# Patient Record
Sex: MALE | Race: WHITE | NOT HISPANIC OR LATINO | Employment: OTHER | URBAN - NONMETROPOLITAN AREA
[De-identification: names, ages, dates, MRNs, and addresses within clinical notes are randomized per-mention and may not be internally consistent; named-entity substitution may affect disease eponyms.]

---

## 2024-03-07 PROBLEM — R06.02 SOB (SHORTNESS OF BREATH) ON EXERTION: Status: ACTIVE | Noted: 2024-03-07

## 2024-03-07 PROBLEM — I82.409 DVT (DEEP VENOUS THROMBOSIS) (MULTI): Status: ACTIVE | Noted: 2024-03-07

## 2024-03-07 PROBLEM — R73.03 PREDIABETES: Status: ACTIVE | Noted: 2024-03-07

## 2024-03-07 PROBLEM — I42.8 NON-ISCHEMIC CARDIOMYOPATHY (MULTI): Status: ACTIVE | Noted: 2024-03-07

## 2024-03-07 PROBLEM — M17.11 PRIMARY OSTEOARTHRITIS OF RIGHT KNEE: Status: ACTIVE | Noted: 2024-03-07

## 2024-03-07 PROBLEM — I50.9: Status: ACTIVE | Noted: 2024-03-07

## 2024-03-07 PROBLEM — I51.7 LVH (LEFT VENTRICULAR HYPERTROPHY): Status: ACTIVE | Noted: 2024-03-07

## 2024-03-07 PROBLEM — R94.39 ABNORMAL STRESS TEST: Status: ACTIVE | Noted: 2024-03-07

## 2024-03-07 PROBLEM — M17.12 PRIMARY OSTEOARTHRITIS OF LEFT KNEE: Status: ACTIVE | Noted: 2024-03-07

## 2024-03-07 PROBLEM — G47.33 OSA ON CPAP: Status: ACTIVE | Noted: 2024-03-07

## 2024-03-07 RX ORDER — NABUMETONE 750 MG/1
1 TABLET, FILM COATED ORAL EVERY 12 HOURS
COMMUNITY
Start: 2021-02-24 | End: 2024-05-08 | Stop reason: ALTCHOICE

## 2024-03-07 RX ORDER — CARVEDILOL 6.25 MG/1
1 TABLET ORAL
COMMUNITY

## 2024-03-07 RX ORDER — SACUBITRIL AND VALSARTAN 24; 26 MG/1; MG/1
1 TABLET, FILM COATED ORAL 2 TIMES DAILY
COMMUNITY
Start: 2022-07-06

## 2024-03-07 RX ORDER — SPIRONOLACTONE 25 MG/1
1 TABLET ORAL DAILY
COMMUNITY
Start: 2022-07-06

## 2024-03-07 RX ORDER — SEMAGLUTIDE 1.34 MG/ML
1 INJECTION, SOLUTION SUBCUTANEOUS WEEKLY
COMMUNITY

## 2024-05-08 ENCOUNTER — OFFICE VISIT (OUTPATIENT)
Dept: CARDIOLOGY | Facility: CLINIC | Age: 69
End: 2024-05-08
Payer: MEDICARE

## 2024-05-08 VITALS
WEIGHT: 315 LBS | BODY MASS INDEX: 44.1 KG/M2 | HEART RATE: 78 BPM | SYSTOLIC BLOOD PRESSURE: 110 MMHG | DIASTOLIC BLOOD PRESSURE: 70 MMHG | HEIGHT: 71 IN

## 2024-05-08 DIAGNOSIS — I42.8 NON-ISCHEMIC CARDIOMYOPATHY (MULTI): Primary | ICD-10-CM

## 2024-05-08 DIAGNOSIS — G47.33 OSA ON CPAP: ICD-10-CM

## 2024-05-08 PROCEDURE — 3008F BODY MASS INDEX DOCD: CPT | Performed by: NURSE PRACTITIONER

## 2024-05-08 PROCEDURE — 1159F MED LIST DOCD IN RCRD: CPT | Performed by: NURSE PRACTITIONER

## 2024-05-08 PROCEDURE — 1160F RVW MEDS BY RX/DR IN RCRD: CPT | Performed by: NURSE PRACTITIONER

## 2024-05-08 PROCEDURE — 99213 OFFICE O/P EST LOW 20 MIN: CPT | Performed by: NURSE PRACTITIONER

## 2024-05-08 RX ORDER — FUROSEMIDE 40 MG/1
40 TABLET ORAL 2 TIMES DAILY
COMMUNITY

## 2024-05-08 NOTE — PATIENT INSTRUCTIONS
Please bring all medicines, vitamins, and herbal supplements with you when you come to the office.    Prescriptions will not be filled unless you are compliant with your follow up appointments or have a follow up appointment scheduled as per instruction of your physician. Refills should be requested at the time of your visit.     PLAN:   Through informed decision making process incorporating patients unique circumstances, the following treatment plan will be initiated:    1.  Prescription drug management of cardiovascular medication for efficacy, adherence to treatment, side effect assessment and polypharmacy. Current treatment clinically warranted and to continue without modifications.    2.  Please complete patient assistance forms and return to office    3. Return for follow-up; in the interim, contact the office if new symptoms arise.  Dr. Caraballo 9 months

## 2024-05-08 NOTE — LETTER
"May 9, 2024     Danay Harkins DO  [address]  [place] [address]    Patient: Reuben Black   YOB: 1955   Date of Visit: 5/8/2024       Dear Dr. Danay Harkins DO:    Thank you for referring Reuben Black to me for evaluation. Below are my notes for this consultation.  If you have questions, please do not hesitate to call me. I look forward to following your patient along with you.       Sincerely,     Liliana Maynard, APRN-CNP      CC: No Recipients  ______________________________________________________________________________________    Chief Complaint  \"Doing good\"    Reason for Visit  9-month follow-up.  Patient presents to the office today for outpatient follow-up for nonischemic cardiomyopathy.  Last evaluated in clinic by Dr. Caraballo July 2023.    Presents today ambulatory with cane and steady gait.  Accompanied by patient  Patient denies any hospitalizations or significant changes to interval medical history since last office follow-up.   He follows routinely with PCP, is due to get annual lab work later this year.  Also follows routinely with the wound clinic.    History of Present Illness   Patient is a very pleasant 69-year-old gentleman who presents to the office today with no voiced cardiovascular complaints.  He continues to follow at the wound clinic due to right lower extremity ulcers, has noted some slight increasing lower extremity edema.  From an activity standpoint he is able to go to [employer], walks into the casino with no change in his functional class II shortness of breath.  He denies any exertional chest pain.  No orthopnea or PND.  Remains compliant with CPAP treatment.    Only concern today is cost of medications.  Apparently has now in the donut hole.  He is on Xarelto due to a prior right lower extremity DVT.    He has some slight increase in his lower extremity edema.  Due to office visits he has not been taking his Lasix twice daily.  Is " "unable to tolerate compression stockings.    Lower extremity wounds, managed by wound clinic.  Denies any prior history of PAD.    Patient reports that overall has no complaint(s) of chest pain, chest pressure/discomfort, claudication, dyspnea, exertional chest pressure/discomfort, fatigue, and irregular heart beat    Review of Systems   Cardiovascular:  Positive for leg swelling. Negative for chest pain, dyspnea on exertion, irregular heartbeat, near-syncope, orthopnea, palpitations, paroxysmal nocturnal dyspnea and syncope.        Visit Vitals  /70 (BP Location: Left arm, Patient Position: Sitting)   Pulse 78   Ht 1.803 m (5' 11\")   Wt (!) 171 kg (378 lb)   BMI 52.72 kg/m²   Smoking Status Never   BSA 2.93 m²     Physical Exam  Vitals and nursing note reviewed.   Constitutional:       Appearance: Normal appearance.   Cardiovascular:      Rate and Rhythm: Normal rate and regular rhythm.      Heart sounds: Normal heart sounds.   Pulmonary:      Effort: Pulmonary effort is normal.      Breath sounds: Normal breath sounds.   Musculoskeletal:      Cervical back: Full passive range of motion without pain.      Right lower le+ Pitting Edema present.      Left lower le+ Pitting Edema present.   Skin:     General: Skin is cool.   Neurological:      Mental Status: He is alert and oriented to person, place, and time.   Psychiatric:         Attention and Perception: Attention normal.         Mood and Affect: Mood normal.         Behavior: Behavior is cooperative.        Allergies   Allergen Reactions   • Cephalosporins Rash   • Diclofenac Sodium Rash   • Statins-Hmg-Coa Reductase Inhibitors Rash     Current Outpatient Medications   Medication Instructions   • carvedilol (Coreg) 6.25 mg tablet 1 tablet, oral, 2 times daily with meals   • empagliflozin (Jardiance) 10 mg 1 tablet, oral, Daily   • furosemide (LASIX) 40 mg, oral, 2 times daily   • Ozempic 1 mg, subcutaneous, Weekly   • rivaroxaban (Xarelto) 10 mg " tablet 1 tablet, oral, Daily   • sacubitriL-valsartan (Entresto) 24-26 mg tablet 1 tablet, oral, 2 times daily   • spironolactone (Aldactone) 25 mg tablet 1 tablet, oral, Daily      Assessment:    Pleasant 69-year-old gentleman presents for routine follow-up.  Nonischemic cardiomyopathy LVEF 42% baseline functional class II stage C on highest tolerated dose of guideline directed medical treatment.  Will provide with patient assistance forms due to cost constraints: Discussed that if needed could transition back over to Cozaar, price check Farxiga.    Overall patient is pleased with current state of cardiovascular health.  At this time there are no indications for additional cardiovascular testing or need for medication changes.     Non-ischemic cardiomyopathy (Multi)  NICM HF bordeline EF 42% Feb 2023 MUGA (2009 cath minimal CAD EF 40-45%)  FC II Stage C    GDMT  Coreg  Jardiance  Entresto  Aldactone    BMI 50.0-59.9, adult (Multi)  Reviewed the merits of healthy lifestyle choices on overall cardiovascular health.  He had lost weight with Ozempic in the past, I believe there was some difficulty obtaining the prescription.  Just recently resumed.    Plan:     Through informed decision making process incorporating patients unique circumstances, the following treatment plan will be initiated:    1.  Prescription drug management of cardiovascular medication for efficacy, adherence to treatment, side effect assessment and polypharmacy. Current treatment clinically warranted and to continue without modifications.    2.  Please complete patient assistance forms and return to office    3. Return for follow-up; in the interim, contact the office if new symptoms arise.  Dr. Caraballo 9 months    Liliana Maynard  MSN, APRN-CNP, PMHNP-BC  [facility]    Please excuse any errors in grammar or translation related to this dictation. Voice recognition software was utilized to prepare this document.

## 2024-05-09 ASSESSMENT — ENCOUNTER SYMPTOMS
SYNCOPE: 0
PALPITATIONS: 0
IRREGULAR HEARTBEAT: 0
NEAR-SYNCOPE: 0
DYSPNEA ON EXERTION: 0
ORTHOPNEA: 0
PND: 0

## 2024-05-09 NOTE — ASSESSMENT & PLAN NOTE
NICM HF bordeline EF 42% Feb 2023 MUGA (2009 cath minimal CAD EF 40-45%)  FC II Stage C    GDMT  Coreg  Jardiance  Entresto  Aldactone

## 2024-05-09 NOTE — ASSESSMENT & PLAN NOTE
Reviewed the merits of healthy lifestyle choices on overall cardiovascular health.  He had lost weight with Ozempic in the past, I believe there was some difficulty obtaining the prescription.  Just recently resumed.

## 2024-05-09 NOTE — PROGRESS NOTES
"Chief Complaint  \"Doing good\"    Reason for Visit  9-month follow-up.  Patient presents to the office today for outpatient follow-up for nonischemic cardiomyopathy.  Last evaluated in clinic by Dr. Caraballo July 2023.    Presents today ambulatory with cane and steady gait.  Accompanied by patient  Patient denies any hospitalizations or significant changes to interval medical history since last office follow-up.   He follows routinely with PCP, is due to get annual lab work later this year.  Also follows routinely with the wound clinic.    History of Present Illness   Patient is a very pleasant 69-year-old gentleman who presents to the office today with no voiced cardiovascular complaints.  He continues to follow at the wound clinic due to right lower extremity ulcers, has noted some slight increasing lower extremity edema.  From an activity standpoint he is able to go to [employer], walks into the casino with no change in his functional class II shortness of breath.  He denies any exertional chest pain.  No orthopnea or PND.  Remains compliant with CPAP treatment.    Only concern today is cost of medications.  Apparently has now in the donut hole.  He is on Xarelto due to a prior right lower extremity DVT.    He has some slight increase in his lower extremity edema.  Due to office visits he has not been taking his Lasix twice daily.  Is unable to tolerate compression stockings.    Lower extremity wounds, managed by wound clinic.  Denies any prior history of PAD.    Patient reports that overall has no complaint(s) of chest pain, chest pressure/discomfort, claudication, dyspnea, exertional chest pressure/discomfort, fatigue, and irregular heart beat    Review of Systems   Cardiovascular:  Positive for leg swelling. Negative for chest pain, dyspnea on exertion, irregular heartbeat, near-syncope, orthopnea, palpitations, paroxysmal nocturnal dyspnea and syncope.        Visit Vitals  /70 (BP Location: Left arm, " "Patient Position: Sitting)   Pulse 78   Ht 1.803 m (5' 11\")   Wt (!) 171 kg (378 lb)   BMI 52.72 kg/m²   Smoking Status Never   BSA 2.93 m²     Physical Exam  Vitals and nursing note reviewed.   Constitutional:       Appearance: Normal appearance.   Cardiovascular:      Rate and Rhythm: Normal rate and regular rhythm.      Heart sounds: Normal heart sounds.   Pulmonary:      Effort: Pulmonary effort is normal.      Breath sounds: Normal breath sounds.   Musculoskeletal:      Cervical back: Full passive range of motion without pain.      Right lower le+ Pitting Edema present.      Left lower le+ Pitting Edema present.   Skin:     General: Skin is cool.   Neurological:      Mental Status: He is alert and oriented to person, place, and time.   Psychiatric:         Attention and Perception: Attention normal.         Mood and Affect: Mood normal.         Behavior: Behavior is cooperative.        Allergies   Allergen Reactions    Cephalosporins Rash    Diclofenac Sodium Rash    Statins-Hmg-Coa Reductase Inhibitors Rash     Current Outpatient Medications   Medication Instructions    carvedilol (Coreg) 6.25 mg tablet 1 tablet, oral, 2 times daily with meals    empagliflozin (Jardiance) 10 mg 1 tablet, oral, Daily    furosemide (LASIX) 40 mg, oral, 2 times daily    Ozempic 1 mg, subcutaneous, Weekly    rivaroxaban (Xarelto) 10 mg tablet 1 tablet, oral, Daily    sacubitriL-valsartan (Entresto) 24-26 mg tablet 1 tablet, oral, 2 times daily    spironolactone (Aldactone) 25 mg tablet 1 tablet, oral, Daily      Assessment:    Pleasant 69-year-old gentleman presents for routine follow-up.  Nonischemic cardiomyopathy LVEF 42% baseline functional class II stage C on highest tolerated dose of guideline directed medical treatment.  Will provide with patient assistance forms due to cost constraints: Discussed that if needed could transition back over to Cozaar, price check Farxiga.    Overall patient is pleased with current " state of cardiovascular health.  At this time there are no indications for additional cardiovascular testing or need for medication changes.     Non-ischemic cardiomyopathy (Multi)  NICM HF bordeline EF 42% Feb 2023 MUGA (2009 cath minimal CAD EF 40-45%)  FC II Stage C    GDMT  Coreg  Jardiance  Entresto  Aldactone    BMI 50.0-59.9, adult (Multi)  Reviewed the merits of healthy lifestyle choices on overall cardiovascular health.  He had lost weight with Ozempic in the past, I believe there was some difficulty obtaining the prescription.  Just recently resumed.    Plan:     Through informed decision making process incorporating patients unique circumstances, the following treatment plan will be initiated:    1.  Prescription drug management of cardiovascular medication for efficacy, adherence to treatment, side effect assessment and polypharmacy. Current treatment clinically warranted and to continue without modifications.    2.  Please complete patient assistance forms and return to office    3. Return for follow-up; in the interim, contact the office if new symptoms arise.  Dr. Caraballo 9 months    Liliana Maynard  MSN, APRN-CNP, PMHNP-BC  [facility]    Please excuse any errors in grammar or translation related to this dictation. Voice recognition software was utilized to prepare this document.

## 2024-07-16 DIAGNOSIS — R73.03 PREDIABETES: ICD-10-CM

## 2024-07-22 DIAGNOSIS — I50.9 HEART FAILURE, NYHA CLASS 2 (MULTI): ICD-10-CM

## 2024-07-23 RX ORDER — SPIRONOLACTONE 25 MG/1
25 TABLET ORAL DAILY
Qty: 90 TABLET | Refills: 3 | Status: SHIPPED | OUTPATIENT
Start: 2024-07-23 | End: 2025-07-23

## 2024-08-19 NOTE — W.VEIN
Discharge Plan
Discharge
Disposition: Home, Self-Care

Outpatient Diagnostics:
VC Endovenous Ablation 1VeinRT  (Routine)  Timeframe:  1 Month
   Facility: [facility] - Location: [facility]
   Ordered By:  Alonso Nation

Follow Up Appointments: 09/11/2024 


Plan of Treatment:
EVLT of right AASV

Patient Instructions:  Endovenous Ablation (DC)

Print Language: English

Discharge Date/Time: 08/20/24 12:07

## 2024-08-19 NOTE — P.DS_ITS
Discharge Plan    
Discharge    
Disposition: Home, Self-Care    
    
Outpatient Diagnostics:    
VC Endovenous Ablation 1VeinRT  (Routine)  Timeframe:  1 Month    
   Facility: [facility] - Location: [facility]    
   Ordered By:  Alonso Nation    
    
Follow Up Appointments: 09/11/2024     
    
    
Plan of Treatment:    
EVLT of right AASV    
    
Patient Instructions:  Endovenous Ablation (DC)    
    
Print Language: English    
    
Discharge Date/Time: 08/20/24 12:07

## 2024-08-19 NOTE — V.VEINS.HP
Vital Signs
 08/20/24
10:31
Height 5 ft 11 in
Weight 166.922 kg
BMI 51.3
/74
BP Location Left Brachial
BP Position Sitting
BP Cuff Size Adult
BP Source Manual Cuff
Respiration 20
Pulse 75
Pulse Source Monitor
Pulse Oximetry (%) 94 L
Oxygen Delivery Method Room Air
Comment The patient's blood pressure is elevated.

Varicose Veins
Patient is a 69 year old male in this day as a referral from Elaine Espino NP at [facility] secondary to cellulitis to right lower leg along with multiple slow healing wounds to right lower anterior and posterior lower leg which initially 
started in May 2024 after his C-Pap machine fell on his right leg.  Patient has since worn an uniboot to right leg with little to no progress in healing right leg ulcers.  Patient previous to this has had varicose vein disease and treatments 
performed by Dr. Cameron to bilateral leg varicose veins.  Patient also experienced a spontaneous DVT to right lower leg.  Patient has worn bilateral leg knee high compression stockings >5years.  Patient states no family history of varicose vein 
disease.  Patient c/o bilateral leg pain, edema, bulging veins, heaviness.  Patient rates pain at a  10  on a scale of 1-10, with right leg worse than left leg.




IAlonso MD personally performed the services described in this documentation, as scribed by Khang Bahena RN in my presence and it is both accurate and complete.



IKhang RN, am scribing for, and in the presence of, Dr. Alonso Nation and in the presence of the patient.



.




thigh: bilateral (right leg > left leg), knee: bilateral, calf: bilateral, ankle: bilateral and shin: bilateral
aching, burning, cramping, dull and tender
10
3 years
Worsened in recent months: Yes (last 5 months)
standing, sitting and walking
analgesics (Tylenol), elevating extremities, compression stockings and wraps
Reports muscle spasms of leg, fatigue, heaviness, limb pain, edema and leg edema
History of lower extremity trauma: Yes (a piece of cpap machine fell and hit right lower leg resulting in hemtoma)
Superficial thrombophlebitis: Yes (DVT right leg)
Family history of varicose veins: no
Has patient had previous lower extremity venous surgery: Yes
Patient has previously received the following treatment(s) for lower extremity varicose veins: Reports foam therapy and laser therapy
Does patient have a history of pregnancy: no
Has patient had lower extremity venous scan with relux testing: Yes
Support hose used: Yes
Problems walking or doing physical activity: Yes
How does it affect you: pain decreases ability to walk
Do you walk much: No
Do you stand much: Yes

Review of Systems
ROS  
 Narrative [id number] not well approximated wound to right mid-distal anterior lower leg




IAlonso MD personally performed the services described in this documentation, as scribed by Khang Bahena RN in my presence and it is both accurate and complete.



I, Khang Bahena RN, am scribing for, and in the presence of, Dr. Alonso Nation and in the presence of the patient.
 
.   
 Status of ROS 10 or more systems reviewed and unremarkable except as noted in history and below   
 Cardiovascular Reports: edema   
 Integumentary/Breast Reports: itching, redness, skin pain, skin tenderness, skin swelling, non-healing lesion and changes in skin color   
 Neurological Reports: numbness in extremities and weakness in extremities   
 Hematologic/Lymphatic Reports: easy bruising and easy bleeding   

PFSH
PFS
Medical History (Updated 08/20/24 @ 10:58 by Khang Bahena)

Varicose veins of bilateral lower extremities with pain
 ?I83.813 - Varicose veins of bilateral lower extremities with pain (ICD-10)
Lymphedema of both lower extremities
 ?I89.0 - Lymphedema, not elsewhere classified (ICD-10)
Arthritis
 ?M19.90 - Unspecified osteoarthritis, unspecified site (ICD-10)
Hypertension
 ?I10 - Essential (primary) hypertension (ICD-10)
Deep vein thrombosis
 ?I82.409 - Acute embolism and thrombosis of unspecified deep veins of unspecified lower extremity (ICD-10)
Cardiomyopathy
 ?I42.9 - Cardiomyopathy, unspecified (ICD-10)
Carpal tunnel syndrome
 ?G56.00 - Carpal tunnel syndrome, unspecified upper limb (ICD-10)
Peripheral vascular disease
 ?I73.9 - Peripheral vascular disease, unspecified (ICD-10)
Ulcer of right leg
 ?L97.919 - Non-pressure chronic ulcer of unspecified part of right lower leg with unspecified severity (ICD-10)
Coronary artery disease
 ?I25.10 - Atherosclerotic heart disease of native coronary artery without angina pectoris (ICD-10)
CHF (congestive heart failure)
 ?I50.9 - Heart failure, unspecified (ICD-10)
Obstructive sleep apnea
 ?G47.33 - Obstructive sleep apnea (adult) (pediatric) (ICD-10)
Chronic back pain
 ?M54.9 - Dorsalgia, unspecified (ICD-10)
 ?G89.29 - Other chronic pain (ICD-10)
Postphlebetic syndrome with inflammation
 ?I87.029 - Postthrombotic syndrome with inflammation of unspecified lower extremity (ICD-10)
Type 2 diabetes mellitus
 ?E11.9 - Type 2 diabetes mellitus without complications (ICD-10)


Surgical History (Updated 08/20/24 @ 10:54 by Khang Bahena)

History of carpal tunnel surgery of right wrist
 ?Z98.890 - Other specified postprocedural states (ICD-10)
History of cholecystectomy
 ?Z90.49 - Acquired absence of other specified parts of digestive tract (ICD-10)


Family History (Updated 08/19/24 @ 11:42 by Khang Bahena)
Sister
 Family history of diabetes mellitus
Father
 Family history of diabetes mellitus
Other
 Family history of CHF (congestive heart failure)
 Family history of hypertension
 Family history of myocardial infarction



Social History (Updated 08/20/24 @ 10:55 by Khang Bahena)
Within the past year, how often did you have a drink containing alcohol:  never 
Score interpretation:  A score less than 4 is consistent with normal alcohol consumption. 
Smoking status:  Never smoker 
Non-prescribed substance use:  denies use 



Meds
Home Medications and Allergies
Home Medications

?Medication ?Instructions ?Recorded ?Confirmed ?Type
Lactobacillus acidophilus 10 100 mmu cells PO DAILY 08/19/24 08/19/24 History
billion cell capsule    
acetaminophen 500 mg capsule 500 mg PO Q6H PRN pain 08/19/24 08/19/24 History
carvedilol 6.25 mg tablet 6.25 mg PO BID 08/19/24 08/19/24 History
empagliflozin 10 mg-linagliptin 5 1 tab PO DAILY 08/19/24 08/19/24 History
mg tablet    
furosemide 40 mg tablet 40 mg PO BID 08/19/24 08/19/24 History
rivaroxaban 10 mg tablet 10 mg PO DAILY 08/19/24 08/19/24 History
sacubitril 24 mg-valsartan 26 mg 1 tab PO BID 08/19/24 08/19/24 History
tablet    
semaglutide 0.25 mg or 0.5 mg (2 0.25 mg subcut QWEEK 08/19/24 08/19/24 History
mg/3 mL) subcutaneous pen injector    
(Ozempic)    
spironolactone 25 mg tablet 25 mg PO DAILY 08/19/24 08/19/24 History
(Aldactone)    


Allergies

Allergy/AdvReac Type Severity Reaction Status Date / Time
cefixime Allergy  Unknown Verified 08/19/24 11:44
diclofenac Allergy  Unknown Verified 08/19/24 11:44



Exam
Narrative
Exam Narrative: 
[id number] not well approximated wound noted to right mid-distal anterior lower leg


Alonso TOSCANO MD personally performed the services described in this documentation, as scribed by Khang Bahena RN in my presence and it is both accurate and complete.



IKhang RN, am scribing for, and in the presence of, Dr. Alonso Nation and in the presence of the patient.
Constitutional
Documenting provider has reviewed patient's vital signs: yes
Common normals: oriented x3
Nutritional appearance: overweight
Cardio
Peripheral pulses: posterior tibial pulses present and dorsalis pedis pulses present
Extremity
Common normals: normal capillary refill
General: calf tenderness and edema
Right lower extremity: lower leg Right lower leg: inspection and palpation
Left lower extremity: lower leg Left lower leg: inspection and palpation
Neuro
Common normals: oriented x3

Results
Additional Findings
Additional findings: 
Bilateral leg reflux u/s reveals moderate venous insufficiency with saphenofemoral junction reflux and dilatation right AASV, incompetent perforating vein right right leg subjacent to ulcerations, bilateral leg incompetent truncal tributary branch 
saphenous varicosities.



Alonso TOSCANO MD personally performed the services described in this documentation, as scribed by Khang Bahena RN in my presence and it is both accurate and complete.



Khang TOSCANO RN, am scribing for, and in the presence of, Dr. Alonso Nation and in the presence of the patient.

Assessment and Plan
Assessment and Plan
(1) Varicose veins of bilateral lower extremities with pain: 

Plan
Dr. Nation examines patient and reviews results of bilateral leg reflux u/s.  Patient and Dr. Nation create plan of care.
Plan is for patient to continue us of bilateral leg compression stockings, rest, and elevation of bilateral legs/feet.
Patient to return for EVLT of right AASV, right leg perforating veins, and lastly microfoam chemical ablation bilateral leg branch saphenous varicosities. 


Alonso TOSCANO MD personally performed the services described in this documentation, as scribed by Khang Bahena RN in my presence and it is both accurate and complete.



I, Khang Bahena RN, am scribing for, and in the presence of, Dr. Alonso Nation and in the presence of the patient.

## 2024-08-20 ENCOUNTER — HOSPITAL ENCOUNTER
Dept: HOSPITAL 101 - VC | Age: 69
Discharge: HOME | End: 2024-08-20
Payer: MEDICARE

## 2024-08-20 VITALS — SYSTOLIC BLOOD PRESSURE: 120 MMHG | HEART RATE: 75 BPM | DIASTOLIC BLOOD PRESSURE: 74 MMHG | OXYGEN SATURATION: 94 %

## 2024-08-20 VITALS — BODY MASS INDEX: 51.3 KG/M2

## 2024-08-20 DIAGNOSIS — I89.0: ICD-10-CM

## 2024-08-20 DIAGNOSIS — Z87.2: ICD-10-CM

## 2024-08-20 DIAGNOSIS — R60.0: Primary | ICD-10-CM

## 2024-08-20 PROCEDURE — G0463 HOSPITAL OUTPT CLINIC VISIT: HCPCS

## 2024-08-20 PROCEDURE — 93970 EXTREMITY STUDY: CPT

## 2024-08-20 NOTE — VEIN_ITS
Patient Name:      
JOCELIN ARORA 
      
MR#: [id number]      
: 1955 
Accession #: [id number] 
Exam Date: 2024  
Ordering Doctor: JAMEY ESPINO 
  
RADIOLOGY REPORT 
  
PROCEDURE:     [facility] COMPREHENSIVE 
  
VEIN CENTER - OFFICE VISIT INITIAL 
  
COMPARISON: None. 
  
PROGRESS NOTES: 
  
69-year-old male sent from [facility] Espino at the [facility].  The  
patient had trauma to the right lower leg when his CPAP machine fell on him in  
May of 2024. The patient developed multiple leg ulcerations which have not  
healed.  The patient did have cellulitis treated with oral antibiotics.  The  
patient complains of bilateral leg pain and swelling, right much worse than  
the left.  The patient's symptoms are exacerbated by prolonged sitting and  
standing and only partially relieved by rest, leg elevation and compression  
stockings which she has worn for approximately 5 years.  The patient has  
previously been treated by gretchen meyer with intravenous laser ablation of  
his great saphenous veins.  The patient denies any signs and symptoms to  
suggest arterial ischemia. 
  
The patient describes a family history of diabetes congestive heart failure,  
hypertension and myocardial infarction.  Patient's past medical history is  
extensive with significant medical history including bilateral lower extremity  
lymphedema, arthritis, deep vein thrombosis, cardiomyopathy with coronary  
artery disease and congestive heart failure.  Post phlebitic syndrome with  
inflammation.  Past surgical history for carpal tunnel syndrome and  
cholecystectomy.  Type 2 diabetes.  The patient does not drink alcohol and has  
never smoked.  No substance or prescription abuse.  
  
See separate history and physical for medication list.  Nursing notes were  
reviewed 
  
After review of nurse notes, history and physical exam I discussed at length  
the pathophysiology of venous hypertension and possible treatments, therapies  
and strategies available. We discussed at length the importance of elevating  
the lower extremities above the level of the heart, increased physical  
activity and compression stocking use.  I discussed with the patient that his  
symptoms were likely multifactorial related to congestive heart failure  
lymphedema with a tributary sector of venous disease.  Treatment of his vein  
disease but hopefully allow healing of his ulcerations but would not likely  
resolve all of his symptoms. 
  
Ultrasound venous reflux study performed the same day was discussed at length  
with the patient. The report demonstrates moderate dilation of venous  
insufficiency of the right anterior accessory saphenous vein.  Multiple  
incompetent perforating veins in the region the patient's right leg venous  
stasis ulcerations period bilateral incompetent varicose veins. 
  
  
PHYSICAL EXAM: 
  
The right leg demonstrates diffuse subcutaneous edema.  Scattered varicose  
reticular and spider veins.  Hemosiderin staining.  Marked erythema mid calf  
to ankle with multiple skin ulcerations the largest measuring 5 cm in diameter  
along the mid to distal anterolateral lower leg.  Multiple small or  
ulcerations are observed.   
  
The left leg demonstrates scattered varicose, reticular and spider veins.   
Hemosiderin staining.  No skin ulceration. 
  
Both thighs, legs and feet were symmetrically warm to the touch. Good  
posterior tibial and dorsalis pedis pulses were not definitively palpated. 
  
ORDER #: [id number] VEIN/VC Facility EST Comprehensive  
IMPRESSION:  
   
1.  Right anterior accessory saphenous and perforating vein venous   
insufficiency   
2.  Moderate bilateral lower extremity varicose veins  
3.  Moderate lower extremity subcutaneous edema, right greater than left   
4. Multiple right leg ulcerations measuring up to 5 cm  
5. CEAP: C6, Ep, Asp, Pr  
   
   
PLAN:  
1. Endovenous laser ablation of the right anterior accessory saphenous vein   
followed by right leg incompetent perforating veins  
2. Micro foam chemical ablation bilateral incompetent varicose veins  
3. Long-term use of bilateral knee or thigh-high 20-30 mm compression   
stockings  
4. Continue leg elevation with increased physical activity for symptomatic   
relief  
   
Nurse notes, history and physical were reviewed and confirmed, see attached   
forms.  
The nurse was present throughout the physical exam and consultation  
   
   
   
Dictated by: Alonso Nation MD on 2024 at 12:00       
Approved by: Alonso Nation MD on 2024 at 12:11

## 2024-08-20 NOTE — VEIN_ITS
Patient Name:      
JOCELIN ARORA 
      
MR#: [id number]      
: 1955 
Accession #: [id number] 
Exam Date: 2024  
Ordering Doctor: JAMEY RAMÍREZ 
  
RADIOLOGY REPORT 
  
PROCEDURE:     VC EXT VENOUS REFLUX TAMIKO LMTD 
  
COMPARISON:     None. 
  
INDICATIONS:     Z87.2, R60.0, I89.0 
  
TECHNIQUE:     Duplex imaging of the lower extremity to assess the deep and  
superficial venous system for the presence of deep or superficial venous  
incompetence and to document the location and severity of disease.  The study  
includes evaluation of the great saphenous vein (GSV), anterior accessory  
saphenous vein (AASV) and small saphenous vein (SSV).  Patient scanned in  
reverse Trendelenburg and standing. 
FINDINGS:      
RIGHT LOWER EXTREMITY: 
Saphenofemoral Junction Reflux: Yes 11.9mm 2.9 sec 
GSV:     Diam (mm)      Reflux/ Time (sec) 
Proximal Thigh          N/A    
Mid Thigh          N/A 
Distal Thigh          N/A 
Prox Calf          N/A 
Mid Calf          N/A    
Saphenopopliteal Junction Reflux:  3.5mm        No    
SSV:            
Proximal Calf     3.6     Yes   0.5 
Mid Calf     2.6     No    
AASV:            
Proximal Thigh     7.3     Yes   1.7 
Mid Thigh     8.0     Yes   0.8 
Distal Thigh              
Thrombi:     Chronic thrombus visualized in Pop V.       
Compressibility:     Normal       
Flow:     1.0s reflux visualized in Pop V.      
Preforator: 
Mid/med calf 6.6mm with 0.6s reflux. Mid/ant calf lateral to wound 4.5mm with  
0.7s reflux. Dist/lateral/posterior 3.8mm with 0.5s reflux.  
Tech Note: Incompetent AASV. Patent varicose vein prox/med calf 6.6mm with  
0.9s reflux. Patent varicose vein 4.8mm with 0.7s reflux. Patent varicose vein  
mid/med thigh 4.7mm with 0.9s reflux.  
  
  
  
  
LEFT LOWER EXTREMITY:  
Saphenofemoral Junction Reflux: No 6.5 mm sec 
GSV:     Diam (mm)     Reflux/Time (sec) 
Proximal  Thigh            N/A      
Mid   Thigh          N/A      
Distal  Thigh          N/A       
Prox  Calf          N/A      
Mid  Calf          N/A     
Saphenopopliteal Junction Relux:     2.7 mm     No      
SSV:           
Proximal  Calf     2.5     No   
Mid  Calf     2.2     No      
AASV:  Not present           
Proximal  Thigh                 
Mid  Thigh                 
Distal  Thigh                 
Thrombi:     No acute or chronic thrombus visualized      
Compressibility:     Normal       
Flow:     Normal       
: 
Dist/med calf 3.6mm with 0s reflux. Mid/med calf 3.7mm with 0s reflux. Mid/med  
thigh  5.6mm with 0.8s reflux.  
Tech Note: Patent varicose vein mid/med calf 3.7mm with 0.8s reflux. Arising  
off of mid/med thigh , is a patent varicose vein 5.2mm with 1.5s  
reflux.  
  
  
  
CONCLUSION:  
  
1. Moderate venous insufficiency with saphenofemoral junction reflux and  
dilatation right anterior accessory saphenous vein 
2. Incompetent perforating veins in the right leg subjacent to ulcerations 
3. Bilateral incompetent varicose veins with the left varicose veins arising  
off in incompetent perforating vein 
  
  
Dictated by: Alonso Nation MD on 2024 at 11:41      
Approved by: Alonso Nation MD on 2024 at 11:44

## 2024-09-09 NOTE — W.VEIN
Discharge Plan
Discharge
Disposition: Home, Self-Care

Outpatient Diagnostics:
VC Facility EST LMTD  (Routine)  Timeframe:  2 Weeks
   Facility: [facility] - Location: Vein Center
   Ordered By:  Alonso Nation
VC EXT Venous RT LMTD  (Routine)  Timeframe:  2 Weeks
   Facility: [facility] - Location: Vein Center
   Ordered By:  Alonso Nation

Follow Up Appointments: 09/18/2024 


Plan of Treatment:
f/u evaluation with physician along with right leg limited u/s

Patient Instructions:  Endovenous Ablation (DC)

Print Language: English

Discharge Date/Time: 09/11/24 13:50

## 2024-09-09 NOTE — VEINCLINIC_ITS
Vital Signs    
    
    
                                    09/11/24    
                                 13:39  09/11/24    
                                      13:49    
     
Height                             5 ft 11 in    
     
Weight                               166 kg    
     
BP                                   116/64     
     
BP Location                      Left Brachial     
     
BP Position                         Sitting     
     
BP Cuff Size                         Adult     
     
BP Source                         Manual Cuff     
     
Respiration                            20     
     
Pulse                                  82     
     
Pulse Source                        Monitor     
     
Pulse Oximetry (%)                     96     
     
Oxygen Delivery Method              Room Air     
    
    
Varicose Veins    
Patient in this day for EVLT of right GSV.    
    
    
    
    
Alonso TOSCNAO MD personally performed the services described in this   
documentation, as scribed by Khang Bahena RN in my presence and it is both accurate  
and complete.    
    
    
    
IKhang RN, am scribing for, and in the presence of, Dr. Alonso Nation and in   
the presence of the patient.    
    
    
    
.    
    
    
    
    
thigh: bilateral (right leg > left leg), knee: bilateral, calf: bilateral,   
ankle: bilateral and shin: bilateral    
aching, burning, cramping, dull and tender    
10    
3 years    
Worsened in recent months: Yes (last 5 months)    
standing, sitting and walking    
analgesics (Tylenol), elevating extremities, compression stockings and wraps    
Reports muscle spasms of leg, fatigue, heaviness, limb pain, edema and leg edema    
History of lower extremity trauma: Yes (a piece of cpap machine fell and hit   
right lower leg resulting in hemtoma)    
Superficial thrombophlebitis: Yes (DVT right leg)    
Family history of varicose veins: no    
Has patient had previous lower extremity venous surgery: Yes    
Patient has previously received the following treatment(s) for lower extremity   
varicose veins: Reports foam therapy and laser therapy    
Does patient have a history of pregnancy: no    
Has patient had lower extremity venous scan with relux testing: Yes    
Support hose used: Yes    
Problems walking or doing physical activity: Yes    
How does it affect you: pain decreases ability to walk    
Do you walk much: No    
Do you stand much: Yes    
    
Review of Systems    
    
    
ROS      
    
 Narrative No changes noted.    
    
    
    
    
Alonso TOSCANO MD personally performed the services described in this   
documentation, as scribed by Khang Bahena RN in my presence and it is both accurate  
and complete.    
    
    
    
IKhang RN, am scribing for, and in the presence of, Dr. Alonso Nation and in   
the presence of the patient.    
     
                                        .       
    
 Status of ROS                          10 or more systems reviewed and unremark    
able except as noted in     
history and below       
    
 Cardiovascular Reports: edema       
    
 Integumentary/Breast Reports: itching, redness, skin pain, skin tenderness,   
skin swelling, non-healing lesion and changes in skin color       
    
 Neurological Reports: numbness in extremities and weakness in extremities       
    
 Hematologic/Lymphatic Reports: easy bruising and easy bleeding       
    
    
[facility]    
Formerly [facility], [facility]    
Medical History (Updated 08/20/24 @ 10:58 by Khang Bahena)    
    
Varicose veins of bilateral lower extremities with pain    
   ?I83.813 - Varicose veins of bilateral lower extremities with pain (ICD-10)    
Lymphedema of both lower extremities    
   ?I89.0 - Lymphedema, not elsewhere classified (ICD-10)    
Arthritis    
   ?M19.90 - Unspecified osteoarthritis, unspecified site (ICD-10)    
Hypertension    
   ?I10 - Essential (primary) hypertension (ICD-10)    
Deep vein thrombosis    
   ?I82.409 - Acute embolism and thrombosis of unspecified deep veins of   
   unspecified lower extremity (ICD-10)    
Cardiomyopathy    
   ?I42.9 - Cardiomyopathy, unspecified (ICD-10)    
Carpal tunnel syndrome    
   ?G56.00 - Carpal tunnel syndrome, unspecified upper limb (ICD-10)    
Peripheral vascular disease    
   ?I73.9 - Peripheral vascular disease, unspecified (ICD-10)    
Ulcer of right leg    
   ?L97.919 - Non-pressure chronic ulcer of unspecified part of right lower leg   
   with unspecified severity (ICD-10)    
Coronary artery disease    
   ?I25.10 - Atherosclerotic heart disease of native coronary artery without   
   angina pectoris (ICD-10)    
CHF (congestive heart failure)    
   ?I50.9 - Heart failure, unspecified (ICD-10)    
Obstructive sleep apnea    
   ?G47.33 - Obstructive sleep apnea (adult) (pediatric) (ICD-10)    
Chronic back pain    
   ?M54.9 - Dorsalgia, unspecified (ICD-10)    
   ?G89.29 - Other chronic pain (ICD-10)    
Postphlebetic syndrome with inflammation    
   ?I87.029 - Postthrombotic syndrome with inflammation of unspecified lower   
   extremity (ICD-10)    
Type 2 diabetes mellitus    
   ?E11.9 - Type 2 diabetes mellitus without complications (ICD-10)    
    
    
Surgical History (Updated 09/11/24 @ 15:46 by Khang Bahena)    
    
Status post laser ablation of incompetent vein    
   ?Z98.890 - Other specified postprocedural states (ICD-10)    
History of carpal tunnel surgery of right wrist    
   ?Z98.890 - Other specified postprocedural states (ICD-10)    
History of cholecystectomy    
   ?Z90.49 - Acquired absence of other specified parts of digestive tract (ICD-  
   10)    
    
    
Family History (Updated 08/19/24 @ 11:42 by Khang Bahena)    
Sister   Family history of diabetes mellitus    
Father   Family history of diabetes mellitus    
Other   Family history of CHF (congestive heart failure)    
   Family history of hypertension    
   Family history of myocardial infarction    
    
    
    
Social History (Updated 08/20/24 @ 10:55 by Khang Bahena)    
Within the past year, how often did you have a drink containing alcohol:  never     
Score interpretation:  A score less than 4 is consistent with normal alcohol   
consumption.     
Smoking status:  Never smoker     
Non-prescribed substance use:  denies use     
    
    
    
Meds    
Home Medications and Allergies    
                                Home Medications    
    
    
    
?Medication ?Instructions ?Recorded ?Confirmed ?Type    
     
Lactobacillus acidophilus 10 100 mmu cells PO DAILY 08/19/24 08/19/24 History    
    
billion cell capsule        
     
acetaminophen 500 mg capsule 500 mg PO Q6H PRN pain 08/19/24 08/19/24 History    
     
carvedilol 6.25 mg tablet 6.25 mg PO BID 08/19/24 08/19/24 History    
     
empagliflozin 10 mg-linagliptin 5 1 tab PO DAILY 08/19/24 08/19/24 History    
    
mg tablet        
     
furosemide 40 mg tablet 40 mg PO BID 08/19/24 08/19/24 History    
     
rivaroxaban 10 mg tablet 10 mg PO DAILY 08/19/24 08/19/24 History    
     
sacubitril 24 mg-valsartan 26 mg 1 tab PO BID 08/19/24 08/19/24 History    
    
tablet        
     
semaglutide 0.25 mg or 0.5 mg (2 0.25 mg subcut QWEEK 08/19/24 08/19/24 History    
    
mg/3 mL) subcutaneous pen injector        
    
(Ozempic)        
     
spironolactone 25 mg tablet 25 mg PO DAILY 08/19/24 08/19/24 History    
    
(Aldactone)        
    
    
    
                                    Allergies    
    
    
    
Allergy/AdvReac Type Severity Reaction Status Date / Time    
     
cefixime Allergy  Unknown Verified 08/19/24 11:44    
     
diclofenac Allergy  Unknown Verified 08/19/24 11:44    
    
    
    
    
Exam    
Narrative    
Exam Narrative:     
No changes noted.    
    
    
Alonso TOSCANO MD personally performed the services described in this   
documentation, as scribed by Khang Bahena RN in my presence and it is both accurate  
and complete.    
    
    
    
IKhang RN, am scribing for, and in the presence of, Dr. Alonso Nation and in   
the presence of the patient.    
Constitutional    
Documenting provider has reviewed patient's vital signs: yes    
Common normals: oriented x3    
Nutritional appearance: overweight    
Cardio    
Peripheral pulses: posterior tibial pulses present and dorsalis pedis pulses   
present    
Extremity    
Common normals: normal capillary refill    
General: calf tenderness and edema    
Right lower extremity: lower leg Right lower leg: inspection and palpation    
Left lower extremity: lower leg Left lower leg: inspection and palpation    
Neuro    
Common normals: oriented x3    
    
Assessment and Plan    
Assessment and Plan    
(1) Varicose veins of bilateral lower extremities with pain:     
    
Plan    
f/u evaluation with physician along with right leg limited u/s    
    
    
IAlonso MD personally performed the services described in this   
documentation, as scribed by Khang Bahena RN in my presence and it is both accurate  
and complete.    
    
    
    
IKhang RN, am scribing for, and in the presence of, Dr. Alonso Nation and in   
the presence of the patient.    
    
Procedures    
Procedure Instructions    
Procedures    
Plan of care:    
    
Risks and benefits of the procedure were discussed at length and informed   
written consent was obtained.? Time-out completed for verification of correct   
patient, procedure and site.?     
    
Staff present during time-out: Khang Bahena RN,? Alonso Nation MD, Maggie Barrios [facility],    
    
Time Out Time__0400_____    
Patient prepped and procedure performed in usual sterile fashion.    
Risk of injury related to use of Diode laser and/or laser devices__CR___    
    
??????????? Serial number of laser used :? [id number]    
    
Control panel self test performed, electrical cords in good condition, floor is   
dry, basin of water available, fire extinguisher in close proximity_CR__    
    
Polycarbonate goggles available and Laser warning signs outside of doors___CR__    
    
Eye protection provided to patient and staff in room_CR___    
    
Use of laser retardant drapes and dull blackened instruments as directed__CR___    
    
Use of nonflammable prep solutions and use of saline soaked sponges to protect   
tissues as indicated _CR___    
    
Length __29______ cm    
    
Laser operated by _Dr. Nation_________    
    
Physician verbal confirmation laser locked in place__CR__    
    
Laser start time (date and time) _09/11/2024@__1429______    
    
Laser stop time(date and time) __09/11/2024@___1433_____    
    
Adler _8.0___    
    
Average laser use __1567_____Joules    
    
Average laser use___196_____seconds    
    
Pulse continuous ___CR_???????? Pulse intermittent ___    
    
Amount of Tumescent used _250cc_____    
    
Evaluated patient for signs and symptoms of electrical injury __CR___    
    
??????????? Skin clear at insertion site __CR___    
    
    
    
Patient tolerated procedure well.? Left leg Coban dressing applied to access   
site.? Applied Left thigh high leg compression stocking.    
    
Will return on 09/18/2024 for Left leg limited venous ultrasound and exam.

## 2024-09-09 NOTE — P.DS_ITS
Discharge Plan    
Discharge    
Disposition: Home, Self-Care    
    
Outpatient Diagnostics:    
VC Facility EST LMTD  (Routine)  Timeframe:  2 Weeks    
   Facility: [facility] - Location: Vein Center    
   Ordered By:  Alonso Nation    
VC EXT Venous RT LMTD  (Routine)  Timeframe:  2 Weeks    
   Facility: [facility] - Location: Vein Center    
   Ordered By:  Alonso Nation    
    
Follow Up Appointments: 09/18/2024     
    
    
Plan of Treatment:    
f/u evaluation with physician along with right leg limited u/s    
    
Patient Instructions:  Endovenous Ablation (DC)    
    
Print Language: English    
    
Discharge Date/Time: 09/11/24 13:50

## 2024-09-09 NOTE — V.VEINS.HP
Vital Signs
 09/11/24
13:39 09/11/24
13:49
Height  5 ft 11 in
Weight  166 kg
/64 
BP Location Left Brachial 
BP Position Sitting 
BP Cuff Size Adult 
BP Source Manual Cuff 
Respiration 20 
Pulse 82 
Pulse Source Monitor 
Pulse Oximetry (%) 96 
Oxygen Delivery Method Room Air 

Varicose Veins
Patient in this day for EVLT of right GSV.




Alonso TOSCANO MD personally performed the services described in this documentation, as scribed by Khang Bahena RN in my presence and it is both accurate and complete.



IKhang RN, am scribing for, and in the presence of, Dr. Alonso Nation and in the presence of the patient.



.




thigh: bilateral (right leg > left leg), knee: bilateral, calf: bilateral, ankle: bilateral and shin: bilateral
aching, burning, cramping, dull and tender
10
3 years
Worsened in recent months: Yes (last 5 months)
standing, sitting and walking
analgesics (Tylenol), elevating extremities, compression stockings and wraps
Reports muscle spasms of leg, fatigue, heaviness, limb pain, edema and leg edema
History of lower extremity trauma: Yes (a piece of cpap machine fell and hit right lower leg resulting in hemtoma)
Superficial thrombophlebitis: Yes (DVT right leg)
Family history of varicose veins: no
Has patient had previous lower extremity venous surgery: Yes
Patient has previously received the following treatment(s) for lower extremity varicose veins: Reports foam therapy and laser therapy
Does patient have a history of pregnancy: no
Has patient had lower extremity venous scan with relux testing: Yes
Support hose used: Yes
Problems walking or doing physical activity: Yes
How does it affect you: pain decreases ability to walk
Do you walk much: No
Do you stand much: Yes

Review of Systems
ROS  
 Narrative No changes noted.




Alonso TOSCANO MD personally performed the services described in this documentation, as scribed by Khang Bahena RN in my presence and it is both accurate and complete.



IKhang RN, am scribing for, and in the presence of, Dr. Alonso Nation and in the presence of the patient.
 
.   
 Status of ROS 10 or more systems reviewed and unremarkable except as noted in history and below   
 Cardiovascular Reports: edema   
 Integumentary/Breast Reports: itching, redness, skin pain, skin tenderness, skin swelling, non-healing lesion and changes in skin color   
 Neurological Reports: numbness in extremities and weakness in extremities   
 Hematologic/Lymphatic Reports: easy bruising and easy bleeding   

PFSH
[facility]
Medical History (Updated 08/20/24 @ 10:58 by Khang Bahena)

Varicose veins of bilateral lower extremities with pain
 ?I83.813 - Varicose veins of bilateral lower extremities with pain (ICD-10)
Lymphedema of both lower extremities
 ?I89.0 - Lymphedema, not elsewhere classified (ICD-10)
Arthritis
 ?M19.90 - Unspecified osteoarthritis, unspecified site (ICD-10)
Hypertension
 ?I10 - Essential (primary) hypertension (ICD-10)
Deep vein thrombosis
 ?I82.409 - Acute embolism and thrombosis of unspecified deep veins of unspecified lower extremity (ICD-10)
Cardiomyopathy
 ?I42.9 - Cardiomyopathy, unspecified (ICD-10)
Carpal tunnel syndrome
 ?G56.00 - Carpal tunnel syndrome, unspecified upper limb (ICD-10)
Peripheral vascular disease
 ?I73.9 - Peripheral vascular disease, unspecified (ICD-10)
Ulcer of right leg
 ?L97.919 - Non-pressure chronic ulcer of unspecified part of right lower leg with unspecified severity (ICD-10)
Coronary artery disease
 ?I25.10 - Atherosclerotic heart disease of native coronary artery without angina pectoris (ICD-10)
CHF (congestive heart failure)
 ?I50.9 - Heart failure, unspecified (ICD-10)
Obstructive sleep apnea
 ?G47.33 - Obstructive sleep apnea (adult) (pediatric) (ICD-10)
Chronic back pain
 ?M54.9 - Dorsalgia, unspecified (ICD-10)
 ?G89.29 - Other chronic pain (ICD-10)
Postphlebetic syndrome with inflammation
 ?I87.029 - Postthrombotic syndrome with inflammation of unspecified lower extremity (ICD-10)
Type 2 diabetes mellitus
 ?E11.9 - Type 2 diabetes mellitus without complications (ICD-10)


Surgical History (Updated 09/11/24 @ 15:46 by Khang Bahena)

Status post laser ablation of incompetent vein
 ?Z98.890 - Other specified postprocedural states (ICD-10)
History of carpal tunnel surgery of right wrist
 ?Z98.890 - Other specified postprocedural states (ICD-10)
History of cholecystectomy
 ?Z90.49 - Acquired absence of other specified parts of digestive tract (ICD-10)


Family History (Updated 08/19/24 @ 11:42 by Khang Bahena)
Sister
 Family history of diabetes mellitus
Father
 Family history of diabetes mellitus
Other
 Family history of CHF (congestive heart failure)
 Family history of hypertension
 Family history of myocardial infarction



Social History (Updated 08/20/24 @ 10:55 by Khang Bahena)
Within the past year, how often did you have a drink containing alcohol:  never 
Score interpretation:  A score less than 4 is consistent with normal alcohol consumption. 
Smoking status:  Never smoker 
Non-prescribed substance use:  denies use 



Meds
Home Medications and Allergies
Home Medications

?Medication ?Instructions ?Recorded ?Confirmed ?Type
Lactobacillus acidophilus 10 100 mmu cells PO DAILY 08/19/24 08/19/24 History
billion cell capsule    
acetaminophen 500 mg capsule 500 mg PO Q6H PRN pain 08/19/24 08/19/24 History
carvedilol 6.25 mg tablet 6.25 mg PO BID 08/19/24 08/19/24 History
empagliflozin 10 mg-linagliptin 5 1 tab PO DAILY 08/19/24 08/19/24 History
mg tablet    
furosemide 40 mg tablet 40 mg PO BID 08/19/24 08/19/24 History
rivaroxaban 10 mg tablet 10 mg PO DAILY 08/19/24 08/19/24 History
sacubitril 24 mg-valsartan 26 mg 1 tab PO BID 08/19/24 08/19/24 History
tablet    
semaglutide 0.25 mg or 0.5 mg (2 0.25 mg subcut QWEEK 08/19/24 08/19/24 History
mg/3 mL) subcutaneous pen injector    
(Ozempic)    
spironolactone 25 mg tablet 25 mg PO DAILY 08/19/24 08/19/24 History
(Aldactone)    


Allergies

Allergy/AdvReac Type Severity Reaction Status Date / Time
cefixime Allergy  Unknown Verified 08/19/24 11:44
diclofenac Allergy  Unknown Verified 08/19/24 11:44



Exam
Narrative
Exam Narrative: 
No changes noted.


Alonso TOSCANO MD personally performed the services described in this documentation, as scribed by Khang Bahena RN in my presence and it is both accurate and complete.



IKhang RN, am scribing for, and in the presence of, Dr. Alonso Nation and in the presence of the patient.
Constitutional
Documenting provider has reviewed patient's vital signs: yes
Common normals: oriented x3
Nutritional appearance: overweight
Cardio
Peripheral pulses: posterior tibial pulses present and dorsalis pedis pulses present
Extremity
Common normals: normal capillary refill
General: calf tenderness and edema
Right lower extremity: lower leg Right lower leg: inspection and palpation
Left lower extremity: lower leg Left lower leg: inspection and palpation
Neuro
Common normals: oriented x3

Assessment and Plan
Assessment and Plan
(1) Varicose veins of bilateral lower extremities with pain: 

Plan
f/u evaluation with physician along with right leg limited u/s


I, Alonso Nation MD personally performed the services described in this documentation, as scribed by Khang Bahena RN in my presence and it is both accurate and complete.



IKhang RN, am scribing for, and in the presence of, Dr. Alonso Nation and in the presence of the patient.

Procedures
Procedure Instructions
Procedures
Plan of care:

Risks and benefits of the procedure were discussed at length and informed written consent was obtained.? Time-out completed for verification of correct patient, procedure and site.? 

Staff present during time-out: Khang Bahena RN,? Alonso Nation MD, Maggie Barrios [facility],

Time Out Time__9266_____
Patient prepped and procedure performed in usual sterile fashion.
Risk of injury related to use of Diode laser and/or laser devices__CR___

??????????? Serial number of laser used :? [id number]

Control panel self test performed, electrical cords in good condition, floor is dry, basin of water available, fire extinguisher in close proximity_CR__

Polycarbonate goggles available and Laser warning signs outside of doors___CR__

Eye protection provided to patient and staff in room_CR___

Use of laser retardant drapes and dull blackened instruments as directed__CR___

Use of nonflammable prep solutions and use of saline soaked sponges to protect tissues as indicated _CR___

Length __29______ cm

Laser operated by _Dr. Nation_________

Physician verbal confirmation laser locked in place__CR__

Laser start time (date and time) _09/11/2024@__1429______

Laser stop time(date and time) __09/11/2024@___1433_____

Adler _8.0___

Average laser use __1567_____Joules

Average laser use___196_____seconds

Pulse continuous ___CR_???????? Pulse intermittent ___

Amount of Tumescent used _250cc_____

Evaluated patient for signs and symptoms of electrical injury __CR___

??????????? Skin clear at insertion site __CR___



Patient tolerated procedure well.? Left leg Coban dressing applied to access site.? Applied Left thigh high leg compression stocking.

Will return on 09/18/2024 for Left leg limited venous ultrasound and exam.

## 2024-09-11 ENCOUNTER — HOSPITAL ENCOUNTER
Dept: HOSPITAL 101 - VC | Age: 69
Discharge: HOME | End: 2024-09-11
Payer: MEDICARE

## 2024-09-11 VITALS — HEART RATE: 82 BPM | DIASTOLIC BLOOD PRESSURE: 64 MMHG | OXYGEN SATURATION: 96 % | SYSTOLIC BLOOD PRESSURE: 116 MMHG

## 2024-09-11 DIAGNOSIS — I83.813: Primary | ICD-10-CM

## 2024-09-11 PROCEDURE — 36478 ENDOVENOUS LASER 1ST VEIN: CPT

## 2024-09-11 RX ADMIN — SODIUM CHLORIDE 0 ML: 900 INJECTION, SOLUTION INTRAVENOUS at 13:35

## 2024-09-11 RX ADMIN — LIDOCAINE HYDROCHLORIDE 10 ML: 10 INJECTION, SOLUTION INFILTRATION; PERINEURAL at 01:35

## 2024-09-11 NOTE — VEIN_ITS
The [address] [address] 
     [phone] 
  
  
Patient Name: 
JOCELIN ARORA 
  
MRN: [id number]    YOB: 1955    Sex: M 
Assigned Patient Location:  
Current Patient Location:  
Accession/Order Number: [id number] 
Exam Date: 9/11/2024  13:15    Report Date: 9/11/2024  15:33 
  
At the request of: 
NICK FERNANDEZ   
  
Procedure:  VC Endovenous Ablation 1VeinRT 
  
EXAMINATION: VC Endovenous Ablation 1Vein right leg  
  
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 
  
COMPARISON: No relevant comparison available.  
  
TECHNIQUE: 
  
The risks and benefits of the procedure had been previously discussed, and  
were  
rediscussed at length. Informed written consent was obtained. Maggie Barrios and Khang Bahena assisted.  
  
Time out procedure was performed. The right lower extremity was prepared and  
draped in the usual sterile fashion to allow knee flexion in the sterile  
field.  
Duplex ultrasound probe was draped in a sterile cover, sterile transmission  
gel  
was used. Venous mapping was performed with the areas of dilation and large  
tributaries marked.  
  
Access was obtained into the distal anterior accessory saphenous vein however  
the wire could not be advanced more than 5 cm past an area of suspected  
scarring or valvular damage. This was not felt to be adequate treatment with  
subsequent access into the vein above this level was unsuccessful. 
  
Access was then obtained in a large branch saphenous varicosities along the  
distal right leg for treatment of a large incompetent perforating vein with an  
  
associated large venous stasis ulcerations. 
  
The total length was 25 cm from the entry distal leg to mid to distal thigh.  
The diameter of the treated vein ranged from 5-7 mm with the incompetent  
perforating vein measuring 6 mm. A 30 gauge needle and 1% buffered lidocaine  
was used to anesthetize the entry site. A 4 mm incision was made with a  
scalpel  
and the vein was entered percutaneously under direct ultrasound guidance with  
a  
micropuncture set, a single stick was successful in gaining access.  
  
A micro-guide wire was inserted and the needle removed. A micro-set including  
a  
dilator was inserted over the microwire and the needle and dilator were  
removed. A 0.018 guide wire was inserted through the micro-set and threaded  
through the vein to the mid to distal medial thigh. The dilator was removed  
and  
an introducer sheath was inserted over the wire. The dilator and wire were  
removed and the 600 micron fiber was introduced and placed and positioned so  
that it extended beyond the sheath. Final position of the fiber was determined  
  
by ultrasound guidance and duplex imaging. 
  
Tumescent anesthetic was delivered by ultrasound guidance. 250 cc of fluid was  
  
delivered along the entire course of the treated vein. The solution consisted  
of 1000 cc of normal saline with 40 mL of 1% lidocaine and 20 mL of sodium  
bicarbonate. A final positioning check was made. 
  
The energy source was turned on by means of the foot pedal and the fiber and  
sheath were withdrawn. The total number of Joules delivered was 1567. The  
laser  
was active for 196 seconds under continuous pulse, average laser use of 8 J.  
Laser start time 1429 9/11/2024 . Laser stop time 1433 9/11/2024 .  
  
A duplex ultrasound revealed compressibility and flow at the saphenofemoral  
junction immediately after the procedure. Hemostasis at the access site was  
achieved. The skin incision of the saphenous vein was closed with a 4 x 4. A  
compression stocking was applied. Postop instructions were given. A follow up  
appointment was recommended and scheduled. The patient tolerated the procedure  
  
well and was discharged in good condition . 
  
ORDER #: [id number] VEIN/VC Endovenous Ablation 1VeinRT  
IMPRESSION:   
   
Technically successful ablation of a large incompetent right leg perforating   
vein  
   
   
Electronically authenticated by: NICK FERNANDEZ   Date: 9/11/2024  15:33

## 2024-09-18 ENCOUNTER — HOSPITAL ENCOUNTER
Age: 69
Discharge: HOME | End: 2024-09-18
Payer: MEDICARE

## 2024-09-18 VITALS — BODY MASS INDEX: 51 KG/M2

## 2024-09-18 DIAGNOSIS — I80.01: Primary | ICD-10-CM

## 2024-09-18 PROCEDURE — 93971 EXTREMITY STUDY: CPT

## 2024-09-18 PROCEDURE — G0463 HOSPITAL OUTPT CLINIC VISIT: HCPCS

## 2024-09-18 NOTE — W.VEIN
Discharge Plan
Discharge
Disposition: Home, Self-Care

Outpatient Diagnostics:
VC Endovenous Perf Ablation RT  (Routine)  Timeframe:  1 Month
   Facility: [facility] - Location: Vein Center
   Ordered By:  Alonso Nation

Follow Up Appointments: 9/27/24 


Plan of Treatment:
EVLT of right leg perforators


Tumescent Anesthesia: None



Buffered Local Anesthesia: 

20 mL of 1% Lidocaine Buffered

Print Language: English

Discharge Date/Time: 09/18/24 15:38

## 2024-09-18 NOTE — VEIN_ITS
Patient Name:      
JOCELIN ARORA 
      
MR#: [id number]      
: 1955 
Accession #: [id number] 
Exam Date: 2024  
Ordering Doctor: DR ALONSO FERNANDEZ M.D. 
  
RADIOLOGY REPORT 
  
PROCEDURE: VC FACILITY EST LMTD 
  
VEIN CENTER - OFFICE VISIT FOLLOW UP 
  
COMPARISON:  None. 
  
PROGRESS NOTES: 
  
The patient reports no significant problems following intravenous laser  
ablation of the right great saphenous vein.  The patient has worn his  
compression stockings.  The patient did not require oral analgesics.  The  
patient has tried exercise within his capacity. 
  
Physical exam demonstrates a large area of bruising measuring 8 x 8 cm  
proximal right thigh related to tumescence injection.  No erythema or warmth  
to suggest cellulitis or thrombophlebitis.  No active ulceration 
  
Review of the ultrasound performed the same day demonstrates occlusive  
thrombus extending throughout the treated right great saphenous vein as well  
as thrombus within the right anterior accessory saphenous vein.  No deep vein  
thrombus. 
  
The patient expressed a desire to proceed with treatment incompetent right leg  
perforating veins with associated venous stasis ulceration. 
  
ORDER #: [id number] VEIN/ Facility EST LMTD  
IMPRESSION:  
1. Successful ablation of the right great saphenous vein  
2. Persistent incompetent right perforating veins with associated venous   
stasis ulceration.  
   
PLAN:  
   
Intravenous laser ablation right leg incompetent perforating veins  
   
   
Nurse notes, history and physical were reviewed and confirmed, see attached   
forms.  
The nurse was present throughout the physical exam and consultation  
   
   
   
   
   
Dictated by: Alonso Fernandez MD on 2024 at 15:08       
Approved by: Alonso Fernandez MD on 2024 at 15:10

## 2024-09-18 NOTE — V.VEINS.HP
Vital Signs
 09/18/24
15:36
Height 5 ft 11 in
Weight 166 kg
BMI 51.0

Varicose Veins

Patient in today for follow up ultrasound of right lower extremity following EVLT of right leg perforators completed on 9/11/24.


Alonso TOSCANO MD personally performed the services described in this documentation, as scribed by Maggie Barrios RDMS in my presence and it is both accurate and complete.



I, Maggie Barrios RDMS, am scribing for, and in the presence of, Dr. Alonso Nation and in the presence of the patient.
thigh: bilateral (right leg > left leg), knee: bilateral, calf: bilateral, ankle: bilateral and shin: bilateral
aching, burning, cramping, dull and tender
10
3 years
Worsened in recent months: Yes (last 5 months)
standing, sitting and walking
analgesics (Tylenol), elevating extremities, compression stockings and wraps
Reports muscle spasms of leg, fatigue, heaviness, limb pain, edema and leg edema
History of lower extremity trauma: Yes (a piece of cpap machine fell and hit right lower leg resulting in hemtoma)
Superficial thrombophlebitis: Yes (DVT right leg)
Family history of varicose veins: no
Has patient had previous lower extremity venous surgery: Yes
Patient has previously received the following treatment(s) for lower extremity varicose veins: Reports foam therapy and laser therapy
Does patient have a history of pregnancy: no
Has patient had lower extremity venous scan with relux testing: Yes
Support hose used: Yes
Problems walking or doing physical activity: Yes
How does it affect you: pain decreases ability to walk
Do you walk much: No
Do you stand much: Yes

Review of Systems
ROS  
 Narrative 
Alonso TOSCANO MD personally performed the services described in this documentation, as scribed by Maggie Barrios RDMS in my presence and it is both accurate and complete.



EVERTON, Maggie Barrios RDMS, am scribing for, and in the presence of, Dr. Alonso Nation and in the presence of the patient.   
 Status of ROS 10 or more systems reviewed and unremarkable except as noted in history and below   
 Cardiovascular Reports: edema   
 Integumentary/Breast Reports: itching, redness, skin pain, skin tenderness, skin swelling, non-healing lesion and changes in skin color   
 Neurological Reports: numbness in extremities and weakness in extremities   
 Hematologic/Lymphatic Reports: easy bruising and easy bleeding   

PFSH
PFS
Medical History (Updated 09/18/24 @ 07:48 by Maggie Barrios)

Phlebitis and thrombophlebitis of superficial vessels of right lower extremity
 ?I80.01 - Phlebitis and thrombophlebitis of superficial vessels of right lower extremity (ICD-10)
Varicose veins of bilateral lower extremities with pain
 ?I83.813 - Varicose veins of bilateral lower extremities with pain (ICD-10)
Lymphedema of both lower extremities
 ?I89.0 - Lymphedema, not elsewhere classified (ICD-10)
Arthritis
 ?M19.90 - Unspecified osteoarthritis, unspecified site (ICD-10)
Hypertension
 ?I10 - Essential (primary) hypertension (ICD-10)
Deep vein thrombosis
 ?I82.409 - Acute embolism and thrombosis of unspecified deep veins of unspecified lower extremity (ICD-10)
Cardiomyopathy
 ?I42.9 - Cardiomyopathy, unspecified (ICD-10)
Carpal tunnel syndrome
 ?G56.00 - Carpal tunnel syndrome, unspecified upper limb (ICD-10)
Peripheral vascular disease
 ?I73.9 - Peripheral vascular disease, unspecified (ICD-10)
Ulcer of right leg
 ?L97.919 - Non-pressure chronic ulcer of unspecified part of right lower leg with unspecified severity (ICD-10)
Coronary artery disease
 ?I25.10 - Atherosclerotic heart disease of native coronary artery without angina pectoris (ICD-10)
CHF (congestive heart failure)
 ?I50.9 - Heart failure, unspecified (ICD-10)
Obstructive sleep apnea
 ?G47.33 - Obstructive sleep apnea (adult) (pediatric) (ICD-10)
Chronic back pain
 ?M54.9 - Dorsalgia, unspecified (ICD-10)
 ?G89.29 - Other chronic pain (ICD-10)
Postphlebetic syndrome with inflammation
 ?I87.029 - Postthrombotic syndrome with inflammation of unspecified lower extremity (ICD-10)
Type 2 diabetes mellitus
 ?E11.9 - Type 2 diabetes mellitus without complications (ICD-10)


Surgical History (Updated 09/11/24 @ 15:46 by Khang Bahena)

Status post laser ablation of incompetent vein
 ?Z98.890 - Other specified postprocedural states (ICD-10)
History of carpal tunnel surgery of right wrist
 ?Z98.890 - Other specified postprocedural states (ICD-10)
History of cholecystectomy
 ?Z90.49 - Acquired absence of other specified parts of digestive tract (ICD-10)


Family History (Updated 08/19/24 @ 11:42 by Khang Bahena)
Sister
 Family history of diabetes mellitus
Father
 Family history of diabetes mellitus
Other
 Family history of CHF (congestive heart failure)
 Family history of hypertension
 Family history of myocardial infarction



Social History (Updated 08/20/24 @ 10:55 by Khang Bahena)
Within the past year, how often did you have a drink containing alcohol:  never 
Score interpretation:  A score less than 4 is consistent with normal alcohol consumption. 
Smoking status:  Never smoker 
Non-prescribed substance use:  denies use 



Meds
Home Medications and Allergies
Home Medications

?Medication ?Instructions ?Recorded ?Confirmed ?Type
Lactobacillus acidophilus 10 100 mmu cells PO DAILY 08/19/24 08/19/24 History
billion cell capsule    
acetaminophen 500 mg capsule 500 mg PO Q6H PRN pain 08/19/24 08/19/24 History
carvedilol 6.25 mg tablet 6.25 mg PO BID 08/19/24 08/19/24 History
empagliflozin 10 mg-linagliptin 5 1 tab PO DAILY 08/19/24 08/19/24 History
mg tablet    
furosemide 40 mg tablet 40 mg PO BID 08/19/24 08/19/24 History
rivaroxaban 10 mg tablet 10 mg PO DAILY 08/19/24 08/19/24 History
sacubitril 24 mg-valsartan 26 mg 1 tab PO BID 08/19/24 08/19/24 History
tablet    
semaglutide 0.25 mg or 0.5 mg (2 0.25 mg subcut QWEEK 08/19/24 08/19/24 History
mg/3 mL) subcutaneous pen injector    
(Ozempic)    
spironolactone 25 mg tablet 25 mg PO DAILY 08/19/24 08/19/24 History
(Aldactone)    


Allergies

Allergy/AdvReac Type Severity Reaction Status Date / Time
cefixime Allergy  Unknown Verified 08/19/24 11:44
diclofenac Allergy  Unknown Verified 08/19/24 11:44



Exam
Narrative
Exam Narrative: 

IAlonso MD personally performed the services described in this documentation, as scribed by Maggie Barrios RDMS in my presence and it is both accurate and complete.



I, Maggie Barrios RDMS, am scribing for, and in the presence of, Dr. Alonso Nation and in the presence of the patient.
Constitutional
Documenting provider has reviewed patient's vital signs: yes
Common normals: oriented x3
Nutritional appearance: overweight
Cardio
Peripheral pulses: posterior tibial pulses present and dorsalis pedis pulses present
Extremity
Common normals: normal capillary refill
General: calf tenderness and edema
Right lower extremity: lower leg Right lower leg: inspection and palpation
Left lower extremity: lower leg Left lower leg: inspection and palpation
Neuro
Common normals: oriented x3

Results
Imaging
Venous US: 
      Radiologist's impression: 
Heat induced thrombus in right leg proximal to distal lower leg GSV and proximal to mid AASV.


Alonso TOSCANO MD personally performed the services described in this documentation, as scribed by Maggie Barrios RDMS in my presence and it is both accurate and complete.



Maggie TOSCANO RDMS, am scribing for, and in the presence of, Dr. Alonso Nation and in the presence of the patient.

Assessment and Plan
Assessment and Plan
(1) Phlebitis and thrombophlebitis of superficial vessels of right lower extremity: 

Plan
Plan is for patient to return for EVLT of right leg perforators on 9/27/24.


Alonso TOSCANO MD personally performed the services described in this documentation, as scribed by Maggie Barrios RDMS in my presence and it is both accurate and complete.



Maggie TOSCANO RDMS am scribing for, and in the presence of, Dr. Alonso Nation and in the presence of the patient.

## 2024-09-18 NOTE — VEIN_ITS
Patient Name:      
JOCELIN ARORA 
      
MR#: [id number]      
: 1955 
Accession #: [id number] 
Exam Date: 2024  
Ordering Doctor: DR ALONSO FERNANDEZ M.D. 
  
RADIOLOGY REPORT 
  
PROCEDURE:     VC EXT VENOUS RT LMTD 
  
COMPARISON:     None. 
  
INDICATIONS:     I80.01 - Phlebitis and thrombophlebitis of superficial veins  
right leg 
  
TECHNIQUE:     Lower extremity grey scale and Duplex Doppler evaluation of the  
deep venous system from the inguinal ligament through the calf veins.   
  
FINDINGS:       
REGION:     Right lower extremity.   
THROMBI:     Negative for DVT. 
     Heat induced thrombus in remaining segment of GSV from proximal to distal  
lower leg. Thrombus noted in AASV approximately 8.2 cm from SFJ and extends to  
mid thigh. 
COMPRESSIBILITY:     Non-compressible segments corresponding to thrombus 
FLOW:     Areas of no flow corresponding to thrombus 
OTHER:     Patent  mid medial lower leg. 
  
  
CONCLUSION:     Post ablation occlusion of the treated great saphenous vein as  
well as the anterior accessory saphenous vein with no deep vein thrombus 
  
  
Dictated by: Alonso Fernandez MD on 2024 at 15:06      
Approved by: Alonso Fernandez MD on 2024 at 15:07

## 2024-09-18 NOTE — P.DS_ITS
Discharge Plan    
Discharge    
Disposition: Home, Self-Care    
    
Outpatient Diagnostics:    
VC Endovenous Perf Ablation RT  (Routine)  Timeframe:  1 Month    
   Facility: [facility] - Location[facility]    
   Ordered By:  Alonso Nation    
    
Follow Up Appointments: 9/27/24     
    
    
Plan of Treatment:    
EVLT of right leg perforators    
    
    
Tumescent Anesthesia: None    
    
    
    
Buffered Local Anesthesia:     
    
20 mL of 1% Lidocaine Buffered    
    
Print Language: English    
    
Discharge Date/Time: 09/18/24 15:38

## 2024-09-18 NOTE — VEINCLINIC_ITS
Vital Signs    
    
    
                                    09/18/24    
                                      15:36    
     
Height                             5 ft 11 in    
     
Weight                               166 kg    
     
BMI                                   51.0    
    
    
Varicose Veins    
    
Patient in today for follow up ultrasound of right lower extremity following   
EVLT of right leg perforators completed on 9/11/24.    
    
    
Alonso TOSCANO MD personally performed the services described in this   
documentation, as scribed by Maggie Barrios RDMS in my presence and it is   
both accurate and complete.    
    
    
    
I, Maggie Barrios RDMS, am scribing for, and in the presence of, Dr. Alonso Nation and in the presence of the patient.    
thigh: bilateral (right leg > left leg), knee: bilateral, calf: bilateral,   
ankle: bilateral and shin: bilateral    
aching, burning, cramping, dull and tender    
10    
3 years    
Worsened in recent months: Yes (last 5 months)    
standing, sitting and walking    
analgesics (Tylenol), elevating extremities, compression stockings and wraps    
Reports muscle spasms of leg, fatigue, heaviness, limb pain, edema and leg edema    
History of lower extremity trauma: Yes (a piece of cpap machine fell and hit   
right lower leg resulting in hemtoma)    
Superficial thrombophlebitis: Yes (DVT right leg)    
Family history of varicose veins: no    
Has patient had previous lower extremity venous surgery: Yes    
Patient has previously received the following treatment(s) for lower extremity   
varicose veins: Reports foam therapy and laser therapy    
Does patient have a history of pregnancy: no    
Has patient had lower extremity venous scan with relux testing: Yes    
Support hose used: Yes    
Problems walking or doing physical activity: Yes    
How does it affect you: pain decreases ability to walk    
Do you walk much: No    
Do you stand much: Yes    
    
Review of Systems    
    
    
ROS      
    
 Narrative     
Alonso TOSCANO MD personally performed the services described in this   
documentation, as scribed by Maggie Barrios RDMS in my presence and it is   
both accurate and complete.    
    
    
    
EVERTON, Maggie Barrios RDMS, am scribing for, and in the presence of, Dr. Alonso Nation and in the presence of the patient.       
    
 Status of ROS                          10 or more systems reviewed and unremark    
able except as noted in     
history and below       
    
 Cardiovascular Reports: edema       
    
 Integumentary/Breast Reports: itching, redness, skin pain, skin tenderness,   
skin swelling, non-healing lesion and changes in skin color       
    
 Neurological Reports: numbness in extremities and weakness in extremities       
    
 Hematologic/Lymphatic Reports: easy bruising and easy bleeding       
    
    
[facility]    
[facility]    
Medical History (Updated 09/18/24 @ 07:48 by Maggie Barrios)    
    
Phlebitis and thrombophlebitis of superficial vessels of right lower extremity    
   ?I80.01 - Phlebitis and thrombophlebitis of superficial vessels of right   
   lower extremity (ICD-10)    
Varicose veins of bilateral lower extremities with pain    
   ?I83.813 - Varicose veins of bilateral lower extremities with pain (ICD-10)    
Lymphedema of both lower extremities    
   ?I89.0 - Lymphedema, not elsewhere classified (ICD-10)    
Arthritis    
   ?M19.90 - Unspecified osteoarthritis, unspecified site (ICD-10)    
Hypertension    
   ?I10 - Essential (primary) hypertension (ICD-10)    
Deep vein thrombosis    
   ?I82.409 - Acute embolism and thrombosis of unspecified deep veins of uns  
   pecified lower extremity (ICD-10)    
Cardiomyopathy    
   ?I42.9 - Cardiomyopathy, unspecified (ICD-10)    
Carpal tunnel syndrome    
   ?G56.00 - Carpal tunnel syndrome, unspecified upper limb (ICD-10)    
Peripheral vascular disease    
   ?I73.9 - Peripheral vascular disease, unspecified (ICD-10)    
Ulcer of right leg    
   ?L97.919 - Non-pressure chronic ulcer of unspecified part of right lower leg   
   with unspecified severity (ICD-10)    
Coronary artery disease    
   ?I25.10 - Atherosclerotic heart disease of native coronary artery without   
   angina pectoris (ICD-10)    
CHF (congestive heart failure)    
   ?I50.9 - Heart failure, unspecified (ICD-10)    
Obstructive sleep apnea    
   ?G47.33 - Obstructive sleep apnea (adult) (pediatric) (ICD-10)    
Chronic back pain    
   ?M54.9 - Dorsalgia, unspecified (ICD-10)    
   ?G89.29 - Other chronic pain (ICD-10)    
Postphlebetic syndrome with inflammation    
   ?I87.029 - Postthrombotic syndrome with inflammation of unspecified lower   
   extremity (ICD-10)    
Type 2 diabetes mellitus    
   ?E11.9 - Type 2 diabetes mellitus without complications (ICD-10)    
    
    
Surgical History (Updated 09/11/24 @ 15:46 by Khang Bahena)    
    
Status post laser ablation of incompetent vein    
   ?Z98.890 - Other specified postprocedural states (ICD-10)    
History of carpal tunnel surgery of right wrist    
   ?Z98.890 - Other specified postprocedural states (ICD-10)    
History of cholecystectomy    
   ?Z90.49 - Acquired absence of other specified parts of digestive tract (ICD-  
   10)    
    
    
Family History (Updated 08/19/24 @ 11:42 by Khang Bahena)    
Sister   Family history of diabetes mellitus    
Father   Family history of diabetes mellitus    
Other   Family history of CHF (congestive heart failure)    
   Family history of hypertension    
   Family history of myocardial infarction    
    
    
    
Social History (Updated 08/20/24 @ 10:55 by Khang Bahena)    
Within the past year, how often did you have a drink containing alcohol:  never     
Score interpretation:  A score less than 4 is consistent with normal alcohol   
consumption.     
Smoking status:  Never smoker     
Non-prescribed substance use:  denies use     
    
    
    
Meds    
Home Medications and Allergies    
                                Home Medications    
    
    
    
?Medication ?Instructions ?Recorded ?Confirmed ?Type    
     
Lactobacillus acidophilus 10 100 mmu cells PO DAILY 08/19/24 08/19/24 History    
    
billion cell capsule        
     
acetaminophen 500 mg capsule 500 mg PO Q6H PRN pain 08/19/24 08/19/24 History    
     
carvedilol 6.25 mg tablet 6.25 mg PO BID 08/19/24 08/19/24 History    
     
empagliflozin 10 mg-linagliptin 5 1 tab PO DAILY 08/19/24 08/19/24 History    
    
mg tablet        
     
furosemide 40 mg tablet 40 mg PO BID 08/19/24 08/19/24 History    
     
rivaroxaban 10 mg tablet 10 mg PO DAILY 08/19/24 08/19/24 History    
     
sacubitril 24 mg-valsartan 26 mg 1 tab PO BID 08/19/24 08/19/24 History    
    
tablet        
     
semaglutide 0.25 mg or 0.5 mg (2 0.25 mg subcut QWEEK 08/19/24 08/19/24 History    
    
mg/3 mL) subcutaneous pen injector        
    
(Ozempic)        
     
spironolactone 25 mg tablet 25 mg PO DAILY 08/19/24 08/19/24 History    
    
(Aldactone)        
    
    
    
                                    Allergies    
    
    
    
Allergy/AdvReac Type Severity Reaction Status Date / Time    
     
cefixime Allergy  Unknown Verified 08/19/24 11:44    
     
diclofenac Allergy  Unknown Verified 08/19/24 11:44    
    
    
    
    
Exam    
Narrative    
Exam Narrative:     
    
IAlonso MD personally performed the services described in this   
documentation, as scribed by Maggie Barrios RDMS in my presence and it is   
both accurate and complete.    
    
    
    
IMaggie RDMS, am scribing for, and in the presence of, Dr. Alonso Nation and in the presence of the patient.    
Constitutional    
Documenting provider has reviewed patient's vital signs: yes    
Common normals: oriented x3    
Nutritional appearance: overweight    
Cardio    
Peripheral pulses: posterior tibial pulses present and dorsalis pedis pulses   
present    
Extremity    
Common normals: normal capillary refill    
General: calf tenderness and edema    
Right lower extremity: lower leg Right lower leg: inspection and palpation    
Left lower extremity: lower leg Left lower leg: inspection and palpation    
Neuro    
Common normals: oriented x3    
    
Results    
Imaging    
Venous US:     
      Radiologist's impression:     
Heat induced thrombus in right leg proximal to distal lower leg GSV and proximal  
to mid AASV.    
    
    
Alonso TOSCANO MD personally performed the services described in this   
documentation, as scribed by Maggie Barrios RDMS in my presence and it is   
both accurate and complete.    
    
    
    
Maggie TOSCANO RDMS, am scribing for, and in the presence of, Dr. Alonso Nation and in the presence of the patient.    
    
Assessment and Plan    
Assessment and Plan    
(1) Phlebitis and thrombophlebitis of superficial vessels of right lower   
extremity:     
    
Plan    
Plan is for patient to return for EVLT of right leg perforators on 9/27/24.    
    
    
Alonso TOSCANO MD personally performed the services described in this   
documentation, as scribed by Maggie Barrios RDMS in my presence and it is   
both accurate and complete.    
    
    
    
Maggie TOSCANO RDMS, am scribing for, and in the presence of, Dr. Alonso Nation and in the presence of the patient.

## 2024-09-26 NOTE — VEINCLINIC_ITS
Vital Signs    
    
    
                                    09/27/24    
                                      13:03    
     
Height                             5 ft 11 in    
     
Weight                               166 kg    
    
    
Varicose Veins    
    
Patient in this day for EVLT of right leg perforating veins    
    
    
Reinaldo TOSCANO MD personally performed the services described in this docume  
ntation, as scribed by Khang Bahena RN in my presence and it is both accurate and   
complete.    
    
    
Khang TOSCANO RN, am scribing for, and in the presence of, Dr. Reinaldo Anders and   
in the presence of the patient.    
thigh: bilateral (right leg > left leg), knee: bilateral, calf: bilateral,   
ankle: bilateral and shin: bilateral    
aching, burning, cramping, dull and tender    
10    
3 years    
Worsened in recent months: Yes (last 5 months)    
standing, sitting and walking    
analgesics (Tylenol), elevating extremities, compression stockings and wraps    
Reports muscle spasms of leg, fatigue, heaviness, limb pain, edema and leg edema    
History of lower extremity trauma: Yes (a piece of cpap machine fell and hit   
right lower leg resulting in hemtoma)    
Superficial thrombophlebitis: Yes (DVT right leg)    
Family history of varicose veins: no    
Has patient had previous lower extremity venous surgery: Yes    
Patient has previously received the following treatment(s) for lower extremity   
varicose veins: Reports foam therapy and laser therapy    
Does patient have a history of pregnancy: no    
Has patient had lower extremity venous scan with relux testing: Yes    
Support hose used: Yes    
Problems walking or doing physical activity: Yes    
How does it affect you: pain decreases ability to walk    
Do you walk much: No    
Do you stand much: Yes    
    
Review of Systems    
    
    
ROS      
    
 Narrative     
    
Reinaldo TOSCANO MD personally performed the services described in this   
documentation, as scribed by Khang Bahena RN in my presence and it is both accurate  
and complete.    
    
    
Khang TOSCANO RN, am scribing for, and in the presence of, Dr. Reinaldo Anders and   
in the presence of the patient.       
    
 Status of ROS                          10 or more systems reviewed and unremark    
able except as noted in     
history and below       
    
 Cardiovascular Reports: edema       
    
 Integumentary/Breast Reports: itching, redness, skin pain, skin tenderness,   
skin swelling, non-healing lesion and changes in skin color       
    
 Neurological Reports: numbness in extremities and weakness in extremities       
    
 Hematologic/Lymphatic Reports: easy bruising and easy bleeding       
    
    
[facility]    
Medical History (Updated 09/18/24 @ 07:48 by Maggie Barrios)    
    
Phlebitis and thrombophlebitis of superficial vessels of right lower extremity    
   ?I80.01 - Phlebitis and thrombophlebitis of superficial vessels of right   
   lower extremity (ICD-10)    
Varicose veins of bilateral lower extremities with pain    
   ?I83.813 - Varicose veins of bilateral lower extremities with pain (ICD-10)    
Lymphedema of both lower extremities    
   ?I89.0 - Lymphedema, not elsewhere classified (ICD-10)    
Arthritis    
   ?M19.90 - Unspecified osteoarthritis, unspecified site (ICD-10)    
Hypertension    
   ?I10 - Essential (primary) hypertension (ICD-10)    
Deep vein thrombosis    
   ?I82.409 - Acute embolism and thrombosis of unspecified deep veins of   
   unspecified lower extremity (ICD-10)    
Cardiomyopathy    
   ?I42.9 - Cardiomyopathy, unspecified (ICD-10)    
Carpal tunnel syndrome    
   ?G56.00 - Carpal tunnel syndrome, unspecified upper limb (ICD-10)    
Peripheral vascular disease    
   ?I73.9 - Peripheral vascular disease, unspecified (ICD-10)    
Ulcer of right leg    
   ?L97.919 - Non-pressure chronic ulcer of unspecified part of right lower leg   
   with unspecified severity (ICD-10)    
Coronary artery disease    
   ?I25.10 - Atherosclerotic heart disease of native coronary artery without   
   angina pectoris (ICD-10)    
CHF (congestive heart failure)    
   ?I50.9 - Heart failure, unspecified (ICD-10)    
Obstructive sleep apnea    
   ?G47.33 - Obstructive sleep apnea (adult) (pediatric) (ICD-10)    
Chronic back pain    
   ?M54.9 - Dorsalgia, unspecified (ICD-10)    
   ?G89.29 - Other chronic pain (ICD-10)    
Postphlebetic syndrome with inflammation    
   ?I87.029 - Postthrombotic syndrome with inflammation of unspecified lower   
   extremity (ICD-10)    
Type 2 diabetes mellitus    
   ?E11.9 - Type 2 diabetes mellitus without complications (ICD-10)    
    
    
Surgical History (Updated 09/27/24 @ 13:50 by Khang Bahena)    
    
Status post laser ablation of incompetent vein    
   ?Z98.890 - Other specified postprocedural states (ICD-10)    
Status post laser ablation of incompetent vein    
   ?Z98.890 - Other specified postprocedural states (ICD-10)    
History of carpal tunnel surgery of right wrist    
   ?Z98.890 - Other specified postprocedural states (ICD-10)    
History of cholecystectomy    
   ?Z90.49 - Acquired absence of other specified parts of digestive tract (ICD-  
   10)    
    
    
Family History (Updated 08/19/24 @ 11:42 by Khang Bahena)    
Sister   Family history of diabetes mellitus    
Father   Family history of diabetes mellitus    
Other   Family history of CHF (congestive heart failure)    
   Family history of hypertension    
   Family history of myocardial infarction    
    
    
    
Social History (Updated 08/20/24 @ 10:55 by Khang Bahena)    
Within the past year, how often did you have a drink containing alcohol:  never     
Score interpretation:  A score less than 4 is consistent with normal alcohol   
consumption.     
Smoking status:  Never smoker     
Non-prescribed substance use:  denies use     
    
    
    
Meds    
Home Medications and Allergies    
                                Home Medications    
    
    
    
?Medication ?Instructions ?Recorded ?Confirmed ?Type    
     
Lactobacillus acidophilus 10 100 mmu cells PO DAILY 08/19/24 08/19/24 History    
    
billion cell capsule        
     
acetaminophen 500 mg capsule 500 mg PO Q6H PRN pain 08/19/24 08/19/24 History    
     
carvedilol 6.25 mg tablet 6.25 mg PO BID 08/19/24 08/19/24 History    
     
empagliflozin 10 mg-linagliptin 5 1 tab PO DAILY 08/19/24 08/19/24 History    
    
mg tablet        
     
furosemide 40 mg tablet 40 mg PO BID 08/19/24 08/19/24 History    
     
rivaroxaban 10 mg tablet 10 mg PO DAILY 08/19/24 08/19/24 History    
     
sacubitril 24 mg-valsartan 26 mg 1 tab PO BID 08/19/24 08/19/24 History    
    
tablet        
     
semaglutide 0.25 mg or 0.5 mg (2 0.25 mg subcut QWEEK 08/19/24 08/19/24 History    
    
mg/3 mL) subcutaneous pen injector        
    
(Ozempic)        
     
spironolactone 25 mg tablet 25 mg PO DAILY 08/19/24 08/19/24 History    
    
(Aldactone)        
    
    
    
                                    Allergies    
    
    
    
Allergy/AdvReac Type Severity Reaction Status Date / Time    
     
cefixime Allergy  Unknown Verified 08/19/24 11:44    
     
diclofenac Allergy  Unknown Verified 08/19/24 11:44    
    
    
    
    
Exam    
Narrative    
Exam Narrative:     
    
    
IReinaldo MD personally performed the services described in this   
documentation, as scribed by Khang Bahena RN in my presence and it is both accurate  
and complete.    
    
    
I, Khang Josef RN, am scribing for, and in the presence of, Dr. Reinaldo Anders and   
in the presence of the patient.    
Constitutional    
Documenting provider has reviewed patient's vital signs: yes    
Common normals: oriented x3    
Nutritional appearance: overweight    
Cardio    
Peripheral pulses: posterior tibial pulses present and dorsalis pedis pulses   
present    
Extremity    
Common normals: normal capillary refill    
General: calf tenderness and edema    
Right lower extremity: lower leg Right lower leg: inspection and palpation    
Left lower extremity: lower leg Left lower leg: inspection and palpation    
Neuro    
Common normals: oriented x3    
    
Assessment and Plan    
Assessment and Plan    
(1) Varicose veins of bilateral lower extremities with pain:     
    
Plan    
f/u evaluation with physician along with right leg limited u/s    
    
    
IReinaldo MD personally performed the services described in this   
documentation, as scribed by Khang Bahena RN in my presence and it is both accurate  
and complete.    
    
    
Khang TOSCANO RN, am scribing for, and in the presence of, Dr. Reinaldo Anders and   
in the presence of the patient.    
    
Procedures    
Procedure Instructions    
Procedures    
Plan of care:    
    
Risks and benefits of the procedure were discussed at length and informed   
written consent was obtained.? Time-out completed for verification of correct   
patient, procedure and site.?     
    
Staff present during time-out: Khang Bahena RN,? Reinaldo Anders MD, Maggie Barrios [facility]    
    
Time Out Time_9837______    
Patient prepped and procedure performed in usual sterile fashion.    
Risk of injury related to use of Diode laser and/or laser devices?__CR___    
    
??????????? Serial number of laser used :? [id number]    
    
Control panel self test performed, electrical cords in good condition, floor is   
dry, basin of water available, fire extinguisher in close proximity_CR__    
    
Polycarbonate goggles available and Laser warning signs outside of doors___CR__    
    
Eye protection provided to patient and staff in room_CR___    
    
Use of laser retardant drapes and dull blackened instruments as directed__CR___    
    
Use of nonflammable prep solutions and use of saline soaked sponges to protect   
tissues as indicated _CR___    
Laser operated by __Dr. Anders________    
    
Physician verbal confirmation laser locked in place__CR__    
    
Laser start time (date and time) _09/27/2024@_1318_______    
    
Laser stop time(date and time) __09/27/2024@_1332_______    
    
[place]ler _8.0___    
Laser Site #1:  Distal lateral left lower leg    
     Seconds:  197    
     Joules:  25    
Laser Site #2:  Mid lateral left lower leg    
     Seconds:  30    
     Joules:  239    
Laser Site #3:  Distal medial left lower leg    
     Seconds:  40    
     Joules:  321    
    
Pulse continuous ___CR_???????? Pulse intermittent ___    
Evaluated patient for signs and symptoms of electrical injury __CR___    
    
??????????? Skin clear at insertion site __CR___    
    
    
    
Patient tolerated procedure well.? Right leg Coban dressing applied to access   
site.? Applied Right thigh high leg compression stocking.    
    
Will return on 10/11/2024 for Right leg limited venous ultrasound and exam.    
    
    
IReinaldo MD personally performed the services described in this   
documentation, as scribed by Khang Bahena RN in my presence and it is both accurate  
and complete.    
    
    
I, Khang Bahena RN, am scribing for, and in the presence of, Dr. Reinaldo Anders and   
in the presence of the patient.

## 2024-09-26 NOTE — W.VEIN
Discharge Plan
Discharge
Disposition: Home, Self-Care

Outpatient Diagnostics:
VC Facility EST LMTD  (Routine)  Timeframe:  2 Weeks
   Facility: [facility] - Location: [facility]
   Ordered By:  Reinaldo Anders
VC EXT Venous RT LMTD  (Routine)  Timeframe:  2 Weeks
   Facility: [facility] - Location: [facility]
   Ordered By:  Reinaldo Anders

Follow Up Appointments: 10/11/2024 


Plan of Treatment:
f/u evaluation with physician along with right leg limited u/s

Patient Instructions:  Endovenous Ablation (DC)

Print Language: English

## 2024-09-26 NOTE — P.DS_ITS
Discharge Plan    
Discharge    
Disposition: Home, Self-Care    
    
Outpatient Diagnostics:    
VC Facility EST LMTD  (Routine)  Timeframe:  2 Weeks    
   Facility: [facility] - Location: [facility]    
   Ordered By:  Reinaldo Anders    
VC EXT Venous RT LMTD  (Routine)  Timeframe:  2 Weeks    
   Facility: [facility] - Location: [facility]    
   Ordered By:  Reinaldo Anders    
    
Follow Up Appointments: 10/11/2024     
    
    
Plan of Treatment:    
f/u evaluation with physician along with right leg limited u/s    
    
Patient Instructions:  Endovenous Ablation (DC)    
    
Print Language: English

## 2024-09-26 NOTE — V.VEINS.HP
Procedure Instructions  ________________________________________________________________    Preparing for Your Procedure  If you have an implanted cardiac device (pacemaker, defibrillator) please notify our office.   Purchase a new toothbrush.   If you are going home on pain medications, buy laxatives or stool softeners as pain medications can cause constipation.  Stop use of all tobacco products (smoking, chewing, vaping, etc.) as soon as possible.  If you are unable to stop, do your best to decrease the use as much as possible before surgery.  Nicotine patches may stay in place. If you are having a heart procedure they must be removed at or before midnight prior to your procedure.    Exercise and be as active as you can. Being active may help shorten your hospital stay, decrease your chance of pneumonia and help you feel better.  Do breathing exercises at least 2 times a day: Take 10 slow, deep breaths, then cough hard 3 times. After surgery, you may be given other instructions about deep breathing.  If you are going home on the day of your procedure:  Plan for a responsible adult to drive you home (Taxi/Uber not acceptable). If you do not have someone to drive you home, your procedure may be canceled.   It is recommended that someone stay with you for the first 24 hours.  If the patient is under 18 years old, a parent or authorized adult needs to stay at the hospital until the child is discharged from the procedure area or admitted to a hospital bed.  Contact your insurance company to determine coverage. Insurance may require prior authorization. We can help with this, but it is your responsibility. Charges for this procedure may include fees for the surgeon and their assistant, facility, anesthesia, and other services such as lab, radiology, pathology, etc.  You may be called to make payment arrangements.  If you have forms that need to be completed (such as disability, work release or FMLA), drop these off  Vital Signs
 09/27/24
13:03
Height 5 ft 11 in
Weight 166 kg

Varicose Veins

Patient in this day for EVLT of right leg perforating veins


Reinaldo TOSCANO MD personally performed the services described in this documentation, as scribed by Khang Bahena RN in my presence and it is both accurate and complete.


Khang TOSCANO RN, am scribing for, and in the presence of, Dr. Reinaldo Anders and in the presence of the patient.
thigh: bilateral (right leg > left leg), knee: bilateral, calf: bilateral, ankle: bilateral and shin: bilateral
aching, burning, cramping, dull and tender
10
3 years
Worsened in recent months: Yes (last 5 months)
standing, sitting and walking
analgesics (Tylenol), elevating extremities, compression stockings and wraps
Reports muscle spasms of leg, fatigue, heaviness, limb pain, edema and leg edema
History of lower extremity trauma: Yes (a piece of cpap machine fell and hit right lower leg resulting in hemtoma)
Superficial thrombophlebitis: Yes (DVT right leg)
Family history of varicose veins: no
Has patient had previous lower extremity venous surgery: Yes
Patient has previously received the following treatment(s) for lower extremity varicose veins: Reports foam therapy and laser therapy
Does patient have a history of pregnancy: no
Has patient had lower extremity venous scan with relux testing: Yes
Support hose used: Yes
Problems walking or doing physical activity: Yes
How does it affect you: pain decreases ability to walk
Do you walk much: No
Do you stand much: Yes

Review of Systems
ROS  
 Narrative 

Reinaldo TOSCANO MD personally performed the services described in this documentation, as scribed by Khang Bahena RN in my presence and it is both accurate and complete.


Khang TOSCANO RN, am scribing for, and in the presence of, Dr. Reinaldo Anders and in the presence of the patient.   
 Status of ROS 10 or more systems reviewed and unremarkable except as noted in history and below   
 Cardiovascular Reports: edema   
 Integumentary/Breast Reports: itching, redness, skin pain, skin tenderness, skin swelling, non-healing lesion and changes in skin color   
 Neurological Reports: numbness in extremities and weakness in extremities   
 Hematologic/Lymphatic Reports: easy bruising and easy bleeding   

[facility]
Medical History (Updated 09/18/24 @ 07:48 by Maggie Barrios)

Phlebitis and thrombophlebitis of superficial vessels of right lower extremity
 ?I80.01 - Phlebitis and thrombophlebitis of superficial vessels of right lower extremity (ICD-10)
Varicose veins of bilateral lower extremities with pain
 ?I83.813 - Varicose veins of bilateral lower extremities with pain (ICD-10)
Lymphedema of both lower extremities
 ?I89.0 - Lymphedema, not elsewhere classified (ICD-10)
Arthritis
 ?M19.90 - Unspecified osteoarthritis, unspecified site (ICD-10)
Hypertension
 ?I10 - Essential (primary) hypertension (ICD-10)
Deep vein thrombosis
 ?I82.409 - Acute embolism and thrombosis of unspecified deep veins of unspecified lower extremity (ICD-10)
Cardiomyopathy
 ?I42.9 - Cardiomyopathy, unspecified (ICD-10)
Carpal tunnel syndrome
 ?G56.00 - Carpal tunnel syndrome, unspecified upper limb (ICD-10)
Peripheral vascular disease
 ?I73.9 - Peripheral vascular disease, unspecified (ICD-10)
Ulcer of right leg
 ?L97.919 - Non-pressure chronic ulcer of unspecified part of right lower leg with unspecified severity (ICD-10)
Coronary artery disease
 ?I25.10 - Atherosclerotic heart disease of native coronary artery without angina pectoris (ICD-10)
CHF (congestive heart failure)
 ?I50.9 - Heart failure, unspecified (ICD-10)
Obstructive sleep apnea
 ?G47.33 - Obstructive sleep apnea (adult) (pediatric) (ICD-10)
Chronic back pain
 ?M54.9 - Dorsalgia, unspecified (ICD-10)
 ?G89.29 - Other chronic pain (ICD-10)
Postphlebetic syndrome with inflammation
 ?I87.029 - Postthrombotic syndrome with inflammation of unspecified lower extremity (ICD-10)
Type 2 diabetes mellitus
 ?E11.9 - Type 2 diabetes mellitus without complications (ICD-10)


Surgical History (Updated 09/27/24 @ 13:50 by Khang Bahena)

Status post laser ablation of incompetent vein
 ?Z98.890 - Other specified postprocedural states (ICD-10)
Status post laser ablation of incompetent vein
 ?Z98.890 - Other specified postprocedural states (ICD-10)
History of carpal tunnel surgery of right wrist
 ?Z98.890 - Other specified postprocedural states (ICD-10)
History of cholecystectomy
 ?Z90.49 - Acquired absence of other specified parts of digestive tract (ICD-10)


Family History (Updated 08/19/24 @ 11:42 by Khang Bahena)
Sister
 Family history of diabetes mellitus
Father
 Family history of diabetes mellitus
Other
 Family history of CHF (congestive heart failure)
 Family history of hypertension
 Family history of myocardial infarction



Social History (Updated 08/20/24 @ 10:55 by Khang Bahena)
Within the past year, how often did you have a drink containing alcohol:  never 
Score interpretation:  A score less than 4 is consistent with normal alcohol consumption. 
Smoking status:  Never smoker 
Non-prescribed substance use:  denies use 



Meds
Home Medications and Allergies
Home Medications

?Medication ?Instructions ?Recorded ?Confirmed ?Type
Lactobacillus acidophilus 10 100 mmu cells PO DAILY 08/19/24 08/19/24 History
billion cell capsule    
acetaminophen 500 mg capsule 500 mg PO Q6H PRN pain 08/19/24 08/19/24 History
carvedilol 6.25 mg tablet 6.25 mg PO BID 08/19/24 08/19/24 History
empagliflozin 10 mg-linagliptin 5 1 tab PO DAILY 08/19/24 08/19/24 History
mg tablet    
furosemide 40 mg tablet 40 mg PO BID 08/19/24 08/19/24 History
rivaroxaban 10 mg tablet 10 mg PO DAILY 08/19/24 08/19/24 History
sacubitril 24 mg-valsartan 26 mg 1 tab PO BID 08/19/24 08/19/24 History
tablet    
semaglutide 0.25 mg or 0.5 mg (2 0.25 mg subcut QWEEK 08/19/24 08/19/24 History
mg/3 mL) subcutaneous pen injector    
(Ozempic)    
spironolactone 25 mg tablet 25 mg PO DAILY 08/19/24 08/19/24 History
(Aldactone)    


Allergies

Allergy/AdvReac Type Severity Reaction Status Date / Time
cefixime Allergy  Unknown Verified 08/19/24 11:44
diclofenac Allergy  Unknown Verified 08/19/24 11:44



Exam
Narrative
Exam Narrative: 


Reinaldo TOSCANO MD personally performed the services described in this documentation, as scribed by Khang Bahena RN in my presence and it is both accurate and complete.


I, Khang Josef RN, am scribing for, and in the presence of, Dr. Reinaldo Anders and in the presence of the patient.
Constitutional
Documenting provider has reviewed patient's vital signs: yes
Common normals: oriented x3
Nutritional appearance: overweight
Cardio
Peripheral pulses: posterior tibial pulses present and dorsalis pedis pulses present
Extremity
Common normals: normal capillary refill
General: calf tenderness and edema
Right lower extremity: lower leg Right lower leg: inspection and palpation
Left lower extremity: lower leg Left lower leg: inspection and palpation
Neuro
Common normals: oriented x3

Assessment and Plan
Assessment and Plan
(1) Varicose veins of bilateral lower extremities with pain: 

Plan
f/u evaluation with physician along with right leg limited u/s


IReinaldo MD personally performed the services described in this documentation, as scribed by Khang Bahena RN in my presence and it is both accurate and complete.


Khang TOSCANO RN, am scribing for, and in the presence of, Dr. Reinaldo Anders and in the presence of the patient.

Procedures
Procedure Instructions
Procedures
Plan of care:

Risks and benefits of the procedure were discussed at length and informed written consent was obtained.? Time-out completed for verification of correct patient, procedure and site.? 

Staff present during time-out: Khang Bahena RN,? Reinaldo Anders MD, Maggie Barrios [facility]

Time Out Time_0227______
Patient prepped and procedure performed in usual sterile fashion.
Risk of injury related to use of Diode laser and/or laser devices?__CR___

??????????? Serial number of laser used :? [id number]

Control panel self test performed, electrical cords in good condition, floor is dry, basin of water available, fire extinguisher in close proximity_CR__

Polycarbonate goggles available and Laser warning signs outside of doors___CR__

Eye protection provided to patient and staff in room_CR___

Use of laser retardant drapes and dull blackened instruments as directed__CR___

Use of nonflammable prep solutions and use of saline soaked sponges to protect tissues as indicated _CR___
Laser operated by __Dr. Anders________

Physician verbal confirmation laser locked in place__CR__

Laser start time (date and time) _09/27/2024@_1318_______

Laser stop time(date and time) __09/27/2024@_1332_______

[place] _8.0___
Laser Site #1:  Distal lateral left lower leg
     Seconds:  197
     Joules:  25
Laser Site #2:  Mid lateral left lower leg
     Seconds:  30
     Joules:  239
Laser Site #3:  Distal medial left lower leg
     Seconds:  40
     Joules:  321

Pulse continuous ___CR_???????? Pulse intermittent ___
Evaluated patient for signs and symptoms of electrical injury __CR___

??????????? Skin clear at insertion site __CR___



Patient tolerated procedure well.? Right leg Coban dressing applied to access site.? Applied Right thigh high leg compression stocking.

Will return on 10/11/2024 for Right leg limited venous ultrasound and exam.


IReinaldo MD personally performed the services described in this documentation, as scribed by Khang Bahena RN in my presence and it is both accurate and complete.


IKhang RN, am scribing for, and in the presence of, Dr. Reinaldo Anders and in the presence of the patient. at our office before the procedure date. A Release of Information form may need to be completed to allow us to share information with the insurance company or your employer.    NOTE:  Follow these instructions to help avoid delays or cancellation of your procedure. ________________________________________________________________    Prepare Your Home  To avoid a fall, remove any rugs or clutter and clear all walkways.   Place a night light in hallways or bathrooms.  Go grocery shopping and fill your pantry with healthy foods.   You may feel tired after your procedure, prepare several meals ahead of time to be kept in the freezer.       Assistance at Home  After your surgery you may need help with normal everyday activities while your body heals.  You might need transportation to and from appointments.      THE WEEK BEFORE PROCEDURE  Call our office at [phone] if you have:  Signs of illness, such as fever, sore throat or a cold.  Any skin problems, rashes, bug bites or open sores in area of procedure.  An exposure to COVID-19 or develop COVID-19 symptoms.    Expect a phone call from a hospital nurse to review your health history, medications and procedure plans.  For patient and community safety, facilities may have restrictions on persons accompanying patients in the building.  Please confirm current restrictions during your call with the hospital nurse.      2 DAYS BEFORE PROCEDURE  Begin using the new toothbrush. Brush teeth and tongue at least 2 times a day.  Do not shave near the surgical area until after the procedure.  You may be given specific DIET INSTRUCTIONS.  Follow these closely.      DAY BEFORE PROCEDURE  Review and follow your specific DIET INSTRUCTIONS.  DO NOT use tobacco or alcohol.  DO NOT use recreational or illegal drugs.  Remove nail polish from fingers and toes. Nails should be clean. Remove artificial nails.   Remove all jewelry and body piercings.  Review and follow your specific bathing  instructions.   Put on clean clothing or pajamas after bathing.  Sleep with clean bedsheets and don't allow pets to sleep in your bed.    MORNING OF YOUR PROCEDURE  Brush teeth and tongue.  Wear loose, comfortable clothing.  DO NOT wear any makeup.  DO NOT apply lotions, powders or hair products.  DO NOT eat anything, including chewing gum, mints or candy.    Bring these items with you:  Items you rely on such as hearing aids, glasses, cane or walker.  Any inhalers you use.  CPAP/BiPAP machine, if you use one.  Insurance cards and ID.  A copy of your Advance Directive, if you have one.    Do not bring these items with you:  DO NOT bring medications, unless you are told differently  DO NOT bring valuables (jewelry, cash, etc.)  DO NOT wear contact lenses. Wear glasses if needed.     ________________________________________________________________    Things to Know After Surgery    Go to an Emergency Room if you have:  trouble breathing or chest pain    Contact our office if you:  have redness, swelling or unusual pain in one of your legs  are unable to keep fluids down. Nausea can be related to anesthesia or pain medicines.  have a fever of 101 degrees or higher.  have pain not adequately controlled with medication.   have bleeding or drainage from your incision that is increasing, or has a bad odor.  are unable to empty your bladder for more than 12 hours, or if your bladder feels uncomfortable prior to this.    DO NOT make important decisions for at least 24 hours after your procedure as you will likely still have anesthesia in your system.    DO NOT drive, operate heavy machinery, or drink alcohol for at least 24 hours after your procedure.    DO NOT drive while taking narcotics or other sedating medications.  Avoid smoking and other nicotine products.  These have been shown to interfere with the healing process.  A sore throat can result from the breathing tube used to administer general anesthesia. This usually  lasts 1-2 days. Cold liquids, ice chips, and throat lozenges help relieve the soreness.  If you received a nerve block during your procedure, it usually wears off within 12 to 24 hours.  Follow the specific instructions on your discharge paperwork from the hospital on how to manage any dressings or drains.   You will get instructions about activity restrictions. Expect that you may have to limit your activities; our goal is to have you return to normal activities as soon as possible. This will be discussed at your first post-operative visit.  Discuss with your provider how long you may be off work or have work restrictions.    Pain  You will get instructions and/or prescriptions to help manage your pain.    It is important to take your pain medications as prescribed during the first 48 hours to prevent your pain from getting out of control.   Set a realistic expectation about your pain.  You may not have complete relief immediately after your procedure.    Pain medications can cause constipation. Take a stool softener (e.g., Colace or Senokot-S) daily while using prescription pain medication. If you do not have a bowel movement within 3 days after your procedure, take MiraLAX (or generic polyethylene glycol): 1 dose in the morning and 1 dose at bedtime until bowel movements are back to normal. When you stop your pain medication, you should be able to stop your stool softener.   If you have not had a bowel movement within 2 to 3 days, and you have not had surgery in the rectal area, you may try an over the counter Dulcolax suppository or Fleets enema.    To avoid constipation drink 6 to 8 glasses of water a day unless you are on a fluid restriction.      Medications and Refills  Always take prescribed medication as directed on the bottle or by your doctor.    Do not exceed 4000 mg of acetaminophen (Tylenol) per day.    If you are allowed to take ibuprofen (Motrin), do not exceed 3200 mg per day.   Remember your pain  prescription may contain acetaminophen or ibuprofen.   Prescription refill requests are handled during normal business hours, Monday thru Friday 8 am - 4:30 pm. Allow one full business day to respond to refill requests.    **Refill requests made after hours or on weekends, will not be   addressed until the following business day.**  Some narcotic prescriptions cannot be called directly into the pharmacy. Not all pharmacies accept electronic refills. Please take this into consideration when contacting our office. You may need to  a hand-written prescription from one of our offices, or  allow for [employer] Postal Service mail delivery of the written prescription.     Dressing/Incision Care  Keep your dressing(s) clean and dry.  Do not apply any creams, lotions or ointments to your incision until fully healed unless you are specifically instructed to use.   Your incision may be closed with either sutures, steri-strips (butterfly tape), Dermabond (surgical glue) or staples.  Dermabond: Do not peel off, this will fall off on its own.  Steri-strips: Should fall off in 7-10 days.  If after 10 days they have not fallen off, you may gently peel them off.    Staples or visible sutures:  Need to be removed in our office typically 10-14 days after your procedure.   Internal sutures: Do not need to be removed.  They will dissolve on their own.

## 2024-09-27 ENCOUNTER — HOSPITAL ENCOUNTER
Dept: HOSPITAL 101 - VC | Age: 69
Discharge: HOME | End: 2024-09-27
Payer: MEDICARE

## 2024-09-27 DIAGNOSIS — I83.813: Primary | ICD-10-CM

## 2024-09-27 PROCEDURE — 36478 ENDOVENOUS LASER 1ST VEIN: CPT

## 2024-09-27 RX ADMIN — LIDOCAINE HYDROCHLORIDE 0 ML: 10 INJECTION, SOLUTION INFILTRATION; PERINEURAL at 12:50

## 2024-09-27 NOTE — XMS_ITS
Comprehensive CCD (C-CDA v2.1)  
  
                         Created on: 2024  
  
  
Jocelin Pacheco  
External Reference #: [id number]:[id number]  
: [id number]  
Sex: Male  
  
Demographics  
  
  
                                        Address             [address]  [address]  
   
                                        Home Phone          [phone]  
   
                                        Work Phone          [phone]  
   
                                        Preferred Language  en  
   
                                        Marital Status        
   
                                        Nondenominational Affiliation Unknown  
   
                                        Race                White  
   
                                        Ethnic Group        Not  or Lati  
no  
  
  
Author  
  
  
                                        Organization        [facility]  
  
  
Care Team Providers  
  
  
                                Care Team Member Name Role            Phone  
   
                                Annel Baird      Unavailable     [phone]  
   
                                Alonso Birmingham   Unavailable     [phone]  
   
                                Jacoby Braden Unavailable     [phone]  
   
                                Vera Melvin Unavailable     [phone]  
   
                                Unavailable     Unavailable     [phone]  
   
                                DO Vera Melvin Primary Care Provider [phone]  
   
                                ESVIN Steward Attending Provider [phone]  
   
                                SOWMYA Villarreal Attending Provider [phone]  
   
                                DO Vera Melvin Primary Care Provider [phone]  
   
                                ESVIN Steward Attending Provider [phone]  
   
                                DO JASON Maldonado Attending Provider [phone]  
   
                                COMPA MALDONADO Attending       Unavailable  
   
                                COMPA MALDONADO Referring       Unavailable  
   
                                Ngozi, Dr. Vera Vincent Primary Care    U  
DO Vera Brown Primary Care Provider [phone]  
   
                                ESVIN Steward Attending Provider [phone]  
   
                                MD Jacoby Braden Attending Provider [phone]  
   
                                DO Vera Melvin Primary Care Provider [phone]  
   
                                SOWMYA Espino Attending Provider [phone]  
   
                                Dr. Compa Maldonado Referring       Rachna Melvin, Dr. Vera Vincent [facility]    U  
daniel Maldonado, Dr. Compa Sr Attending       Rachna Maldonado, Dr. Compa Sr Referring       Unava  
ilmariaelena Melvin, Dr. Vera Melisa Primary Care    U  
Dr. Compa Thomson Attending       Unava  
ilable  
   
                                Nichelle, ANABELLAC Barney Attending Provider [phone]  
   
                                Matias-Clear [place], DO Vera Attending Provider [phone]  
   
                                [place] DO, Vera D Unavailable     [phone]  
   
                                Juve DPM, Danya SMART Unavailable     [phone]  
   
                                Duke CROTES, Elroy CELESTE Unavailable     [phone]  
   
                                [place] DO, Vera D Primary Care Provider   
[phone]  
   
                                Matias[place], DO Vera Primary Care Provider [phone]  
   
                                SOWMYA Espino Attending Provider [phone]  
   
                                [place] DO, Vera Melisa Primary Care Provi  
sherly [phone]  
   
                                SHARLENE AN  Attending       Unavailable  
   
                                MATIAS-EMERY, VERA MELISA Primary Care    Unava  
ilable  
   
                                Matias-[place], DO Vera Primary Care Provider [phone]  
   
                                SOWMYA Espino Attending Provider [phone]  
   
                                Matias[place], DO Vera Primary Care Provider [phone]  
   
                                SOWMYA Espino Attending Provider [phone]  
   
                                VERA MELVIN Attending       Unavailab  
le  
   
                                MATIAS-VERA ANTONY Referring       Unavailab  
le  
   
                                DANYA AN Attending       Unavailable  
   
                                MATIASVERA GARCIA Referring       Unavailab  
le  
   
                                LOPEZ JESSICA L   Attending       Unavailable  
   
                                MIRIAN, RAY L Referring       Unavailable  
   
                                DEPOY, ARLENE Attending       Unavailable  
   
                                MIRIAN, RAY L Referring       Unavailable  
   
                                LOPEZ, JESSICA L   Attending       Unavailable  
   
                                MIRIAN, RAY L Referring       Unavailable  
   
                                DEPOY, ARLENE Attending       Unavailable  
   
                                MIRIAN, RAY L Referring       Unavailable  
   
                                AN, DANYA H Attending       Unavailable  
   
                                LOPEZ, JESSICA L   Attending       Unavailable  
   
                                MIRIAN, RAY L Referring       Unavailable  
   
                                DANYA AN Attending       Unavailable  
   
                                MATIAS-VERA ANTONY Attending       Unavailab  
le  
   
                                MATIAS-VERA ANTONY Referring       Unavailab  
le  
   
                                DANYA AN Attending       Unavailable  
   
                                VERA MELVIN Attending       Unavailab  
le  
   
                                MATIAS-EMERYVERA Referring       Unavailab  
le  
   
                                Cophector, Elaine   Admitting       Unavailable  
   
                                Elaine Espino   Attending       Unavailable  
   
                                [facility] Vera Primary Care    Unavailable  
   
                                Cophector, Elaine   Attending       Unavailable  
   
                                [facility]    Unavailable  
   
                                Copsey, Elaine   Admitting       Unavailable  
   
                                Copsey, Elaine   Admitting       Unavailable  
   
                                Copsey, Elaine   Attending       Unavailable  
   
                                [facility]    Unavailable  
  
  
  
Allergies  
  
  
                                                    Allergy   
Classification                          Reported   
Allergen(s)               Allergy Type              Date of   
Onset                     Reaction(s)               Facility  
   
                                                      
(20 sources)        Cefixime            Drug Allergy        20  
22                                      Unknown,   
Unknown   
Reaction                                [facility]  
   
                                                      
(20 sources)                            Diclofenac;   
Translations:   
[Voltaren]                Drug Allergy              20  
22                        [facility]  
   
                                                      
(20 sources)        Nystatin            Drug Allergy        20  
22                                      Unknown,   
Unknown   
Reaction                                [facility]  
   
                                                      
(8 sources)                             Cephalosporins   
(Antibiotic);   
Translations:   
[Cephalosporins]                        Allergy to   
drug (finding)                          20                        [place] [address] [facility]  
   
                                                      
(7 sources)                             Hmg-Coa Reductase   
Inhibitors   
(Statins);   
Translations:   
[Statins]                               Allergy to   
drug (finding)                          [facility]   
[facility]  
[address] DO  
Work Phone:   
[phone]  
0  
   
                                                      
(15 sources)                            Benzoate;   
Translations:   
[sodium benzoate]         Drug Allergy              20  
22                                      Unknown   
Reaction                                [facility]  
   
                                                      
(3 sources)               Cefixime                  Allergy to   
substance                               20  
23                        Unknown                   [facility]   
[facility]  
Work Phone:   
[phone]  
1  
   
                                                      
(3 sources)         Nystatin            Drug Allergy        20  
23                        Unknown                   [facility]   
[facility]  
   
                                                      
(1 source)                              Cephalosporins   
(Antibiotic)                            Drug   
Intolerance                             20                        [facility]  
   
                                                      
(2 sources)                             Diclofenac;   
Translations:   
[DICLOFENAC   
SODIUM]                   Drug Allergy              20                        [facility]  
Work Phone:   
[phone]  
   
                                                      
(2 sources)                             HMG-CoA reductase   
inhibitor;   
Translations:   
[STATINS-HMG-COA   
REDUCTASE   
INHIBITORS]                             Drug   
Intolerance                             20                        [facility]  
Work Phone:   
[phone]  
   
                                                      
(1 source)          Cefixime            Drug Allergy        20                                                  [facility]   
Repository  
   
                                                      
(1 source)          Diclofenac          Drug Allergy        20                                                  [facility]   
Repository  
   
                                                      
(1 source)          Nystatin            Drug Allergy        20                                                  [facility]   
Repository  
  
  
  
Medications  
Current Medications  
  
  
  
                      Medication Drug Class(es) Dates      Sig (Normalized) Sig   
(Original)  
   
                                                    3 ML semaglutide   
2.68 MG/ML Pen   
Injector [Ozempic]  
(7 sources)                                         Start:   
2022                              inject 2 mg by   
subcutaneous   
injection every   
week                                    Ozempic (2 MG/DOSE)   
8 MG/3ML 2 mg   
Subcutaneous weekly   
for 30 days 21 Dec,   
2022 Active  
  
  
  
                                                            inject 2 mg by subcu  
taneous injection   
every week                              Ozempic (2 MG/DOSE) 8 MG/3ML 2 mg SQ Onc  
e   
a week. for 28 days E11.8 Active  
   
                                                            inject 2 mg by subcu  
taneous injection   
every week                              Ozempic (2 MG/DOSE) 8 MG/3ML DIAL AND   
INJECT UNDER THE SKIN 2 MG WEEKLY for 28   
Active  
   
                                                            inject 2 mg by subcu  
taneous injection   
every week                              Ozempic (2 MG/DOSE) 8 MG/3ML 2 mg   
Subcutaneous weekly for 30 days Active  
  
  
  
                                                    acetaminophen 500   
mg oral tablet  
(12 sources)                                        Start:   
2020                              take 1   
tablet by   
mouth three   
times daily                             Acetaminophen   
(Tylenol Extra   
Strength) 500 mg   
Tablet Active 500   
MG PO Three times   
daily 2020 1:00am  
   
                                                    carvedilol 6.25 mg   
oral tablet  
(20 sources)                            alpha-Adrenergic   
Blocker,   
beta-Adrenergic   
Blocker                                 Start:   
2017                              take 6.25 mg   
by mouth   
twice daily                             Carvedilol Active   
6.25 MG PO Twice   
daily 2017 1:00am  
   
                                                    empagliflozin 10 mg   
oral tablet  
(20 sources)                            Sodium-Glucose   
Cotransporter 2   
Inhibitor                               Start:   
2022                              take 1   
tablet by   
mouth once   
daily                                   Empagliflozin   
(Jardiance) 10 mg   
tablet Active 10 MG   
PO Daily 2022 12:00am  
   
                                                    furosemide 40 mg   
oral tablet  
(20 sources)              Loop Diuretic             Start:   
2017                              take 1   
tablet by   
mouth twice   
daily                                   Furosemide (Lasix)   
40 mg Tablet Active   
40 MG PO Twice   
daily 2017 1:00am  
  
  
  
                                                            take 1 tablet by lenin  
th every twelve   
hours                                   furosemide (Lasix) 40 MG tablet Take 40   
mg   
by mouth every 12 (twelve) hours. 0 Active  
   
                                                                Lasix TABS TAKE   
1 TABLET TWICE DAILY.   
Quantity: 0 Refills: 0 Ordered: 2022   
DO Active  
  
  
  
                                                    lactobacillus   
acidophilus   
[id number] unt oral   
capsule  
(6 sources)                                         Start:   
2024                              take 10   
capsules by   
mouth once   
daily                                   Lactobacillus   
Acidophilus   
(Probiotic) 10   
billion cell capsule   
Active 100 MMU CELLS   
PO Daily 2024 1:00am  
   
                                                    methylPREDNISolone  
(1 source)                Corticosteroid            Start:   
2024  
End:   
2024                                          methylPREDNISolone   
(Medrol Dospak) 4 MG   
tablets Indications:   
Lumbosacral strain,   
initial encounter ,   
Acute pain of left   
shoulder Take as   
directed on package.   
21 tablet 0   
2024 Active  
   
                                                    Ozempic (2 MG/DOSE) 8   
MG/3ML  
(1 source)                                                  inject 2 mg by   
subcutaneous   
injection   
every week                              Ozempic (2 MG/DOSE)   
8 MG/3ML 2 mg   
Subcutaneous weekly   
for 30 days Active  
   
                                                    Ozempic, 2 MG/DOSE, 8   
MG/3ML solution   
pen-injector  
(3 sources)                                         Start:   
2023                                          Ozempic, 2 MG/DOSE,   
8 MG/3ML solution   
pen-injector  
   
                                                    Probiotic Product   
(PROBIOTIC BLEND PO)  
(3 sources)                                                 take 2 [IU] by   
mouth in the   
morning                                 Probiotic Product   
(PROBIOTIC BLEND PO)   
Take 2 Units by   
mouth in the   
morning. 0 Active  
   
                                                    rivaroxaban 10 mg   
oral tablet  
(20 sources)                            Factor Xa   
Inhibitor                               Start:   
2022                              take 1 tablet   
by mouth once   
daily                                   Rivaroxaban   
(Xarelto) 10 mg   
Tablet Active 10 MG   
PO Daily 2022 12:00am   
for 35 days  
  
  
  
                                                    Start: 2019  
End: 2021           take 10 mg by mouth once daily Rivaroxaban Discontinue  
d 10 MG PO  
   
Daily 2019 12:00am 2021 1:02am  
   
                                                                Xarelto Active  
  
  
  
                                                    rosuvastatin   
calcium 10 mg oral   
tablet  
(3 sources)                             HMG-CoA Reductase   
Inhibitor                               Start:   
2023                              take 1 tablet by   
mouth every   
twenty-four hours                       Rosuvastatin   
Calcium 10 MG 1   
tablet Orally   
Once a day for 30   
day(s)  Active  
   
                                                    sacubitril 24 mg /   
valsartan 26 mg   
oral tablet  
(20 sources)                            Angiotensin 2   
Receptor Blocker                        Start:   
2022                              take 1 tablet by   
mouth twice daily                       Sacubitril-Valsar  
jiménez (Entresto)   
24-26 mg Tablet   
Active 1 TAB PO   
Twice daily 2023   
12:00am  
  
  
  
                                                take 1 tablet by mouth in the mo  
rning Entresto 24-26 MG tablet Take 1 tablet by  
   
mouth in the morning and 1 tablet before   
bedtime. 0 Active  
   
                                                                ENTRESTO 24 mg/2  
6 mg 1 orally twice a day   
Active  
  
  
  
                                                    0.25 mg, 0.5 mg   
dose 1.5 ml   
semaglutide 1.34   
mg/ml pen injector  
(3 sources)                                         Start:   
2022                                          Ozempic (0.25 or   
0.5 MG/DOSE) 2   
MG/1.5ML 0.25 mg   
for one month and   
then increase to   
0.5 mg dose   
Subcutaneous   
weekly for 30 days   
   
Active  
   
                                                    Semaglutide  
(4 sources)                                         Start:   
2024                              inject 0.25 mg by   
subcutaneous   
injection every   
week, then inject   
0.5 mg by   
subcutaneous   
injection every   
week                                    Semaglutide   
(Ozempic) 0.25 mg   
or 0.5 mg (2 mg/3   
mL) pen injector   
Active 0.25 MG   
SUBCUT every week   
3 April 24th, 2024   
12:00am for 4   
weeks; then   
increase to 0.5 mg   
every week  
   
                                                    semaglutide   
(Ozempic) 1 mg/dose   
(4 mg/3 mL) pen   
injector  
(1 source)                                                  inject 1 mg by   
subcutaneous   
injection every   
week                                    semaglutide   
(Ozempic) 1   
mg/dose (4 mg/3   
mL) pen injector   
Inject 1 mg under   
the skin per week.   
Active  
   
                                                    spironolactone 25   
mg oral tablet  
(20 sources)                            Aldosterone   
Antagonist                              Start:   
2022                              take 25 mg by   
mouth once daily                        Spironolactone   
Active 25 MG PO   
Daily 2022 12:00am  
  
  
  
Completed/Discontinued Medications  
  
  
  
                      Medication Drug Class(es) Dates      Sig (Normalized) Sig   
(Original)  
   
                                                    3 ML semaglutide   
1.34 MG/ML Pen   
Injector [Ozempic]  
(7 sources)                                         Start:   
10-                              inject 1 mg by   
subcutaneous   
injection every   
week                                    Ozempic (1 MG/DOSE)   
4 MG/3ML 1 mg   
Subcutaneous Weekly   
for 28 days 28 Oct,   
2022 Not-Taking  
  
  
  
                                        Start: 10-   inject 1 mg by subcu  
taneous   
injection every week                    Ozempic (1 MG/DOSE) 4 MG/3ML 1 mg   
Subcutaneous Weekly for 28 days   
28 Oct, 2022 Active  
   
                                        Start: 2022   inject 1 mg by subcu  
taneous   
injection every week                    Ozempic (1 MG/DOSE) 4 MG/3ML 1 mg   
Subcutaneous Weekly for 28 days   
 Active  
  
  
  
                                                    acetaminophen 325   
mg / HYDROcodone   
bitartrate 5 mg   
oral tablet  
(12 sources)              Opioid Agonist            Start:   
2021  
End:   
2022                              take 1   
tablet by   
mouth   
every   
eight   
hours                                   Hydrocodone-Acetaminophen   
Discontinued 1 TAB PO Q8H 6   
2  8:11am  
   
                                                    acetaminophen 325   
mg / oxyCODONE   
hydrochloride 5   
mg oral tablet  
(12 sources)              Opioid Agonist            Start:   
2019  
End:   
2020                              take 1   
tablet by   
mouth   
every   
four to   
six hours                               Oxycodone-Acetaminophen   
(Percocet) 5-325 mg tablet   
Discontinued 1 TAB PO EVERY   
4-6 HOURS 10 3 November   
29th, 2019 December 2nd,   
2020 1:59pm  
   
                                                    ampicillin 500 mg   
oral capsule  
(12 sources)                            Penicillin-class   
Antibacterial                           Start:   
2019  
End:   
2019                              take 500   
mg by   
mouth   
every six   
hours                                   Ampicillin Discontinued 500   
MG PO Q6H 56 14 2019 12:00am 2019   
3:39pm  
   
                                                    ascorbic acid 500   
mg oral tablet  
(12 sources)              Vitamin C                 Start:   
2020  
End:   
2022                              take 1   
tablet by   
mouth   
twice   
daily                                   Ascorbic Acid (Vitamin C)   
(Vitamin C) 500 mg Tablet   
Discontinued 500 MG PO Twice   
daily 14  1:00am 2022 8:11am  
   
                                                    aspirin 81 mg   
delayed release   
oral tablet  
(12 sources)                            Platelet   
Aggregation   
Inhibitor,   
Nonsteroidal   
Anti-inflammatory   
Drug                                    Start:   
2019  
End:   
2019                              take 1   
tablet by   
mouth   
once   
daily                                   Aspirin (Aspirin Low Dose)   
81 mg Tablet,Delayed Release   
(Dr/Ec) Discontinued 81 MG   
PO Daily 2019   
12:00am 2019   
11:52am  
   
                                                    cephalexin 500 mg   
oral capsule  
(13 sources)                            Cephalosporin   
Antibacterial                           Start:   
2024  
End:   
08-                              take 500   
mg by   
mouth   
twice   
daily                                   Cephalexin Discontinued 500   
MG PO Twice daily 14  12:00am   
2024 3:20pm  
  
  
  
                                                    Start: 2019  
End: 2019                         take 500 mg by mouth every   
five hours                              Cephalexin Discontinued 500 MG PO Q5H   
2019 12:00am May 6th, 2019   
1:54pm  
  
  
  
                                                    ciprofloxacin 500   
mg oral tablet  
(20 sources)                            Quinolone   
Antimicrobial                           Start:   
2021  
End: 2021                         take 1   
tablet by   
mouth twice   
daily                                   Ciprofloxacin Hcl   
(Cipro) 500 mg   
tablet Discontinued   
500 MG PO Twice   
daily  1:00am   
2021   
12:59am  
  
  
  
                                                    Start: 2020  
End: 2021                         take 500 mg by mouth every   
twelve hours                            Ciprofloxacin Hcl Discontinued 500 MG   
PO Q12H 20 10 December 4th, 2020   
1:00am 2021 3:07pm  
   
                                                    Start: 2020  
End: 2020                         take 1 tablet by mouth twice   
daily                                   Ciprofloxacin Hcl (Cipro) 500 mg   
tablet Discontinued 500 MG PO Twice   
daily  12:00am   
2020 1:36pm  
   
                                                    Start: 2020  
End: 2020                         take 1 tablet by mouth twice   
daily                                   Ciprofloxacin Hcl (Cipro) 500 mg   
tablet Discontinued 500 MG PO Twice   
daily  12:00am 2020 10:03am  
  
  
  
                                                    clindamycin 300 mg   
oral capsule  
(20 sources)                            Lincosamide   
Antibacterial                           Start: 2019  
End: 2020                         take 300 mg   
by mouth   
every eight   
hours                                   Clindamycin Hcl   
Discontinued 300   
MG PO Q8H 30 10   
November 29th,   
2019 1:00am   
2020   
3:08pm  
  
  
  
                                                    Start: 2019  
End: 2019                         take 450 mg by mouth every   
eight hours                             Clindamycin Hcl Discontinued 450 MG   
PO Q8H 63  1:00am   
2019 2:00pm  
  
  
  
                                                    clobetasol propionate   
0.0005 mg/mg topical   
ointment  
(20 sources)              Corticosteroid            Start: 2019  
End: 2023                                     Clobetasol Discontinued 1   
APPLIC TOPICAL 3 Times a   
week 2021   
1:32pm 2023   
11:11am apply thin layer   
to right leg 1-2 times   
daily as needed for   
itching.  
  
  
  
                                                    Start: 05-  
End: 2019                                     Clobetasol Discontinued 1 AP  
PLIC TOPICAL .every other day 30 14  
   
May 15th, 2019 12:00am 2019 2:56pm apply thin layer to   
right leg rash with dressing changes  
   
                                                                clobetasol (Jose A  
vate) 0.05 % cream Apply 1 application topically   
every 12 (twelve) hours. 0 Active  
   
                                                                Clobetasol Propi  
maged Not-Taking  
   
                                                                Clobetasol Propi  
maged Active  
  
  
  
                                                    cyclobenzaprine   
hydrochloride 10 mg   
oral tablet  
(19 sources)              Muscle Relaxant           Start:   
2017  
End: 2021                         take 10 mg   
by mouth   
three   
times   
daily                                   Cyclobenzaprine   
Discontinued 10 MG   
PO Three times daily   
2017   
1:00am 2021 12:59am  
   
                                                    dexamethasone 6 mg   
oral tablet  
(12 sources)              Corticosteroid            Start:   
2020  
End: 2021                         take 6 mg   
by mouth   
once daily                              Dexamethasone   
Discontinued 6 MG PO   
Daily 3 3 December   
22nd, 2020 1:00am   
2021   
3:07pm  
   
                                                    doxycycline hyclate   
100 mg oral capsule  
(20 sources)                            Tetracycline-class   
Drug                                    Start:   
2024  
End: 08-                         take 100   
mg by   
mouth   
twice   
daily                                   Doxycycline Hyclate   
Discontinued 100 MG   
PO Twice daily 20 10   
July 8th, 2024   
12:00am 2024 3:20pm  
  
  
  
                                                    Start: 2023  
End: 2024                         take 100 mg by mouth twice   
daily                                   Doxycycline Hyclate Discontinued 100   
MG PO Twice daily  1:00am 2024   
2:30pm  
   
                                                    Start: 2021  
End: 2021                         take 100 mg by mouth twice   
daily                                   Doxycycline Hyclate Discontinued 100   
MG PO Twice daily 20 10 April 8th,   
2021 12:00am 2021 1:31pm  
   
                                                    Start: 2021  
End: 2021                         take 100 mg by mouth twice   
daily                                   Doxycycline Hyclate Discontinued 100   
MG PO Twice daily 20 10 January 29th,   
2021 1:00am 2021 8:01am  
   
                                                    Start: 10-  
End: 2020                         take 100 mg by mouth twice   
daily                                   Doxycycline Hyclate Discontinued 100   
MG PO Twice daily 20 10 October 5th,   
2020 12:00am 2020   
1:59pm  
   
                                                    Start: 2020  
End: 2020                         take 100 mg by mouth twice   
daily                                   Doxycycline Hyclate Discontinued 100   
MG PO Twice daily May 5th, 2020   
12:00am 2020 1:37pm  
   
                                                    Start: 2020  
End: 2020                         take 100 mg by mouth twice   
daily                                   Doxycycline Hyclate Discontinued 100   
MG PO Twice daily  12:00am 2020 1:21pm  
   
                                                    Start: 2019  
End: 2020                         take 100 mg by mouth twice   
daily                                   Doxycycline Hyclate Discontinued 100   
MG PO Twice daily  1:00am 2020   
3:11pm  
   
                                                    Start: 2019  
End: 2019                         take 100 mg by mouth twice   
daily                                   Doxycycline Hyclate Discontinued 100   
MG PO Twice daily  12:00am 2019 3:39pm  
   
                                                    Start: 2019  
End: 2019                         take 100 mg by mouth twice   
daily                                   Doxycycline Hyclate Discontinued 100   
MG PO Twice daily 28 14 May 3rd, 2019   
12:00am May 28th, 2019 3:36pm  
  
  
  
                                                    Ketorolac  
(15 sources)                            Nonsteroidal   
Anti-inflammatory Drug,   
Cyclooxygenase   
Inhibitor                               Start:   
07-                                          Toradol per 15 mg   
15 2013 2 mL  
   
                                                    levoFLOXacin 750   
mg oral tablet  
(12 sources)              Quinolone Antimicrobial   Start:   
2019  
End: 2019                         take 1   
tablet by   
mouth once   
daily                                   Levofloxacin   
(Levaquin) 750 mg   
tablet   
Discontinued 750   
MG PO Daily 10 10   
Lynn 25th, 2019   
12:00am 2019 2:24pm  
   
                                                    meloxicam 15 mg   
oral tablet  
(19 sources)                            Nonsteroidal   
Anti-inflammatory Drug                  Start:   
2017  
End: 2021                         take 15 mg   
by mouth   
once daily                              Meloxicam   
Discontinued 15   
MG PO Daily   
2017 1:00am 2021 1:00am  
   
                                                    metOLazone 2.5 mg   
oral tablet  
(20 sources)              Thiazide-like Diuretic    Start:   
2022  
End: 2023                         take 2.5 mg   
by mouth   
every other   
day                                     Metolazone   
Discontinued 2.5   
MG PO Q2D 2022   
12:00am 2023   
11:10am  
  
  
  
                                                    Start: 2019  
End: 2022                         take 2.5 mg by mouth three   
times weekly in the morning as   
needed                                  Metolazone Discontinued 2.5 MG PO 3   
Times a week 2019 12:00am   
2022 8:10am take three   
times a week 30 minutes prior to am   
lasix as needed  
   
                                                                metOLazone Activ  
e  
  
  
  
                                                    nabumetone 750 mg   
oral tablet  
(20 sources)                            Nonsteroidal   
Anti-inflammatory Drug                  Start:   
2021  
End: 2024                         take 750 mg   
by mouth   
twice daily                             Nabumetone   
Discontinued 750   
MG PO Twice daily   
2021   
1:00am 2024 9:17am  
  
  
  
                                                    Start: 2021  
End: 2024                         take 1 tablet by mouth every   
twelve hours                            nabumetone (Relafen) 750 mg tablet   
Take 1 tablet (750 mg) by mouth every   
12 hours. 2021   
Discontinued (Discontinued by another   
clinician)  
   
                                        Start: 2021   take 1 tablet by lenin  
th once   
daily                                   Nabumetone 750 MG Oral Tablet TAKE 1   
TABLET EVERY 12 HOURS DAILY.   
Quantity: 60 Refills: 2 Ordered:   
2021 Compa Soto MD   
Start : 2021 Active  
  
  
  
                                                    Ozempic (1 MG/DOSE)   
SOPN  
(7 sources)                                                     Ozempic (1 MG/DO  
SE)   
SOPN INJECT 1 UNIT   
Weekly Quantity: 0   
Refills: 0 Ordered:   
2022 DO Active  
   
                                                    permethrin 50 mg/ml   
topical cream  
(12 sources)              Pyrethroid                Start:   
2019  
End:   
2019                                          Permethrin   
Discontinued 1 APPLIC   
TOPICAL Once May 6th,   
2019 12:00am May   
28th, 2019 3:36pm  
   
                                                    predniSONE 20 mg oral   
tablet  
(7 sources)                                         Start:   
2019                              take 2 tablets by   
mouth every   
twenty-four hours                       predniSONE 20 MG 2   
tablet Orally Once a   
day for 5  Not-Taking  
   
                                                    Semaglutide (Ozempic)   
2 mg/dose (8 mg/3 mL)   
pen injector  
(12 sources)                                        Start:   
2022  
End:   
2024                              inject 2 mg by   
subcutaneous injection   
every week                              Semaglutide (Ozempic)   
2 mg/dose (8 mg/3 mL)   
pen injector   
Discontinued 2 MG   
SUBCUT Q7D 2022 12:00am   
2024   
2:31pm  
  
  
  
                                        Start: 2022   inject 2 mg by subcu  
taneous   
injection every week                    Semaglutide (Ozempic) 2 mg/dose   
(8 mg/3 mL) pen injector Active 2   
MG SUBCUT Q7D 2022   
11:00pm  
   
                                        Start: 2022   inject 2 mg by subcu  
taneous   
injection every week                    Semaglutide (Ozempic) 2 mg/dose   
(8 mg/3 mL) pen injector Active 2   
MG SUBCUT Q7D 2022   
12:00am  
  
  
  
                                                    Sulfamethoxazole  
(7 sources)     Sulfonamide Antimicrobial                                 Sulfam  
ethoxazole Not-Taking  
  
  
  
                                                                Sulfamethoxazole  
 Active  
  
  
  
                                                    sulfamethoxazole   
800 mg /   
trimethoprim 160   
mg oral tablet  
(12 sources)                            Dihydrofolate   
Reductase   
Inhibitor   
Antibacterial,   
Sulfonamide   
Antimicrobial                           Start:   
2020  
End:   
2020                              take 1   
tablet by   
mouth   
twice   
daily                                   Sulfamethoxazole-Trimethoprim   
(Bactrim Ds) 800-160 mg tablet   
Discontinued 1 TAB PO Twice   
daily    
1:00am 2020   
3:57pm  
   
                                                    telmisartan 40 mg   
oral tablet  
(20 sources)                            Angiotensin 2   
Receptor Blocker                        Start:   
2017  
End:   
2022                              take 40   
mg by   
mouth   
once   
daily                                   Telmisartan Discontinued 40 MG   
PO Daily 2017   
1:00am 2022   
8:15am  
   
                                                    traMADol   
hydrochloride 50   
mg oral tablet  
(19 sources)              Opioid Agonist            Start:   
2020  
End:   
2020                              take 50   
mg by   
mouth   
twice   
daily                                   Tramadol Discontinued 50 MG PO   
Twice daily 2020   
1:00am 2020   
2:10pm  
   
                                                    triamcinolone   
acetonide 5 mg/ml   
topical cream  
(12 sources)              Corticosteroid            Start:   
2019  
End:   
2019                                          Triamcinolone Acetonide   
Discontinued 1 APPLIC TOPICAL   
Twice daily  12:00am 2019   
12:02am apply to back and arm   
rash twice daily as needed for   
itching.  
   
                                                    Triamcinolone &   
Emollient 0.1 %  
(7 sources)                                                     Triamcinolone &   
Emollient 0.1   
% as directed Externally   
Not-Taking  
   
                                                    vancomycin 250 mg   
oral capsule  
(12 sources)                            Glycopeptide   
Antibacterial                           Start:   
2021  
End:   
2021                              take 250   
mg by   
mouth   
every six   
hours                                   Vancomycin Discontinued 250 MG   
PO Q6H 28    
12:00am 2021 1:01am  
   
                                                    zinc sulfate 220   
mg oral capsule  
(12 sources)                                        Start:   
2020  
End:   
2022                              take 1   
capsule   
by mouth   
once   
daily                                   Zinc Sulfate (Orazinc) 220   
(50) mg Capsule Discontinued   
220 MG PO Daily 7  1:00am 2022 8:11am  
  
  
  
Problems  
Active Problems  
  
  
                      Problem Classification Problem    Date       Documented Da  
te Episodic/Chronic  
   
                                                    Acute and unspecified   
renal failure  
(12 sources)                            Injury of kidney;   
Translations: [Acute   
kidney failure,   
unspecified]                            2020          Episodic  
   
                                                    Allergic reactions  
(12 sources)                            Inflammatory   
dermatosis;   
Translations:   
[Dermatitis,   
unspecified]                            10-          Episodic  
   
                                                    Blindness and vision   
defects  
(7 sources)                             Disorder of vision;   
Translations:   
[Problems with   
sight]                                                      Chronic  
   
                                                    Chronic kidney disease  
(3 sources)                             Chronic kidney   
disease stage 2;   
Translations:   
[Chronic kidney   
disease, stage 2   
(mild)]                                 Onset:   
2023                Chronic  
   
                                                    Chronic ulcer of skin  
(20 sources)                            Ulcer; Translations:   
[Ulcerative lesion]                     Onset:   
2024                Chronic  
   
                                                    Coagulation and   
hemorrhagic disorders  
(4 sources)                             Acquired coagulation   
factor inhibitor   
disorder;   
Translations: [Other   
hemorrhagic disorder   
due to intrinsic   
circulating   
anticoagulants,   
antibodies, or   
inhibitors]                             Onset:   
2023                Chronic  
   
                                                    Congestive heart   
failure;   
nonhypertensive  
(20 sources)                            Congestive heart   
failure;   
Translations: [Heart   
failure,   
unspecified]                            Onset:   
2022  
Resolved:   
2022                                          Chronic  
   
                                                    Coronary   
atherosclerosis and   
other heart disease  
(20 sources)                            Coronary   
arteriosclerosis;   
Translations:   
[Atherosclerotic   
heart disease of   
native coronary   
artery without   
angina pectoris]                        Onset:   
2022  
Resolved:   
2022                                          Chronic  
   
                                                    Diabetes mellitus with   
complications  
(20 sources)                            Disorder due to type   
2 diabetes mellitus;   
Translations: [Type   
2 diabetes mellitus   
with unspecified   
complications]                          Onset:   
2022  
Resolved:   
2022                                          Chronic  
   
                                                    Diabetes mellitus   
without complication  
(8 sources)                             Prediabetes;   
Translations: [Other   
abnormal glucose]                       Onset:   
2024                Episodic  
   
                                                    Disorders of lipid   
metabolism  
(20 sources)                            Mixed   
hyperlipidemia;   
Translations: [Mixed   
hyperlipidemia]                         Onset:   
2022  
Resolved:   
2022                                          Chronic  
   
                                                    Essential hypertension  
(14 sources)                            Hypertensive   
disorder;   
Translations:   
[Essential (primary)   
hypertension]                           2019          Chronic  
   
                                                    Fluid and electrolyte   
disorders  
(12 sources)                            Metabolic acidosis;   
Translations:   
[Metabolic acidosis]                     2020          Episodic  
   
                                                    Genitourinary symptoms   
and ill-defined   
conditions  
(3 sources)                             Urinary   
incontinence;   
Translations:   
[Unspecified urinary   
incontinence]                           Onset:   
2024                Chronic  
   
                                                    Immunizations and   
screening for   
infectious disease  
(16 sources)                            Encounter for   
immunization;   
Translations:   
[Culture positive   
for methicillin   
resistant   
Staphylococcus   
aureus]                                 Onset:   
2021  
Resolved:   
2021                                          Episodic  
   
                                                    Mycoses  
(1 source)                              Onychomycosis;   
Translations: [Tinea   
unguium]                                2024          Episodic  
   
                                                    Nonspecific chest pain  
(6 sources)                             Chest pain;   
Translations: [Chest   
pain, unspecified]                      Onset:   
2023                                          Episodic  
   
                                                    Open wounds of   
extremities  
(20 sources)                            Open wound of right   
lower leg;   
Translations:   
[Unspecified open   
wound, right lower   
leg, initial   
encounter]                              Onset:   
2024                Episodic  
   
                                                    Open wounds of   
extremities  
(3 sources)                             Disorder of lower   
extremity;   
Translations:   
[Unspecified open   
wound, left lower   
leg, initial   
encounter]                              Onset:   
2024                Episodic  
   
                                                    Osteoarthritis  
(20 sources)                            Osteoarthritis of   
knee; Translations:   
[Osteoarthritis of   
knee, unspecified]                      Onset:   
2022  
Resolved:   
2022                                          Chronic  
   
                                                    Other and ill-defined   
heart disease  
(11 sources)                            Left ventricular   
hypertrophy;   
Translations:   
[Cardiomegaly]                          Onset:   
2023                Chronic  
   
                                                    Other circulatory   
disease  
(7 sources)                             H/O: heart disorder;   
Translations:   
[Personal history of   
unspecified   
circulatory disease]                                         Episodic  
   
                                                    Other connective   
tissue disease  
(7 sources)                             H/O: arthritis;   
Translations:   
[Personal history of   
arthritis]                                                  Episodic  
   
                                                    Other connective   
tissue disease  
(1 source)                              Pain in both feet;   
Translations: [Pain   
in right foot]                          2024          Episodic  
   
                                                    Other diseases of   
veins and lymphatics  
(17 sources)                            Postthrombotic   
syndrome;   
Translations:   
[Postthrombotic   
syndrome without   
complications of   
unspecified   
extremity]                                                  Chronic  
   
                                                    Other diseases of   
veins and lymphatics  
(1 source)                              Lymphedema, not   
elsewhere   
classified;   
Translations:   
[Lymphedema, not   
elsewhere   
classified]                             Onset:   
2024                                          Chronic  
   
                                                    Other diseases of   
veins and lymphatics  
(20 sources)                            Peripheral venous   
insufficiency;   
Translations:   
[Venous   
insufficiency   
(chronic)   
(peripheral)]                           Onset:   
2023                Episodic  
   
                                                    Other diseases of   
veins and lymphatics  
(20 sources)                            Stasis dermatitis;   
Translations:   
[Venous   
insufficiency   
(chronic)   
(peripheral)]                           Onset:   
2023                Episodic  
   
                                                    Other diseases of   
veins and lymphatics  
(12 sources)                            Vascular   
insufficiency;   
Translations:   
[Venous   
insufficiency   
(chronic)   
(peripheral)]                           2021          Episodic  
   
                                                    Other diseases of   
veins and lymphatics  
(6 sources)                             Disorder of vein of   
lower extremity;   
Translations:   
[Venous   
insufficiency   
(chronic)   
(peripheral)]                           2021          Episodic  
   
                                                    Other diseases of   
veins and lymphatics  
(1 source)                              Venous insufficiency   
of leg;   
Translations:   
[Venous   
insufficiency   
(chronic)   
(peripheral)]                           2024          Episodic  
   
                                                    Other diseases of   
veins and lymphatics  
(2 sources)                             Venous insufficiency   
(chronic)   
(peripheral);   
Translations:   
[Venous (peripheral)   
insufficiency,   
unspecified]                            Onset:   
2024                Episodic  
   
                                                    Other gastrointestinal   
disorders  
(12 sources)                            Diarrhea;   
Translations:   
[Diarrhea,   
unspecified]                            2020          Episodic  
   
                                                    Other gastrointestinal   
disorders  
(2 sources)                             Diarrhea,   
unspecified                                                 Episodic  
   
                                                    Other hematologic   
conditions  
(7 sources)                             H/O: blood disorder;   
Translations:   
[Personal history of   
diseases of blood   
and blood-forming   
organs]                                                     Episodic  
   
                                                    Other infections;   
including parasitic  
(12 sources)                            Disorder due to   
infection;   
Translations:   
[Unspecified   
infectious disease]                     2021          Episodic  
   
                                                    Other lower   
respiratory disease  
(8 sources)                             Dyspnea on exertion;   
Translations:   
[Shortness of   
breath]                                 Onset:   
2024                Episodic  
   
                                                    Other lower   
respiratory disease  
(12 sources)                            Dyspnea;   
Translations:   
[Dyspnea,   
unspecified]                            2020          Episodic  
   
                                                    Other nervous system   
disorders  
(16 sources)                            Sleep-wake schedule   
disorder, delayed   
phase type;   
Translations:   
[Circadian rhythm   
sleep disorder,   
delayed sleep phase   
type]                                                       Chronic  
   
                                                    Other nervous system   
disorders  
(2 sources)                             Circadian rhythm   
sleep disorder,   
delayed sleep phase   
type                                    Onset:   
2022  
Resolved:   
2022                                          Chronic  
   
                                                    Other nervous system   
disorders  
(7 sources)                             Numbness and   
tingling sensation   
of skin;   
Translations:   
[Disturbance of skin   
sensation]                                                  Episodic  
   
                                                    Other non-traumatic   
joint disorders  
(7 sources)                             Pain in unspecified   
knee; Translations:   
[Knee pain]                                                 Episodic  
   
                                                    Other non-traumatic   
joint disorders  
(3 sources)                             Pain in left   
shoulder;   
Translations: [Pain   
in joint, shoulder   
region]                                 Onset:   
2024                Episodic  
   
                                                    Other nutritional;   
endocrine; and   
metabolic disorders  
(20 sources)                            Body mass index 40+   
- severely obese;   
Translations: [Body   
mass index (BMI)   
50.0-59.9, adult]                       Onset:   
2024                Chronic  
   
                                                    Other nutritional;   
endocrine; and   
metabolic disorders  
(20 sources)                            Body mass index   
(BMI) 50.0-59.9,   
adult; Translations:   
[Body Mass Index   
50.0-59.9, adult]                       Onset:   
2022  
Resolved:   
2022                                          Chronic  
   
                                                    Other nutritional;   
endocrine; and   
metabolic disorders  
(20 sources)                            Obesity;   
Translations:   
[Obesity,   
unspecified]                            2019          Chronic  
   
                                                    Other nutritional;   
endocrine; and   
metabolic disorders  
(14 sources)                            Metabolic syndrome   
X; Translations:   
[Metabolic syndrome]                                         Chronic  
   
                                                    Other nutritional;   
endocrine; and   
metabolic disorders  
(4 sources)               Obesity, unspecified      Onset:   
05-  
Resolved:   
2022                                          Chronic  
   
                                                    Other nutritional;   
endocrine; and   
metabolic disorders  
(6 sources)               Metabolic syndrome        Onset:   
2022  
Resolved:   
2022                                          Chronic  
   
                                                    Other nutritional;   
endocrine; and   
metabolic disorders  
(12 sources)                            Morbid obesity;   
Translations:   
[Morbid (severe)   
obesity due to   
excess calories]                        2022          Chronic  
   
                                                    Other nutritional;   
endocrine; and   
metabolic disorders  
(7 sources)                             Morbid (severe)   
obesity due to   
excess calories;   
Translations:   
[Morbid obesity]                        2022          Chronic  
   
                                                    Other nutritional;   
endocrine; and   
metabolic disorders  
(3 sources)                             Obese class III;   
Translations:   
[Morbid (severe)   
obesity due to   
excess calories]                        10-          Chronic  
   
                                                    Other screening for   
suspected conditions   
(not mental disorders   
or infectious disease)  
(8 sources)                             Cardiovascular   
stress test   
abnormal;   
Translations: [Other   
nonspecific abnormal   
results of function   
study of   
cardiovascular   
system]                                 Onset:   
2024                Episodic  
   
                                                    Other skin disorders  
(12 sources)                            Hemosiderin   
pigmentation of   
lower limb due to   
varicose veins of   
lower limb;   
Translations: [Other   
specified disorders   
of pigmentation]                        2020          Episodic  
   
                                                    Other skin disorders  
(1 source)                              Callosity;   
Translations: [Corns   
and callosities]                        2024          Episodic  
   
                                                    Anastasia-; endo-; and   
myocarditis;   
cardiomyopathy (except   
that caused by   
tuberculosis or   
sexually transmitted   
disease)  
(20 sources)                            Cardiomyopathy;   
Translations:   
[Cardiomyopathy,   
unspecified]                            Onset:   
2022  
Resolved:   
2022                                          Chronic  
   
                                                    Peripheral and   
visceral   
atherosclerosis  
(20 sources)                            Peripheral vascular   
disease;   
Translations:   
[Peripheral vascular   
disease,   
unspecified]                            Onset:   
2023                Chronic  
   
                                                    Phlebitis;   
thrombophlebitis and   
thromboembolism  
(8 sources)                             Deep venous   
thrombosis;   
Translations: [Acute   
venous embolism and   
thrombosis of   
unspecified deep   
vessels of lower   
extremity]                              Onset:   
2024                Episodic  
   
                                                    Residual codes;   
unclassified  
(16 sources)                            Sleep apnea;   
Translations: [Sleep   
apnea, unspecified]                                         Chronic  
   
                                                    Residual codes;   
unclassified  
(20 sources)                            Obstructive sleep   
apnea syndrome;   
Translations:   
[Obstructive sleep   
apnea (adult)   
(pediatric)]                            Onset:   
2023                Chronic  
   
                                                    Residual codes;   
unclassified  
(17 sources)                            Obstructive sleep   
apnea (adult)   
(pediatric);   
Translations:   
[Obstructive sleep   
apnea   
(adult)(pediatric)]                     Onset:   
2022  
Resolved:   
2022                                          Chronic  
   
                                                    Residual codes;   
unclassified  
(4 sources)                             Continuous positive   
airway pressure   
ventilation   
treatment;   
Translations:   
[Dependence on other   
enabling machines   
and devices]                                                Chronic  
   
                                                    Residual codes;   
unclassified  
(3 sources)                             Dependence on   
continuous positive   
airway pressure   
ventilation;   
Translations:   
[Dependence on other   
enabling machines   
and devices]                            Onset:   
2023                Chronic  
   
                                                    Residual codes;   
unclassified  
(8 sources)                             Localized edema;   
Translations:   
[Edema]                                 Onset:   
2022  
Resolved:   
2022                                          Episodic  
   
                                                    Residual codes;   
unclassified  
(6 sources)               Edema, unspecified        Onset:   
2022  
Resolved:   
2022                                          Episodic  
   
                                                    Residual codes;   
unclassified  
(3 sources)                             History of chest   
pain; Translations:   
[Personal history of   
other specified   
diseases]                                                   Episodic  
   
                                                    Residual codes;   
unclassified  
(12 sources)                            Noncompliance with   
treatment;   
Translations:   
[Noncompliance]                         2021          Episodic  
   
                                                    Residual codes;   
unclassified  
(12 sources)                            Edema of right lower   
leg; Translations:   
[Localized edema]                       2022          Episodic  
   
                                                    Residual codes;   
unclassified  
(12 sources)                            Edema of lower   
extremity;   
Translations:   
[Localized edema]                       2019          Episodic  
   
                                                    Residual codes;   
unclassified  
(12 sources)                            Other specified   
health status;   
Translations:   
[Failure of   
outpatient   
treatment]                              2019          Episodic  
   
                                                    Skin and subcutaneous   
tissue infections  
(20 sources)                            Cellulitis of lower   
leg; Translations:   
[Cellulitis of right   
lower limb]                             Onset:   
2024                Episodic  
   
                                                    Spondylosis;   
intervertebral disc   
disorders; other back   
problems  
(6 sources)                             Other specified   
dorsopathies, lumbar   
region                                  Onset:   
2022  
Resolved:   
2022                                          Episodic  
   
                                                    Sprains and strains  
(20 sources)                            Sprain of hip;   
Translations: [Hip   
strain]                                 Onset:   
2024                Episodic  
   
                                                    Superficial injury;   
contusion  
(9 sources)                             Hematoma of right   
lower leg;   
Translations:   
[Contusion of right   
lower leg, initial   
encounter]                              Onset:   
09-                05-                Episodic  
   
                                                    Unclassified  
(15 sources)                            Inflammatory   
disorder;   
Translations:   
[Inflammation]                          2023            
   
                                                    Varicose veins of   
lower extremity  
(20 sources)                            Varicose ulcer of   
lower extremity;   
Translations:   
[Varicose veins of   
right lower   
extremity with ulcer   
other part of lower   
leg]                                    2019          Episodic  
   
                                                    Viral infection  
(12 sources)                            Disease caused by   
2019-nCoV;   
Translations:   
[COVID-19]                              2020          Episodic  
  
  
Past or Other Problems  
  
  
                                                    Problem   
Classification  Problem         Date            Documented Date Episodic/Chronic  
   
                                                    Acquired foot   
deformities  
(3 sources)                             Acquired hammer toe   
of right foot;   
Translations: [Other   
hammer toe(s)   
(acquired), right   
foot]                                   Onset:   
2023  
Resolved:   
2023                Chronic  
   
                                                    Acquired foot   
deformities  
(3 sources)                             Acquired deformity   
of left foot;   
Translations: [Other   
acquired deformities   
of left foot]                           Onset:   
2023                Episodic  
   
                                                    Other congenital   
anomalies  
(3 sources)                             Porokeratosis;   
Translations: [Other   
specified congenital   
malformations of   
skin]                                   Onset:   
2023  
Resolved:   
2023                Chronic  
   
                                                    Other nutritional;   
endocrine; and   
metabolic disorders  
(1 source)                Abnormal weight gain      Onset:   
2022  
Resolved:   
2022                                          Episodic  
   
                                                    Unclassified  
(7 sources)                             Never smoked   
tobacco;   
Translations: [Never   
a smoker]                                                     
   
                                                    Unclassified  
(1 source)                                          Onset:   
2024                  
  
  
  
Results  
  
  
                          Test Name    Value        Interpretation Reference   
Range                                   Facility  
   
                                                    HbA1c HPLC (Bld) [Mass fract  
ion]on 2024   
   
                                                    HbA1c (Bld) [Mass   
fraction]       5.8 %                                           [facility]  
   
                                                    Aerobic Cultureon 2024  
   
   
                                        Aerobic Culture     Result Tab Codes  
Light Normal Skin   
Tiffany 2 Days  
No Anaerobes Isolated   
3 Days  
Gram Stain Result  
Rare Epithelial Cells  
Rare Gram Positive   
Cocci  
No White Blood Cells   
Seen  
PERFORMED BY:  
[place] [id number]  
[phone]  
PATHOLOGIST MEDICAL   
DIRECTOR  
BRIDGETTE SWAIN M.D.    Normal                                  [facility]   
[facility]   
[facility]  
   
                                        Comment on above:   Performed By: #### A  
[facility], GS ####  
[address]   
   
                                                    Gram Stainon 2024   
   
                                                    Microscopic observation   
Gram stain Nom (Unsp   
spec)                                   Gram Stain Result  
Rare Epithelial Cells  
Rare Gram Positive   
Cocci  
No White Blood Cells   
Seen  
PERFORMED BY:  
[place] [id number]  
[phone]  
PATHOLOGIST MEDICAL   
DIRECTOR  
BRIDGETTE SWAIN M.D.    Normal                                  [facility]   
Physician   
Group  
   
                                        Comment on above:   Performed By: #### A  
[facility], GS ####  
[address]   
   
                                                    Gram stain for investigation  
 of transfusion reactionOrdered By: Elaine Espino on   
2024   
   
                                                    Microscopic observation   
Gram stain Nom (Unsp   
spec)                                   No Anaerobes Isolated   
3 Days                                                      [facility]  
   
                                                    Alanine aminotransferase [En  
zymatic activity/volume] in Serum or PlasmaOrdered   
By:   
Vera Melvin on 2023   
   
                                                    ALT [Catalytic   
activity/Vol]   16 U/L                          7-52            [facility]  
   
                                                    Albumin [Mass/volume] in Ser  
um or Plasma by Bromocresol green (BCG) dye binding   
methoOrdered By: Vera Melvin on 2023   
   
                                                    Albumin BCG dye   
[Mass/Vol]      4.1 g/dL                        3.5-5.7         [facility]  
   
                                                    Alkaline phosphatase [Enzyma  
tic activity/volume] in Serum or PlasmaOrdered By:   
Vera eMlvin on 2023   
   
                                                    ALP [Catalytic   
activity/Vol]   24 U/L                                    [facility]  
   
                                                    Aspartate aminotransferase [  
Enzymatic activity/volume] in Serum or PlasmaOrdered  
By:   
Vera Melvin on 2023   
   
                                                    AST [Catalytic   
activity/Vol]   18 U/L                          13-39           [facility]  
   
                                                    Automated erythrocytes count  
 in urine sediment (number/area)Ordered By: Vera Melvin on 2023   
   
                                                    RBC Auto (Urine sed)   
[#/Area]        1-2 [HPF]                       0-4             [facility]  
   
                                                    Automated leukocytes count i  
n urine sediment (number/area)Ordered By: Vera Melvin on 2023   
   
                                                    WBC Auto (Urine sed)   
[#/Area]        None seen [HPF]                 0-4             [facility]  
   
                                                    Bilirubin Test strip Ql (U)O  
rdered By: Vera Melvin on 2023   
   
                      Bilirubin Ql (U) Negative              Negative   [facility]  
   
                                                    Bilirubin.total [Mass/volume  
] in Serum or PlasmaOrdered By: Vera Melvin   
on   
2023   
   
                      Bilirubin [Mass/Vol] 0.7 mg/dL             0.3-1.0    [facility]  
   
                                                    Calcium [Mass/volume] in Ser  
um or PlasmaOrdered By: Vera Melvin on   
2023   
   
                      Calcium [Mass/Vol] 9.3 mg/dL             8.6-10.3   [facility]  
   
                                                    Carbon dioxide, total [Moles  
/volume] in Serum or PlasmaOrdered By: Vera Melvin on 2023   
   
                      CO2 [Moles/Vol] 25.0 mmol/L            21.0-31.0  [facility]  
   
                                                    Chloride [Moles/volume] in S  
santa or PlasmaOrdered By: Vera Melvin on   
2023   
   
                      Chloride [Moles/Vol] 105 mmol/L                 [facility]  
   
                                                    Cholesterol [Mass/volume] in  
 Serum or PlasmaOrdered By: Vera Melvin on   
2023   
   
                      Cholesterol [Mass/Vol] 175 mg/dL             140-200    [facility]  
   
                                        Comment on above:   Chol less than 200 m  
g/dl low riskChol 201-239 mg/dl borderline  
   
riskChol 240 mg/dl and greater high risk   
   
                                                    Cholesterol in LDL Calc [Mas  
s/Vol]Ordered By: Vera Melvin on 2023   
   
                                                    Cholesterol in LDL   
[Mass/Vol]      113 mg/dL                       0-100           [facility]  
   
                                        Comment on above:   LDL ATP III CLASSIFI  
CATIONLDL less than 100 mg/dL OptimalLDL   
100-129 mg/dL Near or above optimalLDL 130-159 mg/dL   
Borderline highLDL 160-189 mg/dL HighLDL greater than 189   
mg/dL Very high   
   
                                                    Cholesterol in VLDL Calc [Ma  
ss/Vol]Ordered By: Vera Melvin on 2023  
   
   
                                                    Cholesterol in VLDL   
[Mass/Vol]      19 mg/dL                                        [facility]  
   
                                                    Color Auto (U)Ordered By: Sa ileana Melvin on 2023   
   
                      Color (U)  Yellow                Yellow     [facility]  
   
                                                    Creatinine [Mass/volume] in   
Serum or PlasmaOrdered By: Vera Melvin on   
2023   
   
                      Creatinine [Mass/Vol] 1.03 mg/dL            0.70-1.30  [facility]  
   
                                                    Creatinine [Mass/volume] in   
UrineOrdered By: Vera Melvin on 2023   
   
                                                    Creatinine (U)   
[Mass/Vol]      107.0 mg/dL                     14.0-26.0       [facility]  
   
                                                    Erythrocyte distribution wid  
th Auto (RBC) [Ratio]Ordered By: Vera Melvin  
 on   
2023   
   
                                                    Erythrocyte   
distribution width   
(RBC) [Ratio]   14.0 %                          12.0-14.8       [facility]  
   
                                                    Globulin Calc (S) [Mass/Vol]  
Ordered By: Vera Melvin on 2023   
   
                      Globulin (S) [Mass/Vol] 3.0 g/dL                         F  
[facility]  
   
                                                    Glucose [Mass/volume] in Ser  
um or PlasmaOrdered By: Vera Melvin on   
2023   
   
                      Glucose [Mass/Vol] 95 mg/dL                   [facility]  
   
                                        Comment on above:   ADA recommended refe  
rence rangeRandom Glucose Reference Range   
is dependent on time and content of last meal. Glucose of more   
than 200 mg/dL in a nonstressed, ambulatory subject supports   
the diagnosis of Diabetes Mellitus.   
   
                                                    Glucose mean value [Mass/vol  
ume] in Blood Estimated from glycated   
hemoglobinOrdered   
By: Vera Melvin on 2023   
   
                                                    Average glucose   
Estimated from glycated   
hemoglobin (Bld)   
[Mass/Vol]      114 mg/dL                                       [facility]  
   
                                                    Hematocrit Auto (Bld) [Volum  
e fraction]Ordered By: Vera Melvin on   
2023   
   
                                                    Hematocrit (Bld)   
[Volume fraction] 44.1 %                          38.8-50.0       [facility]  
   
                                                    Hemoglobin A1c percentageOrd  
ered By: Vera Melvin on 2023   
   
                                                    HbA1c (Bld) [Mass   
fraction]       5.6 %                           4.3-5.6         [facility]  
   
                                        Comment on above:   Increased risk for d  
iabetes: 5.7 - 6.4diabetes: >6.4glycemic   
control for adults with diabetes: <7.0   
   
                                                    Hemoglobin [Mass/volume] in   
BloodOrdered By: Vera Melvin on 2023   
   
                                                    Hemoglobin (Bld)   
[Mass/Vol]      15.0 g/dL                       13.0-17.0       [facility]  
   
                                                    Ketones Auto test strip (U)   
[Mass/Vol]Ordered By: Vera Melvin on   
2023   
   
                      Ketones (U) [Mass/Vol] Negative              Negative   [facility]  
   
                                                    Laboratory - UrinalysisOrder  
ed By: Vera Melvin on 2023   
   
                                                    Hyaline casts LM Ql   
(Urine sed)     None seen [LPF]                 0-8             [facility]  
   
                                                    Leukocytes [#/volume] correc  
radha for nucleated erythrocytes in Blood by Automated  
   
counOrdered By: Vera Melvin on 2023   
   
                                                    WBC corrected for nucl   
RBC Auto (Bld) [#/Vol] 8.9 10*3/uL                     4.1-10.5        [facility]  
   
                                                    MCH Auto (RBC) [Entitic mass  
]Ordered By: Vera Melvin on 2023   
   
                                                    MCH (RBC) [Entitic   
mass]           29.9 pg                         27.5-35.2       [facility]  
   
                                                    MCHC Auto (RBC) [Mass/Vol]Or  
dered By: Vera Melvin on 2023   
   
                      MCHC (RBC) [Mass/Vol] 34.1 g/dL             32.5-35.6  [facility]  
   
                                                    MCV Auto (RBC) [Entitic vol]  
Ordered By: Vera Melvin on 2023   
   
                      MCV (RBC) [Entitic vol] 87.7 fL               83.5-101   F  
[facility]  
   
                                                    Microalbumin [Mass/volume] i  
n UrineOrdered By: Vera Melvin on 2023  
   
   
                                                    Albumin DL <= 20 mg/L   
(U) [Mass/Vol]  mg/dL                           0.0-1.8         [facility]  
   
                                                    Nitrite Test strip Ql (U)Ord  
ered By: Vera Melvin on 2023   
   
                      Nitrite Ql (U) Negative              Negative   [facility]  
   
                                                    No Panel InformationOrdered   
By: Vera Melvin on 2023   
   
                      Estimated GFR (CKD-EPI) > 60.0 mL/Min                       
  [facility]  
   
                                                    Pharmacy Creatinine   
Clearance (Chem N/A                                             [facility]  
   
                                                    Platelet mean volume Auto (B  
ld) [Entitic vol]Ordered By: Vera Melvin on   
2023   
   
                                                    Platelet mean volume   
(Bld) [Entitic vol] 7.5 fL                          6.6-10.1        [facility]  
   
                                                    Platelets Auto (Bld) [#/Vol]  
Ordered By: Vera Melvin on 2023   
   
                      Platelets (Bld) [#/Vol] 303 10*3/uL            150-450      
[facility]  
   
                                                    Potassium [Moles/volume] in   
Serum or PlasmaOrdered By: Vera Melvin on   
2023   
   
                      Potassium [Moles/Vol] 4.4 mmol/L            3.5-5.1    [facility]  
   
                                                    Prostate specific Ag [Mass/v  
olume] in Serum or PlasmaOrdered By: Vrea Antony   
on 2023   
   
                                                    Prostate specific Ag   
[Mass/Vol]      2.660 ng/mL                     0.000-4.000     [facility]  
   
                                                    Protein Auto test strip (U)   
[Mass/Vol]Ordered By: Vera Melvin on   
2023   
   
                      Protein (U) [Mass/Vol] Negative              Negative   [facility]  
   
                                                    Protein [Mass/volume] in Ser  
um or PlasmaOrdered By: Vera Melvin on   
2023   
   
                      Protein [Mass/Vol] 7.1 g/dL              6.4-8.9    [facility]  
   
                                                    RBC Auto (Bld) [#/Vol]Ordere  
d By: Vera Melvin on 2023   
   
                      RBC (Bld) [#/Vol] 5.03 10*6/uL            3.90-5.60  [facility]  
   
                                                    Serum or plasma albumin/glob  
ulin mass ratioOrdered By: Vera Melvin on   
2023   
   
                                                    Albumin/Globulin [Mass   
ratio]          1.4 {ratio}                                     [facility]  
   
                                                    Serum or plasma anion gap de  
terminationOrdered By: Vera Melvin on   
2023   
   
                      Anion gap [Moles/Vol] 10.4 mmol/L            6.0-15.0   [facility]  
   
                                                    Serum or plasma high density  
 lipoprotein (HDL) cholesterol measurementOrdered   
By:   
Vera Melvin on 2023   
   
                                                    Cholesterol in HDL   
[Mass/Vol]      42 mg/dL                        23-92           [facility]  
   
                                        Comment on above:   HDL CHOL ATP-III CLA  
SSIFICATION Cardiovascular RiskHDL > or   
equal to 60 mg/dL LOWHDL < 40 mg/dL HIGH   
   
                                                    Serum or plasma total choles  
terol/high density lipoprotein (HDL) cholesterol   
mass   
ratOrdered By: Vera Melvin on 2023   
   
                                                    Cholesterol.total/Ami  
sterol in HDL [Mass   
ratio]          4.2 {ratio}                     <5.0            [facility]  
   
                                                    Sodium [Moles/volume] in Ser  
um or PlasmaOrdered By: Vera Melvin on   
2023   
   
                      Sodium [Moles/Vol] 136 mmol/L            136-145    [facility]  
   
                                                    Specific gravity Auto test s  
trip (U) [Rel density]Ordered By: Vera Antony on   
2023   
   
                                                    Specific gravity (U)   
[Rel density]   1.022                           1.001-1.030     [facility]  
   
                                                    Squamous epithelial cells de  
tection in urine sediment by light microscopyOrdered  
By:   
Vera Melvin on 2023   
   
                                                    Epithelial   
cells.squamous LM Ql   
(Urine sed)     0-1 [HPF]                       0-2             [facility]  
   
                                                    Triglyceride [Mass/volume] i  
n Serum or PlasmaOrdered By: Vera Melvin on   
2023   
   
                      Triglyceride [Mass/Vol] 99 mg/dL              0-149      F  
[facility]  
   
                                        Comment on above:   TRIG ATP III CLASSIF  
ICATIONTRIG less than 150 mg/dL NormalTRIG  
   
150-199 mg/dL Borderline highTRIG 200-500 mg/dL High TRIG   
greater than 500 mg/dL Very highStandard traceable to the   
Center for Disease Conrtrol and Prevention (CDC) test method.   
   
                                                    Urea nitrogen [Mass/volume]   
in Serum or PlasmaOrdered By: Vera Melvin on  
   
2023   
   
                                                    Urea nitrogen   
[Mass/Vol]      19 mg/dL                        7            [facility]  
   
                                                    Urine bacteria detection by   
automated methodOrdered By: Vera Melvin on   
2023   
   
                      Bacteria Auto Ql (U) None seen             None Seen  [facility]  
   
                                                    Urine clarity by refractomet  
ry automatedOrdered By: Vera Melvin on   
2023   
   
                                                    Clarity Refractometry   
automated (U)   Clear                           Clear           [facility]  
   
                                                    Urine glucose measurement by  
 automated test strip (mass/volume)Ordered By:   
Vera Melvin on 2023   
   
                                                    Glucose Auto test strip   
(U) [Mass/Vol]  >=1000 mg/dL                    Normal          [facility]  
   
                                                    Urine hemoglobin detection b  
y automated test stripOrdered By: Vera Antony on   
2023   
   
                                                    Hemoglobin Auto test   
strip Ql (U)    Negative                        Negative        [facility]  
   
                                                    Urine leukocyte esterase det  
ection by automated test stripOrdered By: Vera Melvin on 2023   
   
                                                    Leukocyte esterase Auto   
test strip Ql (U) Negative                        Negative        [facility]  
   
                                                    Urine microalbumin/creatinin  
e mass ratioOrdered By: Vera Melvin on   
2023   
   
                                                    Albumin/Creatinine DL   
<= 20 mg/L (U) [Mass   
ratio]          TNP                                             [facility]  
   
                                        Comment on above:   Test not performed   
   
                                                    Urobilinogen Auto test strip  
 (U) [Mass/Vol]Ordered By: Vera Melvin on   
2023   
   
                                                    Urobilinogen (U)   
[Mass/Vol]      Normal mg/dL                    Normal          [facility]  
   
                                                    pH Auto test strip (U)Ordere  
d By: Vera Melvin on 2023   
   
                      pH (U)     5.5 [pH]              5.0-9.0    [facility]  
   
                                                    A1C HEMOGLOBINon 2023   
   
                                                    HbA1c (Bld) [Mass   
fraction]       5.6 %                                           [facility]   
[employer]   
[employer]  
Other Phone:   
[phone]  
   
                                                    HbA1c (Bld) [Mass fraction]o  
n 2023   
   
                      A1C HEMOGLOBIN                                  [facility]   
[facility]   
[facility]  
Other Phone:   
[phone]  
   
                                                    Office Visit (Cardiology)on   
2023   
   
                                        Follow-up visit     Diagnoses/Problems  
Assessed  
Chest pain,   
unspecified type   
(786.50) (R07.9)  
CHF (NYHA class II,   
ACC/AHA stage C)   
(428.0) (I50.9)  
LVH (left ventricular   
hypertrophy) (429.3)   
(I51.7)  
Non-ischemic   
cardiomyopathy   
(425.4) (I42.8)  
DVT (deep venous   
thrombosis) (453.40)   
(I82.409)  
Never a smoker  
Prediabetes (790.29)   
(R73.03)  
DIMITRI on CPAP (327.23)   
(G47.33)  
Morbid obesity with   
BMI of 45.0-49.9,   
adult (278.01,V85.42)   
(E66.01,Z68.42)  
Orders  
CHF (NYHA class II,   
ACC/AHA stage C)  
Renew: Spironolactone   
25 MG Oral Tablet;   
TAKE 1 TABLET DAILY  
CHF (NYHA class II,   
ACC/AHA stage C),   
Prediabetes  
Renew: Jardiance 10   
MG Oral Tablet; TAKE   
1 TABLET BY MOUTH   
ONCE DAILY  
Morbid obesity with   
BMI of 45.0-49.9,   
adult  
Healthy Weight Tips;   
Status:Complete -   
Retrospective   
Authorization; Done:   
[id number]  
Some eating tips that   
can help you lose   
weight.;   
Status:Complete -   
Retrospective  
Authorization; Done:   
[id number]  
SocHx: Never a smoker  
Tobacco Use   
Screening;   
Status:Complete;   
Done: [id number]  
Patient Instructions  
Please bring all   
medicines, vitamins,   
and herbal   
supplements with you   
when you come to the   
office.  
Prescriptions will   
not be filled unless   
you are compliant   
with your follow up   
appointments or have   
a follow up   
appointment scheduled   
as per instruction of   
your physician.   
Refills should be   
requested at the time   
of your visit.  
Patient to have labs   
forward to Dr. Compa Maldonado DO   
when completed by pcp   
in 2023  
Follow up in 10   
months with Sharlene An NP  
  
  
Chief Complaint  
JOCELIN PACHECO is being   
seen for a 6 month   
follow-up of.  
Patient is a   
58-year-old gentleman   
returns for   
follow-up. He has had   
an over 30 pound   
weight loss since   
  
Ozempic therapy.  
He has a history of   
mild ASHD, mild to   
moderate nonischemic   
cardiomyopathy with   
persistent ejection   
fraction of 42%. He   
has underlying morbid   
obesity, diabetes,   
obstructive sleep   
apnea, history of   
remote DVT, recurrent   
ulcers on the calf   
due to venous stasis.   
His NYHA   
classification is   
class II-III/C. He is   
still working out   
with cardiac rehab   
and activities that   
are amenable to his   
stature and body   
habitus and   
capability.  
He has no   
hospitalizations for   
heart failure, no   
syncope, no falls, no   
angina  
Recommendations,   
continue GDMT as   
reviewed and   
prescribed, follow-up   
in 8 to 10 months   
with nurse   
practitioner  
  
Surgical History   
Problems  
History of Complete   
colonoscopy  
History of Finger   
surgical procedure  
History of   
Gallbladder surgery  
Past Medical History  
Problems  
History of arthritis   
(V13.4) (Z87.39)  
History of bleeding   
disorder (V12.3)   
(Z86.2)  
History of cardiac   
disorder (V12.50)   
(Z86.79)  
History of Numbness   
and tingling (782.0)   
(R20.0,R20.2)  
History of Vision   
problems (V41.0)   
(H54.7)  
Current Meds  
  
Medication   
NameInstruction  
Carvedilol 6.25 MG   
Oral TabletTAKE 1   
TABLET TWICE DAILY   
WITH MEALS.  
Jardiance 10 MG Oral   
TabletTAKE 1 TABLET   
BY MOUTH ONCE DAILY  
Lasix TABSTAKE 1   
TABLET TWICE DAILY.  
Nabumetone 750 MG   
Oral TabletTAKE 1   
TABLET EVERY 12 HOURS   
DAILY.  
Ozempic (1 MG/DOSE)   
SOPNINJECT 1 UNIT   
Weekly  
Spironolactone 25 MG   
Oral TabletTAKE 1   
TABLET DAILY.  
Xarelto 10 MG Oral   
TabletTake 1 tablet   
daily  
Patient did not bring   
medication list or   
bottles. Updated   
verbally with patient  
Allergies  
Medication  
Cephalosporins  
Adverse Reaction;   
Rash; Recorded By:   
Etelvina Bradshaw;   
2022 8:55:43 AM  
Statins  
Adverse Reaction;   
Rash; Recorded By:   
Etelvina Bradshaw;   
2022 8:55:43 AM  
Voltaren  
Adverse Reaction;   
Rash; Recorded By:   
Etelvina Bradshaw;   
2022 8:55:43 AM  
Social History  
Problems  
Daily caffeine   
consumption  
coffee 1-2 pots a day  
Never a smoker  
No alcohol use  
No illicit drug use  
Review of Systems  
Constitutional: not   
feeling tired.  
Cardiovascular: no   
intermittent leg   
claudication and as   
noted in HPI.  
Respiratory: no cough   
and no shortness of   
breath.  
Gastrointestinal: no   
change in bowel   
habits and no blood   
in stools.  
Integumentary: no   
skin rashes.  
Neurological: no   
seizures and no   
frequent falls.  
All other systems   
have been reviewed   
and are negative for   
complaint.  
  
Vitals  
Vital Signs  
Recorded: [id number]   
10:37AM  
Heart Rate74, L   
Radial  
[id number], LUE,   
Sitting  
[id number], LUE,   
Sitting  
Height5 ft 11 in  
Awulqp624 lb  
BMI Jtiszehkqn83.37   
kg/m2  
BSA Calculated2.69  
Tobacco Useb) No  
Falls Screening (Age   
18+)a) No falls   
within the last year  
Physical Exam  
Constitutional: alert   
and in no acute   
distress.  
Neck: neck is supple,   
symmetric, trachea   
midline, no masses   
and no thyromegaly .  
Pulmonary: no   
increased work of   
breathing or signs of   
respiratory distress   
and lungs clear to   
auscultation.  
Cardiovascular:   
carotid pulses 2+   
bilaterally with no   
bruit , JVP was   
normal, no thrills ,   
regular rhythm,   
normal S1 and S2, no   
murmurs , pedal   
pulses 2+ bilaterally   
and no edema .  
Abdomen: abdomen   
non-tender, no masses   
and no hepatomegaly .  
Skin: skin warm and   
dry, normal skin   
turgor .  
Psychiatric judgment   
and insight is normal   
and orien (more   
content not   
included)...        Normal                                   [facility]  
   
                                                    [facility] MUGA SCAN INJECTIONon    
   
                                        [facility] MUGA SCAN INJECTION MRN: [id number]  
Patient Name: JOCELIN PAHCECO  
  
STUDY:  
MUGA  
  
Performing facility:  
[facility],  
[address] [place] [address], [address]   
[address],  
[place] [address]  
[facility] Provider: KOBI Maldonado DO,   
[facility]  
PCP: Dr. OLIVER Melvin  
Supervising provider:   
Ze Flores MD  
  
INDICATION:  
Chest Pain; CHF NICM  
  
HISTORY:  
Gender: M; Age: 69 y/o ; Height: 0 cm;   
Weight: 0 kg.  
  
Chest Pain; CHF NICM  
Denies smoking.  
  
COMPARISON:  
Previous nuclear   
testing completed   
[id number] MPI [place] [facility] [place]  
  
  
ACCESSION NUMBER(S):  
[id number]; [id number]  
  
ORDERING CLINICIAN:  
COMPA MALDONADO  
  
TECHNIQUE:  
The patient received   
an IV injection of 3   
ml of stannous  
pyrophosphate (PYP)   
using the in-vivo   
method of labeling   
red blood  
cells. After 15   
minutes the patient   
received another IV   
injection of  
27.4 mCi of   
Technetium 99m   
pertechnetate. Planar   
image of the left  
ventricle was   
obtained in the TONG   
45.  
  
FINDINGS:  
The right ventricle   
was normal.  
The left ventricle   
was enlarged in size.  
Regional wall motion   
was abnormal.  
Global resting LVEF   
was abnormal- at 42%.  
  
IMPRESSION:  
Normal resting right   
ventricular function.  
Abnormalresting left   
ventricular function.  
Left ventricular   
ejection fraction is   
42%.  
No previous studies   
are available for   
comparison  
  
Electronically signed   
by: ELROY WALL MD         Normal                                  [facility]  
   
                                                    No Panel Informationon    
   
                                            Normal                [facility]   
[facility]   
250 DO  
Work Phone:   
[phone]  
   
                                                    Creatinine and Glomerular fi  
ltration rate.predicted panel (S/P/Bld)Ordered By: JASON Maldonado on 2023   
   
                      Creatinine [Mass/Vol] 1.12 mg/dL            0.64-1.27  [facility]  
   
                                                    Estimated glomerular filtrat  
ion rate (GFR) non- AmericanOrdered By: JASON Maldonado   
on 2023   
   
                                                    GFR/1.73 sq M.predicted   
among non-blacks MDRD   
(S/P/Bld) [Vol   
rate/Area]      > 60 mL/Min                                     [facility]  
   
                                                    Laboratory - Chemistry and C  
hemistry - challengeOrdered By: JASON Maldonado on   
2023   
   
                                                    Natriuretic peptide B   
(Bld) [Mass/Vol] 13.0 pg/mL                      5-100           [facility]  
   
                                                    No Panel InformationOrdered   
By: JASON Maldonado on 2023   
   
                                                    Estimated GFR (   
American)       > 60 mL/Min                                     [facility]  
   
                                        Comment on above:   GFR estimated refere  
nce range: According to KDOQI guidelines,   
<60 ml/min/1.73m2 is sufficient to diagnose a patient with   
chronic kidney disease.   
   
                                                    Pharmacy Creatinine   
Clearance (Chem N/A                                             [facility]  
   
                                                    No Panel Informationon    
   
                                 10.7\S\10.7 Normal     6.0-15.0   [facility]  
   
[facility]   
250 DO  
Work Phone:   
[phone]  
   
                                 9.0\S\9.0  Normal     8.2-10.2   [facility]   
[facility]   
250 DO  
Work Phone:   
[phone]  
   
                                        Comment on above:   PERFORMED BY:[facility]   
[place], [place] [id number] MEDICAL   
DIRECTORBRIDGETTE SWAIN M.D.   
   
                                 24.1\S\24.1 Normal     22.0-30.0  [facility]  
   
[facility]   
250 DO  
Work Phone:   
[phone]  
   
                                 103\S\103  Normal          [facility]   
[address] DO  
Work Phone:   
[phone]  
   
                                 4.8\S\4.8  Normal     3.5-5.1    [facility]   
[facility]   
250 DO  
Work Phone:   
[phone]  
   
                                                133\S\133       below low   
threshold                 136-146                   [facility]   
250 DO  
Work Phone:   
[phone]  
   
                                 > 60       Normal                [facility]   
250 DO  
Work Phone:   
[phone]  
   
                                        Comment on above:   GFR estimated refere  
nce range: According to KDOQI guidelines,   
<60 ml/min/1.73m2 is sufficient to diagnose a patient with   
chronic kidney disease.   
   
                                 1.12\S\1.12 Normal     0.64-1.27  [facility]   
250 DO  
Work Phone:   
[phone]  
   
                                 17\S\17    Normal     9-23       [facility]   
[address] DO  
Work Phone:   
[phone]  
   
                                 95\S\95    Normal          [facility]   
250 DO  
Work Phone:   
[phone]  
   
                                        Comment on above:   Random Glucose Refer  
ence Range is dependent on time and   
content of last meal. Glucose of more than 200 mg/dL in a   
nonstressed, ambulatory subject supports the diagnosis of   
Diabetes Mellitus. ADA recommended reference range   
   
                                 13.0\S\13.0 Normal     5-100      [facility]  
   
[facility]   
[address] DO  
Work Phone:   
[phone]  
   
                                        Comment on above:   PERFORMED BY:[facility]   
[place], [place] [id number] MEDICAL   
DIRECTORBRIDGETTE SWAIN M.D.   
   
                                                    Serum or plasma anion gap de  
terminationOrdered By: JASON Maldonado on 2023   
   
                      Anion gap [Moles/Vol] 10.7 mmol/L            6.0-15.0   [facility]  
   
                                                    Serum or plasma calcium cong  
urement (mass/volume)Ordered By: JASON Maldonado on   
2023   
   
                      Calcium [Mass/Vol] 9.0 mg/dL             8.2-10.2   [facility]  
   
                                                    Serum or plasma chloride patricia  
surement (moles/volume)Ordered By: JASON Maldonado on   
2023   
   
                      Chloride [Moles/Vol] 103 mmol/L                 [facility]  
   
                                                    Serum or plasma glucose cong  
urement (mass/volume)Ordered By: JASON Maldonado on   
2023   
   
                      Glucose [Mass/Vol] 95 mg/dL                   [facility]  
   
                                        Comment on above:   ADA recommended refe  
rence rangeRandom Glucose Reference Range   
is dependent on time and content of last meal. Glucose of more   
than 200 mg/dL in a nonstressed, ambulatory subject supports   
the diagnosis of Diabetes Mellitus.   
   
                                                    Serum or plasma potassium me  
asurement (moles/volume)Ordered By: JASON Maldonado on   
2023   
   
                      Potassium [Moles/Vol] 4.8 mmol/L            3.5-5.1    [facility]  
   
                                                    Serum or plasma sodium measu  
rement (moles/volume)Ordered By: JASON Maldonado on   
2023   
   
                      Sodium [Moles/Vol] 133 mmol/L            136-146    [facility]  
   
                                                    Serum or plasma total carbon  
 dioxide measurement (moles/volume)Ordered By: JASON Maldonado   
on 2023   
   
                      CO2 [Moles/Vol] 24.1 mmol/L            22.0-30.0  [facility]  
   
                                                    Serum or plasma urea nitroge  
n measurement (mass/volume)Ordered By: JASON Maldonado on   
2023   
   
                                                    Urea nitrogen   
[Mass/Vol]      17 mg/dL                        9-23            [facility]  
   
                                                    Falls Screening (Age 18+)on   
2023   
   
                                                    Adult depression   
screening assessment No                                              [facility] [place]  
[facility]   
[facility]   
250 DO  
Work Phone:   
[phone]  
   
                                        Fall risk assessment a) No falls within   
the last year                                               [facility]   
[facility]   
250 DO  
Work Phone:   
[phone]  
   
                      Tobacco use status CPHS b) No                            M  
[facility]   
[facility]   
250 DO  
Work Phone:   
[phone]  
   
                                                    Falls Screening (Age   
18+)                                    Patient is not at   
high risk for falls.   
Falls risk guidance   
reviewed today                                              [facility]   
[facility]   
250 DO  
Work Phone:   
[phone]  
   
                                                    Office Visit (Cardiology)on   
2023   
   
                                        Follow-up visit     Diagnoses/Problems  
Assessed  
DIMITRI on CPAP   
(327.23,V46.8)   
(G47.33,Z99.89)  
CHF (NYHA class II,   
ACC/AHA stage C)   
(428.0) (I50.9)  
Non-ischemic   
cardiomyopathy   
(425.4) (I42.8)  
Chest pain,   
unspecified type   
(786.50) (R07.9)  
Morbid obesity with   
BMI of 50.0-59.9,   
adult (278.01,V85.43)   
(E66.01,Z68.43)  
Never a smoker  
DVT (deep venous   
thrombosis) (453.40)   
(I82.409)  
Orders  
Chest pain,   
unspecified type, CHF   
(NYHA class II,   
ACC/AHA stage C),   
Non-ischemic  
cardiomyopathy  
Basic Metabolic   
Panel; Status:Active   
- Retrospective   
Authorization;   
Requested  
for:2023;  
NM [facility] ([place]   
Cardiac Blood Pool);   
Status:Hold For -   
Scheduling,Retrospect  
anthony  
Authorization;   
Requested   
for:2023;  
Radiologist to   
Determine Optimal   
Study : Y  
What are the   
patient's signs and   
symptoms? : cp  
Brain Natriuretic   
Peptide BNP;   
Status:Active -   
Retrospective   
Authorization;   
Requested  
for:2023;  
CHF (NYHA class II,   
ACC/AHA stage C),   
Non-ischemic   
cardiomyopathy  
Renew: Carvedilol   
6.25 MG Oral Tablet;   
TAKE 1 TABLET TWICE   
DAILY WITH MEALS  
Morbid obesity with   
BMI of 50.0-59.9,   
adult  
Healthy Weight Tips;   
Status:Complete -   
Retrospective   
Authorization; Done:   
2023  
Some eating tips that   
can help you lose   
weight.;   
Status:Complete -   
Retrospective  
Authorization; Done:   
2023  
SocHx: Never a smoker  
Tobacco Use   
Screening;   
Status:Complete;   
Done: [id number]  
Patient Instructions  
Please bring all   
medicines, vitamins,   
and herbal   
supplements with you   
when you come to the   
office.  
Prescriptions will   
not be filled unless   
you are compliant   
with your follow up   
appointments or have   
a follow up   
appointment scheduled   
as per instruction of   
your physician.   
Refills should be   
requested at the time   
of your visit.  
  
Follow up in 6 months  
  
  
Chief Complaint  
JOCELIN PACHECO is being   
seen for a 6 month   
follow-up of.  
62-year-old gentleman   
who returns for   
annual visit he is   
doing well just   
complains atypical   
chest discomfort   
described as tingling   
as well as sharp and   
somewhat painful   
sensation both at   
rest and with   
activities that are   
sporadic and episodic   
only but not   
reproducible. He has   
a history of moderate   
nonischemic   
cardiomyopathy,   
obstructive sleep   
apnea, morbid   
obesity, history of   
remote DVT (remains   
on low-dose Xarelto).  
Stress perfusion   
imaging from 2022 revealed patchy   
tracer uptake,   
proceed ischemia or   
infarction, normal   
left ventricular   
volume with global   
hypokinesis and   
ejection fraction of   
44% normal transient   
ischemic index.  
Heart catheterization   
from  revealed 30   
to 50% ostial   
circumflex disease,   
20 to 25% mid LAD   
disease normal right   
coronary and at that   
time ejection   
fraction of 40 to   
45%.  
Since  with his   
medical history   
continue therapies.   
We have transitioned   
over to Entresto,   
Jardiance, carvedilol   
and spironolactone  
Unfortunately he has   
had no significant   
weight loss despite   
going to cardiac   
rehab 3 times a week.   
He remains 365   
pounds, similar to   
  
Recommendations,   
obtain a MUGA scan   
current therapies,   
obtain appropriate   
laboratories   
including Chem-6 and   
BNP, we will have him   
come back in 6 months   
continues to have any   
further chest   
discomfort will   
prescribe nitrates   
and/or consider   
invasive assessment.  
  
Active Problems   
Problems  
Abnormal stress test   
(794.39) (R94.39)  
DVT (deep venous   
thrombosis) (453.40)   
(I82.409)  
Knee pain (719.46)   
(M25.569)  
LVH (left ventricular   
hypertrophy) (429.3)   
(I51.7)  
Morbid obesity with   
BMI of 50.0-59.9,   
adult (278.01,V85.43)   
(E66.01,Z68.43)  
Never a smoker  
DIMITRI on CPAP   
(327.23,V46.8)   
(G47.33,Z99.89)  
Prediabetes (790.29)   
(R73.03)  
Primary   
osteoarthritis of   
left knee (715.16)   
(M17.12)  
Primary   
osteoarthritis of   
right knee (715.16)   
(M17.11)  
SOB (shortness of   
breath) on exertion   
(786.05) (R06.02)  
Surgical History  
Problems  
History of Complete   
colonoscopy  
History of Finger   
surgical procedure  
History of   
Gallbladder surgery  
Past Medical History  
Problems  
History of arthritis   
(V13.4) (Z87.39)  
History of bleeding   
disorder (V12.3)   
(Z86.2)  
History of cardiac   
disorder (V12.50)   
(Z86.79)  
History of Numbness   
and tingling (782.0)   
(R20.0,R20.2)  
History of Vision   
problems (V41.0)   
(H54.7)  
Current Meds  
  
Medication   
NameInstruction  
Carvedilol 6.25 MG   
Oral TabletTAKE 1   
TABLET TWICE DAILY   
WITH MEALS.  
Entresto 24-26 MG   
Oral TabletTAKE 1   
TABLET BY MOUTH TWICE   
A DAY  
Jardiance 10 MG Oral   
TabletTAKE 1 TABLET   
BY MOUTH ONCE DAILY  
Lasix TABSTAKE 1   
TABLET TWICE DAILY.  
Nabumetone 750 MG   
Oral TabletTAKE 1   
TABLET EVERY 12 HOURS   
DAILY.  
Ozempic (1 MG/DOSE)   
SOPNINJECT 1 UNIT   
Weekly  
Spironolactone 25 MG   
Oral TabletTAKE 1   
TABLET DAILY.  
Xarelto 10 MG Oral   
TabletTake 1 tablet   
daily  
Allergies  
Medication  
Cephalosporins  
Adverse Reaction;   
Rash; Recorded By:   
Etelvina Bradshaw;   
2022 8:55:43 AM  
Statins  
Adverse Reaction;   
Rash; Recorded By:   
Etelvina Bradshaw;   
2022 8:55:43 AM  
Voltaren  
Adverse Reaction;   
Rash; Recorded By:   
Etelvina Bradshaw;   
2022 8:55:43 AM  
Family History  
Mother  
Family history of   
COPD, moderate (more   
content not   
included)...        Normal                                   [email or web address]  
   
                                                    A1C HEMOGLOBINon 2022   
   
                                                    HbA1c (Bld) [Mass   
fraction]       5.4 %                                           [facility] [place]   
[employer]   
[employer]  
Other Phone:   
[phone]  
   
                                                    HbA1c (Bld) [Mass fraction]o  
n 2022   
   
                      A1C HEMOGLOBIN                                  [place]  
t   
[employer]   
[employer]poration  
Other Phone:   
[phone]  
   
                                                    No Panel Informationon    
   
                                 9.3\S\9.3  Normal     8.2-10.2   [facility]   
[facility]   
250 DO  
Work Phone:   
[phone]  
   
                                        Comment on above:   PERFORMED BY:[facility]   
[place], [place] [id number] MEDICAL   
DIRECTORBRIDGETTE SWAIN M.D.   
   
                                 25.0\S\25.0 Normal     22.0-30.0  [facility]  
   
[facility]   
250 DO  
Work Phone:   
[phone]  
   
                                 101\S\101  Normal          [facility]   
[facility]   
250 DO  
Work Phone:   
[phone]  
   
                                 4.3\S\4.3  Normal     3.5-5.1    [facility]   
[facility] Liao DO  
Work Phone:   
[phone]  
   
                                                135\S\135       below low   
threshold                 136-146                   [facility]   
[facility]   
250 DO  
Work Phone:   
[phone]  
   
                                 > 60       Normal                [facility]  
Work Phone:   
[phone]  
   
                                        Comment on above:   GFR estimated refere  
nce range: According to KDOQI guidelines,   
<60 ml/min/1.73m2 is sufficient to diagnose a patient with   
chronic kidney disease.   
   
                                 54\S\54    Normal                [facility]  
Work Phone:   
[phone]  
   
                                                1.33\S\1.33     above high   
threshold                 0.64-1.27                 [facility]   
250 DO  
Work Phone:   
[phone]  
   
                                 20\S\20    Normal     9-23       [facility]   
250 DO  
Work Phone:   
[phone]  
   
                                 92\S\92    Normal          [facility]   
250 DO  
Work Phone:   
[phone]  
   
                                        Comment on above:   Random Glucose Refer  
ence Range is dependent on time and   
content of last meal. Glucose of more than 200 mg/dL in a   
nonstressed, ambulatory subject supports the diagnosis of   
Diabetes Mellitus. ADA recommended reference range   
   
                                                    Tobacco Screening.on   
022   
   
                                                    Adult depression   
screening assessment No                                              [facility]   
[facility]   
250 DO  
Work Phone:   
[phone]  
   
                                        Fall risk assessment b) One or more fall  
s   
in the last year                                            [facility]   
[employer]   
250 DO  
Work Phone:   
[phone]  
   
                      Tobacco use status CPHS b) No                            M  
[facility]   
[facility]   
250 DO  
Work Phone:   
[phone]  
   
                                                    Complete Blood Count with Au  
to Diffon 2022   
   
                                                    Erythrocyte   
distribution width   
(RBC) [Ratio]   13.0 %          Normal          11.0-15.0       [facility]   
Medical   
Specialist  
   
                                        Comment on above:   Performed By: #### M  
DIFF, CMP, CBCAD ####  
[facility]  
[address] [place], [place] [id number]   
   
                                                    Hematocrit (Bld)   
[Volume fraction] 42.7 %          Normal          38.5-50.0       [facility]   
Medical   
Specialist  
   
                                        Comment on above:   Performed By: #### M  
DIFF, CMP, CBCAD ####  
[facility]  
[address] [place], [place] [id number]   
   
                                                    Hemoglobin (Bld)   
[Mass/Vol]      14.5 g/dL       Normal          13.0-17.1       [facility]   
Medical   
Specialist  
   
                                        Comment on above:   Performed By: #### M  
DIFF, CMP, CBCAD ####  
[facility]  
[address] [place], [place] [id number]   
   
                                                    MCH (RBC) [Entitic   
mass]           29.4 pg         Normal          27.0-33.0       [facility]   
Specialist  
   
                                        Comment on above:   Performed By: #### M  
DIFF, CMP, CBCAD ####  
[facility]  
[address] [place], [place] [id number]   
   
                      MCHC (RBC) [Mass/Vol] 34.0 g/dL  Normal     32.0-36.0  [facility]   
Specialist  
   
                                        Comment on above:   Performed By: #### M  
DIFF, CMP, CBCAD ####  
[facility]  
[address] [place], [place] [id number]   
   
                      MCV (RBC) [Entitic vol] 87 fL      Normal          N  
[facility]   
Specialist  
   
                                        Comment on above:   Performed By: #### M  
DIFF, CMP, CBCAD ####  
[facility]  
[address] [place], [place] [id number]   
   
                                                    Platelet mean volume   
(Bld) [Entitic vol] 9.40 fL         Normal          7.50-12.50      [place]  
[facility]   
Medical   
Specialist  
   
                                        Comment on above:   Performed By: #### M  
DIFF, CMP, CBCAD ####  
[facility]  
[address] [place], [place] [id number]   
   
                      Platelets (Bld) [#/Vol] 316 10*3/uL Normal     140-400      
[facility]   
Specialist  
   
                                        Comment on above:   Performed By: #### M  
DIFF, CMP, CBCAD ####  
[facility]  
[address] [place], [place] [id number]   
   
                      RBC (Bld) [#/Vol] 4.93 10*6/uL Normal     4.20-5.80  [facility]  
   
                                        Comment on above:   Performed By: #### M  
DIFF, CMP, CBCAD ####  
[facility]  
[address] [place], [place] [id number]   
   
                      RDW-SD     40.3 fL    Normal     37.0-50.0  [facility]   
Specialist  
   
                                        Comment on above:   Performed By: #### M  
DIFF, CMP, CBCAD ####  
[facility]  
[address] [place], [place] [id number]   
   
                      REFLEX     Manual Differential Normal                [facility]  
   
                                        Comment on above:   Performed By: #### M  
DIFF, CMP, CBCAD ####  
[facility]  
[address] [place], [place] [id number]   
   
                      WBC (Bld) [#/Vol] 6.9 10*3/uL Normal     3.8-11.0   [facility]   
Medical   
Specialist  
   
                                        Comment on above:   Performed By: #### M  
DIFF, CMP, CBCAD ####  
[facility]  
[address] [place], [place] [id number]   
   
                                                    Comprehensive Metabolic Pane  
feli 2022   
   
                      Albumin [Mass/Vol] 4.4 g/dL   Normal     3.6-5.1    [facility]   
Medical   
Specialist  
   
                                        Comment on above:   Performed By: #### M  
DIFF, CMP, CBCAD ####  
[facility]  
[address] [place], [place] [id number]   
   
                                                    Albumin/Globulin [Mass   
ratio]          1.8 {ratio}     Normal          1.0-2.5         [facility]   
Specialist  
   
                                        Comment on above:   Performed By: #### M  
DIFF, CMP, CBCAD ####  
[facility]  
[address] [place], [place] [id number]   
   
                                                    ALP [Catalytic   
activity/Vol]   34 U/L          Low                       [facility]  
   
                                        Comment on above:   Performed By: #### M  
DIFF, CMP, CBCAD ####  
[facility]  
[address] [place], [place] [id number]   
   
                                                    ALT [Catalytic   
activity/Vol]   19 U/L          Normal          9-46            [facility]   
Specialist  
   
                                        Comment on above:   Result Comment:  Female reference range changed.   
   
                                                            Performed By: #### M  
DIFF, CMP, CBCAD ####  
[facility]  
[address] [place], [place] [id number]   
   
                      Anion gap [Moles/Vol] 21 mmol/L  High       12-20      [facility]  
   
                                        Comment on above:   Result Comment: Effe  
ctive 2020 reference range changed.   
   
                                                            Performed By: #### M  
DIFF, CMP, CBCAD ####  
[facility]  
[address] [place], [place] [id number]   
   
                                                    AST [Catalytic   
activity/Vol]   24 U/L          Normal          10-40           [facility]   
Specialist  
   
                                        Comment on above:   Performed By: #### M  
DIFF, CMP, CBCAD ####  
[facility]  
[address] [place], [place] [id number]   
   
                      Bilirubin [Mass/Vol] 0.56 mg/dL Normal     0.30-1.20  [facility]  
   
                                        Comment on above:   Performed By: #### M  
DIFF, CMP, CBCAD ####  
[facility]  
[address] [place], [place] [id number]   
   
                      BUN/CREA   17 Ratio   Normal     6-22       [facility]  
   
                                        Comment on above:   Performed By: #### M  
DIFF, CMP, CBCAD ####  
[facility]  
[address] [place], [place] [id number]   
   
                      Calcium [Mass/Vol] 9.3 mg/dL  Normal     8.6-10.2   [facility]  
   
                                        Comment on above:   Performed By: #### M  
DIFF, CMP, CBCAD ####  
[facility]  
[address] [place], [place] [id number]   
   
                      Chloride [Moles/Vol] 100 mmol/L Normal          [facility]   
Specialist  
   
                                        Comment on above:   Performed By: #### M  
DIFF, CMP, CBCAD ####  
[facility]  
[address] [facility] [place] [id number]   
   
                      CO2 [Moles/Vol] 20 mmol/L  Normal     20-31      [facility]   
Specialist  
   
                                        Comment on above:   Performed By: #### M  
DIFF, CMP, CBCAD ####  
[facility]  
[address] [place], [place] [id number]   
   
                      Creatinine [Mass/Vol] 1.0 mg/dL  Normal     0.7-1.4    Janell  
[facility]   
Medical   
Specialist  
   
                                        Comment on above:   Performed By: #### M  
DIFF, CMP, CBCAD ####  
[facility]  
[address] [place], [place] [id number]   
   
                      eGFRAA     88 mL/min/1.73m2 Normal     >60        [facility]   
Specialist  
   
                                        Comment on above:   Performed By: #### M  
DIFF, CMP, CBCAD ####  
[facility]  
[address] [place], [place] [id number]   
   
                      eGFRNAA    72 mL/min/1.73m2 Normal     >60        [facility]   
Specialist  
   
                                        Comment on above:   Performed By: #### M  
DIFF, CMP, CBCAD ####  
[facility]  
[address] [place], [place] [id number]   
   
                      Globulin (S) [Mass/Vol] 2.5 g/dL   Normal     1.9-3.7    N  
[facility]   
Specialist  
   
                                        Comment on above:   Performed By: #### M  
DIFF, CMP, CBCAD ####  
[facility]  
[address] [place], [place] [id number]   
   
                      Glucose [Mass/Vol] 111 mg/dL  High       65-99      Juanpablo  
[facility]   
Medical   
Specialist  
   
                                        Comment on above:   Result Comment: For   
FASTING Glucose --- ADA reference ranges:  
Normal 65-99 mg/dl  
Prediabetes 100-125  
Diabetes >/= 126   
   
                                                            Performed By: #### M  
DIFF, CMP, CBCAD ####  
[facility]  
[address] [place], [place] [id number]   
   
                      Potassium [Moles/Vol] 4.5 mmol/L Normal     3.5-5.5    Janell  
[facility]   
Medical   
Specialist  
   
                                        Comment on above:   Performed By: #### M  
DIFF, CMP, CBCAD ####  
[facility]  
[address] [place], [place] [id number]   
   
                      Protein [Mass/Vol] 6.9 g/dL   Normal     6.1-8.1    [place]  
[place]   
Medical   
Specialist  
   
                                        Comment on above:   Performed By: #### M  
DIFF, CMP, CBCAD ####  
[facility]  
[address] [place], [place] [id number]   
   
                      Sodium [Moles/Vol] 137 mmol/L Normal     135-146    [facility]  
   
                                        Comment on above:   Performed By: #### M  
DIFF, CMP, CBCAD ####  
[facility]  
[address] [place], [place] [id number]   
   
                                                    Urea nitrogen   
[Mass/Vol]      17 mg/dL        Normal          7-25            [facility]   
Specialist  
   
                                        Comment on above:   Performed By: #### M  
DIFF, CMP, CBCAD ####  
[facility]  
[address] [place], [place] [id number]   
   
                                                    Manual Differentialon 2022   
   
                      BAND       0.0 %      Normal                [facility]   
Specialist  
   
                                        Comment on above:   Performed By: #### M  
DIFF, CMP, CBCAD ####  
[facility]  
[address] [place], [place] [id number]   
   
                      BANDABS    0.0 K/uL   Normal                [facility]  
   
                                        Comment on above:   Performed By: #### M  
DIFF, CMP, CBCAD ####  
[facility]  
[address] [place], [place] [id number]   
   
                      BASO       0.0 %      Normal                [facility]   
Specialist  
   
                                        Comment on above:   Performed By: #### M  
DIFF, CMP, CBCAD ####  
[facility]  
[address] [place], [place] [id number]   
   
                      BASOABS    0.0 K/uL   Normal     0.0-0.2    [facility]   
Specialist  
   
                                        Comment on above:   Performed By: #### M  
DIFF, CMP, CBCAD ####  
[facility]  
[address] [place], [place] [id number]   
   
                      EOS        6.0 %      Normal                [facility]   
Specialist  
   
                                        Comment on above:   Performed By: #### M  
DIFF, CMP, CBCAD ####  
[facility]  
[address] [place], [place] [id number]   
   
                      EOSABS     0.4 K/uL   Normal     0.0-0.5    [facility]   
Specialist  
   
                                        Comment on above:   Performed By: #### M  
DIFF, CMP, CBCAD ####  
[facility]  
[address] [place], [place] [id number]   
   
                      LYMPH      32.0 %     Normal                [facility]   
Specialist  
   
                                        Comment on above:   Performed By: #### M  
DIFF, CMP, CBCAD ####  
[facility]  
[address] [place], [place] [id number]   
   
                      LYMPHABS   2.2 K/uL   Normal     0.9-3.9    [facility]   
Specialist  
   
                                        Comment on above:   Performed By: #### M  
DIFF, CMP, CBCAD ####  
[facility]  
[address] [place], [place] [id number]   
   
                      LYMPHATYP  0.0 %      Low        0.9-3.9    [facility]   
Specialist  
   
                                        Comment on above:   Performed By: #### M  
DIFF, CMP, CBCAD ####  
[facility]  
[address] [place], [place] [id number]   
   
                      MONO       16.0 %     Normal                [facility]   
Specialist  
   
                                        Comment on above:   Performed By: #### M  
DIFF, CMP, CBCAD ####  
[facility]  
[address] [place], [place] [id number]   
   
                      MONOABS    1.1 K/uL   High       0.2-0.9    [facility]   
Specialist  
   
                                        Comment on above:   Performed By: #### M  
DIFF, CMP, CBCAD ####  
[facility]  
[address] [place], [place] [id number]   
   
                      SEG        46.0 %     Normal                [facility]   
Specialist  
   
                                        Comment on above:   Performed By: #### M  
DIFF, CMP, CBCAD ####  
[facility]  
[address] [place], [place] [id number]   
   
                      SEGABS     3.2 K/uL   Normal     1.5-7.8    [facility]   
Medical   
Specialist  
   
                                        Comment on above:   Performed By: #### M  
DIFF, CMP, CBCAD ####  
[facility]  
[address] [place], [place] [id number]   
   
                      WBC        6.9 K/uL   Normal     3.8-11.0   [facility]   
Medical   
Specialist  
   
                                        Comment on above:   Performed By: #### M  
DIFF, CMP, CBCAD ####  
[facility]  
[address] [place], [place] [id number]   
   
                                                    XR Chest 2 Views*on 20   
   
                                        XR Chest 2 Views*   HISTORY: Calm and   
shortness of breath  
  
COMPARISON: None   
available  
  
TECHNIQUE: Frontal   
and lateral views of   
the chest  
  
  
FINDINGS:  
  
The cardiomediastinal   
silhouette is within   
normal limits. Small   
subtle bilateral  
lower lobe opacities.   
No pneumothorax or   
pleural effusion. No   
acute osseous   
abnormality.  
No changes of the   
spine.  
  
  
IMPRESSION:  
  
Small subtle   
bilateral lower lobe   
opacities may   
represent   
atelectasis, or   
pneumonia  
in the appropriate   
clinical setting.  
  
  
  
  
  
  
Report reported and   
signed by Marcus Mckeon on 2022   
1321                Normal                                  [facility]   
Specialist  
  
  
  
Vital Signs  
  
  
                          Date Time    Vital Sign   Value        Performing   
Clinician                               Facility  
   
                                                    2024   
11:          Body temperature    97.7 [degF]         DO Vera   
[facility]  
Work Phone:   
[phone]                          [facility]  
   
                                                    2024   
11:                              Diastolic blood   
pressure                  70 mm[Hg]                 DO Vera   
[facility]  
Work Phone:   
[phone]                          [facility]  
   
                                                    2024   
11:          Heart rate          76 /min             DO Vera   
[facility]  
Work Phone:   
[phone]                          [facility]  
   
                                                    2024   
11:          Respiratory rate    18 /min             DO Vera   
[facility]  
Work Phone:   
[phone]                          [facility]  
   
                                                    2024   
11:                              Systolic blood   
pressure                  127 mm[Hg]                DO Vera   
[facility]  
Work Phone:   
[phone]                          [facility]  
   
                                                    2024   
15:          Body height         180.34 cm           DO Vera   
[facility]  
Work Phone:   
[phone]                          [facility]  
   
                                                    2024   
15:                              Body mass index   
(BMI) [Ratio]             51.2 kg/m2                DO Vera   
[facility]  
Work Phone:   
[phone]                          [facility]  
   
                                                    2024   
15:          Body weight         166.46 kg           DO Vera   
[facility]  
Work Phone:   
[phone]                          [facility]  
   
                                                    2024   
13:          Body height         180.34 cm           DO Vera   
[facility]  
Work Phone:   
[phone]                          [facility]  
   
                                                    2024   
13:                              Body mass index   
(BMI) [Ratio]             51.5 kg/m2                DO Vera   
[facility]  
Work Phone:   
[phone]                          [facility]  
   
                                                    2024   
13:          Body weight         167.43 kg           DO Vera   
[facility]  
Work Phone:   
[phone]                          [facility]  
   
                                                    2024   
13:                              Diastolic blood   
pressure                  58 mm[Hg]                 DO Vera   
[facility]  
Work Phone:   
[phone]                          [facility]  
   
                                                    2024   
13:          Heart rate          86 /min             DO Vera   
[facility]  
Work Phone:   
[phone]                          [facility]  
   
                                                    2024   
13:          Respiratory rate    20 /min             DO Vera   
[facility]  
Work Phone:   
[phone]                          [facility]  
   
                                                    2024   
13:                              SaO2% (BldA) [Mass   
fraction]                 97 %                      DO Vera   
[facility]  
Work Phone:   
[phone]                          [facility]  
   
                                                    2024   
13:                              Systolic blood   
pressure                  110 mm[Hg]                DO Vera   
[facility]  
Work Phone:   
[phone]                          [facility]  
   
                                                    2024   
16:          Body height         180.34 cm           DO Vera   
[facility]  
Work Phone:   
[phone]                          [facility]  
   
                                                    2024   
16:                              Body mass index   
(BMI) [Ratio]             51.2 kg/m2                DO Vera   
[facility]  
Work Phone:   
[phone]                          [facility]  
   
                                                    2024   
16:          Body weight         166.46 kg           DO Vera   
[facility]  
Work Phone:   
[phone]                          [facility]  
   
                                                    2024   
15:          Body temperature    98.4 [degF]         DO Vera   
[facility]  
Work Phone:   
[phone]                          [facility]  
   
                                                    2024   
15:                              Diastolic blood   
pressure                  70 mm[Hg]                 DO Vera   
[facility]  
Work Phone:   
[phone]                          [facility]  
   
                                                    2024   
15:          Heart rate          99 /min             DO Vera   
[facility]  
Work Phone:   
[phone]                          [facility]  
   
                                                    2024   
15:          Respiratory rate    18 /min             DO Vera   
[facility]  
Work Phone:   
[phone]                          [facility]  
   
                                                    2024   
15:                              Systolic blood   
pressure                  131 mm[Hg]                DO Vera   
[facility]  
Work Phone:   
[phone]                          [facility]  
   
                                                    2024   
15:          Body height         180.34 cm           DO Vera   
[facility]  
Work Phone:   
[phone]                          [facility]  
   
                                                    2024   
15:                              Body mass index   
(BMI) [Ratio]             52.1 kg/m2                DO Vera   
[facility]  
Work Phone:   
[phone]                          [facility]  
   
                                                    2024   
15:          Body weight         169.64 kg           DO Vera   
[facility]  
Work Phone:   
[phone]                          [facility]  
   
                                                    2024   
15:          Body height         180.34 cm           DO Vera   
[facility]  
Work Phone:   
[phone]                          [facility]  
   
                                                    2024   
15:                              Body mass index   
(BMI) [Ratio]             51.2 kg/m2                DO Vera   
[facility]  
Work Phone:   
[phone]                          [facility]  
   
                                                    2024   
15:          Body weight         166.46 kg           DO Vera   
[facility]  
Work Phone:   
[phone]                          [facility]  
   
                                                    2024   
15:          Body temperature    98.1 [degF]         DO Vera   
[facility]  
Work Phone:   
[phone]                          [facility]  
   
                                                    2024   
15:                              Diastolic blood   
pressure                  75 mm[Hg]                 DO Vera   
[facility]  
Work Phone:   
[phone]                          [facility]  
   
                                                    2024   
15:          Heart rate          76 /min             DO Vera   
[facility]  
Work Phone:   
[phone]                          [facility]  
   
                                                    2024   
15:          Respiratory rate    20 /min             DO Vera   
[facility]  
Work Phone:   
[phone]                          [facility]  
   
                                                    2024   
15:                              Systolic blood   
pressure                  133 mm[Hg]                DO Vera   
[facility]  
Work Phone:   
[phone]                          [facility]  
   
                                                    2024   
15:          Body height         180.34 cm           DO Vera   
[facility]  
Work Phone:   
[phone]                          [facility]  
   
                                                    2024   
15:                              Body mass index   
(BMI) [Ratio]             52.9 kg/m2                DO Vera   
[facility]  
Work Phone:   
[phone]                          [facility]  
   
                                                    2024   
15:          Body weight         172.36 kg           DO Vera   
[facility]  
Work Phone:   
[phone]                          [facility]  
   
                                                    2024   
15:                              Diastolic blood   
pressure                  66 mm[Hg]                 DO Vera   
[facility]  
Work Phone:   
[phone]                          [facility]  
   
                                                    2024   
15:          Heart rate          71 /min             DO Vera   
[facility]  
Work Phone:   
[phone]                          [facility]  
   
                                                    2024   
15:          Respiratory rate    18 /min             DO Vera   
[facility]  
Work Phone:   
[phone]                          [facility]  
   
                                                    2024   
15:                              SaO2% (BldA) [Mass   
fraction]                 97 %                      DO Vera   
[facility]  
Work Phone:   
[phone]                          [facility]  
   
                                                    2024   
15:                              Systolic blood   
pressure                  105 mm[Hg]                DO Vera   
[facility]  
Work Phone:   
[phone]                          [facility]  
   
                                                    2024   
15:          Body temperature    98.8 [degF]         DO Vera   
[facility]  
Work Phone:   
[phone]                          [facility]  
   
                                                    2024   
15:                              Diastolic blood   
pressure                  62 mm[Hg]                 DO Vera   
[facility]  
Work Phone:   
[phone]                          [facility]  
   
                                                    2024   
15:          Heart rate          83 /min             DO Vera   
[facility]  
Work Phone:   
[phone]                          [facility]  
   
                                                    2024   
15:          Respiratory rate    18 /min             DO Vera   
[facility]  
Work Phone:   
[phone]                          [facility]  
   
                                                    2024   
15:                              Systolic blood   
pressure                  114 mm[Hg]                DO Vera   
[facility]  
Work Phone:   
[phone]                          [facility]  
   
                                                    2024   
14:          Body height         180.34 cm           DO Vera   
[facility]  
Work Phone:   
[phone]                          [facility]  
   
                                                    2024   
14:                              Body mass index   
(BMI) [Ratio]             51.2 kg/m2                DO Vera   
[facility]  
Work Phone:   
[phone]                          [facility]  
   
                                                    2024   
14:          Body weight         166.46 kg           DO Vera   
[facility]  
Work Phone:   
[phone]                          [facility]  
   
                                                    2024   
11:                              Body mass index   
(BMI) [Ratio]             52.72 kg/m2               Sharlene An APRN-CNP  
Work Phone:   
[phone]                          [facility]  
   
                                                    2024   
11:          Body weight         171.46 kg           Sharlene An   
APRN-CNP  
Work Phone:   
[phone]                          [facility]  
   
                                                    2024   
11:          Body height         180.3 cm            Sharlene An   
APRN-CNP  
Work Phone:   
[phone]                          [facility]  
   
                                                    2024   
11:                              Diastolic blood   
pressure                  70 mm[Hg]                 Sharlene An   
APRN-CNP  
Work Phone:   
[phone]                          [facility]  
   
                                                    2024   
11:          Heart rate          78 /min             Sharlene An   
APRN-CNP  
Work Phone:   
[phone]                          [facility]  
   
                                                    2024   
11:                              Systolic blood   
pressure                  110 mm[Hg]                Sharlene An   
APRN-CNP  
Work Phone:   
[phone]                          [facility]  
   
                                                    2024   
14:          Body height         180.34 cm           DO Vera   
[facility]  
Work Phone:   
[phone]                          [facility]  
   
                                                    2024   
14:                              Body mass index   
(BMI) [Ratio]             53.3 kg/m2                DO Vera   
[facility]  
Work Phone:   
[phone]                          [facility]  
   
                                                    2024   
14:          Body weight         173.38 kg           DO Vera   
[facility]  
Work Phone:   
[phone]                          [facility]  
   
                                                    2024   
14:                              Diastolic blood   
pressure                  72 mm[Hg]                 DO Vera   
[facility]  
Work Phone:   
[phone]                          [facility]  
   
                                                    2024   
14:          Heart rate          80 /min             DO Vera   
[facility]  
Work Phone:   
[phone]                          [facility]  
   
                                                    2024   
14:          Respiratory rate    20 /min             DO Vera   
[facility]  
Work Phone:   
[phone]                          [facility]  
   
                                                    2024   
14:                              SaO2% (BldA) [Mass   
fraction]                 96 %                      DO Vera   
[facility]  
Work Phone:   
[phone]                          [facility]  
   
                                                    2024   
14:                              Systolic blood   
pressure                  133 mm[Hg]                DO Vera   
[facility]  
Work Phone:   
[phone]                          [facility]  
   
                                                    2024   
14:          Body temperature    97.6 [degF]         DO Vera   
[facility]  
Work Phone:   
[phone]                          [facility]  
   
                                                    2024   
14:                              Diastolic blood   
pressure                  78 mm[Hg]                 DO Vera   
[facility]  
Work Phone:   
[phone]                          [facility]  
   
                                                    2024   
14:          Heart rate          89 /min             DO Vera   
[facility]  
Work Phone:   
[phone]                          [facility]  
   
                                                    2024   
14:          Respiratory rate    24 /min             DO Vera   
[facility]  
Work Phone:   
[phone]                          [facility]  
   
                                                    2024   
14:                              Systolic blood   
pressure                  143 mm[Hg]                DO Vera   
[facility]  
Work Phone:   
[phone]                          [facility]  
   
                                                    2024   
14:          Body height         180.34 cm           DO Vera   
[facility]  
Work Phone:   
[phone]                          [facility]  
   
                                                    2024   
14:                              Body mass index   
(BMI) [Ratio]             51.5 kg/m2                DO Vera   
[facility]  
Work Phone:   
[phone]                          [facility]  
   
                                                    2024   
14:          Body weight         167.82 kg           DO Vera   
[facility]  
Work Phone:   
[phone]                          [facility]  
   
                                                    2024   
15:                              Body mass index   
(BMI) [Ratio]             52.44 kg/m2               Ray Terry NP  
Work Phone:   
[phone]                          [facility]  
   
                                                    2024   
15:          Body temperature    97.7 [degF]         Ray Terry NP  
Work Phone:   
[phone]                          [facility]  
   
                                                    2024   
15:          Body weight         170.55 kg           Ray Terry NP  
Work Phone:   
[phone]                          [facility]  
   
                                                    2024   
15:                              Diastolic blood   
pressure                  70 mm[Hg]                 Ray Terry NP  
Work Phone:   
[phone]                          [facility]  
   
                                                    2024   
15:          Heart rate          83 /min             Ray Terry NP  
Work Phone:   
[phone]                          [facility]  
   
                                                    2024   
15:                              SaO2% (BldA) [Mass   
fraction]                 94 %                      Ray Terry NP  
Work Phone:   
[phone]                          [facility]  
   
                                                    2024   
15:                              Systolic blood   
pressure                  112 mm[Hg]                Ray Terry NP  
Work Phone:   
[phone]                          [facility]  
   
                                                    2023   
11:          Body height         177.8 cm            Jacoby Braden  
Other Phone:   
[phone]                           [facility]   
[employer]   
[employer]  
Other Phone:   
[phone]  
   
                                                    2023   
11:                              Body mass index   
(BMI) [Ratio]             51.86 kg/m2               Jacoby Braden  
Other Phone:   
[phone]                           [facility]   
[employer]   
[employer]  
Other Phone:   
[phone]  
   
                                                    2023   
11:          Body weight         163.98 kg           Jacoby Braden  
Other Phone:   
[phone]                           [facility]   
[employer]   
[employer]  
Other Phone:   
[phone]  
   
                                                    2023   
11:                              Diastolic blood   
pressure                  79 mm[Hg]                 Jacoby Braden  
Other Phone:   
[phone]                           [facility]   
[employer]   
[employer]  
Other Phone:   
[phone]  
   
                                                    2023   
11:          Respiratory rate    18 /min             Jacoby Braden  
Other Phone:   
[phone]                           [facility]   
[employer]   
[employer]  
Other Phone:   
[phone]  
   
                                                    2023   
11:                              SaO2% (BldA) [Mass   
fraction]                 99 %                      Jacoby Braden  
Other Phone:   
[phone]                           [facility]   
[employer]   
[employer]  
Other Phone:   
[phone]  
   
                                                    2023   
11:                              Systolic blood   
pressure                  125 mm[Hg]                Jacoby Braden  
Other Phone:   
[phone]                           [facility]   
[employer]   
[employer]  
Other Phone:   
[phone]  
   
                                                    2023   
13:          Body height         180.34 cm           DO Vera Melvin  
Work Phone:   
[phone]                          [facility]  
   
                                                    2023   
13:                              Body mass index   
(BMI) [Ratio]             49.2 kg/m2                DO Vera   
[place]  
Work Phone:   
[phone]                          [facility]  
   
                                                    2023   
13:          Body weight         160.11 kg           DO Vera   
[facility]  
Work Phone:   
[phone]                          [facility]  
   
                                                    2023   
13:          Body temperature    97.7 [degF]         DO Vera   
[facility]  
Work Phone:   
[phone]                          [facility]  
   
                                                    2023   
13:                              Diastolic blood   
pressure                  73 mm[Hg]                 DO Vera   
[facility]  
Work Phone:   
[phone]                          [facility]  
   
                                                    2023   
13:          Heart rate          81 /min             DO Vera   
[place]  
Work Phone:   
[phone]                          [facility]  
   
                                                    2023   
13:          Respiratory rate    20 /min             DO Vera   
[place]  
Work Phone:   
[phone]                          [facility]  
   
                                                    2023   
13:                              Systolic blood   
pressure                  123 mm[Hg]                DO Vera   
[place]  
Work Phone:   
[phone]                          [facility]  
   
                                                    2023   
14:                              42 1                Vera D   
[place]  
Work Phone:   
[phone]                          [facility]   
[address]   
[place]  
Work Phone:   
[phone]  
   
                                                    Comment on   
above:                                  [id number]   
   
                                                    2023   
14:          Body height         177.8 cm            Annel Fitt  
Other Phone:   
[phone]                           [facility]   
[employer]   
[employer]  
Other Phone:   
[phone]  
   
                                                    2023   
14:                              Body mass index   
(BMI) [Ratio]             52.41 kg/m2               Annel Fitt  
Other Phone:   
[phone]                           [facility]   
[employer]   
[employer]  
Other Phone:   
[phone]  
   
                                                    01-   
14:          Body weight         165.7 kg            Annel Baird  
Other Phone:   
[phone]                           [facility]   
[employer]   
[employer]  
Other Phone:   
[phone]  
   
                                                    2023   
09:          Body height         180.34 cm           Vera D   
[place]  
Work Phone:   
[phone]                          [facility]   
[address]   
[place]  
Work Phone:   
[phone]  
   
                                                    2023   
09:                              Body mass index   
(BMI) [Ratio]             50.91 kg/m2               Vera D   
[place]  
Work Phone:   
[phone]                          [facility]   
[address]   
[place]  
Work Phone:   
[phone]  
   
                                                    2023   
09:                              Body surface area   
Derived from   
formula                   2.72 m2                   Vera D   
[place]  
Work Phone:   
[phone]                          [facility]   
[address]   
[place]  
Work Phone:   
[phone]  
   
                                                    2023   
09:          Body weight         165.56 kg           Vera D   
[place]  
Work Phone:   
[phone]                          [facility]   
[address]   
[place]  
Work Phone:   
[phone]  
   
                                                    2023   
09:                              Diastolic blood   
pressure                  78 mm[Hg]                 Vera D   
[place]  
Work Phone:   
[phone]                          [facility]   
[address]   
[place]  
Work Phone:   
[phone]  
   
                                                    2023   
09:          Heart rate          90 /min             Vera D   
[place]  
Work Phone:   
[phone]                          [facility]   
[address]   
[place]  
Work Phone:   
[phone]  
   
                                                    2023   
09:                              Systolic blood   
pressure                  124 mm[Hg]                Vera D   
[place]  
Work Phone:   
[phone]                          [facility]   
[address]   
[place]  
Work Phone:   
[phone]  
   
                                                    2022   
15:          Body height         177.8 cm            Jacoby Braden  
Other Phone:   
[phone]                           [facility]   
[employer]   
[employer]  
Other Phone:   
[phone]  
   
                                                    2022   
15:                              Body mass index   
(BMI) [Ratio]             53.53 kg/m2               Jacoby Zuñigadiff  
Other Phone:   
[phone]                           [facility]   
[employer]   
[employer]  
Other Phone:   
[phone]  
   
                                                    2022   
15:          Body weight         169.24 kg           Jacoby Zuñigadiff  
Other Phone:   
[phone]                           [facility]   
[employer]   
[employer]  
Other Phone:   
[phone]  
   
                                                    2022   
15:                              Diastolic blood   
pressure                  72 mm[Hg]                 Jacoby Zuñigadiff  
Other Phone:   
[phone]                           [facility]   
[employer]   
[employer]  
Other Phone:   
[phone]  
   
                                                    2022   
15:          Respiratory rate    20 /min             Jacoby Zuñigadiff  
Other Phone:   
[phone]                           [facility]   
[employer]   
[employer]  
Other Phone:   
[phone]  
   
                                                    2022   
15:                              SaO2% (BldA) [Mass   
fraction]                 98 %                      Jacoby Zuñigadiff  
Other Phone:   
[phone]                           [facility]   
[employer]   
[employer]  
Other Phone:   
[phone]  
   
                                                    2022   
15:                              Systolic blood   
pressure                  132 mm[Hg]                Jacoby Zuñigadiff  
Other Phone:   
[phone]                           [facility]   
[employer]   
[employer]  
Other Phone:   
[phone]  
   
                                                    2022   
11:          Body height         177.8 cm            Annel Fitt  
Other Phone:   
[phone]                           [facility]   
[employer]   
[employer]  
Other Phone:   
[phone]  
   
                                                    2022   
11:                              Body mass index   
(BMI) [Ratio]             52.17 kg/m2               Annel Fitt  
Other Phone:   
[phone]                           [facility]   
[employer]   
[employer]  
Other Phone:   
[phone]  
   
                                                    2022   
11:          Body weight         164.93 kg           Annel Fitt  
Other Phone:   
[phone]                           [facility]   
[employer]   
[employer]  
Other Phone:   
[phone]  
   
                                                    10-   
15:          Body height         177.8 cm            Jacoby Zuñigadiff  
Other Phone:   
[phone]                           [facility]   
[employer]   
[employer]  
Other Phone:   
[phone]  
   
                                                    10-   
15:                              Body mass index   
(BMI) [Ratio]             52.52 kg/m2               Jacoby Zuñigadiff  
Other Phone:   
[phone]                           [facility]   
[employer]   
[employer]  
Other Phone:   
[phone]  
   
                                                    10-   
15:          Body weight         166.06 kg           Jacoby Zuñigadiff  
Other Phone:   
[phone]                           [facility]   
[employer]   
[employer]  
Other Phone:   
[phone]  
   
                                                    10-   
15:                              Diastolic blood   
pressure                  68 mm[Hg]                 Jacoby Zuñigadiff  
Other Phone:   
[phone]                           [facility]   
[employer]   
[employer]  
Other Phone:   
[phone]  
   
                                                    10-   
15:          Respiratory rate    20 /min             Jacoby Zuñigadiff  
Other Phone:   
[phone]                           [facility]   
[employer]   
[employer]  
Other Phone:   
[phone]  
   
                                                    10-   
15:                              SaO2% (BldA) [Mass   
fraction]                 99 %                      Jacoby Zuñigadiff  
Other Phone:   
[phone]                           [facility]   
[employer]   
[employer]  
Other Phone:   
[phone]  
   
                                                    10-   
15:                              Systolic blood   
pressure                  112 mm[Hg]                Jacoby Zuñigadiff  
Other Phone:   
[phone]                           [facility]   
[employer]   
[employer]  
Other Phone:   
[phone]  
   
                                                    2022   
13:          Body height         180.34 cm           DO Vera   
[place]  
Work Phone:   
[phone]                          [facility]  
   
                                                    2022   
13:                              Body mass index   
(BMI) [Ratio]             52.4 kg/m2                DO Vera   
[place]  
Work Phone:   
[phone]                          [facility]  
   
                                                    2022   
13:          Body weight         170.55 kg           DO Vera   
[place]  
Work Phone:   
[phone]                          [facility]  
   
                                                    2022   
13:          Body temperature    98.6 [degF]         DO Vera   
[place]  
Work Phone:   
[phone]                          [facility]  
   
                                                    2022   
13:                              Diastolic blood   
pressure                  69 mm[Hg]                 DO Vera   
[place]  
Work Phone:   
[phone]                          [facility]  
   
                                                    2022   
13:          Heart rate          92 /min             DO Vera   
[place]  
Work Phone:   
[phone]                          [facility]  
   
                                                    2022   
13:          Respiratory rate    18 /min             DO Vera   
[place]  
Work Phone:   
[phone]                          [facility]  
   
                                                    2022   
13:                              Systolic blood   
pressure                  119 mm[Hg]                DO Vera   
[place]  
Work Phone:   
[phone]                          [facility]  
   
                                                    2022   
12:          Body height         177.8 cm            Jacoby Braden  
Other Phone:   
[phone]                           [facility]   
[employer]   
[employer]  
Other Phone:   
[phone]  
   
                                                    2022   
12:                              Body mass index   
(BMI) [Ratio]             53.1 kg/m2                Jacoby Braden  
Other Phone:   
[phone]                           [facility]   
[employer]   
[employer]  
Other Phone:   
[phone]  
   
                                                    2022   
12:          Body weight         167.88 kg           Jacoby Braden  
Other Phone:   
[phone]                           [facility]   
[employer]   
[employer]  
Other Phone:   
[phone]  
   
                                                    2022   
12:                              Diastolic blood   
pressure                  64 mm[Hg]                 Jacoby Braden  
Other Phone:   
[phone]                           [facility]   
[employer]   
[employer]  
Other Phone:   
[phone]  
   
                                                    2022   
12:          Respiratory rate    18 /min             Jacoby Braden  
Other Phone:   
[phone]                           [facility]   
[employer]   
[employer]  
Other Phone:   
[phone]  
   
                                                    2022   
12:                              SaO2% (BldA) [Mass   
fraction]                 98 %                      Jacoby Braden  
Other Phone:   
[phone]                           [facility]   
[employer]   
[employer]  
Other Phone:   
[phone]  
   
                                                    2022   
12:                              Systolic blood   
pressure                  117 mm[Hg]                Jacoby Zuñigadiff  
Other Phone:   
[phone]                           [facility]   
[employer]   
[employer]  
Other Phone:   
[phone]  
   
                                                    2022   
15:          Body height         177.8 cm            Jacoby Zuñigadiff  
Other Phone:   
[phone]                           [facility]   
[employer]   
[employer]  
Other Phone:   
[phone]  
   
                                                    2022   
15:                              Body mass index   
(BMI) [Ratio]             54.81 kg/m2               Jacoby Zuñigadiff  
Other Phone:   
[phone]                           [facility]   
[employer]   
[employer]  
Other Phone:   
[phone]  
   
                                                    2022   
15:          Body weight         173.28 kg           Jaocby Zuñigadiff  
Other Phone:   
[phone]                           [facility]   
[employer]   
[employer]  
Other Phone:   
[phone]  
   
                                                    2022   
15:                              Diastolic blood   
pressure                  63 mm[Hg]                 Jacoby Zuñgiadiff  
Other Phone:   
[phone]                           [facility]   
[employer]   
[employer]  
Other Phone:   
[phone]  
   
                                                    2022   
15:          Respiratory rate    20 /min             Jacoby Zuñigadiff  
Other Phone:   
[phone]                           [facility]   
[employer]   
[employer]  
Other Phone:   
[phone]  
   
                                                    2022   
15:                              SaO2% (BldA) [Mass   
fraction]                 97 %                      Jacoby Zuñigadiff  
Other Phone:   
[phone]                           [facility]   
[employer]   
[employer]  
Other Phone:   
[phone]  
   
                                                    2022   
15:                              Systolic blood   
pressure                  107 mm[Hg]                Jacoby Braden  
Other Phone:   
[phone]                           [facility]   
[employer]   
[employer]  
Other Phone:   
[phone]  
   
                                                    2022   
08:                              Diastolic blood   
pressure                  62 mm[Hg]                 Vera SIMS   
[place]  
Work Phone:   
[phone]                          [facility]   
[address]   
[place]  
Work Phone:   
[phone]  
   
                                                    2022   
08:                              Systolic blood   
pressure                  128 mm[Hg]                Vera D   
[place]  
Work Phone:   
[phone]                          [facility]   
[address]   
[place]  
Work Phone:   
[phone]  
   
                                                    2022   
08:          Body height         180.34 cm           Vera D   
[place]  
Work Phone:   
[phone]                          [facility]   
[address]   
[place]  
Work Phone:   
[phone]  
   
                                                    2022   
08:                              Body mass index   
(BMI) [Ratio]             54.12 kg/m2               Vera D   
[place]  
Work Phone:   
[phone]                          [facility]   
[address]   
[place]  
Work Phone:   
[phone]  
   
                                                    2022   
08:                              Body surface area   
Derived from   
formula                   2.79 m2                   Vera D   
[place]  
Work Phone:   
[phone]                          [facility]   
[address]   
[place]  
Work Phone:   
[phone]  
   
                                                    2022   
08:          Body weight         176 kg              Vera D   
[place]  
Work Phone:   
[phone]                          [facility]   
[address]   
[place]  
Work Phone:   
[phone]  
   
                                                    2022   
08:                              Diastolic blood   
pressure                  70 mm[Hg]                 Vera D   
[place]  
Work Phone:   
[phone]                          [facility]   
[address]   
[place]  
Work Phone:   
[phone]  
   
                                                    2022   
08:          Heart rate          78 /min             Vera D   
[place]  
Work Phone:   
[phone]                          [facility]   
[address]   
[place]  
Work Phone:   
[phone]  
   
                                                    2022   
08:                              Systolic blood   
pressure                  132 mm[Hg]                Vera D   
[place]  
Work Phone:   
[phone]                          [facility]   
[address]   
[place]  
Work Phone:   
[phone]  
   
                                                    2022   
15:          Body height         177.8 cm            Annel Fitt  
Other Phone:   
[phone]                           [facility]   
[employer]   
[employer]  
Other Phone:   
[phone]  
   
                                                    2022   
15:                              Body mass index   
(BMI) [Ratio]             55.72 kg/m2               Annel Fitt  
Other Phone:   
[phone]                           [facility]   
[employer]   
[employer]  
Other Phone:   
[phone]  
   
                                                    2022   
15:          Body weight         176.18 kg           Annel Fitt  
Other Phone:   
[phone]                           [facility]   
[employer]   
[employer]  
Other Phone:   
[phone]  
   
                                                    2022   
16:          Body height         177.8 cm            Alonso Birmingham  
Other Phone:   
[phone]                           [facility]   
[employer]   
[employer]  
Other Phone:   
[phone]  
   
                                                    2022   
16:                              Body mass index   
(BMI) [Ratio]             55.59 kg/m2               Alonso Birmingham  
Other Phone:   
[phone]                           [facility]  
Other Phone:   
[phone]  
   
                                                    2022   
16:          Body temperature    99.5 [degF]         Alonso Birmingham  
Other Phone:   
[phone]                           [facility]  
Other Phone:   
[phone]  
   
                                                    2022   
16:          Body weight         175.77 kg           Alonso Birmingham  
Other Phone:   
[phone]                           [facility]   
[employer]   
[employer]  
Other Phone:   
[phone]  
   
                                                    2022   
16:                              Diastolic blood   
pressure                  71 mm[Hg]                 Alonso Birmingham  
Other Phone:   
[phone]                           [facility]   
[employer]   
[employer]  
Other Phone:   
[phone]  
   
                                                    2022   
16:                              SaO2% (BldA) [Mass   
fraction]                 96 %                      Alonso Birmingham  
Other Phone:   
[phone]                           [facility]   
[employer]   
[employer]  
Other Phone:   
[phone]  
   
                                                    2022   
16:                              Systolic blood   
pressure                  114 mm[Hg]                Alonso Birmingham  
Other Phone:   
[phone]                           [facility]   
[employer]   
[employer]  
Other Phone:   
[phone]  
   
                                                    2022   
00:                              60 1                Vera D   
[place]  
Work Phone:   
[phone]                          [facility]   
[facility] [address]   
DO  
Work Phone:   
[phone]  
   
                                                    Comment on   
above:                                  [id number]   
   
                                                    2022   
15:          Body height         177.8 cm            Jacoby Braden  
Other Phone:   
[phone]                           [facility]   
[employer]   
[employer]  
Other Phone:   
[phone]  
   
                                                    2022   
15:                              Body mass index   
(BMI) [Ratio]             59.45 kg/m2               Jacoby Braden  
Other Phone:   
[phone]                           [facility]   
[employer]   
[employer]  
Other Phone:   
[phone]  
   
                                                    2022   
15:          Body weight         187.97 kg           Jacoby Braden  
Other Phone:   
[phone]                           [facility]   
[employer]   
[employer]  
Other Phone:   
[phone]  
   
                                                    2022   
15:                              Diastolic blood   
pressure                  57 mm[Hg]                 Jacoby Braden  
Other Phone:   
[phone]                           [facility]   
[employer]   
[employer]  
Other Phone:   
[phone]  
   
                                                    2022   
15:          Respiratory rate    20 /min             Jacoby Braden  
Other Phone:   
[phone]                           [facility]   
[employer]   
[employer]  
Other Phone:   
[phone]  
   
                                                    2022   
15:                              SaO2% (BldA) [Mass   
fraction]                 96 %                      Jacoby Braden  
Other Phone:   
[phone]                           [facility]   
[employer]   
[employer]  
Other Phone:   
[phone]  
   
                                                    2022   
15:                              Systolic blood   
pressure                  125 mm[Hg]                Jacoby Braden  
Other Phone:   
[phone]                           [facility]   
[employer]   
[employer]  
Other Phone:   
[phone]  
   
                                                    2022   
16:          Body height         177.8 cm            Alonso Birmingham  
Other Phone:   
[phone]                           [facility]   
[employer]   
[employer]  
Other Phone:   
[phone]  
   
                                                    2022   
16:                              Body mass index   
(BMI) [Ratio]             59.54 kg/m2               Alonso Birmingham  
Other Phone:   
[phone]                           [facility]   
[employer]   
[employer]  
Other Phone:   
[phone]  
   
                                                    2022   
16:          Body temperature    97.8 [degF]         Alonso Vinnie  
Other Phone:   
[phone]                           [facility]   
[employer]   
[employer]  
Other Phone:   
[phone]  
   
                                                    2022   
16:          Body weight         188.24 kg           Alonso Vinnie  
Other Phone:   
[phone]                           [facility]   
[employer]   
[employer]  
Other Phone:   
[phone]  
   
                                                    2022   
16:                              Diastolic blood   
pressure                  77 mm[Hg]                 Alonso Vinnie  
Other Phone:   
[phone]                           [facility]   
[employer]   
[employer]  
Other Phone:   
[phone]  
   
                                                    2022   
16:                              SaO2% (BldA) [Mass   
fraction]                 93 %                      Alonso Vinnie  
Other Phone:   
[phone]                           [facility]   
[employer]   
[employer]  
Other Phone:   
[phone]  
   
                                                    2022   
16:                              Systolic blood   
pressure                  130 mm[Hg]                Alonso Vinnie  
Other Phone:   
[phone]                           [facility]   
[employer]   
[employer]  
Other Phone:   
[phone]  
  
  
  
Encounters  
  
  
                          Encounter Date Encounter Type Care Provider Facility  
   
                          Start: 2024 ambulatory   Elaine Espino Facility:[facility]  
   
                                                    Start: 2024  
End: 2024           ambulatory                VERA D   
[facility]                            Not Available  
   
                                Start: 2024 Registered Recurring DO Vera  
   
[place]  
Work Phone:   
[phone]                          [facility]   
[place]  
Work Phone:   
[phone]  
   
                                                    Start: 2024  
End: 2024           ambulatory                DO Vera   
[place]  
Work Phone:   
[phone]                          [facility]  
Work Phone:   
[phone]  
   
                                                    Start: 2024  
End: 2024           Discharged Recurring      DO Vera   
[place]  
Work Phone:   
[phone]                          [facility]   
[facility]  
   
                                                    Start: 2024  
End: 2024     ambulatory          DANYA AN   Not Available  
   
                                                    Start: 2024  
End: 2024           ambulatory                DO Vera   
[place]  
Work Phone:   
[phone]                          [facility]  
Work Phone:   
[phone]  
   
                                                    Start: 2024  
End: 2024                         Patient encounter   
procedure                               DO Vera   
[place]  
Work Phone:   
[phone]                          [facility]   
[facility]  
Work Phone:   
[phone]  
   
                                Start: 2024 Registered Recurring DO Vera  
   
[place]  
Work Phone:   
[phone]                          [facility]   
Road [facility]  
   
                                Start: 2024 Registered Recurring DO Vera  
   
[place]  
Work Phone:   
[phone]                          [facility]   
[place]  
Work Phone:   
[phone]  
   
                                                    Start: 2024  
End: 2024           ambulatory                DO Vera   
[place]  
Work Phone:   
[phone]                          [facility]  
Work Phone:   
[phone]  
   
                                                    Start: 2024  
End: 2024                         Patient encounter   
procedure                               DO Vera   
[place]  
Work Phone:   
[phone]                          [facility]   
[facility]  
Work Phone:   
[phone]  
   
                                Start: 2024 Registered Recurring DO Vera  
   
[place]  
Work Phone:   
[phone]                          [facility]   
[place]  
Work Phone:   
[phone]  
   
                                Start: 2024 Registered Recurring DO Vera  
   
[place]  
Work Phone:   
[phone]                          [facility]   
Road [facility]  
   
                                                    Start: 2024  
End: 2024           ambulatory                DO Vera   
[place]  
Work Phone:   
[phone]                          [facility]  
Work Phone:   
[phone]  
   
                                                    Start: 2024  
End: 2024                         Patient encounter   
procedure                               DO Vera   
[place]  
Work Phone:   
[phone]                          [facility]   
[facility]  
Work Phone:   
[phone]  
   
                                Start: 2024 Registered Recurring DO Vera  
   
[place]  
Work Phone:   
[phone]                          [facility]   
[place]  
Work Phone:   
[phone]  
   
                                                    Start: 2024  
End: 2024     ambulatory          SHARLENE HOLM [facility]  
   
Ambulatory  
   
                                                    Start: 2024  
End: 2024                         Office outpatient visit   
15 minutes                              Sharlene HOLM An   
APRN-CNP  
Work Phone:   
[phone]                          [facility]  
   
                                        Comment on above:   Non-ischemic cardiom  
yopathy (Multi) (Primary Dx);  
DIMITRI on CPAP;  
BMI 50.0-59.9, adult (Multi)   
   
                                                    Start: 2024  
End: 2024           ambulatory                VERA MATIAS-ARPAN                            Not Available  
   
                                                    Start: 2024  
End: 2024           ambulatory                DO Vera   
[place]  
Work Phone:   
[phone]                          [facility]  
Work Phone:   
[phone]  
   
                                                    Start: 2024  
End: 2024                         Patient encounter   
procedure                               DO Vera   
[place]  
Work Phone:   
[phone]                          [facility]   
[facility]  
Work Phone:   
[phone]  
   
                                                    Start: 2024  
End: 2024     ambulatory          DANYA AN   Not Available  
   
                                                    Start: 2024  
End: 2024           ambulatory                DO Vera   
[place]  
Work Phone:   
[phone]                          [facility]  
Work Phone:   
[phone]  
   
                                                    Start: 2024  
End: 2024           Discharged Recurring      DO Vera   
[place]  
Work Phone:   
[phone]                          [facility]   
[place]  
Work Phone:   
[phone]  
   
                                                    Start: 2024  
End: 2024     ambulatory          JESSICA L LOPEZ        Not Available  
   
                                                    Start: 2024  
End: 2024     ambulatory          ARLENE DEPOY      Not Available  
   
                                                    Start: 2024  
End: 2024     ambulatory          JESSICA L LOPEZ        Not Available  
   
                                                    Start: 2024  
End: 2024     ambulatory          ARLENE HAIDER      Not Available  
   
                                Start: 02- Bamboo flowsheet Jessica L Lopez   
PT  
Work Phone:   
[phone]                          [facility]  
   
                                Start: 02- Bamboo flowsheet Jessica L Lopez   
PT  
Work Phone:   
[phone]                          [facility]  
   
                                                    Start: 02-  
End: 02-     ambulatory          JESSICA L LOPEZ        Not Available  
   
                                                    Start: 2024  
End: 2024                         Office outpatient visit   
25 minutes                              Ray Terry NP  
Work Phone:   
[phone]                          [facility]  
   
                                        Comment on above:   Lumbosacral strain,   
initial encounter (Primary Dx);  
Acute pain of left shoulder;  
Wound of left lower extremity, initial encounter   
   
                                                    Start: 2024  
End: 2024           ambulatory                VERA MELVIN                            Not Available  
   
                                                    Start: 2024  
End: 2024                         Patient encounter   
procedure                               Danya An   
DPM  
Work Phone:   
[phone]                          [facility]  
   
                                        Comment on above:   Onychomycosis (Prima  
ry Dx);  
Pain in both feet;  
Corns and callosities;  
Other specified peripheral vascular diseases (CMS/HCC);  
Circulating anticoagulant disorder (CMS/HCC)   
   
                                                    Start: 2024  
End: 2024     ambulatory          DANYA AN   Not Available  
   
                                                    Start: 2024  
End: 2024           ambulatory                VERA MATIAS-EMERY                            Not Available  
   
                                                    Start: 2023  
End: 2023     ambulatory          DANYA AN   Not Available  
   
                                                    Start: 2023  
End: 2023           ambulatory                DO Vera [place]  
Work Phone:   
[phone]                          [facility]  
Work Phone:   
[phone]  
   
                                                    Start: 2023  
End: 2023                         Patient encounter   
procedure                               DO Vera Melvin  
Work Phone:   
[phone]                          [facility]  
   
                                                    Start: 2023  
End: 2023           ambulatory                Jacoby Braden  
Other Phone:   
[phone]                           [facility]   
Professional   
Corporation  
Other Phone:   
[phone]  
   
                          Start: 2023 Follow-up encounter Jacoby gan Coordinated   
Care Clinic  
   
                                Start: 2023 Registered Recurring DO Vera  
   
[place]  
Work Phone:   
[phone]                          [facility]   
Management  
Work Phone:   
[phone]  
   
                                                    Start: 2023  
End: 2023           ambulatory                DO Vera   
[place]  
Work Phone:   
[phone]                          [facility]  
Work Phone:   
[phone]  
   
                                                    Start: 2023  
End: 2023           Discharged Recurring      DO Vera   
[place]  
Work Phone:   
[phone]                          [facility]   
[place]  
Work Phone:   
[phone]  
   
                                Start: 2023 ambulatory      Dr. Compa Maldonado                                 Facility:  
   
                                                    Start: 2023  
End: 2023           ambulatory                DO Vera   
[place]  
Work Phone:   
[phone]                          [facility]  
Work Phone:   
[phone]  
   
                                                    Start: 2023  
End: 2023                         Patient encounter   
procedure                               DO Vera   
[place]  
Work Phone:   
[phone]                          [facility]   
Exercise Program  
   
                                                    Start: 2023  
End: 2023           ambulatory                Jacoby Braden  
Other Phone:   
[phone]                           [facility]   
Professional   
Corporation  
Other Phone:   
[phone]  
   
                          Start: 2023 Telephone encounter Jacoby gan Coordinated   
Care Clinic  
   
                                Start: 2023 Registered Recurring DO Vera  
   
[place]  
Work Phone:   
[phone]                          [facility]   
Management  
Work Phone:   
[phone]  
   
                                Start: 2023 Chart Update    Vera SIMS   
[place]  
Work Phone:   
[phone]                          [facility]   
[facility] DO  
Work Phone:   
[phone]  
   
                                        Start: 2023   Patient encounter   
procedure                               Vera SIMS   
[place]  
Work Phone:   
[phone]                          [facility]   
[facility] [place]  
Work Phone:   
[phone]  
   
                          Start: 2023 ambulatory   COMPA MALDONADO Facilit  
[id number]  
   
                                Start: 2023 Chart Update    Vera SIMS   
[place]  
Work Phone:   
[phone]                          [facility]   
[facility] DO  
Work Phone:   
[phone]  
   
                                                    Start: 2023  
End: 2023           ambulatory                DO Vera [place]  
Work Phone:   
[phone]                          [facility]  
Work Phone:   
[phone]  
   
                                                    Start: 2023  
End: 2023                         Patient encounter   
procedure                               DO Vera [place]  
Work Phone:   
[phone]                          [facility]   
[facility]  
   
                                Start: 2023 Registered Recurring DO Vera  
   
[place]  
Work Phone:   
[phone]                          [facility]   
Management  
Work Phone:   
[phone]  
   
                                        Start: 2023   ([facility] RD FU) [facility] F/  
U   
Register[place] Dietician       Annel Baird                 [facility] Coordinated   
Care Clinic  
   
                                                    Start: 2023  
End: 2023           ambulatory                Jacoby Braden  
Other Phone:   
[phone]                           [facility]   
Professional   
Corporation  
Other Phone:   
[phone]  
   
                          Start: 2023 Telephone encounter Jacoby gan [facility]inated   
Care Clinic  
   
                                        Start: 2023   Office outpatient vi  
sit   
25 minutes                              Vera SIMS   
[place]  
Work Phone:   
[phone]                          [facility]   
[address] [address] DO  
Work Phone:   
[phone]  
   
                                Start: 2023 ambulatory      Dr. Compa Maldonado                                 Facility:  
   
                                                    Start: 2022  
End: 2022           ambulatory                Jacoby Braden  
Other Phone:   
[phone]                           [facility]   
Professional   
[facility]poration  
Other Phone:   
[phone]  
   
                          Start: 2022 Follow-up encounter Jacoby gan [facility]inated   
Care Clinic  
   
                                        Start: 2022   ([facility] RD FU) [facility] F/  
U   
Registerd Dietician       Annel Baird                 [facility] Coordinated   
Care Clinic  
   
                                                    Start: 2022  
End: 2022           ambulatory                Annel Mcneilwale  
Other Phone:   
[phone]                           [facility]   
Professional   
[employer]poration  
Other Phone:   
[phone]  
   
                                                    Start: 10-  
End: 10-           ambulatory                Jacoby Braden  
Other Phone:   
[phone]                           [facility]   
Professional   
Corporation  
Other Phone:   
[phone]  
   
                          Start: 10- Telephone encounter Jacoby ANTHONY  
[facility]   
Care [facility]  
   
                                                    Start: 10-  
End: 10-           ambulatory                Jacobygeorgia Braden  
Other Phone:   
[phone]                           [facility]   
Professional   
Corporation  
Other Phone:   
[phone]  
   
                          Start: 10- Follow-up encounter Jacoby Zuñigadimeño ANTHONY  
[facility]   
Care [facility]  
   
                                                    Start: 2022  
End: 2022           ambulatory                DO Vera   
[place]  
Work Phone:   
[phone]                          [facility]  
Work Phone:   
[phone]  
   
                                                    Start: 2022  
End: 2022           Discharged Recurring      DO Vera   
[place]  
Work Phone:   
[phone]                          [facility]   
[place]  
   
                                Start: 2022 Registered Recurring DO Vera  
   
[place]  
Work Phone:   
[phone]                          [facility]   
Management  
   
                                                    Start: 2022  
End: 2022           ambulatory                Jacoby Braden  
Other Phone:   
[phone]                           [facility]   
Professional   
Corporation  
Other Phone:   
[phone]  
   
                          Start: 2022 Follow-up encounter Jacoby gan [facility]inated   
Care Clinic  
   
                                                    Start: 2022  
End: 2022           ambulatory                Jacoby Braden  
Other Phone:   
[phone]                           [facility]   
Professional   
Corporation  
Other Phone:   
[phone]  
   
                          Start: 2022 Follow-up encounter Jacoby gan Coordinated   
Care Clinic  
   
                                Start: 2022 Chart Update    Vera SIMS   
[place]  
Work Phone:   
[phone]                          [facility]   
[facility] [address] DO  
Work Phone:   
[phone]  
   
                                        Start: 2022   Office consultation   
new/estab patient 80 min                Vera LEVI   
[employer]  
Work Phone:   
[phone]                          -[facility]   
[facility] [address] DO  
Work Phone:   
[phone]  
   
                                        Start: 2022   ([facility]) WMN Init  
ial   
Provider                  Annel Baird                 [facility]   
Care [facility]  
   
                                                    Start: 2022  
End: 2022           ambulatory                Annel Baird  
Other Phone:   
[phone]                           [facility]   
[employer]   
[employer]  
Other Phone:   
[phone]  
   
                                                    Start: 2022  
End: 2022           ambulatory                Alonso Birmingham  
Other Phone:   
[phone]                           [facility]   
[employer]   
[employer]  
Other Phone:   
[phone]  
   
                                        Start: 2022   Office outpatient vi  
sit   
25 minutes                Alonso Birmingham              [facility]  
   
                                                    Start: 05-  
End: 05-           ambulatory                Annel Mcneilt  
Other Phone:   
[phone]                           [facility]   
[employer]   
[employer]  
Other Phone:   
[phone]  
   
                                        Start: 05-   IBT FOR OBESITY GROU  
P   
2-10 30M                  Annel Baird                 [facility]   
[facility]  
   
                                                    Start: 2022  
End: 2022           ambulatory                Jacoby Braden  
Other Phone:   
[phone]                           [facility]   
[employer]   
[employer]  
Other Phone:   
[phone]  
   
                          Start: 2022 Nutrition therapy Jacoby Louis  
[facility]   
[facility]  
   
                                                    Start: 2022  
End: 2022           ambulatory                Annel Baird  
Other Phone:   
[phone]                           [facility]   
[employer]   
[employer]  
Other Phone:   
[phone]  
   
                          Start: 2022 [facility]  
   
                                                    Start: 2022  
End: 2022           ambulatory                Alonso Birmingham  
Other Phone:   
[phone]                           [facility]   
[employer]   
[employer]  
Other Phone:   
[phone]  
   
                                        Start: 2022   Office outpatient ne  
w 45   
minutes                   Alonso Birmingham              [facility]  
   
                                        Start: 2021   ([facility] C Vac) [facility]  
vid   
Vaccine                   Annel Baird                 [facility]   
[facility]  
   
                                                    Start: 2021  
End: 2021           ambulatory                Annel Baird  
Other Phone:   
[phone]                           [facility]   
[employer]   
[employer]  
Other Phone:   
[phone]  
  
  
  
Procedures  
  
  
                          Date         Procedure    Procedure Detail Performing   
Clinician  
   
                                        Start: 2024   Investigation of   
transfusion reaction                                DO Vera [facility]  
Work Phone:   
[phone]  
   
                                       Finger operation              Vera SIMS We  
[place]  
Work Phone:   
[phone]  
   
                                       Operation on gallbladder              Garcia  
katrin D [facility]  
Work Phone:   
[phone]  
   
                                       Total colonoscopy              Vera GOMEZ  
[facility]  
Work Phone:   
[phone]  
  
  
  
Plan of Treatment  
  
  
                          Date         Care Activity Detail       Author  
   
                                                    Start:   
2025  
End: 2025                         Patient encounter   
procedure                               2025 3:20 PM EST   
[facility] [address]   
[address] [place]   
[address] [phone]   
Compa Maldnoado DO   
[address] [place] [address]   
[phone] (Work)   
[phone] (Fax)                      [facility]  
   
                                                    Start:   
2025                              Medicare Annual Wellness   
(AWV)                                   Medicare Annual Wellness   
(AWV)                                   [facility]  
   
                                                    Start:   
2024                              Urine screening for   
protein                                 Diabetes: Urine Protein   
Screening                               [facility]  
   
                                                    Start:   
2024                              Screening for malignant   
neoplasm of colon                                   [facility]  
   
                                                    Start:   
2024  
End: 2024                         Patient encounter   
procedure                               2024 2:30 PM EDT   
Office Visit [facility]   
[address]   
[place] [id number] [phone]   
Vera Melvin,   
DO [address]   
[address] [place] [address]   
[phone] (Work)   
[phone] (Fax)                      [facility]  
   
                                                    Start:   
2024  
End: 2024                         Patient encounter   
procedure                               2024 2:30 PM EDT   
Procedure Visit [facility]   
PODIATRY [address]   
[address] [place]   
[id number] [phone]   
Danya An DPM   
[address]   
[place] [address]   
[phone] (Work)   
[phone] (Fax)                      [facility]  
   
                                                    Start:   
2024                              Hemoglobin A1c   
measurement               Diabetes: Hemoglobin A1C  [facility]  
   
                                                    Start:   
02-  
End: 02-           ambulatory                02/15/2024 1:00 PM EST   
Evaluation [facility]   
[address]   
[place] [id number] [phone] Jessica Lopez, PT [address]   
[address] [place]   
[id number] [phone]   
(Work) [phone]   
(Fax)                                   [facility]  
   
                                                    Start:   
2024  
End: 2024                         Patient encounter   
procedure                               2024 3:30 PM EST   
Office Visit [facility]   
[address]   
[place] [address] [phone]                            [facility]  
   
                                                    Start:   
2023                              COVID-19 Vaccine ( season)                         COVID-19 Vaccine ( season)                         [facility]  
   
                                                    Start:   
2023                              FUV, Provider:   
Compa Maldonado, Status:   
Pen, Time: 11:00 AM                     FUV, Provider:   
Compa Maldonado, Status:   
Pen, Time: 11:00 AM                     [facility]   
[facility] [address] DO  
Work Phone:   
[phone]  
   
                                                    Start:   
2023                              МАРИЯ, Provider: [place]   
[facility]   
,[id number], Status:   
Pen, Time: 2:00 PM                      MUGA, Provider: [place]   
[facility]   
[id number], Status:   
Pen, Time: 2:00 PM                      [facility]  
Work Phone:   
[phone]  
   
                                                    Start:   
2023                              FUV, Provider:   
Compa Maldonado, Status:   
Pen, Time: 10:00 AM                     FUV, Provider:   
Compa Maldonado, Status:   
Pen, Time: 10:00 AM                     [facility]  
Work Phone:   
[phone]  
   
                                                    Start:   
2016                              RSV Pregnant patients   
and/or patients aged 60+   
years (1 - 1-dose 60+   
series)                                 RSV Pregnant patients   
and/or patients aged 60+   
years (1 - 1-dose 60+   
series)                                 [facility]  
   
                                                    Start:   
2006          Zoster Vaccines (1 of 2) Zoster Vaccines (1 of 2) [facility]  
   
                                                    Start:   
1978                              DTaP/Tdap/Td Vaccines (1   
- Tdap)                                 DTaP/Tdap/Td Vaccines (1   
- Tdap)                                 [facility]  
   
                                                    Start:   
1974                              Diabetes mellitus   
screening                 Diabetes Screening        [facility]  
   
                                                    Start:   
1974          Hepatitis C screening Hepatitis C Screening [facility]  
   
                                                    Start:   
1962                              Pneumococcal Vaccine:   
65+ Years (1 - PCV)                     Pneumococcal Vaccine:   
65+ Years (1 - PCV)                     [facility]  
   
                                                    Start:   
1962                              Pneumococcal Vaccine:   
65+ Years (1 of 2 - PCV)                Pneumococcal Vaccine:   
65+ Years (1 of 2 - PCV)                [facility]  
   
                                                    Start:   
1956                              Hemoglobin A1c   
measurement               Diabetes: Hemoglobin A1C  [facility]  
   
                                                    Start:   
1956          Lipid panel         Lipid Panel         [facility]  
   
                                                    Start:   
1956                              Medicare Annual Wellness   
Visit                                   Medicare Annual Wellness   
Visit (AWV)                             [facility]  
   
                                                    Start:   
1956                              Screening for malignant   
neoplasm of colon                                   [facility]  
  
  
  
Immunizations  
  
  
                      Immunization Date Immunization Notes      Care Provider Jenny llanes  
   
                                        2023          Influenza, Seasonal,  
   
Quadrivalent,   
Adjuvanted                                          Danya An DPM  
Work Phone:   
[phone]                          [facility]  
   
                                        10-          influenza, high dose  
   
seasonal,   
preservative-free                                   Jacoby Wolfff  
Other Phone:   
[phone]                           [facility]   
Professional   
Corporation  
Other Phone:   
[phone]  
   
                                        10-          Fluzone High-Dose   
Quadrivalent 0.7 ML   
Intramuscular   
Suspension Prefilled   
Syringe                                             Vera D   
[place]  
Work Phone:   
[phone]                          [facility]   
[place] [address] DO  
Work Phone:   
[phone]  
   
                                        10-          influenza virus   
vaccine, unspecified   
formulation                                         DO Vera   
[place]  
Work Phone:   
[phone]                          [facility]  
   
                                        10-          seasonal influenza,   
intradermal,   
preservative free                                   Sharlene An   
APRN-CNP  
Work Phone:   
[phone]                          [facility]  
Work Phone:   
[phone]  
   
                          2021   COVID-19 Pfizer              Annel Fitt  
Other Phone:   
[phone]                           [facility]  
   
                                        2021          influenza, high dose  
   
seasonal,   
preservative-free                                   Vera D   
[place]  
Work Phone:   
[phone]                          [facility]  
   
                                        2021          Pfizer-BioNTech   
COVID-19 Vacc 30   
MCG/0.3ML   
Intramuscular   
Suspension                                          Vera D   
[place]  
Work Phone:   
[phone]                          [facility]  
   
                                        2021          Pfizer-BioNTech   
COVID-19 Vacc 30   
MCG/0.3ML   
Intramuscular   
Suspension                                          Vera D   
[place]  
Work Phone:   
[phone]                          [facility]  
   
                                        2020          Influenza, injectabl  
e,   
Madin Winnett Canine   
Kidney, preservative   
free, quadrivalent                                  Vera D   
[place]  
Work Phone:   
[phone]                          [facility]  
Work Phone:   
[phone]  
   
                                        10-          Influenza, injectabl  
e,   
Madin Winnett Canine   
Kidney, preservative   
free, quadrivalent                                  Vera D   
[place]  
Work Phone:   
[phone]                          [facility]  
Work Phone:   
[phone]  
   
                                        2018          influenza, injectabl  
e,   
madin keara canine   
kidney, preservative   
free                                                Vera LEVI   
[place]  
Work Phone:   
[phone]                          [facility] [address] DO  
Work Phone:   
[phone]  
   
                                        2017          influenza, injectabl  
e,   
quadrivalent,   
preservative free                                   Danya An   
DPM  
Work Phone:   
[phone]                          [facility]  
   
                                        10-          seasonal influenza,   
intradermal,   
preservative free                                   Vera SIMS   
[place]  
Work Phone:   
[phone]                          [facility] [address] DO  
Work Phone:   
[phone]  
   
                                        10-          seasonal influenza,   
intradermal,   
preservative free                                   Danya An   
DPM  
Work Phone:   
[phone]                          [facility]  
  
  
  
Payers  
  
  
                          Date         Payer Category Payer        Policy ID  
   
                          2024   Self-pay                  [id number]  
[id number]  
[id number]  
   
                          2023   Private Health Insurance              [id number]  
[id number]  
[id number]  
   
                          2023   Private Health Insurance              [id number]  
[id number]  
   
                          2021   Medicare                  [id number]  
[id number]  
[id number]  
   
                          2021   Private Health Insurance              CLI  
[id number]   
[id number]  
   
                          2021   Medicare                  [id number]   
[id number]  
   
                          1955   Unknown                   [id number]   
[id number]  
[id number]  
   
                          1956   Unknown                   [id number]   
[id number]  
[id number]  
   
                          1956   Unknown                   [id number]   
[id number]  
[id number]  
   
                          1956   Unknown                   [id number]   
[id number]  
[id number]  
   
                          1956   Unknown                   [id number]   
[id number]  
[id number]  
   
                          1956   Unknown                   [id number]   
[id number]  
[id number]  
   
                          1956   Unknown                   [id number]   
[id number]  
[id number]  
   
                          1956   Unknown                   [id number]   
[id number]  
[id number]  
   
                          1956   Unknown                   [id number]   
[id number]  
[id number]  
   
                          1956   Unknown                   [id number]   
[id number]  
[id number]  
   
                          1956   Unknown                   [id number]   
[id number]  
[id number]  
   
                          1956   Unknown                   [id number]   
[id number]  
[id number]  
   
                          1956   Unknown                   [id number]   
[id number]  
[id number]  
   
                          1956   Unknown                   [id number]   
[id number]  
[id number]  
   
                          1956   Unknown                   [id number]   
[id number]  
[id number]  
   
                          1956   Unknown                   [id number]   
[id number]  
[id number]  
   
                          1956   Unknown                   [id number]   
[id number]  
[id number]  
   
                                                Self-pay        Self Pay [employer]  
e   
Program                                 [id number]   
[id number]  
[id number]  
   
                                       Unknown                   [id number]   
[id number]  
   
                                       Unknown                     
   
                                       Unknown                   [id number]   
[id number]  
[id number]  
   
                                       Unknown                   [id number]   
[id number]  
[id number]  
   
                                       Unknown                   [id number]   
[id number]  
[id number]  
  
  
  
Social History  
  
  
                          Date         Type         Detail       Facility  
   
                                                    Start: 2023  
End: 2024     Sex Assigned At Birth                     [facility]   
Professional   
Corporation  
Other Phone:   
[phone]  
   
                                                    Start: 2023  
End: 2024                         Daily caffeine   
consumption                             Daily caffeine   
consumption                             [facility]   
[facility] [address] DO  
Work Phone:   
[phone]  
   
                                        Comment on above:   coffee 1-2 pots a da  
y;   
   
                                                    Start: 2022  
End: 2024                         Tobacco smoking status   
NHIS                                    Never smoked tobacco   
(finding)                               [facility]  
   
                          Start: 1955 Sex Assigned At Birth Male         F  
[facility]  
   
                                       History of tobacco use [facility] smoker [facility]  
   
                                                    Start: 2023  
End: 2024                         Tobacco use and   
exposure                                Smokeless tobacco   
non-user                                [facility]  
   
                                                    Start: 2024  
End: 2024     Alcohol intake      Ex-drinker (finding) [facility]  
   
                                                            How often to you hav  
e   
a drink containing   
alcohol?                  Never                     [facility]  
   
                                                            How many standard   
drinks containing   
alcohol do you have on   
a typical day?            Patient does not drink    [facility]  
   
                          Start: 1955 Sex Assigned At Birth Not on file  N  
[facility]  
   
                                        Start: 2024   Alcoholic beverage   
intake                                  Lifetime non-drinker   
(finding)                               [facility]  
Work Phone:   
[phone]  
   
                                                    Start: 2024  
End: 2024                         Exposure to SARS-CoV-2   
(event)                   Not sure                  [facility]  
  
  
  
Clinical Notes 2021 to 2024  
  
  
                                Note Date & Type Note            Facility  
  
  
  
                                                    2024 Evaluation note   
   
                                           
   
                                        Authored            2024   
2:24pm  
   
                                                    Start weight: 414.4 lbs., he  
 is down 45.2 lbs. today   
with a weight of 369.2lbs. he is down 10.8 lbs.  
since last visit 2024.  
Starting Date: 04/15/2022.  
Ozempic start date 4/15/2022. Ozempic start weight   
414.4lbs. He is down 34.4 lbs. since starting  
Ozempic.  
1. Abnormal weight gain. He notes he is the heaviest   
in his family. His brother has some but much  
less significant weight issues. He notes he was able   
to get his weight down to 368 pounds but was  
unable to keep it down.  
2. Obesity-markedly improved since starting our   
program and with taking Ozempic 2 mg, but had  
significant weight regain of 20.9 pounds off the   
medication and not following up with the program  
since 2023. He is currently on Ozempic 1 mg and   
we will go back to Ozempic 2 mg through patient  
assistance. This is his second visit back since this   
restart. He had previously stopped Ozempic  
due to cost/donut hole. In the future he could   
reconsider going to the [facility] for  
bariatric surgery evaluation. He needs to get his BMI   
down to 45 before the surgeon will replace  
his knee. He is on Jardiance 10 mg which can also   
help with his diabetes and cardiomyopathy/CHF.  
His diabetes is well controlled with his last A1c of   
5.8 on 2024. He has mild CAD along with  
his diabetes and has not been on a statin so I had   
recommended Crestor, but he notes his cardio said   
it was not necessary so he did not start the   
medication. He is eating healthier overall. He should  
follow-up with our nutritionist. He feels that his   
appetite is controlled with Ozempic 2 mg. I have  
recommend again he consider bariatric surgery so he   
could have his knee replaced or find a surgeon  
who will do the surgery at an elevated BMI. In the   
past, he mentioned they found weakness in the  
bottom of the heart and evidently a problem with an   
artery that they could not get. His cardiologist  
however did not feel he needed the statin/Crestor   
that I previously prescribed. He gets very little  
activity due to bone-on-bone arthritis, severe low   
back pain on tramadol, difficulty getting out of  
a chair and also his cardiac function he does get NELSON   
with small amounts of activity. He sees Dr. Maldonado to treat his cardiomyopathy. He did like   
canned foods and he understands that he must cut  
back the salt/processed food. He denies adding salt   
to his foods. He has had a A1c that was 6.6  
indicative of diabetes. He notes he was never told he   
was diabetic before program. He is seeing Dr. Lomeli for his knees and notes he has to get his BMI   
from 59 down to 45 to be able to have his knees  
replaced. Before starting the program he did eat a   
lot of red meat, potatoes and drank a lot of milk  
2% or 4% often buying 3 or 4 gallons at a time. He   
should not skip meals. He did try Adipex for 1  
month in the past but notes he was not able to lose   
but a few pounds so his PCP stopped it. He notes  
his sister does the shopping as he can. He does some   
simple cooking.  
3. Cardiomyopathy/CHF/early CAD-he must continue to   
follow-up with cardiology for evaluation of  
cardiomyopathy/CHF despite medications/known   
cardiomyopathy/previous mild CAD. We obtained his  
previous cardiac catheterization from 2009. He   
notes his preoperative diagnoses were angina  
pectoris, dyspnea on exertion and abnormal Cardiolite   
perfusion stress test. His left anterior  
descending artery showed a 20 to 25% tubular   
stenosis. His circumflex had an ostial stenosis of 30  
to 50% in several views but in one view showed less   
than 30% stenosis and appeared to be mildly  
kinked, his right coronary artery was large dominant   
and normal. His left ventriculogram showed mild  
to moderately reduced systolic dysfunction with an EF   
in the 40 to 45% range globally. He is now  
being treated by Dr. Maldonado.  
4. Type 2 diabetes with A1c controlled at 5.8 on   
2024-continue Ozempic and Jardiance. We could  
consider Metformin but he already has some loose   
stools. We discussed that first-line treatment with   
lifestyle changes, decrease simple sweets and   
starches, will be some increased activity in the  
future and weight loss. He needs to consider   
bariatric surgery.  
5. Obstructive sleep apnea-compliant with his CPAP.   
Treat also with healthy lifestyle changes and  
weight loss. He needs to consider bariatric surgery.  
6. Bilateral OA of the knees-bone-on-bone making it   
very difficult for him to get around. He notes  
his orthopedic doctor wants him to drop from a BMI of   
59 down to 45 before he will do the surgery.  
We will start weight loss and Ozempic. He needs to   
consider bariatric surgery.  
7. Chronic low back pain-treat with healthy lifestyle   
change. He needs to maximize his pain  
management.  
8. Mixed hyperlipidemia-treat with decreasing the   
simple sweets, added sugars, refined starches, and  
bad/added fats, increasing activity and exercise and   
continued long-term weight loss. Monitor with  
healthy lifestyle change. I recommended that he start   
a statin since he is diabetic and had mild CAD  
on his cath but he notes his cardiologist told him it   
was not necessary to start.  
9. Metabolic syndrome-treat with long-term healthy   
lifestyle changes, behavioral changes, healthy  
nutritional changes, increased activity and exercise   
and achieve long-term weight loss.  
Follow up with me in 8 weeks.  
New labs needed: Up-to-date. Monitor A1c at least   
every 6 months/January with his PCP or in our  
office. He will continue other regular lab follow-up   
with his PCP.   
  
  
  
[facility]  
Work Phone: [phone]- Evaluation + Plan note* Assessment & Plan 
  Note - CARLITOS Severino - 2024 10:55 AM EDTAssociated Problem(s):
   BMI 50.0-59.9, adult (Multi)  
Formatting of this note might be different from the original.  
Reviewed the merits of healthy lifestyle choices on overall cardiovascular 
health.  
He had lost weight with Ozempic in the past, I believe there was some difficulty
 obtaining the prescription. Just recently resumed.  
Electronically signed by CARLITOS Severino at 2024 10:59 AM [facility]  
Work Phone: [phone]- Evaluation + Plan note* Assessment & Plan 
  Note - CARLITOS Severino - 2024 10:55 AM EDTAssociated Problem(s):
   Non-ischemic cardiomyopathy (Multi)  
Formatting of this note might be different from the original.  
NICM HF bordeline EF 42% 2023 MUGA (2009 cath minimal CAD EF 40-45%)  
FC II Stage C  
  
GDMT  
Coreg  
Jardiance  
Entresto  
Aldactone  
Electronically signed by CARLITOS Severino at 2024 10:55 AM [facility]  
Work Phone: [phone]- Miscellaneous Notes* Assessment & Plan Note
   - CARLITOS Severino - 2024 10:55 AM EDTAssociated Problem(s): BMI
   50.0-59.9, adult (Multi)  
Formatting of this note might be different from the original.  
Reviewed the merits of healthy lifestyle choices on overall cardiovascular 
health.  
He had lost weight with Ozempic in the past, I believe there was some difficulty
 obtaining the prescription. Just recently resumed.  
Electronically signed by CARLITOS Severino at 2024 10:59 AM EDT  
  
* Assessment & Plan Note - CARLITOS Severino - 2024 10:55 AM EDT
  Associated Problem(s): Non-ischemic cardiomyopathy (Multi)  
Formatting of this note might be different from the original.  
NICM HF bordeline EF 42% 2023 MUGA (2009 cath minimal CAD EF 40-45%)  
FC II Stage C  
  
GDMT  
Coreg  
Jardiance  
Entresto  
Aldactone  
Electronically signed by CARLITOS Severino at 2024 10:55 AM EDT  
  
documented in this [facility]  
Work Phone: [phone]- History of Present illness Narrative* CARLITOS Severino - 2024 11:00 AM EDTFormatting of this note is 
  different from the original.  
Chief Complaint  
 Doing good   
  
Reason for Visit  
9-month follow-up.  
Patient presents to the office today for outpatient follow-up for nonischemic 
cardiomyopathy.  
Last evaluated in clinic by Dr. Maldonado 2023.  
  
Presents today ambulatory with cane and steady gait.  
Accompanied by patient  
Patient denies any hospitalizations or significant changes to interval medical 
history since last office follow-up.  
He follows routinely with PCP, is due to get annual lab work later this year.  
Also follows routinely with the wound clinic.  
  
History of Present Illness  
Patient is a very pleasant 69-year-old gentleman who presents to the office 
today with no voiced cardiovascular complaints. He continues to follow at the 
wound clinic due to right lower extremity ulcers, has noted some slight 
increasing lower extremity edema. From an activity standpoint he is able to go 
to [employer], [place] with no change in his functional class II 
shortness of breath. He denies any exertional chest pain. No orthopnea or PND. 
Remains compliant with CPAP treatment.  
  
Only concern today is cost of medications. Apparently has now in the donut hole.
 He is on Xarelto due to a prior right lower extremity DVT.  
  
He has some slight increase in his lower extremity edema. Due to office visits 
he has not been taking his Lasix twice daily. Is unable to tolerate compression 
stockings.  
  
Lower extremity wounds, managed by wound clinic. Denies any prior history of 
PAD.  
  
Patient reports that overall has no complaint(s) of chest pain, chest 
pressure/discomfort, claudication, dyspnea, exertional chest 
pressure/discomfort, fatigue, and irregular heart beat  
  
Review of Systems  
Cardiovascular: Positive for leg swelling. Negative for chest pain, dyspnea on 
exertion, irregular heartbeat, near-syncope, orthopnea, palpitations, paroxysmal
 nocturnal dyspnea and syncope.  
  
  
Visit Vitals  
/70 (BP Location: Left arm, Patient Position: Sitting)  
Pulse 78  
Ht 1.803 m (5' 11 )  
Wt (!) 171 kg (378 lb)  
BMI 52.72 kg/m  
Smoking Status Never  
BSA 2.93 m  
  
Physical Exam  
Vitals and nursing note reviewed.  
Constitutional:  
Appearance: Normal appearance.  
Cardiovascular:  
Rate and Rhythm: Normal rate and regular rhythm.  
Heart sounds: Normal heart sounds.  
Pulmonary:  
Effort: Pulmonary effort is normal.  
Breath sounds: Normal breath sounds.  
Musculoskeletal:  
Cervical back: Full passive range of motion without pain.  
Right lower le+ Pitting Edema present.  
Left lower le+ Pitting Edema present.  
Skin:  
General: Skin is cool.  
Neurological:  
Mental Status: He is alert and oriented to person, place, and time.  
Psychiatric:  
Attention and Perception: Attention normal.  
Mood and Affect: Mood normal.  
Behavior: Behavior is cooperative.  
  
  
Allergies  
Allergen Reactions  
Cephalosporins Rash  
Diclofenac Sodium Rash  
Statins-Hmg-Coa Reductase Inhibitors Rash  
  
Current Outpatient Medications  
Medication Instructions  
carvedilol (Coreg) 6.25 mg tablet 1 tablet, oral, 2 times daily with meals  
empagliflozin (Jardiance) 10 mg 1 tablet, oral, Daily  
furosemide (LASIX) 40 mg, oral, 2 times daily  
Ozempic 1 mg, subcutaneous, Weekly  
rivaroxaban (Xarelto) 10 mg tablet 1 tablet, oral, Daily  
sacubitriL-valsartan (Entresto) 24-26 mg tablet 1 tablet, oral, 2 times daily  
spironolactone (Aldactone) 25 mg tablet 1 tablet, oral, Daily  
  
Assessment:  
  
Pleasant 69-year-old gentleman presents for routine follow-up. Nonischemic 
cardiomyopathy LVEF 42% baseline functional class II stage C on highest 
tolerated dose of guideline directed medical treatment. Will provide with 
patient assistance forms due to cost constraints: Discussed that if needed could
 transition back over to Cozaar, price check Farxiga.  
  
Overall patient is pleased with current state of cardiovascular health. At this 
time there are no indications for additional cardiovascular testing or need for 
medication changes.  
  
Non-ischemic cardiomyopathy (Multi)  
NICM HF bordeline EF 42% 2023 MUGA (2009 cath minimal CAD EF 40-45%)  
FC II Stage C  
  
GDMT  
Coreg  
Jardiance  
Entresto  
Aldactone  
  
BMI 50.0-59.9, adult (Multi)  
Reviewed the merits of healthy lifestyle choices on overall cardiovascular 
health.  
He had lost weight with Ozempic in the past, I believe there was some difficulty
 obtaining the prescription. Just recently resumed.  
  
Plan:  
  
Through informed decision making process incorporating patients unique 
circumstances, the followingtreatment plan will be initiated:  
  
1. Prescription drug management of cardiovascular medication for efficacy, 
adherence to treatment, side effect assessment and polypharmacy. Current 
treatment clinically warranted and to continue without modifications.  
  
2. Please complete patient assistance forms and return to office  
  
3. Return for follow-up; in the interim, contact the office if new symptoms 
arise.  
Dr. Maldonado 9 months  
  
Sharlene An MSN, APRDRAKE-CNP, PMP-[facility]  
  
Please excuse any errors in grammar or translation related to this dictation. 
Voice recognition software was utilized to prepare this document.  
  
Electronically signed by CARLITOS Severino at 2024 11:02 AM EDT  
  
documented in this [facility]  
Work Phone: [phone]- Instructions* Patient Instructions*   
  
CARLITOS Severino - 2024 11:00 AM EDT  
  
Formatting of this note might be different from the original.  
Please bring all medicines, vitamins, and herbal supplements with you when you 
come to the office.  
  
Prescriptions will not be filled unless you are compliant with your follow up 
appointments or have a follow up appointment scheduled as per instruction of 
your physician. Refills should be requested at the time of your visit.  
  
PLAN:  
Through informed decision making process incorporating patients unique 
circumstances, the followingtreatment plan will be initiated:  
  
1. Prescription drug management of cardiovascular medication for efficacy, 
adherence to treatment, side effect assessment and polypharmacy. Current 
treatment clinically warranted and to continue without modifications.  
  
2. Please complete patient assistance forms and return to office  
  
3. Return for follow-up; in the interim, contact the office if new symptoms 
arise.  
Dr. Maldonado 9 months  
Electronically signed by CARLITOS Severino at 2024 11:38 AM EDT  
  
  
  
documented in this [facility]  
Work Phone: [phone]- Evaluation note*   
  
                                        Author              Jacoby Braden  
[facility]  
   
                                        Authored            2024 2:5  
6pm  
   
                                                    Start weight: 414.4 lbs., he  
 is down 32.0 lbs. today with a weight of 382.4lbs.   
and   
20.9 lbs. since  
his last visit on 2023.  
Starting Date: 04/15/2022.  
Ozempic start date 4/15/2022. Ozempic start weight 414.4lbs. He is down 32.0 
lbs.   
since starting  
Ozempic.  
1. Abnormal weight gain. He notes he is the heaviest in his family. His brother 
has   
some but much  
less significant weight issues. He notes he was able to get his weight down to 
368   
pounds but was  
unable to keep it down.  
2. Obesity-markedly improved since starting our program and with taking Ozempic 
2 mg,   
but had  
significant weight regain of 20.9 pounds off the medication. Due to cost/donut 
hole   
he stopped  
Ozempic. Hopefully he can restart full dose in the future using patient 
assistance.   
He is also on  
Jardiance 10 mg which can also help with his diabetes and cardiomyopathy/CHF. 
His   
diabetes is well  
controlled with his last A1c of 5.6 despite having issues with obtaining with 
his   
diabetic  
medications. He had mild CAD along with his diabetes and has not been on a 
statin so   
I had  
recommended Crestor, but he notes his cardio said it was not necessary so he did
 not   
start the  
medication. He is eating healthier overall. He should follow-up with our   
nutritionist. He feels that   
his appetite is controlled with Ozempic 2 mg. I have recommend again he consider
   
bariatric surgery  
so he could have his knee replaced or find a surgeon who will do the surgery at 
an   
elevated BMI. In  
the past, he mentioned they found weakness in the bottom of the heart and 
evidently a   
problem with  
an artery that they could not get. His cardiologist however did not feel he 
needed   
the  
statin/Crestor that I previously prescribed. He gets very little activity due to
   
bone-on-bone  
arthritis, severe low back pain on tramadol, difficulty getting out of a chair 
and   
also his cardiac  
function he does get NELSON with small amounts of activity. He sees Dr. Maldonado to 
treat   
his  
cardiomyopathy. He did like canned foods and he understands that he must cut 
back the   
salt/processed  
food. He denies adding salt to his foods. He has had a A1c that was 6.6 
indicative of   
diabetes. He  
notes he was never told he was diabetic before program. He is seeing Dr. Lomeli 
for   
his knees and  
notes he has to get his BMI from 59 down to 45 to be able to have his knees 
replaced.   
Before  
starting the program he did eat a lot of red meat, potatoes and drank a lot of 
milk   
2% or 4% often  
buying 3 or 4 gallons at a time. He should not skip meals. He did try Adipex for
 1   
month in the past  
but notes he was not able to lose but a few pounds so his PCP stopped it. He 
notes   
his sister does  
the shopping as he can. He does some simple cooking.  
3. Cardiomyopathy/CHF/early CAD-he must continue to follow-up with cardiology 
for   
evaluation of  
cardiomyopathy/CHF despite medications/known cardiomyopathy/previous mild CAD. 
We   
obtained his  
previous cardiac catheterization from 2009. He notes his preoperative 
diagnoses   
were angina  
pectoris, dyspnea on exertion and abnormal Cardiolite perfusion stress test. His
 left   
anterior  
descending artery showed a 20 to 25% tubular stenosis. His circumflex had an 
ostial   
stenosis of 30  
to 50% in several views but in one view showed less than 30% stenosis and 
appeared to   
be mildly  
kinked, his right coronary artery was large dominant and normal. His left   
ventriculogram showed mild  
to moderately reduced systolic dysfunction with an EF in the 40 to 45% range   
globally. He is now  
being treated by Dr. Maldonado.  
4. Type 2 diabetes with A1c controlled/improved at 5.6-will restart Ozempic and   
continue Jardiance.  
We could consider Metformin but he already has some loose stools. We discussed 
that   
first-line  
treatment with lifestyle changes, decrease simple sweets and starches, will be 
some   
increased  
activity in the future and weight loss. He needs to consider bariatric surgery.  
5. Obstructive sleep apnea-he is compliant with his CPAP. Treat also with 
healthy   
lifestyle changes  
and weight loss. He needs to consider bariatric surgery.  
6. Bilateral OA of the knees-bone-on-bone making it very difficult for him to 
get   
around. He notes  
his orthopedic doctor wants him to drop from a BMI of 59 down to 45 before he 
will do   
the surgery.  
We will start weight loss and Ozempic. He needs to consider bariatric surgery.  
7. Chronic low back pain-treat with healthy lifestyle change. He needs to 
maximize   
his pain  
management.  
8. Mixed hyperlipidemia-treat with decreasing the simple sweets, added sugars,   
refined starches, and  
bad/added fats, increasing activity and exercise and continued long-term weight 
loss.   
Monitor with  
healthy lifestyle change. I recommended that he start a statin since he is 
diabetic   
and had mild CAD  
on his cath but he notes his cardiologist told him it was not necessary to 
start.  
9. Metabolic syndrome-treat with long-term healthy lifestyle changes, behavioral
   
changes, healthy  
nutritional changes, increased activity and exercise and achieve long-term 
weight   
loss.  
Follow up with me in 6 weeks.  
New labs needed: Up-to-date. Monitor A1c at least every 6 months with his PCP or
 in   
our office. He  
will continue other regular lab follow-up with his PCP.   
  
  
  
  
                                        Author              Yvette Grijalva  
[facility]  
   
                                        Authored            2024 3:39p  
m  
   
                                                    Start weight: 414.4 lbs., he  
 is down 34.4 lbs. today with a weight of 380.0lbs.   
he is   
down 2.4 lbs.  
since last visit 2024.. since his last visit on 2023.  
Starting Date: 04/15/2022.  
Ozempic start date 4/15/2022. Ozempic start weight 414.4lbs. He is down 34.4 
lbs.   
since starting  
Ozempic.  
1. Abnormal weight gain. He notes he is the heaviest in his family. His brother 
has   
some but much  
less significant weight issues. He notes he was able to get his weight down to 
368   
pounds but was  
unable to keep it down.  
2. Obesity-markedly improved since starting our program and with taking Ozempic 
2 mg,   
but had  
significant weight regain of 20.9 pounds off the medication. Due to cost/donut 
hole   
he stopped  
Ozempic. Hopefully he can restart full dose in the future using patient 
assistance.   
He is also on  
Jardiance 10 mg which can also help with his diabetes and cardiomyopathy/CHF. 
His   
diabetes is well  
controlled with his last A1c of 5.6 despite having issues with obtaining with 
his   
diabetic  
medications. He had mild CAD along with his diabetes and has not been on a 
statin so   
I had  
recommended Crestor, but he notes his cardio said it was not necessary so he did
 not   
start the  
medication. He is eating healthier overall. He should follow-up with our   
nutritionist. He feels that  
his appetite is controlled with Ozempic 2 mg. I have recommend again he consider
   
bariatric surgery  
so he could have his knee replaced or find a surgeon who will do the surgery at 
an   
elevated BMI. In  
the past, he mentioned they found weakness in the bottom of the heart and 
evidently a   
problem with  
an artery that they could not get. His cardiologist however did not feel he 
needed   
the  
statin/Crestor that I previously prescribed. He gets very little activity due to
   
bone-on-bone  
arthritis, severe low back pain on tramadol, difficulty getting out of a chair 
and   
also his cardiac  
function he does get NELSON with small amounts of activity. He sees Dr. Maldonado to 
treat   
his  
cardiomyopathy. He did like canned foods and he understands that he must cut 
back the   
salt/processed  
food. He denies adding salt to his foods. He has had a A1c that was 6.6 
indicative of   
diabetes. He  
notes he was never told he was diabetic before program. He is seeing Dr. Lomeli 
for   
his knees and  
notes he has to get his BMI from 59 down to 45 to be able to have his knees 
replaced.   
Before  
starting the program he did eat a lot of red meat, potatoes and drank a lot of 
milk   
2% or 4% often  
buying 3 or 4 gallons at a time. He should not skip meals. He did try Adipex for
 1   
month in the past  
but notes he was not able to lose but a few pounds so his PCP stopped it. He 
notes   
his sister does  
the shopping as he can. He does some simple cooking.  
3. Cardiomyopathy/CHF/early CAD-he must continue to follow-up with cardiology 
for   
evaluation of  
cardiomyopathy/CHF despite medications/known cardiomyopathy/previous mild CAD. 
We   
obtained his  
previous cardiac catheterization from 2009. He notes his preoperative 
diagnoses   
were angina  
pectoris, dyspnea on exertion and abnormal Cardiolite perfusion stress test. His
 left   
anterior  
descending artery showed a 20 to 25% tubular stenosis. His circumflex had an 
ostial   
stenosis of 30  
to 50% in several views but in one view showed less than 30% stenosis and 
appeared to   
be mildly  
kinked, his right coronary artery was large dominant and normal. His left   
ventriculogram showed mild  
to moderately reduced systolic dysfunction with an EF in the 40 to 45% range   
globally. He is now  
being treated by Dr. Maldonado.  
4. Type 2 diabetes with A1c controlled/improved at 5.6-will restart Ozempic and   
continue Jardiance.  
We could consider Metformin but he already has some loose stools. We discussed 
that   
first-line  
treatment with lifestyle changes, decrease simple sweets and starches, will be 
some   
increased  
activity in the future and weight loss. He needs to consider bariatric surgery.  
5. Obstructive sleep apnea-he is compliant with his CPAP. Treat also with 
healthy   
lifestyle changes  
and weight loss. He needs to consider bariatric surgery.  
6. Bilateral OA of the knees-bone-on-bone making it very difficult for him to 
get   
around. He notes  
his orthopedic doctor wants him to drop from a BMI of 59 down to 45 before he 
will do   
the surgery.  
We will start weight loss and Ozempic. He needs to consider bariatric surgery.  
7. Chronic low back pain-treat with healthy lifestyle change. He needs to 
maximize   
his pain  
management.  
8. Mixed hyperlipidemia-treat with decreasing the simple sweets, added sugars,   
refined starches, and  
bad/added fats, increasing activity and exercise and continued long-term weight 
loss.   
Monitor with  
healthy lifestyle change. I recommended that he start a statin since he is 
diabetic   
and had mild CAD  
on his cath but he notes his cardiologist told him it was not necessary to 
start.  
9. Metabolic syndrome-treat with long-term healthy lifestyle changes, behavioral
   
changes, healthy  
nutritional changes, increased activity and exercise and achieve long-term 
weight   
loss.  
Follow up with me in 6 weeks.  
New labs needed: Up-to-date. Monitor A1c at least every 6 months with his PCP or
 in   
our office. He  
will continue other regular lab follow-up with his PCP.   
  
  
  
[facility]  
Work Phone: [phone]- Evaluation note*   
  
                                        Author              Jacoby Braden  
[facility]  
   
                                        Authored            2024 2:5  
6pm  
   
                                                    Start weight: 414.4 lbs., he  
 is down 32.0 lbs. today with a weight of 382.4lbs.   
and   
20.9 lbs. since  
his last visit on 2023.  
Starting Date: 04/15/2022.  
Ozempic start date 4/15/2022. Ozempic start weight 414.4lbs. He is down 32.0 
lbs.   
since starting  
Ozempic.  
1. Abnormal weight gain. He notes he is the heaviest in his family. His brother 
has   
some but much  
less significant weight issues. He notes he was able to get his weight down to 
368   
pounds but was  
unable to keep it down.  
2. Obesity-markedly improved since starting our program and with taking Ozempic 
2 mg,   
but had  
significant weight regain of 20.9 pounds off the medication. Due to cost/donut 
hole   
he stopped  
Ozempic. Hopefully he can restart full dose in the future using patient 
assistance.   
He is also on  
Jardiance 10 mg which can also help with his diabetes and cardiomyopathy/CHF. 
His   
diabetes is well  
controlled with his last A1c of 5.6 despite having issues with obtaining with 
his   
diabetic  
medications. He had mild CAD along with his diabetes and has not been on a 
statin so   
I had  
recommended Crestor, but he notes his cardio said it was not necessary so he did
 not   
start the  
medication. He is eating healthier overall. He should follow-up with our   
nutritionist. He feels that   
his appetite is controlled with Ozempic 2 mg. I have recommend again he consider
   
bariatric surgery  
so he could have his knee replaced or find a surgeon who will do the surgery at 
an   
elevated BMI. In  
the past, he mentioned they found weakness in the bottom of the heart and 
evidently a   
problem with  
an artery that they could not get. His cardiologist however did not feel he 
needed   
the  
statin/Crestor that I previously prescribed. He gets very little activity due to
   
bone-on-bone  
arthritis, severe low back pain on tramadol, difficulty getting out of a chair 
and   
also his cardiac  
function he does get NELSON with small amounts of activity. He sees Dr. Maldonado to 
treat   
his  
cardiomyopathy. He did like canned foods and he understands that he must cut 
back the   
salt/processed  
food. He denies adding salt to his foods. He has had a A1c that was 6.6 
indicative of   
diabetes. He  
notes he was never told he was diabetic before program. He is seeing Dr. Lomeli 
for   
his knees and  
notes he has to get his BMI from 59 down to 45 to be able to have his knees 
replaced.   
Before  
starting the program he did eat a lot of red meat, potatoes and drank a lot of 
milk   
2% or 4% often  
buying 3 or 4 gallons at a time. He should not skip meals. He did try Adipex for
 1   
month in the past  
but notes he was not able to lose but a few pounds so his PCP stopped it. He 
notes   
his sister does  
the shopping as he can. He does some simple cooking.  
3. Cardiomyopathy/CHF/early CAD-he must continue to follow-up with cardiology 
for   
evaluation of  
cardiomyopathy/CHF despite medications/known cardiomyopathy/previous mild CAD. 
We   
obtained his  
previous cardiac catheterization from 2009. He notes his preoperative 
diagnoses   
were angina  
pectoris, dyspnea on exertion and abnormal Cardiolite perfusion stress test. His
 left   
anterior  
descending artery showed a 20 to 25% tubular stenosis. His circumflex had an 
ostial   
stenosis of 30  
to 50% in several views but in one view showed less than 30% stenosis and 
appeared to   
be mildly  
kinked, his right coronary artery was large dominant and normal. His left   
ventriculogram showed mild  
to moderately reduced systolic dysfunction with an EF in the 40 to 45% range   
globally. He is now  
being treated by Dr. Maldonado.  
4. Type 2 diabetes with A1c controlled/improved at 5.6-will restart Ozempic and   
continue Jardiance.  
We could consider Metformin but he already has some loose stools. We discussed 
that   
first-line  
treatment with lifestyle changes, decrease simple sweets and starches, will be 
some   
increased  
activity in the future and weight loss. He needs to consider bariatric surgery.  
5. Obstructive sleep apnea-he is compliant with his CPAP. Treat also with 
healthy   
lifestyle changes  
and weight loss. He needs to consider bariatric surgery.  
6. Bilateral OA of the knees-bone-on-bone making it very difficult for him to 
get   
around. He notes  
his orthopedic doctor wants him to drop from a BMI of 59 down to 45 before he 
will do   
the surgery.  
We will start weight loss and Ozempic. He needs to consider bariatric surgery.  
7. Chronic low back pain-treat with healthy lifestyle change. He needs to 
maximize   
his pain  
management.  
8. Mixed hyperlipidemia-treat with decreasing the simple sweets, added sugars,   
refined starches, and  
bad/added fats, increasing activity and exercise and continued long-term weight 
loss.   
Monitor with  
healthy lifestyle change. I recommended that he start a statin since he is 
diabetic   
and had mild CAD  
on his cath but he notes his cardiologist told him it was not necessary to 
start.  
9. Metabolic syndrome-treat with long-term healthy lifestyle changes, behavioral
   
changes, healthy  
nutritional changes, increased activity and exercise and achieve long-term 
weight   
loss.  
Follow up with me in 6 weeks.  
New labs needed: Up-to-date. Monitor A1c at least every 6 months with his PCP or
 in   
our office. He  
will continue other regular lab follow-up with his PCP.   
  
  
  
  
                                        Author              Jacoby Braden  
[facility]  
   
                                        Authored            2024 4:24p  
m  
   
                                                    Start weight: 414.4 lbs., he  
 is down 34.4 lbs. today with a weight of 380.0lbs.   
he is   
down 2.4 lbs.  
since last visit 2024.. since his last visit on 2023.  
Starting Date: 04/15/2022.  
Ozempic start date 4/15/2022. Ozempic start weight 414.4lbs. He is down 34.4 
lbs.   
since starting  
Ozempic.  
1. Abnormal weight gain. He notes he is the heaviest in his family. His brother 
has   
some but much  
less significant weight issues. He notes he was able to get his weight down to 
368   
pounds but was  
unable to keep it down.  
2. Obesity-markedly improved since starting our program and with taking Ozempic 
2 mg,   
but had  
significant weight regain of 20.9 pounds off the medication and not following up
 with   
the program  
since 2023. This is his first visit back since this restart. He had 
previously   
stopped Ozempic  
due to cost/donut hole. He has filled 1 prescription of Ozempic but he thinks it
 was   
$304.  
Hopefully he can continue Ozempic using patient assistance. We are referring him
 to   
[facility] for bariatric surgery evaluation. He needs to get his BMI down to 45 
before   
the surgeon will  
replace his knee. He is on Jardiance 10 mg which can also help with his diabetes
 and  
cardiomyopathy/CHF. His diabetes is well controlled with his last A1c of 5.5 on   
2024 despite  
having issues with obtaining with his diabetic medications. He had mild CAD 
along   
with his diabetes  
and has not been on a statin so I had recommended Crestor, but he notes his 
cardio   
said it was not  
necessary so he did not start the medication. He is eating healthier overall. He
   
should follow-up  
with our nutritionist. He feels that his appetite is controlled with Ozempic 2 
mg. I   
have recommend  
again he consider bariatric surgery so he could have his knee replaced or find a
   
surgeon who will do  
the surgery at an elevated BMI. In the past, he mentioned they found weakness in
 the   
bottom of the  
heart and evidently a problem with an artery that they could not get. His   
cardiologist however did  
not feel he needed the statin/Crestor that I previously prescribed. He gets very
   
little activity due   
to bone-on-bone arthritis, severe low back pain on tramadol, difficulty getting 
out   
of a chair and  
also his cardiac function he does get NELSON with small amounts of activity. He 
sees Dr. Maldonado to  
treat his cardiomyopathy. He did like canned foods and he understands that he 
must   
cut back the  
salt/processed food. He denies adding salt to his foods. He has had a A1c that 
was   
6.6 indicative of  
diabetes. He notes he was never told he was diabetic before program. He is 
seeing Dr. Lomeli for his  
knees and notes he has to get his BMI from 59 down to 45 to be able to have his 
knees   
replaced.  
Before starting the program he did eat a lot of red meat, potatoes and drank a 
lot of   
milk 2% or 4%  
often buying 3 or 4 gallons at a time. He should not skip meals. He did try 
Adipex   
for 1 month in  
the past but notes he was not able to lose but a few pounds so his PCP stopped 
it. He   
notes his  
sister does the shopping as he can. He does some simple cooking.  
3. Cardiomyopathy/CHF/early CAD-he must continue to follow-up with cardiology 
for   
evaluation of  
cardiomyopathy/CHF despite medications/known cardiomyopathy/previous mild CAD. 
We   
obtained his  
previous cardiac catheterization from 2009. He notes his preoperative 
diagnoses   
were angina  
pectoris, dyspnea on exertion and abnormal Cardiolite perfusion stress test. His
 left   
anterior  
descending artery showed a 20 to 25% tubular stenosis. His circumflex had an 
ostial   
stenosis of 30  
to 50% in several views but in one view showed less than 30% stenosis and 
appeared to   
be mildly  
kinked, his right coronary artery was large dominant and normal. His left   
ventriculogram showed mild  
to moderately reduced systolic dysfunction with an EF in the 40 to 45% range   
globally. He is now  
being treated by Dr. Maldonado.  
4. Type 2 diabetes with A1c controlled/improved at 5.5 on 2024-continue to   
titrate up Ozempic as   
available and continue Jardiance. We could consider Metformin but he already has
 some   
loose stools.  
We discussed that first-line treatment with lifestyle changes, decrease simple 
sweets   
and starches,  
will be some increased activity in the future and weight loss. He needs to 
consider   
bariatric  
surgery.  
5. Obstructive sleep apnea-he is compliant with his CPAP. Treat also with 
healthy   
lifestyle changes  
and weight loss. He needs to consider bariatric surgery.  
6. Bilateral OA of the knees-bone-on-bone making it very difficult for him to 
get   
around. He notes  
his orthopedic doctor wants him to drop from a BMI of 59 down to 45 before he 
will do   
the surgery.  
We will start weight loss and Ozempic. He needs to consider bariatric surgery.  
7. Chronic low back pain-treat with healthy lifestyle change. He needs to 
maximize   
his pain  
management.  
8. Mixed hyperlipidemia-treat with decreasing the simple sweets, added sugars,   
refined starches, and   
bad/added fats, increasing activity and exercise and continued long-term weight 
loss.   
Monitor with  
healthy lifestyle change. I recommended that he start a statin since he is 
diabetic   
and had mild CAD  
on his cath but he notes his cardiologist told him it was not necessary to 
start.  
9. Metabolic syndrome-treat with long-term healthy lifestyle changes, behavioral
   
changes, healthy  
nutritional changes, increased activity and exercise and achieve long-term 
weight   
loss.  
Follow up with me in 6 weeks.  
New labs needed: Up-to-date. Monitor A1c at least every 6 months with his PCP or
 in   
our office. He  
will continue other regular lab follow-up with his PCP.   
  
  
  
[facility]  
Work Phone: [phone]- Evaluation note*   
  
                                        Author              Jacoby Braden  
[facility]  
   
                                        Authored            2024 2:5  
6pm  
   
                                                    Start weight: 414.4 lbs., he  
 is down 32.0 lbs. today with a weight of 382.4lbs.   
and   
20.9 lbs. since  
his last visit on 2023.  
Starting Date: 04/15/2022.  
Ozempic start date 4/15/2022. Ozempic start weight 414.4lbs. He is down 32.0 
lbs.   
since starting  
Ozempic.  
1. Abnormal weight gain. He notes he is the heaviest in his family. His brother 
has   
some but much  
less significant weight issues. He notes he was able to get his weight down to 
368   
pounds but was  
unable to keep it down.  
2. Obesity-markedly improved since starting our program and with taking Ozempic 
2 mg,   
but had  
significant weight regain of 20.9 pounds off the medication. Due to cost/donut 
hole   
he stopped  
Ozempic. Hopefully he can restart full dose in the future using patient 
assistance.   
He is also on  
Jardiance 10 mg which can also help with his diabetes and cardiomyopathy/CHF. 
His   
diabetes is well  
controlled with his last A1c of 5.6 despite having issues with obtaining with 
his   
diabetic  
medications. He had mild CAD along with his diabetes and has not been on a 
statin so   
I had  
recommended Crestor, but he notes his cardio said it was not necessary so he did
 not   
start the  
medication. He is eating healthier overall. He should follow-up with our   
nutritionist. He feels that  
his appetite is controlled with Ozempic 2 mg. I have recommend again he consider
   
bariatric surgery  
so he could have his knee replaced or find a surgeon who will do the surgery at 
an   
elevated BMI. In  
the past, he mentioned they found weakness in the bottom of the heart and 
evidently a   
problem with  
an artery that they could not get. His cardiologist however did not feel he 
needed   
the  
statin/Crestor that I previously prescribed. He gets very little activity due to
   
bone-on-bone  
arthritis, severe low back pain on tramadol, difficulty getting out of a chair 
and   
also his cardiac  
function he does get NELSON with small amounts of activity. He sees Dr. Maldonado to 
treat   
his  
cardiomyopathy. He did like canned foods and he understands that he must cut 
back the   
salt/processed  
food. He denies adding salt to his foods. He has had a A1c that was 6.6 
indicative of   
diabetes. He  
notes he was never told he was diabetic before program. He is seeing Dr. Lomeli 
for   
his knees and  
notes he has to get his BMI from 59 down to 45 to be able to have his knees 
replaced.   
Before  
starting the program he did eat a lot of red meat, potatoes and drank a lot of 
milk   
2% or 4% often  
buying 3 or 4 gallons at a time. He should not skip meals. He did try Adipex for
 1   
month in the past  
but notes he was not able to lose but a few pounds so his PCP stopped it. He 
notes   
his sister does  
the shopping as he can. He does some simple cooking.  
3. Cardiomyopathy/CHF/early CAD-he must continue to follow-up with cardiology 
for   
evaluation of  
cardiomyopathy/CHF despite medications/known cardiomyopathy/previous mild CAD. 
We   
obtained his  
previous cardiac catheterization from 2009. He notes his preoperative 
diagnoses   
were angina  
pectoris, dyspnea on exertion and abnormal Cardiolite perfusion stress test. His
 left   
anterior  
descending artery showed a 20 to 25% tubular stenosis. His circumflex had an 
ostial   
stenosis of 30  
to 50% in several views but in one view showed less than 30% stenosis and 
appeared to   
be mildly  
kinked, his right coronary artery was large dominant and normal. His left   
ventriculogram showed mild  
to moderately reduced systolic dysfunction with an EF in the 40 to 45% range   
globally. He is now  
being treated by Dr. Maldonado.  
4. Type 2 diabetes with A1c controlled/improved at 5.6-will restart Ozempic and   
continue Jardiance.  
We could consider Metformin but he already has some loose stools. We discussed 
that   
first-line  
treatment with lifestyle changes, decrease simple sweets and starches, will be 
some   
increased  
activity in the future and weight loss. He needs to consider bariatric surgery.  
5. Obstructive sleep apnea-he is compliant with his CPAP. Treat also with 
healthy   
lifestyle changes  
and weight loss. He needs to consider bariatric surgery.  
6. Bilateral OA of the knees-bone-on-bone making it very difficult for him to 
get   
around. He notes  
his orthopedic doctor wants him to drop from a BMI of 59 down to 45 before he 
will do   
the surgery.  
We will start weight loss and Ozempic. He needs to consider bariatric surgery.  
7. Chronic low back pain-treat with healthy lifestyle change. He needs to 
maximize   
his pain  
management.  
8. Mixed hyperlipidemia-treat with decreasing the simple sweets, added sugars,   
refined starches, and  
bad/added fats, increasing activity and exercise and continued long-term weight 
loss.   
Monitor with  
healthy lifestyle change. I recommended that he start a statin since he is 
diabetic   
and had mild CAD  
on his cath but he notes his cardiologist told him it was not necessary to 
start.  
9. Metabolic syndrome-treat with long-term healthy lifestyle changes, behavioral
   
changes, healthy  
nutritional changes, increased activity and exercise and achieve long-term 
weight   
loss.  
Follow up with me in 6 weeks.  
New labs needed: Up-to-date. Monitor A1c at least every 6 months with his PCP or
 in   
our office. He  
will continue other regular lab follow-up with his PCP.   
  
  
  
  
                                        Author              Jacoby Braden  
[facility]  
   
                                        Authored            2024 4:24p  
m  
   
                                                    Start weight: 414.4 lbs., he  
 is down 34.4 lbs. today with a weight of 380.0lbs.   
he is   
down 2.4 lbs.  
since last visit 2024.. since his last visit on 2023.  
Starting Date: 04/15/2022.  
Ozempic start date 4/15/2022. Ozempic start weight 414.4lbs. He is down 34.4 
lbs.   
since starting  
Ozempic.  
1. Abnormal weight gain. He notes he is the heaviest in his family. His brother 
has   
some but much  
less significant weight issues. He notes he was able to get his weight down to 
368   
pounds but was  
unable to keep it down.  
2. Obesity-markedly improved since starting our program and with taking Ozempic 
2 mg,   
but had  
significant weight regain of 20.9 pounds off the medication and not following up
 with   
the program  
since 2023. This is his first visit back since this restart. He had 
previously   
stopped Ozempic  
due to cost/donut hole. He has filled 1 prescription of Ozempic but he thinks it
 was   
$304.  
Hopefully he can continue Ozempic using patient assistance. We are referring him
 to   
[facility] for bariatric surgery evaluation. He needs to get his BMI down to 45 
before   
the surgeon will  
replace his knee. He is on Jardiance 10 mg which can also help with his diabetes
 and  
cardiomyopathy/CHF. His diabetes is well controlled with his last A1c of 5.5 on   
2024 despite  
having issues with obtaining with his diabetic medications. He had mild CAD 
along   
with his diabetes  
and has not been on a statin so I had recommended Crestor, but he notes his 
cardio   
said it was not  
necessary so he did not start the medication. He is eating healthier overall. He
   
should follow-up  
with our nutritionist. He feels that his appetite is controlled with Ozempic 2 
mg. I   
have recommend  
again he consider bariatric surgery so he could have his knee replaced or find a
   
surgeon who will do  
the surgery at an elevated BMI. In the past, he mentioned they found weakness in
 the   
bottom of the  
heart and evidently a problem with an artery that they could not get. His   
cardiologist however did  
not feel he needed the statin/Crestor that I previously prescribed. He gets very
   
little activity due  
to bone-on-bone arthritis, severe low back pain on tramadol, difficulty getting 
out   
of a chair and  
also his cardiac function he does get NELSON with small amounts of activity. He 
sees Dr. Mladonado to  
treat his cardiomyopathy. He did like canned foods and he understands that he 
must   
cut back the  
salt/processed food. He denies adding salt to his foods. He has had a A1c that 
was   
6.6 indicative of  
diabetes. He notes he was never told he was diabetic before program. He is 
seeing Dr. Lomeli for his   
knees and notes he has to get his BMI from 59 down to 45 to be able to have his 
knees   
replaced.  
Before starting the program he did eat a lot of red meat, potatoes and drank a 
lot of   
milk 2% or 4%  
often buying 3 or 4 gallons at a time. He should not skip meals. He did try 
Adipex   
for 1 month in  
the past but notes he was not able to lose but a few pounds so his PCP stopped 
it. He   
notes his  
sister does the shopping as he can. He does some simple cooking.  
3. Cardiomyopathy/CHF/early CAD-he must continue to follow-up with cardiology 
for   
evaluation of  
cardiomyopathy/CHF despite medications/known cardiomyopathy/previous mild CAD. 
We   
obtained his  
previous cardiac catheterization from 2009. He notes his preoperative 
diagnoses   
were angina  
pectoris, dyspnea on exertion and abnormal Cardiolite perfusion stress test. His
 left   
anterior  
descending artery showed a 20 to 25% tubular stenosis. His circumflex had an 
ostial   
stenosis of 30  
to 50% in several views but in one view showed less than 30% stenosis and 
appeared to   
be mildly  
kinked, his right coronary artery was large dominant and normal. His left   
ventriculogram showed mild  
to moderately reduced systolic dysfunction with an EF in the 40 to 45% range   
globally. He is now  
being treated by Dr. Maldonado.  
4. Type 2 diabetes with A1c controlled/improved at 5.5 on 2024-continue to   
titrate up Ozempic as  
available and continue Jardiance. We could consider Metformin but he already has
 some   
loose stools.  
We discussed that first-line treatment with lifestyle changes, decrease simple 
sweets   
and starches,  
will be some increased activity in the future and weight loss. He needs to 
consider   
bariatric  
surgery.  
5. Obstructive sleep apnea-he is compliant with his CPAP. Treat also with 
healthy   
lifestyle changes  
and weight loss. He needs to consider bariatric surgery.  
6. Bilateral OA of the knees-bone-on-bone making it very difficult for him to 
get   
around. He notes  
his orthopedic doctor wants him to drop from a BMI of 59 down to 45 before he 
will do   
the surgery.  
We will start weight loss and Ozempic. He needs to consider bariatric surgery.  
7. Chronic low back pain-treat with healthy lifestyle change. He needs to 
maximize   
his pain  
management.  
8. Mixed hyperlipidemia-treat with decreasing the simple sweets, added sugars,   
refined starches, and  
bad/added fats, increasing activity and exercise and continued long-term weight 
loss.   
Monitor with  
healthy lifestyle change. I recommended that he start a statin since he is 
diabetic   
and had mild CAD   
on his cath but he notes his cardiologist told him it was not necessary to 
start.  
9. Metabolic syndrome-treat with long-term healthy lifestyle changes, behavioral
   
changes, healthy  
nutritional changes, increased activity and exercise and achieve long-term 
weight   
loss.  
Follow up with me in 6 weeks.  
New labs needed: Up-to-date. Monitor A1c at least every 6 months with his PCP or
 in   
our office. He  
will continue other regular lab follow-up with his PCP.   
  
  
  
  
                                        Author              [facility]  
   
                                        Authored            2024 2:03  
pm  
   
                                                    Start weight: 414.4 lbs., he  
 is down 45.2 lbs. today with a weight of 369.2lbs.   
he is   
down 10.8 lbs.  
since last visit 2024.  
Starting Date: 04/15/2022.  
Ozempic start date 4/15/2022. Ozempic start weight 414.4lbs. He is down 34.4 
lbs.   
since starting  
Ozempic.  
1. Abnormal weight gain. He notes he is the heaviest in his family. His brother 
has   
some but much  
less significant weight issues. He notes he was able to get his weight down to 
368   
pounds but was  
unable to keep it down.  
2. Obesity-markedly improved since starting our program and with taking Ozempic 
2 mg,   
but had  
significant weight regain of 20.9 pounds off the medication and not following up
 with   
the program  
since 2023. This is his first visit back since this restart. He had 
previously   
stopped Ozempic  
due to cost/donut hole. He has filled 1 prescription of Ozempic but he thinks it
 was   
$304.  
Hopefully he can continue Ozempic using patient assistance. We are referring him
 to   
[facility] for bariatric surgery evaluation. He needs to get his BMI down to 45 
before   
the surgeon will  
replace his knee. He is on Jardiance 10 mg which can also help with his diabetes
 and  
cardiomyopathy/CHF. His diabetes is well controlled with his last A1c of 5.5 on   
2024 despite  
having issues with obtaining with his diabetic medications. He had mild CAD 
along   
with his diabetes  
and has not been on a statin so I had recommended Crestor, but he notes his 
cardio   
said it was not  
necessary so he did not start the medication. He is eating healthier overall. He
   
should follow-up  
with our nutritionist. He feels that his appetite is controlled with Ozempic 2 
mg. I   
have recommend  
again he consider bariatric surgery so he could have his knee replaced or find a
   
surgeon who will do  
the surgery at an elevated BMI. In the past, he mentioned they found weakness in
 the   
bottom of the  
heart and evidently a problem with an artery that they could not get. His   
cardiologist however did  
not feel he needed the statin/Crestor that I previously prescribed. He gets very
   
little activity due  
to bone-on-bone arthritis, severe low back pain on tramadol, difficulty getting 
out   
of a chair and  
also his cardiac function he does get NELSON with small amounts of activity. He 
sees Dr. Maldonado to  
treat his cardiomyopathy. He did like canned foods and he understands that he 
must   
cut back the  
salt/processed food. He denies adding salt to his foods. He has had a A1c that 
was   
6.6 indicative of  
diabetes. He notes he was never told he was diabetic before program. He is 
seeing Dr. Lomeli for his  
knees and notes he has to get his BMI from 59 down to 45 to be able to have his 
knees   
replaced.  
Before starting the program he did eat a lot of red meat, potatoes and drank a 
lot of   
milk 2% or 4%  
often buying 3 or 4 gallons at a time. He should not skip meals. He did try 
Adipex   
for 1 month in  
the past but notes he was not able to lose but a few pounds so his PCP stopped 
it. He   
notes his  
sister does the shopping as he can. He does some simple cooking.  
3. Cardiomyopathy/CHF/early CAD-he must continue to follow-up with cardiology 
for   
evaluation of  
cardiomyopathy/CHF despite medications/known cardiomyopathy/previous mild CAD. 
We   
obtained his  
previous cardiac catheterization from 2009. He notes his preoperative 
diagnoses   
were angina  
pectoris, dyspnea on exertion and abnormal Cardiolite perfusion stress test. His
 left   
anterior  
descending artery showed a 20 to 25% tubular stenosis. His circumflex had an 
ostial   
stenosis of 30  
to 50% in several views but in one view showed less than 30% stenosis and 
appeared to   
be mildly  
kinked, his right coronary artery was large dominant and normal. His left   
ventriculogram showed mild  
to moderately reduced systolic dysfunction with an EF in the 40 to 45% range   
globally. He is now  
being treated by Dr. Maldonado.  
4. Type 2 diabetes with A1c controlled/improved at 5.5 on 2024-continue to   
titrate up Ozempic as  
available and continue Jardiance. We could consider Metformin but he already has
 some   
loose stools.  
We discussed that first-line treatment with lifestyle changes, decrease simple 
sweets   
and starches,  
will be some increased activity in the future and weight loss. He needs to 
consider   
bariatric  
surgery.  
5. Obstructive sleep apnea-he is compliant with his CPAP. Treat also with 
healthy   
lifestyle changes  
and weight loss. He needs to consider bariatric surgery.  
6. Bilateral OA of the knees-bone-on-bone making it very difficult for him to 
get   
around. He notes  
his orthopedic doctor wants him to drop from a BMI of 59 down to 45 before he 
will do   
the surgery.  
We will start weight loss and Ozempic. He needs to consider bariatric surgery.  
7. Chronic low back pain-treat with healthy lifestyle change. He needs to 
maximize   
his pain  
management.  
8. Mixed hyperlipidemia-treat with decreasing the simple sweets, added sugars,   
refined starches, and  
bad/added fats, increasing activity and exercise and continued long-term weight 
loss.   
Monitor with  
healthy lifestyle change. I recommended that he start a statin since he is 
diabetic   
and had mild CAD  
on his cath but he notes his cardiologist told him it was not necessary to 
start.  
9. Metabolic syndrome-treat with long-term healthy lifestyle changes, behavioral
   
changes, healthy  
nutritional changes, increased activity and exercise and achieve long-term 
weight   
loss.  
Follow up with me in 6 weeks.  
New labs needed: Up-to-date. Monitor A1c at least every 6 months with his PCP or
 in   
our office. He  
will continue other regular lab follow-up with his PCP.   
  
  
  
[facility]  
Work Phone: [phone]- Progress note  
  
  
  
  
                                        Author              Elaine Espino  
[facility]  
2024 2:29pm  
   
                                        Note Date/Time      2024 2:2  
9pm  
   
                                                    [facility]  
ENTER  
[address] [place] [address]  
  
Wound Center Provider Note  
Signed  
  
Patient: Jocelin Pacheco MR#:   
[id number]  
: 1955 Acct:[id number]  
  
Age/Sex: 69 / M  
  
Copies to: DO Elaine Arboleda, APRDRAKE~  
  
  
HPI  
Date of Visit  
Date of Visit:  
Date of Service: 2024  
Time of Service: 14:28  
  
Narrative  
HPI:  
24 Jocelin is a 69-year-old male presenting to [facility]   
wound care program for an initial visit to the office for a right lower 
extremity   
venous stasis ulcer/lymphedema ulcer.? The patient has been a patient of the 
office   
for quite some time now and has seen other providers.  
He had seen vascular in [place] was told that there is nothing else that can be 
done   
for him.  
He has an extensive history of infections so I imagine this will continue to be 
an   
issue for him.  
He appears to need better edema control.  
He was told by ortho that he needs to lose 50 pounds in order to have surgery- 
this   
was 3 years ago and has not been accomplished- he says that he wasn't taking the
   
ozempic as ordered and he gained back the weight he had lost and hence no 
surgery.  
A past venous duplex indicates deep vein issues which are not surgically 
correctable-   
vascular did confirm that he does not need to see them because there is nothing 
they   
have to offer him.  
He is still retired so hopefully there is more opportunity for elevation and   
compression of the legs to support healing of the ulcer.  
Nutrition and probiotics were discussed and suggestions were given.  
Doxycycline was escribed today for what appears to be cellulitis of the RLE.  
Topical gentamicin and unna wrap was ordered and he will return here for 
dressings.  
3/4/24 better, topical flagyl today with unna wrap, will use a gauze wrap under 
the   
paste since he feels like the unna paste causes itching, no infection seen 
today,   
cellulitis appears resolved  
3/18/24 much improved, same topical orders, could be healed in a week or so, no   
infection noted  
  
Subjective  
Pain  
Right Lower Leg:  
Pain Description: Intermittent  
Pain Intensity: 0  
Pain Management Techniques Other/Comment:  not too bad today   
Wound/Ulcer History  
When did wound start?: 2024 Right lower leg  
Mode of Arrival/ : Personal vehicle  
Assistive Device Used Today: Cane  
Lives with:: Alone  
Appetite Description: Within Normal Limits  
Who helps w/ dressing change?: Wound Care Dept  
Smoking Status: Never smoker  
  
[facility]  
Medical History (Updated 24 @ 08:12 by Maria A Crowe RN)  
  
Ulcer of right leg  
Wound of right lower extremity  
Itching with irritation  
Itching  
PVD (peripheral vascular disease)  
 poor veins  Dr. Cameron did vein closure 2019, needs 5 more viens worked 
on.  
Carpal tunnel syndrome  
right arm surgery  
DIMITRI (obstructive sleep apnea)  
Cardiomyopathy  
 lower part of heart is weak   
Back pain  
DVT (deep venous thrombosis)  
 blood clot from right foot to right thigh  on  blood thinner   
Hypertension  
Arthritis  
Bilateral knees,  right is bone on bone  per  Dr. Richard   
  
Surgical History (Reviewed 24 @ 08:12 by Maria A Crowe RN)  
  
Hx of cholecystectomy  
  
Family History (Reviewed 24 @ 08:13 by Maria A Crowe RN)  
Father Diabetes mellitus, type 2  
Sister Diabetes mellitus, type 2  
Brother Heart disease  
Legacy FamHx Relation: Brother(s)  
Father Heart disease  
Diabetes  
Heart failure  
  
Family/Other   
Legacy FamHx Problem: 2 BROTHERS  :2 SISTERS ::NO CHILDREN  
Mother   
Heart disease  
Sister Heart disease  
  
Social History  
Smoking Status: Never smoker  
Substance Use Type: None  
  
Grafts  
History of Graft  
History of Graft?: No  
  
Exam  
Physical Exam  
Vital Signs:  
  
  
  
  
Temp Pulse Resp BP O2 Del Method  
  
97.7 F 87 24 132/74 Room Air  
  
24 14:18 24 14:18 24 14:18 24 14:18 24 14:18  
  
  
  
Const  
General: cooperative, comfortable and no acute distress  
Nutritional Appearance: obese  
Orientation: alert, awake and oriented x3  
  
Lower/Upper Extremity Exam  
Vascular Exam-Edema  
Right Lower Extremity:  
Edema Type: Lymphedema  
Vascular Exam-Pulses  
Right Dorsalis Pedis:  
Pulse Assessment Method: Doppler  
Right Posterior Tibial:  
Pulse Assessment Method: Doppler  
Right Brachial:  
Pulse Assessment Method: NIBP  
  
Objective  
Meds/Allergies  
Home Medications  
  
carvedilol 6.25 mg tablet 6.25 mg PO BID 17 [History Confirmed 24]  
furosemide 40 mg tablet (Lasix) 40 mg PO BID 17 [History Confirmed 
24]  
acetaminophen 500 mg tablet (Tylenol Extra Strength) 500 mg PO TID PRN Pain 
20   
[History Confirmed 24]  
empagliflozin 10 mg tablet (Jardiance) 10 mg PO DAILY 22 [History 
Confirmed   
24]  
rivaroxaban 10 mg tablet (Xarelto) 10 mg PO DAILY 22 [History Confirmed   
24]  
semaglutide 2 mg/dose (8 mg/3 mL) subcutaneous pen injector (Ozempic) 2 mg 
subcut Q7D   
22 [History Confirmed 24]  
spironolactone 25 mg tablet 25 mg PO DAILY 22 [History Confirmed 24]  
sacubitril 24 mg-valsartan 26 mg tablet (Entresto) 1 tab PO BID 23 
[History   
Confirmed 24]  
Lactobacillus acidophilus 10 billion cell capsule (Probiotic) 100 mmu cells PO 
DAILY   
24 [History Confirmed 24]  
doxycycline hyclate 100 mg capsule 100 mg PO BID 14 days #28 caps 24 [Rx   
Confirmed 24]  
  
  
Allergies  
  
cefixime Allergy (Unknown, Verified 24 08:10)  
Unknown Reaction  
diclofenac Allergy (Unknown, Verified 24 08:10)  
Unknown Reaction  
nystatin Allergy (Unknown, Verified 24 08:10)  
Unknown Reaction  
sodium benzoate Allergy (Verified 24 08:10)  
Unknown Reaction  
  
  
Wound/Ulcer  
Right Lower Leg:  
Type: Lymphedema  
Thickness: Skin Breakdown  
Bed Appearance: Dried Exudate, Pink and Yellow  
Percent of Wound Bed Granulated/Red: 90  
Percent of Devitalized: 10  
Length (cm): 0.5  
Width (cm): 0.5  
Depth (cm): 0.1  
CM Sq: 0.250  
Surrounding Tissue Appearance: Hyperpigmented  
Surrounding Tissue Temp: Warm  
Drainage Amount: Moderate  
Drainage Description: Serosanguineous  
Drainage Odor: No Odor  
Results  
Height: 5 ft 11 in  
Weight: 167.829 kg  
Body Mass Index: 51.5  
  
Assessment/Plan  
Assessment/Plan  
(1) Venous stasis ulcer:  
Qualifiers:  
Laterality: right Non-pressure ulcer stage: limited to breakdown of skin 
Varicose   
vein presence: with varicose veins Venous stasis ulcer site: other part of lower
leg   
Qualified Code(s): I83.018 - Varicose veins of right lower extremity with ulcer 
other   
part of lower leg; L97.811 - Non-pressure chronic ulcer of other part of right 
lower   
leg limited to breakdown of skin  
Code(s):  
I83.009 - Varicose veins of unspecified lower extremity with ulcer of 
unspecified   
site; L97.909 - Non-pressure chronic ulcer of unspecified part of unspecified 
lower   
leg with unspecified severity  
Plan:  
w/lymphedema  
(2) Edema of right lower leg:  
Code(s):  
R60.0 - Localized edema  
(3) Morbid obesity due to excess calories:  
Code(s):  
E66.01 - Morbid (severe) obesity due to excess calories  
(4) Lymphedema of both lower extremities:  
Code(s):  
I89.0 - Lymphedema, not elsewhere classified  
(5) PVD (peripheral vascular disease):  
Code(s):  
I73.9 - Peripheral vascular disease, unspecified  
(6) Varicose veins of both legs with edema:  
Code(s):  
I83.893 - Varicose veins of bilateral lower extremities with other complications  
(7) Inflammation:  
  
Plan  
see orders and hpi  
Time spent with patient  
Time Spent With Patient (min): 10  
  
  
  
  
Dictated By: SOWMYA Correa DD/DT: 24  
  
Signed By: <Electronically signed by SOWMYA Espino>  
24 1429  
   
  
[facility]  
Work Phone: [phone]- Progress note  
  
  
  
  
                                        Author              Elaine Espino  
[facility]  
2024 3:29pm  
   
                                        Note Date/Time      2024 3:29  
pm  
   
                                                    [facility]  
ENTER  
[address] [place] [address]  
  
Wound Center Provider Note  
Signed  
  
Patient: Jocelin Pacheco MR#:   
[id number]  
: 1955 Acct:[id number]  
  
Age/Sex: 69 / M  
  
Copies to: Vera Melvin,SOWMYA Mayer~  
  
  
HPI  
Date of Visit  
Date of Visit:  
Date of Service: 2024  
Time of Service: 15:26  
  
Narrative  
HPI:  
24 Jocelin is a 69-year-old male presenting to [facility]   
wound care program for an initial visit to the office for a right lower 
extremity   
venous stasis ulcer/lymphedema ulcer.? The patient has been a patient of the 
office   
for quite some time now and has seen other providers.  
He had seen vascular in [place] was told that there is nothing else that can be 
done   
for him.  
He has an extensive history of infections so I imagine this will continue to be 
an   
issue for him.  
He appears to need better edema control.  
He was told by ortho that he needs to lose 50 pounds in order to have surgery- 
this   
was 3 years ago and has not been accomplished- he says that he wasn't taking the
  
ozempic as ordered and he gained back the weight he had lost and hence no 
surgery.  
A past venous duplex indicates deep vein issues which are not surgically 
correctable-   
vascular did confirm that he does not need to see them because there is nothing 
they   
have to offer him.  
He is still retired so hopefully there is more opportunity for elevation and   
compression of the legs to support healing of the ulcer.  
Nutrition and probiotics were discussed and suggestions were given.  
Doxycycline was escribed today for what appears to be cellulitis of the RLE.  
Topical gentamicin and unna wrap was ordered and he will return here for 
dressings.  
3/4/24 better, topical flagyl today with unna wrap, will use a gauze wrap under 
the   
paste since he feels like the unna paste causes itching, no infection seen 
today,   
cellulitis appears resolved  
  
Subjective  
Pain  
Right Lower Leg:  
Pain Description: Intermittent  
Pain Intensity: 8  
Wound/Ulcer History  
When did wound start?: 2024 Right lower leg  
Mode of Arrival/ : Personal vehicle  
Assistive Device Used Today: Cane  
Lives with:: Alone  
Appetite Description: Within Normal Limits  
Who helps w/ dressing change?: Wound Care Dept  
Smoking Status: Never smoker  
  
[facility]  
Medical History (Updated 24 @ 08:12 by Maria A Crowe RN)  
  
Ulcer of right leg  
Wound of right lower extremity  
Itching with irritation  
Itching  
PVD (peripheral vascular disease)  
 poor veins  Dr. Cameron did vein closure 2019, needs 5 more viens worked 
on.  
Carpal tunnel syndrome  
right arm surgery  
DIMITRI (obstructive sleep apnea)  
Cardiomyopathy  
 lower part of heart is weak   
Back pain  
DVT (deep venous thrombosis)  
 blood clot from right foot to right thigh  on  blood thinner   
Hypertension  
Arthritis  
Bilateral knees,  right is bone on bone  per  Dr. Richard   
  
Surgical History (Reviewed 24 @ 08:12 by Maria A Crowe, KIMBERLY)  
  
Hx of cholecystectomy  
  
Family History (Reviewed 24 @ 08:13 by Maria A Crowe RN)  
Father Diabetes mellitus, type 2  
Sister Diabetes mellitus, type 2  
Brother Heart disease  
Legacy FamHx Relation: Brother(s)  
Father Heart disease  
Diabetes  
Heart failure  
  
Family/Other   
Legacy FamHx Problem: 2 BROTHERS  :2 SISTERS ::NO CHILDREN  
Mother   
Heart disease  
Sister Heart disease  
  
Social History  
Smoking Status: Never smoker  
Substance Use Type: None  
  
Grafts  
History of Graft  
History of Graft?: No  
  
Exam  
Physical Exam  
Vital Signs:  
  
  
  
  
Temp Pulse Resp BP O2 Del Method  
  
97.3 F L 85 18 130/60 Room Air  
  
24 15:09 24 15:09 24 15:09 24 15:09 24 15:09  
  
  
  
Const  
General: cooperative, comfortable and no acute distress  
Nutritional Appearance: obese  
Orientation: alert, awake and oriented x3  
  
Lower/Upper Extremity Exam  
Vascular Exam-Edema  
Right Lower Extremity:  
Edema Type: Lymphedema  
Vascular Exam-Pulses  
Right Dorsalis Pedis:  
Pulse Assessment Method: Doppler  
Right Posterior Tibial:  
Pulse Assessment Method: Doppler  
Right Brachial:  
Pulse Assessment Method: NIBP  
  
Objective  
Meds/Allergies  
Home Medications  
  
carvedilol 6.25 mg tablet 6.25 mg PO BID 17 [History Confirmed 24]  
furosemide 40 mg tablet (Lasix) 40 mg PO BID 17 [History Confirmed 
24]  
acetaminophen 500 mg tablet (Tylenol Extra Strength) 500 mg PO TID PRN Pain 
20   
[History Confirmed 24]  
empagliflozin 10 mg tablet (Jardiance) 10 mg PO DAILY 22 [History 
Confirmed   
24]  
rivaroxaban 10 mg tablet (Xarelto) 10 mg PO DAILY 22 [History Confirmed   
24]  
semaglutide 2 mg/dose (8 mg/3 mL) subcutaneous pen injector (Ozempic) 2 mg 
subcut Q7D   
22 [History Confirmed 24]  
spironolactone 25 mg tablet 25 mg PO DAILY 22 [History Confirmed 24]  
sacubitril 24 mg-valsartan 26 mg tablet (Entresto) 1 tab PO BID 23 
[History   
Confirmed 24]  
Lactobacillus acidophilus 10 billion cell capsule (Probiotic) 100 mmu cells PO 
DAILY   
24 [History Confirmed 24]  
doxycycline hyclate 100 mg capsule 100 mg PO BID 14 days #28 caps 24 [Rx   
Confirmed 24]  
  
  
Allergies  
  
cefixime Allergy (Unknown, Verified 24 08:10)  
Unknown Reaction  
diclofenac Allergy (Unknown, Verified 24 08:10)  
Unknown Reaction  
nystatin Allergy (Unknown, Verified 24 08:10)  
Unknown Reaction  
sodium benzoate Allergy (Verified 24 08:10)  
Unknown Reaction  
  
  
Wound/Ulcer  
Right Lower Leg:  
Type: Lymphedema  
Thickness: Skin Breakdown  
Bed Appearance: Dried Exudate, Pink and Yellow  
Percent of Wound Bed Granulated/Red: 80  
Percent of Devitalized: 20  
Length (cm): 1.5  
Width (cm): 1.0  
Depth (cm): 0.1  
CM Sq: 1.500  
Surrounding Tissue Appearance: Hyperpigmented  
Surrounding Tissue Temp: Warm  
Drainage Amount: Moderate  
Drainage Description: Serosanguineous  
Drainage Odor: No Odor  
Procedures  
Time Out: 2 Patient Identifiers, Correct Patient, Correct Side/Site, Correct   
Procedure and Safety Issues Reviewed  
Procedure:  
The right leg ulcer was anesthetized with topical 2% lidocaine gel. A forcep and
  
scissor was used to perform debridement for the removal of 1 cm? of devitalized   
tissue consisting of skin and slough. Debridement was down to healthy bleeding   
tissue. Estimated blood loss was minimal to moderate. Hemostasis was achieved by
  
applying pressure. The ulcer now appears 95% pink and red and the size remains 
the   
same. The patient tolerated well with no pain.  
Results  
Height: 5 ft 11 in  
Weight: 167.829 kg  
Body Mass Index: 51.5  
  
Assessment/Plan  
Assessment/Plan  
(1) Venous stasis ulcer:  
Qualifiers:  
Laterality: right Non-pressure ulcer stage: limited to breakdown of skin 
Varicose   
vein presence: with varicose veins Venous stasis ulcer site: other part of lower
leg   
Qualified Code(s): I83.018 - Varicose veins of right lower extremity with ulcer 
other   
part of lower leg; L97.811 - Non-pressure chronic ulcer of other part of right 
lower   
leg limited to breakdown of skin  
Code(s):  
I83.009 - Varicose veins of unspecified lower extremity with ulcer of 
unspecified   
site; L97.909 - Non-pressure chronic ulcer of unspecified part of unspecified 
lower   
leg with unspecified severity  
Plan:  
w/lymphedema  
(2) Edema of right lower leg:  
Code(s):  
R60.0 - Localized edema  
(3) Morbid obesity due to excess calories:  
Code(s):  
E66.01 - Morbid (severe) obesity due to excess calories  
(4) Lymphedema of both lower extremities:  
Code(s):  
I89.0 - Lymphedema, not elsewhere classified  
(5) PVD (peripheral vascular disease):  
Code(s):  
I73.9 - Peripheral vascular disease, unspecified  
(6) Varicose veins of both legs with edema:  
Code(s):  
I83.893 - Varicose veins of bilateral lower extremities with other complications  
(7) Inflammation:  
  
Plan  
see orders and hpi  
Time spent with patient  
Time Spent With Patient (min): 13  
  
  
  
  
Dictated By: SOWMYA Correa DD/DT: 24 152  
  
Signed By: <Electronically signed by SOWMYA Espino>  
24 1529  
   
  
[facility]  
Work Phone: [phone]- Progress note  
  
  
  
  
                                        Author              Laila Villarreal  
[facility]  
2024 3:08pm  
   
                                        Note Date/Time      2024   
3:04pm  
   
                                                    [facility]  
ENTER  
[address] [place] [address]  
  
Wound Center Provider Note  
Signed  
  
Patient: Jocelin Pacheco MR#:   
[id number]  
: 1955 Acct:[id number]  
  
Age/Sex: 69 / M  
  
Copies to: SOWMYA Gutierrez,~  
  
  
HPI  
Date of Visit  
Date of Visit:  
Date of Service: 2024  
Time of Service: 15:03  
  
Narrative  
HPI:  
24 Jocelin is a 69-year-old male presenting to [facility]   
wound care program for an initial visit to the office for a right lower 
extremity   
venous stasis ulcer/lymphedema ulcer.? The patient has been a patient of the 
office   
for quite some time now and has seen other providers.  
He had seen vascular in [place] was told that there is nothing else that can be 
done   
for him.  
He has an extensive history of infections so I imagine this will continue to be 
an   
issue for him.  
He appears to need better edema control.  
He was told by ortho that he needs to lose 50 pounds in order to have surgery- 
this   
was 3 years ago and has not been accomplished- he says that he wasn't taking the
  
ozempic as ordered and he gained back the weight he had lost and hence no 
surgery.  
A past venous duplex indicates deep vein issues which are not surgically 
correctable-   
vascular did confirm that he does not need to see them because there is nothing 
they   
have to offer him.  
He is still retired so hopefully there is more opportunity for elevation and   
compression of the legs to support healing of the ulcer.  
Nutrition and probiotics were discussed and suggestions were given.  
Doxycycline was escribed today for what appears to be cellulitis of the RLE.  
Topical gentamicin and unna wrap was ordered and he will return here for 
dressings.  
Above documentation per SOWMYA Amos - included for continuity of care  
  
24- Jocelin presents for follow-up. He is still taking his antibiotic and   
understands that he should complete the entire course. The area is improving.   
Dressing order changed to collagen silver; sorbact; ERGO wrap. Follow-up in 2-3   
weeks.  
  
Subjective  
Pain  
Right Lower Leg:  
Pain Description: Intermittent and Burning  
Pain Intensity: 9  
Pain Management Techniques Other/Comment:  plus itching   
Wound/Ulcer History  
When did wound start?: 2024 Right lower leg  
Mode of Arrival/ : Personal vehicle  
Assistive Device Used Today: Cane  
Lives with:: Alone  
Appetite Description: Within Normal Limits  
Who helps w/ dressing change?: Wound Care Dept  
Smoking Status: Never smoker  
  
[facility]  
Medical History (Updated 24 @ 08:12 by Maria A Crowe RN)  
  
Ulcer of right leg  
Wound of right lower extremity  
Itching with irritation  
Itching  
PVD (peripheral vascular disease)  
 poor veins  Dr. Cameron did vein closure 2019, needs 5 more viens worked 
on.  
Carpal tunnel syndrome  
right arm surgery  
DIMITRI (obstructive sleep apnea)  
Cardiomyopathy  
 lower part of heart is weak   
Back pain  
DVT (deep venous thrombosis)  
 blood clot from right foot to right thigh  on  blood thinner   
Hypertension  
Arthritis  
Bilateral knees,  right is bone on bone  per  Dr. Richard   
  
Surgical History (Reviewed 24 @ 08:12 by Maria A Crowe RN)  
  
Hx of cholecystectomy  
  
Family History (Reviewed 24 @ 08:13 by Maria A Crowe RN)  
Father Diabetes mellitus, type 2  
Sister Diabetes mellitus, type 2  
Brother Heart disease  
Legacy FamHx Relation: Brother(s)  
Father Heart disease  
Diabetes  
Heart failure  
  
Family/Other   
Legacy FamHx Problem: 2 BROTHERS  :2 SISTERS ::NO CHILDREN  
Mother   
Heart disease  
Sister Heart disease  
  
Social History  
Smoking Status: Never smoker  
Substance Use Type: None  
  
Grafts  
History of Graft  
History of Graft?: No  
  
Exam  
Physical Exam  
Vital Signs:  
  
  
  
  
Temp Pulse Resp BP O2 Del Method  
  
97.5 F L 87 24 175/78 H Room Air  
  
24 14:51 24 14:51 24 14:51 24 14:51 24 14:51  
  
  
  
Const  
General: cooperative, comfortable and no acute distress  
Nutritional Appearance: obese  
Orientation: alert, awake and oriented x3  
  
Lower/Upper Extremity Exam  
Vascular Exam-Edema  
Right Lower Extremity:  
Edema Type: Lymphedema  
Vascular Exam-Pulses  
Right Dorsalis Pedis:  
Pulse Assessment Method: Doppler  
Right Posterior Tibial:  
Pulse Assessment Method: Doppler  
Right Brachial:  
Pulse Assessment Method: NIBP  
Left Brachial:  
Pulse Assessment Method: NIBP  
  
Objective  
Meds/Allergies  
Home Medications  
  
carvedilol 6.25 mg tablet 6.25 mg PO BID 17 [History Confirmed 24]  
furosemide 40 mg tablet (Lasix) 40 mg PO BID 17 [History Confirmed 
24]  
acetaminophen 500 mg tablet (Tylenol Extra Strength) 500 mg PO TID PRN Pain 
20   
[History Confirmed 24]  
empagliflozin 10 mg tablet (Jardiance) 10 mg PO DAILY 22 [History 
Confirmed   
24]  
rivaroxaban 10 mg tablet (Xarelto) 10 mg PO DAILY 22 [History Confirmed   
24]  
semaglutide 2 mg/dose (8 mg/3 mL) subcutaneous pen injector (Ozempic) 2 mg 
subcut Q7D   
22 [History Confirmed 24]  
spironolactone 25 mg tablet 25 mg PO DAILY 22 [History Confirmed 24]  
sacubitril 24 mg-valsartan 26 mg tablet (Entresto) 1 tab PO BID 23 
[History   
Confirmed 24]  
Lactobacillus acidophilus 10 billion cell capsule (Probiotic) 100 mmu cells PO 
DAILY   
24 [History Confirmed 24]  
doxycycline hyclate 100 mg capsule 100 mg PO BID 14 days #28 caps 24 [Rx   
Confirmed 24]  
  
  
Allergies  
  
cefixime Allergy (Unknown, Verified 24 08:10)  
Unknown Reaction  
diclofenac Allergy (Unknown, Verified 24 08:10)  
Unknown Reaction  
nystatin Allergy (Unknown, Verified 24 08:10)  
Unknown Reaction  
sodium benzoate Allergy (Verified 24 08:10)  
Unknown Reaction  
  
  
Wound/Ulcer  
Right Lower Leg:  
Type: Lymphedema  
Thickness: Skin Breakdown  
Bed Appearance: Dried Exudate, Pink and Yellow  
Percent of Wound Bed Granulated/Red: 80  
Percent of Devitalized: 20  
Length (cm): 2.5  
Width (cm): 2  
Depth (cm): 0.1  
CM Sq: 5.000  
Surrounding Tissue Appearance: Hyperpigmented  
Surrounding Tissue Temp: Warm  
Drainage Amount: Moderate  
Drainage Description: Serosanguineous  
Drainage Odor: No Odor  
Results  
Height: 5 ft 11 in  
Weight: 167.829 kg  
Body Mass Index: 51.5  
  
Assessment/Plan  
Assessment/Plan  
(1) Venous stasis ulcer:  
Qualifiers:  
Laterality: right Non-pressure ulcer stage: limited to breakdown of skin 
Varicose   
vein presence: with varicose veins Venous stasis ulcer site: other part of lower
leg   
Qualified Code(s): I83.018 - Varicose veins of right lower extremity with ulcer 
other   
part of lower leg; L97.811 - Non-pressure chronic ulcer of other part of right 
lower   
leg limited to breakdown of skin  
Code(s):  
I83.009 - Varicose veins of unspecified lower extremity with ulcer of 
unspecified   
site; L97.909 - Non-pressure chronic ulcer of unspecified part of unspecified 
lower   
leg with unspecified severity  
Plan:  
w/lymphedema  
(2) Edema of right lower leg:  
Code(s):  
R60.0 - Localized edema  
(3) Morbid obesity due to excess calories:  
Code(s):  
E66.01 - Morbid (severe) obesity due to excess calories  
(4) Lymphedema of both lower extremities:  
Code(s):  
I89.0 - Lymphedema, not elsewhere classified  
(5) PVD (peripheral vascular disease):  
Code(s):  
I73.9 - Peripheral vascular disease, unspecified  
(6) Varicose veins of both legs with edema:  
Code(s):  
I83.893 - Varicose veins of bilateral lower extremities with other complications  
(7) Inflammation:  
  
Plan  
see orders and hpi  
Time spent with patient  
Time Spent With Patient (min): 15  
  
  
  
  
Dictated By: SOWMYA Gutierrez DD/DT: 24 1503  
  
Signed By: <Electronically signed by SOWMYA Villarreal>  
24 1673  
   
  
[facility]  
Work Phone: [phone]- Progress note  
  
  
  
  
                                        Author              Elaine Espino  
[facility]  
2024 8:20am  
   
                                        Note Date/Time      2024   
8:20am  
   
                                                    [facility]  
ENTER  
[address] [place] [address]  
  
Wound Center Provider Note  
Signed  
  
Patient: Jocelin Pacheco MR#:   
[id number]  
: 1955 Acct:[id number]  
  
Age/Sex: 69 / M  
  
Copies to: DO Elaine Arboleda, SOWMYA~  
  
  
HPI  
Date of Visit  
Date of Visit:  
Date of Service: 2024  
Time of Service: 08:17  
  
Narrative  
HPI:  
24 Jocelin is a 69-year-old male presenting to [facility]   
wound care program for an initial visit to the office for a right lower 
extremity   
venous stasis ulcer/lymphedema ulcer.? The patient has been a patient of the 
office   
for quite some time now and has seen other providers.  
He had seen vascular in [place] was told that there is nothing else that can be 
done   
for him.  
He has an extensive history of infections so I imagine this will continue to be 
an   
issue for him.  
He appears to need better edema control.  
He was told by ortho that he needs to lose 50 pounds in order to have surgery- 
this   
was 3 years ago and has not been accomplished- he says that he wasn't taking the
  
ozempic as ordered and he gained back the weight he had lost and hence no 
surgery.  
A past venous duplex indicates deep vein issues which are not surgically 
correctable-   
vascular did confirm that he does not need to see them because there is nothing 
they   
have to offer him.  
He is still retired so hopefully there is more opportunity for elevation and   
compression of the legs to support healing of the ulcer.  
Nutrition and probiotics were discussed and suggestions were given.  
Doxycycline was escribed today for what appears to be cellulitis of the RLE.  
Topical gentamicin and unna wrap was ordered and he will return here for 
dressings.  
  
Subjective  
Pain  
Right Lower Leg:  
Pain Description: Burning  
Pain Intensity: 10  
Wound/Ulcer History  
When did wound start?: 2024 Right lower leg  
Mode of Arrival/ : Personal vehicle  
Assistive Device Used Today: Cane  
Lives with:: Alone  
Appetite Description: Within Normal Limits  
Who helps w/ dressing change?: Wound Care Dept  
Smoking Status: Never smoker  
  
[facility]  
Medical History (Updated 24 @ 08:12 by Maria A Crowe, KIMBERLY)  
  
Ulcer of right leg  
Wound of right lower extremity  
Itching with irritation  
Itching  
PVD (peripheral vascular disease)  
 poor veins  Dr. Cameron did vein closure 2019, needs 5 more viens worked 
on.  
Carpal tunnel syndrome  
right arm surgery  
DIMITRI (obstructive sleep apnea)  
Cardiomyopathy  
 lower part of heart is weak   
Back pain  
DVT (deep venous thrombosis)  
 blood clot from right foot to right thigh  on  blood thinner   
Hypertension  
Arthritis  
Bilateral knees,  right is bone on bone  per  Dr. Richard   
  
Surgical History (Reviewed 24 @ 08:12 by Maria A Crowe, RN)  
  
Hx of cholecystectomy  
  
Family History (Reviewed 24 @ 08:13 by Maria A Crowe, KIMBERLY)  
Father Diabetes mellitus, type 2  
Sister Diabetes mellitus, type 2  
Brother Heart disease  
Legacy FamHx Relation: Brother(s)  
Father Heart disease  
Diabetes  
Heart failure  
  
Family/Other   
Legacy FamHx Problem: 2 BROTHERS  :2 SISTERS ::NO CHILDREN  
Mother   
Heart disease  
Sister Heart disease  
  
Social History  
Smoking Status: Never smoker  
Substance Use Type: None  
  
Grafts  
History of Graft  
History of Graft?: No  
  
Exam  
Physical Exam  
Vital Signs:  
  
  
  
  
Temp Pulse Resp BP O2 Del Method  
  
97.2 F L 81 24 121/74 Room Air  
  
24 08:11 24 08:11 24 08:11 24 08:11 24 08:11  
  
  
  
Const  
General: cooperative, comfortable and no acute distress  
Nutritional Appearance: obese  
Orientation: alert, awake and oriented x3  
  
Lower/Upper Extremity Exam  
Vascular Exam-Edema  
Right Lower Extremity:  
Edema Type: Lymphedema  
Vascular Exam-Pulses  
Right Dorsalis Pedis:  
Pulse Assessment Method: Doppler  
Right Posterior Tibial:  
Pulse Assessment Method: Doppler  
Right Brachial:  
Pulse Assessment Method: NIBP  
  
Objective  
Meds/Allergies  
Home Medications  
  
carvedilol 6.25 mg tablet 6.25 mg PO BID 17 [History Confirmed 24]  
furosemide 40 mg tablet (Lasix) 40 mg PO BID 17 [History Confirmed 
24]  
acetaminophen 500 mg tablet (Tylenol Extra Strength) 500 mg PO TID PRN Pain 
20   
[History Confirmed 24]  
empagliflozin 10 mg tablet (Jardiance) 10 mg PO DAILY 22 [History 
Confirmed   
24]  
rivaroxaban 10 mg tablet (Xarelto) 10 mg PO DAILY 22 [History Confirmed   
24]  
semaglutide 2 mg/dose (8 mg/3 mL) subcutaneous pen injector (Ozempic) 2 mg 
subcut Q7D   
22 [History Confirmed 24]  
spironolactone 25 mg tablet 25 mg PO DAILY 22 [History Confirmed 24]  
sacubitril 24 mg-valsartan 26 mg tablet (Entresto) 1 tab PO BID 23 
[History   
Confirmed 24]  
Lactobacillus acidophilus 10 billion cell capsule (Probiotic) 100 mmu cells PO 
DAILY   
24 [History Confirmed 24]  
doxycycline hyclate 100 mg capsule 100 mg PO BID 14 days #28 caps 24 [Rx   
Confirmed 24]  
  
  
Allergies  
  
cefixime Allergy (Unknown, Verified 24 08:10)  
Unknown Reaction  
diclofenac Allergy (Unknown, Verified 24 08:10)  
Unknown Reaction  
nystatin Allergy (Unknown, Verified 24 08:10)  
Unknown Reaction  
sodium benzoate Allergy (Verified 24 08:10)  
Unknown Reaction  
  
  
Wound/Ulcer  
Right Lower Leg:  
Type: Lymphedema  
Thickness: Skin Breakdown  
Bed Appearance: Dried Exudate, Epithelial Tissue or Bridge, Pink and Yellow  
Percent of Wound Bed Granulated/Red: 80  
Percent of Devitalized: 20  
Length (cm): 4  
Width (cm): 1.8  
Depth (cm): 0.1  
CM Sq: 7.200  
Surrounding Tissue Appearance: Dark Red  
Surrounding Tissue Temp: Warm  
Drainage Amount: Moderate  
Drainage Description: Serosanguineous  
Drainage Odor: No Odor  
Results  
Height: 5 ft 11 in  
Weight: 167.829 kg  
Body Mass Index: 51.5  
  
Assessment/Plan  
Assessment/Plan  
(1) Venous stasis ulcer:  
Qualifiers:  
Laterality: right Non-pressure ulcer stage: limited to breakdown of skin 
Varicose   
vein presence: with varicose veins Venous stasis ulcer site: other part of lower
leg   
Qualified Code(s): I83.018 - Varicose veins of right lower extremity with ulcer 
other   
part of lower leg; L97.811 - Non-pressure chronic ulcer of other part of right 
lower   
leg limited to breakdown of skin  
Code(s):  
I83.009 - Varicose veins of unspecified lower extremity with ulcer of 
unspecified   
site; L97.909 - Non-pressure chronic ulcer of unspecified part of unspecified 
lower   
leg with unspecified severity  
Plan:  
w/lymphedema  
(2) Edema of right lower leg:  
Code(s):  
R60.0 - Localized edema  
(3) Morbid obesity due to excess calories:  
Code(s):  
E66.01 - Morbid (severe) obesity due to excess calories  
(4) Lymphedema of both lower extremities:  
Code(s):  
I89.0 - Lymphedema, not elsewhere classified  
(5) PVD (peripheral vascular disease):  
Code(s):  
I73.9 - Peripheral vascular disease, unspecified  
(6) Varicose veins of both legs with edema:  
Code(s):  
I83.893 - Varicose veins of bilateral lower extremities with other complications  
(7) Inflammation:  
  
Plan  
see orders and hpi  
Time spent with patient  
Time Spent With Patient (min): 14  
  
  
  
  
Dictated By: SOWMYA Correa DD/DT: 24  
  
Signed By: <Electronically signed by SOWMYA Espino>  
24 0820  
   
  
[facility]  
Work Phone: [phone]- History of Present illness Narrative* 
  Ray Terry, NP - 2024 3:30 PM ESTFormatting of this note is 
  different from the original.  
Images from the original note were not included.  
  
Jocelin Pacheco is a 69 y.o. male presents with chief complaint of Back Pain (Pt 
presents with lower back pain with onset of 2 weeks with pain increasing. Took 
tylenol with no relief. Has used ice on it a few times. Left shoulder is 
hurting. Denies any trouble urinating. )  
  
HPI:  
  
Back Pain  
  
Here for low back pain, left side hurts more than the right side. Denies any 
injury or reason for increased back pain. Tried ice and tylenol.  
  
HISTORIES:  
  
PAST MEDICAL HISTORY:  
Past Medical History:  
Diagnosis Date  
Acquired hammer toe of right foot  
Cardiomyopathy (CMS/HCC)  
d/t morbid obesity  
Chronic anticoagulation  
CKD (chronic kidney disease), stage II  
Class 3 obesity (CMS/HCC)  
CPAP (continuous positive airway pressure) dependence  
Diabetes mellitus type 2 in obese (CMS/HCC)  
History of 2019 novel coronavirus disease (COVID-19) 2020  
Received Remdesivir and dexamethasone as an in-patient  
Hx of being hospitalized 2019  
Hospitalized for Lt. lower extremity cellulitis and chronic open venous stasis 
ulcer  
Hx of carpal tunnel syndrome  
Hx of deep venous thrombosis  
DIMITRI on CPAP  
Peripheral venous insufficiency  
Porokeratosis  
Primary osteoarthritis of both knees  
Stasis dermatitis of both legs  
  
  
SURGICAL HISTORY:  
Past Surgical History:  
Procedure Laterality Date  
CARDIAC CATHETERIZATION 2009  
Due to Chest pain  
CARPAL TUNNEL RELEASE Right 2010  
Dr. Estrada  
CHOLECYSTECTOMY 2002  
FOOT NEUROMA SURGERY Bilateral   
Hammertoe repair as well  
HERNIA REPAIR  
IR INJECTION JOINT 2020  
(Knee) Synvisc injection with Dr Lomeli (Ortho)  
IR INJECTION JOINT   
(Knee) Synvisc done by Dr Richard  
SKIN GRAFT 2019  
From right thigh to right foot due to non-healing wound/ulcer. Done by Dr Blakely  
VEIN LIGATION Bilateral 2019  
Procedures in April and Oct done by Dr Lyle Mascorro  
  
  
SOCIAL HISTORY:  
Social History  
  
Tobacco Use  
Smoking status: Never  
Passive exposure: Past  
Smokeless tobacco: Never  
Substance Use Topics  
Alcohol use: Not Currently  
Drug use: Never  
  
Depression: Not at risk (2024)  
PHQ-2  
PHQ-2 Score: 0  
  
  
FAMILY HISTORY:  
Family History  
Problem Relation Name Age of Onset  
Hypertension Mother  
Heart disease Mother  
Diabetes Father  
Heart attack Sister  
Diabetes Sister  
Stroke Brother  
Heart attack Brother  
ALS Brother  
  
  
MEDICATIONS:  
Current Outpatient Medications  
Medication Instructions  
carvedilol (COREG) 6.25 mg, Oral, 2 times daily  
clobetasol (Temovate) 0.05 % cream 1 application , Topical, Every 12 hours  
Entresto 24-26 MG tablet 1 tablet, Oral, 2 times daily  
furosemide (LASIX) 40 mg, Oral, Every 12 hours  
Jardiance 10 mg, Oral, Every 24 hours  
Ozempic, 2 MG/DOSE, 8 MG/3ML solution pen-injector  
Probiotic Product (PROBIOTIC BLEND PO) 2 Units, Oral, Daily  
spironolactone (ALDACTONE) 25 mg, Oral, Daily  
Xarelto 10 MG tablet TAKE 1 TABLET BY MOUTH EVERY DAY WITH FOOD  
  
  
ALLERGIES:  
Allergies  
Allergen Reactions  
Cefixime Unknown  
Nystatin Unknown  
Sodium Benzoate  
Other Reaction(s): Unknown Reaction  
Diclofenac Rash  
  
  
REVIEW OF SYMPTOMS:  
  
Review of Systems  
Musculoskeletal: Positive for back pain.  
Shoulder pain, left. Rates shoulder pain as a 5 and the back as a 9 or 10 on a 
0-10 scale.  
Psychiatric/Behavioral: Negative for sleep disturbance.  
  
  
PHYSICAL EXAM:  
  
Visit Vitals  
/70  
Pulse 83  
Temp 97.7 F  
Wt 376 lb  
SpO2 94%  
BMI 52.44 kg/m  
Smoking Status Never  
BSA 2.93 m  
  
  
Physical Exam  
Constitutional:  
Appearance: He is obese. He is not ill-appearing.  
Eyes:  
Extraocular Movements: Extraocular movements intact.  
Musculoskeletal:  
General: Tenderness present.  
Comments: Low back pain  
Skin:  
General: Skin is warm and dry.  
Neurological:  
Mental Status: He is alert and oriented to person, place, and time.  
Gait: Gait abnormal.  
Psychiatric:  
Mood and Affect: Mood normal.  
Thought Content: Thought content normal.  
Judgment: Judgment normal.  
  
ASSESSMENT AND PLAN:  
  
Assessment/Plan  
Diagnoses and all orders for this visit:  
Lumbosacral strain, initial encounter  
- methylPREDNISolone (Medrol Dospak) 4 MG tablets; Take as directed on package.  
- Ambulatory referral to Physical Therapy; Future  
Acute pain of left shoulder  
- methylPREDNISolone (Medrol Dospak) 4 MG tablets; Take as directed on package.  
- Ambulatory referral to Physical Therapy; Future  
Wound of left lower extremity, initial encounter  
--pt states he will call wound center  
Dictated, but not read  
  
Patient presents today for an acute visit. He s been having low back pain for 
approximately two weeks. He also mentioned that his left shoulder is painful. It
s worse with abduction and he can only raise the left shoulder approximately 90 
. he denies any actual injury. He states he s been taking Tylenol and using ice 
to his back.  
  
At this point in time we re not gonna order x-rays. He does have tenderness of 
the lumbosacral spine and point tenderness in the deltoid area. He s advised ice
the left shoulder. He can use Tylenol. For the low back pain is tender across 
the whole lumbosacral spine. He is not having any radicular type symptoms. I 
will give him a low-dose Medrol dose pack since his A1c is good at 5.5. And he 
can use Tylenol as needed. And suds are not indicated since he is on Xarelto. 
Will refer him to physical therapy and evaluate and treat for the back pain in 
the left shoulder. If not improving will consider x-rays. He also mentioned he 
does have the start of a wound on his left posterior lower extremity.Picture was
taken. He [facility]. I advised him to use Aquaphor or 
Lantisepticointment to the area. He agrees. He ll return here in May for his 
regular scheduled appointment to see Dr. Matias.  
  
  
Electronically signed by Ray Terry NP at 2024 4:20 PM EST  
  
documented in this encounterOzarks Community [id number]- History of Present illness
Narrative* Danya An DPM - 2024 3:15 PM ESTFormatting of this 
  note is different from the original.  
HPI:  
Patient presents in office today for routine nailcare and callous care.  
Location: Right great toe.  
Duration: ongoing x1 month.  
Severity of the symptoms: mild , considered 5 out of 10.  
Onset of pain: gradual.  
Status: stable.  
Context: hard to trim , hard to reach.  
Current symptoms include: toe redness , toe swelling ,  
Relieved by: debridement.  
Characteristics of the nails: red, swollen, painful.  
Aggravated by: shoe gear, pressure.  
Risk factors: curvature of nails.  
  
Examination:  
General Examination:  
GENERAL EXAMINATIONalert, well hydrated, in no distress , awake, aware of 
surroundings.  
FOOT EXAM:  
Date of Last Foot Exam 24  
  
Vascular:  
DORSALIS PEDIS PULSE:2/4, bilaterally.  
POSTERIOR TIBIAL PULSE:1/4, bilaterally.  
TEMPERATURE GRADIENT:within normal limits, warm to cool.  
EDEMA:mild, bilateral lower extremity. erythema to the right 2nd digit is 
resolved. Continued erythema noted.  
CAPILLARY FILLING TIME(sec):bilateral, digits 1-5, less than 3 seconds.  
  
Neurologic:  
VIBRATORY:abnormal.  
SEMMES-LUKE 5.07 MONOFILAMENTnormal.  
  
Dermatologic:  
HYPERKERATOSIS:Location:, Submetatarsal Head 4 right, medial IPJ of the hallux 
bilateral, distal right 3rd digit.  
NAIL PATHOLOGY:digits 1-5 bilateral are thickened, discolored, crumbly, 
dystrophic, elongated and with subungual debris. Incurvation to the lateral 
border right hallux nail. Upon debridement there isno underlying opening noted 
in the skin. The surrounding granuloma, erythema has resolved along thenail 
fold.  
SKIN PATHOLOGY:atrophic, dry, decreased hair growth bilateral.  
  
Orthopedic:  
FOOT MORPHOLOGY:Metatarsus adductus bilateral .  
DEFORMITIES:Rigidly contracted second digit right with elevation at the second 
MPJ. There is overriding and rubbing between the distal right second digit and 
lateral right hallux. Semirigid contracted digits 2 through 4 left, 3 and 4 
right .  
PAIN ELICITED WITH PALPATION OFArea of porokeratosis sub-fourth MPJ right. Pain 
with palpation to the MPJ of the right 2nd digit, but pain is improving .  
MUSCLE STRENGTH5/5 for all pedal groups tested  
  
Assessment:  
1. Right foot pain - M79.671  
2. Dermatophytosis of nail - B35.1 (Primary)  
3. Left foot pain - M79.672  
4. 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  
5. Corns and callosities - L84  
6. Porokeratosis - Q82.8  
7. Acquired pes planovalgus of left foot - M21.6X2  
8. Acquired pes planovalgus of right foot - M21.41  
9. Acquired hammer toe of right foot - M20.41  
10. Circulating anticoagulant disorder - D68.318  
11. Venous insufficiency - I87.2  
12. Ingrowing nail - L60.0  
  
Plan:  
Dermatophytosis of nail  
1. Nails 1-5 bilateral were debrided in length and thickness by manual and 
mechanical means.  
2. Advised patient on the importance of continued foot care including daily 
monitoring of their feet for any new complaints or concerns that may arise.  
3. Continue use of good supportive shoe gear to help prevent complications.  
4. RTC: 9-12 weeks or as needed if problems arise.  
  
Corns and callosities  
1. Hyperkeratosis/porokeratosis as above noted was debrided.  
2. Instructed patient on use of aperture pads or Silipos padding/toe spacers to 
prevent rubbing andcontinued development of the hyperkeratosis.  
3. Also discussed use of moisturizing creams for overall increased hydration to 
the skin.  
4. Discussed continued use of proper foot gear to avoid excess pressure over the
callous site.  
Electronically signed by Danya An DPM at 2024 1:53 PM EST  
  
documented in this encounterOzarks Community HospitalUamkquwbhl80- Evaluation note*   
  
                      Encounter Date Diagnosis  Assessment Notes Treatment Notes  
 Treatment   
Clinical Notes  
   
                                        04 Aug, 2023        Type 2 diabetes   
mellitus with   
unspecified   
complications (ICD-10   
- E11.8)                                                      
   
                                        04 Aug, 2023        BMI 50.0-59.9, adult  
   
(ICD-10 - Z68.43)                                             
   
                                        04 Aug, 2023        Knee osteoarthritis   
(ICD-10 - M17.9)                                              
   
                                        04 Aug, 2023        Mixed hyperlipidemia  
   
(ICD-10 - E78.2)                                              
   
                                        04 Aug, 2023        CAD (coronary artery  
   
disease) (ICD-10 -   
I25.10)                                                       
   
                                        04 Aug, 2023        Obstructive sleep   
apnea (adult)   
(pediatric) (ICD-10 -   
G47.33)                                                       
   
                                        04 Aug, 2023        Cardiomyopathy   
(ICD-10 - I42.9)                                              
   
                                        04 Aug, 2023        CHF (congestive hear  
t   
failure) (ICD-10 -   
I50.9)                                                        
   
                                        04 Aug, 2023        Edema (ICD-10 -   
R60.9)                                                        
   
                                        04 Aug, 2023        Low back derangement  
   
syndrome (ICD-10 -   
M53.86)                                                       
   
                                        04 Aug, 2023        Metabolic syndrome X  
   
(ICD-10 - E88.81)                                             
   
                                        04 Aug, 2023        Encounter for   
immunization (ICD-10   
- Z23)                                                        
  
  
[employer]  
Other Phone: [phone]- Evaluation note*   
  
                                        Author              Jacoby Braden  
[facility]  
   
                                        Authored            2024 2:5  
6pm  
   
                                                    Start weight: 414.4 lbs., he  
 is down 32.0 lbs. today with a weight of 382.4lbs.   
and   
20.9 lbs. since  
his last visit on 2023.  
Starting Date: 04/15/2022.  
Ozempic start date 4/15/2022. Ozempic start weight 414.4lbs. He is down 32.0 
lbs.   
since starting  
Ozempic.  
1. Abnormal weight gain. He notes he is the heaviest in his family. His brother 
has   
some but much  
less significant weight issues. He notes he was able to get his weight down to 
368   
pounds but was  
unable to keep it down.  
2. Obesity-markedly improved since starting our program and with taking Ozempic 
2 mg,   
but had  
significant weight regain of 20.9 pounds off the medication. Due to cost/donut 
hole   
he stopped  
Ozempic. Hopefully he can restart full dose in the future using patient 
assistance.   
He is also on  
Jardiance 10 mg which can also help with his diabetes and cardiomyopathy/CHF. 
His   
diabetes is well  
controlled with his last A1c of 5.6 despite having issues with obtaining with 
his   
diabetic  
medications. He had mild CAD along with his diabetes and has not been on a 
statin so   
I had  
recommended Crestor, but he notes his cardio said it was not necessary so he did
not   
start the  
medication. He is eating healthier overall. He should follow-up with our   
nutritionist. He feels that   
his appetite is controlled with Ozempic 2 mg. I have recommend again he consider
  
bariatric surgery  
so he could have his knee replaced or find a surgeon who will do the surgery at 
an   
elevated BMI. In  
the past, he mentioned they found weakness in the bottom of the heart and 
evidently a   
problem with  
an artery that they could not get. His cardiologist however did not feel he 
needed   
the  
statin/Crestor that I previously prescribed. He gets very little activity due to
  
bone-on-bone  
arthritis, severe low back pain on tramadol, difficulty getting out of a chair 
and   
also his cardiac  
function he does get NELSON with small amounts of activity. He sees Dr. Maldonado to 
treat   
his  
cardiomyopathy. He did like canned foods and he understands that he must cut 
back the   
salt/processed  
food. He denies adding salt to his foods. He has had a A1c that was 6.6 
indicative of   
diabetes. He  
notes he was never told he was diabetic before program. He is seeing Dr. Lomeli 
for   
his knees and  
notes he has to get his BMI from 59 down to 45 to be able to have his knees 
replaced.   
Before  
starting the program he did eat a lot of red meat, potatoes and drank a lot of 
milk   
2% or 4% often  
buying 3 or 4 gallons at a time. He should not skip meals. He did try Adipex for
1   
month in the past  
but notes he was not able to lose but a few pounds so his PCP stopped it. He 
notes   
his sister does  
the shopping as he can. He does some simple cooking.  
3. Cardiomyopathy/CHF/early CAD-he must continue to follow-up with cardiology 
for   
evaluation of  
cardiomyopathy/CHF despite medications/known cardiomyopathy/previous mild CAD. 
We   
obtained his  
previous cardiac catheterization from 2009. He notes his preoperative 
diagnoses   
were angina  
pectoris, dyspnea on exertion and abnormal Cardiolite perfusion stress test. His
left   
anterior  
descending artery showed a 20 to 25% tubular stenosis. His circumflex had an 
ostial   
stenosis of 30  
to 50% in several views but in one view showed less than 30% stenosis and 
appeared to   
be mildly  
kinked, his right coronary artery was large dominant and normal. His left   
ventriculogram showed mild  
to moderately reduced systolic dysfunction with an EF in the 40 to 45% range   
globally. He is now  
being treated by Dr. Maldonado.  
4. Type 2 diabetes with A1c controlled/improved at 5.6-will restart Ozempic and   
continue Jardiance.  
We could consider Metformin but he already has some loose stools. We discussed 
that   
first-line  
treatment with lifestyle changes, decrease simple sweets and starches, will be 
some   
increased  
activity in the future and weight loss. He needs to consider bariatric surgery.  
5. Obstructive sleep apnea-he is compliant with his CPAP. Treat also with 
healthy   
lifestyle changes  
and weight loss. He needs to consider bariatric surgery.  
6. Bilateral OA of the knees-bone-on-bone making it very difficult for him to 
get   
around. He notes  
his orthopedic doctor wants him to drop from a BMI of 59 down to 45 before he 
will do   
the surgery.  
We will start weight loss and Ozempic. He needs to consider bariatric surgery.  
7. Chronic low back pain-treat with healthy lifestyle change. He needs to 
maximize   
his pain  
management.  
8. Mixed hyperlipidemia-treat with decreasing the simple sweets, added sugars,   
refined starches, and   
bad/added fats, increasing activity and exercise and continued long-term weight 
loss.   
Monitor with  
healthy lifestyle change. I recommended that he start a statin since he is 
diabetic   
and had mild CAD  
on his cath but he notes his cardiologist told him it was not necessary to 
start.  
9. Metabolic syndrome-treat with long-term healthy lifestyle changes, behavioral
  
changes, healthy  
nutritional changes, increased activity and exercise and achieve long-term 
weight   
loss.  
Follow up with me in 6 weeks.  
New labs needed: Up-to-date. Monitor A1c at least every 6 months with his PCP or
in   
our office. He  
will continue other regular lab follow-up with his PCP.   
  
  
  
[facility]  
Work Phone: [phone]- Progress note  
  
  
  
  
                                        Author              Elaine Espino  
[facility]  
2023 11:24am  
   
                                        Note Date/Time      2023 11:24  
am  
   
                                                    [facility]  
ENTER  
[address] [place] [address]  
  
Wound Center Provider Note  
Signed  
  
Patient: Jocelin Pacheco MR#:   
[id number]  
: 1955 Acct:[id number]  
  
Age/Sex: 68 / M  
  
Copies to: DO Elaine Arboleda APRN~  
  
  
HPI  
Date of Visit  
Date of Visit:  
Date of Service: 2023  
Time of Service: 11:20  
  
Narrative  
HPI:  
23 Jocelin is a 68-year-old male presenting to [facility]   
wound care program for an initial visit to the office for a right lower 
extremity   
venous stasis ulcer/lymphedema ulcer.? The patient has been a patient of the 
office   
for quite some time now and has seen other providers.  
He had seen vascular in [place] was told that there is nothing else that can be 
done   
for him.  
He has an extensive history of infections so I imagine this will continue to be 
an   
issue for him.  
He appears to need better edema control.  
He was told by ortho that he needs to lose 50 pounds in order to have surgery- 
this   
was 2 years ago and has not been accomplished.  
A past venous duplex indicates deep vein issues which are not surgically 
correctable-   
vascular did confirm that he does not need to see them because there is nothing 
they   
have to offer him.  
He is still retired so hopefully there is more opportunity for elevation and   
compression of the legs to support healing of the ulcer.  
Nutrition and probiotics were discussed and suggestions were given.  
Doxycycline was escribed today for what appears to be cellulitis of the RLE.  
23 looks better, is still taking the antibiotics, change to collagen silver   
topically and clobetasol to periwound skin that he says is itchy, will return 
for   
dressing changes, will bring his compression socks from home to the next visit 
since   
most of our wraps tend to roll down on his legs, says that he will use the   
compression pumps  
  
Subjective  
Pain  
Right Lower Leg:  
Pain Description: Burning  
Pain Intensity: 0  
Wound/Ulcer History  
When did wound start?: 2023  
Mode of Arrival/ : Personal vehicle  
Assistive Device Used Today: Cane  
Lives with:: Alone  
Appetite Description: Within Normal Limits  
Who helps w/ dressing change?: Wound Care Dept  
Why Do You Need Help?: Can't Reach Ulcer  
Smoking Status: Never smoker  
  
[facility]  
Medical History (Updated 23 @ 11:23 by SOWMYA Correa)  
  
Arthritis  
Bilateral knees,  right is bone on bone  per  Dr. Shine   
Back pain  
Cardiomyopathy  
 lower part of heart is weak   
Carpal tunnel syndrome  
right arm surgery  
DVT (deep venous thrombosis)  
 blood clot from right foot to right thigh  on  blood thinner   
Hypertension  
Itching  
Itching with irritation  
DIMITRI (obstructive sleep apnea)  
PVD (peripheral vascular disease)  
 poor veins  Dr. Cameron did vein closure 2019, needs 5 more viens worked 
on.  
Wound of right lower extremity  
  
Surgical History (Reviewed 23 @ 11:09 by Maren Martinez, RN)  
  
Hx of cholecystectomy  
  
Family History (Reviewed 23 @ 11:09 by Maren Martinez, KIMBERLY)  
Father Diabetes mellitus, type 2  
Sister Diabetes mellitus, type 2  
  
Social History  
Smoking Status: Never smoker  
Substance Use Type: None  
  
Grafts  
History of Graft  
History of Graft?: Yes  
  
Exam  
Physical Exam  
Vital Signs:  
  
  
  
  
Temp Pulse Resp BP O2 Del Method  
  
97.7 F 80 18 117/73 Room Air  
  
23 11:05 23 11:05 23 11:05 23 11:05 23 11:05  
  
  
  
Const  
General: cooperative, comfortable and no acute distress  
Nutritional Appearance: obese  
Orientation: alert, awake and oriented x3  
  
Lower/Upper Extremity Exam  
Vascular Exam-Edema  
Right Lower Extremity:  
Edema Type: Lymphedema  
Vascular Exam-Pulses  
Right Brachial:  
Pulse Assessment Method: NIBP  
Right Dorsalis Pedis:  
Pulse Assessment Method: Palpation  
Right Posterior Tibial:  
Pulse Assessment Method: Palpation  
  
Objective  
Meds/Allergies  
Home Medications  
  
carvedilol 6.25 mg tablet 6.25 mg PO BID 17 [History Confirmed 23]  
furosemide 40 mg tablet (Lasix) 40 mg PO BID 17 [History Confirmed 
23]  
acetaminophen 500 mg tablet (Tylenol Extra Strength) 500 mg PO TID PRN Pain 
20   
[History Confirmed 23]  
nabumetone 750 mg tablet 750 mg PO BID 21 [History Confirmed 23]  
empagliflozin 10 mg tablet (Jardiance) 10 mg PO DAILY 22 [History 
Confirmed   
23]  
rivaroxaban 10 mg tablet (Xarelto) 10 mg PO DAILY 22 [History Confirmed   
23]  
semaglutide 2 mg/dose (8 mg/3 mL) subcutaneous pen injector (Ozempic) 2 mg 
subcut Q7D   
22 [History Confirmed 23]  
spironolactone 25 mg tablet 25 mg PO DAILY 22 [History Confirmed 23]  
doxycycline hyclate 100 mg capsule 100 mg PO BID 14 days #28 caps 23 [Rx   
Confirmed 23]  
sacubitril 24 mg-valsartan 26 mg tablet (Entresto) 1 tab PO BID 23 
[History   
Confirmed 23]  
  
  
Allergies  
  
cefixime Allergy (Verified 23 11:09)  
Unknown Reaction  
diclofenac Allergy (Verified 23 11:09)  
Unknown Reaction  
nystatin Allergy (Verified 23 11:09)  
Unknown Reaction  
sodium benzoate Allergy (Verified 23 11:09)  
Unknown Reaction  
  
  
Wound/Ulcer  
Right Lower Leg:  
Type: Venous Stasis Ulcer  
Thickness: Skin Breakdown  
Bed Appearance: Beefy Red, Epithelial Tissue or Bridge, Pink and Yellow  
Percent of Wound Bed Granulated/Red: 75  
Percent of Devitalized: 25  
Length (cm): 4.5  
Width (cm): 17.5  
Depth (cm): 0.2  
CM Sq: 78.750  
Surrounding Tissue Appearance: Hyperpigmented  
Surrounding Tissue Temp: Warm  
Drainage Amount: Moderate  
Drainage Description: Serosanguineous  
Drainage Odor: No Odor  
Procedures  
Time Out: 2 Patient Identifiers, Correct Patient, Correct Side/Site, Correct   
Procedure and Safety Issues Reviewed  
Procedure:  
The right leg ulcer was anesthetized with topical 2% lidocaine gel. A curette 
was   
used to perform debridement for the removal of 10 cm? of devitalized tissue   
consisting of skin and slough. Debridement was down to healthy bleeding tissue.   
Estimated blood loss was minimal. Hemostasis was achieved by applying pressure. 
The   
right leg ulcer now appears 95% pink and red and the size remainsthe same. The   
patient tolerated well with no pain.  
Results  
Height: 5 ft 11 in  
Weight: 160.118 kg  
Body Mass Index: 49.2  
  
Assessment/Plan  
Assessment/Plan  
(1) Venous stasis ulcer:  
Qualifiers:  
Laterality: right Non-pressure ulcer stage: limited to breakdown of skin 
Varicose   
vein presence: with varicose veins Venous stasis ulcer site: other part of lower
leg   
Qualified Code(s): I83.018 - Varicose veins of right lower extremity with ulcer 
other   
part of lower leg; L97.811 - Non-pressure chronic ulcer of other part of right 
lower   
leg limited to breakdown of skin  
Code(s):  
I83.009 - Varicose veins of unspecified lower extremity with ulcer of 
unspecified   
site; L97.909 - Non-pressure chronic ulcer of unspecified part of unspecified 
lower   
leg with unspecified severity  
Status: Chronic  
(2) Edema of right lower leg:  
Code(s):  
R60.0 - Localized edema  
Status: Chronic  
(3) Morbid obesity due to excess calories:  
Code(s):  
E66.01 - Morbid (severe) obesity due to excess calories  
Status: Chronic  
(4) Lymphedema of both lower extremities:  
Code(s):  
I89.0 - Lymphedema, not elsewhere classified  
Status: Suspected  
(5) PVD (peripheral vascular disease):  
Code(s):  
I73.9 - Peripheral vascular disease, unspecified  
Status: Chronic  
(6) Varicose veins of both legs with edema:  
Code(s):  
I83.893 - Varicose veins of bilateral lower extremities with other complications  
Status: Chronic  
(7) Inflammation:  
Status: Chronic  
(8) Cellulitis:  
Assessment/Problem Details:  
rle  
Qualifiers:  
Laterality: right Site of cellulitis: extremity Site of cellulitis of extremity:
  
lower extremity Qualified Code(s): L03.115 - Cellulitis of right lower limb  
Code(s):  
L03.90 - Cellulitis, unspecified  
Status: Resolved  
Time spent with patient  
Time Spent With Patient (min): 15  
  
  
  
  
Dictated By: SOWMYA Correa DD/DT: 23  
  
Signed By: <Electronically signed by SOWMYA Espino>  
23 [address] [place] [address] Ctr  
Work Phone: [phone]- Progress note  
  
  
  
  
                                        Author              Elaine Espino  
[facility]  
2023 11:30am  
   
                                        Note Date/Time      2023 11:2  
7am  
   
                                                    [facility]  
ENTER  
[address] [place] [address]  
  
Wound Center Provider Note  
Signed  
  
Patient: Jocelin Pacheco MR#:   
[id number]  
: 1955 Acct:[id number]  
  
Age/Sex: 68 / M  
  
Copies to: DO Elaine Arboleda APRN~  
  
  
HPI  
Date of Visit  
Date of Visit:  
Date of Service: 2023  
Time of Service: 11:21  
  
Narrative  
HPI:  
23 Jocelin is a 68-year-old male presenting to [facility]   
wound care program for an initial visit to the office for a right lower 
extremity   
venous stasis ulcer/lymphedema ulcer.? The patient has been a patient of the 
office   
for quite some time now and has seen other providers.  
He had seen vascular in [place] was told that there is nothing else that can be 
done   
for him.  
He has an extensive history of infections so I imagine this will continue to be 
an   
issue for him.  
He appears to need better edema control.  
He was told by ortho that he needs to lose 50 pounds in order to have surgery- 
this   
was 2 years ago and has not been accomplished.  
A past venous duplex indicates deep vein issues which are not surgically 
correctable-   
vascular did confirm that he does not need to see them because there is nothing 
they   
have to offer him.  
He is still retired so hopefully there is more opportunity for elevation and   
compression of the legs to support healing of the ulcer.  
Nutrition and probiotics were discussed and suggestions were given.  
Doxycycline was escribed today for what appears to be cellulitis of the RLE.  
  
Subjective  
Pain  
Right Lower Leg:  
Pain Intensity: 10  
Wound/Ulcer History  
When did wound start?: 2023  
Mode of Arrival/ : Personal vehicle  
Assistive Device Used Today: Cane  
Lives with:: Alone  
Appetite Description: Within Normal Limits  
Who helps w/ dressing change?: Wound Care Dept  
Why Do You Need Help?: Can't Reach Ulcer  
Smoking Status: Never smoker  
  
[facility]  
Medical History (Updated 23 @ 11:25 by SOWMYA Correa)  
  
Arthritis  
Bilateral knees,  right is bone on bone  per  Dr. Richard   
Back pain  
Cardiomyopathy  
 lower part of heart is weak   
Carpal tunnel syndrome  
right arm surgery  
DVT (deep venous thrombosis)  
 blood clot from right foot to right thigh  on  blood thinner   
Hypertension  
Itching  
Itching with irritation  
DIMITRI (obstructive sleep apnea)  
PVD (peripheral vascular disease)  
 poor veins  Dr. Cameron did vein closure 2019, needs 5 more viens worked 
on.  
Wound of right lower extremity  
  
Surgical History (Reviewed 23 @ 11:09 by Maren Martinez RN)  
  
Hx of cholecystectomy  
  
Family History (Reviewed 23 @ 11:09 by Maren Martinez RN)  
Father Diabetes mellitus, type 2  
Sister Diabetes mellitus, type 2  
  
Social History  
Smoking Status: Never smoker  
Substance Use Type: None  
  
Grafts  
History of Graft  
History of Graft?: Yes  
  
Exam  
Physical Exam  
Vital Signs:  
  
  
  
  
Temp Pulse Resp BP O2 Del Method  
  
97.5 F L 88 18 138/80 Room Air  
  
23 11:04 23 11:04 23 11:04 23 11:04 23 11:04  
  
  
  
Const  
General: cooperative, comfortable and no acute distress  
Nutritional Appearance: obese  
Orientation: alert, awake and oriented x3  
  
Lower/Upper Extremity Exam  
Vascular Exam-Edema  
Right Lower Extremity:  
Edema Type: Lymphedema  
Vascular Exam-Pulses  
Right Brachial:  
Pulse Assessment Method: NIBP  
Right Dorsalis Pedis:  
Pulse Assessment Method: Palpation  
Right Posterior Tibial:  
Pulse Assessment Method: Palpation  
  
Objective  
Meds/Allergies  
Home Medications  
  
carvedilol 6.25 mg tablet 6.25 mg PO BID 17 [History Confirmed 23]  
furosemide 40 mg tablet (Lasix) 40 mg PO BID 17 [History Confirmed 
23]  
acetaminophen 500 mg tablet (Tylenol Extra Strength) 500 mg PO TID PRN Pain 
20   
[History Confirmed 23]  
nabumetone 750 mg tablet 750 mg PO BID 21 [History Confirmed 23]  
empagliflozin 10 mg tablet (Jardiance) 10 mg PO DAILY 22 [History 
Confirmed   
23]  
rivaroxaban 10 mg tablet (Xarelto) 10 mg PO DAILY 22 [History Confirmed   
23]  
semaglutide 2 mg/dose (8 mg/3 mL) subcutaneous pen injector (Ozempic) 2 mg 
subcut Q7D   
22 [History Confirmed 23]  
spironolactone 25 mg tablet 25 mg PO DAILY 22 [History Confirmed 23]  
doxycycline hyclate 100 mg capsule 100 mg PO BID 14 days #28 caps 23 [Rx]  
sacubitril 24 mg-valsartan 26 mg tablet (Entresto) 1 tab PO BID 23 
[History   
Confirmed 23]  
  
  
Allergies  
  
cefixime Allergy (Verified 23 11:09)  
Unknown Reaction  
diclofenac Allergy (Verified 23 11:09)  
Unknown Reaction  
nystatin Allergy (Verified 23 11:09)  
Unknown Reaction  
sodium benzoate Allergy (Verified 23 11:09)  
Unknown Reaction  
  
  
Wound/Ulcer  
Right Lower Leg:  
Type: Venous Stasis Ulcer  
Thickness: Skin Breakdown  
Bed Appearance: Beefy Red, Black Dry, Epithelial Tissue or Bridge and Yellow  
Percent of Wound Bed Granulated/Red: 50  
Percent of Devitalized: 50  
Length (cm): 3.5  
Width (cm): 18.5  
Depth (cm): 0.2  
CM Sq: 64.750  
Surrounding Tissue Appearance: Bright Red  
Surrounding Tissue Temp: Increased Warmth  
Drainage Amount: Moderate  
Drainage Description: Serosanguineous  
Drainage Odor: No Odor  
Results  
Height: 5 ft 11 in  
Weight: 160.118 kg  
Body Mass Index: 49.2  
  
Assessment/Plan  
Assessment/Plan  
(1) Venous stasis ulcer:  
Qualifiers:  
Venous stasis ulcer site: other part of lower leg Varicose vein presence: with   
varicose veins Laterality: right Non-pressure ulcer stage: limited to breakdown 
of   
skin Qualified Code(s): I83.018 - Varicose veins of right lower extremity with 
ulcer   
other part of lower leg; L97.811 - Non-pressurechronic ulcer of other part of 
right   
lower leg limited to breakdown of skin  
Code(s):  
I83.009 - Varicose veins of unspecified lower extremity with ulcer of 
unspecified   
site; L97.909 - Non-pressure chronic ulcer of unspecified part of unspecified 
lower   
leg with unspecified severity  
Status: Chronic  
(2) Cellulitis:  
Assessment/Problem Details:  
rle  
Qualifiers:  
Site of cellulitis: extremity Site of cellulitis of extremity: lower extremity   
Laterality: right Qualified Code(s): L03.115 - Cellulitis of right lower limb  
Code(s):  
L03.90 - Cellulitis, unspecified  
Status: Acute  
(3) Edema of right lower leg:  
Code(s):  
R60.0 - Localized edema  
Status: Chronic  
(4) PVD (peripheral vascular disease):  
Code(s):  
I73.9 - Peripheral vascular disease, unspecified  
Status: Chronic  
(5) Varicose veins of both legs with edema:  
Code(s):  
I83.893 - Varicose veins of bilateral lower extremities with other complications  
Status: Chronic  
(6) Morbid obesity due to excess calories:  
Code(s):  
E66.01 - Morbid (severe) obesity due to excess calories  
Status: Chronic  
(7) Lymphedema of both lower extremities:  
Code(s):  
I89.0 - Lymphedema, not elsewhere classified  
Status: Suspected  
(8) Inflammation:  
Status: Chronic  
Time spent with patient  
Time Spent With Patient (min): 20  
  
  
  
  
Dictated By: SOWMYA Correa DD/DT: 23 1121  
  
Signed By: <Electronically signed by SOWMYA Espino>  
23 1130  
   
  
[facility]  
Work Phone: [phone]- Evaluation note*   
  
                      Encounter Date Diagnosis  Assessment Notes Treatment Notes  
 Treatment   
Clinical Notes  
   
                                                Type 2 diabetes   
mellitus with   
unspecified   
complications (ICD-10   
- E11.8)                                                      
  
  
[facility]  
Other Phone: [phone]- Evaluation note*   
  
                      Encounter Date Diagnosis  Assessment Notes Treatment Notes  
 Treatment   
Clinical Notes  
   
                                                Obesity,   
unspecified   
classification,   
unspecified obesity   
type, unspecified   
whether serious   
comorbidity present   
(ICD-10 - E66.9)                                              
   
                                                BMI 50.0-59.9,   
adult (ICD-10 -   
Z68.43)                                                       
   
                           Other                     Summary of Visi  
t:  
(A) emphasized   
increasing fruits   
and vegetables  
(B) reviewed the   
plate method  
(C) discussed   
foods high in   
sodium  
                                          
  
  
[facility]  
Other Phone: [phone]- Evaluation note*   
  
                      Encounter Date Diagnosis  Assessment Notes Treatment Notes  
 Treatment   
Clinical Notes  
   
                                        21 Dec, 2022        Type 2 diabetes   
mellitus with   
unspecified   
complications (ICD-10   
- E11.8)                                                      
   
                                        21 Dec, 2022        BMI 50.0-59.9, adult  
   
(ICD-10 - Z68.43)                                             
   
                                        21 Dec, 2022        Diarrhea (ICD-10 -   
R19.7)                                                        
   
                                        21 Dec, 2022        Obstructive sleep   
apnea (adult)   
(pediatric) (ICD-10 -   
G47.33)                                                       
   
                                        21 Dec, 2022        Mixed hyperlipidemia  
   
(ICD-10 - E78.2)                                              
   
                                        21 Dec, 2022        CAD (coronary artery  
   
disease) (ICD-10 -   
I25.10)                                                       
   
                                        21 Dec, 2022        Knee osteoarthritis   
(ICD-10 - M17.9)                                              
   
                                        21 Dec, 2022        Cardiomyopathy   
(ICD-10 - I42.9)                                              
   
                                        21 Dec, 2022        CHF (congestive hear  
t   
failure) (ICD-10 -   
I50.9)                                                        
   
                                        21 Dec, 2022        Edema (ICD-10 -   
R60.9)                                                        
   
                                        21 Dec, 2022        Low back derangement  
   
syndrome (ICD-10 -   
M53.86)                                                       
   
                                        21 Dec, 2022        Metabolic syndrome X  
   
(ICD-10 - E88.81)                                             
   
                                        21 Dec, 2022        Encounter for   
immunization (ICD-10   
- Z23)                                                        
  
  
[facility]  
Other Phone: [phone]- Evaluation note*   
  
                      Encounter Date Diagnosis  Assessment Notes Treatment Notes  
 Treatment   
Clinical Notes  
   
                                                Obesity,   
unspecified   
classification,   
unspecified obesity   
type, unspecified   
whether serious   
comorbidity present   
(ICD-10 - E66.9)                                              
   
                                                BMI 50.0-59.9,   
adult (ICD-10 -   
Z68.43)                                                       
   
                           Other                     Summary of Visi  
t:  
(A) encourage   
patient to trial   
different fruits,   
as well as   
temporarily   
reducing/eliminati  
ng milk to improve   
diarrhea  
(B) discussed food   
options for   
traveling on the   
road  
(C) discussed   
options for   
oatmeal  
                                          
  
  
[facility]  
Other Phone: [phone]- Evaluation note*   
  
                      Encounter Date Diagnosis  Assessment Notes Treatment Notes  
 Treatment   
Clinical Notes  
   
                                        20 Oct, 2022        Type 2 diabetes   
mellitus with   
unspecified   
complications (ICD-10   
- E11.8)                                                      
   
                                        20 Oct, 2022        BMI 50.0-59.9, adult  
   
(ICD-10 - Z68.43)                                             
   
                                        20 Oct, 2022        Diarrhea (ICD-10 -   
R19.7)                                                        
   
                                        20 Oct, 2022        Obstructive sleep   
apnea (adult)   
(pediatric) (ICD-10 -   
G47.33)                                                       
   
                                        20 Oct, 2022        Mixed hyperlipidemia  
   
(ICD-10 - E78.2)                                              
   
                                        20 Oct, 2022        CAD (coronary artery  
   
disease) (ICD-10 -   
I25.10)                                                       
   
                                        20 Oct, 2022        Knee osteoarthritis   
(ICD-10 - M17.9)                                              
   
                                        20 Oct, 2022        Cardiomyopathy   
(ICD-10 - I42.9)                                              
   
                                        20 Oct, 2022        CHF (congestive hear  
t   
failure) (ICD-10 -   
I50.9)                                                        
   
                                        20 Oct, 2022        Edema (ICD-10 -   
R60.9)                                                        
   
                                        20 Oct, 2022        Low back derangement  
   
syndrome (ICD-10 -   
M53.86)                                                       
   
                                        20 Oct, 2022        Metabolic syndrome X  
   
(ICD-10 - E88.81)                                             
   
                                        20 Oct, 2022        Encounter for   
immunization (ICD-10   
- Z23)                                              Patient denies   
current illness,   
previous allergic   
reaction to   
influenza   
vaccine, eggs, or   
other vaccines,   
and   
Guillain-[place]   
Syndrome. Patient   
given current   
editions of   
influenza Vaccine   
Information   
Statement (VIS).  
                                          
  
  
[facility]  
Other Phone: [phone]- Progress note  
  
  
  
  
                                        Author              Laila Villarreal  
[facility]  
2022 1:59pm  
   
                                        Note Date/Time      2022  
 1:59pm  
   
                                                    [facility]  
ENTER  
[address] [place] [address]  
  
Wound Center Provider Note  
Signed  
  
Patient: Jocelin Pacheco MR#:   
[id number]  
: 1955 Acct:[id number]  
  
Age/Sex: 67 / M  
  
Copies to: SOWMYA Gutierrez,~  
  
  
HPI  
Date of Visit  
Date of Visit:  
Date of Service: 2022  
Time of Service: 13:55  
  
Narrative  
HPI:  
Jocelin is a 67-year-old male presenting to the [facility] for a 
follow-up   
visit for evaluation and treatment of a Right Lower Leg Wound. Jocelin reports 
that he   
bumped his leg on an end table approximately one month ago. He has been a 
patient in   
our office before due to venous stasis ulcers. He has 2+ pitting edema to his 
RLE. He   
does have compression pumps at home and admits to using them approximately once 
a   
week. He has been seen by vascular in the past in [place] where he was told no   
surgical intervention could be performed. The right leg is entirely healed. The 
area   
will be padded and protected today. His right leg will be wrapped in ACE wrap 
for   
compression. He understands that he should use his compression pumps daily, and   
compression stockings are recommended as well. He will reach out to our office 
for   
further wound care needs.  
  
Subjective  
Pain  
Right Lower Leg:  
Pain Description: Throbbing  
Pain Intensity: 0  
Wound/Ulcer History  
When did wound start?: 2022 right medial lower leg  
Mode of Arrival/ : Personal vehicle  
Assistive Device Used Today: Cane  
Lives with:: Alone  
Appetite Description: Within Normal Limits  
Who helps w/ dressing change?: Wound Care Dept  
Smoking Status: Never smoker  
  
[facility]  
Medical History (Updated 22 @ 13:56 by SOWMYA Gutierrez)  
  
Arthritis  
Bilateral knees,  right is bone on bone  per  Dr. Richard   
Back pain  
Cardiomyopathy  
 lower part of heart is weak   
Carpal tunnel syndrome  
right arm surgery  
DVT (deep venous thrombosis)  
 blood clot from right foot to right thigh  on  blood thinner   
Hypertension  
Itching  
Itching with irritation  
DIMITRI (obstructive sleep apnea)  
PVD (peripheral vascular disease)  
 poor veins  Dr. Cameron did vein closure 2019, needs 5 more viens worked 
on.  
Wound of right lower extremity  
  
Surgical History (Reviewed 22 @ 08:05 by Maria A Crowe RN)  
  
Hx of cholecystectomy  
  
Family History (Reviewed 22 @ 08:05 by Maria A Crowe RN)  
Father Diabetes mellitus, type 2  
Sister Diabetes mellitus, type 2  
  
Social History  
Smoking Status: Never smoker  
Substance Use Type: None  
  
Grafts  
History of Graft  
History of Graft?: No  
  
Exam  
Physical Exam  
Vital Signs:  
  
  
  
  
Temp Pulse Resp BP O2 Del Method  
  
98.6 F 92 H 18 119/69 Room Air  
  
22 13:46 22 13:46 22 13:46 22 13:46 22 13:46  
  
  
  
Const  
General: cooperative, comfortable and no acute distress  
Nutritional Appearance: obese  
Orientation: alert, awake and oriented x3  
  
Lower/Upper Extremity Exam  
Vascular Exam-Edema  
Right Lower Extremity:  
Edema Degree: 1+  
Edema Type: Pitting  
Vascular Exam-Pulses  
Right Dorsalis Pedis:  
Pulse Assessment Method: Palpation  
Right Posterior Tibial:  
Pulse Assessment Method: Palpation  
Right Brachial:  
Pulse Assessment Method: NIBP  
  
Objective  
Meds/Allergies  
Home Medications  
  
carvedilol 6.25 mg tablet 6.25 mg PO BID 17 [History Confirmed 22]  
furosemide 40 mg tablet (Lasix) 40 mg PO BID 17 [History Confirmed 
22]  
acetaminophen 500 mg tablet (Tylenol Extra Strength) 500 mg PO TID PRN Pain 
20   
[History Confirmed 22]  
nabumetone 750 mg tablet 750 mg PO BID 21 [History Confirmed 22]  
clobetasol 0.05 % topical ointment 1 applic topical 3XW PRN Rash 21 
[History   
Confirmed 22]  
empagliflozin 10 mg tablet (Jardiance) 10 mg PO DAILY 22 [History 
Confirmed   
22]  
metolazone 2.5 mg tablet 2.5 mg PO Q2D 22 [History Confirmed 22]  
rivaroxaban 10 mg tablet (Xarelto) 10 mg PO DAILY 22 [History Confirmed   
22]  
semaglutide 2 mg/dose (8 mg/3 mL) subcutaneous pen injector (Ozempic) 2 mg 
subcut Q7D   
22 [History Confirmed 22]  
spironolactone 25 mg tablet 25 mg PO DAILY 22 [History Confirmed 22]  
telmisartan 40 mg tablet (Micardis) 40 mg PO DAILY 22 [History Confirmed   
22]  
  
  
Allergies  
  
cefixime Allergy (Verified 22 10:26)  
Unknown Reaction  
diclofenac Allergy (Verified 22 10:26)  
Unknown Reaction  
nystatin Allergy (Verified 22 10:26)  
Unknown Reaction  
sodium benzoate Allergy (Verified 22 10:26)  
Unknown Reaction  
  
  
Wound/Ulcer  
Right Lower Posterior Leg:  
Bed Appearance: Beefy Red and Pink  
Length (cm): 0  
Width (cm): 0  
Depth (cm): 0  
CM Sq: 0.000  
Surrounding Tissue Appearance: Hyperpigmented  
Surrounding Tissue Temp: Warm  
Drainage Odor: No Odor  
Results  
Height: 5 ft 11 in  
Weight: 170.551 kg  
Body Mass Index: 52.4  
  
Assessment/Plan  
Assessment/Plan  
(1) Traumatic open wound of right lower leg:  
Assessment/Problem Details:  
22- Right lower medial leg- healed  
Code(s):  
S81.801A - Unspecified open wound, right lower leg, initial encounter  
Status: Resolved  
(2) Venous stasis ulcer:  
Assessment/Problem Details:  
new 22- healed 22  
Code(s):  
I83.009 - Varicose veins of unspecified lower extremity with ulcer of 
unspecified   
site; L97.909 - Non-pressure chronic ulcer of unspecified part of unspecified 
lower   
leg with unspecified severity  
Status: Resolved  
(3) Edema of right lower leg:  
Code(s):  
R60.0 - Localized edema  
Status: Chronic  
(4) PVD (peripheral vascular disease):  
Code(s):  
I73.9 - Peripheral vascular disease, unspecified  
Status: Chronic  
(5) Varicose veins of both legs with edema:  
Code(s):  
I83.893 - Varicose veins of bilateral lower extremities with other complications  
Status: Chronic  
(6) Morbid obesity due to excess calories:  
Code(s):  
E66.01 - Morbid (severe) obesity due to excess calories  
Status: Chronic  
Time spent with patient  
Time Spent With Patient (min): 20  
  
  
  
  
Dictated By: SOWMYA Gutierrez DD/DT: 22 1355  
  
Signed By: <Electronically signed by SOWMYA Villarreal>  
22 1359  
   
  
[facility]  
Work Phone: [phone]- Progress note  
  
  
  
  
                                        Author              Laila Villarreal  
[facility]  
2022 1:58pm  
   
                                        Note Date/Time      2022  
 1:58pm  
   
                                                    [facility]  
ENTER  
[address] [place] [address]  
  
Wound Center Provider Note  
Signed  
  
Patient: Jocelin Pacheco MR#:   
[id number]  
: 1955 Acct:[id number]  
  
Age/Sex: 67 / M  
  
Copies to: SOWMYA Gutierrez DO~  
  
  
HPI  
Date of Visit  
Date of Visit:  
Date of Service: 2022  
Time of Service: 13:54  
  
Narrative  
HPI:  
Jocelin is a 67-year-old male presenting to the [facility] for a 
follow-up   
visit for evaluation and treatment of a Right Lower Leg Wound. Jocelin reports 
that he   
bumped his leg on an end table approximately one month ago. He has been a 
patient in   
our office before due to venous stasis ulcers. He has 2+ pitting edema to his 
RLE. He   
does have compression pumps at home and admits to using them approximately once 
a   
week. He has been seen by vascular in the past in [place] where he was told no   
surgical intervention could be performed. The initial traumatic wound is healed.
He   
does present with a new ulcer to right lower posterior leg. His wound will be 
topped   
with collagen powder and topped with versatel. His leg will be wrapped in an 
Ergo K2   
for compression to reduce edema. Jocelin does seem to be tolerating the Ergo wrap   
better. Healing of the wound will be dependent on dressing changes being 
performed as   
ordered, a nutritious diet somewhat higher in protein, loss of excess weight, 
control   
of edema with compression and use of compression pumps, and preventing/treating   
infection if it were to arise. There are no signs of acute infection on exam 
today.   
Dressing changes will be done in our office twice a week. Jocelin also reports 
that he   
has been exercising regularly over the past month to help with weight loss.  
  
Subjective  
Pain  
Right Lower Leg:  
Pain Description: Throbbing  
Pain Intensity: 2  
Wound/Ulcer History  
When did wound start?: 2022 right medial lower leg  
Mode of Arrival/ : Personal vehicle  
Assistive Device Used Today: Cane  
Lives with:: Alone  
Appetite Description: Within Normal Limits  
Who helps w/ dressing change?: Wound Care Dept  
Smoking Status: Never smoker  
  
[facility]  
Medical History (Updated 22 @ 13:55 by Laila Villarreal, SOWMYA)  
  
Arthritis  
Bilateral knees,  right is bone on bone  per  Dr. Richard   
Back pain  
Cardiomyopathy  
 lower part of heart is weak   
Carpal tunnel syndrome  
right arm surgery  
DVT (deep venous thrombosis)  
 blood clot from right foot to right thigh  on  blood thinner   
Hypertension  
Itching  
Itching with irritation  
DIMITRI (obstructive sleep apnea)  
PVD (peripheral vascular disease)  
 poor veins  Dr. Cameron did vein closure 2019, needs 5 more viens worked 
on.  
Wound of right lower extremity  
  
Surgical History (Reviewed 22 @ 08:05 by Maria A Crowe RN)  
  
Hx of cholecystectomy  
  
Family History (Reviewed 22 @ 08:05 by Maria A Crowe RN)  
Father Diabetes mellitus, type 2  
Sister Diabetes mellitus, type 2  
  
Social History  
Smoking Status: Never smoker  
Substance Use Type: None  
  
Grafts  
History of Graft  
History of Graft?: No  
  
Exam  
Physical Exam  
Vital Signs:  
  
  
  
  
Temp Pulse Resp BP O2 Del Method  
  
98.1 F 80 20 116/72 Room Air  
  
22 13:48 22 13:48 22 13:48 22 13:48 22 13:48  
  
  
  
Const  
General: cooperative, comfortable and no acute distress  
Nutritional Appearance: obese  
Orientation: alert, awake and oriented x3  
  
Lower/Upper Extremity Exam  
Vascular Exam-Edema  
Right Lower Extremity:  
Edema Degree: 2+  
Edema Type: Pitting  
Vascular Exam-Pulses  
Right Dorsalis Pedis:  
Pulse Assessment Method: Doppler  
Right Posterior Tibial:  
Pulse Assessment Method: Doppler  
Right Brachial:  
Pulse Assessment Method: NIBP  
  
Objective  
Meds/Allergies  
Home Medications  
  
carvedilol 6.25 mg tablet 6.25 mg PO BID 17 [History Confirmed 22]  
furosemide 40 mg tablet (Lasix) 40 mg PO BID 17 [History Confirmed 
22]  
acetaminophen 500 mg tablet (Tylenol Extra Strength) 500 mg PO TID PRN Pain 
20   
[History Confirmed 22]  
nabumetone 750 mg tablet 750 mg PO BID 21 [History Confirmed 22]  
clobetasol 0.05 % topical ointment 1 applic topical 3XW PRN Rash 21 
[History   
Confirmed 22]  
empagliflozin 10 mg tablet (Jardiance) 10 mg PO DAILY 22 [History 
Confirmed   
22]  
metolazone 2.5 mg tablet 2.5 mg PO Q2D 22 [History Confirmed 22]  
rivaroxaban 10 mg tablet (Xarelto) 10 mg PO DAILY 22 [History Confirmed   
22]  
semaglutide 2 mg/dose (8 mg/3 mL) subcutaneous pen injector (Ozempic) 2 mg 
subcut Q7D   
22 [History Confirmed 22]  
spironolactone 25 mg tablet 25 mg PO DAILY 22 [History Confirmed 22]  
telmisartan 40 mg tablet (Micardis) 40 mg PO DAILY 22 [History Confirmed   
22]  
  
  
Allergies  
  
cefixime Allergy (Verified 22 10:26)  
Unknown Reaction  
diclofenac Allergy (Verified 22 10:26)  
Unknown Reaction  
nystatin Allergy (Verified 22 10:26)  
Unknown Reaction  
sodium benzoate Allergy (Verified 22 10:26)  
Unknown Reaction  
  
  
Wound/Ulcer  
Right Lower Medial Leg:  
Type: Traumatic (laceration (without foreign body))  
Thickness: Full  
Bed Appearance: Beefy Red and Pink  
Percent of Wound Bed Granulated/Red: 100  
Percent of Devitalized: 0  
Length (cm): 0  
Width (cm): 0  
Depth (cm): 0  
CM Sq: 0.000  
Surrounding Tissue Appearance: Hyperpigmented  
Surrounding Tissue Temp: Warm  
Drainage Amount: None  
Drainage Description: Serosanguineous  
Drainage Odor: No Odor  
Right Lower Posterior Leg:  
Bed Appearance: Beefy Red and Pink  
Percent of Wound Bed Granulated/Red: 100  
Percent of Devitalized: 0  
Length (cm): 2.4  
Width (cm): 1.9  
Depth (cm): 0.1  
CM Sq: 4.560  
Surrounding Tissue Appearance: Hyperpigmented  
Surrounding Tissue Temp: Warm  
Drainage Amount: Moderate  
Drainage Description: Serosanguineous  
Drainage Odor: No Odor  
Results  
Height: 5 ft 11 in  
Weight: 170.551 kg  
Body Mass Index: 52.4  
  
Assessment/Plan  
Assessment/Plan  
(1) Traumatic open wound of right lower leg:  
Assessment/Problem Details:  
22- Right lower medial leg- healed  
Code(s):  
S81.801A - Unspecified open wound, right lower leg, initial encounter  
Status: Resolved  
(2) Venous stasis ulcer:  
Assessment/Problem Details:  
new 22  
Code(s):  
I83.009 - Varicose veins of unspecified lower extremity with ulcer of 
unspecified   
site; L97.909 - Non-pressure chronic ulcer of unspecified part of unspecified 
lower   
leg with unspecified severity  
Status: Acute  
(3) Edema of right lower leg:  
Code(s):  
R60.0 - Localized edema  
Status: Chronic  
(4) PVD (peripheral vascular disease):  
Code(s):  
I73.9 - Peripheral vascular disease, unspecified  
Status: Chronic  
(5) Varicose veins of both legs with edema:  
Code(s):  
I83.893 - Varicose veins of bilateral lower extremities with other complications  
Status: Chronic  
(6) Morbid obesity due to excess calories:  
Code(s):  
E66.01 - Morbid (severe) obesity due to excess calories  
Status: Chronic  
Time spent with patient  
Time Spent With Patient (min): 20  
  
  
  
  
Dictated By: SOWMYA Gutierrez DD/DT: 22 1354  
  
Signed By: <Electronically signed by SOWMYA Villarreal>  
22 1358  
   
  
[facility]  
Work Phone: [phone]- Evaluation note*   
  
                      Encounter Date Diagnosis  Assessment Notes Treatment Notes  
 Treatment   
Clinical Notes  
   
                                        08 Sep, 2022        Type 2 diabetes   
mellitus with   
unspecified   
complications (ICD-10   
- E11.8)                                                      
   
                                        08 Sep, 2022        BMI 50.0-59.9, adult  
   
(ICD-10 - Z68.43)                                             
   
                                        08 Sep, 2022        Obstructive sleep   
apnea (adult)   
(pediatric) (ICD-10 -   
G47.33)                                                       
   
                                        08 Sep, 2022        Mixed hyperlipidemia  
   
(ICD-10 - E78.2)                                              
   
                                        08 Sep, 2022        CAD (coronary artery  
   
disease) (ICD-10 -   
I25.10)                                                       
   
                                        08 Sep, 2022        Knee osteoarthritis   
(ICD-10 - M17.9)                                              
   
                                        08 Sep, 2022        Cardiomyopathy   
(ICD-10 - I42.9)                                              
   
                                        08 Sep, 2022        CHF (congestive hear  
t   
failure) (ICD-10 -   
I50.9)                                                        
   
                                        08 Sep, 2022        Edema (ICD-10 -   
R60.9)                                                        
   
                                        08 Sep, 2022        Low back derangement  
   
syndrome (ICD-10 -   
M53.86)                                                       
   
                                        08 Sep, 2022        Metabolic syndrome X  
   
(ICD-10 - E88.81)                                             
  
  
[facility]  
Other Phone: [phone]- Progress note  
  
  
  
  
                                        Author              Laila Villarreal  
[facility]  
2022 10:41am  
   
                                        Note Date/Time      2022 10  
:40am  
   
                                                    [facility]  
ENTER  
[address] [place] [address]  
  
Wound Center Provider Note  
Signed  
  
Patient: Jocelin Pacheco MR#:   
[id number]  
: 1955 Acct:[id number]  
  
Age/Sex: 67 / M  
  
Copies to: SOWMYA Gutierrez DO~  
  
  
HPI  
Date of Visit  
Date of Visit:  
Date of Service: 2022  
Time of Service: 10:37  
  
Narrative  
HPI:  
Jocelin is a 67-year-old male presenting to the [facility] for a 
follow-up   
visit for evaluation and treatment of a Right Lower Leg Wound. Jocelin reports 
that he   
bumped his leg on an end table approximately one month ago. He has been a 
patient in   
our office before due to venous stasis ulcers. He has 2+ pitting edema to his 
RLE. He   
does have compression pumps at home and admits to using them approximately once 
a   
week. He has been seen by vascular in the past in [place] where he was told no   
surgical intervention could be performed. The wound did show improvement. The 
wound   
bed is more pink/red. His wound will be topped with collagen powder. His leg 
will be   
wrapped in an Ergo K2 for compression to reduce edema. He did experience 
sensitivity   
(itching) to the standard Unna boot. Clobetasol was applied anastasia wound and we 
will   
monitor for improvement with the Ergo wrap. Healing of the wound will be 
dependent on   
dressing changes being performed as ordered, a nutritious diet somewhat higher 
in   
protein, loss of excess weight, control of edema with compression and use of   
compression pumps, and preventing/treating infection if it were to arise. 
Thereare no   
signs of acute infection on exam today. Dressing changes will be done inour 
office   
twice a week. Jocelin also reports that he has been exercising regularly over the 
past   
month to help with weight loss.  
  
Subjective  
Pain  
Right Lower Leg:  
Pain Description: Soreness  
Pain Intensity: 7  
Wound/Ulcer History  
When did wound start?: 2022 right medial lower leg  
Mode of Arrival/ : Personal vehicle  
Assistive Device Used Today: Cane  
Lives with:: Alone  
Appetite Description: Within Normal Limits  
Who helps w/ dressing change?: Wound Care Dept  
Smoking Status: Never smoker  
  
[facility]  
Medical History (Updated 22 @ 08:17 by Maria A Crowe RN)  
  
Arthritis  
Bilateral knees,  right is bone on bone  per  Dr. Richard   
Back pain  
Cardiomyopathy  
 lower part of heart is weak   
Carpal tunnel syndrome  
right arm surgery  
DVT (deep venous thrombosis)  
 blood clot from right foot to right thigh  on  blood thinner   
Hypertension  
Itching  
Itching with irritation  
DIMITRI (obstructive sleep apnea)  
PVD (peripheral vascular disease)  
 poor veins  Dr. Cameron did vein closure 2019, needs 5 more viens worked 
on.  
Wound of right lower extremity  
  
Surgical History (Reviewed 22 @ 08:05 by Maria A Crowe, KIMBERLY)  
  
Hx of cholecystectomy  
  
Family History (Reviewed 22 @ 08:05 by Maira A Crowe RN)  
Father Diabetes mellitus, type 2  
Sister Diabetes mellitus, type 2  
  
Social History  
Smoking Status: Never smoker  
Substance Use Type: None  
  
Grafts  
History of Graft  
History of Graft?: No  
  
Exam  
Physical Exam  
Vital Signs:  
  
  
  
  
Temp Pulse Resp BP O2 Del Method  
  
97.3 F L 103 H 18 139/72 Room Air  
  
22 10:24 22 10:24 22 10:24 22 10:24 22 10:24  
  
  
  
Const  
General: cooperative, comfortable and no acute distress  
Nutritional Appearance: obese  
Orientation: alert, awake and oriented x3  
  
Lower/Upper Extremity Exam  
Vascular Exam-Edema  
Right Lower Extremity:  
Edema Degree: 2+  
Edema Type: Pitting  
Vascular Exam-Pulses  
Right Dorsalis Pedis:  
Pulse Assessment Method: Doppler  
Right Posterior Tibial:  
Pulse Assessment Method: Doppler  
Right Brachial:  
Pulse Assessment Method: NIBP  
  
Objective  
Meds/Allergies  
Home Medications  
  
carvedilol 6.25 mg tablet 6.25 mg PO BID 17 [History Confirmed 22]  
furosemide 40 mg tablet (Lasix) 40 mg PO BID 17 [History Confirmed 
22]  
acetaminophen 500 mg tablet (Tylenol Extra Strength) 500 mg PO TID PRN Pain 
20   
[History Confirmed 22]  
nabumetone 750 mg tablet 750 mg PO BID 21 [History Confirmed 22]  
clobetasol 0.05 % topical ointment 1 applic topical 3XW PRN Rash 21 
[History   
Confirmed 22]  
empagliflozin 10 mg tablet (Jardiance) 10 mg PO DAILY 22 [History 
Confirmed   
22]  
metolazone 2.5 mg tablet 2.5 mg PO Q2D 22 [History Confirmed 22]  
rivaroxaban 10 mg tablet (Xarelto) 10 mg PO DAILY 22 [History Confirmed   
22]  
semaglutide 2 mg/dose (8 mg/3 mL) subcutaneous pen injector (Ozempic) 2 mg 
subcut Q7D   
22 [History Confirmed 22]  
spironolactone 25 mg tablet 25 mg PO DAILY 22 [History Confirmed 22]  
telmisartan 40 mg tablet (Micardis) 40 mg PO DAILY 22 [History Confirmed   
22]  
  
  
Allergies  
  
cefixime Allergy (Verified 22 10:26)  
Unknown Reaction  
diclofenac Allergy (Verified 22 10:26)  
Unknown Reaction  
nystatin Allergy (Verified 22 10:26)  
Unknown Reaction  
sodium benzoate Allergy (Verified 22 10:26)  
Unknown Reaction  
  
  
Wound/Ulcer  
Right Lower Medial Leg:  
Type: Traumatic (laceration (without foreign body))  
Thickness: Full  
Bed Appearance: Beefy Red, Pink and Yellow  
Percent of Wound Bed Granulated/Red: 95  
Percent of Devitalized: 5  
Length (cm): 2.1  
Width (cm): 1.4  
Depth (cm): 0.1  
CM Sq: 2.940  
Surrounding Tissue Appearance: Hyperpigmented  
Surrounding Tissue Temp: Warm  
Drainage Amount: Moderate  
Drainage Description: Serosanguineous  
Drainage Odor: No Odor  
Lidocaine Applied Topically: 2% Jelly  
Results  
Height: 5 ft 11 in  
Weight: 170.551 kg  
Body Mass Index: 52.4  
  
Assessment/Plan  
Assessment/Plan  
(1) Traumatic open wound of right lower leg:  
Assessment/Problem Details:  
22- Right lower medial leg  
Code(s):  
S81.801A - Unspecified open wound, right lower leg, initial encounter  
Status: Chronic  
(2) Edema of right lower leg:  
Code(s):  
R60.0 - Localized edema  
Status: Chronic  
(3) PVD (peripheral vascular disease):  
Code(s):  
I73.9 - Peripheral vascular disease, unspecified  
Status: Chronic  
(4) Varicose veins of both legs with edema:  
Code(s):  
I83.893 - Varicose veins of bilateral lower extremities with other complications  
Status: Chronic  
(5) Morbid obesity due to excess calories:  
Code(s):  
E66.01 - Morbid (severe) obesity due to excess calories  
Status: Chronic  
Time spent with patient  
Time Spent With Patient (min): 15  
  
  
  
  
Dictated By: SOWMYA Gutierrez DD/DT: 22 1037  
  
Signed By: <Electronically signed by SOWMYA Villarreal>  
22 1041  
   
  
[facility]  
Work Phone: [phone]- Progress note  
  
  
  
  
                                        Author              Laila Villarreal  
[facility]  
2022 8:03am  
   
                                        Note Date/Time      2022 8:  
03am  
   
                                                    [facility]  
ENTER  
[address] [place] [address]  
  
Wound Center Provider Note  
Signed  
  
Patient: Jocelin Pacheco MR#:   
[id number]  
: 1955 Acct:[id number]  
  
Age/Sex: 67 / M  
  
Copies to: SOWMYA Gutierrez DO~  
  
  
HPI  
Date of Visit  
Date of Visit:  
Date of Service: 2022  
Time of Service: 07:50  
  
Narrative  
HPI:  
Jocelin is a 67-year-old male presenting to the [facility] for an 
initial   
visit for evaluation and treatment of a Right Lower Leg Wound. Jocelin reports 
that he   
bumped his leg on an end table approximately one month ago. He has been a 
patient in   
our office before due to venous stasis ulcers. He presents today with the wound 
open   
to air. He has 2+ pitting edema to his RLE. He does have compression pumps at 
home   
and admits to using them approximately once a week. He has been seen by vascular
in   
the past in [place] where he was told no surgical intervention could be 
performed.   
The wound will be topped witha honey/collagen mixture and an alginate silver. 
His leg   
will be wrapped in an Unna boot for compression to reduce edema. Healing of the 
wound   
will be dependent on dressing changes being performed as ordered, a nutritious 
diet   
somewhathigher in protein, loss of excess weight, control of edema with 
compression   
and use of compression pumps, and preventing/treating infection if it were to 
arise.   
There are no signs of acute infection on exam today. Dressing changes will be 
done in   
our office twice a week. Jocelin also reports that he has been exercising 
regularly   
over the past month to help with weight loss.  
  
Subjective  
Pain  
Right Lower Leg:  
Pain Description: Soreness  
Pain Intensity: 8  
Wound/Ulcer History  
When did wound start?: 2022 right medial lower leg  
Mode of Arrival/ : Personal vehicle  
Assistive Device Used Today: Cane  
Lives with:: Alone  
Appetite Description: Within Normal Limits  
Who helps w/ dressing change?: Wound Care Dept  
Smoking Status: Never smoker  
  
[facility]  
Medical History (Reviewed 21 @ 01:50 by Gumaro Malin DO)  
  
Arthritis  
Bilateral knees,  right is bone on bone  per  Dr. Richard   
Back pain  
Cardiomyopathy  
 lower part of heart is weak   
Carpal tunnel syndrome  
right arm surgery  
DVT (deep venous thrombosis)  
 blood clot from right foot to right thigh  on  blood thinner   
Hypertension  
Itching  
Itching with irritation  
DIMITRI (obstructive sleep apnea)  
PVD (peripheral vascular disease)  
 poor veins  Dr. Cameron did vein closure 2019, needs 5 more viens worked 
on.  
  
Surgical History (Reviewed 21 @ 01:50 by Gumaro Malin DO)  
  
Hx of cholecystectomy  
  
Family History (Reviewed 21 @ 01:50 by Gumaro Malin DO)  
Father Diabetes mellitus, type 2  
Sister Diabetes mellitus, type 2  
  
Social History  
Smoking Status: Never smoker  
Substance Use Type: None  
  
Grafts  
History of Graft  
History of Graft?: No  
  
Exam  
Physical Exam  
Vital Signs:  
  
  
  
  
Temp Pulse Resp BP O2 Del Method  
  
97.7 F 97 H 24 130/70 Room Air  
  
22 07:37 22 07:37 22 07:37 22 07:37 22 07:37  
  
  
  
Const  
General: cooperative, comfortable and no acute distress  
Nutritional Appearance: obese  
Orientation: alert, awake and oriented x3  
  
Lower/Upper Extremity Exam  
Vascular Exam-Edema  
Right Lower Extremity:  
Edema Degree: 2+  
Edema Type: Pitting  
Vascular Exam-Pulses  
Right Dorsalis Pedis:  
Pulse Assessment Method: Palpation  
Right Posterior Tibial:  
Pulse Assessment Method: Doppler  
Right Brachial:  
Pulse Assessment Method: NIBP  
  
Objective  
Meds/Allergies  
Home Medications  
  
carvedilol 6.25 mg tablet 6.25 mg PO BID 17 [History Confirmed 21]  
furosemide 40 mg tablet (Lasix) 40 mg PO BID 17 [History Confirmed 
21]  
telmisartan 40 mg tablet 40 mg PO DAILY 17 [History Confirmed 21]  
metolazone 2.5 mg tablet 2.5 mg PO 3XW PRN Edema 19 [History Confirmed   
21]  
acetaminophen 500 mg tablet (Tylenol Extra Strength) 500 mg PO TID PRN Pain 
20   
[History Confirmed 21]  
ascorbic acid (vitamin C) 500 mg tablet (Vitamin C) 500 mg PO BID 7 days #14 
tabs   
20 [Rx Confirmed 21]  
zinc sulfate 50 mg zinc (220 mg) capsule (Orazinc) 220 mg PO DAILY 7 days #7 
caps   
20 [Rx Confirmed 21]  
nabumetone 750 mg tablet 750 mg PO BID 21 [History Confirmed 21]  
hydrocodone 5 mg-acetaminophen 325 mg tablet 1 tab PO Q8H PRN severe pain 
(scalescore   
7-10) 2 days #6 tabs 21 [Rx Confirmed 21]  
clobetasol 0.05 % topical ointment 1 applic topical 3XW PRN Rash 21 
[History   
Confirmed 21]  
  
  
Allergies  
  
cefixime Allergy (Verified 21 13:49)  
Unknown Reaction  
diclofenac Allergy (Verified 21 13:49)  
Unknown Reaction  
nystatin Allergy (Verified 21 13:49)  
Unknown Reaction  
sodium benzoate Allergy (Verified 21 13:49)  
Unknown Reaction  
  
  
Wound/Ulcer  
Right Lower Medial Leg:  
Type: Traumatic (laceration (without foreign body))  
Thickness: Full  
Bed Appearance: Brown, Devitalized and Dried Exudate  
Percent of Wound Bed Granulated/Red: 0  
Percent of Devitalized: 100  
Length (cm): 2.5  
Width (cm): 1.5  
Depth (cm): 0.1  
CM Sq: 3.750  
Surrounding Tissue Appearance: Hyperpigmented  
Surrounding Tissue Temp: Warm  
Drainage Amount: Scant  
Drainage Description: Serosanguineous  
Drainage Odor: No Odor  
Lidocaine Applied Topically: 2% Jelly  
Procedures  
Time Out: 2 Patient Identifiers, Correct Patient, Correct Side/Site, Correct   
Procedure and Safety Issues Reviewed  
Procedure:  
The right lower medial leg wound was anesthetized with 2% topical lidocaine. A   
curette was used to perform debridement for the removal of 3.7 cm? of 
devitalized   
tissue consisting of skin, slough, and exudate. Debridement was down to more 
healthy,   
bleeding tissue. Estimated blood loss was minimal. Hemostasis was achieved by   
applying direct pressure. The wound now appears 50% more pink and red and 
remains the   
same in size. The patient tolerated the procedure well with minimal pain.  
Results  
Height: 5 ft 11 in  
Weight: 170.551 kg  
Body Mass Index: 52.4  
  
Assessment/Plan  
Assessment/Plan  
(1) Traumatic open wound of right lower leg:  
Assessment/Problem Details:  
22- Right lower medial leg  
Code(s):  
S81.801A - Unspecified open wound, right lower leg, initial encounter  
Status: Chronic  
(2) Edema of right lower leg:  
Code(s):  
R60.0 - Localized edema  
Status: Chronic  
(3) PVD (peripheral vascular disease):  
Code(s):  
I73.9 - Peripheral vascular disease, unspecified  
Status: Chronic  
(4) Varicose veins of both legs with edema:  
Code(s):  
I83.893 - Varicose veins of bilateral lower extremities with other complications  
Status: Chronic  
(5) Morbid obesity due to excess calories:  
Code(s):  
E66.01 - Morbid (severe) obesity due to excess calories  
Status: Chronic  
Time spent with patient  
Time Spent With Patient (min): 20  
  
  
  
  
Dictated By: SOWMYA Gutierrez DD/DT: 22 0750  
  
Signed By: <Electronically signed by SOWMYA Villarreal>  
22 0803  
   
  
[facility]  
Work Phone: [phone]- Evaluation note*   
  
                      Encounter Date Diagnosis  Assessment Notes Treatment Notes  
 Treatment   
Clinical Notes  
   
                                                Type 2 diabetes   
mellitus with   
unspecified   
complications (ICD-10   
- E11.8)                                                      
   
                                                BMI 50.0-59.9, adult  
   
(ICD-10 - Z68.43)                                             
   
                                                Obstructive sleep   
apnea (adult)   
(pediatric) (ICD-10 -   
G47.33)                                                       
   
                                                Mixed hyperlipidemia  
   
(ICD-10 - E78.2)                                              
   
                                                CAD (coronary artery  
   
disease) (ICD-10 -   
I25.10)                                                       
   
                                                Knee osteoarthritis   
(ICD-10 - M17.9)                                              
   
                                                Cardiomyopathy   
(ICD-10 - I42.9)                                              
   
                                                CHF (congestive hear  
t   
failure) (ICD-10 -   
I50.9)                                                        
   
                                                Edema (ICD-10 -   
R60.9)                                                        
   
                                                Low back derangement  
   
syndrome (ICD-10 -   
M53.86)                                                       
   
                                                Metabolic syndrome X  
   
(ICD-10 - E88.81)                                             
  
  
[facility]  
Other Phone: [phone]- Evaluation note*   
  
                      Encounter Date Diagnosis  Assessment Notes Treatment Notes  
 Treatment   
Clinical Notes  
   
                                                Obesity,   
unspecified   
classification,   
unspecified obesity   
type, unspecified   
whether serious   
comorbidity present   
(ICD-10 - E66.9)                                              
   
                                                BMI 50.0-59.9,   
adult (ICD-10 -   
Z68.43)                                                       
   
                           Other                     Summary of Visi  
t:  
(A) reviewed plate   
method  
(B) emphasized   
reducing calorie   
consumption   
through beverages  
(C) continue   
logging all food   
and drink  
                                          
  
  
[facility]  
Other Phone: [phone]- Evaluation note*   
  
                      Encounter Date Diagnosis  Assessment Notes Treatment Notes  
 Treatment   
Clinical Notes  
   
                                                Obstructive sleep   
apnea (adult)   
(pediatric) (ICD-10   
- G47.33)                                           His machine should   
be set for CPAP   
auto minimum 12   
And maximum 18. We   
will try to get   
download 1 way or   
another to confirm   
that he has good   
control. He does   
seem to tolerate   
the pressure   
without difficulty   
and does wear the   
mask when he is in   
bed. I encouraged   
him to ensure   
regular sleep-wake   
cycles with   
adequate sleep   
time  
                                          
   
                                                Sleep phase   
syndrome, delayed   
(ICD-10 - G47.21)                                   His original   
history reported   
substantial sleep   
phase delay, but   
he is now   
reporting almost   
chaotic sleep-wake   
cycles. I   
encouraged him to   
have regular   
sleep-wake times,   
and to avoid being   
awake in the bed   
even if unable to   
sleep.  
                                          
   
                                                BMI 50.0-59.9,   
adult (ICD-10 -   
Z68.43)                                             He asked about   
inspire therapy,   
but his current   
body weight is   
above the cutoff   
to qualify for   
that treatment. He   
is encouraged to   
continue efforts   
at weight   
reduction  
                                          
  
  
[facility]  
Other Phone: [phone]- Evaluation note*   
  
                      Encounter Date Diagnosis  Assessment Notes Treatment Notes  
 Treatment   
Clinical Notes  
   
                                        10 May, 2022        Obesity,   
unspecified   
classification,   
unspecified   
obesity type,   
unspecified   
whether serious   
comorbidity   
present (ICD-10 -   
E66.9)                                                        
   
                                        10 May, 2022        BMI 50.0-59.9,   
adult (ICD-10 -   
Z68.43)                                                       
   
                          10 May, 2022 Other                     Summary of Visi  
t:  
(A) Presentation of   
Plate Method   
discussed  
(B) Sample meal ideas   
reviewed  
(C) exercise   
recommendations   
reviewed  
Patient set the   
following goals:  
- patient set   
personal goal using   
given handout.  
                                          
  
  
[facility]  
Other Phone: [phone]- Evaluation note*   
  
                      Encounter Date Diagnosis  Assessment Notes Treatment Notes  
 Treatment   
Clinical Notes  
   
                                                Abnormal weight gain  
   
(ICD-10 - R63.5)                                              
   
                                                Type 2 diabetes   
mellitus with   
unspecified   
complications (ICD-10   
- E11.8)                                                      
   
                                                Obstructive sleep   
apnea (adult)   
(pediatric) (ICD-10 -   
G47.33)                                                       
   
                                                Mixed hyperlipidemia  
   
(ICD-10 - E78.2)                                              
   
                                                Knee osteoarthritis   
(ICD-10 - M17.9)                                              
   
                                                Cardiomyopathy   
(ICD-10 - I42.9)                                              
   
                                                CHF (congestive hear  
t   
failure) (ICD-10 -   
I50.9)                                                        
   
                                                BMI 50.0-59.9, adult  
   
(ICD-10 - Z68.43)                                             
   
                                                Edema (ICD-10 -   
R60.9)                                                        
   
                                                Low back derangement  
   
syndrome (ICD-10 -   
M53.86)                                                       
   
                                                Metabolic syndrome X  
   
(ICD-10 - E88.81)                                             
   
                                                CAD (coronary artery  
   
disease) (ICD-10 -   
I25.10)                                                       
  
  
[facility]  
Other Phone: [phone]- Evaluation note*   
  
                      Encounter Date Diagnosis  Assessment Notes Treatment Notes  
 Treatment   
Clinical Notes  
   
                                                Obstructive sleep   
apnea (adult)   
(pediatric)   
(ICD-10 - G47.33)                                     
  
  
He had original sleep   
study in  with an   
AHI of 63 and with   
21% of the time spent   
hypoxic. He was   
titrated and treated   
with CPAP, but   
struggled some with   
usage at that time.   
He was lost to   
follow-up not long   
after. However he now   
reports he has been   
using the machine   
regularly. The most   
current download   
available to us is   
from , and at   
that time he did have   
good compliance with   
9 hours of usage and   
with adequate control   
of sleep apnea.   
However I am   
concerned because of   
his very high [place]   
Sleepiness Scale, his   
difficult to sort out   
sleep history, and   
his severe persistent   
hypoxia when   
initially diagnosed   
(which raises the   
stakes and emphasizes   
the importance of   
effective treatment).   
His exam does show   
bilateral lower   
extremity edema which   
can be an indication   
of suboptimally   
treated sleep apnea   
with pulmonary   
hypertension. Given   
the uncertainty of   
the overall situation   
I feel full   
reassessment with   
polysomnography and   
possible split-night   
is advisable. In the   
meantime we will try   
to get him a new auto   
CPAP machine as his   
current device is   
both antiquated and   
subject to recall                         
  
  
   
                                                BMI 50.0-59.9,   
adult (ICD-10 -   
Z68.43)                                               
  
  
His substantial   
morbid obesity does   
pose considerable   
risks for   
cardiopulmonary   
complications and he   
is encouraged to   
bring body weight   
down. Not only will   
this improve quality   
of life but can have   
substantial impact on   
sleep apnea and   
pulmonary   
hypertension with   
hypoventilation,   
should these problems   
be present                                
  
  
   
                                                Lower extremity   
edema (ICD-10 -   
R60.0)                                                
  
  
He mayHis original   
sleep test showed   
severe sleep apnea   
with substantial   
nocturnal hypoxia. I   
am concerned for   
breakthrough hypoxia   
with hypoventilation;   
some patients may   
require control of   
sleep apnea but   
additionally may   
require oxygen   
therapy bled into the   
device                                    
  
  
   
                                                Sleep phase   
syndrome, delayed   
(ICD-10 - G47.21)                                     
  
  
His self-reported   
sleep pattern has not   
no nighttime sleep   
with substantial   
sleep only beginning   
at 6 AM. However it   
is quite difficult to   
sort out what is   
going on and he is   
not certain that he   
goes sleepless   
through the night. We   
will try to schedule   
him for a sleep phase   
delayed sleep study                       
  
  
   
                           Other                     The diagnosis o  
f   
Sleep Apnea was   
reviewed in detail,   
and handout materials   
were provided. The   
patient expresses   
good understanding,   
We also reviewed the   
medical and accident   
risks associated with   
sleep apnea and   
excessive fatigue.   
The patient is   
advised to adhere to   
a proper sleep   
hygiene schedule and   
to assure adequate   
total sleep time; The   
patient was advised   
to continue efforts   
at progressive weight   
loss, including   
decreased overall   
caloric intake   
quantity and better   
food choices.The   
patient was advised   
to call or return any   
difficulties arise,   
including changes in   
symptoms and/or   
problems with   
treatment                                 
  
  
[facility]  
Other Phone: [phone] Evaluation note*   
  
                      Encounter Date Diagnosis  Assessment Notes Treatment Notes  
 Treatment   
Clinical Notes  
   
                                        22 Dec, 2021        Encounter for   
immunization   
(ICD-10 - Z23)                                        
  
  
Patient presents   
for COVID-19   
vaccination   
BOOSTER.   
Pre-screening form   
answers evaluated   
with patient.   
Patient denies   
current illness or   
allergic reaction   
to component of   
COVID-19 vaccine.   
Patient provided   
with current copy   
of EUA.                                   
  
  
  
  
[facility]  
Other Phone: [phone] complaint Narrative - Reported* JOCELIN PACHECO is 
  being seen for a consultation for an abnormal ECG and abnormal test(s) 
  results.  
* 67-year-old gentleman seen in cardiology consultation at the request of 
  primary care for worsening shortness of breath and exertional dyspnea. Patient
  had abnormal Lexiscan imaging revealing global hypokinesis with ejection 
  fraction of 44% and a normal transient ischemic index but no evidence of isc
  hemia. Prior heart catheterization performed by myself in  revealed 
  minimal coronary disease with reduced left ventricular function  
* Today's ECG is normal. Echocardiogram reveals LVH with normal left ventricular
  function and a septal thickness of 1.8 cm with no outflow tract obstructive 
  physiology.  
* Patient has a history of morbid obesity, obstructive sleep apnea, history of 
  right lower extremity DVT, essential hypertension.  
* The driving symptom currently is shortness of breath and dyspnea with exertion
  now with evidence ofLV dysfunction and morbid obesity  
* We have counseled him on dietary discretion, weight loss and exercise and 
  aerobic training as a means towards improving his overall demeanor/physiology 
  and anatomy and body habitus.  
* I would simply recommend appropriate guideline directed medical therapies with
  Entresto, spironolactone, SGLT2 inhibitor (Jardiance), discontinue 
  telmisartan, with appropriate laboratory and clinicalfollow-up 
  thereafterwards. Extensive education and counseling performed with the patient
  for over 30 minutes this morning, he is agreeing to the above will follow up 
  as described  
-[facility] [address] DO  
Work Phone: [phone] noteNo InformationNort Coast Professional 
[facility]por[facility]  
Other Phone: [phone] note*   
  
                          Diagnosis    Onset Date   Resolution   Status  
   
                          Edema of right lower leg                           chr  
onic  
   
                          Morbid obesity due to excess calories                   
          chronic  
   
                          PVD (peripheral vascular disease)                       
      chronic  
   
                          Varicose veins of both legs with edema                  
           chronic  
   
                          Traumatic open wound of right lower leg                 
            resolved  
   
                          Venous stasis ulcer                           resolved  
  
  
[facility]  
Work Phone: [phone] noteNo assessment information available
[facility]  
Work Phone: [phone] note*   
  
                          Diagnosis    Onset Date   Resolution   Status  
   
                          Edema of right lower leg                           chr  
onic  
   
                          Inflammation                           chronic  
   
                          Morbid obesity due to excess calories                   
          chronic  
   
                          PVD (peripheral vascular disease)                       
      chronic  
   
                          Varicose veins of both legs with edema                  
           chronic  
   
                          Cellulitis                             resolved  
   
                          Venous stasis ulcer                           resolved  
  
  
[facility]  
Work Phone: [phone] note*   
  
                                                    Diagnosis  
   
                                                      
  
  
Onychomycosis- Primary  
  
  
Dermatophytosis of nail  
   
                                                      
  
  
Pain in both feet  
   
                                                      
  
  
Corns and callosities  
   
                                                      
  
  
Other specified peripheral vascular diseases (CMS/HCC)  
   
                                                      
  
  
Circulating anticoagulant disorder (CMS/HCC)  
  
  
Other and unspecified coagulation defects  
  
documented in this encounter  
[facility]*   
  
                                                    Diagnosis  
   
                                                      
  
  
Lumbosacral strain, initial encounter- Primary  
   
                                                      
  
  
Acute pain of left shoulder  
   
                                                      
  
  
Wound of left lower extremity, initial encounter  
  
documented in this encounter  
[facility]*   
  
                          Diagnosis    Onset Date   Resolution   Status  
   
                          Edema of right lower leg                           chr  
onic  
   
                          Inflammation                           chronic  
   
                          Morbid obesity due to excess calories                   
          chronic  
   
                          PVD (peripheral vascular disease)                       
      chronic  
   
                          Varicose veins of both legs with edema                  
           chronic  
   
                          Venous stasis ulcer                           resolved  
  
  
[facility]  
Work Phone: [phone] note*   
  
                                                    Diagnosis  
   
                                                      
  
  
Non-ischemic cardiomyopathy (Multi)- Primary  
  
  
Other primary cardiomyopathies  
   
                                                      
  
  
DIMITRI on CPAP  
   
                                                      
  
  
BMI 50.0-59.9, adult (Multi)  
  
documented in this encounter  
[facility]  
Work Phone: [phone] general Narrative - Reported*   
  
                                Type            Description     Date  
   
                                Medical History Osteoarthritis Bilateral Knees   
   
                                Medical History Hypertension      
   
                                Medical History Chronic Back Pain   
   
                                Medical History Cardiomyopathy    
   
                                Medical History DIMITRI               
   
                                Medical History vein closure      
   
                                Medical History Venous Ulcer with Pain   
   
                                Surgical History gall bladder      
   
                                Surgical History hand surgery      
   
                                Surgical History heart catheterization   
   
                                Hospitalization History infection         
  
  
[facility]  
Other Phone: [phone] note  
  
  
  
  
                                        Author              Elaine Espino  
[facility]  
2023 1:19pm  
   
                                        Note Date/Time      2023 1:19  
pm  
   
                                                    [facility]  
ENTER  
[address] [place] [address]  
  
Wound Center Provider Note  
Signed  
  
Patient: Jocelin Pacheco MR#:   
[id number]  
: 1955 Acct:[id number]  
  
Age/Sex: 68 / M  
  
Copies to: DO Elaine Arboleda, APRN~  
  
  
HPI  
Date of Visit  
Date of Visit:  
Date of Service: 2023  
Time of Service: 13:17  
  
Narrative  
HPI:  
23 Jocelin is a 68-year-old male presenting to [facility]   
wound care program for an initial visit to the office for a right lower 
extremity   
venous stasis ulcer/lymphedema ulcer.? The patient has been a patient of the 
office   
for quite some time now and has seen other providers.  
He had seen vascular in [place] was told that there is nothing else that can be 
done   
for him.  
He has an extensive history of infections so I imagine this will continue to be 
an   
issue for him.  
He appears to need better edema control.  
He was told by ortho that he needs to lose 50 pounds in order to have surgery- 
this   
was 2 years ago and has not been accomplished.  
A past venous duplex indicates deep vein issues which are not surgically 
correctable-   
vascular did confirm that he does not need to see them because there is nothing 
they   
have to offer him.  
He is still retired so hopefully there is more opportunity for elevation and   
compression of the legs to support healing of the ulcer.  
Nutrition and probiotics were discussed and suggestions were given.  
Doxycycline was escribed today for what appears to be cellulitis of the RLE.  
23 looks better, is still taking the antibiotics, change to collagen silver   
topically and clobetasol to periwound skin that he says is itchy, will return 
for   
dressing changes, will bring his compression socks from home to the next visit 
since   
most of our wraps tend to roll down on his legs, says that he will use the   
compression pumps  
23 healed, steroid and compression sock applied, aware to call sooner than 
later   
if something reopens  
  
Subjective  
Pain  
Right Lower Leg:  
Pain Intensity: 0  
Wound/Ulcer History  
When did wound start?: 2023  
Mode of Arrival/ : Personal vehicle  
Assistive Device Used Today: Cane  
Lives with:: Alone  
Appetite Description: Within Normal Limits  
Who helps w/ dressing change?: Wound Care Dept  
Why Do You Need Help?: Can't Reach Ulcer  
Smoking Status: Never smoker  
  
[facility]  
Medical History (Updated 23 @ 13:19 by SOWMYA Correa)  
  
Arthritis  
Bilateral knees,  right is bone on bone  per  Dr. Richard   
Back pain  
Cardiomyopathy  
 lower part of heart is weak   
Carpal tunnel syndrome  
right arm surgery  
DVT (deep venous thrombosis)  
 blood clot from right foot to right thigh  on  blood thinner   
Hypertension  
Itching  
Itching with irritation  
DIMITRI (obstructive sleep apnea)  
PVD (peripheral vascular disease)  
 poor veins  Dr. Cameron did vein closure 2019, needs 5 more viens worked 
on.  
Wound of right lower extremity  
  
Surgical History (Reviewed 23 @ 11:09 by Maren Martinez RN)  
  
Hx of cholecystectomy  
  
Family History (Reviewed 23 @ 11:09 by Maren Martinez RN)  
Father Diabetes mellitus, type 2  
Sister Diabetes mellitus, type 2  
  
Social History  
Smoking Status: Never smoker  
Substance Use Type: None  
  
Grafts  
History of Graft  
History of Graft?: Yes  
  
Exam  
Physical Exam  
Vital Signs:  
  
  
  
  
Temp Pulse Resp BP O2 Del Method  
  
97.7 F 81 20 123/73 Room Air  
  
23 13:12 23 13:12 23 13:12 23 13:12 23 13:12  
  
  
  
Const  
General: cooperative, comfortable and no acute distress  
Nutritional Appearance: obese  
Orientation: alert, awake and oriented x3  
  
Lower/Upper Extremity Exam  
Vascular Exam-Edema  
Right Lower Extremity:  
Edema Type: Lymphedema  
Vascular Exam-Pulses  
Right Brachial:  
Pulse Assessment Method: NIBP  
  
Objective  
Meds/Allergies  
Home Medications  
  
carvedilol 6.25 mg tablet 6.25 mg PO BID 17 [History Confirmed 23]  
furosemide 40 mg tablet (Lasix) 40 mg PO BID 17 [History Confirmed 
23]  
acetaminophen 500 mg tablet (Tylenol Extra Strength) 500 mg PO TID PRN Pain 
20   
[History Confirmed 23]  
nabumetone 750 mg tablet 750 mg PO BID 21 [History Confirmed 23]  
empagliflozin 10 mg tablet (Jardiance) 10 mg PO DAILY 22 [History 
Confirmed   
23]  
rivaroxaban 10 mg tablet (Xarelto) 10 mg PO DAILY 22 [History Confirmed   
23]  
semaglutide 2 mg/dose (8 mg/3 mL) subcutaneous pen injector (Ozempic) 2 mg 
subcut Q7D   
22 [History Confirmed 23]  
spironolactone 25 mg tablet 25 mg PO DAILY 22 [History Confirmed 23]  
doxycycline hyclate 100 mg capsule 100 mg PO BID 14 days #28 caps 23 [Rx   
Confirmed 23]  
sacubitril 24 mg-valsartan 26 mg tablet (Entresto) 1 tab PO BID 23 
[History   
Confirmed 23]  
  
  
Allergies  
  
cefixime Allergy (Verified 23 11:09)  
Unknown Reaction  
diclofenac Allergy (Verified 23 11:09)  
Unknown Reaction  
nystatin Allergy (Verified 23 11:09)  
Unknown Reaction  
sodium benzoate Allergy (Verified 23 11:09)  
Unknown Reaction  
  
  
Wound/Ulcer  
Right Lower Leg:  
Type: Venous Stasis Ulcer  
Thickness: Skin Breakdown  
Length (cm): 0  
Width (cm): 0  
Depth (cm): 0  
CM Sq: 0.000  
Surrounding Tissue Appearance: Hyperpigmented  
Surrounding Tissue Temp: Warm  
Drainage Amount: None  
Drainage Odor: No Odor  
Results  
Height: 5 ft 11 in  
Weight: 160.118 kg  
Body Mass Index: 49.2  
  
Assessment/Plan  
Assessment/Plan  
(1) Venous stasis ulcer:  
Qualifiers:  
Laterality: right Non-pressure ulcer stage: limited to breakdown of skin 
Varicose   
vein presence: with varicose veins Venous stasis ulcer site: other part of lower
leg   
Qualified Code(s): I83.018 - Varicose veins of right lower extremity with ulcer 
other   
part of lower leg; L97.811 - Non-pressure chronic ulcer of other part of right 
lower   
leg limited to breakdown of skin  
Code(s):  
I83.009 - Varicose veins of unspecified lower extremity with ulcer of 
unspecified   
site; L97.909 - Non-pressure chronic ulcer of unspecified part of unspecified 
lower   
leg with unspecified severity  
Status: Resolved  
(2) Edema of right lower leg:  
Code(s):  
R60.0 - Localized edema  
Status: Chronic  
(3) Morbid obesity due to excess calories:  
Code(s):  
E66.01 - Morbid (severe) obesity due to excess calories  
Status: Chronic  
(4) Lymphedema of both lower extremities:  
Code(s):  
I89.0 - Lymphedema, not elsewhere classified  
Status: Suspected  
(5) PVD (peripheral vascular disease):  
Code(s):  
I73.9 - Peripheral vascular disease, unspecified  
Status: Chronic  
(6) Varicose veins of both legs with edema:  
Code(s):  
I83.893 - Varicose veins of bilateral lower extremities with other complications  
Status: Chronic  
(7) Inflammation:  
Status: Chronic  
Time spent with patient  
Time Spent With Patient (min): 10  
  
  
  
  
Dictated By: SOWMYA Correa DD/DT: 23  
  
Signed By: <Electronically signed by SOWMYA Espino>  
23 1319  
   
  
[facility]  
Work Phone: [phone] for referral (narrative)* Consultation 
  (Routine) - Authorized  
  
                          Specialty    Diagnoses / Procedures Referred By Contac  
t Referred To Contact  
   
                                        Cardiology            
  
  
Diagnoses  
  
  
Non-ischemic cardiomyopathy   
(Multi)  
  
  
  
Procedures  
  
  
Follow Up In Cardiology                   
  
  
Sharlene An APRN-CNP  
  
  
[address]  
  
  
[facility], [address] [address]  
  
  
Phone: [phone]  
  
  
Fax: [phone]                         
  
  
Compa Maldonado DO  
  
  
[address] [facility]  
  
  
[place] [address]  
  
  
Phone: [phone]  
  
  
Fax: [phone]  
  
  
  
                    Referral ID Status    Reason    Start Date Expiration Date V  
isits   
Requested                               Visits   
Authorized  
   
                [id number] Authorized         2024 1       1  
  
  
  
  
Electronically signed by Sharlene URBINA at 2024 11:39 AM EDT  
  
  
[facility]  
Work Phone: [phone]  
  
Summary Purpose  
  
  
                                                      
  
  
  
Family History  
No Family History Records FoundUnknown Family Member  
  
                                Name            Dates           Details  
   
                                                    Family history of diabetes m  
ellitus: Sister, Mother,   
Father(V18.0, Z83.3)  
                                                    Status:Active  
   
                                                    COPD, moderate: Mother, Sist  
er  
                                                    Status:Active  
  
                              Unknown Family Member  
  
                                Name            Dates           Details  
   
                                                    Family history of diabetes m  
ellitus: Sister, Mother,   
Father(V18.0, Z83.3)  
                                                    Status:Active  
   
                                                    COPD, moderate: Mother, Sist  
er  
                                                    Status:Active  
  
  
  
                          Relationship Condition    Age at Onset Recorded Date/T  
ceasar  
   
                          father       Type 2 diabetes mellitus Unknown        
   
                          sister       Type 2 diabetes mellitus Unknown        
  
                              Unknown Family Member  
  
                                Name            Dates           Details  
   
                                                    Family history of diabetes m  
ellitus: Sister, Mother,   
Father(V18.0, Z83.3)  
                                                    Status:Active  
   
                                                    COPD, moderate: Mother, Sist  
er  
                                                    Status:Active  
  
                              Unknown Family Member  
  
                                Name            Dates           Details  
   
                                                    Family history of diabetes m  
ellitus: Sister, Mother,   
Father(V18.0, Z83.3)  
                                                    Status:Active  
   
                                                    COPD, moderate: Mother, Sist  
er  
                                                    Status:Active  
  
                              Unknown Family Member  
  
                                Name            Dates           Details  
   
                                                    Family history of diabetes m  
ellitus: Sister, Mother,   
Father(V18.0, Z83.3)  
                                                    Status:Active  
   
                                                    COPD, moderate: Mother, Sist  
er  
                                                    Status:Active  
  
                              Unknown Family Member  
  
                                Name            Dates           Details  
   
                                                    Family history of diabetes m  
ellitus: Sister, Mother,   
Father(V18.0, Z83.3)  
                                                    Status:Active  
   
                                                    COPD, moderate: Mother, Sist  
er  
                                                    Status:Active  
  
  
  
                          Relationship Condition    Age at Onset Recorded Date/T  
ceasar  
   
                          father       Type 2 diabetes mellitus Unknown        
   
                          sister       Type 2 diabetes mellitus Unknown        
   
                          brother      Heart disease Unknown        
   
                          father       Heart disease Unknown        
   
                                       Diabetes mellitus Unknown        
   
                                       Heart failure Unknown        
   
                                            Unknown        
   
                          family member      Unknown        
   
                          Not Specified      Unknown        
   
                                       Heart disease Unknown        
   
                          sister       Heart disease Unknown        
  
  
  
                          Relationship Condition    Age at Onset Recorded Date/T  
ceasar  
   
                          father       Type 2 diabetes mellitus Unknown        
   
                          sister       Type 2 diabetes mellitus Unknown        
   
                          brother      Heart disease Unknown        
   
                          father       Heart disease Unknown        
   
                                       Diabetes mellitus Unknown        
   
                                       Heart failure Unknown        
   
                                            Unknown        
   
                          family member      Unknown        
   
                          mother            Unknown        
   
                                       Heart disease Unknown        
   
                          sister       Heart disease Unknown        
  
  
  
Advance Directives  
No Advanced Directives Records Found  
  
                                Advance Directive Response        Recorded Date/  
Time  
   
                                Advance Directives No               9:56pm  
  
  
  
                                Advance Directive Response        Recorded Date/  
Time  
   
                                Advance Directives No               8:56pm  
  
  
  
Chief Complaint and Reason for Visit  
  
  
                                        Chief Complaint     Obesity  
Open Wound  
   
                                        Reason for Visit    Edema of right lower  
 leg  
Morbid obesity due to excess calories  
PVD (peripheral vascular disease)  
Varicose veins of both legs with edema  
Traumatic open wound of right lower leg  
Venous stasis ulcer  
  
  
  
                                        Chief Complaint     Obesity  
R07.9 I50.9 I42.8  
  
  
  
                                        Chief Complaint     Obesity  
exercise  
  
  
  
                                        Chief Complaint     Open Wound  
   
                                        Reason for Visit    Edema of right lower  
 leg  
Inflammation  
Morbid obesity due to excess calories  
PVD (peripheral vascular disease)  
Varicose veins of both legs with edema  
Cellulitis  
Venous stasis ulcer  
  
  
  
                                        Chief Complaint     Open Wound  
Obesity  
D68.318 N18.2 R73.03 E66.01  
   
                                        Reason for Visit    Edema of right lower  
 leg  
Inflammation  
Morbid obesity due to excess calories  
PVD (peripheral vascular disease)  
Varicose veins of both legs with edema  
Cellulitis  
Venous stasis ulcer  
  
  
  
                                        Chief Complaint     Open Wound  
   
                                        Reason for Visit    Edema of right lower  
 leg  
Inflammation  
Morbid obesity due to excess calories  
PVD (peripheral vascular disease)  
Varicose veins of both legs with edema  
Venous stasis ulcer  
  
  
  
                                        Chief Complaint     Open Wound  
WMN f/u restart  
   
                                        Reason for Visit    Edema of right lower  
 leg  
Inflammation  
Morbid obesity due to excess calories  
PVD (peripheral vascular disease)  
Varicose veins of both legs with edema  
Venous stasis ulcer  
Adult BMI 50.0-59.9 kg/sq m  
Mixed hyperlipidemia  
DIMITRI on CPAP  
Osteoarthritis of knee  
Type 2 diabetes mellitus with unspecified complications  
  
  
  
                                        Chief Complaint     Open Wound  
WMN f/u restart  
Open Wound  
   
                                        Reason for Visit    Edema of right lower  
 leg  
Inflammation  
Morbid obesity due to excess calories  
PVD (peripheral vascular disease)  
Varicose veins of both legs with edema  
Venous stasis ulcer  
Adult BMI 50.0-59.9 kg/sq m  
Mixed hyperlipidemia  
DIMITRI on CPAP  
Osteoarthritis of knee  
Type 2 diabetes mellitus with unspecified complications  
Adult BMI 50.0-59.9 kg/sq m  
Hematoma of right lower leg  
Type 2 diabetes mellitus with unspecified complications  
  
  
  
                                        Chief Complaint     Open Wound  
WMN f/u restart  
Bilateral lower extremity edema  
Open Wound  
   
                                        Reason for Visit    Edema of right lower  
 leg  
Inflammation  
Morbid obesity due to excess calories  
PVD (peripheral vascular disease)  
Varicose veins of both legs with edema  
Venous stasis ulcer  
Adult BMI 50.0-59.9 kg/sq m  
Mixed hyperlipidemia  
DIMITRI on CPAP  
Osteoarthritis of knee  
Type 2 diabetes mellitus with unspecified complications  
Adult BMI 50.0-59.9 kg/sq m  
CAD (coronary artery disease)  
Mixed hyperlipidemia  
DIMITRI on CPAP  
Osteoarthritis of knee  
Type 2 diabetes mellitus with unspecified complications  
HTN (hypertension)  
Adult BMI 50.0-59.9 kg/sq m  
Hematoma of right lower leg  
Leg wound, right  
Type 2 diabetes mellitus with unspecified complications  
Cellulitis  
  
  
  
                                        Chief Complaint     WMN f/u restart  
Open Wound  
Bilateral lower extremity edema  
   
                                        Reason for Visit    Adult BMI 50.0-59.9   
kg/sq m  
Mixed hyperlipidemia  
DIMITRI on CPAP  
Osteoarthritis of knee  
Type 2 diabetes mellitus with unspecified complications  
Adult BMI 50.0-59.9 kg/sq m  
CAD (coronary artery disease)  
Mixed hyperlipidemia  
DIMITRI on CPAP  
Osteoarthritis of knee  
Type 2 diabetes mellitus with unspecified complications  
HTN (hypertension)  
Adult BMI 50.0-59.9 kg/sq m  
Hematoma of right lower leg  
Leg wound, right  
Type 2 diabetes mellitus with unspecified complications  
Cellulitis  
  
  
  
                                        Chief Complaint     Bilateral lower extr  
emity edema  
Open Wound  
   
                                        Reason for Visit    Adult BMI 50.0-59.9   
kg/sq m  
Mixed hyperlipidemia  
DIMITRI on CPAP  
Osteoarthritis of knee  
Type 2 diabetes mellitus with unspecified complications  
Adult BMI 50.0-59.9 kg/sq m  
Hematoma of right lower leg  
Leg wound, right  
Type 2 diabetes mellitus with unspecified complications  
Urinary incontinence  
Venous insufficiency of right lower extremity  
Cellulitis  
  
  
  
Chief Complaint  
* JOCELIN PACHECO is being seen for a 6 month follow-up of.  
* 62-year-old gentleman who returns for annual visit he is doing well just 
  complains atypical chest discomfort described as tingling as well as sharp and
  somewhat painful sensation both at rest and with activities that are sporadic 
  and episodic only but not reproducible. He has a history of moderate n
  onischemic cardiomyopathy, obstructive sleep apnea, morbid obesity, history of
  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  
* Stress perfusion imaging from 2022 revealed patchy tracer uptake, proceed
  ischemia or infarction, normal left ventricular volume with global hypokinesis
  and ejection fraction of 44% normal transient ischemic index.  
* Heart catheterization from  revealed 30 to 50% ostial circumflex disease, 
  20 to 25% mid LAD disease normal right coronary and at that time ejection 
  fraction of 40 to 45%.  
* Since  with his medical history continue therapies. We have transitioned 
  over to Entresto, Jardiance, carvedilol and spironolactone  
* Unfortunately he has had no significant weight loss despite going to cardiac 
  rehab 3 times a week. He remains 365 pounds, similar to   
* Recommendations, obtain a MUGA scan current therapies, obtain appropriate 
  laboratories including Chem-6 and BNP, we will have him come back in 6 months 
  continues to have any further chest discomfort will prescribe nitrates and/or 
  consider invasive assessment.  
  
  
Reason for Referral  
  
  
                          Specialty    Diagnoses / Procedures Referred By Contac  
t Referred To Contact  
   
                                        Physical Therapy      
  
  
Diagnoses  
  
  
Lumbosacral strain,   
initial encounter  
  
  
Acute pain of left   
shoulder  
  
  
  
Procedures  
  
  
MS OFFICE/OUTPATIENT NEW   
HIGH MDM 60 MINUTES                       
  
  
Ray Terry, NP  
  
  
[address]  
  
  
[place] [address]  
  
  
Phone: [phone]  
  
  
Fax: [phone]                         
  
  
Jessica Lopez, PT  
  
  
[address]  
  
  
[address]  
  
  
[place]   
[address]  
  
  
Phone: [phone]  
  
  
Fax: [phone]  
  
  
  
                      Referral ID Status     Reason     Start Date Expiration   
Date                                    Visits   
Requested                               Visits   
Authorized  
   
                                [id number]          Authorized        
  
  
Consult and   
Treat           2024        10              10  
  
  
  
Additional Source Comments  
  
  
  
                                                    PREGNANCY (unrecognized sect  
ion and content)  
   
                                                    No Pregnancy Status Records   
FoundNo Pregnancy Status Records FoundNo Pregnancy   
Status   
Records FoundNo Pregnancy Status Records FoundNo Pregnancy Status Records 
FoundNo   
Pregnancy Status Records FoundNo Pregnancy Status Records Found  
  
  
  
  
                                                    INFORMATION SOURCE (unrecogn  
ized section and content)  
   
                                          
  
  
  
                                        DATE CREATED        AUTHOR  
   
                                2022                      [facility]  
dical Specialist  
  
  
  
                                DATE CREATED    AUTHOR          AUTHOR'S ORGANIZ  
ATION  
   
                                2023                       [facility]  
[facility]  
  
  
  
                                DATE CREATED    AUTHOR          AUTHOR'S ORGANIZ  
ATION  
   
                                2023                       [facility]  
  
  
  
                                DATE CREATED    AUTHOR          AUTHOR'S ORGANIZ  
ATION  
   
                                08/10/2023                      [facility]  
ical Center  
  
  
  
                                DATE CREATED    AUTHOR          AUTHOR'S ORGANIZ  
ATION  
   
                                05/10/2024                      [facility]  
[facility]  
  
  
  
                                DATE CREATED    AUTHOR          AUTHOR'S ORGANIZ  
ATION  
   
                                2024                      [facility]  
dical Specialists [facility]  
  
  
  
                                DATE CREATED    AUTHOR          AUTHOR'S ORGANIZ  
ATION  
   
                                2024                      [facility]  
ysician Group  
  
  
  
  
  
                                                    REASON FOR VISIT (unrecogniz  
ed section and content)  
   
                                          
  
  
  
                                        Reason              Comments  
   
                                        Back Pain           Pt presents with low  
er back pain with onset of 2 weeks with pain   
increasing. Took tylenol with no relief. Has used ice on it a few times.   
Left shoulder is hurting. Denies any trouble urinating.  
  
  
  
                                        Reason              Comments  
   
                                        Follow-up           10m per wss  
  
  
  
  
  
                                                    Care Teams (unrecognized sec  
tion and content)  
   
                                          
  
  
  
                                                    Team Status: Active   
   
                          Member       Role         Status       Dates  
   
                          Vera Melvin , DO Primary Care Provider Active   
        
  
  
  
                                                    Team Status: Inactive   
   
                          Member       Role         Status       Dates  
   
                          Vera Melvin , DO Primary Care Provider Active   
      Start: 2024  
End: 2024  
   
                          SOWMYA Correa Attending Provider Active       St  
art: 2024  
End: 2024  
  
  
  
                                                    Team Status: Active   
   
                          Member       Role         Status       Dates  
   
                          Vera Melvin , DO Primary Care Provider Active   
        
   
                          ESVIN Haro Attending Provider Active         
  
  
  
                                                    Team Status: Inactive   
   
                          Member       Role         Status       Dates  
   
                          Vera Melvin , DO Primary Care Provider Active   
        
   
                          SOWMYA Correa Attending Provider Active         
  
  
  
                                                    Team Status: Inactive   
   
                          Member       Role         Status       Dates  
   
                          Vera Melvin , DO Primary Care Provider, Attend  
ing Provider Active         
  
  
  
                                                    Team Status: Inactive   
   
                          Member       Role         Status       Dates  
   
                          Vera Melvin , DO Primary Care Provider Active   
        
   
                          Jacoby Braden MD Attending Provider Active         
  
  
  
                                                    Team Status: Inactive   
   
                          Member       Role         Status       Dates  
   
                          Vera Melvin , DO Primary Care Provider Active   
        
   
                          SOWMYA Gutierrez Attending Provider Active       
    
  
  
  
                                                    Team Status: Inactive   
   
                          Member       Role         Status       Dates  
   
                          Vera Melvin , DO Primary Care Provider Active   
        
   
                          JASON Maldonado DO Attending Provider Active         
  
  
  
                      Team Member Relationship Specialty  Start Date End Date  
   
                                                      
  
  
Vera Melvin DO  
  
  
NPI: [id number]  
  
  
[address]  
  
  
[place] [address]  
  
  
[phone] (Work)  
  
  
[phone] (Fax) PCP - ACO Reach                 23           
   
                                                      
  
  
Vera Melvin DO  
  
  
NPI: [id number]  
  
  
[address]  
  
  
[place] [address]  
  
  
[phone] (Work)  
  
  
[phone] (Fax) PCP - General   Internal Medicine 24            
   
                                                      
  
  
Danya An DPM  
  
  
NPI: [id number]  
  
  
[address]  
  
  
[place] [address]  
  
  
[phone] (Work)  
  
  
[phone] (Fax) Referring Physician Podiatry        23          
   
                                                      
  
  
Elroy Wall MD  
  
  
NPI: [id number]  
  
  
[address] [facility] [address]  
  
  
[place] [address]  
  
  
[phone] (Work)  
  
  
[phone] (Fax) Referring Physician Cardiology      23          
   
                                                      
  
  
Steff Espino    Nurse Practitioner Wound Care      23          
  
  
  
                      Team Member Relationship Specialty  Start Date End Date  
   
                                                      
  
  
Vera Melvin DO  
  
  
NPI: [id number]  
  
  
[address]  
  
  
[place] [address]  
  
  
[phone] (Work)  
  
  
[phone] (Fax) PCP - ACO Reach                 23           
   
                                                      
  
  
Vera Melvin DO  
  
  
NPI: [id number]  
  
  
[address]  
  
  
[place] [address]  
  
  
[phone] (Work)  
  
  
[phone] (Fax) PCP - General   Internal Medicine 24            
   
                                                      
  
  
Danya An DPM  
  
  
NPI: [id number]  
  
  
[address]  
  
  
[place] [address]  
  
  
[phone] (Work)  
  
  
[phone] (Fax) Referring Physician Podiatry        23          
   
                                                      
  
  
Elroy Wall MD  
  
  
NPI: [id number]  
  
  
[address] [facility] [address]  
  
  
[place] [address]  
  
  
[phone] (Work)  
  
  
[phone] (Fax) Referring Physician Cardiology      23          
   
                                                      
  
  
Steff Espino    Nurse Practitioner Wound Care      23          
  
  
  
                      Team Member Relationship Specialty  Start Date End Date  
   
                                                      
  
  
Vera Melvin DO  
  
  
NPI: [id number]  
  
  
[address]  
  
  
[place] [address]  
  
  
[phone] (Work)  
  
  
[phone] (Fax) PCP - ACO Reach                 23           
   
                                                      
  
  
Vera Melvin DO  
  
  
NPI: [id number]  
  
  
[address]  
  
  
[place] [address]  
  
  
[phone] (Work)  
  
  
[phone] (Fax) PCP - General   Internal Medicine 24            
   
                                                      
  
  
Danya An DPM  
  
  
NPI: [id number]  
  
  
[address]  
  
  
[place] [address]  
  
  
[phone] (Work)  
  
  
[phone] (Fax) Referring Physician Podiatry        23          
   
                                                      
  
  
Elroy Wall MD  
  
  
NPI: [id number]  
  
  
[address]  
  
  
[place] [address]  
  
  
[phone] (Work)  
  
  
[phone] (Fax) Referring Physician Cardiology      23          
   
                                                      
  
  
Steff Espino    Nurse Practitioner Wound Care      23          
  
  
  
                                                    Team Status: Inactive   
   
                          Member       Role         Status       Dates  
   
                          Vera Melvin DO Primary Care Provider Active   
      Start: 2024  
End: 2024  
   
                          Jacoby Braden MD Attending Provider Active         
Start: 2024  
End: 2024  
  
  
  
                      Team Member Relationship Specialty  Start Date End Date  
   
                                                      
  
  
Vera Melvin DO  
  
  
NPI: [id number]  
  
  
[address]  
  
  
[place] [address]  
  
  
[phone] (Work)  
  
  
[phone] (Fax) PCP - General                   00            
  
  
  
                                                    Team Status: Active   
   
                          Member       Role         Status       Dates  
   
                          Vera Melvin DO Primary Care Provider Active   
      Start: 2024  
  
   
                          SOWMYA Correa Attending Provider Active       St  
art: 2024  
  
  
  
  
                                                    Team Status: Inactive   
   
                          Member       Role         Status       Dates  
   
                          Vera Melvin DO Primary Care Provider Active   
      Start: 2024  
End: 2024  
   
                          Jacoby Braden MD Attending Provider Active         
Start: 2024  
End: 2024  
  
  
  
                                                    Team Status: Active   
   
                          Member       Role         Status       Dates  
   
                          Vera Melvin DO Primary Care Provider Active   
      Start: 2024  
  
   
                          SOWMYA Correa Attending Provider Active       St  
art: 2024  
  
  
  
  
                                                    Team Status: Active   
   
                          Member       Role         Status       Dates  
   
                          Vera Melvin DO Primary Care Provider Active   
      Start: 2024  
  
   
                          SOWMYA Correa Attending Provider Active       St  
art: 2024  
  
  
  
  
                                                    Team Status: Inactive   
   
                          Member       Role         Status       Dates  
   
                          Vera Melvin DO Primary Care Provider Active   
      Start: 2024  
End: 2024  
   
                          ROM Peng Attending Provider Active        
 Start: 2024  
End: 2024  
  
  
  
                                                    Team Status: Active   
   
                          Member       Role         Status       Keyanna  
   
                          Vera Melvin ,  Primary Care Provider Active   
      Start: 2024  
  
   
                          SOWMYA Correa Attending Provider Active       St  
art: 2024  
  
  
  
  
                                                    Team Status: Active   
   
                          Member       Role         Status       Keyanna Melvin ,  Primary Care Provider Active   
      Start: 2024  
  
   
                          SOWMYA Correa Attending Provider Active       St  
art: 2024  
  
  
  
  
                                                    Team Status: Inactive   
   
                          Member       Role         Status       Keyanna  
   
                          Vera Melvin DO Primary Care Provider Active   
      Start: 2024  
End: 2024  
   
                          Jacoby Braden MD Attending Provider Active         
Start: 2024  
End: 2024  
  
  
  
                                                    Team Status: Inactive   
   
                          Member       Role         Status       Keyanna  
   
                          Vera Melvin DO Primary Care Provider Active   
      Start: 2024  
End: 2024  
   
                          SOWMYA Correa Attending Provider Active       St  
art: 2024  
End: 2024  
  
  
  
                                                    Team Status: Active   
   
                          Member       Role         Status       Keyanna Melvin DO Primary Care Provider Active   
      Start: 2024  
  
   
                          SOWMYA Correa Attending Provider Active       St  
art: 2024  
  
  
  
  
  
  
                                                    Goals (unrecognized section   
and content)  
   
                                                    Goals may be documented in a  
n alternate section  
  
FOR RECORDS PERTAINING TO PATIENTS WHO ARE OR HAVE BEEN ENROLLED IN A CHEMICAL 
DEPENDENCY/SUBSTANCEABUSE PROGRAM, SOME INFORMATION MAY BE OMITTED. This 
clinical summary was aggregated from multiple sources. Caution should be 
exercised in using it in the provision of clinical care. This summary normalizes
information from multiple sources, and as a consequence, information in this 
document may materially change the coding, format and clinical context of 
patient data. In addition, data may be omitted in some cases. CLINICAL DECISIONS
SHOULD BE BASED ON THE PRIMARY CLINICAL RECORDS. [facility] [employer] provides 
no warranty or guarantee of the accuracy or completeness of information in this 
document.

## 2024-09-27 NOTE — VEIN_ITS
[address] [address] 
     [phone] 
  
  
Patient Name: 
JOCELIN ARORA 
  
MRN: [id number]    YOB: 1955    Sex: M 
Assigned Patient Location:  
Current Patient Location:  
Accession/Order Number: [id number] 
Exam Date: 9/27/2024  12:35    Report Date: 9/27/2024  13:41 
  
At the request of: 
NICK FERNANDEZ   
  
Procedure:  VC Endovenous Perf Ablation RT 
  
EXAMINATION: VC Endovenous Perf Ablation RT 
  
COMPARISON: 
  
INDICATIONS: I83.813 - Varicose veins of bilateral lower extremities w... 
  
OPERATIVE REPORT: 
  
Diagnosis: Superficial venous reflux, incompetent perforating veins  
  
Procedure: Endovenous laser ablation of the right (s) 
  
Procedure: The patient was positioned supine on the table and the leg was  
prepped and draped to allow for visualization during venous access. A sterile  
cover was draped over a 16 mhz ultrasound probe. Venous mapping was performed  
prior to the procedure noting location and size of vessel(s).  
  
 vein 1: 
  
Distal lateral lower right leg. The diameter of the vein ranged from 4.2 mm's  
below the muscular fascia to 4.2 mm's at the entry point.  
  
Using a 30 gauge needle the entry site was anesthetized with 1 cc of 1%  
buffered lidocaine. Access was gained percutaneously, with a 21-gauge needle,  
into the  vein under ultrasound guidance. The needle was advanced  
into the desired position and the pre-measured 400-micron fiber was then  
inserted into the needle and locked in place. The position of the fiber was  
imaged with ultrasound guidance. The fiber tip was visualized to be 10 mm from  
  
the deep vessel. An anesthetic solution of 6 cc 1% buffered lidocaine was  
delivered along the course of the vein under ultrasound guidance using a  
syringe. A final positioning check of the laser fiber tip was performed. The  
laser was activated by means of a foot-pedal and the fiber and needle were  
withdrawn together in accordance to the desired joules per treatment area/spot  
  
weld. 3 areas/spot welds were performed, and the total number of joules  
delivered was 197. The total time of energy delivery was 25 seconds. A duplex  
ultrasound revealed compressibility and flow of the deep system immediately  
after the procedure.  
  
Hemostasis of the access site was achieved and dressed. A 20-30 mm compression  
  
stocking over coban was placed on the treated leg. Post-Op instructions were  
given, and a follow-up appointment was made. 
  
 vein 2: 
  
Mid lateral lower right leg. The diameter of the vein ranged from 4.9 mm's  
below the muscular fascia to 4.9 mm's at the entry point.  
  
Using a 30 gauge needle the entry site was anesthetized with 1 cc of 1%  
buffered lidocaine. Access was gained percutaneously, with a 21-gauge needle,  
into the  vein under ultrasound guidance. The needle was advanced  
into the desired position and the pre-measured 400-micron fiber was then  
inserted into the needle and locked in place. The position of the fiber was  
imaged with ultrasound guidance. The fiber tip was visualized to be 10 mm from  
  
the deep vessel. An anesthetic solution of 6 cc 1% buffered lidocaine was  
delivered along the course of the vein under ultrasound guidance using a  
syringe. A final positioning check of the laser fiber tip was performed. The  
laser was activated by means of a foot-pedal and the fiber and needle were  
withdrawn together in accordance to the desired joules per treatment area/spot  
  
weld. 3 areas/spot welds were performed, and the total number of joules  
delivered was 239. The total time of energy delivery was 30 seconds. A duplex  
ultrasound revealed compressibility and flow of the deep system immediately  
after the procedure.  
  
Hemostasis of the access site was achieved and dressed. A 20-30 mm compression  
  
stocking over coban was placed on the treated leg. Post-Op instructions were  
given, and a follow-up appointment was made. 
  
 vein 3: 
  
Distal medial lower right leg. The diameter of the vein ranged from 6.0 mm's  
below the muscular fascia to 6.0 mm's at the entry point.  
  
Using a 30 gauge needle the entry site was anesthetized with 1 cc of 1%  
buffered lidocaine. Access was gained percutaneously, with a 21-gauge needle,  
into the  vein under ultrasound guidance. The needle was advanced  
into the desired position and the pre-measured 400-micron fiber was then  
inserted into the needle and locked in place. The position of the fiber was  
imaged with ultrasound guidance. The fiber tip was visualized to be 10 mm from  
  
the deep vessel. An anesthetic solution of 6 cc 1% buffered lidocaine was  
delivered along the course of the vein under ultrasound guidance using a  
syringe. A final positioning check of the laser fiber tip was performed. The  
laser was activated by means of a foot-pedal and the fiber and needle were  
withdrawn together in accordance to the desired joules per treatment area/spot  
  
weld. 4 areas/spot welds were performed, and the total number of joules  
delivered was 321. The total time of energy delivery was 40 seconds. A duplex  
ultrasound revealed compressibility and flow of the deep system immediately  
after the procedure.  
  
Hemostasis of the access site was achieved and dressed. A 20-30 mm compression  
  
stocking over coban was placed on the treated leg. Post-Op instructions were  
given, and a follow-up appointment was made. 
  
CONCLUSION:  
1. Technically successful endovenous laser ablation of 3  veins  
  
  
Electronically authenticated by: KAYY JUNIOR   Date: 9/27/2024  13:41

## 2024-10-06 DIAGNOSIS — I50.9 HEART FAILURE, NYHA CLASS 2: ICD-10-CM

## 2024-10-07 RX ORDER — SACUBITRIL AND VALSARTAN 24; 26 MG/1; MG/1
1 TABLET, FILM COATED ORAL 2 TIMES DAILY
Qty: 180 TABLET | Refills: 3 | Status: SHIPPED | OUTPATIENT
Start: 2024-10-07 | End: 2025-10-07

## 2024-10-10 NOTE — P.DS_ITS
Discharge Plan    
Discharge    
Disposition: Home, Self-Care    
    
Outpatient Diagnostics:    
VC INJ Foam Sclerosant WUS MLT  (Routine)  Timeframe:  2 Weeks    
   Facility: [facility] - [facility]    
   Ordered By:  Alonso Nation    
    
Plan of Treatment:    
Varithena/microfoam chemical ablation     
    
Print Language: English    
    
Discharge Date/Time: 10/11/24 11:21

## 2024-10-10 NOTE — V.VEINS.HP
Vital Signs
 10/10/24
15:37
Height 5 ft 11 in
Weight 166 kg
BMI 51.0

Varicose Veins

Patient in this day for follow  up ultrasound post EVLT of right leg perforating veins



Alonso TOSCANO MD personally performed the services described in this documentation, as scribed by Gail Jimenez RVT, RDMS in my presence and it is both accurate and complete.



I, Gail Jimenez RVT, RDMS, am scribing for, and in the presence of, Dr. Alonso Nation and in the presence of the patient.
thigh: bilateral (right leg > left leg), knee: bilateral, calf: bilateral, ankle: bilateral and shin: bilateral
aching, burning, cramping, dull and tender
10
3 years
Worsened in recent months: Yes (last 5 months)
standing, sitting and walking
analgesics (Tylenol), elevating extremities, compression stockings and wraps
Reports muscle spasms of leg, fatigue, heaviness, limb pain, edema and leg edema
History of lower extremity trauma: Yes (a piece of cpap machine fell and hit right lower leg resulting in hemtoma)
Superficial thrombophlebitis: Yes (DVT right leg)
Family history of varicose veins: no
Has patient had previous lower extremity venous surgery: Yes
Patient has previously received the following treatment(s) for lower extremity varicose veins: Reports foam therapy and laser therapy
Does patient have a history of pregnancy: no
Has patient had lower extremity venous scan with relux testing: Yes
Support hose used: Yes
Problems walking or doing physical activity: Yes
How does it affect you: pain decreases ability to walk
Do you walk much: No
Do you stand much: Yes

Review of Systems
ROS  
 Narrative 


Alonso TOSCANO MD personally performed the services described in this documentation, as scribed by Gail Jimenez RVT, RDMS in my presence and it is both accurate and complete.



I, Gail Jimenez RVT, RDMS, am scribing for, and in the presence of, Dr. Alonso Nation and in the presence of the patient.   
 Status of ROS 10 or more systems reviewed and unremarkable except as noted in history and below   
 Cardiovascular Reports: edema   
 Integumentary/Breast Reports: itching, redness, skin pain, skin tenderness, skin swelling, non-healing lesion and changes in skin color   
 Neurological Reports: numbness in extremities and weakness in extremities   
 Hematologic/Lymphatic Reports: easy bruising and easy bleeding   

PFSH
[facility]
Medical History (Updated 09/18/24 @ 07:48 by Maggie Barrios)

Phlebitis and thrombophlebitis of superficial vessels of right lower extremity
 ?I80.01 - Phlebitis and thrombophlebitis of superficial vessels of right lower extremity (ICD-10)
Varicose veins of bilateral lower extremities with pain
 ?I83.813 - Varicose veins of bilateral lower extremities with pain (ICD-10)
Lymphedema of both lower extremities
 ?I89.0 - Lymphedema, not elsewhere classified (ICD-10)
Arthritis
 ?M19.90 - Unspecified osteoarthritis, unspecified site (ICD-10)
Hypertension
 ?I10 - Essential (primary) hypertension (ICD-10)
Deep vein thrombosis
 ?I82.409 - Acute embolism and thrombosis of unspecified deep veins of unspecified lower extremity (ICD-10)
Cardiomyopathy
 ?I42.9 - Cardiomyopathy, unspecified (ICD-10)
Carpal tunnel syndrome
 ?G56.00 - Carpal tunnel syndrome, unspecified upper limb (ICD-10)
Peripheral vascular disease
 ?I73.9 - Peripheral vascular disease, unspecified (ICD-10)
Ulcer of right leg
 ?L97.919 - Non-pressure chronic ulcer of unspecified part of right lower leg with unspecified severity (ICD-10)
Coronary artery disease
 ?I25.10 - Atherosclerotic heart disease of native coronary artery without angina pectoris (ICD-10)
CHF (congestive heart failure)
 ?I50.9 - Heart failure, unspecified (ICD-10)
Obstructive sleep apnea
 ?G47.33 - Obstructive sleep apnea (adult) (pediatric) (ICD-10)
Chronic back pain
 ?M54.9 - Dorsalgia, unspecified (ICD-10)
 ?G89.29 - Other chronic pain (ICD-10)
Postphlebetic syndrome with inflammation
 ?I87.029 - Postthrombotic syndrome with inflammation of unspecified lower extremity (ICD-10)
Type 2 diabetes mellitus
 ?E11.9 - Type 2 diabetes mellitus without complications (ICD-10)


Surgical History (Updated 09/27/24 @ 13:50 by Khang Bahena)

Status post laser ablation of incompetent vein
 ?Z98.890 - Other specified postprocedural states (ICD-10)
Status post laser ablation of incompetent vein
 ?Z98.890 - Other specified postprocedural states (ICD-10)
History of carpal tunnel surgery of right wrist
 ?Z98.890 - Other specified postprocedural states (ICD-10)
History of cholecystectomy
 ?Z90.49 - Acquired absence of other specified parts of digestive tract (ICD-10)


Family History (Updated 08/19/24 @ 11:42 by Khang Bahena)
Sister
 Family history of diabetes mellitus
Father
 Family history of diabetes mellitus
Other
 Family history of CHF (congestive heart failure)
 Family history of hypertension
 Family history of myocardial infarction



Social History (Updated 08/20/24 @ 10:55 by Khang Bahena)
Within the past year, how often did you have a drink containing alcohol:  never 
Score interpretation:  A score less than 4 is consistent with normal alcohol consumption. 
Smoking status:  Never smoker 
Non-prescribed substance use:  denies use 



Meds
Home Medications and Allergies
Home Medications

?Medication ?Instructions ?Recorded ?Confirmed ?Type
Lactobacillus acidophilus 10 100 mmu cells PO DAILY 08/19/24 08/19/24 History
billion cell capsule    
acetaminophen 500 mg capsule 500 mg PO Q6H PRN pain 08/19/24 08/19/24 History
carvedilol 6.25 mg tablet 6.25 mg PO BID 08/19/24 08/19/24 History
empagliflozin 10 mg-linagliptin 5 1 tab PO DAILY 08/19/24 08/19/24 History
mg tablet    
furosemide 40 mg tablet 40 mg PO BID 08/19/24 08/19/24 History
rivaroxaban 10 mg tablet 10 mg PO DAILY 08/19/24 08/19/24 History
sacubitril 24 mg-valsartan 26 mg 1 tab PO BID 08/19/24 08/19/24 History
tablet    
semaglutide 0.25 mg or 0.5 mg (2 0.25 mg subcut QWEEK 08/19/24 08/19/24 History
mg/3 mL) subcutaneous pen injector    
(Ozempic)    
spironolactone 25 mg tablet 25 mg PO DAILY 08/19/24 08/19/24 History
(Aldactone)    


Allergies

Allergy/AdvReac Type Severity Reaction Status Date / Time
cefixime Allergy  Unknown Verified 08/19/24 11:44
diclofenac Allergy  Unknown Verified 08/19/24 11:44



Exam
Narrative
Exam Narrative: 



I, Alonso Nation MD personally performed the services described in this documentation, as scribed by Gail Jimenez RVT, RUSS in my presence and it is both accurate and complete.



I, Gail Jimenez RVT, RDMS, am scribing for, and in the presence of, Dr. Alonso Nation and in the presence of the patient.
Constitutional
Documenting provider has reviewed patient's vital signs: yes
Common normals: oriented x3
Nutritional appearance: overweight
Cardio
Peripheral pulses: posterior tibial pulses present and dorsalis pedis pulses present
Extremity
Common normals: normal capillary refill
General: calf tenderness and edema
Right lower extremity: lower leg Right lower leg: inspection and palpation
Left lower extremity: lower leg Left lower leg: inspection and palpation
Neuro
Common normals: oriented x3

Results
Imaging
Venous US: 
      Radiologist's impression: 
The ultrasound demonstrates Heat induced thrombus visualized in perforators at dist/lat calf and mid/lat calf. 

Assessment and Plan
Assessment and Plan
(1) Phlebitis and thrombophlebitis of superficial vessels of right lower extremity: 

Plan

Patient in today for follow up ultrasound of lower extremity following treatment of EVLT of right leg perforators completed on 9/27/24.

## 2024-10-10 NOTE — VEINCLINIC_ITS
Vital Signs    
    
    
                                    10/10/24    
                                      15:37    
     
Height                             5 ft 11 in    
     
Weight                               166 kg    
     
BMI                                   51.0    
    
    
Varicose Veins    
    
Patient in this day for follow  up ultrasound post EVLT of right leg perforating  
veins    
    
    
    
Alonso TOSCANO MD personally performed the services described in this   
documentation, as scribed by Gail Jimenez RVT, RDMS in my presence and it   
is both accurate and complete.    
    
    
    
I, Gail iJmenez RVT, RDMS, am scribing for, and in the presence of, Dr. Alonso Nation and in the presence of the patient.    
thigh: bilateral (right leg > left leg), knee: bilateral, calf: bilateral,   
ankle: bilateral and shin: bilateral    
aching, burning, cramping, dull and tender    
10    
3 years    
Worsened in recent months: Yes (last 5 months)    
standing, sitting and walking    
analgesics (Tylenol), elevating extremities, compression stockings and wraps    
Reports muscle spasms of leg, fatigue, heaviness, limb pain, edema and leg edema    
History of lower extremity trauma: Yes (a piece of cpap machine fell and hit   
right lower leg resulting in hemtoma)    
Superficial thrombophlebitis: Yes (DVT right leg)    
Family history of varicose veins: no    
Has patient had previous lower extremity venous surgery: Yes    
Patient has previously received the following treatment(s) for lower extremity   
varicose veins: Reports foam therapy and laser therapy    
Does patient have a history of pregnancy: no    
Has patient had lower extremity venous scan with relux testing: Yes    
Support hose used: Yes    
Problems walking or doing physical activity: Yes    
How does it affect you: pain decreases ability to walk    
Do you walk much: No    
Do you stand much: Yes    
    
Review of Systems    
    
    
ROS      
    
 Narrative     
    
    
Alonso TOSCANO MD personally performed the services described in this   
documentation, as scribed by Gail Jimenez RVT, RDMS in my presence and it   
is both accurate and complete.    
    
    
    
I, Gail Jimenez RVT, RDMS, am scribing for, and in the presence of, Dr. Alonso Nation and in the presence of the patient.       
    
 Status of ROS                          10 or more systems reviewed and unremark    
able except as noted in     
history and below       
    
 Cardiovascular Reports: edema       
    
 Integumentary/Breast Reports: itching, redness, skin pain, skin tenderness,   
skin swelling, non-healing lesion and changes in skin color       
    
 Neurological Reports: numbness in extremities and weakness in extremities       
    
 Hematologic/Lymphatic Reports: easy bruising and easy bleeding       
    
    
PFSH    
[facility]    
Medical History (Updated 09/18/24 @ 07:48 by Maggie Barrios)    
    
Phlebitis and thrombophlebitis of superficial vessels of right lower extremity    
   ?I80.01 - Phlebitis and thrombophlebitis of superficial vessels of right   
   lower extremity (ICD-10)    
Varicose veins of bilateral lower extremities with pain    
   ?I83.813 - Varicose veins of bilateral lower extremities with pain (ICD-10)    
Lymphedema of both lower extremities    
   ?I89.0 - Lymphedema, not elsewhere classified (ICD-10)    
Arthritis    
   ?M19.90 - Unspecified osteoarthritis, unspecified site (ICD-10)    
Hypertension    
   ?I10 - Essential (primary) hypertension (ICD-10)    
Deep vein thrombosis    
   ?I82.409 - Acute embolism and thrombosis of unspecified deep veins of   
   unspecified lower extremity (ICD-10)    
Cardiomyopathy    
   ?I42.9 - Cardiomyopathy, unspecified (ICD-10)    
Carpal tunnel syndrome    
   ?G56.00 - Carpal tunnel syndrome, unspecified upper limb (ICD-10)    
Peripheral vascular disease    
   ?I73.9 - Peripheral vascular disease, unspecified (ICD-10)    
Ulcer of right leg    
   ?L97.919 - Non-pressure chronic ulcer of unspecified part of right lower leg   
   with unspecified severity (ICD-10)    
Coronary artery disease    
   ?I25.10 - Atherosclerotic heart disease of native coronary artery without   
   angina pectoris (ICD-10)    
CHF (congestive heart failure)    
   ?I50.9 - Heart failure, unspecified (ICD-10)    
Obstructive sleep apnea    
   ?G47.33 - Obstructive sleep apnea (adult) (pediatric) (ICD-10)    
Chronic back pain    
   ?M54.9 - Dorsalgia, unspecified (ICD-10)    
   ?G89.29 - Other chronic pain (ICD-10)    
Postphlebetic syndrome with inflammation    
   ?I87.029 - Postthrombotic syndrome with inflammation of unspecified lower   
   extremity (ICD-10)    
Type 2 diabetes mellitus    
   ?E11.9 - Type 2 diabetes mellitus without complications (ICD-10)    
    
    
Surgical History (Updated 09/27/24 @ 13:50 by Khang Bahena)    
    
Status post laser ablation of incompetent vein    
   ?Z98.890 - Other specified postprocedural states (ICD-10)    
Status post laser ablation of incompetent vein    
   ?Z98.890 - Other specified postprocedural states (ICD-10)    
History of carpal tunnel surgery of right wrist    
   ?Z98.890 - Other specified postprocedural states (ICD-10)    
History of cholecystectomy    
   ?Z90.49 - Acquired absence of other specified parts of digestive tract (ICD-  
   10)    
    
    
Family History (Updated 08/19/24 @ 11:42 by Khang Bahena)    
Sister   Family history of diabetes mellitus    
Father   Family history of diabetes mellitus    
Other   Family history of CHF (congestive heart failure)    
   Family history of hypertension    
   Family history of myocardial infarction    
    
    
    
Social History (Updated 08/20/24 @ 10:55 by Khang Bahena)    
Within the past year, how often did you have a drink containing alcohol:  never     
Score interpretation:  A score less than 4 is consistent with normal alcohol   
consumption.     
Smoking status:  Never smoker     
Non-prescribed substance use:  denies use     
    
    
    
Meds    
Home Medications and Allergies    
                                Home Medications    
    
    
    
?Medication ?Instructions ?Recorded ?Confirmed ?Type    
     
Lactobacillus acidophilus 10 100 mmu cells PO DAILY 08/19/24 08/19/24 History    
    
billion cell capsule        
     
acetaminophen 500 mg capsule 500 mg PO Q6H PRN pain 08/19/24 08/19/24 History    
     
carvedilol 6.25 mg tablet 6.25 mg PO BID 08/19/24 08/19/24 History    
     
empagliflozin 10 mg-linagliptin 5 1 tab PO DAILY 08/19/24 08/19/24 History    
    
mg tablet        
     
furosemide 40 mg tablet 40 mg PO BID 08/19/24 08/19/24 History    
     
rivaroxaban 10 mg tablet 10 mg PO DAILY 08/19/24 08/19/24 History    
     
sacubitril 24 mg-valsartan 26 mg 1 tab PO BID 08/19/24 08/19/24 History    
    
tablet        
     
semaglutide 0.25 mg or 0.5 mg (2 0.25 mg subcut QWEEK 08/19/24 08/19/24 History    
    
mg/3 mL) subcutaneous pen injector        
    
(Ozempic)        
     
spironolactone 25 mg tablet 25 mg PO DAILY 08/19/24 08/19/24 History    
    
(Aldactone)        
    
    
    
                                    Allergies    
    
    
    
Allergy/AdvReac Type Severity Reaction Status Date / Time    
     
cefixime Allergy  Unknown Verified 08/19/24 11:44    
     
diclofenac Allergy  Unknown Verified 08/19/24 11:44    
    
    
    
    
Exam    
Narrative    
Exam Narrative:     
    
    
    
IAlonso MD personally performed the services described in this   
documentation, as scribed by Gail Jimenez RVT, RUSS in my presence and it   
is both accurate and complete.    
    
    
    
I, Gail Jimenez RVT, RDMS, am scribing for, and in the presence of, Dr. Alonso Nation and in the presence of the patient.    
Constitutional    
Documenting provider has reviewed patient's vital signs: yes    
Common normals: oriented x3    
Nutritional appearance: overweight    
Cardio    
Peripheral pulses: posterior tibial pulses present and dorsalis pedis pulses pr  
esent    
Extremity    
Common normals: normal capillary refill    
General: calf tenderness and edema    
Right lower extremity: lower leg Right lower leg: inspection and palpation    
Left lower extremity: lower leg Left lower leg: inspection and palpation    
Neuro    
Common normals: oriented x3    
    
Results    
Imaging    
Venous US:     
      Radiologist's impression:     
The ultrasound demonstrates Heat induced thrombus visualized in perforators at   
dist/lat calf and mid/lat calf.     
    
Assessment and Plan    
Assessment and Plan    
(1) Phlebitis and thrombophlebitis of superficial vessels of right lower   
extremity:     
    
Plan    
    
Patient in today for follow up ultrasound of lower extremity following treatment  
of EVLT of right leg perforators completed on 9/27/24.

## 2024-10-10 NOTE — W.VEIN
Discharge Plan
Discharge
Disposition: Home, Self-Care

Outpatient Diagnostics:
VC INJ Foam Sclerosant WUS MLT  (Routine)  Timeframe:  2 Weeks
   Facility: [facility] - Location: [facility]
   Ordered By:  Alonso Nation

Plan of Treatment:
Varithena/microfoam chemical ablation 

Print Language: English

Discharge Date/Time: 10/11/24 11:21

## 2024-10-11 ENCOUNTER — HOSPITAL ENCOUNTER
Dept: HOSPITAL 101 - VC | Age: 69
Discharge: HOME | End: 2024-10-11
Payer: MEDICARE

## 2024-10-11 VITALS — BODY MASS INDEX: 51 KG/M2

## 2024-10-11 DIAGNOSIS — I80.01: Primary | ICD-10-CM

## 2024-10-11 PROCEDURE — 93971 EXTREMITY STUDY: CPT

## 2024-10-11 PROCEDURE — G0463 HOSPITAL OUTPT CLINIC VISIT: HCPCS

## 2024-10-11 NOTE — VEIN_ITS
Patient Name:      
JOCELIN ARORA 
      
MR#: [id number]      
: 1955 
Accession #: [id number] 
Exam Date: 10/11/2024  
Ordering Doctor: DR KAYY JUNIOR M.D. 
  
RADIOLOGY REPORT 
  
PROCEDURE: [facility] EST LMTD 
  
VEIN CENTER - OFFICE VISIT FOLLOW UP 
  
[facility] 
  
PROGRESS NOTES: 
  
The patient reports no problems following intravenous laser ablation of  
incompetent right leg perforating veins.  The patient has worn his compression  
stockings and wraps. 
  
Physical exam demonstrates continued improvement in overall appearance of the  
right lower leg with continued healing of multiple ulcerations.  There is  
interval decrease in subcutaneous swelling skin thickening and erythema. 
  
Review of the ultrasound performed the same day demonstrates occlusive  
thrombus extending throughout the treated perforating veins with no deep vein  
thrombus. 
  
The patient expressed a desire to proceed with treatment of varicose veins  
with micro foam chemical ablation. 
  
ORDER #: [id number] VEIN/[facility] EST LMTD  
IMPRESSION:  
1. Successful ablation of treated incompetent right leg perforating vein  
2. Persistent bilateral varicose veins.  
   
PLAN:  
   
Micro foam chemical ablation varicose veins  
   
   
Nurse notes, history and physical were reviewed and confirmed, see attached   
forms.  
The nurse was present throughout the physical exam and consultation  
   
   
   
   
   
Dictated by: Alonso Nation MD on 10/11/2024 at 12:01       
Approved by: Alonso Nation MD on 10/11/2024 at 12:03

## 2024-10-11 NOTE — XMS_ITS
Comprehensive CCD (C-CDA v2.1)  
  
                          Created on: 2024  
  
  
Jocelin Pacheco  
External Reference #: CDR,PersonID:[id number]  
: [id number]  
Sex: Male  
  
Demographics  
  
  
                                        Address             [address]  [address]  
   
                                        Home Phone          [phone]  
   
                                        Work Phone          [phone]  
   
                                        Preferred Language  en  
   
                                        Marital Status        
   
                                        Yazidism Affiliation Unknown  
   
                                        Race                White  
   
                                        Ethnic Group        Not  or Lati  
no  
  
  
Author  
  
  
                                        Organization        [facility]  
  
  
Care Team Providers  
  
  
                                Care Team Member Name Role            Phone  
   
                                Annel Baird      Unavailable     [phone]  
   
                                Alonso Birmingham   Unavailable     [phone]  
   
                                Jacoby Braden Unavailable     [phone]  
   
                                Vera Melvin Unavailable     [phone]  
   
                                Unavailable     Unavailable     [phone]  
   
                                DO Vera Melvin Primary Care Provider [phone]  
   
                                ESVIN Steward Attending Provider [phone]  
   
                                SOWMYA Villarreal Attending Provider [phone]  
   
                                DO Vera Melvin Primary Care Provider [phone]  
   
                                ESVIN Steward Attending Provider [phone]  
   
                                DO JASON Maldonado Attending Provider [phone]  
   
                                COMPA MALDONADO Attending       Unavailable  
   
                                COMPA MALDONADO Referring       Unavailable  
   
                                Ngozi, Dr. Vera Vincent Primary [facility]    U  
DO Vera Brown Primary Care Provider [phone]  
   
                                ESVIN Steward Attending Provider [phone]  
   
                                MD Jacoby Braden Attending Provider [phone]  
   
                                DO Vera Melvin Primary Care Provider [phone]  
   
                                SOWMYA Espino Attending Provider [phone]  
   
                                Dr. Compa Maldonado Referring       Rachna Melvin, Dr. Vera [facility]    U  
daniel Maldonado, Dr. Compa Sr Attending       Rachna Maldonado, Dr. Compa Sr Referring       Unava  
ilmariaelena Melvin, Dr. Vera Melisa Primary Care    U  
Dr. Compa Thomson Attending       Unava  
ilable  
   
                                Nichelle, ANABELLAC Barney Attending Provider [phone]  
   
                                Matias-Mayaguez, DO Vera Attending Provider [phone]  
   
                                Matias-Mayaguez DO, Vera D Unavailable     [phone]  
   
                                Juve DPM, Danya SMART Unavailable     [phone]  
   
                                [facility] CORTES, Elroy CELESTE Unavailable     [phone]  
   
                                Matias-Mayaguez DO, Vera D Primary Care Provider   
[phone]  
   
                                Matias-Mayaguez, DO Vera Primary Care Provider [phone]  
   
                                SOWMYA Espino Attending Provider [phone]  
   
                                Matias-Mayaguez DO, Vera Melisa Primary Care Provi  
sherly [phone]  
   
                                SHARLENE AN  Attending       Unavailable  
   
                                MATIAS-EMERY, VERA MELISA Primary Care    Unava  
ilable  
   
                                Matias-Mayaguez, DO Vera Primary Care Provider [phone]  
   
                                SOWMYA Espino Attending Provider [phone]  
   
                                Matias-Mayaguez, DO Vera Primary Care Provider [phone]  
   
                                SOWMYA Espino Attending Provider [phone]  
   
                                VERA MELVIN Attending       Unavailab  
le  
   
                                MATIAS-VERA ANTONY Referring       Unavailab  
le  
   
                                DANYA AN Attending       Unavailable  
   
                                MATIASVERA GARCIA Referring       Unavailab  
le  
   
                                LOPEZ JESSICA L   Attending       Unavailable  
   
                                MIRIAN, RAY L Referring       Unavailable  
   
                                DEPOY, ARLENE Attending       Unavailable  
   
                                MIRIAN, RAY L Referring       Unavailable  
   
                                LOPEZ, JESSICA L   Attending       Unavailable  
   
                                MIRIAN, RAY L Referring       Unavailable  
   
                                DEPOY, ARLENE Attending       Unavailable  
   
                                MIRIAN, RAY L Referring       Unavailable  
   
                                NA, DANYA H Attending       Unavailable  
   
                                LOPEZ, JESSICA L   Attending       Unavailable  
   
                                MIRIAN, RAY L Referring       Unavailable  
   
                                DANYA AN Attending       Unavailable  
   
                                MATIAS-VERA ANTONY Attending       Unavailab  
le  
   
                                MATIAS-VERA ANTONY Referring       Unavailab  
le  
   
                                DANYA AN Attending       Unavailable  
   
                                VERA MELVIN Attending       Unavailab  
le  
   
                                MATIAS-EMERYVERA Referring       Unavailab  
le  
   
                                Cophector, Elaine   Admitting       Unavailable  
   
                                Elaine Espino   Attending       Unavailable  
   
                                [facility]    Unavailable  
   
                                Cophector, Elaine   Attending       Unavailable  
   
                                [facility]    Unavailable  
   
                                Copsey, Elaine   Admitting       Unavailable  
   
                                Copsey, Elanie   Admitting       Unavailable  
   
                                Copsey, Elaine   Attending       Unavailable  
   
                                [facility]    Unavailable  
  
  
  
Allergies  
  
  
                                                    Allergy   
Classification                          Reported   
Allergen(s)               Allergy Type              Date of   
Onset                     Reaction(s)               Facility  
   
                                                      
(20 sources)        Cefixime            Drug Allergy        20  
22                                      Unknown,   
Unknown   
Reaction                                [facility]  
   
                                                      
(20 sources)                            Diclofenac;   
Translations:   
[Voltaren]                Drug Allergy              20  
22                        [facility]  
   
                                                      
(20 sources)        Nystatin            Drug Allergy        20  
22                                      Unknown,   
Unknown   
Reaction                                [facility]  
   
                                                      
(8 sources)                             Cephalosporins   
(Antibiotic);   
Translations:   
[Cephalosporins]                        Allergy to   
drug (finding)                          20                        [place] [address] Repository  
   
                                                      
(7 sources)                             Hmg-Coa Reductase   
Inhibitors   
(Statins);   
Translations:   
[Statins]                               Allergy to   
drug (finding)                          [facility]   
[facility]  
[address] DO  
Work Phone:   
[phone]  
0  
   
                                                      
(15 sources)                            Benzoate;   
Translations:   
[sodium benzoate]         Drug Allergy              20  
22                                      Unknown   
Reaction                                [facility]  
   
                                                      
(3 sources)               Cefixime                  Allergy to   
substance                               20  
23                        Unknown                   [facility]   
[facility]  
Work Phone:   
[phone]  
1  
   
                                                      
(3 sources)         Nystatin            Drug Allergy        20  
23                        Unknown                   [facility]   
[facility]  
   
                                                      
(1 source)                              Cephalosporins   
(Antibiotic)                            Drug   
Intolerance                             20                        [facility]  
   
                                                      
(2 sources)                             Diclofenac;   
Translations:   
[DICLOFENAC   
SODIUM]                   Drug Allergy              20                        [facility]  
Work Phone:   
[phone]  
   
                                                      
(2 sources)                             HMG-CoA reductase   
inhibitor;   
Translations:   
[STATINS-HMG-COA   
REDUCTASE   
INHIBITORS]                             Drug   
Intolerance                             20                        [facility]  
Work Phone:   
[phone]  
   
                                                      
(1 source)          Cefixime            Drug Allergy        20                                                  [facility]   
Repository  
   
                                                      
(1 source)          Diclofenac          Drug Allergy        20                                                  [facility]   
Repository  
   
                                                      
(1 source)          Nystatin            Drug Allergy        20                                                  [facility]   
Repository  
  
  
  
Medications  
Current Medications  
  
  
  
                      Medication Drug Class(es) Dates      Sig (Normalized) Sig   
(Original)  
   
                                                    3 ML semaglutide   
2.68 MG/ML Pen   
Injector [Ozempic]  
(7 sources)                                         Start:   
2022                              inject 2 mg by   
subcutaneous   
injection every   
week                                    Ozempic (2 MG/DOSE)   
8 MG/3ML 2 mg   
Subcutaneous weekly   
for 30 days 21 Dec,   
2022 Active  
  
  
  
                                                            inject 2 mg by subcu  
taneous injection   
every week                              Ozempic (2 MG/DOSE) 8 MG/3ML 2 mg SQ Onc  
e   
a week. for 28 days E11.8 Active  
   
                                                            inject 2 mg by subcu  
taneous injection   
every week                              Ozempic (2 MG/DOSE) 8 MG/3ML DIAL AND   
INJECT UNDER THE SKIN 2 MG WEEKLY for 28   
Active  
   
                                                            inject 2 mg by subcu  
taneous injection   
every week                              Ozempic (2 MG/DOSE) 8 MG/3ML 2 mg   
Subcutaneous weekly for 30 days Active  
  
  
  
                                                    acetaminophen 500   
mg oral tablet  
(12 sources)                                        Start:   
2020                              take 1   
tablet by   
mouth three   
times daily                             Acetaminophen   
(Tylenol Extra   
Strength) 500 mg   
Tablet Active 500   
MG PO Three times   
daily 2020 1:00am  
   
                                                    carvedilol 6.25 mg   
oral tablet  
(20 sources)                            alpha-Adrenergic   
Blocker,   
beta-Adrenergic   
Blocker                                 Start:   
2017                              take 6.25 mg   
by mouth   
twice daily                             Carvedilol Active   
6.25 MG PO Twice   
daily 2017 1:00am  
   
                                                    empagliflozin 10 mg   
oral tablet  
(20 sources)                            Sodium-Glucose   
Cotransporter 2   
Inhibitor                               Start:   
2022                              take 1   
tablet by   
mouth once   
daily                                   Empagliflozin   
(Jardiance) 10 mg   
tablet Active 10 MG   
PO Daily 2022 12:00am  
   
                                                    furosemide 40 mg   
oral tablet  
(20 sources)              Loop Diuretic             Start:   
2017                              take 1   
tablet by   
mouth twice   
daily                                   Furosemide (Lasix)   
40 mg Tablet Active   
40 MG PO Twice   
daily 2017 1:00am  
  
  
  
                                                            take 1 tablet by lenin  
th every twelve   
hours                                   furosemide (Lasix) 40 MG tablet Take 40   
mg   
by mouth every 12 (twelve) hours. 0 Active  
   
                                                                Lasix TABS TAKE   
1 TABLET TWICE DAILY.   
Quantity: 0 Refills: 0 Ordered: 2022   
DO Active  
  
  
  
                                                    lactobacillus   
acidophilus   
[id number] unt oral   
capsule  
(6 sources)                                         Start:   
2024                              take 10   
capsules by   
mouth once   
daily                                   Lactobacillus   
Acidophilus   
(Probiotic) 10   
billion cell capsule   
Active 100 MMU CELLS   
PO Daily 2024 1:00am  
   
                                                    methylPREDNISolone  
(1 source)                Corticosteroid            Start:   
2024  
End:   
2024                                          methylPREDNISolone   
(Medrol Dospak) 4 MG   
tablets Indications:   
Lumbosacral strain,   
initial encounter ,   
Acute pain of left   
shoulder Take as   
directed on package.   
21 tablet 0   
2024 Active  
   
                                                    Ozempic (2 MG/DOSE) 8   
MG/3ML  
(1 source)                                                  inject 2 mg by   
subcutaneous   
injection   
every week                              Ozempic (2 MG/DOSE)   
8 MG/3ML 2 mg   
Subcutaneous weekly   
for 30 days Active  
   
                                                    Ozempic, 2 MG/DOSE, 8   
MG/3ML solution   
pen-injector  
(3 sources)                                         Start:   
2023                                          Ozempic, 2 MG/DOSE,   
8 MG/3ML solution   
pen-injector  
   
                                                    Probiotic Product   
(PROBIOTIC BLEND PO)  
(3 sources)                                                 take 2 [IU] by   
mouth in the   
morning                                 Probiotic Product   
(PROBIOTIC BLEND PO)   
Take 2 Units by   
mouth in the   
morning. 0 Active  
   
                                                    rivaroxaban 10 mg   
oral tablet  
(20 sources)                            Factor Xa   
Inhibitor                               Start:   
2022                              take 1 tablet   
by mouth once   
daily                                   Rivaroxaban   
(Xarelto) 10 mg   
Tablet Active 10 MG   
PO Daily 2022 12:00am   
for 35 days  
  
  
  
                                                    Start: 2019  
End: 2021           take 10 mg by mouth once daily Rivaroxaban Discontinue  
d 10 MG PO  
   
Daily 2019 12:00am 2021 1:02am  
   
                                                                Xarelto Active  
  
  
  
                                                    rosuvastatin   
calcium 10 mg oral   
tablet  
(3 sources)                             HMG-CoA Reductase   
Inhibitor                               Start:   
2023                              take 1 tablet by   
mouth every   
twenty-four hours                       Rosuvastatin   
Calcium 10 MG 1   
tablet Orally   
Once a day for 30   
day(s)  Active  
   
                                                    sacubitril 24 mg /   
valsartan 26 mg   
oral tablet  
(20 sources)                            Angiotensin 2   
Receptor Blocker                        Start:   
2022                              take 1 tablet by   
mouth twice daily                       Sacubitril-Valsar  
jiménez (Entresto)   
24-26 mg Tablet   
Active 1 TAB PO   
Twice daily 2023   
12:00am  
  
  
  
                                                take 1 tablet by mouth in the mo  
rning Entresto 24-26 MG tablet Take 1 tablet by  
   
mouth in the morning and 1 tablet before   
bedtime. 0 Active  
   
                                                                ENTRESTO 24 mg/2  
6 mg 1 orally twice a day   
Active  
  
  
  
                                                    0.25 mg, 0.5 mg   
dose 1.5 ml   
semaglutide 1.34   
mg/ml pen injector  
(3 sources)                                         Start:   
2022                                          Ozempic (0.25 or   
0.5 MG/DOSE) 2   
MG/1.5ML 0.25 mg   
for one month and   
then increase to   
0.5 mg dose   
Subcutaneous   
weekly for 30 days   
   
Active  
   
                                                    Semaglutide  
(4 sources)                                         Start:   
2024                              inject 0.25 mg by   
subcutaneous   
injection every   
week, then inject   
0.5 mg by   
subcutaneous   
injection every   
week                                    Semaglutide   
(Ozempic) 0.25 mg   
or 0.5 mg (2 mg/3   
mL) pen injector   
Active 0.25 MG   
SUBCUT every week   
3 April 24th, 2024   
12:00am for 4   
weeks; then   
increase to 0.5 mg   
every week  
   
                                                    semaglutide   
(Ozempic) 1 mg/dose   
(4 mg/3 mL) pen   
injector  
(1 source)                                                  inject 1 mg by   
subcutaneous   
injection every   
week                                    semaglutide   
(Ozempic) 1   
mg/dose (4 mg/3   
mL) pen injector   
Inject 1 mg under   
the skin per week.   
Active  
   
                                                    spironolactone 25   
mg oral tablet  
(20 sources)                            Aldosterone   
Antagonist                              Start:   
2022                              take 25 mg by   
mouth once daily                        Spironolactone   
Active 25 MG PO   
Daily 2022 12:00am  
  
  
  
Completed/Discontinued Medications  
  
  
  
                      Medication Drug Class(es) Dates      Sig (Normalized) Sig   
(Original)  
   
                                                    3 ML semaglutide   
1.34 MG/ML Pen   
Injector [Ozempic]  
(7 sources)                                         Start:   
10-                              inject 1 mg by   
subcutaneous   
injection every   
week                                    Ozempic (1 MG/DOSE)   
4 MG/3ML 1 mg   
Subcutaneous Weekly   
for 28 days 28 Oct,   
2022 Not-Taking  
  
  
  
                                        Start: 10-   inject 1 mg by subcu  
taneous   
injection every week                    Ozempic (1 MG/DOSE) 4 MG/3ML 1 mg   
Subcutaneous Weekly for 28 days   
28 Oct, 2022 Active  
   
                                        Start: 2022   inject 1 mg by subcu  
taneous   
injection every week                    Ozempic (1 MG/DOSE) 4 MG/3ML 1 mg   
Subcutaneous Weekly for 28 days   
 Active  
  
  
  
                                                    acetaminophen 325   
mg / HYDROcodone   
bitartrate 5 mg   
oral tablet  
(12 sources)              Opioid Agonist            Start:   
2021  
End:   
2022                              take 1   
tablet by   
mouth   
every   
eight   
hours                                   Hydrocodone-Acetaminophen   
Discontinued 1 TAB PO Q8H 6   
2  8:11am  
   
                                                    acetaminophen 325   
mg / oxyCODONE   
hydrochloride 5   
mg oral tablet  
(12 sources)              Opioid Agonist            Start:   
2019  
End:   
2020                              take 1   
tablet by   
mouth   
every   
four to   
six hours                               Oxycodone-Acetaminophen   
(Percocet) 5-325 mg tablet   
Discontinued 1 TAB PO EVERY   
4-6 HOURS 10 3 November   
29th, 2019 December 2nd,   
2020 1:59pm  
   
                                                    ampicillin 500 mg   
oral capsule  
(12 sources)                            Penicillin-class   
Antibacterial                           Start:   
2019  
End:   
2019                              take 500   
mg by   
mouth   
every six   
hours                                   Ampicillin Discontinued 500   
MG PO Q6H 56 14 2019 12:00am 2019   
3:39pm  
   
                                                    ascorbic acid 500   
mg oral tablet  
(12 sources)              Vitamin C                 Start:   
2020  
End:   
2022                              take 1   
tablet by   
mouth   
twice   
daily                                   Ascorbic Acid (Vitamin C)   
(Vitamin C) 500 mg Tablet   
Discontinued 500 MG PO Twice   
daily 14  1:00am 2022 8:11am  
   
                                                    aspirin 81 mg   
delayed release   
oral tablet  
(12 sources)                            Platelet   
Aggregation   
Inhibitor,   
Nonsteroidal   
Anti-inflammatory   
Drug                                    Start:   
2019  
End:   
2019                              take 1   
tablet by   
mouth   
once   
daily                                   Aspirin (Aspirin Low Dose)   
81 mg Tablet,Delayed Release   
(Dr/Ec) Discontinued 81 MG   
PO Daily 2019   
12:00am 2019   
11:52am  
   
                                                    cephalexin 500 mg   
oral capsule  
(13 sources)                            Cephalosporin   
Antibacterial                           Start:   
2024  
End:   
08-                              take 500   
mg by   
mouth   
twice   
daily                                   Cephalexin Discontinued 500   
MG PO Twice daily 14  12:00am   
2024 3:20pm  
  
  
  
                                                    Start: 2019  
End: 2019                         take 500 mg by mouth every   
five hours                              Cephalexin Discontinued 500 MG PO Q5H   
2019 12:00am May 6th, 2019   
1:54pm  
  
  
  
                                                    ciprofloxacin 500   
mg oral tablet  
(20 sources)                            Quinolone   
Antimicrobial                           Start:   
2021  
End: 2021                         take 1   
tablet by   
mouth twice   
daily                                   Ciprofloxacin Hcl   
(Cipro) 500 mg   
tablet Discontinued   
500 MG PO Twice   
daily  1:00am   
2021   
12:59am  
  
  
  
                                                    Start: 2020  
End: 2021                         take 500 mg by mouth every   
twelve hours                            Ciprofloxacin Hcl Discontinued 500 MG   
PO Q12H 20 10 December 4th, 2020   
1:00am 2021 3:07pm  
   
                                                    Start: 2020  
End: 2020                         take 1 tablet by mouth twice   
daily                                   Ciprofloxacin Hcl (Cipro) 500 mg   
tablet Discontinued 500 MG PO Twice   
daily  12:00am   
2020 1:36pm  
   
                                                    Start: 2020  
End: 2020                         take 1 tablet by mouth twice   
daily                                   Ciprofloxacin Hcl (Cipro) 500 mg   
tablet Discontinued 500 MG PO Twice   
daily  12:00am 2020 10:03am  
  
  
  
                                                    clindamycin 300 mg   
oral capsule  
(20 sources)                            Lincosamide   
Antibacterial                           Start: 2019  
End: 2020                         take 300 mg   
by mouth   
every eight   
hours                                   Clindamycin Hcl   
Discontinued 300   
MG PO Q8H 30 10   
November 29th,   
2019 1:00am   
2020   
3:08pm  
  
  
  
                                                    Start: 2019  
End: 2019                         take 450 mg by mouth every   
eight hours                             Clindamycin Hcl Discontinued 450 MG   
PO Q8H 63  1:00am   
2019 2:00pm  
  
  
  
                                                    clobetasol propionate   
0.0005 mg/mg topical   
ointment  
(20 sources)              Corticosteroid            Start: 2019  
End: 2023                                     Clobetasol Discontinued 1   
APPLIC TOPICAL 3 Times a   
week 2021   
1:32pm 2023   
11:11am apply thin layer   
to right leg 1-2 times   
daily as needed for   
itching.  
  
  
  
                                                    Start: 05-  
End: 2019                                     Clobetasol Discontinued 1 AP  
PLIC TOPICAL .every other day 30 14  
   
May 15th, 2019 12:00am 2019 2:56pm apply thin layer to   
right leg rash with dressing changes  
   
                                                                clobetasol (Jose A  
vate) 0.05 % cream Apply 1 application topically   
every 12 (twelve) hours. 0 Active  
   
                                                                Clobetasol Propi  
maged Not-Taking  
   
                                                                Clobetasol Propi  
maged Active  
  
  
  
                                                    cyclobenzaprine   
hydrochloride 10 mg   
oral tablet  
(19 sources)              Muscle Relaxant           Start:   
2017  
End: 2021                         take 10 mg   
by mouth   
three   
times   
daily                                   Cyclobenzaprine   
Discontinued 10 MG   
PO Three times daily   
2017   
1:00am 2021 12:59am  
   
                                                    dexamethasone 6 mg   
oral tablet  
(12 sources)              Corticosteroid            Start:   
2020  
End: 2021                         take 6 mg   
by mouth   
once daily                              Dexamethasone   
Discontinued 6 MG PO   
Daily 3 3 December   
22nd, 2020 1:00am   
2021   
3:07pm  
   
                                                    doxycycline hyclate   
100 mg oral capsule  
(20 sources)                            Tetracycline-class   
Drug                                    Start:   
2024  
End: 08-                         take 100   
mg by   
mouth   
twice   
daily                                   Doxycycline Hyclate   
Discontinued 100 MG   
PO Twice daily 20 10   
July 8th, 2024   
12:00am 2024 3:20pm  
  
  
  
                                                    Start: 2023  
End: 2024                         take 100 mg by mouth twice   
daily                                   Doxycycline Hyclate Discontinued 100   
MG PO Twice daily  1:00am 2024   
2:30pm  
   
                                                    Start: 2021  
End: 2021                         take 100 mg by mouth twice   
daily                                   Doxycycline Hyclate Discontinued 100   
MG PO Twice daily 20 10 April 8th,   
2021 12:00am 2021 1:31pm  
   
                                                    Start: 2021  
End: 2021                         take 100 mg by mouth twice   
daily                                   Doxycycline Hyclate Discontinued 100   
MG PO Twice daily 20 10 January 29th,   
2021 1:00am 2021 8:01am  
   
                                                    Start: 10-  
End: 2020                         take 100 mg by mouth twice   
daily                                   Doxycycline Hyclate Discontinued 100   
MG PO Twice daily 20 10 October 5th,   
2020 12:00am 2020   
1:59pm  
   
                                                    Start: 2020  
End: 2020                         take 100 mg by mouth twice   
daily                                   Doxycycline Hyclate Discontinued 100   
MG PO Twice daily May 5th, 2020   
12:00am 2020 1:37pm  
   
                                                    Start: 2020  
End: 2020                         take 100 mg by mouth twice   
daily                                   Doxycycline Hyclate Discontinued 100   
MG PO Twice daily  12:00am 2020 1:21pm  
   
                                                    Start: 2019  
End: 2020                         take 100 mg by mouth twice   
daily                                   Doxycycline Hyclate Discontinued 100   
MG PO Twice daily  1:00am 2020   
3:11pm  
   
                                                    Start: 2019  
End: 2019                         take 100 mg by mouth twice   
daily                                   Doxycycline Hyclate Discontinued 100   
MG PO Twice daily  12:00am 2019 3:39pm  
   
                                                    Start: 2019  
End: 2019                         take 100 mg by mouth twice   
daily                                   Doxycycline Hyclate Discontinued 100   
MG PO Twice daily 28 14 May 3rd, 2019   
12:00am May 28th, 2019 3:36pm  
  
  
  
                                                    Ketorolac  
(15 sources)                            Nonsteroidal   
Anti-inflammatory Drug,   
Cyclooxygenase   
Inhibitor                               Start:   
07-                                          Toradol per 15 mg   
15 2013 2 mL  
   
                                                    levoFLOXacin 750   
mg oral tablet  
(12 sources)              Quinolone Antimicrobial   Start:   
2019  
End: 2019                         take 1   
tablet by   
mouth once   
daily                                   Levofloxacin   
(Levaquin) 750 mg   
tablet   
Discontinued 750   
MG PO Daily 10 10   
Lynn 25th, 2019   
12:00am 2019 2:24pm  
   
                                                    meloxicam 15 mg   
oral tablet  
(19 sources)                            Nonsteroidal   
Anti-inflammatory Drug                  Start:   
2017  
End: 2021                         take 15 mg   
by mouth   
once daily                              Meloxicam   
Discontinued 15   
MG PO Daily   
2017 1:00am 2021 1:00am  
   
                                                    metOLazone 2.5 mg   
oral tablet  
(20 sources)              Thiazide-like Diuretic    Start:   
2022  
End: 2023                         take 2.5 mg   
by mouth   
every other   
day                                     Metolazone   
Discontinued 2.5   
MG PO Q2D 2022   
12:00am 2023   
11:10am  
  
  
  
                                                    Start: 2019  
End: 2022                         take 2.5 mg by mouth three   
times weekly in the morning as   
needed                                  Metolazone Discontinued 2.5 MG PO 3   
Times a week 2019 12:00am   
2022 8:10am take three   
times a week 30 minutes prior to am   
lasix as needed  
   
                                                                metOLazone Activ  
e  
  
  
  
                                                    nabumetone 750 mg   
oral tablet  
(20 sources)                            Nonsteroidal   
Anti-inflammatory Drug                  Start:   
2021  
End: 2024                         take 750 mg   
by mouth   
twice daily                             Nabumetone   
Discontinued 750   
MG PO Twice daily   
2021   
1:00am 2024 9:17am  
  
  
  
                                                    Start: 2021  
End: 2024                         take 1 tablet by mouth every   
twelve hours                            nabumetone (Relafen) 750 mg tablet   
Take 1 tablet (750 mg) by mouth every   
12 hours. 2021   
Discontinued (Discontinued by another   
clinician)  
   
                                        Start: 2021   take 1 tablet by lenin  
th once   
daily                                   Nabumetone 750 MG Oral Tablet TAKE 1   
TABLET EVERY 12 HOURS DAILY.   
Quantity: 60 Refills: 2 Ordered:   
2021 Compa Soto MD   
Start : 2021 Active  
  
  
  
                                                    Ozempic (1 MG/DOSE)   
SOPN  
(7 sources)                                                     Ozempic (1 MG/DO  
SE)   
SOPN INJECT 1 UNIT   
Weekly Quantity: 0   
Refills: 0 Ordered:   
2022 DO Active  
   
                                                    permethrin 50 mg/ml   
topical cream  
(12 sources)              Pyrethroid                Start:   
2019  
End:   
2019                                          Permethrin   
Discontinued 1 APPLIC   
TOPICAL Once May 6th,   
2019 12:00am May   
28th, 2019 3:36pm  
   
                                                    predniSONE 20 mg oral   
tablet  
(7 sources)                                         Start:   
2019                              take 2 tablets by   
mouth every   
twenty-four hours                       predniSONE 20 MG 2   
tablet Orally Once a   
day for 5  Not-Taking  
   
                                                    Semaglutide (Ozempic)   
2 mg/dose (8 mg/3 mL)   
pen injector  
(12 sources)                                        Start:   
2022  
End:   
2024                              inject 2 mg by   
subcutaneous injection   
every week                              Semaglutide (Ozempic)   
2 mg/dose (8 mg/3 mL)   
pen injector   
Discontinued 2 MG   
SUBCUT Q7D 2022 12:00am   
2024   
2:31pm  
  
  
  
                                        Start: 2022   inject 2 mg by subcu  
taneous   
injection every week                    Semaglutide (Ozempic) 2 mg/dose   
(8 mg/3 mL) pen injector Active 2   
MG SUBCUT Q7D 2022   
11:00pm  
   
                                        Start: 2022   inject 2 mg by subcu  
taneous   
injection every week                    Semaglutide (Ozempic) 2 mg/dose   
(8 mg/3 mL) pen injector Active 2   
MG SUBCUT Q7D 2022   
12:00am  
  
  
  
                                                    Sulfamethoxazole  
(7 sources)     Sulfonamide Antimicrobial                                 Sulfam  
ethoxazole Not-Taking  
  
  
  
                                                                Sulfamethoxazole  
 Active  
  
  
  
                                                    sulfamethoxazole   
800 mg /   
trimethoprim 160   
mg oral tablet  
(12 sources)                            Dihydrofolate   
Reductase   
Inhibitor   
Antibacterial,   
Sulfonamide   
Antimicrobial                           Start:   
2020  
End:   
2020                              take 1   
tablet by   
mouth   
twice   
daily                                   Sulfamethoxazole-Trimethoprim   
(Bactrim Ds) 800-160 mg tablet   
Discontinued 1 TAB PO Twice   
daily    
1:00am 2020   
3:57pm  
   
                                                    telmisartan 40 mg   
oral tablet  
(20 sources)                            Angiotensin 2   
Receptor Blocker                        Start:   
2017  
End:   
2022                              take 40   
mg by   
mouth   
once   
daily                                   Telmisartan Discontinued 40 MG   
PO Daily 2017   
1:00am 2022   
8:15am  
   
                                                    traMADol   
hydrochloride 50   
mg oral tablet  
(19 sources)              Opioid Agonist            Start:   
2020  
End:   
2020                              take 50   
mg by   
mouth   
twice   
daily                                   Tramadol Discontinued 50 MG PO   
Twice daily 2020   
1:00am 2020   
2:10pm  
   
                                                    triamcinolone   
acetonide 5 mg/ml   
topical cream  
(12 sources)              Corticosteroid            Start:   
2019  
End:   
2019                                          Triamcinolone Acetonide   
Discontinued 1 APPLIC TOPICAL   
Twice daily  12:00am 2019   
12:02am apply to back and arm   
rash twice daily as needed for   
itching.  
   
                                                    Triamcinolone &   
Emollient 0.1 %  
(7 sources)                                                     Triamcinolone &   
Emollient 0.1   
% as directed Externally   
Not-Taking  
   
                                                    vancomycin 250 mg   
oral capsule  
(12 sources)                            Glycopeptide   
Antibacterial                           Start:   
2021  
End:   
2021                              take 250   
mg by   
mouth   
every six   
hours                                   Vancomycin Discontinued 250 MG   
PO Q6H 28    
12:00am 2021 1:01am  
   
                                                    zinc sulfate 220   
mg oral capsule  
(12 sources)                                        Start:   
2020  
End:   
2022                              take 1   
capsule   
by mouth   
once   
daily                                   Zinc Sulfate (Orazinc) 220   
(50) mg Capsule Discontinued   
220 MG PO Daily 7  1:00am 2022 8:11am  
  
  
  
Problems  
Active Problems  
  
  
                      Problem Classification Problem    Date       Documented Da  
te Episodic/Chronic  
   
                                                    Acute and unspecified   
renal failure  
(12 sources)                            Injury of kidney;   
Translations: [Acute   
kidney failure,   
unspecified]                            2020          Episodic  
   
                                                    Allergic reactions  
(12 sources)                            Inflammatory   
dermatosis;   
Translations:   
[Dermatitis,   
unspecified]                            10-          Episodic  
   
                                                    Blindness and vision   
defects  
(7 sources)                             Disorder of vision;   
Translations:   
[Problems with   
sight]                                                      Chronic  
   
                                                    Chronic kidney disease  
(3 sources)                             Chronic kidney   
disease stage 2;   
Translations:   
[Chronic kidney   
disease, stage 2   
(mild)]                                 Onset:   
2023                Chronic  
   
                                                    Chronic ulcer of skin  
(20 sources)                            Ulcer; Translations:   
[Ulcerative lesion]                     Onset:   
2024                Chronic  
   
                                                    Coagulation and   
hemorrhagic disorders  
(4 sources)                             Acquired coagulation   
factor inhibitor   
disorder;   
Translations: [Other   
hemorrhagic disorder   
due to intrinsic   
circulating   
anticoagulants,   
antibodies, or   
inhibitors]                             Onset:   
2023                Chronic  
   
                                                    Congestive heart   
failure;   
nonhypertensive  
(20 sources)                            Congestive heart   
failure;   
Translations: [Heart   
failure,   
unspecified]                            Onset:   
2022  
Resolved:   
2022                                          Chronic  
   
                                                    Coronary   
atherosclerosis and   
other heart disease  
(20 sources)                            Coronary   
arteriosclerosis;   
Translations:   
[Atherosclerotic   
heart disease of   
native coronary   
artery without   
angina pectoris]                        Onset:   
2022  
Resolved:   
2022                                          Chronic  
   
                                                    Diabetes mellitus with   
complications  
(20 sources)                            Disorder due to type   
2 diabetes mellitus;   
Translations: [Type   
2 diabetes mellitus   
with unspecified   
complications]                          Onset:   
2022  
Resolved:   
2022                                          Chronic  
   
                                                    Diabetes mellitus   
without complication  
(8 sources)                             Prediabetes;   
Translations: [Other   
abnormal glucose]                       Onset:   
2024                Episodic  
   
                                                    Disorders of lipid   
metabolism  
(20 sources)                            Mixed   
hyperlipidemia;   
Translations: [Mixed   
hyperlipidemia]                         Onset:   
2022  
Resolved:   
2022                                          Chronic  
   
                                                    Essential hypertension  
(14 sources)                            Hypertensive   
disorder;   
Translations:   
[Essential (primary)   
hypertension]                           2019          Chronic  
   
                                                    Fluid and electrolyte   
disorders  
(12 sources)                            Metabolic acidosis;   
Translations:   
[Metabolic acidosis]                     2020          Episodic  
   
                                                    Genitourinary symptoms   
and ill-defined   
conditions  
(3 sources)                             Urinary   
incontinence;   
Translations:   
[Unspecified urinary   
incontinence]                           Onset:   
10-                08-                Chronic  
   
                                                    Immunizations and   
screening for   
infectious disease  
(16 sources)                            Encounter for   
immunization;   
Translations:   
[Culture positive   
for methicillin   
resistant   
Staphylococcus   
aureus]                                 Onset:   
2021  
Resolved:   
2021                                          Episodic  
   
                                                    Mycoses  
(1 source)                              Onychomycosis;   
Translations: [Tinea   
unguium]                                2024          Episodic  
   
                                                    Nonspecific chest pain  
(6 sources)                             Chest pain;   
Translations: [Chest   
pain, unspecified]                      Onset:   
2023                                          Episodic  
   
                                                    Open wounds of   
extremities  
(20 sources)                            Open wound of right   
lower leg;   
Translations:   
[Unspecified open   
wound, right lower   
leg, initial   
encounter]                              Onset:   
10-                09-                Episodic  
   
                                                    Open wounds of   
extremities  
(3 sources)                             Disorder of lower   
extremity;   
Translations:   
[Unspecified open   
wound, left lower   
leg, initial   
encounter]                              Onset:   
2024                Episodic  
   
                                                    Osteoarthritis  
(20 sources)                            Osteoarthritis of   
knee; Translations:   
[Osteoarthritis of   
knee, unspecified]                      Onset:   
2022  
Resolved:   
2022                                          Chronic  
   
                                                    Other and ill-defined   
heart disease  
(11 sources)                            Left ventricular   
hypertrophy;   
Translations:   
[Cardiomegaly]                          Onset:   
2023                Chronic  
   
                                                    Other circulatory   
disease  
(7 sources)                             H/O: heart disorder;   
Translations:   
[Personal history of   
unspecified   
circulatory disease]                                         Episodic  
   
                                                    Other connective   
tissue disease  
(7 sources)                             H/O: arthritis;   
Translations:   
[Personal history of   
arthritis]                                                  Episodic  
   
                                                    Other connective   
tissue disease  
(1 source)                              Pain in both feet;   
Translations: [Pain   
in right foot]                          2024          Episodic  
   
                                                    Other diseases of   
veins and lymphatics  
(17 sources)                            Postthrombotic   
syndrome;   
Translations:   
[Postthrombotic   
syndrome without   
complications of   
unspecified   
extremity]                                                  Chronic  
   
                                                    Other diseases of   
veins and lymphatics  
(1 source)                              Lymphedema, not   
elsewhere   
classified;   
Translations:   
[Lymphedema, not   
elsewhere   
classified]                             Onset:   
2024                                          Chronic  
   
                                                    Other diseases of   
veins and lymphatics  
(20 sources)                            Peripheral venous   
insufficiency;   
Translations:   
[Venous   
insufficiency   
(chronic)   
(peripheral)]                           Onset:   
2023                Episodic  
   
                                                    Other diseases of   
veins and lymphatics  
(20 sources)                            Stasis dermatitis;   
Translations:   
[Venous   
insufficiency   
(chronic)   
(peripheral)]                           Onset:   
2023                Episodic  
   
                                                    Other diseases of   
veins and lymphatics  
(12 sources)                            Vascular   
insufficiency;   
Translations:   
[Venous   
insufficiency   
(chronic)   
(peripheral)]                           2021          Episodic  
   
                                                    Other diseases of   
veins and lymphatics  
(6 sources)                             Disorder of vein of   
lower extremity;   
Translations:   
[Venous   
insufficiency   
(chronic)   
(peripheral)]                           2021          Episodic  
   
                                                    Other diseases of   
veins and lymphatics  
(1 source)                              Venous insufficiency   
of leg;   
Translations:   
[Venous   
insufficiency   
(chronic)   
(peripheral)]                           2024          Episodic  
   
                                                    Other diseases of   
veins and lymphatics  
(2 sources)                             Venous insufficiency   
(chronic)   
(peripheral);   
Translations:   
[Venous (peripheral)   
insufficiency,   
unspecified]                            Onset:   
10-                09-                Episodic  
   
                                                    Other gastrointestinal   
disorders  
(12 sources)                            Diarrhea;   
Translations:   
[Diarrhea,   
unspecified]                            2020          Episodic  
   
                                                    Other gastrointestinal   
disorders  
(2 sources)                             Diarrhea,   
unspecified                                                 Episodic  
   
                                                    Other hematologic   
conditions  
(7 sources)                             H/O: blood disorder;   
Translations:   
[Personal history of   
diseases of blood   
and blood-forming   
organs]                                                     Episodic  
   
                                                    Other infections;   
including parasitic  
(12 sources)                            Disorder due to   
infection;   
Translations:   
[Unspecified   
infectious disease]                     2021          Episodic  
   
                                                    Other lower   
respiratory disease  
(8 sources)                             Dyspnea on exertion;   
Translations:   
[Shortness of   
breath]                                 Onset:   
2024                Episodic  
   
                                                    Other lower   
respiratory disease  
(12 sources)                            Dyspnea;   
Translations:   
[Dyspnea,   
unspecified]                            2020          Episodic  
   
                                                    Other nervous system   
disorders  
(16 sources)                            Sleep-wake schedule   
disorder, delayed   
phase type;   
Translations:   
[Circadian rhythm   
sleep disorder,   
delayed sleep phase   
type]                                                       Chronic  
   
                                                    Other nervous system   
disorders  
(2 sources)                             Circadian rhythm   
sleep disorder,   
delayed sleep phase   
type                                    Onset:   
2022  
Resolved:   
2022                                          Chronic  
   
                                                    Other nervous system   
disorders  
(7 sources)                             Numbness and   
tingling sensation   
of skin;   
Translations:   
[Disturbance of skin   
sensation]                                                  Episodic  
   
                                                    Other non-traumatic   
joint disorders  
(7 sources)                             Pain in unspecified   
knee; Translations:   
[Knee pain]                                                 Episodic  
   
                                                    Other non-traumatic   
joint disorders  
(3 sources)                             Pain in left   
shoulder;   
Translations: [Pain   
in joint, shoulder   
region]                                 Onset:   
2024                Episodic  
   
                                                    Other nutritional;   
endocrine; and   
metabolic disorders  
(20 sources)                            Body mass index 40+   
- severely obese;   
Translations: [Body   
mass index (BMI)   
50.0-59.9, adult]                       Onset:   
2024                Chronic  
   
                                                    Other nutritional;   
endocrine; and   
metabolic disorders  
(20 sources)                            Body mass index   
(BMI) 50.0-59.9,   
adult; Translations:   
[Body Mass Index   
50.0-59.9, adult]                       Onset:   
2022  
Resolved:   
2022                                          Chronic  
   
                                                    Other nutritional;   
endocrine; and   
metabolic disorders  
(20 sources)                            Obesity;   
Translations:   
[Obesity,   
unspecified]                            2019          Chronic  
   
                                                    Other nutritional;   
endocrine; and   
metabolic disorders  
(14 sources)                            Metabolic syndrome   
X; Translations:   
[Metabolic syndrome]                                         Chronic  
   
                                                    Other nutritional;   
endocrine; and   
metabolic disorders  
(4 sources)               Obesity, unspecified      Onset:   
05-  
Resolved:   
2022                                          Chronic  
   
                                                    Other nutritional;   
endocrine; and   
metabolic disorders  
(6 sources)               Metabolic syndrome        Onset:   
2022  
Resolved:   
2022                                          Chronic  
   
                                                    Other nutritional;   
endocrine; and   
metabolic disorders  
(12 sources)                            Morbid obesity;   
Translations:   
[Morbid (severe)   
obesity due to   
excess calories]                        2022          Chronic  
   
                                                    Other nutritional;   
endocrine; and   
metabolic disorders  
(7 sources)                             Morbid (severe)   
obesity due to   
excess calories;   
Translations:   
[Morbid obesity]                        2022          Chronic  
   
                                                    Other nutritional;   
endocrine; and   
metabolic disorders  
(3 sources)                             Obese class III;   
Translations:   
[Morbid (severe)   
obesity due to   
excess calories]                        10-          Chronic  
   
                                                    Other screening for   
suspected conditions   
(not mental disorders   
or infectious disease)  
(8 sources)                             Cardiovascular   
stress test   
abnormal;   
Translations: [Other   
nonspecific abnormal   
results of function   
study of   
cardiovascular   
system]                                 Onset:   
2024                Episodic  
   
                                                    Other skin disorders  
(12 sources)                            Hemosiderin   
pigmentation of   
lower limb due to   
varicose veins of   
lower limb;   
Translations: [Other   
specified disorders   
of pigmentation]                        2020          Episodic  
   
                                                    Other skin disorders  
(1 source)                              Callosity;   
Translations: [Corns   
and callosities]                        2024          Episodic  
   
                                                    Anastasia-; endo-; and   
myocarditis;   
cardiomyopathy (except   
that caused by   
tuberculosis or   
sexually transmitted   
disease)  
(20 sources)                            Cardiomyopathy;   
Translations:   
[Cardiomyopathy,   
unspecified]                            Onset:   
2022  
Resolved:   
2022                                          Chronic  
   
                                                    Peripheral and   
visceral   
atherosclerosis  
(20 sources)                            Peripheral vascular   
disease;   
Translations:   
[Peripheral vascular   
disease,   
unspecified]                            Onset:   
2023                Chronic  
   
                                                    Phlebitis;   
thrombophlebitis and   
thromboembolism  
(8 sources)                             Deep venous   
thrombosis;   
Translations: [Acute   
venous embolism and   
thrombosis of   
unspecified deep   
vessels of lower   
extremity]                              Onset:   
2024                Episodic  
   
                                                    Residual codes;   
unclassified  
(16 sources)                            Sleep apnea;   
Translations: [Sleep   
apnea, unspecified]                                         Chronic  
   
                                                    Residual codes;   
unclassified  
(20 sources)                            Obstructive sleep   
apnea syndrome;   
Translations:   
[Obstructive sleep   
apnea (adult)   
(pediatric)]                            Onset:   
2023                Chronic  
   
                                                    Residual codes;   
unclassified  
(17 sources)                            Obstructive sleep   
apnea (adult)   
(pediatric);   
Translations:   
[Obstructive sleep   
apnea   
(adult)(pediatric)]                     Onset:   
2022  
Resolved:   
2022                                          Chronic  
   
                                                    Residual codes;   
unclassified  
(4 sources)                             Continuous positive   
airway pressure   
ventilation   
treatment;   
Translations:   
[Dependence on other   
enabling machines   
and devices]                                                Chronic  
   
                                                    Residual codes;   
unclassified  
(3 sources)                             Dependence on   
continuous positive   
airway pressure   
ventilation;   
Translations:   
[Dependence on other   
enabling machines   
and devices]                            Onset:   
2023                Chronic  
   
                                                    Residual codes;   
unclassified  
(8 sources)                             Localized edema;   
Translations:   
[Edema]                                 Onset:   
2022  
Resolved:   
2022                                          Episodic  
   
                                                    Residual codes;   
unclassified  
(6 sources)               Edema, unspecified        Onset:   
2022  
Resolved:   
2022                                          Episodic  
   
                                                    Residual codes;   
unclassified  
(3 sources)                             History of chest   
pain; Translations:   
[Personal history of   
other specified   
diseases]                                                   Episodic  
   
                                                    Residual codes;   
unclassified  
(12 sources)                            Noncompliance with   
treatment;   
Translations:   
[Noncompliance]                         2021          Episodic  
   
                                                    Residual codes;   
unclassified  
(12 sources)                            Edema of right lower   
leg; Translations:   
[Localized edema]                       2022          Episodic  
   
                                                    Residual codes;   
unclassified  
(12 sources)                            Edema of lower   
extremity;   
Translations:   
[Localized edema]                       2019          Episodic  
   
                                                    Residual codes;   
unclassified  
(12 sources)                            Other specified   
health status;   
Translations:   
[Failure of   
outpatient   
treatment]                              2019          Episodic  
   
                                                    Skin and subcutaneous   
tissue infections  
(20 sources)                            Cellulitis of lower   
leg; Translations:   
[Cellulitis of right   
lower limb]                             Onset:   
10-                05-                Episodic  
   
                                                    Spondylosis;   
intervertebral disc   
disorders; other back   
problems  
(6 sources)                             Other specified   
dorsopathies, lumbar   
region                                  Onset:   
2022  
Resolved:   
2022                                          Episodic  
   
                                                    Sprains and strains  
(20 sources)                            Sprain of hip;   
Translations: [Hip   
strain]                                 Onset:   
2024                Episodic  
   
                                                    Superficial injury;   
contusion  
(9 sources)                             Hematoma of right   
lower leg;   
Translations:   
[Contusion of right   
lower leg, initial   
encounter]                              Onset:   
10-                05-                Episodic  
   
                                                    Unclassified  
(15 sources)                            Inflammatory   
disorder;   
Translations:   
[Inflammation]                          2023            
   
                                                    Varicose veins of   
lower extremity  
(20 sources)                            Varicose ulcer of   
lower extremity;   
Translations:   
[Varicose veins of   
right lower   
extremity with ulcer   
other part of lower   
leg]                                    2019          Episodic  
   
                                                    Viral infection  
(12 sources)                            Disease caused by   
2019-nCoV;   
Translations:   
[COVID-19]                              2020          Episodic  
  
  
Past or Other Problems  
  
  
                                                    Problem   
Classification  Problem         Date            Documented Date Episodic/Chronic  
   
                                                    Acquired foot   
deformities  
(3 sources)                             Acquired hammer toe   
of right foot;   
Translations: [Other   
hammer toe(s)   
(acquired), right   
foot]                                   Onset:   
2023  
Resolved:   
2023                Chronic  
   
                                                    Acquired foot   
deformities  
(3 sources)                             Acquired deformity   
of left foot;   
Translations: [Other   
acquired deformities   
of left foot]                           Onset:   
2023                Episodic  
   
                                                    Other congenital   
anomalies  
(3 sources)                             Porokeratosis;   
Translations: [Other   
specified congenital   
malformations of   
skin]                                   Onset:   
2023  
Resolved:   
2023                Chronic  
   
                                                    Other nutritional;   
endocrine; and   
metabolic disorders  
(1 source)                Abnormal weight gain      Onset:   
2022  
Resolved:   
2022                                          Episodic  
   
                                                    Unclassified  
(7 sources)                             Never smoked   
tobacco;   
Translations: [Never   
a smoker]                                                     
   
                                                    Unclassified  
(1 source)                                          Onset:   
2024                  
  
  
  
Results  
  
  
                          Test Name    Value        Interpretation Reference   
Range                                   Facility  
   
                                                    HbA1c HPLC (Bld) [Mass fract  
ion]on 2024   
   
                                                    HbA1c (Bld) [Mass   
fraction]       5.8 %                                           [facility]  
   
                                                    Aerobic Cultureon 2024  
   
   
                                        Aerobic Culture     Result Tab Codes  
Light Normal Skin   
Tiffany 2 Days  
No Anaerobes Isolated   
3 Days  
Gram Stain Result  
Rare Epithelial Cells  
Rare Gram Positive   
Cocci  
No White Blood Cells   
Seen  
PERFORMED BY:  
[place] [id number]  
[phone]  
PATHOLOGIST MEDICAL   
DIRECTOR  
BRIDGETTE SWAIN M.D.    Normal                                  [facility]   
Physician   
Group  
   
                                        Comment on above:   Performed By: #### A  
[facility], GS ####  
[address]   
   
                                                    Gram Stainon 2024   
   
                                                    Microscopic observation   
Gram stain Nom (Unsp   
spec)                                   Gram Stain Result  
Rare Epithelial Cells  
Rare Gram Positive   
Cocci  
No White Blood Cells   
Seen  
PERFORMED BY:  
[place] [address]  
[phone]  
PATHOLOGIST MEDICAL   
DIRECTOR  
BRIDGETTE SWAIN M.D.    Normal                                  [facility]   
Physician   
Group  
   
                                        Comment on above:   Performed By: #### A  
[facility], GS ####  
[address]   
   
                                                    Gram stain for investigation  
 of transfusion reactionOrdered By: Elaine Espino on   
2024   
   
                                                    Microscopic observation   
Gram stain Nom (Unsp   
spec)                                   No Anaerobes Isolated   
3 Days                                                      [facility]  
   
                                                    Alanine aminotransferase [En  
zymatic activity/volume] in Serum or PlasmaOrdered   
By:   
Vera Melvin on 2023   
   
                                                    ALT [Catalytic   
activity/Vol]   16 U/L                          7-52            [facility]  
   
                                                    Albumin [Mass/volume] in Ser  
um or Plasma by Bromocresol green (BCG) dye binding   
methoOrdered By: Vera Melvin on 2023   
   
                                                    Albumin BCG dye   
[Mass/Vol]      4.1 g/dL                        3.5-5.7         [facility]  
   
                                                    Alkaline phosphatase [Enzyma  
tic activity/volume] in Serum or PlasmaOrdered By:   
Vera Melvin on 2023   
   
                                                    ALP [Catalytic   
activity/Vol]   24 U/L                                    [facility]  
   
                                                    Aspartate aminotransferase [  
Enzymatic activity/volume] in Serum or PlasmaOrdered  
By:   
Vera Melvin on 2023   
   
                                                    AST [Catalytic   
activity/Vol]   18 U/L                          13-39           [facility]  
   
                                                    Automated erythrocytes count  
 in urine sediment (number/area)Ordered By: Vera Melvin on 2023   
   
                                                    RBC Auto (Urine sed)   
[#/Area]        1-2 [HPF]                       0-4             [facility]  
   
                                                    Automated leukocytes count i  
n urine sediment (number/area)Ordered By: Vera Melvin on 2023   
   
                                                    WBC Auto (Urine sed)   
[#/Area]        None seen [HPF]                 0-4             [facility]  
   
                                                    Bilirubin Test strip Ql (U)O  
rdered By: Vera Melvin on 2023   
   
                      Bilirubin Ql (U) Negative              Negative   [facility]  
   
                                                    Bilirubin.total [Mass/volume  
] in Serum or PlasmaOrdered By: Vera Melvin   
on   
2023   
   
                      Bilirubin [Mass/Vol] 0.7 mg/dL             0.3-1.0    [facility]  
   
                                                    Calcium [Mass/volume] in Ser  
um or PlasmaOrdered By: Vera Melvin on   
2023   
   
                      Calcium [Mass/Vol] 9.3 mg/dL             8.6-10.3   [facility]  
   
                                                    Carbon dioxide, total [Moles  
/volume] in Serum or PlasmaOrdered By: Vera Melvin on 2023   
   
                      CO2 [Moles/Vol] 25.0 mmol/L            21.0-31.0  [facility]  
   
                                                    Chloride [Moles/volume] in S  
santa or PlasmaOrdered By: Vera Melvin on   
2023   
   
                      Chloride [Moles/Vol] 105 mmol/L                 [facility]  
   
                                                    Cholesterol [Mass/volume] in  
 Serum or PlasmaOrdered By: Vera Melvin on   
2023   
   
                      Cholesterol [Mass/Vol] 175 mg/dL             140-200    [facility]  
   
                                        Comment on above:   Chol less than 200 m  
g/dl low riskChol 201-239 mg/dl borderline  
   
riskChol 240 mg/dl and greater high risk   
   
                                                    Cholesterol in LDL Calc [Mas  
s/Vol]Ordered By: Vera Melvin on 2023   
   
                                                    Cholesterol in LDL   
[Mass/Vol]      113 mg/dL                       0-100           [facility]  
   
                                        Comment on above:   LDL ATP III CLASSIFI  
CATIONLDL less than 100 mg/dL OptimalLDL   
100-129 mg/dL Near or above optimalLDL 130-159 mg/dL   
Borderline highLDL 160-189 mg/dL HighLDL greater than 189   
mg/dL Very high   
   
                                                    Cholesterol in VLDL Calc [Ma  
ss/Vol]Ordered By: Vera Melvin on 2023  
   
   
                                                    Cholesterol in VLDL   
[Mass/Vol]      19 mg/dL                                        [facility]  
   
                                                    Color Auto (U)Ordered By: Sa ileana Melvin on 2023   
   
                      Color (U)  Yellow                Yellow     [facility]  
   
                                                    Creatinine [Mass/volume] in   
Serum or PlasmaOrdered By: Vera Melvin on   
2023   
   
                      Creatinine [Mass/Vol] 1.03 mg/dL            0.70-1.30  [facility]  
   
                                                    Creatinine [Mass/volume] in   
UrineOrdered By: Vera Melvin on 2023   
   
                                                    Creatinine (U)   
[Mass/Vol]      107.0 mg/dL                     14.0-26.0       [facility]  
   
                                                    Erythrocyte distribution wid  
th Auto (RBC) [Ratio]Ordered By: Vera Melvin  
 on   
2023   
   
                                                    Erythrocyte   
distribution width   
(RBC) [Ratio]   14.0 %                          12.0-14.8       [facility]  
   
                                                    Globulin Calc (S) [Mass/Vol]  
Ordered By: Vera Melvin on 2023   
   
                      Globulin (S) [Mass/Vol] 3.0 g/dL                         F  
[facility]  
   
                                                    Glucose [Mass/volume] in Ser  
um or PlasmaOrdered By: Vera Melvin on   
2023   
   
                      Glucose [Mass/Vol] 95 mg/dL                   [facility]  
   
                                        Comment on above:   ADA recommended refe  
rence rangeRandom Glucose Reference Range   
is dependent on time and content of last meal. Glucose of more   
than 200 mg/dL in a nonstressed, ambulatory subject supports   
the diagnosis of Diabetes Mellitus.   
   
                                                    Glucose mean value [Mass/vol  
ume] in Blood Estimated from glycated   
hemoglobinOrdered   
By: Vera Melvin on 2023   
   
                                                    Average glucose   
Estimated from glycated   
hemoglobin (Bld)   
[Mass/Vol]      114 mg/dL                                       [facility]  
   
                                                    Hematocrit Auto (Bld) [Volum  
e fraction]Ordered By: Vera Melvin on   
2023   
   
                                                    Hematocrit (Bld)   
[Volume fraction] 44.1 %                          38.8-50.0       [facility]  
   
                                                    Hemoglobin A1c percentageOrd  
ered By: Vera Melvin on 2023   
   
                                                    HbA1c (Bld) [Mass   
fraction]       5.6 %                           4.3-5.6         [facility]  
   
                                        Comment on above:   Increased risk for d  
iabetes: 5.7 - 6.4diabetes: >6.4glycemic   
control for adults with diabetes: <7.0   
   
                                                    Hemoglobin [Mass/volume] in   
BloodOrdered By: Vera Melvin on 2023   
   
                                                    Hemoglobin (Bld)   
[Mass/Vol]      15.0 g/dL                       13.0-17.0       [facility]  
   
                                                    Ketones Auto test strip (U)   
[Mass/Vol]Ordered By: Vera Melvin on   
2023   
   
                      Ketones (U) [Mass/Vol] Negative              Negative   [facility]  
   
                                                    Laboratory - UrinalysisOrder  
ed By: Vera Melvin on 2023   
   
                                                    Hyaline casts LM Ql   
(Urine sed)     None seen [LPF]                 0-8             [facility]  
   
                                                    Leukocytes [#/volume] correc  
radha for nucleated erythrocytes in Blood by Automated  
   
counOrdered By: Vera Melvin on 2023   
   
                                                    WBC corrected for nucl   
RBC Auto (Bld) [#/Vol] 8.9 10*3/uL                     4.1-10.5        [facility]  
   
                                                    MCH Auto (RBC) [Entitic mass  
]Ordered By: Vera Melvin on 2023   
   
                                                    MCH (RBC) [Entitic   
mass]           29.9 pg                         27.5-35.2       [facility]  
   
                                                    MCHC Auto (RBC) [Mass/Vol]Or  
dered By: Vera Melvin on 2023   
   
                      MCHC (RBC) [Mass/Vol] 34.1 g/dL             32.5-35.6  [facility]  
   
                                                    MCV Auto (RBC) [Entitic vol]  
Ordered By: Vera Melvin on 2023   
   
                      MCV (RBC) [Entitic vol] 87.7 fL               83.5-101   F  
[facility]  
   
                                                    Microalbumin [Mass/volume] i  
n UrineOrdered By: Vera Melvin on 2023  
   
   
                                                    Albumin DL <= 20 mg/L   
(U) [Mass/Vol]  mg/dL                           0.0-1.8         [facility]  
   
                                                    Nitrite Test strip Ql (U)Ord  
ered By: Vera Melvin on 2023   
   
                      Nitrite Ql (U) Negative              Negative   [facility]  
   
                                                    No Panel InformationOrdered   
By: Vera Melvin on 2023   
   
                      Estimated GFR (CKD-EPI) > 60.0 mL/Min                       
  [facility]  
   
                                                    Pharmacy Creatinine   
Clearance (Chem N/A                                             [facility]  
   
                                                    Platelet mean volume Auto (B  
ld) [Entitic vol]Ordered By: Vera Melvin on   
2023   
   
                                                    Platelet mean volume   
(Bld) [Entitic vol] 7.5 fL                          6.6-10.1        [facility]  
   
                                                    Platelets Auto (Bld) [#/Vol]  
Ordered By: Vera Melvin on 2023   
   
                      Platelets (Bld) [#/Vol] 303 10*3/uL            150-450      
[facility]  
   
                                                    Potassium [Moles/volume] in   
Serum or PlasmaOrdered By: Vera Melvin on   
2023   
   
                      Potassium [Moles/Vol] 4.4 mmol/L            3.5-5.1    [facility]  
   
                                                    Prostate specific Ag [Mass/v  
olume] in Serum or PlasmaOrdered By: Vera Antony   
on 2023   
   
                                                    Prostate specific Ag   
[Mass/Vol]      2.660 ng/mL                     0.000-4.000     [facility]  
   
                                                    Protein Auto test strip (U)   
[Mass/Vol]Ordered By: Vera Melvin on   
2023   
   
                      Protein (U) [Mass/Vol] Negative              Negative   [facility]  
   
                                                    Protein [Mass/volume] in Ser  
um or PlasmaOrdered By: Vera Melvin on   
2023   
   
                      Protein [Mass/Vol] 7.1 g/dL              6.4-8.9    [facility]  
   
                                                    RBC Auto (Bld) [#/Vol]Ordere  
d By: Vera Melvin on 2023   
   
                      RBC (Bld) [#/Vol] 5.03 10*6/uL            3.90-5.60  [facility]  
   
                                                    Serum or plasma albumin/glob  
ulin mass ratioOrdered By: Vera Melvin on   
2023   
   
                                                    Albumin/Globulin [Mass   
ratio]          1.4 {ratio}                                     [facility]  
   
                                                    Serum or plasma anion gap de  
terminationOrdered By: Vera Melvin on   
2023   
   
                      Anion gap [Moles/Vol] 10.4 mmol/L            6.0-15.0   [facility]  
   
                                                    Serum or plasma high density  
 lipoprotein (HDL) cholesterol measurementOrdered   
By:   
Vera Melvin on 2023   
   
                                                    Cholesterol in HDL   
[Mass/Vol]      42 mg/dL                        23-92           [facility]  
   
                                        Comment on above:   HDL CHOL ATP-III CLA  
SSIFICATION Cardiovascular RiskHDL > or   
equal to 60 mg/dL LOWHDL < 40 mg/dL HIGH   
   
                                                    Serum or plasma total choles  
terol/high density lipoprotein (HDL) cholesterol   
mass   
ratOrdered By: Vera Melvin on 2023   
   
                                                    Cholesterol.total/Ami  
sterol in HDL [Mass   
ratio]          4.2 {ratio}                     <5.0            [facility]  
   
                                                    Sodium [Moles/volume] in Ser  
um or PlasmaOrdered By: Vera Melvin on   
2023   
   
                      Sodium [Moles/Vol] 136 mmol/L            136-145    [facility]  
   
                                                    Specific gravity Auto test s  
trip (U) [Rel density]Ordered By: Vera Antony on   
2023   
   
                                                    Specific gravity (U)   
[Rel density]   1.022                           1.001-1.030     [facility]  
   
                                                    Squamous epithelial cells de  
tection in urine sediment by light microscopyOrdered  
By:   
Vera Melvin on 2023   
   
                                                    Epithelial   
cells.squamous LM Ql   
(Urine sed)     0-1 [HPF]                       0-2             [facility]  
   
                                                    Triglyceride [Mass/volume] i  
n Serum or PlasmaOrdered By: Vera Melvin on   
2023   
   
                      Triglyceride [Mass/Vol] 99 mg/dL              0-149      F  
[facility]  
   
                                        Comment on above:   TRIG ATP III CLASSIF  
ICATIONTRIG less than 150 mg/dL NormalTRIG  
   
150-199 mg/dL Borderline highTRIG 200-500 mg/dL High TRIG   
greater than 500 mg/dL Very highStandard traceable to the   
Center for Disease Conrtrol and Prevention (CDC) test method.   
   
                                                    Urea nitrogen [Mass/volume]   
in Serum or PlasmaOrdered By: Vera Melvin on  
   
2023   
   
                                                    Urea nitrogen   
[Mass/Vol]      19 mg/dL                        7            [facility]  
   
                                                    Urine bacteria detection by   
automated methodOrdered By: Vera Melvin on   
2023   
   
                      Bacteria Auto Ql (U) None seen             None Seen  [facility]  
   
                                                    Urine clarity by refractomet  
ry automatedOrdered By: Vera Melvin on   
2023   
   
                                                    Clarity Refractometry   
automated (U)   Clear                           Clear           [facility]  
   
                                                    Urine glucose measurement by  
 automated test strip (mass/volume)Ordered By:   
Vera Melvin on 2023   
   
                                                    Glucose Auto test strip   
(U) [Mass/Vol]  >=1000 mg/dL                    Normal          [facility]  
   
                                                    Urine hemoglobin detection b  
y automated test stripOrdered By: Vera Antony on   
2023   
   
                                                    Hemoglobin Auto test   
strip Ql (U)    Negative                        Negative        [facility]  
   
                                                    Urine leukocyte esterase det  
ection by automated test stripOrdered By: Vera Melvin on 2023   
   
                                                    Leukocyte esterase Auto   
test strip Ql (U) Negative                        Negative        [facility]  
   
                                                    Urine microalbumin/creatinin  
e mass ratioOrdered By: Vera Melvin on   
2023   
   
                                                    Albumin/Creatinine DL   
<= 20 mg/L (U) [Mass   
ratio]          TNP                                             [facility]  
   
                                        Comment on above:   Test not performed   
   
                                                    Urobilinogen Auto test strip  
 (U) [Mass/Vol]Ordered By: Vera Melvin on   
2023   
   
                                                    Urobilinogen (U)   
[Mass/Vol]      Normal mg/dL                    Normal          [facility]  
   
                                                    pH Auto test strip (U)Ordere  
d By: Vera Melvin on 2023   
   
                      pH (U)     5.5 [pH]              5.0-9.0    [facility]  
   
                                                    A1C HEMOGLOBINon 2023   
   
                                                    HbA1c (Bld) [Mass   
fraction]       5.6 %                                           [facility]   
[employer]   
[employer]  
Other Phone:   
[phone]  
   
                                                    HbA1c (Bld) [Mass fraction]o  
n 2023   
   
                      A1C HEMOGLOBIN                                  [facility]   
[employer]   
[employer]ation  
Other Phone:   
[phone]  
   
                                                    Office Visit (Cardiology)on   
2023   
   
                                        Follow-up visit     Diagnoses/Problems  
Assessed  
Chest pain,   
unspecified type   
(786.50) (R07.9)  
CHF (NYHA class II,   
ACC/AHA stage C)   
(428.0) (I50.9)  
LVH (left ventricular   
hypertrophy) (429.3)   
(I51.7)  
Non-ischemic   
cardiomyopathy   
(425.4) (I42.8)  
DVT (deep venous   
thrombosis) (453.40)   
(I82.409)  
Never a smoker  
Prediabetes (790.29)   
(R73.03)  
DIMITRI on CPAP (327.23)   
(G47.33)  
Morbid obesity with   
BMI of 45.0-49.9,   
adult (278.01,V85.42)   
(E66.01,Z68.42)  
Orders  
CHF (NYHA class II,   
ACC/AHA stage C)  
Renew: Spironolactone   
25 MG Oral Tablet;   
TAKE 1 TABLET DAILY  
CHF (NYHA class II,   
ACC/AHA stage C),   
Prediabetes  
Renew: Jardiance 10   
MG Oral Tablet; TAKE   
1 TABLET BY MOUTH   
ONCE DAILY  
Morbid obesity with   
BMI of 45.0-49.9,   
adult  
Healthy Weight Tips;   
Status:Complete -   
Retrospective   
Authorization; Done:   
[id number]  
Some eating tips that   
can help you lose   
weight.;   
Status:Complete -   
Retrospective  
Authorization; Done:   
[id number]  
SocHx: Never a smoker  
Tobacco Use   
Screening;   
Status:Complete;   
Done: [id number]  
Patient Instructions  
Please bring all   
medicines, vitamins,   
and herbal   
supplements with you   
when you come to the   
office.  
Prescriptions will   
not be filled unless   
you are compliant   
with your follow up   
appointments or have   
a follow up   
appointment scheduled   
as per instruction of   
your physician.   
Refills should be   
requested at the time   
of your visit.  
Patient to have labs   
forward to Dr. Compa Maldonado DO   
when completed by pcp   
in 2023  
Follow up in 10   
months with Sharlene An NP  
  
  
Chief Complaint  
JOCELIN PACHECO is being   
seen for a 6 month   
follow-up of.  
Patient is a   
58-year-old gentleman   
returns for   
follow-up. He has had   
an over 30 pound   
weight loss since   
  
Ozempic therapy.  
He has a history of   
mild ASHD, mild to   
moderate nonischemic   
cardiomyopathy with   
persistent ejection   
fraction of 42%. He   
has underlying morbid   
obesity, diabetes,   
obstructive sleep   
apnea, history of   
remote DVT, recurrent   
ulcers on the calf   
due to venous stasis.   
His NYHA   
classification is   
class II-III/C. He is   
still working out   
with cardiac rehab   
and activities that   
are amenable to his   
stature and body   
habitus and   
capability.  
He has no   
hospitalizations for   
heart failure, no   
syncope, no falls, no   
angina  
Recommendations,   
continue GDMT as   
reviewed and   
prescribed, follow-up   
in 8 to 10 months   
with nurse   
practitioner  
  
Surgical History   
Problems  
History of Complete   
colonoscopy  
History of Finger   
surgical procedure  
History of   
Gallbladder surgery  
Past Medical History  
Problems  
History of arthritis   
(V13.4) (Z87.39)  
History of bleeding   
disorder (V12.3)   
(Z86.2)  
History of cardiac   
disorder (V12.50)   
(Z86.79)  
History of Numbness   
and tingling (782.0)   
(R20.0,R20.2)  
History of Vision   
problems (V41.0)   
(H54.7)  
Current Meds  
  
Medication   
NameInstruction  
Carvedilol 6.25 MG   
Oral TabletTAKE 1   
TABLET TWICE DAILY   
WITH MEALS.  
Jardiance 10 MG Oral   
TabletTAKE 1 TABLET   
BY MOUTH ONCE DAILY  
Lasix TABSTAKE 1   
TABLET TWICE DAILY.  
Nabumetone 750 MG   
Oral TabletTAKE 1   
TABLET EVERY 12 HOURS   
DAILY.  
Ozempic (1 MG/DOSE)   
SOPNINJECT 1 UNIT   
Weekly  
Spironolactone 25 MG   
Oral TabletTAKE 1   
TABLET DAILY.  
Xarelto 10 MG Oral   
TabletTake 1 tablet   
daily  
Patient did not bring   
medication list or   
bottles. Updated   
verbally with patient  
Allergies  
Medication  
Cephalosporins  
Adverse Reaction;   
Rash; Recorded By:   
Etelvina Bradshaw;   
2022 8:55:43 AM  
Statins  
Adverse Reaction;   
Rash; Recorded By:   
Etelvina Bradshaw;   
2022 8:55:43 AM  
Voltaren  
Adverse Reaction;   
Rash; Recorded By:   
Etelvina Bradshaw;   
2022 8:55:43 AM  
Social History  
Problems  
Daily caffeine   
consumption  
coffee 1-2 pots a day  
Never a smoker  
No alcohol use  
No illicit drug use  
Review of Systems  
Constitutional: not   
feeling tired.  
Cardiovascular: no   
intermittent leg   
claudication and as   
noted in HPI.  
Respiratory: no cough   
and no shortness of   
breath.  
Gastrointestinal: no   
change in bowel   
habits and no blood   
in stools.  
Integumentary: no   
skin rashes.  
Neurological: no   
seizures and no   
frequent falls.  
All other systems   
have been reviewed   
and are negative for   
complaint.  
  
Vitals  
Vital Signs  
Recorded: [id number]   
10:37AM  
Heart Rate74, L   
Radial  
[id number], LUE,   
Sitting  
[id number], LUE,   
Sitting  
Height5 ft 11 in  
Wejxzh100 lb  
BMI Juvvudcyfw66.37   
kg/m2  
BSA Calculated2.69  
Tobacco Useb) No  
Falls Screening (Age   
18+)a) No falls   
within the last year  
Physical Exam  
Constitutional: alert   
and in no acute   
distress.  
Neck: neck is supple,   
symmetric, trachea   
midline, no masses   
and no thyromegaly .  
Pulmonary: no   
increased work of   
breathing or signs of   
respiratory distress   
and lungs clear to   
auscultation.  
Cardiovascular:   
carotid pulses 2+   
bilaterally with no   
bruit , JVP was   
normal, no thrills ,   
regular rhythm,   
normal S1 and S2, no   
murmurs , pedal   
pulses 2+ bilaterally   
and no edema .  
Abdomen: abdomen   
non-tender, no masses   
and no hepatomegaly .  
Skin: skin warm and   
dry, normal skin   
turgor .  
Psychiatric judgment   
and insight is normal   
and orien (more   
content not   
included)...        Normal                                   [facility]  
   
                                                    [facility] MUGA SCAN INJECTIONon    
   
                                        [facility] MUGA SCAN INJECTION MRN: [id number]  
Patient Name: JOCELIN PACHECO  
  
STUDY:  
MUGA  
  
Performing facility:  
[facility],  
[address] [place] [address], [address]   
[address],  
[place] [address]  
[facility] Provider: KOBI Maldonado DO,   
[facility]  
PCP: Dr. OLIVER Melvin  
Supervising provider:   
Ze Flores MD  
  
INDICATION:  
Chest Pain; CHF NICM  
  
HISTORY:  
Gender: M; Age: 67 y/o ; Height: 0 cm;   
Weight: 0 kg.  
  
Chest Pain; CHF NICM  
Denies smoking.  
  
COMPARISON:  
Previous nuclear   
[place] completed   
[id number] [facility] [place]  
  
  
ACCESSION NUMBER(S):  
[id number]; [id number]  
  
ORDERING CLINICIAN:  
COMPA MALDONADO  
  
TECHNIQUE:  
The patient received   
an IV injection of 3   
ml of stannous  
pyrophosphate (PYP)   
using the in-vivo   
method of labeling   
red blood  
cells. After 15   
minutes the patient   
received another IV   
injection of  
27.4 mCi of   
Technetium 99m   
pertechnetate. Planar   
image of the left  
ventricle was   
obtained in the [place]   
45.  
  
FINDINGS:  
The right ventricle   
was normal.  
The left ventricle   
was enlarged in size.  
Regional wall motion   
was abnormal.  
Global resting LVEF   
was abnormal- at 42%.  
  
IMPRESSION:  
Normal resting right   
ventricular function.  
Abnormalresting left   
ventricular function.  
Left ventricular   
ejection fraction is   
42%.  
No previous studies   
are available for   
comparison  
  
Electronically signed   
by: ELROY WALL MD         Normal                                  [facility]  
   
                                                    No Panel Informationon    
   
                                            Normal                [facility]   
[facility]   
250 DO  
Work Phone:   
[phone]  
   
                                                    Creatinine and Glomerular fi  
ltration rate.predicted panel (S/P/Bld)Ordered By: JASON Maldonado on 2023   
   
                      Creatinine [Mass/Vol] 1.12 mg/dL            0.64-1.27  [facility]  
   
                                                    Estimated glomerular filtrat  
ion rate (GFR) non- AmericanOrdered By: JASON Maldonado   
on 2023   
   
                                                    GFR/1.73 sq M.predicted   
among non-blacks MDRD   
(S/P/Bld) [Vol   
rate/Area]      > 60 mL/Min                                     [facility]  
   
                                                    Laboratory - Chemistry and C  
hemistry - challengeOrdered By: JASON Maldonado on   
2023   
   
                                                    Natriuretic peptide B   
(Bld) [Mass/Vol] 13.0 pg/mL                      5-100           [facility]  
   
                                                    No Panel InformationOrdered   
By: JASON Maldonado on 2023   
   
                                                    Estimated GFR (   
American)       > 60 mL/Min                                     [facility]  
   
                                        Comment on above:   GFR estimated refere  
nce range: According to KDOQI guidelines,   
<60 ml/min/1.73m2 is sufficient to diagnose a patient with   
chronic kidney disease.   
   
                                                    Pharmacy Creatinine   
Clearance (Chem N/A                                             [facility]  
   
                                                    No Panel Informationon    
   
                                 10.7\S\10.7 Normal     6.0-15.0   [facility]  
   
[facility]   
250 DO  
Work Phone:   
[phone]  
   
                                 9.0\S\9.0  Normal     8.2-10.2   [facility]   
[facility]   
250 DO  
Work Phone:   
[phone]  
   
                                        Comment on above:   PERFORMED BY:[facility]   
[place], [place] [id number] MEDICAL   
DIRECTORBRIDGETTE SWAIN M.D.   
   
                                 24.1\S\24.1 Normal     22.0-30.0  [facility]  
   
[facility]   
250 DO  
Work Phone:   
[phone]  
   
                                 103\S\103  Normal          [facility]   
250 DO  
Work Phone:   
[phone]  
   
                                 4.8\S\4.8  Normal     3.5-5.1    [facility]   
[facility]   
250 DO  
Work Phone:   
[phone]  
   
                                                133\S\133       below low   
threshold                 136-146                   [facility]   
250 DO  
Work Phone:   
[phone]  
   
                                 > 60       Normal                [facility]   
250 DO  
Work Phone:   
[phone]  
   
                                        Comment on above:   GFR estimated refere  
nce range: According to KDOQI guidelines,   
<60 ml/min/1.73m2 is sufficient to diagnose a patient with   
chronic kidney disease.   
   
                                 1.12\S\1.12 Normal     0.64-1.27  [facility]   
250 DO  
Work Phone:   
[phone]  
   
                                 17\S\17    Normal     9-23       [facility]   
[address] DO  
Work Phone:   
[phone]  
   
                                 95\S\95    Normal          [facility]   
250 DO  
Work Phone:   
[phone]  
   
                                        Comment on above:   Random Glucose Refer  
ence Range is dependent on time and   
content of last meal. Glucose of more than 200 mg/dL in a   
nonstressed, ambulatory subject supports the diagnosis of   
Diabetes Mellitus. ADA recommended reference range   
   
                                 13.0\S\13.0 Normal     5-100      [facility]  
   
[facility]   
[address] DO  
Work Phone:   
[phone]  
   
                                        Comment on above:   PERFORMED BY:[facility]   
[place] [id number]ICAL   
DIRECTORBRIDGETTE SWAIN M.D.   
   
                                                    Serum or plasma anion gap de  
terminationOrdered By: JASON Maldonado on 2023   
   
                      Anion gap [Moles/Vol] 10.7 mmol/L            6.0-15.0   [facility]  
   
                                                    Serum or plasma calcium cong  
urement (mass/volume)Ordered By: JASON Maldonado on   
2023   
   
                      Calcium [Mass/Vol] 9.0 mg/dL             8.2-10.2   [facility]  
   
                                                    Serum or plasma chloride patricia  
surement (moles/volume)Ordered By: JASON Maldonado on   
2023   
   
                      Chloride [Moles/Vol] 103 mmol/L                 [facility]  
   
                                                    Serum or plasma glucose cong  
urement (mass/volume)Ordered By: JASON Maldonado on   
2023   
   
                      Glucose [Mass/Vol] 95 mg/dL                   [facility]  
   
                                        Comment on above:   ADA recommended refe  
rence rangeRandom Glucose Reference Range   
is dependent on time and content of last meal. Glucose of more   
than 200 mg/dL in a nonstressed, ambulatory subject supports   
the diagnosis of Diabetes Mellitus.   
   
                                                    Serum or plasma potassium me  
asurement (moles/volume)Ordered By: JASON Maldonado on   
2023   
   
                      Potassium [Moles/Vol] 4.8 mmol/L            3.5-5.1    [facility]  
   
                                                    Serum or plasma sodium measu  
rement (moles/volume)Ordered By: JASON Maldonado on   
2023   
   
                      Sodium [Moles/Vol] 133 mmol/L            136-146    [facility]  
   
                                                    Serum or plasma total carbon  
 dioxide measurement (moles/volume)Ordered By: JASON Maldonado   
on 2023   
   
                      CO2 [Moles/Vol] 24.1 mmol/L            22.0-30.0  [facility]  
   
                                                    Serum or plasma urea nitroge  
n measurement (mass/volume)Ordered By: JASON Maldonado on   
2023   
   
                                                    Urea nitrogen   
[Mass/Vol]      17 mg/dL                        9-23            [facility]  
   
                                                    Falls Screening (Age 18+)on   
2023   
   
                                                    Adult depression   
screening assessment No                                              [facility]  
[facility]   
[facility]   
250 DO  
Work Phone:   
[phone]  
   
                                        Fall risk assessment a) No falls within   
the last year                                               [facility]   
[facility]   
250 DO  
Work Phone:   
[phone]  
   
                      Tobacco use status CPHS b) No                            M  
[facility]   
[facility]   
250 DO  
Work Phone:   
[phone]  
   
                                                    Falls Screening (Age   
18+)                                    Patient is not at   
high risk for falls.   
Falls risk guidance   
reviewed today                                              [facility]   
[facility]   
250 DO  
Work Phone:   
[phone]  
   
                                                    Office Visit (Cardiology)on   
2023   
   
                                        Follow-up visit     Diagnoses/Problems  
Assessed  
DIMITRI on CPAP   
(327.23,V46.8)   
(G47.33,Z99.89)  
CHF (NYHA class II,   
ACC/AHA stage C)   
(428.0) (I50.9)  
Non-ischemic   
cardiomyopathy   
(425.4) (I42.8)  
Chest pain,   
unspecified type   
(786.50) (R07.9)  
Morbid obesity with   
BMI of 50.0-59.9,   
adult (278.01,V85.43)   
(E66.01,Z68.43)  
Never a smoker  
DVT (deep venous   
thrombosis) (453.40)   
(I82.409)  
Orders  
Chest pain,   
unspecified type, CHF   
(NYHA class II,   
ACC/AHA stage C),   
Non-ischemic  
cardiomyopathy  
Basic Metabolic   
Panel; Status:Active   
- Retrospective   
Authorization;   
Requested  
for:2023;  
NM [facility] ([place]   
Cardiac Blood Pool);   
Status:Hold For -   
Scheduling,Retrospect  
anthony  
Authorization;   
Requested   
for:2023;  
Radiologist to   
Determine Optimal   
Study : Y  
What are the   
patient's signs and   
symptoms? : cp  
Brain Natriuretic   
Peptide BNP;   
Status:Active -   
Retrospective   
Authorization;   
Requested  
for:2023;  
CHF (NYHA class II,   
ACC/AHA stage C),   
Non-ischemic   
cardiomyopathy  
Renew: Carvedilol   
6.25 MG Oral Tablet;   
TAKE 1 TABLET TWICE   
DAILY WITH MEALS  
Morbid obesity with   
BMI of 50.0-59.9,   
adult  
Healthy Weight Tips;   
Status:Complete -   
Retrospective   
Authorization; Done:   
2023  
Some eating tips that   
can help you lose   
weight.;   
Status:Complete -   
Retrospective  
Authorization; Done:   
2023  
SocHx: Never a smoker  
Tobacco Use   
Screening;   
Status:Complete;   
Done: [id number]  
Patient Instructions  
Please bring all   
medicines, vitamins,   
and herbal   
supplements with you   
when you come to the   
office.  
Prescriptions will   
not be filled unless   
you are compliant   
with your follow up   
appointments or have   
a follow up   
appointment scheduled   
as per instruction of   
your physician.   
Refills should be   
requested at the time   
of your visit.  
  
Follow up in 6 months  
  
  
Chief Complaint  
JOCELIN PACHECO is being   
seen for a 6 month   
follow-up of.  
62-year-old gentleman   
who returns for   
annual visit he is   
doing well just   
complains atypical   
chest discomfort   
described as tingling   
as well as sharp and   
somewhat painful   
sensation both at   
rest and with   
activities that are   
sporadic and episodic   
only but not   
reproducible. He has   
a history of moderate   
nonischemic   
cardiomyopathy,   
obstructive sleep   
apnea, morbid   
obesity, history of   
remote DVT (remains   
on low-dose Xarelto).  
Stress perfusion   
imaging from 2022 revealed patchy   
tracer uptake,   
proceed ischemia or   
infarction, normal   
left ventricular   
volume with global   
hypokinesis and   
ejection fraction of   
44% normal transient   
ischemic index.  
Heart catheterization   
from  revealed 30   
to 50% ostial   
circumflex disease,   
20 to 25% mid LAD   
disease normal right   
coronary and at that   
time ejection   
fraction of 40 to   
45%.  
Since  with his   
medical history   
continue therapies.   
We have transitioned   
over to Entresto,   
Jardiance, carvedilol   
and spironolactone  
Unfortunately he has   
had no significant   
weight loss despite   
going to cardiac   
rehab 3 times a week.   
He remains 365   
pounds, similar to   
  
Recommendations,   
obtain a MUGA scan   
current therapies,   
obtain appropriate   
laboratories   
including Chem-6 and   
BNP, we will have him   
come back in 6 months   
continues to have any   
further chest   
discomfort will   
prescribe nitrates   
and/or consider   
invasive assessment.  
  
Active Problems   
Problems  
Abnormal stress test   
(794.39) (R94.39)  
DVT (deep venous   
thrombosis) (453.40)   
(I82.409)  
Knee pain (719.46)   
(M25.569)  
LVH (left ventricular   
hypertrophy) (429.3)   
(I51.7)  
Morbid obesity with   
BMI of 50.0-59.9,   
adult (278.01,V85.43)   
(E66.01,Z68.43)  
Never a smoker  
DIMITRI on CPAP   
(327.23,V46.8)   
(G47.33,Z99.89)  
Prediabetes (790.29)   
(R73.03)  
Primary   
osteoarthritis of   
left knee (715.16)   
(M17.12)  
Primary   
osteoarthritis of   
right knee (715.16)   
(M17.11)  
SOB (shortness of   
breath) on exertion   
(786.05) (R06.02)  
Surgical History  
Problems  
History of Complete   
colonoscopy  
History of Finger   
surgical procedure  
History of   
Gallbladder surgery  
Past Medical History  
Problems  
History of arthritis   
(V13.4) (Z87.39)  
History of bleeding   
disorder (V12.3)   
(Z86.2)  
History of cardiac   
disorder (V12.50)   
(Z86.79)  
History of Numbness   
and tingling (782.0)   
(R20.0,R20.2)  
History of Vision   
problems (V41.0)   
(H54.7)  
Current Meds  
  
Medication   
NameInstruction  
Carvedilol 6.25 MG   
Oral TabletTAKE 1   
TABLET TWICE DAILY   
WITH MEALS.  
Entresto 24-26 MG   
Oral TabletTAKE 1   
TABLET BY MOUTH TWICE   
A DAY  
Jardiance 10 MG Oral   
TabletTAKE 1 TABLET   
BY MOUTH ONCE DAILY  
Lasix TABSTAKE 1   
TABLET TWICE DAILY.  
Nabumetone 750 MG   
Oral TabletTAKE 1   
TABLET EVERY 12 HOURS   
DAILY.  
Ozempic (1 MG/DOSE)   
SOPNINJECT 1 UNIT   
Weekly  
Spironolactone 25 MG   
Oral TabletTAKE 1   
TABLET DAILY.  
Xarelto 10 MG Oral   
TabletTake 1 tablet   
daily  
Allergies  
Medication  
Cephalosporins  
Adverse Reaction;   
Rash; Recorded By:   
Etelvina Bradshaw;   
2022 8:55:43 AM  
Statins  
Adverse Reaction;   
Rash; Recorded By:   
Etelvina Bradshaw;   
2022 8:55:43 AM  
Voltaren  
Adverse Reaction;   
Rash; Recorded By:   
Etelvina Bradhsaw;   
2022 8:55:43 AM  
Family History  
Mother  
Family history of   
COPD, moderate (more   
content not   
included)...        Normal                                   [facility]  
   
                                                    A1C HEMOGLOBINon 2022   
   
                                                    HbA1c (Bld) [Mass   
fraction]       5.4 %                                           [place]   
[employer]   
[employer]  
Other Phone:   
[phone]  
   
                                                    HbA1c (Bld) [Mass fraction]o  
n 2022   
   
                      A1C HEMOGLOBIN                                  [place]  
t   
[employer]   
[employer]poration  
Other Phone:   
[phone]  
   
                                                    No Panel Informationon    
   
                                 9.3\S\9.3  Normal     8.2-10.2   [facility]   
[facility]   
250 DO  
Work Phone:   
[phone]  
   
                                        Comment on above:   PERFORMED BY:[facility]   
[place], [place] [id number] MEDICAL   
DIRECTORBRIDGETTE SWAIN M.D.   
   
                                 25.0\S\25.0 Normal     22.0-30.0  [facility]  
   
[facility]   
250 DO  
Work Phone:   
[phone]  
   
                                 101\S\101  Normal          [facility]   
[facility]   
250 DO  
Work Phone:   
[phone]  
   
                                 4.3\S\4.3  Normal     3.5-5.1    [facility]   
[facility] Liao DO  
Work Phone:   
[phone]  
   
                                                135\S\135       below low   
threshold                 136-146                   [facility]   
[facility]   
250 DO  
Work Phone:   
[phone]  
   
                                 > 60       Normal                [facility]  
Work Phone:   
[phone]  
   
                                        Comment on above:   GFR estimated refere  
nce range: According to KDOQI guidelines,   
<60 ml/min/1.73m2 is sufficient to diagnose a patient with   
chronic kidney disease.   
   
                                 54\S\54    Normal                [facility]  
Work Phone:   
[phone]  
   
                                                1.33\S\1.33     above high   
threshold                 0.64-1.27                 [facility]   
250 DO  
Work Phone:   
[phone]  
   
                                 20\S\20    Normal     9-23       [facility]   
250 DO  
Work Phone:   
[phone]  
   
                                 92\S\92    Normal          [facility]   
250 DO  
Work Phone:   
[phone]  
   
                                        Comment on above:   Random Glucose Refer  
ence Range is dependent on time and   
content of last meal. Glucose of more than 200 mg/dL in a   
nonstressed, ambulatory subject supports the diagnosis of   
Diabetes Mellitus. ADA recommended reference range   
   
                                                    Tobacco Screening.on   
022   
   
                                                    Adult depression   
screening assessment No                                              [facility]   
[facility]   
250 DO  
Work Phone:   
[phone]  
   
                                        Fall risk assessment b) One or more fall  
s   
in the last year                                            [facility]   
[employer]   
250 DO  
Work Phone:   
[phone]  
   
                      Tobacco use status CPHS b) No                            M  
[facility]   
[facility]   
250 DO  
Work Phone:   
[phone]  
   
                                                    Complete Blood Count with Au  
to Diffon 2022   
   
                                                    Erythrocyte   
distribution width   
(RBC) [Ratio]   13.0 %          Normal          11.0-15.0       [facility]   
Medical   
Specialist  
   
                                        Comment on above:   Performed By: #### M  
DIFF, CMP, CBCAD ####  
[facility]  
[address], [place] [id number]   
   
                                                    Hematocrit (Bld)   
[Volume fraction] 42.7 %          Normal          38.5-50.0       [facility]   
Medical   
Specialist  
   
                                        Comment on above:   Performed By: #### M  
DIFF, CMP, CBCAD ####  
[facility]  
[address], [place] [id number]   
   
                                                    Hemoglobin (Bld)   
[Mass/Vol]      14.5 g/dL       Normal          13.0-17.1       [facility]   
Medical   
Specialist  
   
                                        Comment on above:   Performed By: #### M  
DIFF, CMP, CBCAD ####  
[facility]  
[address], [place] [id number]   
   
                                                    MCH (RBC) [Entitic   
mass]           29.4 pg         Normal          27.0-33.0       [facility]   
Specialist  
   
                                        Comment on above:   Performed By: #### M  
DIFF, CMP, CBCAD ####  
[facility]  
[address], [place] [id number]   
   
                      MCHC (RBC) [Mass/Vol] 34.0 g/dL  Normal     32.0-36.0  [facility]   
Specialist  
   
                                        Comment on above:   Performed By: #### M  
DIFF, CMP, CBCAD ####  
[facility]  
[address], [place] [id number]   
   
                      MCV (RBC) [Entitic vol] 87 fL      Normal          N  
[facility]   
Specialist  
   
                                        Comment on above:   Performed By: #### M  
DIFF, CMP, CBCAD ####  
[facility]  
[address], [place] [id number]   
   
                                                    Platelet mean volume   
(Bld) [Entitic vol] 9.40 fL         Normal          7.50-12.50      [place]  
[facility]   
Medical   
Specialist  
   
                                        Comment on above:   Performed By: #### M  
DIFF, CMP, CBCAD ####  
[facility]  
[address], [place] [id number]   
   
                      Platelets (Bld) [#/Vol] 316 10*3/uL Normal     140-400      
[facility]   
Specialist  
   
                                        Comment on above:   Performed By: #### M  
DIFF, CMP, CBCAD ####  
[facility]  
[address], [place] [id number]   
   
                      RBC (Bld) [#/Vol] 4.93 10*6/uL Normal     4.20-5.80  [facility]  
   
                                        Comment on above:   Performed By: #### M  
DIFF, CMP, CBCAD ####  
[facility]  
[address], [place] [id number]   
   
                      RDW-SD     40.3 fL    Normal     37.0-50.0  [facility]   
Specialist  
   
                                        Comment on above:   Performed By: #### M  
DIFF, CMP, CBCAD ####  
[facility]  
[address], [place] [id number]   
   
                      REFLEX     Manual Differential Normal                [facility]  
   
                                        Comment on above:   Performed By: #### M  
DIFF, CMP, CBCAD ####  
[facility]  
[address], [place] [id number]   
   
                      WBC (Bld) [#/Vol] 6.9 10*3/uL Normal     3.8-11.0   [facility]   
Medical   
Specialist  
   
                                        Comment on above:   Performed By: #### M  
DIFF, CMP, CBCAD ####  
[facility]  
[address], [place] [id number]   
   
                                                    Comprehensive Metabolic Pane  
feli 2022   
   
                      Albumin [Mass/Vol] 4.4 g/dL   Normal     3.6-5.1    [facility]   
Medical   
Specialist  
   
                                        Comment on above:   Performed By: #### M  
DIFF, CMP, CBCAD ####  
[facility]  
[address], [place] [id number]   
   
                                                    Albumin/Globulin [Mass   
ratio]          1.8 {ratio}     Normal          1.0-2.5         [facility]   
Specialist  
   
                                        Comment on above:   Performed By: #### M  
DIFF, CMP, CBCAD ####  
[facility]  
[address], [place] [id number]   
   
                                                    ALP [Catalytic   
activity/Vol]   34 U/L          Low                       [facility]  
   
                                        Comment on above:   Performed By: #### M  
DIFF, CMP, CBCAD ####  
[facility]  
[address], [place] [id number]   
   
                                                    ALT [Catalytic   
activity/Vol]   19 U/L          Normal          9-46            [facility]   
Specialist  
   
                                        Comment on above:   Result Comment:  Female reference range changed.   
   
                                                            Performed By: #### M  
DIFF, CMP, CBCAD ####  
[facility]  
[address], [place] [id number]   
   
                      Anion gap [Moles/Vol] 21 mmol/L  High       12-20      [facility]  
   
                                        Comment on above:   Result Comment: Effe  
ctive 2020 reference range changed.   
   
                                                            Performed By: #### M  
DIFF, CMP, CBCAD ####  
[facility]  
[address], [place] [id number]   
   
                                                    AST [Catalytic   
activity/Vol]   24 U/L          Normal          10-40           [facility]   
Specialist  
   
                                        Comment on above:   Performed By: #### M  
DIFF, CMP, CBCAD ####  
[facility]  
[address], [place] [id number]   
   
                      Bilirubin [Mass/Vol] 0.56 mg/dL Normal     0.30-1.20  [facility]  
   
                                        Comment on above:   Performed By: #### M  
DIFF, CMP, CBCAD ####  
[facility]  
[address], [place] [id number]   
   
                      BUN/CREA   17 Ratio   Normal     6-22       [facility]  
   
                                        Comment on above:   Performed By: #### M  
DIFF, CMP, CBCAD ####  
[facility]  
[address], [place] [id number]   
   
                      Calcium [Mass/Vol] 9.3 mg/dL  Normal     8.6-10.2   [facility]  
   
                                        Comment on above:   Performed By: #### M  
DIFF, CMP, CBCAD ####  
[facility]  
[address], [place] [id number]   
   
                      Chloride [Moles/Vol] 100 mmol/L Normal          [facility]   
Specialist  
   
                                        Comment on above:   Performed By: #### M  
DIFF, CMP, CBCAD ####  
[facility]  
[address] [facility] [place] [id number]   
   
                      CO2 [Moles/Vol] 20 mmol/L  Normal     20-31      [facility]   
Specialist  
   
                                        Comment on above:   Performed By: #### M  
DIFF, CMP, CBCAD ####  
[facility]  
[address], [place] [id number]   
   
                      Creatinine [Mass/Vol] 1.0 mg/dL  Normal     0.7-1.4    Janell  
[facility]   
Medical   
Specialist  
   
                                        Comment on above:   Performed By: #### M  
DIFF, CMP, CBCAD ####  
[facility]  
[address], [place] [id number]   
   
                      eGFRAA     88 mL/min/1.73m2 Normal     >60        [facility]   
Specialist  
   
                                        Comment on above:   Performed By: #### M  
DIFF, CMP, CBCAD ####  
[facility]  
[address], [place] [id number]   
   
                      eGFRNAA    72 mL/min/1.73m2 Normal     >60        [facility]   
Specialist  
   
                                        Comment on above:   Performed By: #### M  
DIFF, CMP, CBCAD ####  
[facility]  
[address], [place] [id number]   
   
                      Globulin (S) [Mass/Vol] 2.5 g/dL   Normal     1.9-3.7    N  
[facility]   
Specialist  
   
                                        Comment on above:   Performed By: #### M  
DIFF, CMP, CBCAD ####  
[facility]  
[address], [place] [id number]   
   
                      Glucose [Mass/Vol] 111 mg/dL  High       65-99      Juanpablo  
[facility]   
Medical   
Specialist  
   
                                        Comment on above:   Result Comment: For   
FASTING Glucose --- ADA reference ranges:  
Normal 65-99 mg/dl  
Prediabetes 100-125  
Diabetes >/= 126   
   
                                                            Performed By: #### M  
DIFF, CMP, CBCAD ####  
[facility]  
[address], [place] [id number]   
   
                      Potassium [Moles/Vol] 4.5 mmol/L Normal     3.5-5.5    Janell  
[facility]   
Medical   
Specialist  
   
                                        Comment on above:   Performed By: #### M  
DIFF, CMP, CBCAD ####  
[facility]  
[address], [place] [id number]   
   
                      Protein [Mass/Vol] 6.9 g/dL   Normal     6.1-8.1    [place]  
[facility] [place]   
Medical   
Specialist  
   
                                        Comment on above:   Performed By: #### M  
DIFF, CMP, CBCAD ####  
[facility]  
[address], [place] [id number]   
   
                      Sodium [Moles/Vol] 137 mmol/L Normal     135-146    [facility]  
   
                                        Comment on above:   Performed By: #### M  
DIFF, CMP, CBCAD ####  
[facility]  
[address], [place] [id number]   
   
                                                    Urea nitrogen   
[Mass/Vol]      17 mg/dL        Normal          7-25            [facility]   
Specialist  
   
                                        Comment on above:   Performed By: #### M  
DIFF, CMP, CBCAD ####  
[facility]  
[address], [place] [id number]   
   
                                                    Manual Differentialon 2022   
   
                      BAND       0.0 %      Normal                [facility]   
Specialist  
   
                                        Comment on above:   Performed By: #### M  
DIFF, CMP, CBCAD ####  
[facility]  
[address], [place] [id number]   
   
                      BANDABS    0.0 K/uL   Normal                [facility]  
   
                                        Comment on above:   Performed By: #### M  
DIFF, CMP, CBCAD ####  
[facility]  
[address], [place] [id number]   
   
                      BASO       0.0 %      Normal                [facility]   
Specialist  
   
                                        Comment on above:   Performed By: #### M  
DIFF, CMP, CBCAD ####  
[facility]  
[address], [place] [id number]   
   
                      BASOABS    0.0 K/uL   Normal     0.0-0.2    [facility]   
Specialist  
   
                                        Comment on above:   Performed By: #### M  
DIFF, CMP, CBCAD ####  
[facility]  
[address], [place] [id number]   
   
                      EOS        6.0 %      Normal                [facility]   
Specialist  
   
                                        Comment on above:   Performed By: #### M  
DIFF, CMP, CBCAD ####  
[facility]  
[address], [place] [id number]   
   
                      EOSABS     0.4 K/uL   Normal     0.0-0.5    [facility]   
Specialist  
   
                                        Comment on above:   Performed By: #### M  
DIFF, CMP, CBCAD ####  
[facility]  
[address], [place] [id number]   
   
                      LYMPH      32.0 %     Normal                [facility]   
Specialist  
   
                                        Comment on above:   Performed By: #### M  
DIFF, CMP, CBCAD ####  
[facility]  
[address], [place] [id number]   
   
                      LYMPHABS   2.2 K/uL   Normal     0.9-3.9    [facility]   
Specialist  
   
                                        Comment on above:   Performed By: #### M  
DIFF, CMP, CBCAD ####  
[facility]  
[address], [place] [id number]   
   
                      LYMPHATYP  0.0 %      Low        0.9-3.9    [facility]   
Specialist  
   
                                        Comment on above:   Performed By: #### M  
DIFF, CMP, CBCAD ####  
[facility]  
[address], [place] [id number]   
   
                      MONO       16.0 %     Normal                [facility]   
Specialist  
   
                                        Comment on above:   Performed By: #### M  
DIFF, CMP, CBCAD ####  
[facility]  
[address], [place] [id number]   
   
                      MONOABS    1.1 K/uL   High       0.2-0.9    [facility]   
Specialist  
   
                                        Comment on above:   Performed By: #### M  
DIFF, CMP, CBCAD ####  
[facility]  
[address], [place] [id number]   
   
                      SEG        46.0 %     Normal                [facility]   
Specialist  
   
                                        Comment on above:   Performed By: #### M  
DIFF, CMP, CBCAD ####  
[facility]  
[address], [place] [id number]   
   
                      SEGABS     3.2 K/uL   Normal     1.5-7.8    [facility]   
Medical   
Specialist  
   
                                        Comment on above:   Performed By: #### M  
DIFF, CMP, CBCAD ####  
[facility]  
[address], [place] [id number]   
   
                      WBC        6.9 K/uL   Normal     3.8-11.0   [facility]   
Medical   
Specialist  
   
                                        Comment on above:   Performed By: #### M  
DIFF, CMP, CBCAD ####  
[facility]  
[address], [place] [id number]   
   
                                                    XR Chest 2 Views*on 20   
   
                                        XR Chest 2 Views*   HISTORY: Calm and   
shortness of breath  
  
COMPARISON: None   
available  
  
TECHNIQUE: Frontal   
and lateral views of   
the chest  
  
  
FINDINGS:  
  
The cardiomediastinal   
silhouette is within   
normal limits. Small   
subtle bilateral  
lower lobe opacities.   
No pneumothorax or   
pleural effusion. No   
acute osseous   
abnormality.  
No changes of the   
spine.  
  
  
IMPRESSION:  
  
Small subtle   
bilateral lower lobe   
opacities may   
represent   
atelectasis, or   
pneumonia  
in the appropriate   
clinical setting.  
  
  
  
  
  
  
Report reported and   
signed by Marcus Mckeon on 2022   
1321                Normal                                  [facility]   
Specialist  
  
  
  
Vital Signs  
  
  
                          Date Time    Vital Sign   Value        Performing   
Clinician                               Facility  
   
                                                    2024   
11:          Body temperature    97.7 [degF]         DO Vera   
[facility]  
Work Phone:   
[phone]                          [facility]  
   
                                                    2024   
11:                              Diastolic blood   
pressure                  70 mm[Hg]                 DO Vera   
[facility]  
Work Phone:   
[phone]                          [facility]  
   
                                                    2024   
11:          Heart rate          76 /min             DO Vera   
[facility]  
Work Phone:   
[phone]                          [facility]  
   
                                                    2024   
11:          Respiratory rate    18 /min             DO Vera   
[facility]  
Work Phone:   
[phone]                          [facility]  
   
                                                    2024   
11:                              Systolic blood   
pressure                  127 mm[Hg]                DO Vera   
[facility]  
Work Phone:   
[phone]                          [facility]  
   
                                                    2024   
15:          Body height         180.34 cm           DO Vera   
[facility]  
Work Phone:   
[phone]                          [facility]  
   
                                                    2024   
15:                              Body mass index   
(BMI) [Ratio]             51.2 kg/m2                DO Vera   
[facility]  
Work Phone:   
[phone]                          [facility]  
   
                                                    2024   
15:          Body weight         166.46 kg           DO Vera   
[facility]  
Work Phone:   
[phone]                          [facility]  
   
                                                    2024   
13:          Body height         180.34 cm           DO Vera   
[facility]  
Work Phone:   
[phone]                          [facility]  
   
                                                    2024   
13:                              Body mass index   
(BMI) [Ratio]             51.5 kg/m2                DO Vera   
[facility]  
Work Phone:   
[phone]                          [facility]  
   
                                                    2024   
13:          Body weight         167.43 kg           DO Vera   
[facility]  
Work Phone:   
[phone]                          [facility]  
   
                                                    2024   
13:                              Diastolic blood   
pressure                  58 mm[Hg]                 DO Vera   
[facility]  
Work Phone:   
[phone]                          [facility]  
   
                                                    2024   
13:          Heart rate          86 /min             DO Vera   
[facility]  
Work Phone:   
[phone]                          [facility]  
   
                                                    2024   
13:          Respiratory rate    20 /min             DO Vera   
[facility]  
Work Phone:   
[phone]                          [facility]  
   
                                                    2024   
13:                              SaO2% (BldA) [Mass   
fraction]                 97 %                      DO Vera   
[facility]  
Work Phone:   
[phone]                          [facility]  
   
                                                    2024   
13:                              Systolic blood   
pressure                  110 mm[Hg]                DO Vera   
[facility]  
Work Phone:   
[phone]                          [facility]  
   
                                                    2024   
16:          Body height         180.34 cm           DO Vera   
[facility]  
Work Phone:   
[phone]                          [facility]  
   
                                                    2024   
16:                              Body mass index   
(BMI) [Ratio]             51.2 kg/m2                DO Vera   
[facility]  
Work Phone:   
[phone]                          [facility]  
   
                                                    2024   
16:          Body weight         166.46 kg           DO Vera   
[facility]  
Work Phone:   
[phone]                          [facility]  
   
                                                    2024   
15:          Body temperature    98.4 [degF]         DO Vera   
[facility]  
Work Phone:   
[phone]                          [facility]  
   
                                                    2024   
15:                              Diastolic blood   
pressure                  70 mm[Hg]                 DO Vera   
[facility]  
Work Phone:   
[phone]                          [facility]  
   
                                                    2024   
15:          Heart rate          99 /min             DO Vera   
[facility]  
Work Phone:   
[phone]                          [facility]  
   
                                                    2024   
15:          Respiratory rate    18 /min             DO Vera   
[facility]  
Work Phone:   
[phone]                          [facility]  
   
                                                    2024   
15:                              Systolic blood   
pressure                  131 mm[Hg]                DO Vera   
[facility]  
Work Phone:   
[phone]                          [facility]  
   
                                                    2024   
15:          Body height         180.34 cm           DO Vera   
[facility]  
Work Phone:   
[phone]                          [facility]  
   
                                                    2024   
15:                              Body mass index   
(BMI) [Ratio]             52.1 kg/m2                DO Vera   
[facility]  
Work Phone:   
[phone]                          [facility]  
   
                                                    2024   
15:          Body weight         169.64 kg           DO Vera   
[facility]  
Work Phone:   
[phone]                          [facility]  
   
                                                    2024   
15:          Body height         180.34 cm           DO Vera   
[facility]  
Work Phone:   
[phone]                          [facility]  
   
                                                    2024   
15:                              Body mass index   
(BMI) [Ratio]             51.2 kg/m2                DO Vera   
[facility]  
Work Phone:   
[phone]                          [facility]  
   
                                                    2024   
15:          Body weight         166.46 kg           DO Vera   
[place]  
Work Phone:   
[phone]                          [facility]  
   
                                                    2024   
15:          Body temperature    98.1 [degF]         DO Vera   
[place]  
Work Phone:   
[phone]                          [facility]  
   
                                                    2024   
15:                              Diastolic blood   
pressure                  75 mm[Hg]                 DO Vera   
[place]  
Work Phone:   
[phone]                          [facility]  
   
                                                    2024   
15:          Heart rate          76 /min             DO Vera   
[place]  
Work Phone:   
[phone]                          [facility]  
   
                                                    2024   
15:          Respiratory rate    20 /min             DO Vera   
[place]  
Work Phone:   
[phone]                          [facility]  
   
                                                    2024   
15:                              Systolic blood   
pressure                  133 mm[Hg]                DO Vera   
[facility]  
Work Phone:   
[phone]                          [facility]  
   
                                                    2024   
15:          Body height         180.34 cm           DO Vera   
[place]  
Work Phone:   
[phone]                          [facility]  
   
                                                    2024   
15:                              Body mass index   
(BMI) [Ratio]             52.9 kg/m2                DO Vera   
[place]  
Work Phone:   
[phone]                          [facility]  
   
                                                    2024   
15:          Body weight         172.36 kg           DO Vera   
[facility]  
Work Phone:   
[phone]                          [facility]  
   
                                                    2024   
15:                              Diastolic blood   
pressure                  66 mm[Hg]                 DO Vera   
[facility]  
Work Phone:   
[phone]                          [facility]  
   
                                                    2024   
15:          Heart rate          71 /min             DO Vera   
[facility]  
Work Phone:   
[phone]                          [facility]  
   
                                                    2024   
15:          Respiratory rate    18 /min             DO Vera   
[facility]  
Work Phone:   
[phone]                          [facility]  
   
                                                    2024   
15:                              SaO2% (BldA) [Mass   
fraction]                 97 %                      DO Vera   
[place]  
Work Phone:   
[phone]                          [facility]  
   
                                                    2024   
15:                              Systolic blood   
pressure                  105 mm[Hg]                DO Vera   
[place]  
Work Phone:   
[phone]                          [facility]  
   
                                                    2024   
15:          Body temperature    98.8 [degF]         DO Vera   
[facility]  
Work Phone:   
[phone]                          [facility]  
   
                                                    2024   
15:                              Diastolic blood   
pressure                  62 mm[Hg]                 DO Vera   
[facility]  
Work Phone:   
[phone]                          [facility]  
   
                                                    2024   
15:          Heart rate          83 /min             DO Vera   
[place]  
Work Phone:   
[phone]                          [facility]  
   
                                                    2024   
15:          Respiratory rate    18 /min             DO Vera   
[place]  
Work Phone:   
[phone]                          [facility]  
   
                                                    2024   
15:                              Systolic blood   
pressure                  114 mm[Hg]                DO Vera   
[place]  
Work Phone:   
[phone]                          [facility]  
   
                                                    2024   
14:          Body height         180.34 cm           DO Vera   
[place]  
Work Phone:   
[phone]                          [facility]  
   
                                                    2024   
14:                              Body mass index   
(BMI) [Ratio]             51.2 kg/m2                DO Vera   
[place]  
Work Phone:   
[phone]                          [facility]  
   
                                                    2024   
14:          Body weight         166.46 kg           DO Vera   
[place]  
Work Phone:   
[phone]                          [facility]  
   
                                                    2024   
11:                              Body mass index   
(BMI) [Ratio]             52.72 kg/m2               Sharlene An APRN-CNP  
Work Phone:   
[phone]                          [facility]  
   
                                                    2024   
11:          Body weight         171.46 kg           Sharlene An   
APRN-CNP  
Work Phone:   
[phone]                          [facility]  
   
                                                    2024   
11:          Body height         180.3 cm            Sharlene An   
APRN-CNP  
Work Phone:   
[phone]                          [facility]  
   
                                                    2024   
11:                              Diastolic blood   
pressure                  70 mm[Hg]                 Sharlene An   
APRN-CNP  
Work Phone:   
[phone]                          [facility]  
   
                                                    2024   
11:          Heart rate          78 /min             Sharlene An   
APRN-CNP  
Work Phone:   
[phone]                          [facility]  
   
                                                    2024   
11:                              Systolic blood   
pressure                  110 mm[Hg]                Sharlene An   
APRN-CNP  
Work Phone:   
[phone]                          [facility]  
   
                                                    2024   
14:          Body height         180.34 cm           DO Vera   
[place]  
Work Phone:   
[phone]                          [facility]  
   
                                                    2024   
14:                              Body mass index   
(BMI) [Ratio]             53.3 kg/m2                DO Vera   
[place]  
Work Phone:   
[phone]                          [facility]  
   
                                                    2024   
14:          Body weight         173.38 kg           DO Vera   
[place]  
Work Phone:   
[phone]                          [facility]  
   
                                                    2024   
14:                              Diastolic blood   
pressure                  72 mm[Hg]                 DO Vera   
[place]  
Work Phone:   
[phone]                          [facility]  
   
                                                    2024   
14:          Heart rate          80 /min             DO Vera   
[place]  
Work Phone:   
[phone]                          [facility]  
   
                                                    2024   
14:          Respiratory rate    20 /min             DO Vera   
[place]  
Work Phone:   
[phone]                          [facility]  
   
                                                    2024   
14:                              SaO2% (BldA) [Mass   
fraction]                 96 %                      DO Vera   
[place]  
Work Phone:   
[phone]                          [facility]  
   
                                                    2024   
14:                              Systolic blood   
pressure                  133 mm[Hg]                DO Vera   
[place]  
Work Phone:   
[phone]                          [facility]  
   
                                                    2024   
14:          Body temperature    97.6 [degF]         DO Vera   
[place]  
Work Phone:   
[phone]                          [facility]  
   
                                                    2024   
14:                              Diastolic blood   
pressure                  78 mm[Hg]                 DO Vera   
[place]  
Work Phone:   
[phone]                          [facility]  
   
                                                    2024   
14:          Heart rate          89 /min             DO Vera   
[place]  
Work Phone:   
[phone]                          [facility]  
   
                                                    2024   
14:          Respiratory rate    24 /min             DO Vera   
[place]  
Work Phone:   
[phone]                          [facility]  
   
                                                    2024   
14:                              Systolic blood   
pressure                  143 mm[Hg]                DO Vera   
[place]  
Work Phone:   
[phone]                          [facility]  
   
                                                    2024   
14:          Body height         180.34 cm           DO Vera   
[place]  
Work Phone:   
[phone]                          [facility]  
   
                                                    2024   
14:                              Body mass index   
(BMI) [Ratio]             51.5 kg/m2                DO Vera   
[place]  
Work Phone:   
[phone]                          [facility]  
   
                                                    2024   
14:          Body weight         167.82 kg           DO Vera   
[place]  
Work Phone:   
[phone]                          [facility]  
   
                                                    2024   
15:                              Body mass index   
(BMI) [Ratio]             52.44 kg/m2               Ray Terry NP  
Work Phone:   
[phone]                          [facility]  
   
                                                    2024   
15:          Body temperature    97.7 [degF]         Ray Terry NP  
Work Phone:   
[phone]                          [facility]  
   
                                                    2024   
15:          Body weight         170.55 kg           Ray Terry NP  
Work Phone:   
[phone]                          [facility]  
   
                                                    2024   
15:                              Diastolic blood   
pressure                  70 mm[Hg]                 Ray Terry NP  
Work Phone:   
[phone]                          [facility]  
   
                                                    2024   
15:          Heart rate          83 /min             Ray Terry NP  
Work Phone:   
[phone]                          [facility]  
   
                                                    2024   
15:                              SaO2% (BldA) [Mass   
fraction]                 94 %                      Ray Terry NP  
Work Phone:   
[phone]                          [facility]  
   
                                                    2024   
15:                              Systolic blood   
pressure                  112 mm[Hg]                Ray Terry NP  
Work Phone:   
[phone]                          [facility]  
   
                                                    2023   
11:          Body height         177.8 cm            Jacoby Braden  
Other Phone:   
[phone]                           [facility]   
[employer]   
[employer]  
Other Phone:   
[phone]  
   
                                                    2023   
11:                              Body mass index   
(BMI) [Ratio]             51.86 kg/m2               Jacoby Braden  
Other Phone:   
[phone]                           [facility]   
[employer]   
[employer]  
Other Phone:   
[phone]  
   
                                                    2023   
11:          Body weight         163.98 kg           Jacoby Braden  
Other Phone:   
[phone]                           [facility]   
[employer]   
[employer]  
Other Phone:   
[phone]  
   
                                                    2023   
11:                              Diastolic blood   
pressure                  79 mm[Hg]                 Jacoby Braden  
Other Phone:   
[phone]                           [facility]   
[employer]   
[employer]  
Other Phone:   
[phone]  
   
                                                    2023   
11:          Respiratory rate    18 /min             Jacoby Braden  
Other Phone:   
[phone]                           [facility]   
[employer]   
[employer]  
Other Phone:   
[phone]  
   
                                                    2023   
11:                              SaO2% (BldA) [Mass   
fraction]                 99 %                      Jacoby Braden  
Other Phone:   
[phone]                           [facility]   
[employer]   
[employer]  
Other Phone:   
[phone]  
   
                                                    2023   
11:                              Systolic blood   
pressure                  125 mm[Hg]                Jacoby Braden  
Other Phone:   
[phone]                           [facility]   
[employer]   
[employer]  
Other Phone:   
[phone]  
   
                                                    2023   
13:          Body height         180.34 cm           DO Vera Melvin  
Work Phone:   
[phone]                          [facility]  
   
                                                    2023   
13:                              Body mass index   
(BMI) [Ratio]             49.2 kg/m2                DO Vera   
[place]  
Work Phone:   
[phone]                          [facility]  
   
                                                    2023   
13:          Body weight         160.11 kg           DO Vera   
[place]  
Work Phone:   
[phone]                          [facility]  
   
                                                    2023   
13:          Body temperature    97.7 [degF]         DO Vera   
[place]  
Work Phone:   
[phone]                          [facility]  
   
                                                    2023   
13:                              Diastolic blood   
pressure                  73 mm[Hg]                 DO Vera   
[place]  
Work Phone:   
[phone]                          [facility]  
   
                                                    2023   
13:          Heart rate          81 /min             DO Vera   
[place]  
Work Phone:   
[phone]                          [facility]  
   
                                                    2023   
13:          Respiratory rate    20 /min             DO Vera   
[place]  
Work Phone:   
[phone]                          [facility]  
   
                                                    2023   
13:                              Systolic blood   
pressure                  123 mm[Hg]                DO Vera   
[place]  
Work Phone:   
[phone]                          [facility]  
   
                                                    2023   
14:                              42 1                Vera D   
[place]  
Work Phone:   
[phone]                          [facility]   
[facility]   
[place]  
Work Phone:   
[phone]  
   
                                                    Comment on   
above:                                  [id number]   
   
                                                    2023   
14:          Body height         177.8 cm            Annel Fitt  
Other Phone:   
[phone]                           [facility]   
[employer]   
[employer]  
Other Phone:   
[phone]  
   
                                                    2023   
14:                              Body mass index   
(BMI) [Ratio]             52.41 kg/m2               Annel Fitt  
Other Phone:   
[phone]                           [facility]   
[employer]   
[employer]  
Other Phone:   
[phone]  
   
                                                    01-   
14:          Body weight         165.7 kg            Annel Baird  
Other Phone:   
[phone]                           [facility]   
[employer]   
[employer]  
Other Phone:   
[phone]  
   
                                                    2023   
09:          Body height         180.34 cm           Vera D   
[place]  
Work Phone:   
[phone]                          [facility]   
[facility]   
[place]  
Work Phone:   
[phone]  
   
                                                    2023   
09:                              Body mass index   
(BMI) [Ratio]             50.91 kg/m2               Vera D   
[place]  
Work Phone:   
[phone]                          [facility]   
[facility]   
[place]  
Work Phone:   
[phone]  
   
                                                    2023   
09:                              Body surface area   
Derived from   
formula                   2.72 m2                   Vera D   
[place]  
Work Phone:   
[phone]                          [facility]   
[facility]   
[place]  
Work Phone:   
[phone]  
   
                                                    2023   
09:          Body weight         165.56 kg           Vera D   
[place]  
Work Phone:   
[phone]                          [facility]   
[facility]   
[place]  
Work Phone:   
[phone]  
   
                                                    2023   
09:                              Diastolic blood   
pressure                  78 mm[Hg]                 Vera D   
[place]  
Work Phone:   
[phone]                          [facility]   
[address]   
[place]  
Work Phone:   
[phone]  
   
                                                    2023   
09:          Heart rate          90 /min             Vera D   
[place]  
Work Phone:   
[phone]                          [facility]   
[facility]   
[place]  
Work Phone:   
[phone]  
   
                                                    2023   
09:                              Systolic blood   
pressure                  124 mm[Hg]                Vera D   
[place]  
Work Phone:   
[phone]                          [facility]   
[facility]   
[place]  
Work Phone:   
[phone]  
   
                                                    2022   
15:          Body height         177.8 cm            Jacoby Braden  
Other Phone:   
[phone]                           [facility]   
[employer]   
[employer]  
Other Phone:   
[phone]  
   
                                                    2022   
15:                              Body mass index   
(BMI) [Ratio]             53.53 kg/m2               Jacoby Zuñigadiff  
Other Phone:   
[phone]                           [facility]   
[employer]   
[employer]  
Other Phone:   
[phone]  
   
                                                    2022   
15:          Body weight         169.24 kg           Jacoby Zuñigadiff  
Other Phone:   
[phone]                           [facility]   
[employer]   
[employer]  
Other Phone:   
[phone]  
   
                                                    2022   
15:                              Diastolic blood   
pressure                  72 mm[Hg]                 Jacoby Zuñigadiff  
Other Phone:   
[phone]                           [facility]   
[employer]   
[employer]  
Other Phone:   
[phone]  
   
                                                    2022   
15:          Respiratory rate    20 /min             Jacoby Zuñigadiff  
Other Phone:   
[phone]                           [facility]   
[employer]   
[employer]  
Other Phone:   
[phone]  
   
                                                    2022   
15:                              SaO2% (BldA) [Mass   
fraction]                 98 %                      Jacoby Zuñigadiff  
Other Phone:   
[phone]                           [facility]   
[employer]   
[employer]  
Other Phone:   
[phone]  
   
                                                    2022   
15:                              Systolic blood   
pressure                  132 mm[Hg]                Jacoby Zuñigadiff  
Other Phone:   
[phone]                           [facility]   
[employer]   
[employer]  
Other Phone:   
[phone]  
   
                                                    2022   
11:          Body height         177.8 cm            Annel Fitt  
Other Phone:   
[phone]                           [facility]   
[employer]   
[employer]  
Other Phone:   
[phone]  
   
                                                    2022   
11:                              Body mass index   
(BMI) [Ratio]             52.17 kg/m2               Annel Fitt  
Other Phone:   
[phone]                           [facility]   
[employer]   
[employer]  
Other Phone:   
[phone]  
   
                                                    2022   
11:          Body weight         164.93 kg           Annel Fitt  
Other Phone:   
[phone]                           [facility]   
[employer]   
[employer]  
Other Phone:   
[phone]  
   
                                                    10-   
15:          Body height         177.8 cm            Jacoby Zuñigadiff  
Other Phone:   
[phone]                           [facility]   
[employer]   
[employer]  
Other Phone:   
[phone]  
   
                                                    10-   
15:                              Body mass index   
(BMI) [Ratio]             52.52 kg/m2               Jacoby Zuñigadiff  
Other Phone:   
[phone]                           [facility]   
[employer]   
[employer]  
Other Phone:   
[phone]  
   
                                                    10-   
15:          Body weight         166.06 kg           Jacoby Zuñigadiff  
Other Phone:   
[phone]                           [facility]   
[employer]   
[employer]  
Other Phone:   
[phone]  
   
                                                    10-   
15:                              Diastolic blood   
pressure                  68 mm[Hg]                 Jacoby Zuñigadiff  
Other Phone:   
[phone]                           [facility]   
[employer]   
[employer]  
Other Phone:   
[phone]  
   
                                                    10-   
15:          Respiratory rate    20 /min             Jacoby Zuñigadiff  
Other Phone:   
[phone]                           [facility]   
[employer]   
[employer]  
Other Phone:   
[phone]  
   
                                                    10-   
15:                              SaO2% (BldA) [Mass   
fraction]                 99 %                      Jacoby Zuñigadiff  
Other Phone:   
[phone]                           [facility]   
[employer]   
[employer]  
Other Phone:   
[phone]  
   
                                                    10-   
15:                              Systolic blood   
pressure                  112 mm[Hg]                Jacoby Zuñigadiff  
Other Phone:   
[phone]                           [facility]   
[employer]   
[employer]  
Other Phone:   
[phone]  
   
                                                    2022   
13:          Body height         180.34 cm           DO Vera   
[place]  
Work Phone:   
[phone]                          [facility]  
   
                                                    2022   
13:                              Body mass index   
(BMI) [Ratio]             52.4 kg/m2                DO Vera   
[place]  
Work Phone:   
[phone]                          [facility]  
   
                                                    2022   
13:          Body weight         170.55 kg           DO Vera   
[place]  
Work Phone:   
[phone]                          [facility]  
   
                                                    2022   
13:          Body temperature    98.6 [degF]         DO Vera   
[place]  
Work Phone:   
[phone]                          [facility]  
   
                                                    2022   
13:                              Diastolic blood   
pressure                  69 mm[Hg]                 DO Vera   
[place]  
Work Phone:   
[phone]                          [facility]  
   
                                                    2022   
13:          Heart rate          92 /min             DO Vera   
[place]  
Work Phone:   
[phone]                          [facility]  
   
                                                    2022   
13:          Respiratory rate    18 /min             DO Vera   
[place]  
Work Phone:   
[phone]                          [facility]  
   
                                                    2022   
13:                              Systolic blood   
pressure                  119 mm[Hg]                DO Vera   
[place]  
Work Phone:   
[phone]                          [facility]  
   
                                                    2022   
12:          Body height         177.8 cm            Jacoby Braden  
Other Phone:   
[phone]                           [facility]   
[employer]   
[employer]  
Other Phone:   
[phone]  
   
                                                    2022   
12:                              Body mass index   
(BMI) [Ratio]             53.1 kg/m2                Jacoby Braden  
Other Phone:   
[phone]                           [facility]   
[employer]   
[employer]  
Other Phone:   
[phone]  
   
                                                    2022   
12:          Body weight         167.88 kg           Jacoby Braden  
Other Phone:   
[phone]                           [facility]   
[employer]   
[employer]  
Other Phone:   
[phone]  
   
                                                    2022   
12:                              Diastolic blood   
pressure                  64 mm[Hg]                 Jacoby Braden  
Other Phone:   
[phone]                           [facility]   
[employer]   
[employer]  
Other Phone:   
[phone]  
   
                                                    2022   
12:          Respiratory rate    18 /min             Jacoby Braden  
Other Phone:   
[phone]                           [facility]   
[employer]   
[employer]  
Other Phone:   
[phone]  
   
                                                    2022   
12:                              SaO2% (BldA) [Mass   
fraction]                 98 %                      Jacoby Braden  
Other Phone:   
[phone]                           [facility]   
[employer]   
[employer]  
Other Phone:   
[phone]  
   
                                                    2022   
12:                              Systolic blood   
pressure                  117 mm[Hg]                Jacoby Zuñigadiff  
Other Phone:   
[phone]                           [facility]   
[employer]   
[employer]  
Other Phone:   
[phone]  
   
                                                    2022   
15:          Body height         177.8 cm            Jacoby Zuñigadiff  
Other Phone:   
[phone]                           [facility]   
[employer]   
[employer]  
Other Phone:   
[phone]  
   
                                                    2022   
15:                              Body mass index   
(BMI) [Ratio]             54.81 kg/m2               Jacoby Zuñigadiff  
Other Phone:   
[phone]                           [facility]   
[employer]   
[employer]  
Other Phone:   
[phone]  
   
                                                    2022   
15:          Body weight         173.28 kg           Jacoby Zuñigadiff  
Other Phone:   
[phone]                           [facility]   
[employer]   
[employer]  
Other Phone:   
[phone]  
   
                                                    2022   
15:                              Diastolic blood   
pressure                  63 mm[Hg]                 Jacoby Zuñigadiff  
Other Phone:   
[phone]                           [facility]   
[employer]   
[employer]  
Other Phone:   
[phone]  
   
                                                    2022   
15:          Respiratory rate    20 /min             Jacoby Zuñigadiff  
Other Phone:   
[phone]                           [facility]   
[employer]  
Other Phone:   
[phone]  
   
                                                    2022   
15:                              SaO2% (BldA) [Mass   
fraction]                 97 %                      Jacoby Zuñigadiff  
Other Phone:   
[phone]                           [facility]   
[employer]   
[employer]  
Other Phone:   
[phone]  
   
                                                    2022   
15:                              Systolic blood   
pressure                  107 mm[Hg]                Jacoby Braden  
Other Phone:   
[phone]                           [facility]   
[employer]  
Other Phone:   
[phone]  
   
                                                    2022   
08:                              Diastolic blood   
pressure                  62 mm[Hg]                 Vera SIMS   
[place]  
Work Phone:   
[phone]                          [facility]   
[facility]   
[place]  
Work Phone:   
[phone]  
   
                                                    2022   
08:                              Systolic blood   
pressure                  128 mm[Hg]                Vera D   
[place]  
Work Phone:   
[phone]                          [facility]   
[facility]   
[place]  
Work Phone:   
[phone]  
   
                                                    2022   
08:          Body height         180.34 cm           Vera D   
[place]  
Work Phone:   
[phone]                          [facility]   
[facility]   
[place]  
Work Phone:   
[phone]  
   
                                                    2022   
08:                              Body mass index   
(BMI) [Ratio]             54.12 kg/m2               Vera D   
[place]  
Work Phone:   
[phone]                          [facility]   
[facility]   
[place]  
Work Phone:   
[phone]  
   
                                                    2022   
08:                              Body surface area   
Derived from   
formula                   2.79 m2                   Vera D   
[place]  
Work Phone:   
[phone]                          [facility]   
[facility]   
[place]  
Work Phone:   
[phone]  
   
                                                    2022   
08:          Body weight         176 kg              Vera D   
[place]  
Work Phone:   
[phone]                          [facility]   
[facility]   
[place]  
Work Phone:   
[phone]  
   
                                                    2022   
08:                              Diastolic blood   
pressure                  70 mm[Hg]                 Vera D   
[place]  
Work Phone:   
[phone]                          [facility]   
[facility]   
[place]  
Work Phone:   
[phone]  
   
                                                    2022   
08:          Heart rate          78 /min             Vera D   
[place]  
Work Phone:   
[phone]                          [facility]   
[facility]   
[place]  
Work Phone:   
[phone]  
   
                                                    2022   
08:                              Systolic blood   
pressure                  132 mm[Hg]                Vera D   
[place]  
Work Phone:   
[phone]                          [facility]   
[facility]   
[place]  
Work Phone:   
[phone]  
   
                                                    2022   
15:          Body height         177.8 cm            Annel Fitt  
Other Phone:   
[phone]                           [facility]   
[employer]   
[employer]  
Other Phone:   
[phone]  
   
                                                    2022   
15:                              Body mass index   
(BMI) [Ratio]             55.72 kg/m2               Annel Fitt  
Other Phone:   
[phone]                           [facility]   
[employer]  
Other Phone:   
[phone]  
   
                                                    2022   
15:          Body weight         176.18 kg           Annel Fitt  
Other Phone:   
[phone]                           [facility]   
[employer]  
Other Phone:   
[phone]  
   
                                                    2022   
16:          Body height         177.8 cm            Alonso Birmingham  
Other Phone:   
[phone]                           [facility]   
[employer]  
Other Phone:   
[phone]  
   
                                                    2022   
16:                              Body mass index   
(BMI) [Ratio]             55.59 kg/m2               Alonso Birmingham  
Other Phone:   
[phone]                           [facility]   
[employer]  
Other Phone:   
[phone]  
   
                                                    2022   
16:          Body temperature    99.5 [degF]         Alonso Birmingham  
Other Phone:   
[phone]                           [facility]  
Other Phone:   
[phone]  
   
                                                    2022   
16:          Body weight         175.77 kg           Alonso Birmingham  
Other Phone:   
[phone]                           [facility]   
[employer]   
[employer]  
Other Phone:   
[phone]  
   
                                                    2022   
16:                              Diastolic blood   
pressure                  71 mm[Hg]                 Alonso Birmingham  
Other Phone:   
[phone]                           [facility]   
[employer]   
[employer]  
Other Phone:   
[phone]  
   
                                                    2022   
16:                              SaO2% (BldA) [Mass   
fraction]                 96 %                      Alonso Birmingham  
Other Phone:   
[phone]                           [facility]   
[employer]   
[employer]  
Other Phone:   
[phone]  
   
                                                    2022   
16:                              Systolic blood   
pressure                  114 mm[Hg]                Alonso Birmingham  
Other Phone:   
[phone]                           [facility]   
[employer]   
[employer]  
Other Phone:   
[phone]  
   
                                                    2022   
00:                              60 1                Vera D   
[place]  
Work Phone:   
[phone]                          [facility]   
[facility]   
DO  
Work Phone:   
[phone]  
   
                                                    Comment on   
above:                                  [id number]   
   
                                                    2022   
15:          Body height         177.8 cm            Jacoby Braden  
Other Phone:   
[phone]                           [facility]   
[employer]   
[employer]  
Other Phone:   
[phone]  
   
                                                    2022   
15:                              Body mass index   
(BMI) [Ratio]             59.45 kg/m2               Jacoby Braden  
Other Phone:   
[phone]                           [facility]   
[employer]   
[employer]  
Other Phone:   
[phone]  
   
                                                    2022   
15:          Body weight         187.97 kg           Jacoby Braden  
Other Phone:   
[phone]                           [facility]   
[employer]   
[employer]  
Other Phone:   
[phone]  
   
                                                    2022   
15:                              Diastolic blood   
pressure                  57 mm[Hg]                 Jacoby Braden  
Other Phone:   
[phone]                           [facility]   
[employer]   
[employer]  
Other Phone:   
[phone]  
   
                                                    2022   
15:          Respiratory rate    20 /min             Jacoby Braden  
Other Phone:   
[phone]                           [facility]   
[employer]   
[employer]  
Other Phone:   
[phone]  
   
                                                    2022   
15:                              SaO2% (BldA) [Mass   
fraction]                 96 %                      Jacoby Braden  
Other Phone:   
[phone]                           [facility]   
[employer]   
[employer]  
Other Phone:   
[phone]  
   
                                                    2022   
15:                              Systolic blood   
pressure                  125 mm[Hg]                Jacoby Braden  
Other Phone:   
[phone]                           [facility]   
[employer]   
[employer]  
Other Phone:   
[phone]  
   
                                                    2022   
16:          Body height         177.8 cm            Alonso Birmingham  
Other Phone:   
[phone]                           [facility]   
[employer]   
[employer]  
Other Phone:   
[phone]  
   
                                                    2022   
16:                              Body mass index   
(BMI) [Ratio]             59.54 kg/m2               Alonso Birmingham  
Other Phone:   
[phone]                           [facility]   
[employer]   
[employer]  
Other Phone:   
[phone]  
   
                                                    2022   
16:          Body temperature    97.8 [degF]         Alonso Vinnie  
Other Phone:   
[phone]                           [facility]   
[employer]   
[employer]  
Other Phone:   
[phone]  
   
                                                    2022   
16:          Body weight         188.24 kg           Alonso Vinnie  
Other Phone:   
[phone]                           [facility]   
[employer]   
[employer]  
Other Phone:   
[phone]  
   
                                                    2022   
16:                              Diastolic blood   
pressure                  77 mm[Hg]                 Alonso Vinnei  
Other Phone:   
[phone]                           [facility]   
[employer]   
[employer]  
Other Phone:   
[phone]  
   
                                                    2022   
16:                              SaO2% (BldA) [Mass   
fraction]                 93 %                      Alonso Vinnie  
Other Phone:   
[phone]                           [facility]   
[employer]   
[employer]  
Other Phone:   
[phone]  
   
                                                    2022   
16:                              Systolic blood   
pressure                  130 mm[Hg]                Alonso Vinnie  
Other Phone:   
[phone]                           [facility]   
[employer]   
[employer]  
Other Phone:   
[phone]  
  
  
  
Encounters  
  
  
                          Encounter Date Encounter Type Care Provider Facility  
   
                          Start: 10- ambulatory   Elaine Espino Facility:[facility]  
   
                                                    Start: 2024  
End: 2024           ambulatory                VERA D   
MATIAS-EMERY                            Not Available  
   
                                Start: 2024 Registered Recurring DO Vera  
   
[place]  
Work Phone:   
[phone]                          [facility]   
[place]  
Work Phone:   
[phone]  
   
                                                    Start: 2024  
End: 2024           ambulatory                DO Vera   
[place]  
Work Phone:   
[phone]                          [facility]  
Work Phone:   
[phone]  
   
                                                    Start: 2024  
End: 2024           Discharged Recurring      DO Vera   
[place]  
Work Phone:   
[phone]                          [facility]   
[facility] [employer]  
   
                                                    Start: 2024  
End: 2024     ambulatory          DANYA AN   Not Available  
   
                                                    Start: 2024  
End: 2024           ambulatory                DO Vera   
[place]  
Work Phone:   
[phone]                          [facility]  
Work Phone:   
[phone]  
   
                                                    Start: 2024  
End: 2024                         Patient encounter   
procedure                               DO Vera   
[place]  
Work Phone:   
[phone]                          [facility]   
[facility]  
Work Phone:   
[phone]  
   
                                Start: 2024 Registered Recurring DO Vrea  
   
[place]  
Work Phone:   
[phone]                          [facility]   
[address] [facility]  
   
                                Start: 2024 Registered Recurring DO Vera  
   
[place]  
Work Phone:   
[phone]                          [facility]   
[place]  
Work Phone:   
[phone]  
   
                                                    Start: 2024  
End: 2024           ambulatory                DO Vera   
[place]  
Work Phone:   
[phone]                          [facility]  
Work Phone:   
[phone]  
   
                                                    Start: 2024  
End: 2024                         Patient encounter   
procedure                               DO Vera   
[place]  
Work Phone:   
[phone]                          [facility]   
[facility]  
Work Phone:   
[phone]  
   
                                Start: 2024 Registered Recurring DO Vera  
   
[place]  
Work Phone:   
[phone]                          [facility]   
[place]  
Work Phone:   
[phone]  
   
                                Start: 2024 Registered Recurring DO Vera  
   
[place]  
Work Phone:   
[phone]                          [facility]   
[address] [facility]  
   
                                                    Start: 2024  
End: 2024           ambulatory                DO Vera   
[place]  
Work Phone:   
[phone]                          [facility]  
Work Phone:   
[phone]  
   
                                                    Start: 2024  
End: 2024                         Patient encounter   
procedure                               DO Vera   
[place]  
Work Phone:   
[phone]                          [facility]   
[facility]  
Work Phone:   
[phone]  
   
                                Start: 2024 Registered Recurring DO Vera  
   
[place]  
Work Phone:   
[phone]                          [facility]   
[place]  
Work Phone:   
[phone]  
   
                                                    Start: 2024  
End: 2024     ambulatory          SHARLENE HOLM [facility]  
   
Ambulatory  
   
                                                    Start: 2024  
End: 2024                         Office outpatient visit   
15 minutes                              Sharlene HOLM An   
APRN-CNP  
Work Phone:   
[phone]                          [facility]  
   
                                        Comment on above:   Non-ischemic cardiom  
yopathy (Multi) (Primary Dx);  
DIMITRI on CPAP;  
BMI 50.0-59.9, adult (Multi)   
   
                                                    Start: 2024  
End: 2024           ambulatory                VERA MATIAS-ARPAN                            Not Available  
   
                                                    Start: 2024  
End: 2024           ambulatory                DO Vera   
[place]  
Work Phone:   
[phone]                          [facility]  
Work Phone:   
[phone]  
   
                                                    Start: 2024  
End: 2024                         Patient encounter   
procedure                               DO Vera   
[place]  
Work Phone:   
[phone]                          [facility]   
[facility]  
Work Phone:   
[phone]  
   
                                                    Start: 2024  
End: 2024     ambulatory          DANYA AN   Not Available  
   
                                                    Start: 2024  
End: 2024           ambulatory                DO Vera   
[place]  
Work Phone:   
[phone]                          [facility]  
Work Phone:   
[phone]  
   
                                                    Start: 2024  
End: 2024           Discharged Recurring      DO Vera   
[place]  
Work Phone:   
[phone]                          [facility]   
[place]  
Work Phone:   
[phone]  
   
                                                    Start: 2024  
End: 2024     ambulatory          JESSICA L LOPEZ        Not Available  
   
                                                    Start: 2024  
End: 2024     ambulatory          ARLENE DEPOY      Not Available  
   
                                                    Start: 2024  
End: 2024     ambulatory          JESSICA L LOPEZ        Not Available  
   
                                                    Start: 2024  
End: 2024     ambulatory          ARLENE HAIDER      Not Available  
   
                                Start: 02- Bamboo flowsheet Jessica L Lopez   
PT  
Work Phone:   
[phone]                          [facility]  
   
                                Start: 02- Bamboo flowsheet Jessica L Lopez   
PT  
Work Phone:   
[phone]                          [facility]  
   
                                                    Start: 02-  
End: 02-     ambulatory          JESSICA L LOPEZ        Not Available  
   
                                                    Start: 2024  
End: 2024                         Office outpatient visit   
25 minutes                              Ray Terry NP  
Work Phone:   
[phone]                          [facility]  
   
                                        Comment on above:   Lumbosacral strain,   
initial encounter (Primary Dx);  
Acute pain of left shoulder;  
Wound of left lower extremity, initial encounter   
   
                                                    Start: 2024  
End: 2024           ambulatory                VERA MELVIN                            Not Available  
   
                                                    Start: 2024  
End: 2024                         Patient encounter   
procedure                               Danya An   
DPM  
Work Phone:   
[phone]                          [facility]OD[facility]  
   
                                        Comment on above:   Onychomycosis (Prima  
ry Dx);  
Pain in both feet;  
Corns and callosities;  
Other specified peripheral vascular diseases (CMS/HCC);  
Circulating anticoagulant disorder (CMS/HCC)   
   
                                                    Start: 2024  
End: 2024     ambulatory          DANYA AN   Not Available  
   
                                                    Start: 2024  
End: 2024           ambulatory                VERA MATIAS-EMERY                            Not Available  
   
                                                    Start: 2023  
End: 2023     ambulatory          DANYA AN   Not Available  
   
                                                    Start: 2023  
End: 2023           ambulatory                DO Vera [place]  
Work Phone:   
[phone]                          [facility]  
Work Phone:   
[phone]  
   
                                                    Start: 2023  
End: 2023                         Patient encounter   
procedure                               DO Vera Melvin  
Work Phone:   
[phone]                          [facility]  
   
                                                    Start: 2023  
End: 2023           ambulatory                Jacoby Braden  
Other Phone:   
[phone]                           [facility]   
Professional   
Corporation  
Other Phone:   
[phone]  
   
                          Start: 2023 Follow-up encounter Jacoby gan Coordinated   
Care Clinic  
   
                                Start: 2023 Registered Recurring DO Vera  
   
[place]  
Work Phone:   
[phone]                          [facility]   
Management  
Work Phone:   
[phone]  
   
                                                    Start: 2023  
End: 2023           ambulatory                DO Vera   
[place]  
Work Phone:   
[phone]                          [facility]  
Work Phone:   
[phone]  
   
                                                    Start: 2023  
End: 2023           Discharged Recurring      DO Vera   
[place]  
Work Phone:   
[phone]                          [facility]   
[place]  
Work Phone:   
[phone]  
   
                                Start: 2023 ambulatory      Dr. Compa Maldonado                                 Facility:  
   
                                                    Start: 2023  
End: 2023           ambulatory                DO Vera   
[place]  
Work Phone:   
[phone]                          [facility]  
Work Phone:   
[phone]  
   
                                                    Start: 2023  
End: 2023                         Patient encounter   
procedure                               DO Vera   
[place]  
Work Phone:   
[phone]                          [facility]   
Exercise Program  
   
                                                    Start: 2023  
End: 2023           ambulatory                Jacoby Braden  
Other Phone:   
[phone]                           [facility]   
Professional   
Corporation  
Other Phone:   
[phone]  
   
                          Start: 2023 Telephone encounter Jacoby gan Coordinated   
Care Clinic  
   
                                Start: 2023 Registered Recurring DO Vera  
   
[place]  
Work Phone:   
[phone]                          [facility]   
Management  
Work Phone:   
[phone]  
   
                                Start: 2023 Chart Update    Vera SIMS   
[place]  
Work Phone:   
[phone]                          [facility]   
[facility] DO  
Work Phone:   
[phone]  
   
                                        Start: 2023   Patient encounter   
procedure                               Vera SIMS   
[place]  
Work Phone:   
[phone]                          [facility]   
[facility] [place]  
Work Phone:   
[phone]  
   
                          Start: 2023 ambulatory   COMPA MALDONADO Facilit  
[id number]  
   
                                Start: 2023 Chart Update    Vera SIMS   
[place]  
Work Phone:   
[phone]                          [facility]   
[facility] DO  
Work Phone:   
[phone]  
   
                                                    Start: 2023  
End: 2023           ambulatory                DO Vera Alexisaver-[place]z  
Work Phone:   
[phone]                          [facility]  
Work Phone:   
[phone]  
   
                                                    Start: 2023  
End: 2023                         Patient encounter   
procedure                               DO Vera [place]  
Work Phone:   
[phone]                          [facility]   
[facility]  
   
                                Start: 2023 Registered Recurring DO Vera  
   
[place]  
Work Phone:   
[phone]                          [facility]   
Management  
Work Phone:   
[phone]  
   
                                        Start: 2023   ([facility] RD FU) [facility] F/  
U   
Registerd Dietician       Annel Baird                 [facility] Coordinated   
Care Clinic  
   
                                                    Start: 2023  
End: 2023           ambulatory                Jacoby Braden  
Other Phone:   
[phone]                           [facility]   
Professional   
Corporation  
Other Phone:   
[phone]  
   
                          Start: 2023 Telephone encounter Jacoby gan Coordinated   
Care Clinic  
   
                                        Start: 2023   Office outpatient vi  
sit   
25 minutes                              Vera SIMS   
[place]  
Work Phone:   
[phone]                          [facility]   
[facility] DO  
Work Phone:   
[phone]  
   
                                Start: 2023 ambulatory      Dr. Compa Maldonado                                 Facility:  
   
                                                    Start: 2022  
End: 2022           ambulatory                Jacoby Braden  
Other Phone:   
[phone]                           [facility]   
Professional   
Corporation  
Other Phone:   
[phone]  
   
                          Start: 2022 Follow-up encounter Jacoby gan Coordinated   
Care Clinic  
   
                                        Start: 2022   ([facility] RD FU) [facility] F/  
U   
Registerd Dietician       Annel Baird                 [facility] Coordinated   
Care Clinic  
   
                                                    Start: 2022  
End: 2022           ambulatory                Annel Mcneilwale  
Other Phone:   
[phone]                           [facility]   
Professional   
Corporation  
Other Phone:   
[phone]  
   
                                                    Start: 10-  
End: 10-           ambulatory                Jacoby Braden  
Other Phone:   
[phone]                           [facility]   
Professional   
Corporation  
Other Phone:   
[phone]  
   
                          Start: 10- Telephone encounter Jacoby ANTHONY  
peters [facility]inated   
Care Clinic  
   
                                                    Start: 10-  
End: 10-           ambulatory                Jacobygeorgia Braden  
Other Phone:   
[phone]                           [facility]   
Professional   
Corporation  
Other Phone:   
[phone]  
   
                          Start: 10- Follow-up encounter Jacoby Zuñigadimeño ANTHONY  
peters [facility]inated   
Care Clinic  
   
                                                    Start: 2022  
End: 2022           ambulatory                DO Vera   
[place]  
Work Phone:   
[phone]                          [facility]  
Work Phone:   
[phone]  
   
                                                    Start: 2022  
End: 2022           Discharged Recurring      DO Vera   
[place]  
Work Phone:   
[phone]                          [facility]   
[place]  
   
                                Start: 2022 Registered Recurring DO Vera  
   
[place]  
Work Phone:   
[phone]                          [facility]   
Management  
   
                                                    Start: 2022  
End: 2022           ambulatory                Jacoby Braden  
Other Phone:   
[phone]                           [facility]   
Professional   
Corporation  
Other Phone:   
[phone]  
   
                          Start: 2022 Follow-up encounter Jacoby gan Coordinated   
Care Clinic  
   
                                                    Start: 2022  
End: 2022           ambulatory                Jacoby Braden  
Other Phone:   
[phone]                           [facility]   
Professional   
Corporation  
Other Phone:   
[phone]  
   
                          Start: 2022 Follow-up encounter Jacoby gan Coordinated   
Care Clinic  
   
                                Start: 2022 Chart Update    Vera SIMS   
[employer]  
Work Phone:   
[phone]                          [facility]   
[facility] DO  
Work Phone:   
[phone]  
   
                                        Start: 2022   Office consultation   
new/estab patient 80 min                Vera LEVI   
Fylet-[place]  
Work Phone:   
[phone]                          -[facility]   
[facility] [address] DO  
Work Phone:   
[phone]  
   
                                        Start: 2022   ([facility]) WMN Init  
ial   
Provider                  nAnel Baird                 [facility]   
Care Clinic  
   
                                                    Start: 2022  
End: 2022           ambulatory                Annel Baird  
Other Phone:   
[phone]                           [facility]   
[employer]   
[employer]  
Other Phone:   
[phone]  
   
                                                    Start: 2022  
End: 2022           ambulatory                Alonso Birmingham  
Other Phone:   
[phone]                           [facility]   
[employer]   
[employer]  
Other Phone:   
[phone]  
   
                                        Start: 2022   Office outpatient vi  
sit   
25 minutes                Alonso Birmingham              [facility]  
   
                                                    Start: 05-  
End: 05-           ambulatory                Annel Mcneilt  
Other Phone:   
[phone]                           [facility]   
[employer]   
[employer]  
Other Phone:   
[phone]  
   
                                        Start: 05-   IBT FOR OBESITY GROU  
P   
2-10 30M                  Annel Baird                 [facility]   
Care Clinic  
   
                                                    Start: 2022  
End: 2022           ambulatory                Jacoby Braden  
Other Phone:   
[phone]                           [facility]   
[employer]   
[employer]  
Other Phone:   
[phone]  
   
                          Start: 2022 Nutrition therapy Jacoby Louis  
[facility]   
Care Clinic  
   
                                                    Start: 2022  
End: 2022           ambulatory                Annel Baird  
Other Phone:   
[phone]                           [facility]   
[employer]   
[employer]  
Other Phone:   
[phone]  
   
                          Start: 2022 [facility]  
   
                                                    Start: 2022  
End: 2022           ambulatory                Alonso Birmingham  
Other Phone:   
[phone]                           [facility]   
[employer]   
[employer]  
Other Phone:   
[phone]  
   
                                        Start: 2022   Office outpatient ne  
w 45   
minutes                   Alonso Birmingham              [facility]  
   
                                        Start: 2021   ([facility]) [facility]  
vid   
Vaccine                   Annel Baird                 [facility]   
Care Clinic  
   
                                                    Start: 2021  
End: 2021           ambulatory                Annel Baird  
Other Phone:   
[phone]                           [facility]   
[employer]   
[employer]  
Other Phone:   
[phone]  
  
  
  
Procedures  
  
  
                          Date         Procedure    Procedure Detail Performing   
Clinician  
   
                                        Start: 2024   Investigation of   
transfusion reaction                                DO Vera Alexisaver-Mayaguez  
Work Phone:   
[phone]  
   
                                       Finger operation              Vera SIMS We  
aver-[place]aguez  
Work Phone:   
[phone]  
   
                                       Operation on gallbladder              Garcia  
katrin D Matias-Mayaguez  
Work Phone:   
[phone]  
   
                                       Total colonoscopy              Vera GOMEZ  
eaver-Mayaguez  
Work Phone:   
[phone]  
  
  
  
Plan of Treatment  
  
  
                          Date         Care Activity Detail       Author  
   
                                                    Start:   
2025  
End: 2025                         Patient encounter   
procedure                               2025 3:20 PM EST   
Office Visit [facility] [address]   
[address] [place]   
[address] [phone]   
Compa Maldonado DO   
[address]   
[address] [place] [address]   
[phone] (Work)   
[phone] (Fax)                      [facility]  
   
                                                    Start:   
2025                              Medicare Annual Wellness   
(AWV)                                   Medicare Annual Wellness   
(AWV)                                   [facility]  
   
                                                    Start:   
2024                              Urine screening for   
protein                                 Diabetes: Urine Protein   
Screening                               [facility]  
   
                                                    Start:   
2024                              Screening for malignant   
neoplasm of colon                                   [facility]  
   
                                                    Start:   
2024  
End: 2024                         Patient encounter   
procedure                               2024 2:30 PM EDT   
Office Visit [facility]   
[address]   
[place] [address] [phone]   
Vera Melvin,   
DO [address]   
[address] [place] [address]   
[phone] (Work)   
[phone] (Fax)                      [facility]  
   
                                                    Start:   
2024  
End: 2024                         Patient encounter   
procedure                               2024 2:30 PM EDT   
Procedure Visit [facility]   
PODIATRY [address]   
[address] [place]   
[address] [phone]   
Danya An DPM   
[address]   
[place] [address]   
[phone] (Work)   
[phone] (Fax)                      [facility] P[facility]AT[facility]  
   
                                                    Start:   
2024                              Hemoglobin A1c   
measurement               Diabetes: Hemoglobin A1C  [facility]  
   
                                                    Start:   
02-  
End: 02-           ambulatory                02/15/2024 1:00 PM EST   
Evaluation [facility]   
[address]   
[place] [address] [phone] Jessica Lopez, PT [address]   
[address] [place]   
[address] [phone]   
(Work) [phone]   
(Fax)                                   [facility]  
   
                                                    Start:   
2024  
End: 2024                         Patient encounter   
procedure                               2024 3:30 PM EST   
Office Visit [facility]   
[address]   
[place] [address] [phone]                            [facility]  
   
                                                    Start:   
2023                              COVID-19 Vaccine ( season)                         COVID-19 Vaccine ( season)                         [facility]  
   
                                                    Start:   
2023                              FUV, Provider:   
Compa Maldonado, Status:   
Pen, Time: 11:00 AM                     FUV, Provider:   
Compa Maldonado, Status:   
Pen, Time: 11:00 AM                     [facility]   
[facility] [address] DO  
Work Phone:   
[phone]  
   
                                                    Start:   
2023                              МАРИЯ, Provider: [place]   
[facility]   
,[id number], Status:   
Pen, Time: 2:00 PM                      MUGA, Provider: [place]   
[facility]   
[id number], Status:   
Pen, Time: 2:00 PM                      [facility]  
Work Phone:   
[phone]  
   
                                                    Start:   
2023                              FUV, Provider:   
Compa Maldonado, Status:   
Pen, Time: 10:00 AM                     FUV, Provider:   
Compa Maldonado, Status:   
Pen, Time: 10:00 AM                     [facility]  
Work Phone:   
[phone]  
   
                                                    Start:   
2016                              RSV Pregnant patients   
and/or patients aged 60+   
years (1 - 1-dose 60+   
series)                                 RSV Pregnant patients   
and/or patients aged 60+   
years (1 - 1-dose 60+   
series)                                 [facility]  
   
                                                    Start:   
2006          Zoster Vaccines (1 of 2) Zoster Vaccines (1 of 2) [facility]  
   
                                                    Start:   
1978                              DTaP/Tdap/Td Vaccines (1   
- Tdap)                                 DTaP/Tdap/Td Vaccines (1   
- Tdap)                                 [facility]  
   
                                                    Start:   
1974                              Diabetes mellitus   
screening                 Diabetes Screening        [facility]  
   
                                                    Start:   
1974          Hepatitis C screening Hepatitis C Screening [facility]  
   
                                                    Start:   
1962                              Pneumococcal Vaccine:   
65+ Years (1 - PCV)                     Pneumococcal Vaccine:   
65+ Years (1 - PCV)                     [facility]  
   
                                                    Start:   
1962                              Pneumococcal Vaccine:   
65+ Years (1 of 2 - PCV)                Pneumococcal Vaccine:   
65+ Years (1 of 2 - PCV)                [facility]  
   
                                                    Start:   
1956                              Hemoglobin A1c   
measurement               Diabetes: Hemoglobin A1C  [facility]  
   
                                                    Start:   
1956          Lipid panel         Lipid Panel         [facility]  
   
                                                    Start:   
1956                              Medicare Annual Wellness   
Visit                                   Medicare Annual Wellness   
Visit (AWV)                             [facility]  
   
                                                    Start:   
1956                              Screening for malignant   
neoplasm of colon                                   [facility]  
  
  
  
Immunizations  
  
  
                      Immunization Date Immunization Notes      Care Provider Jenny llanes  
   
                                        2023          Influenza, Seasonal,  
   
Quadrivalent,   
Adjuvanted                                          Danya An DPM  
Work Phone:   
[phone]                          [facility]  
   
                                        10-          influenza, high dose  
   
seasonal,   
preservative-free                                   Jacoby Wolfff  
Other Phone:   
[phone]                           [facility]   
Professional   
Corporation  
Other Phone:   
[phone]  
   
                                        10-          Fluzone High-Dose   
Quadrivalent 0.7 ML   
Intramuscular   
Suspension Prefilled   
Syringe                                             Vera D   
Matias-Mayaguez  
Work Phone:   
[phone]                          [facility]   
[address] DO  
Work Phone:   
[phone]  
   
                                        10-          influenza virus   
vaccine, unspecified   
formulation                                         DO Vera   
Matias-Mayaguez  
Work Phone:   
[phone]                          [facility]  
   
                                        10-          seasonal influenza,   
intradermal,   
preservative free                                   Sharlene An   
APRN-CNP  
Work Phone:   
[phone]                          [facility]  
Work Phone:   
[phone]  
   
                          2021   COVID-19 Pfizer              Annel Fitt  
Other Phone:   
[phone]                           [facility]  
   
                                        2021          influenza, high dose  
   
seasonal,   
preservative-free                                   Vera D   
[place]  
Work Phone:   
[phone]                          [facility]  
   
                                        2021          Pfizer-BioNTech   
COVID-19 Vacc 30   
MCG/0.3ML   
Intramuscular   
Suspension                                          Vera D   
Matias-[place]  
Work Phone:   
[phone]                          [facility]  
   
                                        2021          Pfizer-BioNTech   
COVID-19 Vacc 30   
MCG/0.3ML   
Intramuscular   
Suspension                                          Vera D   
Matias-[place]  
Work Phone:   
[phone]                          [facility]  
   
                                        2020          Influenza, injectabl  
e,   
Madin Griselda Canine   
Kidney, preservative   
free, quadrivalent                                  Vera D   
Matias-[place]aguez  
Work Phone:   
[phone]                          [facility]  
Work Phone:   
[phone]  
   
                                        10-          Influenza, injectabl  
e,   
Madin Bethesda Canine   
Kidney, preservative   
free, quadrivalent                                  Vera D   
Matias-[place]aguez  
Work Phone:   
[phone]                          [facility]  
Work Phone:   
[phone]  
   
                                        2018          influenza, injectabl  
e,   
madin griselda canine   
kidney, preservative   
free                                                Vera LEVI   
Matias-[place]aguez  
Work Phone:   
[phone]                          [facility] [address] DO  
Work Phone:   
[phone]  
   
                                        2017          influenza, injectabl  
e,   
quadrivalent,   
preservative free                                   Danya An   
DPM  
Work Phone:   
[phone]                          [facility]  
   
                                        10-          seasonal influenza,   
intradermal,   
preservative free                                   Vera SIMS   
Matias-[place]z  
Work Phone:   
[phone]                          [facility] [address] DO  
Work Phone:   
[phone]  
   
                                        10-          seasonal influenza,   
intradermal,   
preservative free                                   Danya An   
DPM  
Work Phone:   
[phone]                          [facility]  
  
  
  
Payers  
  
  
                          Date         Payer Category Payer        Policy ID  
   
                          2024   Self-pay                  [id number]  
[id number]  
[id number]  
   
                          2023   Private Health Insurance              [id number]  
[id number]  
[id number]  
   
                          2023   Private Health Insurance              [id number]  
[id number]  
   
                          2021   Medicare                  [id number]  
[id number]  
[id number]  
   
                          2021   Private Health Insurance              [id number]  
[id number]   
[id number]  
   
                          2021   Medicare                  [id number]   
[id number]  
   
                          1956   Unknown                   [id number]   
[id number]  
[id number]  
   
                          1956   Unknown                   [id number]   
[id number]  
[id number]  
   
                          1956   Unknown                   [id number]   
[id number]  
[id number]  
   
                          1956   Unknown                   [id number]   
[id number]  
[id number]  
   
                          1956   Unknown                   [id number]   
[id number]  
[id number]  
   
                          1956   Unknown                   [id number]   
[id number]  
[id number]  
   
                          1956   Unknown                   [id number]   
[id number]  
[id number]  
   
                          1956   Unknown                   [id number]   
[id number]  
[id number]  
   
                          1956   Unknown                   [id number]   
[id number]  
[id number]  
   
                          1956   Unknown                   [id number]   
[id number]  
[id number]  
   
                          1956   Unknown                   [id number]   
[id number]  
[id number]  
   
                          1956   Unknown                   [id number]   
[id number]  
[id number]  
   
                          1956   Unknown                   [id number]   
[id number]  
[id number]  
   
                          1956   Unknown                   [id number]   
[id number]  
[id number]  
   
                          1956   Unknown                   [id number]   
[id number]  
[id number]  
   
                          1956   Unknown                   [id number]   
[id number]  
[id number]  
   
                          1956   Unknown                   [id number]   
[id number]  
[id number]  
   
                                                Self-pay        Self Pay [employer]  
e   
Program                                 [id number]   
[id number]  
[id number]  
   
                                       Unknown                   [id number]   
[id number]  
   
                                       Unknown                     
   
                                       Unknown                   [id number]   
[id number]  
[id number]  
   
                                       Unknown                   [id number]   
[id number]  
[id number]  
   
                                       Unknown                   [id number]   
[id number]  
[id number]  
  
  
  
Social History  
  
  
                          Date         Type         Detail       Facility  
   
                                                    Start: 2023  
End: 2024     Sex Assigned At Birth                     [facility]   
Professional   
Corporation  
Other Phone:   
[phone]  
   
                                                    Start: 2023  
End: 2024                         Daily caffeine   
consumption                             Daily caffeine   
consumption                             -[facility]   
[facility] [address] DO  
Work Phone:   
[phone]  
   
                                        Comment on above:   coffee 1-2 pots a da  
y;   
   
                                                    Start: 2022  
End: 2024                         Tobacco smoking status   
NHIS                                    Never smoked tobacco   
(finding)                               [facility]  
   
                          Start: 1955 Sex Assigned At Birth Male         F  
[facility]  
   
                                       History of tobacco use [facility] smoker [facility]  
   
                                                    Start: 2023  
End: 2024                         Tobacco use and   
exposure                                Smokeless tobacco   
non-user                                [facility]  
   
                                                    Start: 2024  
End: 2024     Alcohol intake      Ex-drinker (finding) [facility]  
   
                                                            How often to you hav  
e   
a drink containing   
alcohol?                  Never                     [facility]  
   
                                                            How many standard   
drinks containing   
alcohol do you have on   
a typical day?            Patient does not drink    [facility]  
   
                          Start: 1955 Sex Assigned At Birth Not on file  N  
[facility]  
   
                                        Start: 2024   Alcoholic beverage   
intake                                  Lifetime non-drinker   
(finding)                               [facility]  
Work Phone:   
[phone]  
   
                                                    Start: 2024  
End: 2024                         Exposure to SARS-CoV-2   
(event)                   Not sure                  [facility]  
  
  
  
Clinical Notes 2021 to 2024  
  
  
                                Note Date & Type Note            Facility  
  
  
  
                                                    2024 Evaluation note   
   
                                           
   
                                        Authored            2024   
2:24pm  
   
                                                    Start weight: 414.4 lbs., he  
 is down 45.2 lbs. today   
with a weight of 369.2lbs. he is down 10.8 lbs.  
since last visit 2024.  
Starting Date: 04/15/2022.  
Ozempic start date 4/15/2022. Ozempic start weight   
414.4lbs. He is down 34.4 lbs. since starting  
Ozempic.  
1. Abnormal weight gain. He notes he is the heaviest   
in his family. His brother has some but much  
less significant weight issues. He notes he was able   
to get his weight down to 368 pounds but was  
unable to keep it down.  
2. Obesity-markedly improved since starting our   
program and with taking Ozempic 2 mg, but had  
significant weight regain of 20.9 pounds off the   
medication and not following up with the program  
since 2023. He is currently on Ozempic 1 mg and   
we will go back to Ozempic 2 mg through patient  
assistance. This is his second visit back since this   
restart. He had previously stopped Ozempic  
due to cost/donut hole. In the future he could   
reconsider going to the [facility] for  
bariatric surgery evaluation. He needs to get his BMI   
down to 45 before the surgeon will replace  
his knee. He is on Jardiance 10 mg which can also   
help with his diabetes and cardiomyopathy/CHF.  
His diabetes is well controlled with his last A1c of   
5.8 on 2024. He has mild CAD along with  
his diabetes and has not been on a statin so I had   
recommended Crestor, but he notes his cardio said   
it was not necessary so he did not start the   
medication. He is eating healthier overall. He should  
follow-up with our nutritionist. He feels that his   
appetite is controlled with Ozempic 2 mg. I have  
recommend again he consider bariatric surgery so he   
could have his knee replaced or find a surgeon  
who will do the surgery at an elevated BMI. In the   
past, he mentioned they found weakness in the  
bottom of the heart and evidently a problem with an   
artery that they could not get. His cardiologist  
however did not feel he needed the statin/Crestor   
that I previously prescribed. He gets very little  
activity due to bone-on-bone arthritis, severe low   
back pain on tramadol, difficulty getting out of  
a chair and also his cardiac function he does get NELSON   
with small amounts of activity. He sees Dr. Maldonado to treat his cardiomyopathy. He did like   
canned foods and he understands that he must cut  
back the salt/processed food. He denies adding salt   
to his foods. He has had a A1c that was 6.6  
indicative of diabetes. He notes he was never told he   
was diabetic before program. He is seeing Dr. Lomeli for his knees and notes he has to get his BMI   
from 59 down to 45 to be able to have his knees  
replaced. Before starting the program he did eat a   
lot of red meat, potatoes and drank a lot of milk  
2% or 4% often buying 3 or 4 gallons at a time. He   
should not skip meals. He did try Adipex for 1  
month in the past but notes he was not able to lose   
but a few pounds so his PCP stopped it. He notes  
his sister does the shopping as he can. He does some   
simple cooking.  
3. Cardiomyopathy/CHF/early CAD-he must continue to   
follow-up with cardiology for evaluation of  
cardiomyopathy/CHF despite medications/known   
cardiomyopathy/previous mild CAD. We obtained his  
previous cardiac catheterization from 2009. He   
notes his preoperative diagnoses were angina  
pectoris, dyspnea on exertion and abnormal Cardiolite   
perfusion stress test. His left anterior  
descending artery showed a 20 to 25% tubular   
stenosis. His circumflex had an ostial stenosis of 30  
to 50% in several views but in one view showed less   
than 30% stenosis and appeared to be mildly  
kinked, his right coronary artery was large dominant   
and normal. His left ventriculogram showed mild  
to moderately reduced systolic dysfunction with an EF   
in the 40 to 45% range globally. He is now  
being treated by Dr. Maldonado.  
4. Type 2 diabetes with A1c controlled at 5.8 on   
2024-continue Ozempic and Jardiance. We could  
consider Metformin but he already has some loose   
stools. We discussed that first-line treatment with   
lifestyle changes, decrease simple sweets and   
starches, will be some increased activity in the  
future and weight loss. He needs to consider   
bariatric surgery.  
5. Obstructive sleep apnea-compliant with his CPAP.   
Treat also with healthy lifestyle changes and  
weight loss. He needs to consider bariatric surgery.  
6. Bilateral OA of the knees-bone-on-bone making it   
very difficult for him to get around. He notes  
his orthopedic doctor wants him to drop from a BMI of   
59 down to 45 before he will do the surgery.  
We will start weight loss and Ozempic. He needs to   
consider bariatric surgery.  
7. Chronic low back pain-treat with healthy lifestyle   
change. He needs to maximize his pain  
management.  
8. Mixed hyperlipidemia-treat with decreasing the   
simple sweets, added sugars, refined starches, and  
bad/added fats, increasing activity and exercise and   
continued long-term weight loss. Monitor with  
healthy lifestyle change. I recommended that he start   
a statin since he is diabetic and had mild CAD  
on his cath but he notes his cardiologist told him it   
was not necessary to start.  
9. Metabolic syndrome-treat with long-term healthy   
lifestyle changes, behavioral changes, healthy  
nutritional changes, increased activity and exercise   
and achieve long-term weight loss.  
Follow up with me in 8 weeks.  
New labs needed: Up-to-date. Monitor A1c at least   
every 6 months/January with his PCP or in our  
office. He will continue other regular lab follow-up   
with his PCP.   
  
  
  
[facility]  
Work Phone: [phone]- Evaluation + Plan note* Assessment & Plan 
  Note - CARLITOS Severino - 2024 10:55 AM EDTAssociated Problem(s):
   BMI 50.0-59.9, adult (Multi)  
Formatting of this note might be different from the original.  
Reviewed the merits of healthy lifestyle choices on overall cardiovascular 
health.  
He had lost weight with Ozempic in the past, I believe there was some difficulty
 obtaining the prescription. Just recently resumed.  
Electronically signed by CARLITOS Severino at 2024 10:59 AM [facility]  
Work Phone: [phone]- Evaluation + Plan note* Assessment & Plan 
  Note - CARLITOS Severino - 2024 10:55 AM EDTAssociated Problem(s):
   Non-ischemic cardiomyopathy (Multi)  
Formatting of this note might be different from the original.  
NICM HF bordeline EF 42% 2023 MUGA (2009 cath minimal CAD EF 40-45%)  
FC II Stage C  
  
GDMT  
Coreg  
Jardiance  
Entresto  
Aldactone  
Electronically signed by CARLITOS Severino at 2024 10:55 AM [facility]  
Work Phone: [phone]- Miscellaneous Notes* Assessment & Plan Note
   - CARLITOS Severino - 2024 10:55 AM EDTAssociated Problem(s): BMI
   50.0-59.9, adult (Multi)  
Formatting of this note might be different from the original.  
Reviewed the merits of healthy lifestyle choices on overall cardiovascular 
health.  
He had lost weight with Ozempic in the past, I believe there was some difficulty
 obtaining the prescription. Just recently resumed.  
Electronically signed by CARLITOS Severino at 2024 10:59 AM EDT  
  
* Assessment & Plan Note - CARLITOS Severino - 2024 10:55 AM EDT
  Associated Problem(s): Non-ischemic cardiomyopathy (Multi)  
Formatting of this note might be different from the original.  
NICM HF bordeline EF 42% 2023 MUGA (2009 cath minimal CAD EF 40-45%)  
FC II Stage C  
  
GDMT  
Coreg  
Jardiance  
Entresto  
Aldactone  
Electronically signed by CARLITOS Severino at 2024 10:55 AM EDT  
  
documented in this [facility]  
Work Phone: [phone]- History of Present illness Narrative* CARLITOS Severino - 2024 11:00 AM EDTFormatting of this note is 
  different from the original.  
Chief Complaint  
 Doing good   
  
Reason for Visit  
9-month follow-up.  
Patient presents to the office today for outpatient follow-up for nonischemic 
cardiomyopathy.  
Last evaluated in clinic by Dr. Maldonado 2023.  
  
Presents today ambulatory with cane and steady gait.  
Accompanied by patient  
Patient denies any hospitalizations or significant changes to interval medical 
history since last office follow-up.  
He follows routinely with PCP, is due to get annual lab work later this year.  
Also follows routinely with the wound clinic.  
  
History of Present Illness  
Patient is a very pleasant 69-year-old gentleman who presents to the office 
today with no voiced cardiovascular complaints. He continues to follow at the 
wound clinic due to right lower extremity ulcers, has noted some slight 
increasing lower extremity edema. From an activity standpoint he is able to go 
to [place], [place] into the [place] with no change in his functional class II 
shortness of breath. He denies any exertional chest pain. No orthopnea or PND. 
Remains compliant with CPAP treatment.  
  
Only concern today is cost of medications. Apparently has now in the donut hole.
 He is on Xarelto due to a prior right lower extremity DVT.  
  
He has some slight increase in his lower extremity edema. Due to office visits 
he has not been taking his Lasix twice daily. Is unable to tolerate compression 
stockings.  
  
Lower extremity wounds, managed by wound clinic. Denies any prior history of 
PAD.  
  
Patient reports that overall has no complaint(s) of chest pain, chest 
pressure/discomfort, claudication, dyspnea, exertional chest 
pressure/discomfort, fatigue, and irregular heart beat  
  
Review of Systems  
Cardiovascular: Positive for leg swelling. Negative for chest pain, dyspnea on 
exertion, irregular heartbeat, near-syncope, orthopnea, palpitations, paroxysmal
 nocturnal dyspnea and syncope.  
  
  
Visit Vitals  
/70 (BP Location: Left arm, Patient Position: Sitting)  
Pulse 78  
Ht 1.803 m (5' 11 )  
Wt (!) 171 kg (378 lb)  
BMI 52.72 kg/m  
Smoking Status Never  
BSA 2.93 m  
  
Physical Exam  
Vitals and nursing note reviewed.  
Constitutional:  
Appearance: Normal appearance.  
Cardiovascular:  
Rate and Rhythm: Normal rate and regular rhythm.  
Heart sounds: Normal heart sounds.  
Pulmonary:  
Effort: Pulmonary effort is normal.  
Breath sounds: Normal breath sounds.  
Musculoskeletal:  
Cervical back: Full passive range of motion without pain.  
Right lower le+ Pitting Edema present.  
Left lower le+ Pitting Edema present.  
Skin:  
General: Skin is cool.  
Neurological:  
Mental Status: He is alert and oriented to person, place, and time.  
Psychiatric:  
Attention and Perception: Attention normal.  
Mood and Affect: Mood normal.  
Behavior: Behavior is cooperative.  
  
  
Allergies  
Allergen Reactions  
Cephalosporins Rash  
Diclofenac Sodium Rash  
Statins-Hmg-Coa Reductase Inhibitors Rash  
  
Current Outpatient Medications  
Medication Instructions  
carvedilol (Coreg) 6.25 mg tablet 1 tablet, oral, 2 times daily with meals  
empagliflozin (Jardiance) 10 mg 1 tablet, oral, Daily  
furosemide (LASIX) 40 mg, oral, 2 times daily  
Ozempic 1 mg, subcutaneous, Weekly  
rivaroxaban (Xarelto) 10 mg tablet 1 tablet, oral, Daily  
sacubitriL-valsartan (Entresto) 24-26 mg tablet 1 tablet, oral, 2 times daily  
spironolactone (Aldactone) 25 mg tablet 1 tablet, oral, Daily  
  
Assessment:  
  
Pleasant 69-year-old gentleman presents for routine follow-up. Nonischemic 
cardiomyopathy LVEF 42% baseline functional class II stage C on highest 
tolerated dose of guideline directed medical treatment. Will provide with 
patient assistance forms due to cost constraints: Discussed that if needed could
 transition back over to Cozaar, price check Farxiga.  
  
Overall patient is pleased with current state of cardiovascular health. At this 
time there are no indications for additional cardiovascular testing or need for 
medication changes.  
  
Non-ischemic cardiomyopathy (Multi)  
NICM HF bordeline EF 42% 2023 MUGA (2009 cath minimal CAD EF 40-45%)  
FC II Stage C  
  
GDMT  
Coreg  
Jardiance  
Entresto  
Aldactone  
  
BMI 50.0-59.9, adult (Multi)  
Reviewed the merits of healthy lifestyle choices on overall cardiovascular 
health.  
He had lost weight with Ozempic in the past, I believe there was some difficulty
 obtaining the prescription. Just recently resumed.  
  
Plan:  
  
Through informed decision making process incorporating patients unique 
circumstances, the followingtreatment plan will be initiated:  
  
1. Prescription drug management of cardiovascular medication for efficacy, 
adherence to treatment, side effect assessment and polypharmacy. Current 
treatment clinically warranted and to continue without modifications.  
  
2. Please complete patient assistance forms and return to office  
  
3. Return for follow-up; in the interim, contact the office if new symptoms 
arise.  
Dr. Maldonado 9 months  
  
Sharlene An MSN, APRDRAKE-CNP, [facility]  
  
Please excuse any errors in grammar or translation related to this dictation. 
Voice recognition software was utilized to prepare this document.  
  
Electronically signed by CARLITOS Severino at 2024 11:02 AM EDT  
  
documented in this [facility]  
Work Phone: [phone]- Instructions* Patient Instructions*   
  
CARLITOS Severino - 2024 11:00 AM EDT  
  
Formatting of this note might be different from the original.  
Please bring all medicines, vitamins, and herbal supplements with you when you 
come to the office.  
  
Prescriptions will not be filled unless you are compliant with your follow up 
appointments or have a follow up appointment scheduled as per instruction of 
your physician. Refills should be requested at the time of your visit.  
  
PLAN:  
Through informed decision making process incorporating patients unique 
circumstances, the followingtreatment plan will be initiated:  
  
1. Prescription drug management of cardiovascular medication for efficacy, 
adherence to treatment, side effect assessment and polypharmacy. Current 
treatment clinically warranted and to continue without modifications.  
  
2. Please complete patient assistance forms and return to office  
  
3. Return for follow-up; in the interim, contact the office if new symptoms 
arise.  
Dr. Maldonado 9 months  
Electronically signed by CARLITOS Severino at 2024 11:38 AM EDT  
  
  
  
documented in this [facility]  
Work Phone: [phone]- Evaluation note*   
  
                                        Author              Jacoby Braden  
[facility]  
   
                                        Authored            2024 2:5  
6pm  
   
                                                    Start weight: 414.4 lbs., he  
 is down 32.0 lbs. today with a weight of 382.4lbs.   
and   
20.9 lbs. since  
his last visit on 2023.  
Starting Date: 04/15/2022.  
Ozempic start date 4/15/2022. Ozempic start weight 414.4lbs. He is down 32.0 
lbs.   
since starting  
Ozempic.  
1. Abnormal weight gain. He notes he is the heaviest in his family. His brother 
has   
some but much  
less significant weight issues. He notes he was able to get his weight down to 
368   
pounds but was  
unable to keep it down.  
2. Obesity-markedly improved since starting our program and with taking Ozempic 
2 mg,   
but had  
significant weight regain of 20.9 pounds off the medication. Due to cost/donut 
hole   
he stopped  
Ozempic. Hopefully he can restart full dose in the future using patient 
assistance.   
He is also on  
Jardiance 10 mg which can also help with his diabetes and cardiomyopathy/CHF. 
His   
diabetes is well  
controlled with his last A1c of 5.6 despite having issues with obtaining with 
his   
diabetic  
medications. He had mild CAD along with his diabetes and has not been on a 
statin so   
I had  
recommended Crestor, but he notes his cardio said it was not necessary so he did
 not   
start the  
medication. He is eating healthier overall. He should follow-up with our   
nutritionist. He feels that   
his appetite is controlled with Ozempic 2 mg. I have recommend again he consider
   
bariatric surgery  
so he could have his knee replaced or find a surgeon who will do the surgery at 
an   
elevated BMI. In  
the past, he mentioned they found weakness in the bottom of the heart and 
evidently a   
problem with  
an artery that they could not get. His cardiologist however did not feel he 
needed   
the  
statin/Crestor that I previously prescribed. He gets very little activity due to
   
bone-on-bone  
arthritis, severe low back pain on tramadol, difficulty getting out of a chair 
and   
also his cardiac  
function he does get NELSON with small amounts of activity. He sees Dr. Maldonado to 
treat   
his  
cardiomyopathy. He did like canned foods and he understands that he must cut 
back the   
salt/processed  
food. He denies adding salt to his foods. He has had a A1c that was 6.6 
indicative of   
diabetes. He  
notes he was never told he was diabetic before program. He is seeing Dr. Lomeli 
for   
his knees and  
notes he has to get his BMI from 59 down to 45 to be able to have his knees 
replaced.   
Before  
starting the program he did eat a lot of red meat, potatoes and drank a lot of 
milk   
2% or 4% often  
buying 3 or 4 gallons at a time. He should not skip meals. He did try Adipex for
 1   
month in the past  
but notes he was not able to lose but a few pounds so his PCP stopped it. He 
notes   
his sister does  
the shopping as he can. He does some simple cooking.  
3. Cardiomyopathy/CHF/early CAD-he must continue to follow-up with cardiology 
for   
evaluation of  
cardiomyopathy/CHF despite medications/known cardiomyopathy/previous mild CAD. 
We   
obtained his  
previous cardiac catheterization from 2009. He notes his preoperative 
diagnoses   
were angina  
pectoris, dyspnea on exertion and abnormal Cardiolite perfusion stress test. His
 left   
anterior  
descending artery showed a 20 to 25% tubular stenosis. His circumflex had an 
ostial   
stenosis of 30  
to 50% in several views but in one view showed less than 30% stenosis and 
appeared to   
be mildly  
kinked, his right coronary artery was large dominant and normal. His left   
ventriculogram showed mild  
to moderately reduced systolic dysfunction with an EF in the 40 to 45% range   
globally. He is now  
being treated by Dr. Maldonado.  
4. Type 2 diabetes with A1c controlled/improved at 5.6-will restart Ozempic and   
continue Jardiance.  
We could consider Metformin but he already has some loose stools. We discussed 
that   
first-line  
treatment with lifestyle changes, decrease simple sweets and starches, will be 
some   
increased  
activity in the future and weight loss. He needs to consider bariatric surgery.  
5. Obstructive sleep apnea-he is compliant with his CPAP. Treat also with 
healthy   
lifestyle changes  
and weight loss. He needs to consider bariatric surgery.  
6. Bilateral OA of the knees-bone-on-bone making it very difficult for him to 
get   
around. He notes  
his orthopedic doctor wants him to drop from a BMI of 59 down to 45 before he 
will do   
the surgery.  
We will start weight loss and Ozempic. He needs to consider bariatric surgery.  
7. Chronic low back pain-treat with healthy lifestyle change. He needs to 
maximize   
his pain  
management.  
8. Mixed hyperlipidemia-treat with decreasing the simple sweets, added sugars,   
refined starches, and  
bad/added fats, increasing activity and exercise and continued long-term weight 
loss.   
Monitor with  
healthy lifestyle change. I recommended that he start a statin since he is 
diabetic   
and had mild CAD  
on his cath but he notes his cardiologist told him it was not necessary to 
start.  
9. Metabolic syndrome-treat with long-term healthy lifestyle changes, behavioral
   
changes, healthy  
nutritional changes, increased activity and exercise and achieve long-term 
weight   
loss.  
Follow up with me in 6 weeks.  
New labs needed: Up-to-date. Monitor A1c at least every 6 months with his PCP or
 in   
our office. He  
will continue other regular lab follow-up with his PCP.   
  
  
  
  
                                        Author              Yvette Grijalva  
[facility]  
   
                                        Authored            2024 3:39p  
m  
   
                                                    Start weight: 414.4 lbs., he  
 is down 34.4 lbs. today with a weight of 380.0lbs.   
he is   
down 2.4 lbs.  
since last visit 2024.. since his last visit on 2023.  
Starting Date: 04/15/2022.  
Ozempic start date 4/15/2022. Ozempic start weight 414.4lbs. He is down 34.4 
lbs.   
since starting  
Ozempic.  
1. Abnormal weight gain. He notes he is the heaviest in his family. His brother 
has   
some but much  
less significant weight issues. He notes he was able to get his weight down to 
368   
pounds but was  
unable to keep it down.  
2. Obesity-markedly improved since starting our program and with taking Ozempic 
2 mg,   
but had  
significant weight regain of 20.9 pounds off the medication. Due to cost/donut 
hole   
he stopped  
Ozempic. Hopefully he can restart full dose in the future using patient 
assistance.   
He is also on  
Jardiance 10 mg which can also help with his diabetes and cardiomyopathy/CHF. 
His   
diabetes is well  
controlled with his last A1c of 5.6 despite having issues with obtaining with 
his   
diabetic  
medications. He had mild CAD along with his diabetes and has not been on a 
statin so   
I had  
recommended Crestor, but he notes his cardio said it was not necessary so he did
 not   
start the  
medication. He is eating healthier overall. He should follow-up with our   
nutritionist. He feels that  
his appetite is controlled with Ozempic 2 mg. I have recommend again he consider
   
bariatric surgery  
so he could have his knee replaced or find a surgeon who will do the surgery at 
an   
elevated BMI. In  
the past, he mentioned they found weakness in the bottom of the heart and 
evidently a   
problem with  
an artery that they could not get. His cardiologist however did not feel he 
needed   
the  
statin/Crestor that I previously prescribed. He gets very little activity due to
   
bone-on-bone  
arthritis, severe low back pain on tramadol, difficulty getting out of a chair 
and   
also his cardiac  
function he does get NELSON with small amounts of activity. He sees Dr. Maldonado to 
treat   
his  
cardiomyopathy. He did like canned foods and he understands that he must cut 
back the   
salt/processed  
food. He denies adding salt to his foods. He has had a A1c that was 6.6 
indicative of   
diabetes. He  
notes he was never told he was diabetic before program. He is seeing Dr. Lomeli 
for   
his knees and  
notes he has to get his BMI from 59 down to 45 to be able to have his knees 
replaced.   
Before  
starting the program he did eat a lot of red meat, potatoes and drank a lot of 
milk   
2% or 4% often  
buying 3 or 4 gallons at a time. He should not skip meals. He did try Adipex for
 1   
month in the past  
but notes he was not able to lose but a few pounds so his PCP stopped it. He 
notes   
his sister does  
the shopping as he can. He does some simple cooking.  
3. Cardiomyopathy/CHF/early CAD-he must continue to follow-up with cardiology 
for   
evaluation of  
cardiomyopathy/CHF despite medications/known cardiomyopathy/previous mild CAD. 
We   
obtained his  
previous cardiac catheterization from 2009. He notes his preoperative 
diagnoses   
were angina  
pectoris, dyspnea on exertion and abnormal Cardiolite perfusion stress test. His
 left   
anterior  
descending artery showed a 20 to 25% tubular stenosis. His circumflex had an 
ostial   
stenosis of 30  
to 50% in several views but in one view showed less than 30% stenosis and 
appeared to   
be mildly  
kinked, his right coronary artery was large dominant and normal. His left   
ventriculogram showed mild  
to moderately reduced systolic dysfunction with an EF in the 40 to 45% range   
globally. He is now  
being treated by Dr. Maldonado.  
4. Type 2 diabetes with A1c controlled/improved at 5.6-will restart Ozempic and   
continue Jardiance.  
We could consider Metformin but he already has some loose stools. We discussed 
that   
first-line  
treatment with lifestyle changes, decrease simple sweets and starches, will be 
some   
increased  
activity in the future and weight loss. He needs to consider bariatric surgery.  
5. Obstructive sleep apnea-he is compliant with his CPAP. Treat also with 
healthy   
lifestyle changes  
and weight loss. He needs to consider bariatric surgery.  
6. Bilateral OA of the knees-bone-on-bone making it very difficult for him to 
get   
around. He notes  
his orthopedic doctor wants him to drop from a BMI of 59 down to 45 before he 
will do   
the surgery.  
We will start weight loss and Ozempic. He needs to consider bariatric surgery.  
7. Chronic low back pain-treat with healthy lifestyle change. He needs to 
maximize   
his pain  
management.  
8. Mixed hyperlipidemia-treat with decreasing the simple sweets, added sugars,   
refined starches, and  
bad/added fats, increasing activity and exercise and continued long-term weight 
loss.   
Monitor with  
healthy lifestyle change. I recommended that he start a statin since he is 
diabetic   
and had mild CAD  
on his cath but he notes his cardiologist told him it was not necessary to 
start.  
9. Metabolic syndrome-treat with long-term healthy lifestyle changes, behavioral
   
changes, healthy  
nutritional changes, increased activity and exercise and achieve long-term 
weight   
loss.  
Follow up with me in 6 weeks.  
New labs needed: Up-to-date. Monitor A1c at least every 6 months with his PCP or
 in   
our office. He  
will continue other regular lab follow-up with his PCP.   
  
  
  
[facility]  
Work Phone: [phone]- Evaluation note*   
  
                                        Author              Jacoby Braden  
[facility]  
   
                                        Authored            2024 2:5  
6pm  
   
                                                    Start weight: 414.4 lbs., he  
 is down 32.0 lbs. today with a weight of 382.4lbs.   
and   
20.9 lbs. since  
his last visit on 2023.  
Starting Date: 04/15/2022.  
Ozempic start date 4/15/2022. Ozempic start weight 414.4lbs. He is down 32.0 
lbs.   
since starting  
Ozempic.  
1. Abnormal weight gain. He notes he is the heaviest in his family. His brother 
has   
some but much  
less significant weight issues. He notes he was able to get his weight down to 
368   
pounds but was  
unable to keep it down.  
2. Obesity-markedly improved since starting our program and with taking Ozempic 
2 mg,   
but had  
significant weight regain of 20.9 pounds off the medication. Due to cost/donut 
hole   
he stopped  
Ozempic. Hopefully he can restart full dose in the future using patient 
assistance.   
He is also on  
Jardiance 10 mg which can also help with his diabetes and cardiomyopathy/CHF. 
His   
diabetes is well  
controlled with his last A1c of 5.6 despite having issues with obtaining with 
his   
diabetic  
medications. He had mild CAD along with his diabetes and has not been on a 
statin so   
I had  
recommended Crestor, but he notes his cardio said it was not necessary so he did
 not   
start the  
medication. He is eating healthier overall. He should follow-up with our   
nutritionist. He feels that   
his appetite is controlled with Ozempic 2 mg. I have recommend again he consider
   
bariatric surgery  
so he could have his knee replaced or find a surgeon who will do the surgery at 
an   
elevated BMI. In  
the past, he mentioned they found weakness in the bottom of the heart and 
evidently a   
problem with  
an artery that they could not get. His cardiologist however did not feel he 
needed   
the  
statin/Crestor that I previously prescribed. He gets very little activity due to
   
bone-on-bone  
arthritis, severe low back pain on tramadol, difficulty getting out of a chair 
and   
also his cardiac  
function he does get NELSON with small amounts of activity. He sees Dr. Maldonado to 
treat   
his  
cardiomyopathy. He did like canned foods and he understands that he must cut 
back the   
salt/processed  
food. He denies adding salt to his foods. He has had a A1c that was 6.6 
indicative of   
diabetes. He  
notes he was never told he was diabetic before program. He is seeing Dr. Lomeli 
for   
his knees and  
notes he has to get his BMI from 59 down to 45 to be able to have his knees 
replaced.   
Before  
starting the program he did eat a lot of red meat, potatoes and drank a lot of 
milk   
2% or 4% often  
buying 3 or 4 gallons at a time. He should not skip meals. He did try Adipex for
 1   
month in the past  
but notes he was not able to lose but a few pounds so his PCP stopped it. He 
notes   
his sister does  
the shopping as he can. He does some simple cooking.  
3. Cardiomyopathy/CHF/early CAD-he must continue to follow-up with cardiology 
for   
evaluation of  
cardiomyopathy/CHF despite medications/known cardiomyopathy/previous mild CAD. 
We   
obtained his  
previous cardiac catheterization from 2009. He notes his preoperative 
diagnoses   
were angina  
pectoris, dyspnea on exertion and abnormal Cardiolite perfusion stress test. His
 left   
anterior  
descending artery showed a 20 to 25% tubular stenosis. His circumflex had an 
ostial   
stenosis of 30  
to 50% in several views but in one view showed less than 30% stenosis and 
appeared to   
be mildly  
kinked, his right coronary artery was large dominant and normal. His left   
ventriculogram showed mild  
to moderately reduced systolic dysfunction with an EF in the 40 to 45% range   
globally. He is now  
being treated by Dr. Maldonado.  
4. Type 2 diabetes with A1c controlled/improved at 5.6-will restart Ozempic and   
continue Jardiance.  
We could consider Metformin but he already has some loose stools. We discussed 
that   
first-line  
treatment with lifestyle changes, decrease simple sweets and starches, will be 
some   
increased  
activity in the future and weight loss. He needs to consider bariatric surgery.  
5. Obstructive sleep apnea-he is compliant with his CPAP. Treat also with 
healthy   
lifestyle changes  
and weight loss. He needs to consider bariatric surgery.  
6. Bilateral OA of the knees-bone-on-bone making it very difficult for him to 
get   
around. He notes  
his orthopedic doctor wants him to drop from a BMI of 59 down to 45 before he 
will do   
the surgery.  
We will start weight loss and Ozempic. He needs to consider bariatric surgery.  
7. Chronic low back pain-treat with healthy lifestyle change. He needs to 
maximize   
his pain  
management.  
8. Mixed hyperlipidemia-treat with decreasing the simple sweets, added sugars,   
refined starches, and  
bad/added fats, increasing activity and exercise and continued long-term weight 
loss.   
Monitor with  
healthy lifestyle change. I recommended that he start a statin since he is 
diabetic   
and had mild CAD  
on his cath but he notes his cardiologist told him it was not necessary to 
start.  
9. Metabolic syndrome-treat with long-term healthy lifestyle changes, behavioral
   
changes, healthy  
nutritional changes, increased activity and exercise and achieve long-term 
weight   
loss.  
Follow up with me in 6 weeks.  
New labs needed: Up-to-date. Monitor A1c at least every 6 months with his PCP or
 in   
our office. He  
will continue other regular lab follow-up with his PCP.   
  
  
  
  
                                        Author              Jacoby Braden  
[facility]  
   
                                        Authored            2024 4:24p  
m  
   
                                                    Start weight: 414.4 lbs., he  
 is down 34.4 lbs. today with a weight of 380.0lbs.   
he is   
down 2.4 lbs.  
since last visit 2024.. since his last visit on 2023.  
Starting Date: 04/15/2022.  
Ozempic start date 4/15/2022. Ozempic start weight 414.4lbs. He is down 34.4 
lbs.   
since starting  
Ozempic.  
1. Abnormal weight gain. He notes he is the heaviest in his family. His brother 
has   
some but much  
less significant weight issues. He notes he was able to get his weight down to 
368   
pounds but was  
unable to keep it down.  
2. Obesity-markedly improved since starting our program and with taking Ozempic 
2 mg,   
but had  
significant weight regain of 20.9 pounds off the medication and not following up
 with   
the program  
since 2023. This is his first visit back since this restart. He had 
previously   
stopped Ozempic  
due to cost/donut hole. He has filled 1 prescription of Ozempic but he thinks it
 was   
$304.  
Hopefully he can continue Ozempic using patient assistance. We are referring him
 to   
[facility] for bariatric surgery evaluation. He needs to get his BMI down to 45 
before   
the surgeon will  
replace his knee. He is on Jardiance 10 mg which can also help with his diabetes
 and  
cardiomyopathy/CHF. His diabetes is well controlled with his last A1c of 5.5 on   
2024 despite  
having issues with obtaining with his diabetic medications. He had mild CAD 
along   
with his diabetes  
and has not been on a statin so I had recommended Crestor, but he notes his 
cardio   
said it was not  
necessary so he did not start the medication. He is eating healthier overall. He
   
should follow-up  
with our nutritionist. He feels that his appetite is controlled with Ozempic 2 
mg. I   
have recommend  
again he consider bariatric surgery so he could have his knee replaced or find a
   
surgeon who will do  
the surgery at an elevated BMI. In the past, he mentioned they found weakness in
 the   
bottom of the  
heart and evidently a problem with an artery that they could not get. His   
cardiologist however did  
not feel he needed the statin/Crestor that I previously prescribed. He gets very
   
little activity due   
to bone-on-bone arthritis, severe low back pain on tramadol, difficulty getting 
out   
of a chair and  
also his cardiac function he does get NELSON with small amounts of activity. He 
sees Dr. Maldonado to  
treat his cardiomyopathy. He did like canned foods and he understands that he 
must   
cut back the  
salt/processed food. He denies adding salt to his foods. He has had a A1c that 
was   
6.6 indicative of  
diabetes. He notes he was never told he was diabetic before program. He is 
seeing Dr. Lomeli for his  
knees and notes he has to get his BMI from 59 down to 45 to be able to have his 
knees   
replaced.  
Before starting the program he did eat a lot of red meat, potatoes and drank a 
lot of   
milk 2% or 4%  
often buying 3 or 4 gallons at a time. He should not skip meals. He did try 
Adipex   
for 1 month in  
the past but notes he was not able to lose but a few pounds so his PCP stopped 
it. He   
notes his  
sister does the shopping as he can. He does some simple cooking.  
3. Cardiomyopathy/CHF/early CAD-he must continue to follow-up with cardiology 
for   
evaluation of  
cardiomyopathy/CHF despite medications/known cardiomyopathy/previous mild CAD. 
We   
obtained his  
previous cardiac catheterization from 2009. He notes his preoperative 
diagnoses   
were angina  
pectoris, dyspnea on exertion and abnormal Cardiolite perfusion stress test. His
 left   
anterior  
descending artery showed a 20 to 25% tubular stenosis. His circumflex had an 
ostial   
stenosis of 30  
to 50% in several views but in one view showed less than 30% stenosis and 
appeared to   
be mildly  
kinked, his right coronary artery was large dominant and normal. His left   
ventriculogram showed mild  
to moderately reduced systolic dysfunction with an EF in the 40 to 45% range   
globally. He is now  
being treated by Dr. Maldonado.  
4. Type 2 diabetes with A1c controlled/improved at 5.5 on 2024-continue to   
titrate up Ozempic as   
available and continue Jardiance. We could consider Metformin but he already has
 some   
loose stools.  
We discussed that first-line treatment with lifestyle changes, decrease simple 
sweets   
and starches,  
will be some increased activity in the future and weight loss. He needs to 
consider   
bariatric  
surgery.  
5. Obstructive sleep apnea-he is compliant with his CPAP. Treat also with 
healthy   
lifestyle changes  
and weight loss. He needs to consider bariatric surgery.  
6. Bilateral OA of the knees-bone-on-bone making it very difficult for him to 
get   
around. He notes  
his orthopedic doctor wants him to drop from a BMI of 59 down to 45 before he 
will do   
the surgery.  
We will start weight loss and Ozempic. He needs to consider bariatric surgery.  
7. Chronic low back pain-treat with healthy lifestyle change. He needs to 
maximize   
his pain  
management.  
8. Mixed hyperlipidemia-treat with decreasing the simple sweets, added sugars,   
refined starches, and   
bad/added fats, increasing activity and exercise and continued long-term weight 
loss.   
Monitor with  
healthy lifestyle change. I recommended that he start a statin since he is 
diabetic   
and had mild CAD  
on his cath but he notes his cardiologist told him it was not necessary to 
start.  
9. Metabolic syndrome-treat with long-term healthy lifestyle changes, behavioral
   
changes, healthy  
nutritional changes, increased activity and exercise and achieve long-term 
weight   
loss.  
Follow up with me in 6 weeks.  
New labs needed: Up-to-date. Monitor A1c at least every 6 months with his PCP or
 in   
our office. He  
will continue other regular lab follow-up with his PCP.   
  
  
  
[facility]  
Work Phone: [phone]- Evaluation note*   
  
                                        Author              Jacoby Braden  
[facility]  
   
                                        Authored            2024 2:5  
6pm  
   
                                                    Start weight: 414.4 lbs., he  
 is down 32.0 lbs. today with a weight of 382.4lbs.   
and   
20.9 lbs. since  
his last visit on 2023.  
Starting Date: 04/15/2022.  
Ozempic start date 4/15/2022. Ozempic start weight 414.4lbs. He is down 32.0 
lbs.   
since starting  
Ozempic.  
1. Abnormal weight gain. He notes he is the heaviest in his family. His brother 
has   
some but much  
less significant weight issues. He notes he was able to get his weight down to 
368   
pounds but was  
unable to keep it down.  
2. Obesity-markedly improved since starting our program and with taking Ozempic 
2 mg,   
but had  
significant weight regain of 20.9 pounds off the medication. Due to cost/donut 
hole   
he stopped  
Ozempic. Hopefully he can restart full dose in the future using patient 
assistance.   
He is also on  
Jardiance 10 mg which can also help with his diabetes and cardiomyopathy/CHF. 
His   
diabetes is well  
controlled with his last A1c of 5.6 despite having issues with obtaining with 
his   
diabetic  
medications. He had mild CAD along with his diabetes and has not been on a 
statin so   
I had  
recommended Crestor, but he notes his cardio said it was not necessary so he did
 not   
start the  
medication. He is eating healthier overall. He should follow-up with our   
nutritionist. He feels that  
his appetite is controlled with Ozempic 2 mg. I have recommend again he consider
   
bariatric surgery  
so he could have his knee replaced or find a surgeon who will do the surgery at 
an   
elevated BMI. In  
the past, he mentioned they found weakness in the bottom of the heart and 
evidently a   
problem with  
an artery that they could not get. His cardiologist however did not feel he 
needed   
the  
statin/Crestor that I previously prescribed. He gets very little activity due to
   
bone-on-bone  
arthritis, severe low back pain on tramadol, difficulty getting out of a chair 
and   
also his cardiac  
function he does get NELSON with small amounts of activity. He sees Dr. Maldonado to 
treat   
his  
cardiomyopathy. He did like canned foods and he understands that he must cut 
back the   
salt/processed  
food. He denies adding salt to his foods. He has had a A1c that was 6.6 
indicative of   
diabetes. He  
notes he was never told he was diabetic before program. He is seeing Dr. Lomeli 
for   
his knees and  
notes he has to get his BMI from 59 down to 45 to be able to have his knees 
replaced.   
Before  
starting the program he did eat a lot of red meat, potatoes and drank a lot of 
milk   
2% or 4% often  
buying 3 or 4 gallons at a time. He should not skip meals. He did try Adipex for
 1   
month in the past  
but notes he was not able to lose but a few pounds so his PCP stopped it. He 
notes   
his sister does  
the shopping as he can. He does some simple cooking.  
3. Cardiomyopathy/CHF/early CAD-he must continue to follow-up with cardiology 
for   
evaluation of  
cardiomyopathy/CHF despite medications/known cardiomyopathy/previous mild CAD. 
We   
obtained his  
previous cardiac catheterization from 2009. He notes his preoperative 
diagnoses   
were angina  
pectoris, dyspnea on exertion and abnormal Cardiolite perfusion stress test. His
 left   
anterior  
descending artery showed a 20 to 25% tubular stenosis. His circumflex had an 
ostial   
stenosis of 30  
to 50% in several views but in one view showed less than 30% stenosis and 
appeared to   
be mildly  
kinked, his right coronary artery was large dominant and normal. His left   
ventriculogram showed mild  
to moderately reduced systolic dysfunction with an EF in the 40 to 45% range   
globally. He is now  
being treated by Dr. Maldonado.  
4. Type 2 diabetes with A1c controlled/improved at 5.6-will restart Ozempic and   
continue Jardiance.  
We could consider Metformin but he already has some loose stools. We discussed 
that   
first-line  
treatment with lifestyle changes, decrease simple sweets and starches, will be 
some   
increased  
activity in the future and weight loss. He needs to consider bariatric surgery.  
5. Obstructive sleep apnea-he is compliant with his CPAP. Treat also with 
healthy   
lifestyle changes  
and weight loss. He needs to consider bariatric surgery.  
6. Bilateral OA of the knees-bone-on-bone making it very difficult for him to 
get   
around. He notes  
his orthopedic doctor wants him to drop from a BMI of 59 down to 45 before he 
will do   
the surgery.  
We will start weight loss and Ozempic. He needs to consider bariatric surgery.  
7. Chronic low back pain-treat with healthy lifestyle change. He needs to 
maximize   
his pain  
management.  
8. Mixed hyperlipidemia-treat with decreasing the simple sweets, added sugars,   
refined starches, and  
bad/added fats, increasing activity and exercise and continued long-term weight 
loss.   
Monitor with  
healthy lifestyle change. I recommended that he start a statin since he is 
diabetic   
and had mild CAD  
on his cath but he notes his cardiologist told him it was not necessary to 
start.  
9. Metabolic syndrome-treat with long-term healthy lifestyle changes, behavioral
   
changes, healthy  
nutritional changes, increased activity and exercise and achieve long-term 
weight   
loss.  
Follow up with me in 6 weeks.  
New labs needed: Up-to-date. Monitor A1c at least every 6 months with his PCP or
 in   
our office. He  
will continue other regular lab follow-up with his PCP.   
  
  
  
  
                                        Author              Jacoby Braden  
[facility]  
   
                                        Authored            2024 4:24p  
m  
   
                                                    Start weight: 414.4 lbs., he  
 is down 34.4 lbs. today with a weight of 380.0lbs.   
he is   
down 2.4 lbs.  
since last visit 2024.. since his last visit on 2023.  
Starting Date: 04/15/2022.  
Ozempic start date 4/15/2022. Ozempic start weight 414.4lbs. He is down 34.4 
lbs.   
since starting  
Ozempic.  
1. Abnormal weight gain. He notes he is the heaviest in his family. His brother 
has   
some but much  
less significant weight issues. He notes he was able to get his weight down to 
368   
pounds but was  
unable to keep it down.  
2. Obesity-markedly improved since starting our program and with taking Ozempic 
2 mg,   
but had  
significant weight regain of 20.9 pounds off the medication and not following up
 with   
the program  
since 2023. This is his first visit back since this restart. He had 
previously   
stopped Ozempic  
due to cost/donut hole. He has filled 1 prescription of Ozempic but he thinks it
 was   
$304.  
Hopefully he can continue Ozempic using patient assistance. We are referring him
 to   
[facility] for bariatric surgery evaluation. He needs to get his BMI down to 45 
before   
the surgeon will  
replace his knee. He is on Jardiance 10 mg which can also help with his diabetes
 and  
cardiomyopathy/CHF. His diabetes is well controlled with his last A1c of 5.5 on   
2024 despite  
having issues with obtaining with his diabetic medications. He had mild CAD 
along   
with his diabetes  
and has not been on a statin so I had recommended Crestor, but he notes his 
cardio   
said it was not  
necessary so he did not start the medication. He is eating healthier overall. He
   
should follow-up  
with our nutritionist. He feels that his appetite is controlled with Ozempic 2 
mg. I   
have recommend  
again he consider bariatric surgery so he could have his knee replaced or find a
   
surgeon who will do  
the surgery at an elevated BMI. In the past, he mentioned they found weakness in
 the   
bottom of the  
heart and evidently a problem with an artery that they could not get. His   
cardiologist however did  
not feel he needed the statin/Crestor that I previously prescribed. He gets very
   
little activity due  
to bone-on-bone arthritis, severe low back pain on tramadol, difficulty getting 
out   
of a chair and  
also his cardiac function he does get NELSON with small amounts of activity. He 
sees Dr. Maldonado to  
treat his cardiomyopathy. He did like canned foods and he understands that he 
must   
cut back the  
salt/processed food. He denies adding salt to his foods. He has had a A1c that 
was   
6.6 indicative of  
diabetes. He notes he was never told he was diabetic before program. He is 
seeing Dr. Lomeli for his   
knees and notes he has to get his BMI from 59 down to 45 to be able to have his 
knees   
replaced.  
Before starting the program he did eat a lot of red meat, potatoes and drank a 
lot of   
milk 2% or 4%  
often buying 3 or 4 gallons at a time. He should not skip meals. He did try 
Adipex   
for 1 month in  
the past but notes he was not able to lose but a few pounds so his PCP stopped 
it. He   
notes his  
sister does the shopping as he can. He does some simple cooking.  
3. Cardiomyopathy/CHF/early CAD-he must continue to follow-up with cardiology 
for   
evaluation of  
cardiomyopathy/CHF despite medications/known cardiomyopathy/previous mild CAD. 
We   
obtained his  
previous cardiac catheterization from 2009. He notes his preoperative 
diagnoses   
were angina  
pectoris, dyspnea on exertion and abnormal Cardiolite perfusion stress test. His
 left   
anterior  
descending artery showed a 20 to 25% tubular stenosis. His circumflex had an 
ostial   
stenosis of 30  
to 50% in several views but in one view showed less than 30% stenosis and 
appeared to   
be mildly  
kinked, his right coronary artery was large dominant and normal. His left   
ventriculogram showed mild  
to moderately reduced systolic dysfunction with an EF in the 40 to 45% range   
globally. He is now  
being treated by Dr. Maldonado.  
4. Type 2 diabetes with A1c controlled/improved at 5.5 on 2024-continue to   
titrate up Ozempic as  
available and continue Jardiance. We could consider Metformin but he already has
 some   
loose stools.  
We discussed that first-line treatment with lifestyle changes, decrease simple 
sweets   
and starches,  
will be some increased activity in the future and weight loss. He needs to 
consider   
bariatric  
surgery.  
5. Obstructive sleep apnea-he is compliant with his CPAP. Treat also with 
healthy   
lifestyle changes  
and weight loss. He needs to consider bariatric surgery.  
6. Bilateral OA of the knees-bone-on-bone making it very difficult for him to 
get   
around. He notes  
his orthopedic doctor wants him to drop from a BMI of 59 down to 45 before he 
will do   
the surgery.  
We will start weight loss and Ozempic. He needs to consider bariatric surgery.  
7. Chronic low back pain-treat with healthy lifestyle change. He needs to 
maximize   
his pain  
management.  
8. Mixed hyperlipidemia-treat with decreasing the simple sweets, added sugars,   
refined starches, and  
bad/added fats, increasing activity and exercise and continued long-term weight 
loss.   
Monitor with  
healthy lifestyle change. I recommended that he start a statin since he is 
diabetic   
and had mild CAD   
on his cath but he notes his cardiologist told him it was not necessary to 
start.  
9. Metabolic syndrome-treat with long-term healthy lifestyle changes, behavioral
   
changes, healthy  
nutritional changes, increased activity and exercise and achieve long-term 
weight   
loss.  
Follow up with me in 6 weeks.  
New labs needed: Up-to-date. Monitor A1c at least every 6 months with his PCP or
 in   
our office. He  
will continue other regular lab follow-up with his PCP.   
  
  
  
  
                                        Author              [facility]  
   
                                        Authored            2024 2:03  
pm  
   
                                                    Start weight: 414.4 lbs., he  
 is down 45.2 lbs. today with a weight of 369.2lbs.   
he is   
down 10.8 lbs.  
since last visit 2024.  
Starting Date: 04/15/2022.  
Ozempic start date 4/15/2022. Ozempic start weight 414.4lbs. He is down 34.4 
lbs.   
since starting  
Ozempic.  
1. Abnormal weight gain. He notes he is the heaviest in his family. His brother 
has   
some but much  
less significant weight issues. He notes he was able to get his weight down to 
368   
pounds but was  
unable to keep it down.  
2. Obesity-markedly improved since starting our program and with taking Ozempic 
2 mg,   
but had  
significant weight regain of 20.9 pounds off the medication and not following up
 with   
the program  
since 2023. This is his first visit back since this restart. He had 
previously   
stopped Ozempic  
due to cost/donut hole. He has filled 1 prescription of Ozempic but he thinks it
 was   
$304.  
Hopefully he can continue Ozempic using patient assistance. We are referring him
 to   
[facility] for bariatric surgery evaluation. He needs to get his BMI down to 45 
before   
the surgeon will  
replace his knee. He is on Jardiance 10 mg which can also help with his diabetes
 and  
cardiomyopathy/CHF. His diabetes is well controlled with his last A1c of 5.5 on   
2024 despite  
having issues with obtaining with his diabetic medications. He had mild CAD 
along   
with his diabetes  
and has not been on a statin so I had recommended Crestor, but he notes his 
cardio   
said it was not  
necessary so he did not start the medication. He is eating healthier overall. He
   
should follow-up  
with our nutritionist. He feels that his appetite is controlled with Ozempic 2 
mg. I   
have recommend  
again he consider bariatric surgery so he could have his knee replaced or find a
   
surgeon who will do  
the surgery at an elevated BMI. In the past, he mentioned they found weakness in
 the   
bottom of the  
heart and evidently a problem with an artery that they could not get. His   
cardiologist however did  
not feel he needed the statin/Crestor that I previously prescribed. He gets very
   
little activity due  
to bone-on-bone arthritis, severe low back pain on tramadol, difficulty getting 
out   
of a chair and  
also his cardiac function he does get NELSON with small amounts of activity. He 
sees Dr. Maldonado to  
treat his cardiomyopathy. He did like canned foods and he understands that he 
must   
cut back the  
salt/processed food. He denies adding salt to his foods. He has had a A1c that 
was   
6.6 indicative of  
diabetes. He notes he was never told he was diabetic before program. He is 
seeing Dr. Lomeli for his  
knees and notes he has to get his BMI from 59 down to 45 to be able to have his 
knees   
replaced.  
Before starting the program he did eat a lot of red meat, potatoes and drank a 
lot of   
milk 2% or 4%  
often buying 3 or 4 gallons at a time. He should not skip meals. He did try 
Adipex   
for 1 month in  
the past but notes he was not able to lose but a few pounds so his PCP stopped 
it. He   
notes his  
sister does the shopping as he can. He does some simple cooking.  
3. Cardiomyopathy/CHF/early CAD-he must continue to follow-up with cardiology 
for   
evaluation of  
cardiomyopathy/CHF despite medications/known cardiomyopathy/previous mild CAD. 
We   
obtained his  
previous cardiac catheterization from 2009. He notes his preoperative 
diagnoses   
were angina  
pectoris, dyspnea on exertion and abnormal Cardiolite perfusion stress test. His
 left   
anterior  
descending artery showed a 20 to 25% tubular stenosis. His circumflex had an 
ostial   
stenosis of 30  
to 50% in several views but in one view showed less than 30% stenosis and 
appeared to   
be mildly  
kinked, his right coronary artery was large dominant and normal. His left   
ventriculogram showed mild  
to moderately reduced systolic dysfunction with an EF in the 40 to 45% range   
globally. He is now  
being treated by Dr. Maldonado.  
4. Type 2 diabetes with A1c controlled/improved at 5.5 on 2024-continue to   
titrate up Ozempic as  
available and continue Jardiance. We could consider Metformin but he already has
 some   
loose stools.  
We discussed that first-line treatment with lifestyle changes, decrease simple 
sweets   
and starches,  
will be some increased activity in the future and weight loss. He needs to 
consider   
bariatric  
surgery.  
5. Obstructive sleep apnea-he is compliant with his CPAP. Treat also with 
healthy   
lifestyle changes  
and weight loss. He needs to consider bariatric surgery.  
6. Bilateral OA of the knees-bone-on-bone making it very difficult for him to 
get   
around. He notes  
his orthopedic doctor wants him to drop from a BMI of 59 down to 45 before he 
will do   
the surgery.  
We will start weight loss and Ozempic. He needs to consider bariatric surgery.  
7. Chronic low back pain-treat with healthy lifestyle change. He needs to 
maximize   
his pain  
management.  
8. Mixed hyperlipidemia-treat with decreasing the simple sweets, added sugars,   
refined starches, and  
bad/added fats, increasing activity and exercise and continued long-term weight 
loss.   
Monitor with  
healthy lifestyle change. I recommended that he start a statin since he is 
diabetic   
and had mild CAD  
on his cath but he notes his cardiologist told him it was not necessary to 
start.  
9. Metabolic syndrome-treat with long-term healthy lifestyle changes, behavioral
   
changes, healthy  
nutritional changes, increased activity and exercise and achieve long-term 
weight   
loss.  
Follow up with me in 6 weeks.  
New labs needed: Up-to-date. Monitor A1c at least every 6 months with his PCP or
 in   
our office. He  
will continue other regular lab follow-up with his PCP.   
  
  
  
[facility]  
Work Phone: [phone]- Progress note  
  
  
  
  
                                        Author              Elaine Espino  
[facility]  
2024 2:29pm  
   
                                        Note Date/Time      2024 2:2  
9pm  
   
                                                    [facility]  
ENTER  
[address] [place] [address]  
  
Wound Center Provider Note  
Signed  
  
Patient: Jocelin Pacheco MR#:   
[id number]  
: 1955 Acct:[id number]  
  
Age/Sex: 69 / M  
  
Copies to: DO Elaine Arboleda, SOWMYA~  
  
  
HPI  
Date of Visit  
Date of Visit:  
Date of Service: 2024  
Time of Service: 14:28  
  
Narrative  
HPI:  
24 Jocelin is a 69-year-old male presenting to [facility]   
wound care program for an initial visit to the office for a right lower 
extremity   
venous stasis ulcer/lymphedema ulcer.? The patient has been a patient of the 
office   
for quite some time now and has seen other providers.  
He had seen vascular in [place] was told that there is nothing else that can be 
done   
for him.  
He has an extensive history of infections so I imagine this will continue to be 
an   
issue for him.  
He appears to need better edema control.  
He was told by ortho that he needs to lose 50 pounds in order to have surgery- 
this   
was 3 years ago and has not been accomplished- he says that he wasn't taking the
   
ozempic as ordered and he gained back the weight he had lost and hence no 
surgery.  
A past venous duplex indicates deep vein issues which are not surgically 
correctable-   
vascular did confirm that he does not need to see them because there is nothing 
they   
have to offer him.  
He is still retired so hopefully there is more opportunity for elevation and   
compression of the legs to support healing of the ulcer.  
Nutrition and probiotics were discussed and suggestions were given.  
Doxycycline was escribed today for what appears to be cellulitis of the RLE.  
Topical gentamicin and unna wrap was ordered and he will return here for 
dressings.  
3/4/24 better, topical flagyl today with unna wrap, will use a gauze wrap under 
the   
paste since he feels like the unna paste causes itching, no infection seen 
today,   
cellulitis appears resolved  
3/18/24 much improved, same topical orders, could be healed in a week or so, no   
infection noted  
  
Subjective  
Pain  
Right Lower Leg:  
Pain Description: Intermittent  
Pain Intensity: 0  
Pain Management Techniques Other/Comment:  not too bad today   
Wound/Ulcer History  
When did wound start?: 2024 Right lower leg  
Mode of Arrival/ : Personal vehicle  
Assistive Device Used Today: Cane  
Lives with:: Alone  
Appetite Description: Within Normal Limits  
Who helps w/ dressing change?: Wound Care Dept  
Smoking Status: Never smoker  
  
[facility]  
Medical History (Updated 24 @ 08:12 by Maria A Crowe RN)  
  
Ulcer of right leg  
Wound of right lower extremity  
Itching with irritation  
Itching  
PVD (peripheral vascular disease)  
 poor veins  Dr. Cameron did vein closure 2019, needs 5 more viens worked 
on.  
Carpal tunnel syndrome  
right arm surgery  
DIMITRI (obstructive sleep apnea)  
Cardiomyopathy  
 lower part of heart is weak   
Back pain  
DVT (deep venous thrombosis)  
 blood clot from right foot to right thigh  on  blood thinner   
Hypertension  
Arthritis  
Bilateral knees,  right is bone on bone  per  Dr. Richard   
  
Surgical History (Reviewed 24 @ 08:12 by Maria A Crowe RN)  
  
Hx of cholecystectomy  
  
Family History (Reviewed 24 @ 08:13 by Maria A Crowe RN)  
Father Diabetes mellitus, type 2  
Sister Diabetes mellitus, type 2  
Brother Heart disease  
Legacy FamHx Relation: Brother(s)  
Father Heart disease  
Diabetes  
Heart failure  
  
Family/Other   
Legacy FamHx Problem: 2 BROTHERS  :2 SISTERS ::NO CHILDREN  
Mother   
Heart disease  
Sister Heart disease  
  
Social History  
Smoking Status: Never smoker  
Substance Use Type: None  
  
Grafts  
History of Graft  
History of Graft?: No  
  
Exam  
Physical Exam  
Vital Signs:  
  
  
  
  
Temp Pulse Resp BP O2 Del Method  
  
97.7 F 87 24 132/74 Room Air  
  
24 14:18 24 14:18 24 14:18 24 14:18 24 14:18  
  
  
  
Const  
General: cooperative, comfortable and no acute distress  
Nutritional Appearance: obese  
Orientation: alert, awake and oriented x3  
  
Lower/Upper Extremity Exam  
Vascular Exam-Edema  
Right Lower Extremity:  
Edema Type: Lymphedema  
Vascular Exam-Pulses  
Right Dorsalis Pedis:  
Pulse Assessment Method: Doppler  
Right Posterior Tibial:  
Pulse Assessment Method: Doppler  
Right Brachial:  
Pulse Assessment Method: NIBP  
  
Objective  
Meds/Allergies  
Home Medications  
  
carvedilol 6.25 mg tablet 6.25 mg PO BID 17 [History Confirmed 24]  
furosemide 40 mg tablet (Lasix) 40 mg PO BID 17 [History Confirmed 
24]  
acetaminophen 500 mg tablet (Tylenol Extra Strength) 500 mg PO TID PRN Pain 
20   
[History Confirmed 24]  
empagliflozin 10 mg tablet (Jardiance) 10 mg PO DAILY 22 [History 
Confirmed   
24]  
rivaroxaban 10 mg tablet (Xarelto) 10 mg PO DAILY 22 [History Confirmed   
24]  
semaglutide 2 mg/dose (8 mg/3 mL) subcutaneous pen injector (Ozempic) 2 mg 
subcut Q7D   
22 [History Confirmed 24]  
spironolactone 25 mg tablet 25 mg PO DAILY 22 [History Confirmed 24]  
sacubitril 24 mg-valsartan 26 mg tablet (Entresto) 1 tab PO BID 23 
[History   
Confirmed 24]  
Lactobacillus acidophilus 10 billion cell capsule (Probiotic) 100 mmu cells PO 
DAILY   
24 [History Confirmed 24]  
doxycycline hyclate 100 mg capsule 100 mg PO BID 14 days #28 caps 24 [Rx   
Confirmed 24]  
  
  
Allergies  
  
cefixime Allergy (Unknown, Verified 24 08:10)  
Unknown Reaction  
diclofenac Allergy (Unknown, Verified 24 08:10)  
Unknown Reaction  
nystatin Allergy (Unknown, Verified 24 08:10)  
Unknown Reaction  
sodium benzoate Allergy (Verified 24 08:10)  
Unknown Reaction  
  
  
Wound/Ulcer  
Right Lower Leg:  
Type: Lymphedema  
Thickness: Skin Breakdown  
Bed Appearance: Dried Exudate, Pink and Yellow  
Percent of Wound Bed Granulated/Red: 90  
Percent of Devitalized: 10  
Length (cm): 0.5  
Width (cm): 0.5  
Depth (cm): 0.1  
CM Sq: 0.250  
Surrounding Tissue Appearance: Hyperpigmented  
Surrounding Tissue Temp: Warm  
Drainage Amount: Moderate  
Drainage Description: Serosanguineous  
Drainage Odor: No Odor  
Results  
Height: 5 ft 11 in  
Weight: 167.829 kg  
Body Mass Index: 51.5  
  
Assessment/Plan  
Assessment/Plan  
(1) Venous stasis ulcer:  
Qualifiers:  
Laterality: right Non-pressure ulcer stage: limited to breakdown of skin 
Varicose   
vein presence: with varicose veins Venous stasis ulcer site: other part of lower
leg   
Qualified Code(s): I83.018 - Varicose veins of right lower extremity with ulcer 
other   
part of lower leg; L97.811 - Non-pressure chronic ulcer of other part of right 
lower   
leg limited to breakdown of skin  
Code(s):  
I83.009 - Varicose veins of unspecified lower extremity with ulcer of 
unspecified   
site; L97.909 - Non-pressure chronic ulcer of unspecified part of unspecified 
lower   
leg with unspecified severity  
Plan:  
w/lymphedema  
(2) Edema of right lower leg:  
Code(s):  
R60.0 - Localized edema  
(3) Morbid obesity due to excess calories:  
Code(s):  
E66.01 - Morbid (severe) obesity due to excess calories  
(4) Lymphedema of both lower extremities:  
Code(s):  
I89.0 - Lymphedema, not elsewhere classified  
(5) PVD (peripheral vascular disease):  
Code(s):  
I73.9 - Peripheral vascular disease, unspecified  
(6) Varicose veins of both legs with edema:  
Code(s):  
I83.893 - Varicose veins of bilateral lower extremities with other complications  
(7) Inflammation:  
  
Plan  
see orders and hpi  
Time spent with patient  
Time Spent With Patient (min): 10  
  
  
  
  
Dictated By: SOWMYA Correa DD/DT: 24  
  
Signed By: <Electronically signed by SOWMYA Espino>  
24 142  
   
  
[facility]  
Work Phone: [phone]- Progress note  
  
  
  
  
                                        Author              Elaine Espino  
[facility]  
2024 3:29pm  
   
                                        Note Date/Time      2024 3:29  
pm  
   
                                                    [facility]  
ENTER  
[address] [place] [address]  
  
Wound Center Provider Note  
Signed  
  
Patient: Jocelin Pacheco MR#:   
[id number]  
: 1955 Acct:[id number]  
  
Age/Sex: 69 / M  
  
Copies to: Vera Melvin,SOWMYA Mayer~  
  
  
HPI  
Date of Visit  
Date of Visit:  
Date of Service: 2024  
Time of Service: 15:26  
  
Narrative  
HPI:  
24 Jocelin is a 69-year-old male presenting to [facility]   
wound care program for an initial visit to the office for a right lower 
extremity   
venous stasis ulcer/lymphedema ulcer.? The patient has been a patient of the 
office   
for quite some time now and has seen other providers.  
He had seen vascular in [place] was told that there is nothing else that can be 
done   
for him.  
He has an extensive history of infections so I imagine this will continue to be 
an   
issue for him.  
He appears to need better edema control.  
He was told by ortho that he needs to lose 50 pounds in order to have surgery- 
this   
was 3 years ago and has not been accomplished- he says that he wasn't taking the
  
ozempic as ordered and he gained back the weight he had lost and hence no 
surgery.  
A past venous duplex indicates deep vein issues which are not surgically 
correctable-   
vascular did confirm that he does not need to see them because there is nothing 
they   
have to offer him.  
He is still retired so hopefully there is more opportunity for elevation and   
compression of the legs to support healing of the ulcer.  
Nutrition and probiotics were discussed and suggestions were given.  
Doxycycline was escribed today for what appears to be cellulitis of the RLE.  
Topical gentamicin and unna wrap was ordered and he will return here for 
dressings.  
3/4/24 better, topical flagyl today with unna wrap, will use a gauze wrap under 
the   
paste since he feels like the unna paste causes itching, no infection seen 
today,   
cellulitis appears resolved  
  
Subjective  
Pain  
Right Lower Leg:  
Pain Description: Intermittent  
Pain Intensity: 8  
Wound/Ulcer History  
When did wound start?: 2024 Right lower leg  
Mode of Arrival/ : Personal vehicle  
Assistive Device Used Today: Cane  
Lives with:: Alone  
Appetite Description: Within Normal Limits  
Who helps w/ dressing change?: Wound Care Dept  
Smoking Status: Never smoker  
  
[facility]  
Medical History (Updated 24 @ 08:12 by Maria A Crowe RN)  
  
Ulcer of right leg  
Wound of right lower extremity  
Itching with irritation  
Itching  
PVD (peripheral vascular disease)  
 poor veins  Dr. Cameron did vein closure 2019, needs 5 more viens worked 
on.  
Carpal tunnel syndrome  
right arm surgery  
DIMITRI (obstructive sleep apnea)  
Cardiomyopathy  
 lower part of heart is weak   
Back pain  
DVT (deep venous thrombosis)  
 blood clot from right foot to right thigh  on  blood thinner   
Hypertension  
Arthritis  
Bilateral knees,  right is bone on bone  per  Dr. Richard   
  
Surgical History (Reviewed 24 @ 08:12 by Maria A Crowe, KIMBERLY)  
  
Hx of cholecystectomy  
  
Family History (Reviewed 24 @ 08:13 by Maria A Crowe RN)  
Father Diabetes mellitus, type 2  
Sister Diabetes mellitus, type 2  
Brother Heart disease  
Legacy FamHx Relation: Brother(s)  
Father Heart disease  
Diabetes  
Heart failure  
  
Family/Other   
Legacy FamHx Problem: 2 BROTHERS  :2 SISTERS ::NO CHILDREN  
Mother   
Heart disease  
Sister Heart disease  
  
Social History  
Smoking Status: Never smoker  
Substance Use Type: None  
  
Grafts  
History of Graft  
History of Graft?: No  
  
Exam  
Physical Exam  
Vital Signs:  
  
  
  
  
Temp Pulse Resp BP O2 Del Method  
  
97.3 F L 85 18 130/60 Room Air  
  
24 15:09 24 15:09 24 15:09 24 15:09 24 15:09  
  
  
  
Const  
General: cooperative, comfortable and no acute distress  
Nutritional Appearance: obese  
Orientation: alert, awake and oriented x3  
  
Lower/Upper Extremity Exam  
Vascular Exam-Edema  
Right Lower Extremity:  
Edema Type: Lymphedema  
Vascular Exam-Pulses  
Right Dorsalis Pedis:  
Pulse Assessment Method: Doppler  
Right Posterior Tibial:  
Pulse Assessment Method: Doppler  
Right Brachial:  
Pulse Assessment Method: NIBP  
  
Objective  
Meds/Allergies  
Home Medications  
  
carvedilol 6.25 mg tablet 6.25 mg PO BID 17 [History Confirmed 24]  
furosemide 40 mg tablet (Lasix) 40 mg PO BID 17 [History Confirmed 
24]  
acetaminophen 500 mg tablet (Tylenol Extra Strength) 500 mg PO TID PRN Pain 
20   
[History Confirmed 24]  
empagliflozin 10 mg tablet (Jardiance) 10 mg PO DAILY 22 [History 
Confirmed   
24]  
rivaroxaban 10 mg tablet (Xarelto) 10 mg PO DAILY 22 [History Confirmed   
24]  
semaglutide 2 mg/dose (8 mg/3 mL) subcutaneous pen injector (Ozempic) 2 mg 
subcut Q7D   
22 [History Confirmed 24]  
spironolactone 25 mg tablet 25 mg PO DAILY 22 [History Confirmed 24]  
sacubitril 24 mg-valsartan 26 mg tablet (Entresto) 1 tab PO BID 23 
[History   
Confirmed 24]  
Lactobacillus acidophilus 10 billion cell capsule (Probiotic) 100 mmu cells PO 
DAILY   
24 [History Confirmed 24]  
doxycycline hyclate 100 mg capsule 100 mg PO BID 14 days #28 caps 24 [Rx   
Confirmed 24]  
  
  
Allergies  
  
cefixime Allergy (Unknown, Verified 24 08:10)  
Unknown Reaction  
diclofenac Allergy (Unknown, Verified 24 08:10)  
Unknown Reaction  
nystatin Allergy (Unknown, Verified 24 08:10)  
Unknown Reaction  
sodium benzoate Allergy (Verified 24 08:10)  
Unknown Reaction  
  
  
Wound/Ulcer  
Right Lower Leg:  
Type: Lymphedema  
Thickness: Skin Breakdown  
Bed Appearance: Dried Exudate, Pink and Yellow  
Percent of Wound Bed Granulated/Red: 80  
Percent of Devitalized: 20  
Length (cm): 1.5  
Width (cm): 1.0  
Depth (cm): 0.1  
CM Sq: 1.500  
Surrounding Tissue Appearance: Hyperpigmented  
Surrounding Tissue Temp: Warm  
Drainage Amount: Moderate  
Drainage Description: Serosanguineous  
Drainage Odor: No Odor  
Procedures  
Time Out: 2 Patient Identifiers, Correct Patient, Correct Side/Site, Correct   
Procedure and Safety Issues Reviewed  
Procedure:  
The right leg ulcer was anesthetized with topical 2% lidocaine gel. A forcep and
  
scissor was used to perform debridement for the removal of 1 cm? of devitalized   
tissue consisting of skin and slough. Debridement was down to healthy bleeding   
tissue. Estimated blood loss was minimal to moderate. Hemostasis was achieved by
  
applying pressure. The ulcer now appears 95% pink and red and the size remains 
the   
same. The patient tolerated well with no pain.  
Results  
Height: 5 ft 11 in  
Weight: 167.829 kg  
Body Mass Index: 51.5  
  
Assessment/Plan  
Assessment/Plan  
(1) Venous stasis ulcer:  
Qualifiers:  
Laterality: right Non-pressure ulcer stage: limited to breakdown of skin 
Varicose   
vein presence: with varicose veins Venous stasis ulcer site: other part of lower
leg   
Qualified Code(s): I83.018 - Varicose veins of right lower extremity with ulcer 
other   
part of lower leg; L97.811 - Non-pressure chronic ulcer of other part of right 
lower   
leg limited to breakdown of skin  
Code(s):  
I83.009 - Varicose veins of unspecified lower extremity with ulcer of 
unspecified   
site; L97.909 - Non-pressure chronic ulcer of unspecified part of unspecified 
lower   
leg with unspecified severity  
Plan:  
w/lymphedema  
(2) Edema of right lower leg:  
Code(s):  
R60.0 - Localized edema  
(3) Morbid obesity due to excess calories:  
Code(s):  
E66.01 - Morbid (severe) obesity due to excess calories  
(4) Lymphedema of both lower extremities:  
Code(s):  
I89.0 - Lymphedema, not elsewhere classified  
(5) PVD (peripheral vascular disease):  
Code(s):  
I73.9 - Peripheral vascular disease, unspecified  
(6) Varicose veins of both legs with edema:  
Code(s):  
I83.893 - Varicose veins of bilateral lower extremities with other complications  
(7) Inflammation:  
  
Plan  
see orders and hpi  
Time spent with patient  
Time Spent With Patient (min): 13  
  
  
  
  
Dictated By: SOWMYA Correa DD/DT: 24  
  
Signed By: <Electronically signed by SOWMYA Espino>  
24 1529  
   
  
[facility]  
Work Phone: [phone]- Progress note  
  
  
  
  
                                        Author              Laila Villarreal  
[facility]  
2024 3:08pm  
   
                                        Note Date/Time      2024   
3:04pm  
   
                                                    [facility]  
ENTER  
[address] [place] [address]  
  
Wound Center Provider Note  
Signed  
  
Patient: Jocelin Pacheco MR#:   
[id number]  
: 1955 Acct:[id number]  
  
Age/Sex: 69 / M  
  
Copies to: SOWMYA Gutierrez,~  
  
  
HPI  
Date of Visit  
Date of Visit:  
Date of Service: 2024  
Time of Service: 15:03  
  
Narrative  
HPI:  
24 Jocelin is a 69-year-old male presenting to [facility]   
wound care program for an initial visit to the office for a right lower 
extremity   
venous stasis ulcer/lymphedema ulcer.? The patient has been a patient of the 
office   
for quite some time now and has seen other providers.  
He had seen vascular in [place] was told that there is nothing else that can be 
done   
for him.  
He has an extensive history of infections so I imagine this will continue to be 
an   
issue for him.  
He appears to need better edema control.  
He was told by ortho that he needs to lose 50 pounds in order to have surgery- 
this   
was 3 years ago and has not been accomplished- he says that he wasn't taking the
  
ozempic as ordered and he gained back the weight he had lost and hence no 
surgery.  
A past venous duplex indicates deep vein issues which are not surgically 
correctable-   
vascular did confirm that he does not need to see them because there is nothing 
they   
have to offer him.  
He is still retired so hopefully there is more opportunity for elevation and   
compression of the legs to support healing of the ulcer.  
Nutrition and probiotics were discussed and suggestions were given.  
Doxycycline was escribed today for what appears to be cellulitis of the RLE.  
Topical gentamicin and unna wrap was ordered and he will return here for 
dressings.  
Above documentation per SOWMYA Amos - included for continuity of care  
  
24- Jocelin presents for follow-up. He is still taking his antibiotic and   
understands that he should complete the entire course. The area is improving.   
Dressing order changed to collagen silver; sorbact; ERGO wrap. Follow-up in 2-3   
weeks.  
  
Subjective  
Pain  
Right Lower Leg:  
Pain Description: Intermittent and Burning  
Pain Intensity: 9  
Pain Management Techniques Other/Comment:  plus itching   
Wound/Ulcer History  
When did wound start?: 2024 Right lower leg  
Mode of Arrival/ : Personal vehicle  
Assistive Device Used Today: Cane  
Lives with:: Alone  
Appetite Description: Within Normal Limits  
Who helps w/ dressing change?: Wound Care Dept  
Smoking Status: Never smoker  
  
[facility]  
Medical History (Updated 24 @ 08:12 by Maria A Crowe RN)  
  
Ulcer of right leg  
Wound of right lower extremity  
Itching with irritation  
Itching  
PVD (peripheral vascular disease)  
 poor veins  Dr. Cameron did vein closure 2019, needs 5 more viens worked 
on.  
Carpal tunnel syndrome  
right arm surgery  
DIMITRI (obstructive sleep apnea)  
Cardiomyopathy  
 lower part of heart is weak   
Back pain  
DVT (deep venous thrombosis)  
 blood clot from right foot to right thigh  on  blood thinner   
Hypertension  
Arthritis  
Bilateral knees,  right is bone on bone  per  Dr. Richard   
  
Surgical History (Reviewed 24 @ 08:12 by Maria A Crowe, KIMBERLY)  
  
Hx of cholecystectomy  
  
Family History (Reviewed 24 @ 08:13 by Maria A Crowe RN)  
Father Diabetes mellitus, type 2  
Sister Diabetes mellitus, type 2  
Brother Heart disease  
Legacy FamHx Relation: Brother(s)  
Father Heart disease  
Diabetes  
Heart failure  
  
Family/Other   
Legacy FamHx Problem: 2 BROTHERS  :2 SISTERS ::NO CHILDREN  
Mother   
Heart disease  
Sister Heart disease  
  
Social History  
Smoking Status: Never smoker  
Substance Use Type: None  
  
Grafts  
History of Graft  
History of Graft?: No  
  
Exam  
Physical Exam  
Vital Signs:  
  
  
  
  
Temp Pulse Resp BP O2 Del Method  
  
97.5 F L 87 24 175/78 H Room Air  
  
24 14:51 24 14:51 24 14:51 24 14:51 24 14:51  
  
  
  
Const  
General: cooperative, comfortable and no acute distress  
Nutritional Appearance: obese  
Orientation: alert, awake and oriented x3  
  
Lower/Upper Extremity Exam  
Vascular Exam-Edema  
Right Lower Extremity:  
Edema Type: Lymphedema  
Vascular Exam-Pulses  
Right Dorsalis Pedis:  
Pulse Assessment Method: Doppler  
Right Posterior Tibial:  
Pulse Assessment Method: Doppler  
Right Brachial:  
Pulse Assessment Method: NIBP  
Left Brachial:  
Pulse Assessment Method: NIBP  
  
Objective  
Meds/Allergies  
Home Medications  
  
carvedilol 6.25 mg tablet 6.25 mg PO BID 17 [History Confirmed 24]  
furosemide 40 mg tablet (Lasix) 40 mg PO BID 17 [History Confirmed 
24]  
acetaminophen 500 mg tablet (Tylenol Extra Strength) 500 mg PO TID PRN Pain 
20   
[History Confirmed 24]  
empagliflozin 10 mg tablet (Jardiance) 10 mg PO DAILY 22 [History 
Confirmed   
24]  
rivaroxaban 10 mg tablet (Xarelto) 10 mg PO DAILY 22 [History Confirmed   
24]  
semaglutide 2 mg/dose (8 mg/3 mL) subcutaneous pen injector (Ozempic) 2 mg 
subcut Q7D   
22 [History Confirmed 24]  
spironolactone 25 mg tablet 25 mg PO DAILY 22 [History Confirmed 24]  
sacubitril 24 mg-valsartan 26 mg tablet (Entresto) 1 tab PO BID 23 
[History   
Confirmed 24]  
Lactobacillus acidophilus 10 billion cell capsule (Probiotic) 100 mmu cells PO 
DAILY   
24 [History Confirmed 24]  
doxycycline hyclate 100 mg capsule 100 mg PO BID 14 days #28 caps 24 [Rx   
Confirmed 24]  
  
  
Allergies  
  
cefixime Allergy (Unknown, Verified 24 08:10)  
Unknown Reaction  
diclofenac Allergy (Unknown, Verified 24 08:10)  
Unknown Reaction  
nystatin Allergy (Unknown, Verified 24 08:10)  
Unknown Reaction  
sodium benzoate Allergy (Verified 24 08:10)  
Unknown Reaction  
  
  
Wound/Ulcer  
Right Lower Leg:  
Type: Lymphedema  
Thickness: Skin Breakdown  
Bed Appearance: Dried Exudate, Pink and Yellow  
Percent of Wound Bed Granulated/Red: 80  
Percent of Devitalized: 20  
Length (cm): 2.5  
Width (cm): 2  
Depth (cm): 0.1  
CM Sq: 5.000  
Surrounding Tissue Appearance: Hyperpigmented  
Surrounding Tissue Temp: Warm  
Drainage Amount: Moderate  
Drainage Description: Serosanguineous  
Drainage Odor: No Odor  
Results  
Height: 5 ft 11 in  
Weight: 167.829 kg  
Body Mass Index: 51.5  
  
Assessment/Plan  
Assessment/Plan  
(1) Venous stasis ulcer:  
Qualifiers:  
Laterality: right Non-pressure ulcer stage: limited to breakdown of skin 
Varicose   
vein presence: with varicose veins Venous stasis ulcer site: other part of lower
leg   
Qualified Code(s): I83.018 - Varicose veins of right lower extremity with ulcer 
other   
part of lower leg; L97.811 - Non-pressure chronic ulcer of other part of right 
lower   
leg limited to breakdown of skin  
Code(s):  
I83.009 - Varicose veins of unspecified lower extremity with ulcer of 
unspecified   
site; L97.909 - Non-pressure chronic ulcer of unspecified part of unspecified 
lower   
leg with unspecified severity  
Plan:  
w/lymphedema  
(2) Edema of right lower leg:  
Code(s):  
R60.0 - Localized edema  
(3) Morbid obesity due to excess calories:  
Code(s):  
E66.01 - Morbid (severe) obesity due to excess calories  
(4) Lymphedema of both lower extremities:  
Code(s):  
I89.0 - Lymphedema, not elsewhere classified  
(5) PVD (peripheral vascular disease):  
Code(s):  
I73.9 - Peripheral vascular disease, unspecified  
(6) Varicose veins of both legs with edema:  
Code(s):  
I83.893 - Varicose veins of bilateral lower extremities with other complications  
(7) Inflammation:  
  
Plan  
see orders and hpi  
Time spent with patient  
Time Spent With Patient (min): 15  
  
  
  
  
Dictated By: SOWMYA Gutierrez DD/DT: 24 1503  
  
Signed By: <Electronically signed by SOWMYA Villarreal>  
24 0912  
   
  
[facility]  
Work Phone: [phone]- Progress note  
  
  
  
  
                                        Author              Elaine Espino  
[facility]  
2024 8:20am  
   
                                        Note Date/Time      2024   
8:20am  
   
                                                    [facility]  
ENTER  
[address] [place] [address]  
  
Wound Center Provider Note  
Signed  
  
Patient: Jocelin Pacheco MR#:   
[id number]  
: 1955 Acct:[id number]  
  
Age/Sex: 69 / M  
  
Copies to: DO Elaine Arboleda, SOWMYA~  
  
  
HPI  
Date of Visit  
Date of Visit:  
Date of Service: 2024  
Time of Service: 08:17  
  
Narrative  
HPI:  
24 Jocelin is a 69-year-old male presenting to [facility]   
wound care program for an initial visit to the office for a right lower 
extremity   
venous stasis ulcer/lymphedema ulcer.? The patient has been a patient of the 
office   
for quite some time now and has seen other providers.  
He had seen vascular in [place] was told that there is nothing else that can be 
done   
for him.  
He has an extensive history of infections so I imagine this will continue to be 
an   
issue for him.  
He appears to need better edema control.  
He was told by ortho that he needs to lose 50 pounds in order to have surgery- 
this   
was 3 years ago and has not been accomplished- he says that he wasn't taking the
  
ozempic as ordered and he gained back the weight he had lost and hence no 
surgery.  
A past venous duplex indicates deep vein issues which are not surgically 
correctable-   
vascular did confirm that he does not need to see them because there is nothing 
they   
have to offer him.  
He is still retired so hopefully there is more opportunity for elevation and   
compression of the legs to support healing of the ulcer.  
Nutrition and probiotics were discussed and suggestions were given.  
Doxycycline was escribed today for what appears to be cellulitis of the RLE.  
Topical gentamicin and unna wrap was ordered and he will return here for 
dressings.  
  
Subjective  
Pain  
Right Lower Leg:  
Pain Description: Burning  
Pain Intensity: 10  
Wound/Ulcer History  
When did wound start?: 2024 Right lower leg  
Mode of Arrival/ : Personal vehicle  
Assistive Device Used Today: Cane  
Lives with:: Alone  
Appetite Description: Within Normal Limits  
Who helps w/ dressing change?: Wound Care Dept  
Smoking Status: Never smoker  
  
[facility]  
Medical History (Updated 24 @ 08:12 by Maria A Crowe, KIMBERLY)  
  
Ulcer of right leg  
Wound of right lower extremity  
Itching with irritation  
Itching  
PVD (peripheral vascular disease)  
 poor veins  Dr. Cameron did vein closure 2019, needs 5 more viens worked 
on.  
Carpal tunnel syndrome  
right arm surgery  
DIMITRI (obstructive sleep apnea)  
Cardiomyopathy  
 lower part of heart is weak   
Back pain  
DVT (deep venous thrombosis)  
 blood clot from right foot to right thigh  on  blood thinner   
Hypertension  
Arthritis  
Bilateral knees,  right is bone on bone  per  Dr. Richard   
  
Surgical History (Reviewed 24 @ 08:12 by Maria A Crowe, RN)  
  
Hx of cholecystectomy  
  
Family History (Reviewed 24 @ 08:13 by Maria A Crowe RN)  
Father Diabetes mellitus, type 2  
Sister Diabetes mellitus, type 2  
Brother Heart disease  
Legacy FamHx Relation: Brother(s)  
Father Heart disease  
Diabetes  
Heart failure  
  
Family/Other   
Legacy FamHx Problem: 2 BROTHERS  :2 SISTERS ::NO CHILDREN  
Mother   
Heart disease  
Sister Heart disease  
  
Social History  
Smoking Status: Never smoker  
Substance Use Type: None  
  
Grafts  
History of Graft  
History of Graft?: No  
  
Exam  
Physical Exam  
Vital Signs:  
  
  
  
  
Temp Pulse Resp BP O2 Del Method  
  
97.2 F L 81 24 121/74 Room Air  
  
24 08:11 24 08:11 24 08:11 24 08:11 24 08:11  
  
  
  
Const  
General: cooperative, comfortable and no acute distress  
Nutritional Appearance: obese  
Orientation: alert, awake and oriented x3  
  
Lower/Upper Extremity Exam  
Vascular Exam-Edema  
Right Lower Extremity:  
Edema Type: Lymphedema  
Vascular Exam-Pulses  
Right Dorsalis Pedis:  
Pulse Assessment Method: Doppler  
Right Posterior Tibial:  
Pulse Assessment Method: Doppler  
Right Brachial:  
Pulse Assessment Method: NIBP  
  
Objective  
Meds/Allergies  
Home Medications  
  
carvedilol 6.25 mg tablet 6.25 mg PO BID 17 [History Confirmed 24]  
furosemide 40 mg tablet (Lasix) 40 mg PO BID 17 [History Confirmed 
24]  
acetaminophen 500 mg tablet (Tylenol Extra Strength) 500 mg PO TID PRN Pain 
20   
[History Confirmed 24]  
empagliflozin 10 mg tablet (Jardiance) 10 mg PO DAILY 22 [History 
Confirmed   
24]  
rivaroxaban 10 mg tablet (Xarelto) 10 mg PO DAILY 22 [History Confirmed   
24]  
semaglutide 2 mg/dose (8 mg/3 mL) subcutaneous pen injector (Ozempic) 2 mg 
subcut Q7D   
22 [History Confirmed 24]  
spironolactone 25 mg tablet 25 mg PO DAILY 22 [History Confirmed 24]  
sacubitril 24 mg-valsartan 26 mg tablet (Entresto) 1 tab PO BID 23 
[History   
Confirmed 24]  
Lactobacillus acidophilus 10 billion cell capsule (Probiotic) 100 mmu cells PO 
DAILY   
24 [History Confirmed 24]  
doxycycline hyclate 100 mg capsule 100 mg PO BID 14 days #28 caps 24 [Rx   
Confirmed 24]  
  
  
Allergies  
  
cefixime Allergy (Unknown, Verified 24 08:10)  
Unknown Reaction  
diclofenac Allergy (Unknown, Verified 24 08:10)  
Unknown Reaction  
nystatin Allergy (Unknown, Verified 24 08:10)  
Unknown Reaction  
sodium benzoate Allergy (Verified 24 08:10)  
Unknown Reaction  
  
  
Wound/Ulcer  
Right Lower Leg:  
Type: Lymphedema  
Thickness: Skin Breakdown  
Bed Appearance: Dried Exudate, Epithelial Tissue or Bridge, Pink and Yellow  
Percent of Wound Bed Granulated/Red: 80  
Percent of Devitalized: 20  
Length (cm): 4  
Width (cm): 1.8  
Depth (cm): 0.1  
CM Sq: 7.200  
Surrounding Tissue Appearance: Dark Red  
Surrounding Tissue Temp: Warm  
Drainage Amount: Moderate  
Drainage Description: Serosanguineous  
Drainage Odor: No Odor  
Results  
Height: 5 ft 11 in  
Weight: 167.829 kg  
Body Mass Index: 51.5  
  
Assessment/Plan  
Assessment/Plan  
(1) Venous stasis ulcer:  
Qualifiers:  
Laterality: right Non-pressure ulcer stage: limited to breakdown of skin 
Varicose   
vein presence: with varicose veins Venous stasis ulcer site: other part of lower
leg   
Qualified Code(s): I83.018 - Varicose veins of right lower extremity with ulcer 
other   
part of lower leg; L97.811 - Non-pressure chronic ulcer of other part of right 
lower   
leg limited to breakdown of skin  
Code(s):  
I83.009 - Varicose veins of unspecified lower extremity with ulcer of 
unspecified   
site; L97.909 - Non-pressure chronic ulcer of unspecified part of unspecified 
lower   
leg with unspecified severity  
Plan:  
w/lymphedema  
(2) Edema of right lower leg:  
Code(s):  
R60.0 - Localized edema  
(3) Morbid obesity due to excess calories:  
Code(s):  
E66.01 - Morbid (severe) obesity due to excess calories  
(4) Lymphedema of both lower extremities:  
Code(s):  
I89.0 - Lymphedema, not elsewhere classified  
(5) PVD (peripheral vascular disease):  
Code(s):  
I73.9 - Peripheral vascular disease, unspecified  
(6) Varicose veins of both legs with edema:  
Code(s):  
I83.893 - Varicose veins of bilateral lower extremities with other complications  
(7) Inflammation:  
  
Plan  
see orders and hpi  
Time spent with patient  
Time Spent With Patient (min): 14  
  
  
  
  
Dictated By: SOWMYA Correa DD/DT: 24  
  
Signed By: <Electronically signed by SOWMYA Espino>  
24 0820  
   
  
[facility]  
Work Phone: [phone]- History of Present illness Narrative* 
  Ray Terry, NP - 2024 3:30 PM ESTFormatting of this note is 
  different from the original.  
Images from the original note were not included.  
  
Jocelin Pacheco is a 69 y.o. male presents with chief complaint of Back Pain (Pt 
presents with lower back pain with onset of 2 weeks with pain increasing. Took 
tylenol with no relief. Has used ice on it a few times. Left shoulder is 
hurting. Denies any trouble urinating. )  
  
HPI:  
  
Back Pain  
  
Here for low back pain, left side hurts more than the right side. Denies any 
injury or reason for increased back pain. Tried ice and tylenol.  
  
HISTORIES:  
  
PAST MEDICAL HISTORY:  
Past Medical History:  
Diagnosis Date  
Acquired hammer toe of right foot  
Cardiomyopathy (CMS/HCC)  
d/t morbid obesity  
Chronic anticoagulation  
CKD (chronic kidney disease), stage II  
Class 3 obesity (CMS/HCC)  
CPAP (continuous positive airway pressure) dependence  
Diabetes mellitus type 2 in obese (CMS/HCC)  
History of 2019 novel coronavirus disease (COVID-19) 2020  
Received Remdesivir and dexamethasone as an in-patient  
Hx of being hospitalized 2019  
Hospitalized for Lt. lower extremity cellulitis and chronic open venous stasis 
ulcer  
Hx of carpal tunnel syndrome  
Hx of deep venous thrombosis  
DIMITRI on CPAP  
Peripheral venous insufficiency  
Porokeratosis  
Primary osteoarthritis of both knees  
Stasis dermatitis of both legs  
  
  
SURGICAL HISTORY:  
Past Surgical History:  
Procedure Laterality Date  
CARDIAC CATHETERIZATION 2009  
Due to Chest pain  
CARPAL TUNNEL RELEASE Right 2010  
Dr. Estrada  
CHOLECYSTECTOMY 2002  
FOOT NEUROMA SURGERY Bilateral   
Hammertoe repair as well  
HERNIA REPAIR  
IR INJECTION JOINT 2020  
(Knee) Synvisc injection with Dr Lomeli (Ortho)  
IR INJECTION JOINT   
(Knee) Synvisc done by Dr Richard  
SKIN GRAFT 2019  
From right thigh to right foot due to non-healing wound/ulcer. Done by Dr Blakely  
VEIN LIGATION Bilateral 2019  
Procedures in April and Oct done by Dr Lyle Mascorro  
  
  
SOCIAL HISTORY:  
Social History  
  
Tobacco Use  
Smoking status: Never  
Passive exposure: Past  
Smokeless tobacco: Never  
Substance Use Topics  
Alcohol use: Not Currently  
Drug use: Never  
  
Depression: Not at risk (2024)  
PHQ-2  
PHQ-2 Score: 0  
  
  
FAMILY HISTORY:  
Family History  
Problem Relation Name Age of Onset  
Hypertension Mother  
Heart disease Mother  
Diabetes Father  
Heart attack Sister  
Diabetes Sister  
Stroke Brother  
Heart attack Brother  
ALS Brother  
  
  
MEDICATIONS:  
Current Outpatient Medications  
Medication Instructions  
carvedilol (COREG) 6.25 mg, Oral, 2 times daily  
clobetasol (Temovate) 0.05 % cream 1 application , Topical, Every 12 hours  
Entresto 24-26 MG tablet 1 tablet, Oral, 2 times daily  
furosemide (LASIX) 40 mg, Oral, Every 12 hours  
Jardiance 10 mg, Oral, Every 24 hours  
Ozempic, 2 MG/DOSE, 8 MG/3ML solution pen-injector  
Probiotic Product (PROBIOTIC BLEND PO) 2 Units, Oral, Daily  
spironolactone (ALDACTONE) 25 mg, Oral, Daily  
Xarelto 10 MG tablet TAKE 1 TABLET BY MOUTH EVERY DAY WITH FOOD  
  
  
ALLERGIES:  
Allergies  
Allergen Reactions  
Cefixime Unknown  
Nystatin Unknown  
Sodium Benzoate  
Other Reaction(s): Unknown Reaction  
Diclofenac Rash  
  
  
REVIEW OF SYMPTOMS:  
  
Review of Systems  
Musculoskeletal: Positive for back pain.  
Shoulder pain, left. Rates shoulder pain as a 5 and the back as a 9 or 10 on a 
0-10 scale.  
Psychiatric/Behavioral: Negative for sleep disturbance.  
  
  
PHYSICAL EXAM:  
  
Visit Vitals  
/70  
Pulse 83  
Temp 97.7 F  
Wt 376 lb  
SpO2 94%  
BMI 52.44 kg/m  
Smoking Status Never  
BSA 2.93 m  
  
  
Physical Exam  
Constitutional:  
Appearance: He is obese. He is not ill-appearing.  
Eyes:  
Extraocular Movements: Extraocular movements intact.  
Musculoskeletal:  
General: Tenderness present.  
Comments: Low back pain  
Skin:  
General: Skin is warm and dry.  
Neurological:  
Mental Status: He is alert and oriented to person, place, and time.  
Gait: Gait abnormal.  
Psychiatric:  
Mood and Affect: Mood normal.  
Thought Content: Thought content normal.  
Judgment: Judgment normal.  
  
ASSESSMENT AND PLAN:  
  
Assessment/Plan  
Diagnoses and all orders for this visit:  
Lumbosacral strain, initial encounter  
- methylPREDNISolone (Medrol Dospak) 4 MG tablets; Take as directed on package.  
- Ambulatory referral to Physical Therapy; Future  
Acute pain of left shoulder  
- methylPREDNISolone (Medrol Dospak) 4 MG tablets; Take as directed on package.  
- Ambulatory referral to Physical Therapy; Future  
Wound of left lower extremity, initial encounter  
--pt states he will call wound center  
Dictated, but not read  
  
Patient presents today for an acute visit. He s been having low back pain for 
approximately two weeks. He also mentioned that his left shoulder is painful. It
s worse with abduction and he can only raise the left shoulder approximately 90 
. he denies any actual injury. He states he s been taking Tylenol and using ice 
to his back.  
  
At this point in time we re not gonna order x-rays. He does have tenderness of 
the lumbosacral spine and point tenderness in the deltoid area. He s advised ice
the left shoulder. He can use Tylenol. For the low back pain is tender across 
the whole lumbosacral spine. He is not having any radicular type symptoms. I 
will give him a low-dose Medrol dose pack since his A1c is good at 5.5. And he 
can use Tylenol as needed. And suds are not indicated since he is on Xarelto. 
Will refer him to physical therapy and evaluate and treat for the back pain in 
the left shoulder. If not improving will consider x-rays. He also mentioned he 
does have the start of a wound on his left posterior lower extremity.Picture was
taken. He states [facility]. I advised him to use Aquaphor or 
Lantisepticointment to the area. He agrees. He ll return here in May for his 
regular scheduled appointment to see Dr. Matias.  
  
  
Electronically signed by Ray Terry NP at 2024 4:20 PM EST  
  
documented in this encounterSSM Health [id number]- History of Present illness
Narrative* Danya An DPM - 2024 3:15 PM ESTFormatting of this 
  note is different from the original.  
HPI:  
Patient presents in office today for routine nailcare and callous care.  
Location: Right great toe.  
Duration: ongoing x1 month.  
Severity of the symptoms: mild , considered 5 out of 10.  
Onset of pain: gradual.  
Status: stable.  
Context: hard to trim , hard to reach.  
Current symptoms include: toe redness , toe swelling ,  
Relieved by: debridement.  
Characteristics of the nails: red, swollen, painful.  
Aggravated by: shoe gear, pressure.  
Risk factors: curvature of nails.  
  
Examination:  
General Examination:  
GENERAL EXAMINATIONalert, well hydrated, in no distress , awake, aware of 
surroundings.  
FOOT EXAM:  
Date of Last Foot Exam 24  
  
Vascular:  
DORSALIS PEDIS PULSE:2/4, bilaterally.  
POSTERIOR TIBIAL PULSE:1/4, bilaterally.  
TEMPERATURE GRADIENT:within normal limits, warm to cool.  
EDEMA:mild, bilateral lower extremity. erythema to the right 2nd digit is 
resolved. Continued erythema noted.  
CAPILLARY FILLING TIME(sec):bilateral, digits 1-5, less than 3 seconds.  
  
Neurologic:  
VIBRATORY:abnormal.  
SEMMES-LUKE 5.07 MONOFILAMENTnormal.  
  
Dermatologic:  
HYPERKERATOSIS:Location:, Submetatarsal Head 4 right, medial IPJ of the hallux 
bilateral, distal right 3rd digit.  
NAIL PATHOLOGY:digits 1-5 bilateral are thickened, discolored, crumbly, 
dystrophic, elongated and with subungual debris. Incurvation to the lateral 
border right hallux nail. Upon debridement there isno underlying opening noted 
in the skin. The surrounding granuloma, erythema has resolved along thenail 
fold.  
SKIN PATHOLOGY:atrophic, dry, decreased hair growth bilateral.  
  
Orthopedic:  
FOOT MORPHOLOGY:Metatarsus adductus bilateral .  
DEFORMITIES:Rigidly contracted second digit right with elevation at the second 
MPJ. There is overriding and rubbing between the distal right second digit and 
lateral right hallux. Semirigid contracted digits 2 through 4 left, 3 and 4 
right .  
PAIN ELICITED WITH PALPATION OFArea of porokeratosis sub-fourth MPJ right. Pain 
with palpation to the MPJ of the right 2nd digit, but pain is improving .  
MUSCLE STRENGTH5/5 for all pedal groups tested  
  
Assessment:  
1. Right foot pain - M79.671  
2. Dermatophytosis of nail - B35.1 (Primary)  
3. Left foot pain - M79.672  
4. 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  
5. Corns and callosities - L84  
6. Porokeratosis - Q82.8  
7. Acquired pes planovalgus of left foot - M21.6X2  
8. Acquired pes planovalgus of right foot - M21.41  
9. Acquired hammer toe of right foot - M20.41  
10. Circulating anticoagulant disorder - D68.318  
11. Venous insufficiency - I87.2  
12. Ingrowing nail - L60.0  
  
Plan:  
Dermatophytosis of nail  
1. Nails 1-5 bilateral were debrided in length and thickness by manual and 
mechanical means.  
2. Advised patient on the importance of continued foot care including daily 
monitoring of their feet for any new complaints or concerns that may arise.  
3. Continue use of good supportive shoe gear to help prevent complications.  
4. RTC: 9-12 weeks or as needed if problems arise.  
  
Corns and callosities  
1. Hyperkeratosis/porokeratosis as above noted was debrided.  
2. Instructed patient on use of aperture pads or Silipos padding/toe spacers to 
prevent rubbing andcontinued development of the hyperkeratosis.  
3. Also discussed use of moisturizing creams for overall increased hydration to 
the skin.  
4. Discussed continued use of proper foot gear to avoid excess pressure over the
callous site.  
Electronically signed by Danya An DPM at 2024 1:53 PM EST  
  
documented in this encounterSSM Health [id number]- Evaluation note*   
  
                      Encounter Date Diagnosis  Assessment Notes Treatment Notes  
 Treatment   
Clinical Notes  
   
                                        04 Aug, 2023        Type 2 diabetes   
mellitus with   
unspecified   
complications (ICD-10   
- E11.8)                                                      
   
                                        04 Aug, 2023        BMI 50.0-59.9, adult  
   
(ICD-10 - Z68.43)                                             
   
                                        04 Aug, 2023        Knee osteoarthritis   
(ICD-10 - M17.9)                                              
   
                                        04 Aug, 2023        Mixed hyperlipidemia  
   
(ICD-10 - E78.2)                                              
   
                                        04 Aug, 2023        CAD (coronary artery  
   
disease) (ICD-10 -   
I25.10)                                                       
   
                                        04 Aug, 2023        Obstructive sleep   
apnea (adult)   
(pediatric) (ICD-10 -   
G47.33)                                                       
   
                                        04 Aug, 2023        Cardiomyopathy   
(ICD-10 - I42.9)                                              
   
                                        04 Aug, 2023        CHF (congestive hear  
t   
failure) (ICD-10 -   
I50.9)                                                        
   
                                        04 Aug, 2023        Edema (ICD-10 -   
R60.9)                                                        
   
                                        04 Aug, 2023        Low back derangement  
   
syndrome (ICD-10 -   
M53.86)                                                       
   
                                        04 Aug, 2023        Metabolic syndrome X  
   
(ICD-10 - E88.81)                                             
   
                                        04 Aug, 2023        Encounter for   
immunization (ICD-10   
- Z23)                                                        
  
  
[facility]  
Other Phone: [phone]- Evaluation note*   
  
                                        Author              Jacoby Braden  
[facility]  
   
                                        Authored            2024 2:5  
6pm  
   
                                                    Start weight: 414.4 lbs., he  
 is down 32.0 lbs. today with a weight of 382.4lbs.   
and   
20.9 lbs. since  
his last visit on 2023.  
Starting Date: 04/15/2022.  
Ozempic start date 4/15/2022. Ozempic start weight 414.4lbs. He is down 32.0 
lbs.   
since starting  
Ozempic.  
1. Abnormal weight gain. He notes he is the heaviest in his family. His brother 
has   
some but much  
less significant weight issues. He notes he was able to get his weight down to 
368   
pounds but was  
unable to keep it down.  
2. Obesity-markedly improved since starting our program and with taking Ozempic 
2 mg,   
but had  
significant weight regain of 20.9 pounds off the medication. Due to cost/donut 
hole   
he stopped  
Ozempic. Hopefully he can restart full dose in the future using patient 
assistance.   
He is also on  
Jardiance 10 mg which can also help with his diabetes and cardiomyopathy/CHF. 
His   
diabetes is well  
controlled with his last A1c of 5.6 despite having issues with obtaining with 
his   
diabetic  
medications. He had mild CAD along with his diabetes and has not been on a 
statin so   
I had  
recommended Crestor, but he notes his cardio said it was not necessary so he did
not   
start the  
medication. He is eating healthier overall. He should follow-up with our   
nutritionist. He feels that   
his appetite is controlled with Ozempic 2 mg. I have recommend again he consider
  
bariatric surgery  
so he could have his knee replaced or find a surgeon who will do the surgery at 
an   
elevated BMI. In  
the past, he mentioned they found weakness in the bottom of the heart and 
evidently a   
problem with  
an artery that they could not get. His cardiologist however did not feel he 
needed   
the  
statin/Crestor that I previously prescribed. He gets very little activity due to
  
bone-on-bone  
arthritis, severe low back pain on tramadol, difficulty getting out of a chair 
and   
also his cardiac  
function he does get NELSON with small amounts of activity. He sees Dr. Maldonado to 
treat   
his  
cardiomyopathy. He did like canned foods and he understands that he must cut 
back the   
salt/processed  
food. He denies adding salt to his foods. He has had a A1c that was 6.6 
indicative of   
diabetes. He  
notes he was never told he was diabetic before program. He is seeing Dr. Lomeli 
for   
his knees and  
notes he has to get his BMI from 59 down to 45 to be able to have his knees 
replaced.   
Before  
starting the program he did eat a lot of red meat, potatoes and drank a lot of 
milk   
2% or 4% often  
buying 3 or 4 gallons at a time. He should not skip meals. He did try Adipex for
1   
month in the past  
but notes he was not able to lose but a few pounds so his PCP stopped it. He 
notes   
his sister does  
the shopping as he can. He does some simple cooking.  
3. Cardiomyopathy/CHF/early CAD-he must continue to follow-up with cardiology 
for   
evaluation of  
cardiomyopathy/CHF despite medications/known cardiomyopathy/previous mild CAD. 
We   
obtained his  
previous cardiac catheterization from 2009. He notes his preoperative 
diagnoses   
were angina  
pectoris, dyspnea on exertion and abnormal Cardiolite perfusion stress test. His
left   
anterior  
descending artery showed a 20 to 25% tubular stenosis. His circumflex had an 
ostial   
stenosis of 30  
to 50% in several views but in one view showed less than 30% stenosis and 
appeared to   
be mildly  
kinked, his right coronary artery was large dominant and normal. His left   
ventriculogram showed mild  
to moderately reduced systolic dysfunction with an EF in the 40 to 45% range   
globally. He is now  
being treated by Dr. Maldonado.  
4. Type 2 diabetes with A1c controlled/improved at 5.6-will restart Ozempic and   
continue Jardiance.  
We could consider Metformin but he already has some loose stools. We discussed 
that   
first-line  
treatment with lifestyle changes, decrease simple sweets and starches, will be 
some   
increased  
activity in the future and weight loss. He needs to consider bariatric surgery.  
5. Obstructive sleep apnea-he is compliant with his CPAP. Treat also with 
healthy   
lifestyle changes  
and weight loss. He needs to consider bariatric surgery.  
6. Bilateral OA of the knees-bone-on-bone making it very difficult for him to 
get   
around. He notes  
his orthopedic doctor wants him to drop from a BMI of 59 down to 45 before he 
will do   
the surgery.  
We will start weight loss and Ozempic. He needs to consider bariatric surgery.  
7. Chronic low back pain-treat with healthy lifestyle change. He needs to 
maximize   
his pain  
management.  
8. Mixed hyperlipidemia-treat with decreasing the simple sweets, added sugars,   
refined starches, and   
bad/added fats, increasing activity and exercise and continued long-term weight 
loss.   
Monitor with  
healthy lifestyle change. I recommended that he start a statin since he is 
diabetic   
and had mild CAD  
on his cath but he notes his cardiologist told him it was not necessary to 
start.  
9. Metabolic syndrome-treat with long-term healthy lifestyle changes, behavioral
  
changes, healthy  
nutritional changes, increased activity and exercise and achieve long-term 
weight   
loss.  
Follow up with me in 6 weeks.  
New labs needed: Up-to-date. Monitor A1c at least every 6 months with his PCP or
in   
our office. He  
will continue other regular lab follow-up with his PCP.   
  
  
  
[facility]  
Work Phone: [phone]- Progress note  
  
  
  
  
                                        Author              Elaine Espino  
[facility]  
2023 11:24am  
   
                                        Note Date/Time      2023 11:24  
am  
   
                                                    [facility]  
ENTER  
[address] [place] [address]  
  
Wound Center Provider Note  
Signed  
  
Patient: Jocelin Pacheco MR#:   
[id number]  
: 1955 Acct:[id number]  
  
Age/Sex: 68 / M  
  
Copies to: DO Elaine Arboleda APRN~  
  
  
HPI  
Date of Visit  
Date of Visit:  
Date of Service: 2023  
Time of Service: 11:20  
  
Narrative  
HPI:  
23 Jocelin is a 68-year-old male presenting to [facility]   
wound care program for an initial visit to the office for a right lower 
extremity   
venous stasis ulcer/lymphedema ulcer.? The patient has been a patient of the 
office   
for quite some time now and has seen other providers.  
He had seen vascular in [place] was told that there is nothing else that can be 
done   
for him.  
He has an extensive history of infections so I imagine this will continue to be 
an   
issue for him.  
He appears to need better edema control.  
He was told by ortho that he needs to lose 50 pounds in order to have surgery- 
this   
was 2 years ago and has not been accomplished.  
A past venous duplex indicates deep vein issues which are not surgically 
correctable-   
vascular did confirm that he does not need to see them because there is nothing 
they   
have to offer him.  
He is still retired so hopefully there is more opportunity for elevation and   
compression of the legs to support healing of the ulcer.  
Nutrition and probiotics were discussed and suggestions were given.  
Doxycycline was escribed today for what appears to be cellulitis of the RLE.  
23 looks better, is still taking the antibiotics, change to collagen silver   
topically and clobetasol to periwound skin that he says is itchy, will return 
for   
dressing changes, will bring his compression socks from home to the next visit 
since   
most of our wraps tend to roll down on his legs, says that he will use the   
compression pumps  
  
Subjective  
Pain  
Right Lower Leg:  
Pain Description: Burning  
Pain Intensity: 0  
Wound/Ulcer History  
When did wound start?: 2023  
Mode of Arrival/ : Personal vehicle  
Assistive Device Used Today: Cane  
Lives with:: Alone  
Appetite Description: Within Normal Limits  
Who helps w/ dressing change?: Wound Care Dept  
Why Do You Need Help?: Can't Reach Ulcer  
Smoking Status: Never smoker  
  
[facility]  
Medical History (Updated 23 @ 11:23 by SOWMYA Correa)  
  
Arthritis  
Bilateral knees,  right is bone on bone  per  Dr. Richard   
Back pain  
Cardiomyopathy  
 lower part of heart is weak   
Carpal tunnel syndrome  
right arm surgery  
DVT (deep venous thrombosis)  
 blood clot from right foot to right thigh  on  blood thinner   
Hypertension  
Itching  
Itching with irritation  
DIMITRI (obstructive sleep apnea)  
PVD (peripheral vascular disease)  
 poor veins  Dr. Cameron did vein closure 2019, needs 5 more viens worked 
on.  
Wound of right lower extremity  
  
Surgical History (Reviewed 23 @ 11:09 by Maren Martinez, RN)  
  
Hx of cholecystectomy  
  
Family History (Reviewed 23 @ 11:09 by Maren Martinez, KIMBERLY)  
Father Diabetes mellitus, type 2  
Sister Diabetes mellitus, type 2  
  
Social History  
Smoking Status: Never smoker  
Substance Use Type: None  
  
Grafts  
History of Graft  
History of Graft?: Yes  
  
Exam  
Physical Exam  
Vital Signs:  
  
  
  
  
Temp Pulse Resp BP O2 Del Method  
  
97.7 F 80 18 117/73 Room Air  
  
23 11:05 23 11:05 23 11:05 23 11:05 23 11:05  
  
  
  
Const  
General: cooperative, comfortable and no acute distress  
Nutritional Appearance: obese  
Orientation: alert, awake and oriented x3  
  
Lower/Upper Extremity Exam  
Vascular Exam-Edema  
Right Lower Extremity:  
Edema Type: Lymphedema  
Vascular Exam-Pulses  
Right Brachial:  
Pulse Assessment Method: NIBP  
Right Dorsalis Pedis:  
Pulse Assessment Method: Palpation  
Right Posterior Tibial:  
Pulse Assessment Method: Palpation  
  
Objective  
Meds/Allergies  
Home Medications  
  
carvedilol 6.25 mg tablet 6.25 mg PO BID 17 [History Confirmed 23]  
furosemide 40 mg tablet (Lasix) 40 mg PO BID 17 [History Confirmed 
23]  
acetaminophen 500 mg tablet (Tylenol Extra Strength) 500 mg PO TID PRN Pain 
20   
[History Confirmed 23]  
nabumetone 750 mg tablet 750 mg PO BID 21 [History Confirmed 23]  
empagliflozin 10 mg tablet (Jardiance) 10 mg PO DAILY 22 [History 
Confirmed   
23]  
rivaroxaban 10 mg tablet (Xarelto) 10 mg PO DAILY 22 [History Confirmed   
23]  
semaglutide 2 mg/dose (8 mg/3 mL) subcutaneous pen injector (Ozempic) 2 mg 
subcut Q7D   
22 [History Confirmed 23]  
spironolactone 25 mg tablet 25 mg PO DAILY 22 [History Confirmed 23]  
doxycycline hyclate 100 mg capsule 100 mg PO BID 14 days #28 caps 23 [Rx   
Confirmed 23]  
sacubitril 24 mg-valsartan 26 mg tablet (Entresto) 1 tab PO BID 23 
[History   
Confirmed 23]  
  
  
Allergies  
  
cefixime Allergy (Verified 23 11:09)  
Unknown Reaction  
diclofenac Allergy (Verified 23 11:09)  
Unknown Reaction  
nystatin Allergy (Verified 23 11:09)  
Unknown Reaction  
sodium benzoate Allergy (Verified 23 11:09)  
Unknown Reaction  
  
  
Wound/Ulcer  
Right Lower Leg:  
Type: Venous Stasis Ulcer  
Thickness: Skin Breakdown  
Bed Appearance: Beefy Red, Epithelial Tissue or Bridge, Pink and Yellow  
Percent of Wound Bed Granulated/Red: 75  
Percent of Devitalized: 25  
Length (cm): 4.5  
Width (cm): 17.5  
Depth (cm): 0.2  
CM Sq: 78.750  
Surrounding Tissue Appearance: Hyperpigmented  
Surrounding Tissue Temp: Warm  
Drainage Amount: Moderate  
Drainage Description: Serosanguineous  
Drainage Odor: No Odor  
Procedures  
Time Out: 2 Patient Identifiers, Correct Patient, Correct Side/Site, Correct   
Procedure and Safety Issues Reviewed  
Procedure:  
The right leg ulcer was anesthetized with topical 2% lidocaine gel. A curette 
was   
used to perform debridement for the removal of 10 cm? of devitalized tissue   
consisting of skin and slough. Debridement was down to healthy bleeding tissue.   
Estimated blood loss was minimal. Hemostasis was achieved by applying pressure. 
The   
right leg ulcer now appears 95% pink and red and the size remainsthe same. The   
patient tolerated well with no pain.  
Results  
Height: 5 ft 11 in  
Weight: 160.118 kg  
Body Mass Index: 49.2  
  
Assessment/Plan  
Assessment/Plan  
(1) Venous stasis ulcer:  
Qualifiers:  
Laterality: right Non-pressure ulcer stage: limited to breakdown of skin 
Varicose   
vein presence: with varicose veins Venous stasis ulcer site: other part of lower
leg   
Qualified Code(s): I83.018 - Varicose veins of right lower extremity with ulcer 
other   
part of lower leg; L97.811 - Non-pressure chronic ulcer of other part of right 
lower   
leg limited to breakdown of skin  
Code(s):  
I83.009 - Varicose veins of unspecified lower extremity with ulcer of 
unspecified   
site; L97.909 - Non-pressure chronic ulcer of unspecified part of unspecified 
lower   
leg with unspecified severity  
Status: Chronic  
(2) Edema of right lower leg:  
Code(s):  
R60.0 - Localized edema  
Status: Chronic  
(3) Morbid obesity due to excess calories:  
Code(s):  
E66.01 - Morbid (severe) obesity due to excess calories  
Status: Chronic  
(4) Lymphedema of both lower extremities:  
Code(s):  
I89.0 - Lymphedema, not elsewhere classified  
Status: Suspected  
(5) PVD (peripheral vascular disease):  
Code(s):  
I73.9 - Peripheral vascular disease, unspecified  
Status: Chronic  
(6) Varicose veins of both legs with edema:  
Code(s):  
I83.893 - Varicose veins of bilateral lower extremities with other complications  
Status: Chronic  
(7) Inflammation:  
Status: Chronic  
(8) Cellulitis:  
Assessment/Problem Details:  
rle  
Qualifiers:  
Laterality: right Site of cellulitis: extremity Site of cellulitis of extremity:
  
lower extremity Qualified Code(s): L03.115 - Cellulitis of right lower limb  
Code(s):  
L03.90 - Cellulitis, unspecified  
Status: Resolved  
Time spent with patient  
Time Spent With Patient (min): 15  
  
  
  
  
Dictated By: SOWMYA Crorea DD/DT: 23  
  
Signed By: <Electronically signed by SOWMYA Espino>  
23 [address] [place] [address] Ctr  
Work Phone: [phone]- Progress note  
  
  
  
  
                                        Author              Elaine Espino  
[facility]  
2023 11:30am  
   
                                        Note Date/Time      2023 11:2  
7am  
   
                                                    [facility]  
ENTER  
[address] [place] [address]  
  
Wound Center Provider Note  
Signed  
  
Patient: Jocelin Pacheco MR#:   
[id number]  
: 1955 Acct:[id number]  
  
Age/Sex: 68 / M  
  
Copies to: DO Elaine Arboleda APRN~  
  
  
HPI  
Date of Visit  
Date of Visit:  
Date of Service: 2023  
Time of Service: 11:21  
  
Narrative  
HPI:  
23 Jocelin is a 68-year-old male presenting to [facility]   
wound care program for an initial visit to the office for a right lower 
extremity   
venous stasis ulcer/lymphedema ulcer.? The patient has been a patient of the 
office   
for quite some time now and has seen other providers.  
He had seen vascular in [place] was told that there is nothing else that can be 
done   
for him.  
He has an extensive history of infections so I imagine this will continue to be 
an   
issue for him.  
He appears to need better edema control.  
He was told by ortho that he needs to lose 50 pounds in order to have surgery- 
this   
was 2 years ago and has not been accomplished.  
A past venous duplex indicates deep vein issues which are not surgically 
correctable-   
vascular did confirm that he does not need to see them because there is nothing 
they   
have to offer him.  
He is still retired so hopefully there is more opportunity for elevation and   
compression of the legs to support healing of the ulcer.  
Nutrition and probiotics were discussed and suggestions were given.  
Doxycycline was escribed today for what appears to be cellulitis of the RLE.  
  
Subjective  
Pain  
Right Lower Leg:  
Pain Intensity: 10  
Wound/Ulcer History  
When did wound start?: 2023  
Mode of Arrival/ : Personal vehicle  
Assistive Device Used Today: Beste  
Lives with:: Alone  
Appetite Description: Within Normal Limits  
Who helps w/ dressing change?: Wound Care Dept  
Why Do You Need Help?: Can't Reach Ulcer  
Smoking Status: Never smoker  
  
[facility]  
Medical History (Updated 23 @ 11:25 by SOWMYA Correa)  
  
Arthritis  
Bilateral knees,  right is bone on bone  per  Dr. Richard   
Back pain  
Cardiomyopathy  
 lower part of heart is weak   
Carpal tunnel syndrome  
right arm surgery  
DVT (deep venous thrombosis)  
 blood clot from right foot to right thigh  on  blood thinner   
Hypertension  
Itching  
Itching with irritation  
DIMITRI (obstructive sleep apnea)  
PVD (peripheral vascular disease)  
 poor veins  Dr. Cameron did vein closure 2019, needs 5 more viens worked 
on.  
Wound of right lower extremity  
  
Surgical History (Reviewed 23 @ 11:09 by Maren Martinez RN)  
  
Hx of cholecystectomy  
  
Family History (Reviewed 23 @ 11:09 by Maren Martinez RN)  
Father Diabetes mellitus, type 2  
Sister Diabetes mellitus, type 2  
  
Social History  
Smoking Status: Never smoker  
Substance Use Type: None  
  
Grafts  
History of Graft  
History of Graft?: Yes  
  
Exam  
Physical Exam  
Vital Signs:  
  
  
  
  
Temp Pulse Resp BP O2 Del Method  
  
97.5 F L 88 18 138/80 Room Air  
  
23 11:04 23 11:04 23 11:04 23 11:04 23 11:04  
  
  
  
Const  
General: cooperative, comfortable and no acute distress  
Nutritional Appearance: obese  
Orientation: alert, awake and oriented x3  
  
Lower/Upper Extremity Exam  
Vascular Exam-Edema  
Right Lower Extremity:  
Edema Type: Lymphedema  
Vascular Exam-Pulses  
Right Brachial:  
Pulse Assessment Method: NIBP  
Right Dorsalis Pedis:  
Pulse Assessment Method: Palpation  
Right Posterior Tibial:  
Pulse Assessment Method: Palpation  
  
Objective  
Meds/Allergies  
Home Medications  
  
carvedilol 6.25 mg tablet 6.25 mg PO BID 17 [History Confirmed 23]  
furosemide 40 mg tablet (Lasix) 40 mg PO BID 17 [History Confirmed 
23]  
acetaminophen 500 mg tablet (Tylenol Extra Strength) 500 mg PO TID PRN Pain 
20   
[History Confirmed 23]  
nabumetone 750 mg tablet 750 mg PO BID 21 [History Confirmed 23]  
empagliflozin 10 mg tablet (Jardiance) 10 mg PO DAILY 22 [History 
Confirmed   
23]  
rivaroxaban 10 mg tablet (Xarelto) 10 mg PO DAILY 22 [History Confirmed   
23]  
semaglutide 2 mg/dose (8 mg/3 mL) subcutaneous pen injector (Ozempic) 2 mg 
subcut Q7D   
22 [History Confirmed 23]  
spironolactone 25 mg tablet 25 mg PO DAILY 22 [History Confirmed 23]  
doxycycline hyclate 100 mg capsule 100 mg PO BID 14 days #28 caps 23 [Rx]  
sacubitril 24 mg-valsartan 26 mg tablet (Entresto) 1 tab PO BID 23 
[History   
Confirmed 23]  
  
  
Allergies  
  
cefixime Allergy (Verified 23 11:09)  
Unknown Reaction  
diclofenac Allergy (Verified 23 11:09)  
Unknown Reaction  
nystatin Allergy (Verified 23 11:09)  
Unknown Reaction  
sodium benzoate Allergy (Verified 23 11:09)  
Unknown Reaction  
  
  
Wound/Ulcer  
Right Lower Leg:  
Type: Venous Stasis Ulcer  
Thickness: Skin Breakdown  
Bed Appearance: Beefy Red, Black Dry, Epithelial Tissue or Bridge and Yellow  
Percent of Wound Bed Granulated/Red: 50  
Percent of Devitalized: 50  
Length (cm): 3.5  
Width (cm): 18.5  
Depth (cm): 0.2  
CM Sq: 64.750  
Surrounding Tissue Appearance: Bright Red  
Surrounding Tissue Temp: Increased Warmth  
Drainage Amount: Moderate  
Drainage Description: Serosanguineous  
Drainage Odor: No Odor  
Results  
Height: 5 ft 11 in  
Weight: 160.118 kg  
Body Mass Index: 49.2  
  
Assessment/Plan  
Assessment/Plan  
(1) Venous stasis ulcer:  
Qualifiers:  
Venous stasis ulcer site: other part of lower leg Varicose vein presence: with   
varicose veins Laterality: right Non-pressure ulcer stage: limited to breakdown 
of   
skin Qualified Code(s): I83.018 - Varicose veins of right lower extremity with 
ulcer   
other part of lower leg; L97.811 - Non-pressurechronic ulcer of other part of 
right   
lower leg limited to breakdown of skin  
Code(s):  
I83.009 - Varicose veins of unspecified lower extremity with ulcer of 
unspecified   
site; L97.909 - Non-pressure chronic ulcer of unspecified part of unspecified 
lower   
leg with unspecified severity  
Status: Chronic  
(2) Cellulitis:  
Assessment/Problem Details:  
rle  
Qualifiers:  
Site of cellulitis: extremity Site of cellulitis of extremity: lower extremity   
Laterality: right Qualified Code(s): L03.115 - Cellulitis of right lower limb  
Code(s):  
L03.90 - Cellulitis, unspecified  
Status: Acute  
(3) Edema of right lower leg:  
Code(s):  
R60.0 - Localized edema  
Status: Chronic  
(4) PVD (peripheral vascular disease):  
Code(s):  
I73.9 - Peripheral vascular disease, unspecified  
Status: Chronic  
(5) Varicose veins of both legs with edema:  
Code(s):  
I83.893 - Varicose veins of bilateral lower extremities with other complications  
Status: Chronic  
(6) Morbid obesity due to excess calories:  
Code(s):  
E66.01 - Morbid (severe) obesity due to excess calories  
Status: Chronic  
(7) Lymphedema of both lower extremities:  
Code(s):  
I89.0 - Lymphedema, not elsewhere classified  
Status: Suspected  
(8) Inflammation:  
Status: Chronic  
Time spent with patient  
Time Spent With Patient (min): 20  
  
  
  
  
Dictated By: SOWMYA Correa DD/DT: 23 1121  
  
Signed By: <Electronically signed by SOWMYA Espino>  
23 1130  
   
  
[facility]  
Work Phone: [phone]- Evaluation note*   
  
                      Encounter Date Diagnosis  Assessment Notes Treatment Notes  
 Treatment   
Clinical Notes  
   
                                                Type 2 diabetes   
mellitus with   
unspecified   
complications (ICD-10   
- E11.8)                                                      
  
  
[facility]  
Other Phone: [phone]- Evaluation note*   
  
                      Encounter Date Diagnosis  Assessment Notes Treatment Notes  
 Treatment   
Clinical Notes  
   
                                                Obesity,   
unspecified   
classification,   
unspecified obesity   
type, unspecified   
whether serious   
comorbidity present   
(ICD-10 - E66.9)                                              
   
                                                BMI 50.0-59.9,   
adult (ICD-10 -   
Z68.43)                                                       
   
                           Other                     Summary of Visi  
t:  
(A) emphasized   
increasing fruits   
and vegetables  
(B) reviewed the   
plate method  
(C) discussed   
foods high in   
sodium  
                                          
  
  
[facility]  
Other Phone: [phone]- Evaluation note*   
  
                      Encounter Date Diagnosis  Assessment Notes Treatment Notes  
 Treatment   
Clinical Notes  
   
                                        21 Dec, 2022        Type 2 diabetes   
mellitus with   
unspecified   
complications (ICD-10   
- E11.8)                                                      
   
                                        21 Dec, 2022        BMI 50.0-59.9, adult  
   
(ICD-10 - Z68.43)                                             
   
                                        21 Dec, 2022        Diarrhea (ICD-10 -   
R19.7)                                                        
   
                                        21 Dec, 2022        Obstructive sleep   
apnea (adult)   
(pediatric) (ICD-10 -   
G47.33)                                                       
   
                                        21 Dec, 2022        Mixed hyperlipidemia  
   
(ICD-10 - E78.2)                                              
   
                                        21 Dec, 2022        CAD (coronary artery  
   
disease) (ICD-10 -   
I25.10)                                                       
   
                                        21 Dec, 2022        Knee osteoarthritis   
(ICD-10 - M17.9)                                              
   
                                        21 Dec, 2022        Cardiomyopathy   
(ICD-10 - I42.9)                                              
   
                                        21 Dec, 2022        CHF (congestive hear  
t   
failure) (ICD-10 -   
I50.9)                                                        
   
                                        21 Dec, 2022        Edema (ICD-10 -   
R60.9)                                                        
   
                                        21 Dec, 2022        Low back derangement  
   
syndrome (ICD-10 -   
M53.86)                                                       
   
                                        21 Dec, 2022        Metabolic syndrome X  
   
(ICD-10 - E88.81)                                             
   
                                        21 Dec, 2022        Encounter for   
immunization (ICD-10   
- Z23)                                                        
  
  
[facility]  
Other Phone: [phone]- Evaluation note*   
  
                      Encounter Date Diagnosis  Assessment Notes Treatment Notes  
 Treatment   
Clinical Notes  
   
                                                Obesity,   
unspecified   
classification,   
unspecified obesity   
type, unspecified   
whether serious   
comorbidity present   
(ICD-10 - E66.9)                                              
   
                                                BMI 50.0-59.9,   
adult (ICD-10 -   
Z68.43)                                                       
   
                           Other                     Summary of Visi  
t:  
(A) encourage   
patient to trial   
different fruits,   
as well as   
temporarily   
reducing/eliminati  
ng milk to improve   
diarrhea  
(B) discussed food   
options for   
traveling on the   
road  
(C) discussed   
options for   
oatmeal  
                                          
  
  
[facility]  
Other Phone: [phone]- Evaluation note*   
  
                      Encounter Date Diagnosis  Assessment Notes Treatment Notes  
 Treatment   
Clinical Notes  
   
                                        20 Oct, 2022        Type 2 diabetes   
mellitus with   
unspecified   
complications (ICD-10   
- E11.8)                                                      
   
                                        20 Oct, 2022        BMI 50.0-59.9, adult  
   
(ICD-10 - Z68.43)                                             
   
                                        20 Oct, 2022        Diarrhea (ICD-10 -   
R19.7)                                                        
   
                                        20 Oct, 2022        Obstructive sleep   
apnea (adult)   
(pediatric) (ICD-10 -   
G47.33)                                                       
   
                                        20 Oct, 2022        Mixed hyperlipidemia  
   
(ICD-10 - E78.2)                                              
   
                                        20 Oct, 2022        CAD (coronary artery  
   
disease) (ICD-10 -   
I25.10)                                                       
   
                                        20 Oct, 2022        Knee osteoarthritis   
(ICD-10 - M17.9)                                              
   
                                        20 Oct, 2022        Cardiomyopathy   
(ICD-10 - I42.9)                                              
   
                                        20 Oct, 2022        CHF (congestive hear  
t   
failure) (ICD-10 -   
I50.9)                                                        
   
                                        20 Oct, 2022        Edema (ICD-10 -   
R60.9)                                                        
   
                                        20 Oct, 2022        Low back derangement  
   
syndrome (ICD-10 -   
M53.86)                                                       
   
                                        20 Oct, 2022        Metabolic syndrome X  
   
(ICD-10 - E88.81)                                             
   
                                        20 Oct, 2022        Encounter for   
immunization (ICD-10   
- Z23)                                              Patient denies   
current illness,   
previous allergic   
reaction to   
influenza   
vaccine, eggs, or   
other vaccines,   
and   
Guillain-[place]   
Syndrome. Patient   
given current   
editions of   
influenza Vaccine   
Information   
Statement (VIS).  
                                          
  
  
[facility]  
Other Phone: [phone]- Progress note  
  
  
  
  
                                        Author              Laila Villarreal  
[facility]  
2022 1:59pm  
   
                                        Note Date/Time      2022  
 1:59pm  
   
                                                    [facility]  
ENTER  
[address] [place] [address]  
  
Wound Center Provider Note  
Signed  
  
Patient: Jocelin Pacheco MR#:   
[id number]  
: 1955 Acct:[id number]  
  
Age/Sex: 67 / M  
  
Copies to: SOWMYA Gutierrez,~  
  
  
HPI  
Date of Visit  
Date of Visit:  
Date of Service: 2022  
Time of Service: 13:55  
  
Narrative  
HPI:  
Jocelin is a 67-year-old male presenting to the [facility] for a 
follow-up   
visit for evaluation and treatment of a Right Lower Leg Wound. Jocelin reports 
that he   
bumped his leg on an end table approximately one month ago. He has been a 
patient in   
our office before due to venous stasis ulcers. He has 2+ pitting edema to his 
RLE. He   
does have compression pumps at home and admits to using them approximately once 
a   
week. He has been seen by vascular in the past in [place] where he was told no   
surgical intervention could be performed. The right leg is entirely healed. The 
area   
will be padded and protected today. His right leg will be wrapped in ACE wrap 
for   
compression. He understands that he should use his compression pumps daily, and   
compression stockings are recommended as well. He will reach out to our office 
for   
further wound care needs.  
  
Subjective  
Pain  
Right Lower Leg:  
Pain Description: Throbbing  
Pain Intensity: 0  
Wound/Ulcer History  
When did wound start?: 2022 right medial lower leg  
Mode of Arrival/ : Personal vehicle  
Assistive Device Used Today: Cane  
Lives with:: Alone  
Appetite Description: Within Normal Limits  
Who helps w/ dressing change?: Wound Care Dept  
Smoking Status: Never smoker  
  
[facility]  
Medical History (Updated 22 @ 13:56 by Laila Villarreal, SOWMYA)  
  
Arthritis  
Bilateral knees,  right is bone on bone  per  Dr. Richard   
Back pain  
Cardiomyopathy  
 lower part of heart is weak   
Carpal tunnel syndrome  
right arm surgery  
DVT (deep venous thrombosis)  
 blood clot from right foot to right thigh  on  blood thinner   
Hypertension  
Itching  
Itching with irritation  
DIMITRI (obstructive sleep apnea)  
PVD (peripheral vascular disease)  
 poor veins  Dr. Cameron did vein closure 2019, needs 5 more viens worked 
on.  
Wound of right lower extremity  
  
Surgical History (Reviewed 22 @ 08:05 by Maria A Crowe, KIMBERLY)  
  
Hx of cholecystectomy  
  
Family History (Reviewed 22 @ 08:05 by Maria A Crowe RN)  
Father Diabetes mellitus, type 2  
Sister Diabetes mellitus, type 2  
  
Social History  
Smoking Status: Never smoker  
Substance Use Type: None  
  
Grafts  
History of Graft  
History of Graft?: No  
  
Exam  
Physical Exam  
Vital Signs:  
  
  
  
  
Temp Pulse Resp BP O2 Del Method  
  
98.6 F 92 H 18 119/69 Room Air  
  
22 13:46 22 13:46 22 13:46 22 13:46 22 13:46  
  
  
  
Const  
General: cooperative, comfortable and no acute distress  
Nutritional Appearance: obese  
Orientation: alert, awake and oriented x3  
  
Lower/Upper Extremity Exam  
Vascular Exam-Edema  
Right Lower Extremity:  
Edema Degree: 1+  
Edema Type: Pitting  
Vascular Exam-Pulses  
Right Dorsalis Pedis:  
Pulse Assessment Method: Palpation  
Right Posterior Tibial:  
Pulse Assessment Method: Palpation  
Right Brachial:  
Pulse Assessment Method: NIBP  
  
Objective  
Meds/Allergies  
Home Medications  
  
carvedilol 6.25 mg tablet 6.25 mg PO BID 17 [History Confirmed 22]  
furosemide 40 mg tablet (Lasix) 40 mg PO BID 17 [History Confirmed 
22]  
acetaminophen 500 mg tablet (Tylenol Extra Strength) 500 mg PO TID PRN Pain 
20   
[History Confirmed 22]  
nabumetone 750 mg tablet 750 mg PO BID 21 [History Confirmed 22]  
clobetasol 0.05 % topical ointment 1 applic topical 3XW PRN Rash 21 
[History   
Confirmed 22]  
empagliflozin 10 mg tablet (Jardiance) 10 mg PO DAILY 22 [History 
Confirmed   
22]  
metolazone 2.5 mg tablet 2.5 mg PO Q2D 22 [History Confirmed 22]  
rivaroxaban 10 mg tablet (Xarelto) 10 mg PO DAILY 22 [History Confirmed   
22]  
semaglutide 2 mg/dose (8 mg/3 mL) subcutaneous pen injector (Ozempic) 2 mg 
subcut Q7D   
22 [History Confirmed 22]  
spironolactone 25 mg tablet 25 mg PO DAILY 22 [History Confirmed 22]  
telmisartan 40 mg tablet (Micardis) 40 mg PO DAILY 22 [History Confirmed   
22]  
  
  
Allergies  
  
cefixime Allergy (Verified 22 10:26)  
Unknown Reaction  
diclofenac Allergy (Verified 22 10:26)  
Unknown Reaction  
nystatin Allergy (Verified 22 10:26)  
Unknown Reaction  
sodium benzoate Allergy (Verified 22 10:26)  
Unknown Reaction  
  
  
Wound/Ulcer  
Right Lower Posterior Leg:  
Bed Appearance: Beefy Red and Pink  
Length (cm): 0  
Width (cm): 0  
Depth (cm): 0  
CM Sq: 0.000  
Surrounding Tissue Appearance: Hyperpigmented  
Surrounding Tissue Temp: Warm  
Drainage Odor: No Odor  
Results  
Height: 5 ft 11 in  
Weight: 170.551 kg  
Body Mass Index: 52.4  
  
Assessment/Plan  
Assessment/Plan  
(1) Traumatic open wound of right lower leg:  
Assessment/Problem Details:  
22- Right lower medial leg- healed  
Code(s):  
S81.801A - Unspecified open wound, right lower leg, initial encounter  
Status: Resolved  
(2) Venous stasis ulcer:  
Assessment/Problem Details:  
new 22- healed 22  
Code(s):  
I83.009 - Varicose veins of unspecified lower extremity with ulcer of 
unspecified   
site; L97.909 - Non-pressure chronic ulcer of unspecified part of unspecified 
lower   
leg with unspecified severity  
Status: Resolved  
(3) Edema of right lower leg:  
Code(s):  
R60.0 - Localized edema  
Status: Chronic  
(4) PVD (peripheral vascular disease):  
Code(s):  
I73.9 - Peripheral vascular disease, unspecified  
Status: Chronic  
(5) Varicose veins of both legs with edema:  
Code(s):  
I83.893 - Varicose veins of bilateral lower extremities with other complications  
Status: Chronic  
(6) Morbid obesity due to excess calories:  
Code(s):  
E66.01 - Morbid (severe) obesity due to excess calories  
Status: Chronic  
Time spent with patient  
Time Spent With Patient (min): 20  
  
  
  
  
Dictated By: SOWMYA Gutierrez DD/DT: 22 1355  
  
Signed By: <Electronically signed by SOWMYA Villarreal>  
22 1359  
   
  
[facility]  
Work Phone: [phone]- Progress note  
  
  
  
  
                                        Author              Laila Villarreal  
[facility]  
2022 1:58pm  
   
                                        Note Date/Time      2022  
 1:58pm  
   
                                                    [facility]  
ENTER  
[address] [place] [address]  
  
Wound Center Provider Note  
Signed  
  
Patient: Jocelin Pacheco MR#:   
[id number]  
: 1955 Acct:[id number]  
  
Age/Sex: 67 / M  
  
Copies to: SOWMYA Gutierrez DO~  
  
  
HPI  
Date of Visit  
Date of Visit:  
Date of Service: 2022  
Time of Service: 13:54  
  
Narrative  
HPI:  
Jocelin is a 67-year-old male presenting to the [facility] for a 
follow-up   
visit for evaluation and treatment of a Right Lower Leg Wound. Jocelin reports 
that he   
bumped his leg on an end table approximately one month ago. He has been a 
patient in   
our office before due to venous stasis ulcers. He has 2+ pitting edema to his 
RLE. He   
does have compression pumps at home and admits to using them approximately once 
a   
week. He has been seen by vascular in the past in [place] where he was told no   
surgical intervention could be performed. The initial traumatic wound is healed.
He   
does present with a new ulcer to right lower posterior leg. His wound will be 
topped   
with collagen powder and topped with versatel. His leg will be wrapped in an 
Ergo K2   
for compression to reduce edema. Jocelin does seem to be tolerating the Ergo wrap   
better. Healing of the wound will be dependent on dressing changes being 
performed as   
ordered, a nutritious diet somewhat higher in protein, loss of excess weight, 
control   
of edema with compression and use of compression pumps, and preventing/treating   
infection if it were to arise. There are no signs of acute infection on exam 
today.   
Dressing changes will be done in our office twice a week. Joceiln also reports 
that he   
has been exercising regularly over the past month to help with weight loss.  
  
Subjective  
Pain  
Right Lower Leg:  
Pain Description: Throbbing  
Pain Intensity: 2  
Wound/Ulcer History  
When did wound start?: 2022 right medial lower leg  
Mode of Arrival/ : Personal vehicle  
Assistive Device Used Today: Cane  
Lives with:: Alone  
Appetite Description: Within Normal Limits  
Who helps w/ dressing change?: Wound Care Dept  
Smoking Status: Never smoker  
  
[facility]  
Medical History (Updated 22 @ 13:55 by SOWMYA Gutierrez)  
  
Arthritis  
Bilateral knees,  right is bone on bone  per  Dr. Richard   
Back pain  
Cardiomyopathy  
 lower part of heart is weak   
Carpal tunnel syndrome  
right arm surgery  
DVT (deep venous thrombosis)  
 blood clot from right foot to right thigh  on  blood thinner   
Hypertension  
Itching  
Itching with irritation  
DIMITRI (obstructive sleep apnea)  
PVD (peripheral vascular disease)  
 poor veins  Dr. Cameron did vein closure 2019, needs 5 more viens worked 
on.  
Wound of right lower extremity  
  
Surgical History (Reviewed 22 @ 08:05 by Maria A Crowe RN)  
  
Hx of cholecystectomy  
  
Family History (Reviewed 22 @ 08:05 by Maria A Crowe RN)  
Father Diabetes mellitus, type 2  
Sister Diabetes mellitus, type 2  
  
Social History  
Smoking Status: Never smoker  
Substance Use Type: None  
  
Grafts  
History of Graft  
History of Graft?: No  
  
Exam  
Physical Exam  
Vital Signs:  
  
  
  
  
Temp Pulse Resp BP O2 Del Method  
  
98.1 F 80 20 116/72 Room Air  
  
22 13:48 22 13:48 22 13:48 22 13:48 22 13:48  
  
  
  
Const  
General: cooperative, comfortable and no acute distress  
Nutritional Appearance: obese  
Orientation: alert, awake and oriented x3  
  
Lower/Upper Extremity Exam  
Vascular Exam-Edema  
Right Lower Extremity:  
Edema Degree: 2+  
Edema Type: Pitting  
Vascular Exam-Pulses  
Right Dorsalis Pedis:  
Pulse Assessment Method: Doppler  
Right Posterior Tibial:  
Pulse Assessment Method: Doppler  
Right Brachial:  
Pulse Assessment Method: NIBP  
  
Objective  
Meds/Allergies  
Home Medications  
  
carvedilol 6.25 mg tablet 6.25 mg PO BID 17 [History Confirmed 22]  
furosemide 40 mg tablet (Lasix) 40 mg PO BID 17 [History Confirmed 
22]  
acetaminophen 500 mg tablet (Tylenol Extra Strength) 500 mg PO TID PRN Pain 
20   
[History Confirmed 22]  
nabumetone 750 mg tablet 750 mg PO BID 21 [History Confirmed 22]  
clobetasol 0.05 % topical ointment 1 applic topical 3XW PRN Rash 21 
[History   
Confirmed 22]  
empagliflozin 10 mg tablet (Jardiance) 10 mg PO DAILY 22 [History 
Confirmed   
22]  
metolazone 2.5 mg tablet 2.5 mg PO Q2D 22 [History Confirmed 22]  
rivaroxaban 10 mg tablet (Xarelto) 10 mg PO DAILY 22 [History Confirmed   
22]  
semaglutide 2 mg/dose (8 mg/3 mL) subcutaneous pen injector (Ozempic) 2 mg 
subcut Q7D   
22 [History Confirmed 22]  
spironolactone 25 mg tablet 25 mg PO DAILY 22 [History Confirmed 22]  
telmisartan 40 mg tablet (Micardis) 40 mg PO DAILY 22 [History Confirmed   
22]  
  
  
Allergies  
  
cefixime Allergy (Verified 22 10:26)  
Unknown Reaction  
diclofenac Allergy (Verified 22 10:26)  
Unknown Reaction  
nystatin Allergy (Verified 22 10:26)  
Unknown Reaction  
sodium benzoate Allergy (Verified 22 10:26)  
Unknown Reaction  
  
  
Wound/Ulcer  
Right Lower Medial Leg:  
Type: Traumatic (laceration (without foreign body))  
Thickness: Full  
Bed Appearance: Beefy Red and Pink  
Percent of Wound Bed Granulated/Red: 100  
Percent of Devitalized: 0  
Length (cm): 0  
Width (cm): 0  
Depth (cm): 0  
CM Sq: 0.000  
Surrounding Tissue Appearance: Hyperpigmented  
Surrounding Tissue Temp: Warm  
Drainage Amount: None  
Drainage Description: Serosanguineous  
Drainage Odor: No Odor  
Right Lower Posterior Leg:  
Bed Appearance: Beefy Red and Pink  
Percent of Wound Bed Granulated/Red: 100  
Percent of Devitalized: 0  
Length (cm): 2.4  
Width (cm): 1.9  
Depth (cm): 0.1  
CM Sq: 4.560  
Surrounding Tissue Appearance: Hyperpigmented  
Surrounding Tissue Temp: Warm  
Drainage Amount: Moderate  
Drainage Description: Serosanguineous  
Drainage Odor: No Odor  
Results  
Height: 5 ft 11 in  
Weight: 170.551 kg  
Body Mass Index: 52.4  
  
Assessment/Plan  
Assessment/Plan  
(1) Traumatic open wound of right lower leg:  
Assessment/Problem Details:  
22- Right lower medial leg- healed  
Code(s):  
S81.801A - Unspecified open wound, right lower leg, initial encounter  
Status: Resolved  
(2) Venous stasis ulcer:  
Assessment/Problem Details:  
new 22  
Code(s):  
I83.009 - Varicose veins of unspecified lower extremity with ulcer of 
unspecified   
site; L97.909 - Non-pressure chronic ulcer of unspecified part of unspecified 
lower   
leg with unspecified severity  
Status: Acute  
(3) Edema of right lower leg:  
Code(s):  
R60.0 - Localized edema  
Status: Chronic  
(4) PVD (peripheral vascular disease):  
Code(s):  
I73.9 - Peripheral vascular disease, unspecified  
Status: Chronic  
(5) Varicose veins of both legs with edema:  
Code(s):  
I83.893 - Varicose veins of bilateral lower extremities with other complications  
Status: Chronic  
(6) Morbid obesity due to excess calories:  
Code(s):  
E66.01 - Morbid (severe) obesity due to excess calories  
Status: Chronic  
Time spent with patient  
Time Spent With Patient (min): 20  
  
  
  
  
Dictated By: SOWMYA Gutierrez DD/DT: 22 1354  
  
Signed By: <Electronically signed by SOWMYA Villarreal>  
22 1358  
   
  
[facility]  
Work Phone: [phone]- Evaluation note*   
  
                      Encounter Date Diagnosis  Assessment Notes Treatment Notes  
 Treatment   
Clinical Notes  
   
                                        08 Sep, 2022        Type 2 diabetes   
mellitus with   
unspecified   
complications (ICD-10   
- E11.8)                                                      
   
                                        08 Sep, 2022        BMI 50.0-59.9, adult  
   
(ICD-10 - Z68.43)                                             
   
                                        08 Sep, 2022        Obstructive sleep   
apnea (adult)   
(pediatric) (ICD-10 -   
G47.33)                                                       
   
                                        08 Sep, 2022        Mixed hyperlipidemia  
   
(ICD-10 - E78.2)                                              
   
                                        08 Sep, 2022        CAD (coronary artery  
   
disease) (ICD-10 -   
I25.10)                                                       
   
                                        08 Sep, 2022        Knee osteoarthritis   
(ICD-10 - M17.9)                                              
   
                                        08 Sep, 2022        Cardiomyopathy   
(ICD-10 - I42.9)                                              
   
                                        08 Sep, 2022        CHF (congestive hear  
t   
failure) (ICD-10 -   
I50.9)                                                        
   
                                        08 Sep, 2022        Edema (ICD-10 -   
R60.9)                                                        
   
                                        08 Sep, 2022        Low back derangement  
   
syndrome (ICD-10 -   
M53.86)                                                       
   
                                        08 Sep, 2022        Metabolic syndrome X  
   
(ICD-10 - E88.81)                                             
  
  
[facility]  
Other Phone: [phone]- Progress note  
  
  
  
  
                                        Author              Laila Villarreal  
[facility]  
2022 10:41am  
   
                                        Note Date/Time      2022 10  
:40am  
   
                                                    [facility]  
ENTER  
[address] [place] [address]  
  
Wound Center Provider Note  
Signed  
  
Patient: Jocelin Pacheco MR#:   
[id number]  
: 1955 Acct:[id number]  
  
Age/Sex: 67 / M  
  
Copies to: SOWMYA Gutierrez DO~  
  
  
HPI  
Date of Visit  
Date of Visit:  
Date of Service: 2022  
Time of Service: 10:37  
  
Narrative  
HPI:  
Jocelin is a 67-year-old male presenting to the [facility] for a 
follow-up   
visit for evaluation and treatment of a Right Lower Leg Wound. Jocelin reports 
that he   
bumped his leg on an end table approximately one month ago. He has been a 
patient in   
our office before due to venous stasis ulcers. He has 2+ pitting edema to his 
RLE. He   
does have compression pumps at home and admits to using them approximately once 
a   
week. He has been seen by vascular in the past in [place] where he was told no   
surgical intervention could be performed. The wound did show improvement. The 
wound   
bed is more pink/red. His wound will be topped with collagen powder. His leg 
will be   
wrapped in an Ergo K2 for compression to reduce edema. He did experience 
sensitivity   
(itching) to the standard Unna boot. Clobetasol was applied anastasia wound and we 
will   
monitor for improvement with the Ergo wrap. Healing of the wound will be 
dependent on   
dressing changes being performed as ordered, a nutritious diet somewhat higher 
in   
protein, loss of excess weight, control of edema with compression and use of   
compression pumps, and preventing/treating infection if it were to arise. 
Thereare no   
signs of acute infection on exam today. Dressing changes will be done inour 
office   
twice a week. Jocelin also reports that he has been exercising regularly over the 
past   
month to help with weight loss.  
  
Subjective  
Pain  
Right Lower Leg:  
Pain Description: Soreness  
Pain Intensity: 7  
Wound/Ulcer History  
When did wound start?: 2022 right medial lower leg  
Mode of Arrival/ : Personal vehicle  
Assistive Device Used Today: Cane  
Lives with:: Alone  
Appetite Description: Within Normal Limits  
Who helps w/ dressing change?: Wound Care Dept  
Smoking Status: Never smoker  
  
[facility]  
Medical History (Updated 22 @ 08:17 by Maria A Crowe RN)  
  
Arthritis  
Bilateral knees,  right is bone on bone  per  Dr. Richard   
Back pain  
Cardiomyopathy  
 lower part of heart is weak   
Carpal tunnel syndrome  
right arm surgery  
DVT (deep venous thrombosis)  
 blood clot from right foot to right thigh  on  blood thinner   
Hypertension  
Itching  
Itching with irritation  
DIMITRI (obstructive sleep apnea)  
PVD (peripheral vascular disease)  
 poor veins  Dr. Cameron did vein closure 2019, needs 5 more viens worked 
on.  
Wound of right lower extremity  
  
Surgical History (Reviewed 22 @ 08:05 by Maria A Crowe, KIMBERLY)  
  
Hx of cholecystectomy  
  
Family History (Reviewed 22 @ 08:05 by Maria A Crowe RN)  
Father Diabetes mellitus, type 2  
Sister Diabetes mellitus, type 2  
  
Social History  
Smoking Status: Never smoker  
Substance Use Type: None  
  
Grafts  
History of Graft  
History of Graft?: No  
  
Exam  
Physical Exam  
Vital Signs:  
  
  
  
  
Temp Pulse Resp BP O2 Del Method  
  
97.3 F L 103 H 18 139/72 Room Air  
  
22 10:24 22 10:24 22 10:24 22 10:24 22 10:24  
  
  
  
Const  
General: cooperative, comfortable and no acute distress  
Nutritional Appearance: obese  
Orientation: alert, awake and oriented x3  
  
Lower/Upper Extremity Exam  
Vascular Exam-Edema  
Right Lower Extremity:  
Edema Degree: 2+  
Edema Type: Pitting  
Vascular Exam-Pulses  
Right Dorsalis Pedis:  
Pulse Assessment Method: Doppler  
Right Posterior Tibial:  
Pulse Assessment Method: Doppler  
Right Brachial:  
Pulse Assessment Method: NIBP  
  
Objective  
Meds/Allergies  
Home Medications  
  
carvedilol 6.25 mg tablet 6.25 mg PO BID 17 [History Confirmed 22]  
furosemide 40 mg tablet (Lasix) 40 mg PO BID 17 [History Confirmed 
22]  
acetaminophen 500 mg tablet (Tylenol Extra Strength) 500 mg PO TID PRN Pain 
20   
[History Confirmed 22]  
nabumetone 750 mg tablet 750 mg PO BID 21 [History Confirmed 22]  
clobetasol 0.05 % topical ointment 1 applic topical 3XW PRN Rash 21 
[History   
Confirmed 22]  
empagliflozin 10 mg tablet (Jardiance) 10 mg PO DAILY 22 [History 
Confirmed   
22]  
metolazone 2.5 mg tablet 2.5 mg PO Q2D 22 [History Confirmed 22]  
rivaroxaban 10 mg tablet (Xarelto) 10 mg PO DAILY 22 [History Confirmed   
22]  
semaglutide 2 mg/dose (8 mg/3 mL) subcutaneous pen injector (Ozempic) 2 mg 
subcut Q7D   
22 [History Confirmed 22]  
spironolactone 25 mg tablet 25 mg PO DAILY 22 [History Confirmed 22]  
telmisartan 40 mg tablet (Micardis) 40 mg PO DAILY 22 [History Confirmed   
22]  
  
  
Allergies  
  
cefixime Allergy (Verified 22 10:26)  
Unknown Reaction  
diclofenac Allergy (Verified 22 10:26)  
Unknown Reaction  
nystatin Allergy (Verified 22 10:26)  
Unknown Reaction  
sodium benzoate Allergy (Verified 22 10:26)  
Unknown Reaction  
  
  
Wound/Ulcer  
Right Lower Medial Leg:  
Type: Traumatic (laceration (without foreign body))  
Thickness: Full  
Bed Appearance: Beefy Red, Pink and Yellow  
Percent of Wound Bed Granulated/Red: 95  
Percent of Devitalized: 5  
Length (cm): 2.1  
Width (cm): 1.4  
Depth (cm): 0.1  
CM Sq: 2.940  
Surrounding Tissue Appearance: Hyperpigmented  
Surrounding Tissue Temp: Warm  
Drainage Amount: Moderate  
Drainage Description: Serosanguineous  
Drainage Odor: No Odor  
Lidocaine Applied Topically: 2% Jelly  
Results  
Height: 5 ft 11 in  
Weight: 170.551 kg  
Body Mass Index: 52.4  
  
Assessment/Plan  
Assessment/Plan  
(1) Traumatic open wound of right lower leg:  
Assessment/Problem Details:  
22- Right lower medial leg  
Code(s):  
S81.801A - Unspecified open wound, right lower leg, initial encounter  
Status: Chronic  
(2) Edema of right lower leg:  
Code(s):  
R60.0 - Localized edema  
Status: Chronic  
(3) PVD (peripheral vascular disease):  
Code(s):  
I73.9 - Peripheral vascular disease, unspecified  
Status: Chronic  
(4) Varicose veins of both legs with edema:  
Code(s):  
I83.893 - Varicose veins of bilateral lower extremities with other complications  
Status: Chronic  
(5) Morbid obesity due to excess calories:  
Code(s):  
E66.01 - Morbid (severe) obesity due to excess calories  
Status: Chronic  
Time spent with patient  
Time Spent With Patient (min): 15  
  
  
  
  
Dictated By: SOWMYA Gutierrez DD/DT: 22 1037  
  
Signed By: <Electronically signed by SOWMYA Villarreal>  
22 1041  
   
  
[facility]  
Work Phone: [phone]- Progress note  
  
  
  
  
                                        Author              Laila Villarreal  
[facility]  
2022 8:03am  
   
                                        Note Date/Time      2022 8:  
03am  
   
                                                    [facility]  
ENTER  
[address] [place] [address]  
  
Wound Center Provider Note  
Signed  
  
Patient: Jocelin Pacheco MR#:   
[id number]  
: 1955 Acct:[id number]  
  
Age/Sex: 67 / M  
  
Copies to: SOWMYA Gutierrez DO~  
  
  
HPI  
Date of Visit  
Date of Visit:  
Date of Service: 2022  
Time of Service: 07:50  
  
Narrative  
HPI:  
Jocelin is a 67-year-old male presenting to the [facility] for an 
initial   
visit for evaluation and treatment of a Right Lower Leg Wound. Jocelin reports 
that he   
bumped his leg on an end table approximately one month ago. He has been a 
patient in   
our office before due to venous stasis ulcers. He presents today with the wound 
open   
to air. He has 2+ pitting edema to his RLE. He does have compression pumps at 
home   
and admits to using them approximately once a week. He has been seen by vascular
in   
the past in [place] where he was told no surgical intervention could be 
performed.   
The wound will be topped witha honey/collagen mixture and an alginate silver. 
His leg   
will be wrapped in an Unna boot for compression to reduce edema. Healing of the 
wound   
will be dependent on dressing changes being performed as ordered, a nutritious 
diet   
somewhathigher in protein, loss of excess weight, control of edema with 
compression   
and use of compression pumps, and preventing/treating infection if it were to 
arise.   
There are no signs of acute infection on exam today. Dressing changes will be 
done in   
our office twice a week. Jocelin also reports that he has been exercising 
regularly   
over the past month to help with weight loss.  
  
Subjective  
Pain  
Right Lower Leg:  
Pain Description: Soreness  
Pain Intensity: 8  
Wound/Ulcer History  
When did wound start?: 2022 right medial lower leg  
Mode of Arrival/ : Personal vehicle  
Assistive Device Used Today: Cane  
Lives with:: Alone  
Appetite Description: Within Normal Limits  
Who helps w/ dressing change?: Wound Care Dept  
Smoking Status: Never smoker  
  
[facility]  
Medical History (Reviewed 21 @ 01:50 by Gumaro Malin DO)  
  
Arthritis  
Bilateral knees,  right is bone on bone  per  Dr. Richard   
Back pain  
Cardiomyopathy  
 lower part of heart is weak   
Carpal tunnel syndrome  
right arm surgery  
DVT (deep venous thrombosis)  
 blood clot from right foot to right thigh  on  blood thinner   
Hypertension  
Itching  
Itching with irritation  
DIMITRI (obstructive sleep apnea)  
PVD (peripheral vascular disease)  
 poor veins  Dr. Cameron did vein closure 2019, needs 5 more viens worked 
on.  
  
Surgical History (Reviewed 21 @ 01:50 by Gumaro Malin DO)  
  
Hx of cholecystectomy  
  
Family History (Reviewed 21 @ 01:50 by Gumaro Malin DO)  
Father Diabetes mellitus, type 2  
Sister Diabetes mellitus, type 2  
  
Social History  
Smoking Status: Never smoker  
Substance Use Type: None  
  
Grafts  
History of Graft  
History of Graft?: No  
  
Exam  
Physical Exam  
Vital Signs:  
  
  
  
  
Temp Pulse Resp BP O2 Del Method  
  
97.7 F 97 H 24 130/70 Room Air  
  
22 07:37 22 07:37 22 07:37 22 07:37 22 07:37  
  
  
  
Const  
General: cooperative, comfortable and no acute distress  
Nutritional Appearance: obese  
Orientation: alert, awake and oriented x3  
  
Lower/Upper Extremity Exam  
Vascular Exam-Edema  
Right Lower Extremity:  
Edema Degree: 2+  
Edema Type: Pitting  
Vascular Exam-Pulses  
Right Dorsalis Pedis:  
Pulse Assessment Method: Palpation  
Right Posterior Tibial:  
Pulse Assessment Method: Doppler  
Right Brachial:  
Pulse Assessment Method: NIBP  
  
Objective  
Meds/Allergies  
Home Medications  
  
carvedilol 6.25 mg tablet 6.25 mg PO BID 17 [History Confirmed 21]  
furosemide 40 mg tablet (Lasix) 40 mg PO BID 17 [History Confirmed 
21]  
telmisartan 40 mg tablet 40 mg PO DAILY 17 [History Confirmed 21]  
metolazone 2.5 mg tablet 2.5 mg PO 3XW PRN Edema 19 [History Confirmed   
21]  
acetaminophen 500 mg tablet (Tylenol Extra Strength) 500 mg PO TID PRN Pain 
20   
[History Confirmed 21]  
ascorbic acid (vitamin C) 500 mg tablet (Vitamin C) 500 mg PO BID 7 days #14 
tabs   
20 [Rx Confirmed 21]  
zinc sulfate 50 mg zinc (220 mg) capsule (Orazinc) 220 mg PO DAILY 7 days #7 
caps   
20 [Rx Confirmed 21]  
nabumetone 750 mg tablet 750 mg PO BID 21 [History Confirmed 21]  
hydrocodone 5 mg-acetaminophen 325 mg tablet 1 tab PO Q8H PRN severe pain 
(scalescore   
7-10) 2 days #6 tabs 21 [Rx Confirmed 21]  
clobetasol 0.05 % topical ointment 1 applic topical 3XW PRN Rash 21 
[History   
Confirmed 21]  
  
  
Allergies  
  
cefixime Allergy (Verified 21 13:49)  
Unknown Reaction  
diclofenac Allergy (Verified 21 13:49)  
Unknown Reaction  
nystatin Allergy (Verified 21 13:49)  
Unknown Reaction  
sodium benzoate Allergy (Verified 21 13:49)  
Unknown Reaction  
  
  
Wound/Ulcer  
Right Lower Medial Leg:  
Type: Traumatic (laceration (without foreign body))  
Thickness: Full  
Bed Appearance: Brown, Devitalized and Dried Exudate  
Percent of Wound Bed Granulated/Red: 0  
Percent of Devitalized: 100  
Length (cm): 2.5  
Width (cm): 1.5  
Depth (cm): 0.1  
CM Sq: 3.750  
Surrounding Tissue Appearance: Hyperpigmented  
Surrounding Tissue Temp: Warm  
Drainage Amount: Scant  
Drainage Description: Serosanguineous  
Drainage Odor: No Odor  
Lidocaine Applied Topically: 2% Jelly  
Procedures  
Time Out: 2 Patient Identifiers, Correct Patient, Correct Side/Site, Correct   
Procedure and Safety Issues Reviewed  
Procedure:  
The right lower medial leg wound was anesthetized with 2% topical lidocaine. A   
curette was used to perform debridement for the removal of 3.7 cm? of 
devitalized   
tissue consisting of skin, slough, and exudate. Debridement was down to more 
healthy,   
bleeding tissue. Estimated blood loss was minimal. Hemostasis was achieved by   
applying direct pressure. The wound now appears 50% more pink and red and 
remains the   
same in size. The patient tolerated the procedure well with minimal pain.  
Results  
Height: 5 ft 11 in  
Weight: 170.551 kg  
Body Mass Index: 52.4  
  
Assessment/Plan  
Assessment/Plan  
(1) Traumatic open wound of right lower leg:  
Assessment/Problem Details:  
22- Right lower medial leg  
Code(s):  
S81.801A - Unspecified open wound, right lower leg, initial encounter  
Status: Chronic  
(2) Edema of right lower leg:  
Code(s):  
R60.0 - Localized edema  
Status: Chronic  
(3) PVD (peripheral vascular disease):  
Code(s):  
I73.9 - Peripheral vascular disease, unspecified  
Status: Chronic  
(4) Varicose veins of both legs with edema:  
Code(s):  
I83.893 - Varicose veins of bilateral lower extremities with other complications  
Status: Chronic  
(5) Morbid obesity due to excess calories:  
Code(s):  
E66.01 - Morbid (severe) obesity due to excess calories  
Status: Chronic  
Time spent with patient  
Time Spent With Patient (min): 20  
  
  
  
  
Dictated By: SOWMYA Gutierrez DD/DT: 22 0750  
  
Signed By: <Electronically signed by SOWMYA Villarreal>  
22 0803  
   
  
[facility]  
Work Phone: [phone]- Evaluation note*   
  
                      Encounter Date Diagnosis  Assessment Notes Treatment Notes  
 Treatment   
Clinical Notes  
   
                                                Type 2 diabetes   
mellitus with   
unspecified   
complications (ICD-10   
- E11.8)                                                      
   
                                                BMI 50.0-59.9, adult  
   
(ICD-10 - Z68.43)                                             
   
                                                Obstructive sleep   
apnea (adult)   
(pediatric) (ICD-10 -   
G47.33)                                                       
   
                                                Mixed hyperlipidemia  
   
(ICD-10 - E78.2)                                              
   
                                                CAD (coronary artery  
   
disease) (ICD-10 -   
I25.10)                                                       
   
                                                Knee osteoarthritis   
(ICD-10 - M17.9)                                              
   
                                                Cardiomyopathy   
(ICD-10 - I42.9)                                              
   
                                                CHF (congestive hear  
t   
failure) (ICD-10 -   
I50.9)                                                        
   
                                                Edema (ICD-10 -   
R60.9)                                                        
   
                                                Low back derangement  
   
syndrome (ICD-10 -   
M53.86)                                                       
   
                                                Metabolic syndrome X  
   
(ICD-10 - E88.81)                                             
  
  
[facility]  
Other Phone: [phone]- Evaluation note*   
  
                      Encounter Date Diagnosis  Assessment Notes Treatment Notes  
 Treatment   
Clinical Notes  
   
                                                Obesity,   
unspecified   
classification,   
unspecified obesity   
type, unspecified   
whether serious   
comorbidity present   
(ICD-10 - E66.9)                                              
   
                                                BMI 50.0-59.9,   
adult (ICD-10 -   
Z68.43)                                                       
   
                           Other                     Summary of Visi  
t:  
(A) reviewed plate   
method  
(B) emphasized   
reducing calorie   
consumption   
through beverages  
(C) continue   
logging all food   
and drink  
                                          
  
  
[facility]  
Other Phone: [phone]- Evaluation note*   
  
                      Encounter Date Diagnosis  Assessment Notes Treatment Notes  
 Treatment   
Clinical Notes  
   
                                                Obstructive sleep   
apnea (adult)   
(pediatric) (ICD-10   
- G47.33)                                           His machine should   
be set for CPAP   
auto minimum 12   
And maximum 18. We   
will try to get   
download 1 way or   
another to confirm   
that he has good   
control. He does   
seem to tolerate   
the pressure   
without difficulty   
and does wear the   
mask when he is in   
bed. I encouraged   
him to ensure   
regular sleep-wake   
cycles with   
adequate sleep   
time  
                                          
   
                                                Sleep phase   
syndrome, delayed   
(ICD-10 - G47.21)                                   His original   
history reported   
substantial sleep   
phase delay, but   
he is now   
reporting almost   
chaotic sleep-wake   
cycles. I   
encouraged him to   
have regular   
sleep-wake times,   
and to avoid being   
awake in the bed   
even if unable to   
sleep.  
                                          
   
                                                BMI 50.0-59.9,   
adult (ICD-10 -   
Z68.43)                                             He asked about   
inspire therapy,   
but his current   
body weight is   
above the cutoff   
to qualify for   
that treatment. He   
is encouraged to   
continue efforts   
at weight   
reduction  
                                          
  
  
[facility]  
Other Phone: [phone]- Evaluation note*   
  
                      Encounter Date Diagnosis  Assessment Notes Treatment Notes  
 Treatment   
Clinical Notes  
   
                                        10 May, 2022        Obesity,   
unspecified   
classification,   
unspecified   
obesity type,   
unspecified   
whether serious   
comorbidity   
present (ICD-10 -   
E66.9)                                                        
   
                                        10 May, 2022        BMI 50.0-59.9,   
adult (ICD-10 -   
Z68.43)                                                       
   
                          10 May, 2022 Other                     Summary of Visi  
t:  
(A) Presentation of   
Plate Method   
discussed  
(B) Sample meal ideas   
reviewed  
(C) exercise   
recommendations   
reviewed  
Patient set the   
following goals:  
- patient set   
personal goal using   
given handout.  
                                          
  
  
[facility]  
Other Phone: [phone]- Evaluation note*   
  
                      Encounter Date Diagnosis  Assessment Notes Treatment Notes  
 Treatment   
Clinical Notes  
   
                                                Abnormal weight gain  
   
(ICD-10 - R63.5)                                              
   
                                                Type 2 diabetes   
mellitus with   
unspecified   
complications (ICD-10   
- E11.8)                                                      
   
                                                Obstructive sleep   
apnea (adult)   
(pediatric) (ICD-10 -   
G47.33)                                                       
   
                                                Mixed hyperlipidemia  
   
(ICD-10 - E78.2)                                              
   
                                                Knee osteoarthritis   
(ICD-10 - M17.9)                                              
   
                                                Cardiomyopathy   
(ICD-10 - I42.9)                                              
   
                                                CHF (congestive hear  
t   
failure) (ICD-10 -   
I50.9)                                                        
   
                                                BMI 50.0-59.9, adult  
   
(ICD-10 - Z68.43)                                             
   
                                        14 2022        Edema (ICD-10 -   
R60.9)                                                        
   
                                        14 2022        Low back derangement  
   
syndrome (ICD-10 -   
M53.86)                                                       
   
                                                Metabolic syndrome X  
   
(ICD-10 - E88.81)                                             
   
                                                CAD (coronary artery  
   
disease) (ICD-10 -   
I25.10)                                                       
  
  
[facility]  
Other Phone: [phone]- Evaluation note*   
  
                      Encounter Date Diagnosis  Assessment Notes Treatment Notes  
 Treatment   
Clinical Notes  
   
                                                Obstructive sleep   
apnea (adult)   
(pediatric)   
(ICD-10 - G47.33)                                     
  
  
He had original sleep   
study in  with an   
AHI of 63 and with   
21% of the time spent   
hypoxic. He was   
titrated and treated   
with CPAP, but   
struggled some with   
usage at that time.   
He was lost to   
follow-up not long   
after. However he now   
reports he has been   
using the machine   
regularly. The most   
current download   
available to us is   
from , and at   
that time he did have   
good compliance with   
9 hours of usage and   
with adequate control   
of sleep apnea.   
However I am   
concerned because of   
his very high [place]   
Sleepiness Scale, his   
difficult to sort out   
sleep history, and   
his severe persistent   
hypoxia when   
initially diagnosed   
(which raises the   
stakes and emphasizes   
the importance of   
effective treatment).   
His exam does show   
bilateral lower   
extremity edema which   
can be an indication   
of suboptimally   
treated sleep apnea   
with pulmonary   
hypertension. Given   
the uncertainty of   
the overall situation   
I feel full   
reassessment with   
polysomnography and   
possible split-night   
is advisable. In the   
meantime we will try   
to get him a new auto   
CPAP machine as his   
current device is   
both antiquated and   
subject to recall                         
  
  
   
                                                BMI 50.0-59.9,   
adult (ICD-10 -   
Z68.43)                                               
  
  
His substantial   
morbid obesity does   
pose considerable   
risks for   
cardiopulmonary   
complications and he   
is encouraged to   
bring body weight   
down. Not only will   
this improve quality   
of life but can have   
substantial impact on   
sleep apnea and   
pulmonary   
hypertension with   
hypoventilation,   
should these problems   
be present                                
  
  
   
                                                Lower extremity   
edema (ICD-10 -   
R60.0)                                                
  
  
He mayHis original   
sleep test showed   
severe sleep apnea   
with substantial   
nocturnal hypoxia. I   
am concerned for   
breakthrough hypoxia   
with hypoventilation;   
some patients may   
require control of   
sleep apnea but   
additionally may   
require oxygen   
therapy bled into the   
device                                    
  
  
   
                                                Sleep phase   
syndrome, delayed   
(ICD-10 - G47.21)                                     
  
  
His self-reported   
sleep pattern has not   
no nighttime sleep   
with substantial   
sleep only beginning   
at 6 AM. However it   
is quite difficult to   
sort out what is   
going on and he is   
not certain that he   
goes sleepless   
through the night. We   
will try to schedule   
him for a sleep phase   
delayed sleep study                       
  
  
   
                           Other                     The diagnosis o  
f   
Sleep Apnea was   
reviewed in detail,   
and handout materials   
were provided. The   
patient expresses   
good understanding,   
We also reviewed the   
medical and accident   
risks associated with   
sleep apnea and   
excessive fatigue.   
The patient is   
advised to adhere to   
a proper sleep   
hygiene schedule and   
to assure adequate   
total sleep time; The   
patient was advised   
to continue efforts   
at progressive weight   
loss, including   
decreased overall   
caloric intake   
quantity and better   
food choices.The   
patient was advised   
to call or return any   
difficulties arise,   
including changes in   
symptoms and/or   
problems with   
treatment                                 
  
  
[facility]  
Other Phone: [phone] Evaluation note*   
  
                      Encounter Date Diagnosis  Assessment Notes Treatment Notes  
 Treatment   
Clinical Notes  
   
                                        22 Dec, 2021        Encounter for   
immunization   
(ICD-10 - Z23)                                        
  
  
Patient presents   
for COVID-19   
vaccination   
BOOSTER.   
Pre-screening form   
answers evaluated   
with patient.   
Patient denies   
current illness or   
allergic reaction   
to component of   
COVID-19 vaccine.   
Patient provided   
with current copy   
of EUA.                                   
  
  
  
  
[facility]  
Other Phone: [phone] complaint Narrative - Reported* JOCELIN PACHECO is 
  being seen for a consultation for an abnormal ECG and abnormal test(s) 
  results.  
* 67-year-old gentleman seen in cardiology consultation at the request of 
  primary care for worsening shortness of breath and exertional dyspnea. Patient
  had abnormal Lexiscan imaging revealing global hypokinesis with ejection 
  fraction of 44% and a normal transient ischemic index but no evidence of isc
  hemia. Prior heart catheterization performed by myself in  revealed 
  minimal coronary disease with reduced left ventricular function  
* Today's ECG is normal. Echocardiogram reveals LVH with normal left ventricular
  function and a septal thickness of 1.8 cm with no outflow tract obstructive 
  physiology.  
* Patient has a history of morbid obesity, obstructive sleep apnea, history of 
  right lower extremity DVT, essential hypertension.  
* The driving symptom currently is shortness of breath and dyspnea with exertion
  now with evidence ofLV dysfunction and morbid obesity  
* We have counseled him on dietary discretion, weight loss and exercise and 
  aerobic training as a means towards improving his overall demeanor/physiology 
  and anatomy and body habitus.  
* I would simply recommend appropriate guideline directed medical therapies with
  Entresto, spironolactone, SGLT2 inhibitor (Jardiance), discontinue 
  telmisartan, with appropriate laboratory and clinicalfollow-up 
  thereafterwards. Extensive education and counseling performed with the patient
  for over 30 minutes this morning, he is agreeing to the above will follow up 
  as described  
-[facility] [address] DO  
Work Phone: [phone] noteNo InformationNort Coast Professional 
[facility]por[facility]  
Other Phone: [phone] note*   
  
                          Diagnosis    Onset Date   Resolution   Status  
   
                          Edema of right lower leg                           chr  
onic  
   
                          Morbid obesity due to excess calories                   
          chronic  
   
                          PVD (peripheral vascular disease)                       
      chronic  
   
                          Varicose veins of both legs with edema                  
           chronic  
   
                          Traumatic open wound of right lower leg                 
            resolved  
   
                          Venous stasis ulcer                           resolved  
  
  
[facility]  
Work Phone: [phone] noteNo assessment information available
[facility]  
Work Phone: [phone] note*   
  
                          Diagnosis    Onset Date   Resolution   Status  
   
                          Edema of right lower leg                           chr  
onic  
   
                          Inflammation                           chronic  
   
                          Morbid obesity due to excess calories                   
          chronic  
   
                          PVD (peripheral vascular disease)                       
      chronic  
   
                          Varicose veins of both legs with edema                  
           chronic  
   
                          Cellulitis                             resolved  
   
                          Venous stasis ulcer                           resolved  
  
  
[facility]  
Work Phone: [phone] note*   
  
                                                    Diagnosis  
   
                                                      
  
  
Onychomycosis- Primary  
  
  
Dermatophytosis of nail  
   
                                                      
  
  
Pain in both feet  
   
                                                      
  
  
Corns and callosities  
   
                                                      
  
  
Other specified peripheral vascular diseases (CMS/HCC)  
   
                                                      
  
  
Circulating anticoagulant disorder (CMS/HCC)  
  
  
Other and unspecified coagulation defects  
  
documented in this encounter  
[facility]   
  
                                                    Diagnosis  
   
                                                      
  
  
Lumbosacral strain, initial encounter- Primary  
   
                                                      
  
  
Acute pain of left shoulder  
   
                                                      
  
  
Wound of left lower extremity, initial encounter  
  
documented in this encounter  
[facility]   
  
                          Diagnosis    Onset Date   Resolution   Status  
   
                          Edema of right lower leg                           chr  
onic  
   
                          Inflammation                           chronic  
   
                          Morbid obesity due to excess calories                   
          chronic  
   
                          PVD (peripheral vascular disease)                       
      chronic  
   
                          Varicose veins of both legs with edema                  
           chronic  
   
                          Venous stasis ulcer                           resolved  
  
  
[facility]  
Work Phone: [phone] note*   
  
                                                    Diagnosis  
   
                                                      
  
  
Non-ischemic cardiomyopathy (Multi)- Primary  
  
  
Other primary cardiomyopathies  
   
                                                      
  
  
DIMITRI on CPAP  
   
                                                      
  
  
BMI 50.0-59.9, adult (Multi)  
  
documented in this encounter  
[facility]  
Work Phone: [phone] general Narrative - Reported*   
  
                                Type            Description     Date  
   
                                Medical History Osteoarthritis Bilateral Knees   
   
                                Medical History Hypertension      
   
                                Medical History Chronic Back Pain   
   
                                Medical History Cardiomyopathy    
   
                                Medical History DIMITRI               
   
                                Medical History vein closure      
   
                                Medical History Venous Ulcer with Pain   
   
                                Surgical History gall bladder      
   
                                Surgical History hand surgery      
   
                                Surgical History heart catheterization   
   
                                Hospitalization History infection         
  
  
[facility]  
Other Phone: [phone] note  
  
  
  
  
                                        Author              Elaine Espino  
[facility]  
2023 1:19pm  
   
                                        Note Date/Time      2023 1:19  
pm  
   
                                                    [facility]  
ENTER  
[address] [place] [address]  
  
Wound Center Provider Note  
Signed  
  
Patient: Jocelin Pacheco MR#:   
[id number]  
: 1955 Acct:[id number]  
  
Age/Sex: 68 / M  
  
Copies to: DO Elaine Arboleda, SOWMYA~  
  
  
HPI  
Date of Visit  
Date of Visit:  
Date of Service: 2023  
Time of Service: 13:17  
  
Narrative  
HPI:  
23 Jocelin is a 68-year-old male presenting to [facility]   
wound care program for an initial visit to the office for a right lower 
extremity   
venous stasis ulcer/lymphedema ulcer.? The patient has been a patient of the 
office   
for quite some time now and has seen other providers.  
He had seen vascular in [place] was told that there is nothing else that can be 
done   
for him.  
He has an extensive history of infections so I imagine this will continue to be 
an   
issue for him.  
He appears to need better edema control.  
He was told by ortho that he needs to lose 50 pounds in order to have surgery- 
this   
was 2 years ago and has not been accomplished.  
A past venous duplex indicates deep vein issues which are not surgically 
correctable-   
vascular did confirm that he does not need to see them because there is nothing 
they   
have to offer him.  
He is still retired so hopefully there is more opportunity for elevation and   
compression of the legs to support healing of the ulcer.  
Nutrition and probiotics were discussed and suggestions were given.  
Doxycycline was escribed today for what appears to be cellulitis of the RLE.  
23 looks better, is still taking the antibiotics, change to collagen silver   
topically and clobetasol to periwound skin that he says is itchy, will return 
for   
dressing changes, will bring his compression socks from home to the next visit 
since   
most of our wraps tend to roll down on his legs, says that he will use the   
compression pumps  
23 healed, steroid and compression sock applied, aware to call sooner than 
later   
if something reopens  
  
Subjective  
Pain  
Right Lower Leg:  
Pain Intensity: 0  
Wound/Ulcer History  
When did wound start?: 2023  
Mode of Arrival/ : Personal vehicle  
Assistive Device Used Today: Cane  
Lives with:: Alone  
Appetite Description: Within Normal Limits  
Who helps w/ dressing change?: Wound Care Dept  
Why Do You Need Help?: Can't Reach Ulcer  
Smoking Status: Never smoker  
  
[facility]  
Medical History (Updated 23 @ 13:19 by SOWMYA Correa)  
  
Arthritis  
Bilateral knees,  right is bone on bone  per  Dr. Richard   
Back pain  
Cardiomyopathy  
 lower part of heart is weak   
Carpal tunnel syndrome  
right arm surgery  
DVT (deep venous thrombosis)  
 blood clot from right foot to right thigh  on  blood thinner   
Hypertension  
Itching  
Itching with irritation  
DIMITRI (obstructive sleep apnea)  
PVD (peripheral vascular disease)  
 poor veins  Dr. Cameron did vein closure 2019, needs 5 more viens worked 
on.  
Wound of right lower extremity  
  
Surgical History (Reviewed 23 @ 11:09 by Maren Martinez RN)  
  
Hx of cholecystectomy  
  
Family History (Reviewed 23 @ 11:09 by Maren Martinez RN)  
Father Diabetes mellitus, type 2  
Sister Diabetes mellitus, type 2  
  
Social History  
Smoking Status: Never smoker  
Substance Use Type: None  
  
Grafts  
History of Graft  
History of Graft?: Yes  
  
Exam  
Physical Exam  
Vital Signs:  
  
  
  
  
Temp Pulse Resp BP O2 Del Method  
  
97.7 F 81 20 123/73 Room Air  
  
23 13:12 23 13:12 23 13:12 23 13:12 23 13:12  
  
  
  
Const  
General: cooperative, comfortable and no acute distress  
Nutritional Appearance: obese  
Orientation: alert, awake and oriented x3  
  
Lower/Upper Extremity Exam  
Vascular Exam-Edema  
Right Lower Extremity:  
Edema Type: Lymphedema  
Vascular Exam-Pulses  
Right Brachial:  
Pulse Assessment Method: NIBP  
  
Objective  
Meds/Allergies  
Home Medications  
  
carvedilol 6.25 mg tablet 6.25 mg PO BID 17 [History Confirmed 23]  
furosemide 40 mg tablet (Lasix) 40 mg PO BID 17 [History Confirmed 
23]  
acetaminophen 500 mg tablet (Tylenol Extra Strength) 500 mg PO TID PRN Pain 
20   
[History Confirmed 23]  
nabumetone 750 mg tablet 750 mg PO BID 21 [History Confirmed 23]  
empagliflozin 10 mg tablet (Jardiance) 10 mg PO DAILY 22 [History 
Confirmed   
23]  
rivaroxaban 10 mg tablet (Xarelto) 10 mg PO DAILY 22 [History Confirmed   
23]  
semaglutide 2 mg/dose (8 mg/3 mL) subcutaneous pen injector (Ozempic) 2 mg 
subcut Q7D   
22 [History Confirmed 23]  
spironolactone 25 mg tablet 25 mg PO DAILY 22 [History Confirmed 23]  
doxycycline hyclate 100 mg capsule 100 mg PO BID 14 days #28 caps 23 [Rx   
Confirmed 23]  
sacubitril 24 mg-valsartan 26 mg tablet (Entresto) 1 tab PO BID 23 
[History   
Confirmed 23]  
  
  
Allergies  
  
cefixime Allergy (Verified 23 11:09)  
Unknown Reaction  
diclofenac Allergy (Verified 23 11:09)  
Unknown Reaction  
nystatin Allergy (Verified 23 11:09)  
Unknown Reaction  
sodium benzoate Allergy (Verified 23 11:09)  
Unknown Reaction  
  
  
Wound/Ulcer  
Right Lower Leg:  
Type: Venous Stasis Ulcer  
Thickness: Skin Breakdown  
Length (cm): 0  
Width (cm): 0  
Depth (cm): 0  
CM Sq: 0.000  
Surrounding Tissue Appearance: Hyperpigmented  
Surrounding Tissue Temp: Warm  
Drainage Amount: None  
Drainage Odor: No Odor  
Results  
Height: 5 ft 11 in  
Weight: 160.118 kg  
Body Mass Index: 49.2  
  
Assessment/Plan  
Assessment/Plan  
(1) Venous stasis ulcer:  
Qualifiers:  
Laterality: right Non-pressure ulcer stage: limited to breakdown of skin 
Varicose   
vein presence: with varicose veins Venous stasis ulcer site: other part of lower
leg   
Qualified Code(s): I83.018 - Varicose veins of right lower extremity with ulcer 
other   
part of lower leg; L97.811 - Non-pressure chronic ulcer of other part of right 
lower   
leg limited to breakdown of skin  
Code(s):  
I83.009 - Varicose veins of unspecified lower extremity with ulcer of 
unspecified   
site; L97.909 - Non-pressure chronic ulcer of unspecified part of unspecified 
lower   
leg with unspecified severity  
Status: Resolved  
(2) Edema of right lower leg:  
Code(s):  
R60.0 - Localized edema  
Status: Chronic  
(3) Morbid obesity due to excess calories:  
Code(s):  
E66.01 - Morbid (severe) obesity due to excess calories  
Status: Chronic  
(4) Lymphedema of both lower extremities:  
Code(s):  
I89.0 - Lymphedema, not elsewhere classified  
Status: Suspected  
(5) PVD (peripheral vascular disease):  
Code(s):  
I73.9 - Peripheral vascular disease, unspecified  
Status: Chronic  
(6) Varicose veins of both legs with edema:  
Code(s):  
I83.893 - Varicose veins of bilateral lower extremities with other complications  
Status: Chronic  
(7) Inflammation:  
Status: Chronic  
Time spent with patient  
Time Spent With Patient (min): 10  
  
  
  
  
Dictated By: SOWMYA Correa DD/DT: 23  
  
Signed By: <Electronically signed by SOWMYA Espino>  
23 1319  
   
  
[facility]  
Work Phone: [phone] for referral (narrative)* Consultation 
  (Routine) - Authorized  
  
                          Specialty    Diagnoses / Procedures Referred By Contac  
t Referred To Contact  
   
                                        Cardiology            
  
  
Diagnoses  
  
  
Non-ischemic cardiomyopathy   
(Multi)  
  
  
  
Procedures  
  
  
Follow Up In Cardiology                   
  
  
Sharlene An APRN-CNP  
  
  
[address]  
  
  
[facility], [address] [address]  
  
  
Phone: [phone]  
  
  
Fax: [phone]                         
  
  
[facility] [place],   
  
  
[address] [facility] [address]  
  
  
[place] [address]  
  
  
Phone: [phone]  
  
  
Fax: [phone]  
  
  
  
                    Referral ID Status    Reason    Start Date Expiration Date V  
isits   
Requested                               Visits   
Authorized  
   
                [id number] Authorized         2024 1       1  
  
  
  
  
Electronically signed by Sharlene URBINA at 2024 11:39 AM EDT  
  
  
[facility]  
Work Phone: [phone]  
  
Summary Purpose  
  
  
                                                      
  
  
  
Family History  
No Family History Records FoundUnknown Family Member  
  
                                Name            Dates           Details  
   
                                                    Family history of diabetes m  
ellitus: Sister, Mother,   
Father(V18.0, Z83.3)  
                                                    Status:Active  
   
                                                    COPD, moderate: Mother, Sist  
er  
                                                    Status:Active  
  
                              Unknown Family Member  
  
                                Name            Dates           Details  
   
                                                    Family history of diabetes m  
ellitus: Sister, Mother,   
Father(V18.0, Z83.3)  
                                                    Status:Active  
   
                                                    COPD, moderate: Mother, Sist  
er  
                                                    Status:Active  
  
  
  
                          Relationship Condition    Age at Onset Recorded Date/T  
ceasar  
   
                          father       Type 2 diabetes mellitus Unknown        
   
                          sister       Type 2 diabetes mellitus Unknown        
  
                              Unknown Family Member  
  
                                Name            Dates           Details  
   
                                                    Family history of diabetes m  
ellitus: Sister, Mother,   
Father(V18.0, Z83.3)  
                                                    Status:Active  
   
                                                    COPD, moderate: Mother, Sist  
er  
                                                    Status:Active  
  
                              Unknown Family Member  
  
                                Name            Dates           Details  
   
                                                    Family history of diabetes m  
ellitus: Sister, Mother,   
Father(V18.0, Z83.3)  
                                                    Status:Active  
   
                                                    COPD, moderate: Mother, Sist  
er  
                                                    Status:Active  
  
                              Unknown Family Member  
  
                                Name            Dates           Details  
   
                                                    Family history of diabetes m  
ellitus: Sister, Mother,   
Father(V18.0, Z83.3)  
                                                    Status:Active  
   
                                                    COPD, moderate: Mother, Sist  
er  
                                                    Status:Active  
  
                              Unknown Family Member  
  
                                Name            Dates           Details  
   
                                                    Family history of diabetes m  
ellitus: Sister, Mother,   
Father(V18.0, Z83.3)  
                                                    Status:Active  
   
                                                    COPD, moderate: Mother, Sist  
er  
                                                    Status:Active  
  
  
  
                          Relationship Condition    Age at Onset Recorded Date/T  
ceasar  
   
                          father       Type 2 diabetes mellitus Unknown        
   
                          sister       Type 2 diabetes mellitus Unknown        
   
                          brother      Heart disease Unknown        
   
                          father       Heart disease Unknown        
   
                                       Diabetes mellitus Unknown        
   
                                       Heart failure Unknown        
   
                                            Unknown        
   
                          family member      Unknown        
   
                          Not Specified      Unknown        
   
                                       Heart disease Unknown        
   
                          sister       Heart disease Unknown        
  
  
  
                          Relationship Condition    Age at Onset Recorded Date/T  
ceasar  
   
                          father       Type 2 diabetes mellitus Unknown        
   
                          sister       Type 2 diabetes mellitus Unknown        
   
                          brother      Heart disease Unknown        
   
                          father       Heart disease Unknown        
   
                                       Diabetes mellitus Unknown        
   
                                       Heart failure Unknown        
   
                                            Unknown        
   
                          family member      Unknown        
   
                          mother            Unknown        
   
                                       Heart disease Unknown        
   
                          sister       Heart disease Unknown        
  
  
  
Advance Directives  
No Advanced Directives Records Found  
  
                                Advance Directive Response        Recorded Date/  
Time  
   
                                Advance Directives No               9:56pm  
  
  
  
                                Advance Directive Response        Recorded Date/  
Time  
   
                                Advance Directives No               8:56pm  
  
  
  
Chief Complaint and Reason for Visit  
  
  
                                        Chief Complaint     Obesity  
Open Wound  
   
                                        Reason for Visit    Edema of right lower  
 leg  
Morbid obesity due to excess calories  
PVD (peripheral vascular disease)  
Varicose veins of both legs with edema  
Traumatic open wound of right lower leg  
Venous stasis ulcer  
  
  
  
                                        Chief Complaint     Obesity  
R07.9 I50.9 I42.8  
  
  
  
                                        Chief Complaint     Obesity  
exercise  
  
  
  
                                        Chief Complaint     Open Wound  
   
                                        Reason for Visit    Edema of right lower  
 leg  
Inflammation  
Morbid obesity due to excess calories  
PVD (peripheral vascular disease)  
Varicose veins of both legs with edema  
Cellulitis  
Venous stasis ulcer  
  
  
  
                                        Chief Complaint     Open Wound  
Obesity  
D68.318 N18.2 R73.03 E66.01  
   
                                        Reason for Visit    Edema of right lower  
 leg  
Inflammation  
Morbid obesity due to excess calories  
PVD (peripheral vascular disease)  
Varicose veins of both legs with edema  
Cellulitis  
Venous stasis ulcer  
  
  
  
                                        Chief Complaint     Open Wound  
   
                                        Reason for Visit    Edema of right lower  
 leg  
Inflammation  
Morbid obesity due to excess calories  
PVD (peripheral vascular disease)  
Varicose veins of both legs with edema  
Venous stasis ulcer  
  
  
  
                                        Chief Complaint     Open Wound  
WMN f/u restart  
   
                                        Reason for Visit    Edema of right lower  
 leg  
Inflammation  
Morbid obesity due to excess calories  
PVD (peripheral vascular disease)  
Varicose veins of both legs with edema  
Venous stasis ulcer  
Adult BMI 50.0-59.9 kg/sq m  
Mixed hyperlipidemia  
DIMITRI on CPAP  
Osteoarthritis of knee  
Type 2 diabetes mellitus with unspecified complications  
  
  
  
                                        Chief Complaint     Open Wound  
WMN f/u restart  
Open Wound  
   
                                        Reason for Visit    Edema of right lower  
 leg  
Inflammation  
Morbid obesity due to excess calories  
PVD (peripheral vascular disease)  
Varicose veins of both legs with edema  
Venous stasis ulcer  
Adult BMI 50.0-59.9 kg/sq m  
Mixed hyperlipidemia  
DIMITRI on CPAP  
Osteoarthritis of knee  
Type 2 diabetes mellitus with unspecified complications  
Adult BMI 50.0-59.9 kg/sq m  
Hematoma of right lower leg  
Type 2 diabetes mellitus with unspecified complications  
  
  
  
                                        Chief Complaint     Open Wound  
WMN f/u restart  
Bilateral lower extremity edema  
Open Wound  
   
                                        Reason for Visit    Edema of right lower  
 leg  
Inflammation  
Morbid obesity due to excess calories  
PVD (peripheral vascular disease)  
Varicose veins of both legs with edema  
Venous stasis ulcer  
Adult BMI 50.0-59.9 kg/sq m  
Mixed hyperlipidemia  
DIMITRI on CPAP  
Osteoarthritis of knee  
Type 2 diabetes mellitus with unspecified complications  
Adult BMI 50.0-59.9 kg/sq m  
CAD (coronary artery disease)  
Mixed hyperlipidemia  
DIMITRI on CPAP  
Osteoarthritis of knee  
Type 2 diabetes mellitus with unspecified complications  
HTN (hypertension)  
Adult BMI 50.0-59.9 kg/sq m  
Hematoma of right lower leg  
Leg wound, right  
Type 2 diabetes mellitus with unspecified complications  
Cellulitis  
  
  
  
                                        Chief Complaint     WMN f/u restart  
Open Wound  
Bilateral lower extremity edema  
   
                                        Reason for Visit    Adult BMI 50.0-59.9   
kg/sq m  
Mixed hyperlipidemia  
DIMITRI on CPAP  
Osteoarthritis of knee  
Type 2 diabetes mellitus with unspecified complications  
Adult BMI 50.0-59.9 kg/sq m  
CAD (coronary artery disease)  
Mixed hyperlipidemia  
DIMITRI on CPAP  
Osteoarthritis of knee  
Type 2 diabetes mellitus with unspecified complications  
HTN (hypertension)  
Adult BMI 50.0-59.9 kg/sq m  
Hematoma of right lower leg  
Leg wound, right  
Type 2 diabetes mellitus with unspecified complications  
Cellulitis  
  
  
  
                                        Chief Complaint     Bilateral lower extr  
emity edema  
Open Wound  
   
                                        Reason for Visit    Adult BMI 50.0-59.9   
kg/sq m  
Mixed hyperlipidemia  
DIMITRI on CPAP  
Osteoarthritis of knee  
Type 2 diabetes mellitus with unspecified complications  
Adult BMI 50.0-59.9 kg/sq m  
Hematoma of right lower leg  
Leg wound, right  
Type 2 diabetes mellitus with unspecified complications  
Urinary incontinence  
Venous insufficiency of right lower extremity  
Cellulitis  
  
  
  
Chief Complaint  
* JOCELIN PACHECO is being seen for a 6 month follow-up of.  
* 62-year-old gentleman who returns for annual visit he is doing well just 
  complains atypical chest discomfort described as tingling as well as sharp and
  somewhat painful sensation both at rest and with activities that are sporadic 
  and episodic only but not reproducible. He has a history of moderate n
  onischemic cardiomyopathy, obstructive sleep apnea, morbid obesity, history of
  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  
* Stress perfusion imaging from 2022 revealed patchy tracer uptake, proceed
  ischemia or infarction, normal left ventricular volume with global hypokinesis
  and ejection fraction of 44% normal transient ischemic index.  
* Heart catheterization from  revealed 30 to 50% ostial circumflex disease, 
  20 to 25% mid LAD disease normal right coronary and at that time ejection 
  fraction of 40 to 45%.  
* Since  with his medical history continue therapies. We have transitioned 
  over to Entresto, Jardiance, carvedilol and spironolactone  
* Unfortunately he has had no significant weight loss despite going to cardiac 
  rehab 3 times a week. He remains 365 pounds, similar to   
* Recommendations, obtain a MUGA scan current therapies, obtain appropriate 
  laboratories including Chem-6 and BNP, we will have him come back in 6 months 
  continues to have any further chest discomfort will prescribe nitrates and/or 
  consider invasive assessment.  
  
  
Reason for Referral  
  
  
                          Specialty    Diagnoses / Procedures Referred By Contac  
t Referred To Contact  
   
                                        Physical Therapy      
  
  
Diagnoses  
  
  
Lumbosacral strain,   
initial encounter  
  
  
Acute pain of left   
shoulder  
  
  
  
Procedures  
  
  
MT OFFICE/OUTPATIENT NEW   
HIGH MDM 60 MINUTES                       
  
  
Ray Terry, NP  
  
  
[address]  
  
  
[place] [address]  
  
  
Phone: [phone]  
  
  
Fax: [phone]                         
  
  
Jessica Lopez, PT  
  
  
[address]  
  
  
[address]  
  
  
[place]   
[address]  
  
  
Phone: [phone]  
  
  
Fax: [phone]  
  
  
  
                      Referral ID Status     Reason     Start Date Expiration   
Date                                    Visits   
Requested                               Visits   
Authorized  
   
                                [id number]          Authorized        
  
  
Consult and   
Treat           2024        10              10  
  
  
  
Additional Source Comments  
  
  
  
                                                    PREGNANCY (unrecognized sect  
ion and content)  
   
                                                    No Pregnancy Status Records   
FoundNo Pregnancy Status Records FoundNo Pregnancy   
Status   
Records FoundNo Pregnancy Status Records FoundNo Pregnancy Status Records 
FoundNo   
Pregnancy Status Records FoundNo Pregnancy Status Records Found  
  
  
  
  
                                                    INFORMATION SOURCE (unrecogn  
ized section and content)  
   
                                          
  
  
  
                                        DATE CREATED        AUTHOR  
   
                                2022                      [facility]  
dical Specialist  
  
  
  
                                DATE CREATED    AUTHOR          AUTHOR'S ORGANIZ  
ATION  
   
                                2023                       [facility]  
  
  
  
                                DATE CREATED    AUTHOR          AUTHOR'S ORGANIZ  
ATION  
   
                                2023                       [facility]  
  
  
  
                                DATE CREATED    AUTHOR          AUTHOR'S ORGANIZ  
ATION  
   
                                08/10/2023                      [facility]  
ical Center  
  
  
  
                                DATE CREATED    AUTHOR          AUTHOR'S ORGANIZ  
ATION  
   
                                05/10/2024                      [facility]  
[facility]  
  
  
  
                                DATE CREATED    AUTHOR          AUTHOR'S ORGANIZ  
ATION  
   
                                2024                      [facility]  
dical Specialists [facility]  
  
  
  
                                DATE CREATED    AUTHOR          AUTHOR'S ORGANIZ  
ATION  
   
                                10/08/2024                      [facility]  
[facility]  
  
  
  
  
  
                                                    REASON FOR VISIT (unrecogniz  
ed section and content)  
   
                                          
  
  
  
                                        Reason              Comments  
   
                                        Back Pain           Pt presents with low  
er back pain with onset of 2 weeks with pain   
increasing. Took tylenol with no relief. Has used ice on it a few times.   
Left shoulder is hurting. Denies any trouble urinating.  
  
  
  
                                        Reason              Comments  
   
                                        Follow-up           10m per wss  
  
  
  
  
  
                                                    Care Teams (unrecognized sec  
tion and content)  
   
                                          
  
  
  
                                                    Team Status: Active   
   
                          Member       Role         Status       Dates  
   
                          Vera Melvin , DO Primary Care Provider Active   
        
  
  
  
                                                    Team Status: Inactive   
   
                          Member       Role         Status       Dates  
   
                          Vera Melvin , DO Primary Care Provider Active   
      Start: 2024  
End: 2024  
   
                          SOWMYA Correa Attending Provider Active       St  
art: 2024  
End: 2024  
  
  
  
                                                    Team Status: Active   
   
                          Member       Role         Status       Dates  
   
                          Vera Melvin , DO Primary Care Provider Active   
        
   
                          ESVIN Haro Attending Provider Active         
  
  
  
                                                    Team Status: Inactive   
   
                          Member       Role         Status       Dates  
   
                          Vera Melvin , DO Primary Care Provider Active   
        
   
                          SOWMYA Correa Attending Provider Active         
  
  
  
                                                    Team Status: Inactive   
   
                          Member       Role         Status       Dates  
   
                          Vera Melvin , DO Primary Care Provider, Attend  
ing Provider Active         
  
  
  
                                                    Team Status: Inactive   
   
                          Member       Role         Status       Dates  
   
                          Vera Melvin , DO Primary Care Provider Active   
        
   
                          Jacoby Braden MD Attending Provider Active         
  
  
  
                                                    Team Status: Inactive   
   
                          Member       Role         Status       Dates  
   
                          Vera Melvin , DO Primary Care Provider Active   
        
   
                          SOWMYA Gutierrez Attending Provider Active       
    
  
  
  
                                                    Team Status: Inactive   
   
                          Member       Role         Status       Dates  
   
                          Vera Melvin ,  Primary Care Provider Active   
        
   
                          JASON Maldonado DO Attending Provider Active         
  
  
  
                      Team Member Relationship Specialty  Start Date End Date  
   
                                                      
  
  
Vera Melvin DO  
  
  
NPI: [id number]  
  
  
[address]  
  
  
[place] [address]  
  
  
[phone] (Work)  
  
  
[phone] (Fax) PCP - ACO Reach                 23           
   
                                                      
  
  
Vera Melvin DO  
  
  
NPI: [id number]  
  
  
[address]  
  
  
[place] [address]  
  
  
[phone] (Work)  
  
  
[phone] (Fax) PCP - General   Internal Medicine 24            
   
                                                      
  
  
Danya An DPM  
  
  
NPI: [id number]  
  
  
[address]  
  
  
[place] [address]  
  
  
[phone] (Work)  
  
  
[phone] (Fax) Referring Physician Podiatry        23          
   
                                                      
  
  
Elroy Wall MD  
  
  
NPI: [id number]  
  
  
[address] [address]  
  
  
[phone] (Work)  
  
  
[phone] (Fax) Referring Physician Cardiology      23          
   
                                                      
  
  
Steff Espino    Nurse Practitioner Wound Care      23          
  
  
  
                      Team Member Relationship Specialty  Start Date End Date  
   
                                                      
  
  
Vera Melvin DO  
  
  
NPI: [id number]  
  
  
[address]  
  
  
[place] [address]  
  
  
[phone] (Work)  
  
  
[phone] (Fax) PCP - ACO Reach                 23           
   
                                                      
  
  
Vera Melvin DO  
  
  
NPI: [id number]  
  
  
[address]  
  
  
[place] [address]  
  
  
[phone] (Work)  
  
  
[phone] (Fax) PCP - General   Internal Medicine 24            
   
                                                      
  
  
Danya An DPM  
  
  
NPI: [id number]  
  
  
[address]  
  
  
[place] [address]  
  
  
[phone] (Work)  
  
  
[phone] (Fax) Referring Physician Podiatry        23          
   
                                                      
  
  
Elroy Wall MD  
  
  
NPI: [id number]  
  
  
[address] [address]  
  
  
[phone] (Work)  
  
  
[phone] (Fax) Referring Physician Cardiology      23          
   
                                                      
  
  
Steff Espino    Nurse Practitioner Wound Care      23          
  
  
  
                      Team Member Relationship Specialty  Start Date End Date  
   
                                                      
  
  
Vera Melvin DO  
  
  
NPI: [id number]  
  
  
[address]  
  
  
[place] [address]  
  
  
[phone] (Work)  
  
  
[phone] (Fax) PCP - ACO Reach                 23           
   
                                                      
  
  
Vera Melvin DO  
  
  
NPI: [id number]  
  
  
[address]  
  
  
[place] [address]  
  
  
[phone] (Work)  
  
  
[phone] (Fax) PCP - General   Internal Medicine 24            
   
                                                      
  
  
Danya An DPM  
  
  
NPI: [id number]  
  
  
[address]  
  
  
[place] [address]  
  
  
[phone] (Work)  
  
  
[phone] (Fax) Referring Physician Podiatry        23          
   
                                                      
  
  
Elroy Wall MD  
  
  
NPI: [id number]  
  
  
[address]  
  
  
[place] [address]  
  
  
[phone] (Work)  
  
  
[phone] (Fax) Referring Physician Cardiology      23          
   
                                                      
  
  
Steff Espino    Nurse Practitioner Wound Care      23          
  
  
  
                                                    Team Status: Inactive   
   
                          Member       Role         Status       Dates  
   
                          Vera Melvin DO Primary Care Provider Active   
      Start: 2024  
End: 2024  
   
                          Jacoby Braden MD Attending Provider Active         
Start: 2024  
End: 2024  
  
  
  
                      Team Member Relationship Specialty  Start Date End Date  
   
                                                      
  
  
Vera Melvin DO  
  
  
NPI: [id number]  
  
  
[address]  
  
  
[place] [address]  
  
  
[phone] (Work)  
  
  
[phone] (Fax) PCP - General                   00            
  
  
  
                                                    Team Status: Active   
   
                          Member       Role         Status       Keyanna Melvin DO Primary Care Provider Active   
      Start: 2024  
  
   
                          SOWMYA Correa Attending Provider Active       St  
art: 2024  
  
  
  
  
                                                    Team Status: Inactive   
   
                          Member       Role         Status       Keyanna Melvin DO Primary Care Provider Active   
      Start: 2024  
End: 2024  
   
                          Jacoby Braden MD Attending Provider Active         
Start: 2024  
End: 2024  
  
  
  
                                                    Team Status: Active   
   
                          Member       Role         Status       Keyanna Melvin DO Primary Care Provider Active   
      Start: 2024  
  
   
                          SOWMYA Correa Attending Provider Active       St  
art: 2024  
  
  
  
  
                                                    Team Status: Active   
   
                          Member       Role         Status       Dates  
   
                          Vera Melvin DO Primary Care Provider Active   
      Start: 2024  
  
   
                          SOWMYA Correa Attending Provider Active       St  
art: 2024  
  
  
  
  
                                                    Team Status: Inactive   
   
                          Member       Role         Status       Keyanna Melvin DO Primary Care Provider Active   
      Start: 2024  
End: 2024  
   
                          ROM Peng Attending Provider Active        
 Start: 2024  
End: 2024  
  
  
  
                                                    Team Status: Active   
   
                          Member       Role         Status       Keyanna  
   
                          Vera Melvin ,  Primary Care Provider Active   
      Start: 2024  
  
   
                          SOWMYA Correa Attending Provider Active       St  
art: 2024  
  
  
  
  
                                                    Team Status: Active   
   
                          Member       Role         Status       Keyanna  
   
                          Vera Melvin ,  Primary Care Provider Active   
      Start: 2024  
  
   
                          SOWMYA Correa Attending Provider Active       St  
art: 2024  
  
  
  
  
                                                    Team Status: Inactive   
   
                          Member       Role         Status       Keyanna  
   
                          Vera Melvin ,  Primary Care Provider Active   
      Start: 2024  
End: 2024  
   
                          Jacoby Braden MD Attending Provider Active         
Start: 2024  
End: 2024  
  
  
  
                                                    Team Status: Inactive   
   
                          Member       Role         Status       Keyanna  
   
                          Vera Melvin DO Primary Care Provider Active   
      Start: 2024  
End: 2024  
   
                          SOWMYA Correa Attending Provider Active       St  
art: 2024  
End: 2024  
  
  
  
                                                    Team Status: Active   
   
                          Member       Role         Status       Keyanna  
   
                          Vera Melvin DO Primary Care Provider Active   
      Start: 2024  
  
   
                          SOWMYA Correa Attending Provider Active       St  
art: 2024  
  
  
  
  
  
  
                                                    Goals (unrecognized section   
and content)  
   
                                                    Goals may be documented in a  
n alternate section  
  
FOR RECORDS PERTAINING TO PATIENTS WHO ARE OR HAVE BEEN ENROLLED IN A CHEMICAL 
DEPENDENCY/SUBSTANCEABUSE PROGRAM, SOME INFORMATION MAY BE OMITTED. This 
clinical summary was aggregated from multiple sources. Caution should be 
exercised in using it in the provision of clinical care. This summary normalizes
information from multiple sources, and as a consequence, information in this 
document may materially change the coding, format and clinical context of 
patient data. In addition, data may be omitted in some cases. CLINICAL DECISIONS
SHOULD BE BASED ON THE PRIMARY CLINICAL RECORDS. [facility] [employer] provides 
no warranty or guarantee of the accuracy or completeness of information in this 
document.

## 2024-10-11 NOTE — VEIN_ITS
Patient Name:      
JOCELIN ARORA 
      
MR#: [id number]      
: 1955 
Accession #: [id number] 
Exam Date: 10/11/2024  
Ordering Doctor: DR KAYY JUNIOR M.D. 
  
RADIOLOGY REPORT 
  
PROCEDURE:     VC EXT VENOUS RT LMTD 
  
COMPARISON:     VC EXT VENOUS RT LMTD, 2024. 
  
INDICATIONS:     I80.01 - Phlebitis and thrombophlebitis of superficial ve... 
  
TECHNIQUE:     Lower extremity grey scale and Duplex Doppler evaluation of the  
deep venous system from the inguinal ligament through the calf veins.   
  
FINDINGS:       
REGION:     Right lower extremity.   
THROMBI:     Negative for DVT. 
     Heat induced thrombus visualized in perforators at dist/lat calf and  
mid/lat calf.  
COMPRESSIBILITY:     Non-compressible segments corresponding to thrombus 
FLOW:     Areas of no flow corresponding to thrombus 
      
  
  
CONCLUSION:     Post ablation occlusion of treated perforating veins with no  
deep vein thrombus 
  
  
Dictated by: Alonso Nation MD on 10/11/2024 at 11:28      
Approved by: Alonso Nation MD on 10/11/2024 at 11:28

## 2024-10-25 ENCOUNTER — HOSPITAL ENCOUNTER
Dept: HOSPITAL 101 - VC | Age: 69
Discharge: HOME | End: 2024-10-25
Payer: MEDICARE

## 2024-10-25 VITALS — HEART RATE: 66 BPM | DIASTOLIC BLOOD PRESSURE: 62 MMHG | SYSTOLIC BLOOD PRESSURE: 118 MMHG | OXYGEN SATURATION: 96 %

## 2024-10-25 DIAGNOSIS — I83.813: Primary | ICD-10-CM

## 2024-10-25 PROCEDURE — 36466 NJX NONCMPND SCLRSNT MLT VN: CPT

## 2024-10-25 NOTE — VEINCLINIC_ITS
Vital Signs    
    
    
                                    10/25/24    
                                      13:10    
     
BP                                   118/62    
     
BP Location                      Right Brachial    
     
BP Position                          Sitting    
     
BP Cuff Size                          Adult    
     
BP Source                          Manual Cuff    
     
Respiration                            20    
     
Pulse                                  66    
     
Pulse Source                         Monitor    
     
Pulse Oximetry (%)                     96    
     
Oxygen Delivery Method              Room Air    
    
    
Varicose Veins    
    
Patient in this day for microfoam chemical ablation     
    
    
    
    
Reinaldo TOSCANO MD personally performed the services described in this   
documentation, as scribed by Khang Bahena RN in my presence and it is both accurate  
and complete.    
    
    
IKhang RN, am scribing for, and in the presence of, Dr. Reinaldo Anders and   
in the presence of the patient.    
thigh: bilateral (right leg > left leg), knee: bilateral, calf: bilateral,   
ankle: bilateral and shin: bilateral    
aching, burning, cramping, dull and tender    
10    
3 years    
Worsened in recent months: Yes (last 5 months)    
standing, sitting and walking    
analgesics (Tylenol), elevating extremities, compression stockings and wraps    
Reports muscle spasms of leg, fatigue, heaviness, limb pain, edema and leg edema    
History of lower extremity trauma: Yes (a piece of cpap machine fell and hit   
right lower leg resulting in hemtoma)    
Superficial thrombophlebitis: Yes (DVT right leg)    
Family history of varicose veins: no    
Has patient had previous lower extremity venous surgery: Yes    
Patient has previously received the following treatment(s) for lower extremity   
varicose veins: Reports foam therapy and laser therapy    
Does patient have a history of pregnancy: no    
Has patient had lower extremity venous scan with relux testing: Yes    
Support hose used: Yes    
Problems walking or doing physical activity: Yes    
How does it affect you: pain decreases ability to walk    
Do you walk much: No    
Do you stand much: Yes    
    
Review of Systems    
    
    
ROS      
    
 Narrative     
    
    
    
Reinaldo TOSCANO MD personally performed the services described in this   
documentation, as scribed by Khang Bahena RN in my presence and it is both accurate  
and complete.    
    
    
Khang TOSCANO RN, am scribing for, and in the presence of, Dr. Reinaldo Anders and   
in the presence of the patient.       
    
 Status of ROS                          10 or more systems reviewed and unremark    
able except as noted in     
history and below       
    
 Cardiovascular Reports: edema       
    
 Integumentary/Breast Reports: itching, redness, skin pain, skin tenderness,   
skin swelling, non-healing lesion and changes in skin color       
    
 Neurological Reports: numbness in extremities and weakness in extremities       
    
 Hematologic/Lymphatic Reports: easy bruising and easy bleeding       
    
    
PFSH    
[facility]    
Medical History (Updated 24 @ 07:48 by Maggie Barrios)    
    
Phlebitis and thrombophlebitis of superficial vessels of right lower extremity    
   ?I80.01 - Phlebitis and thrombophlebitis of superficial vessels of right   
   lower extremity (ICD-10)    
Varicose veins of bilateral lower extremities with pain    
   ?I83.813 - Varicose veins of bilateral lower extremities with pain (ICD-10)    
Lymphedema of both lower extremities    
   ?I89.0 - Lymphedema, not elsewhere classified (ICD-10)    
Arthritis    
   ?M19.90 - Unspecified osteoarthritis, unspecified site (ICD-10)    
Hypertension    
   ?I10 - Essential (primary) hypertension (ICD-10)    
Deep vein thrombosis    
   ?I82.409 - Acute embolism and thrombosis of unspecified deep veins of   
   unspecified lower extremity (ICD-10)    
Cardiomyopathy    
   ?I42.9 - Cardiomyopathy, unspecified (ICD-10)    
Carpal tunnel syndrome    
   ?G56.00 - Carpal tunnel syndrome, unspecified upper limb (ICD-10)    
Peripheral vascular disease    
   ?I73.9 - Peripheral vascular disease, unspecified (ICD-10)    
Ulcer of right leg    
   ?L97.919 - Non-pressure chronic ulcer of unspecified part of right lower leg   
   with unspecified severity (ICD-10)    
Coronary artery disease    
   ?I25.10 - Atherosclerotic heart disease of native coronary artery without   
   angina pectoris (ICD-10)    
CHF (congestive heart failure)    
   ?I50.9 - Heart failure, unspecified (ICD-10)    
Obstructive sleep apnea    
   ?G47.33 - Obstructive sleep apnea (adult) (pediatric) (ICD-10)    
Chronic back pain    
   ?M54.9 - Dorsalgia, unspecified (ICD-10)    
   ?G89.29 - Other chronic pain (ICD-10)    
Postphlebetic syndrome with inflammation    
   ?I87.029 - Postthrombotic syndrome with inflammation of unspecified lower   
   extremity (ICD-10)    
Type 2 diabetes mellitus    
   ?E11.9 - Type 2 diabetes mellitus without complications (ICD-10)    
    
    
Surgical History (Updated 10/25/24 @ 14:21 by Khang Bahena)    
    
S/P sclerotherapy of varicose veins    
   ?Z98.890 - Other specified postprocedural states (ICD-10)    
   ?Z86.79 - Personal history of other diseases of the circulatory system (ICD-  
   10)    
Status post laser ablation of incompetent vein    
   ?Z98.890 - Other specified postprocedural states (ICD-10)    
Status post laser ablation of incompetent vein    
   ?Z98.890 - Other specified postprocedural states (ICD-10)    
History of carpal tunnel surgery of right wrist    
   ?Z98.890 - Other specified postprocedural states (ICD-10)    
History of cholecystectomy    
   ?Z90.49 - Acquired absence of other specified parts of digestive tract (ICD-  
   10)    
    
    
Family History (Updated 24 @ 11:42 by Khang Bahena)    
Sister   Family history of diabetes mellitus    
Father   Family history of diabetes mellitus    
Other   Family history of CHF (congestive heart failure)    
   Family history of hypertension    
   Family history of myocardial infarction    
    
    
    
Social History (Updated 24 @ 10:55 by Khang Bahena)    
Within the past year, how often did you have a drink containing alcohol:  never     
Score interpretation:  A score less than 4 is consistent with normal alcohol   
consumption.     
Smoking status:  Never smoker     
Non-prescribed substance use:  denies use     
    
    
    
Meds    
Home Medications and Allergies    
                                Home Medications    
    
    
    
?Medication ?Instructions ?Recorded ?Confirmed ?Type    
     
Lactobacillus acidophilus 10 100 mmu cells PO DAILY 24 History    
    
billion cell capsule        
     
acetaminophen 500 mg capsule 500 mg PO Q6H PRN pain 24 History    
     
carvedilol 6.25 mg tablet 6.25 mg PO BID 24 History    
     
empagliflozin 10 mg-linagliptin 5 1 tab PO DAILY 24 History    
    
mg tablet        
     
furosemide 40 mg tablet 40 mg PO BID 24 History    
     
rivaroxaban 10 mg tablet 10 mg PO DAILY 24 History    
     
sacubitril 24 mg-valsartan 26 mg 1 tab PO BID 24 History    
    
tablet        
     
semaglutide 0.25 mg or 0.5 mg (2 0.25 mg subcut QWEEK 24 History    
    
mg/3 mL) subcutaneous pen injector        
    
(Ozempic)        
     
spironolactone 25 mg tablet 25 mg PO DAILY 24 History    
    
(Aldactone)        
    
    
    
                                    Allergies    
    
    
    
Allergy/AdvReac Type Severity Reaction Status Date / Time    
     
cefixime Allergy  Unknown Verified 24 11:44    
     
diclofenac Allergy  Unknown Verified 24 11:44    
    
    
    
    
Exam    
Narrative    
Exam Narrative:     
    
    
    
    
Reinaldo TOSCANO MD personally performed the services described in this   
documentation, as scribed by Khang Bahena RN in my presence and it is both accurate  
and complete.    
    
    
Khang TOSCANO RN, am scribing for, and in the presence of, Dr. Reinaldo Anders and   
in the presence of the patient.    
Constitutional    
Documenting provider has reviewed patient's vital signs: yes    
Common normals: oriented x3    
Nutritional appearance: overweight    
Cardio    
Peripheral pulses: posterior tibial pulses present and dorsalis pedis pulses   
present    
Extremity    
Common normals: normal capillary refill    
General: calf tenderness and edema    
Right lower extremity: lower leg Right lower leg: inspection and palpation    
Left lower extremity: lower leg Left lower leg: inspection and palpation    
Neuro    
Common normals: oriented x3    
    
Assessment and Plan    
Assessment and Plan    
(1) Varicose veins of bilateral lower extremities with pain:     
    
Plan    
f/u evaluation along with right leg limited u/s    
    
    
Reinaldo TOSCANO MD personally performed the services described in this   
documentation, as scribed by Khang Bahena RN in my presence and it is both accurate  
and complete.    
    
    
Khang TOSCANO RN, am scribing for, and in the presence of, Dr. Reinaldo Anders and   
in the presence of the patient.    
    
Procedures    
Procedure Instructions    
Procedures    
    
Right leg microfoam chemical ablation/Varithena:    
    
    
    
Risks and benefits of the procedure were discussed at length and informed   
written consent was obtained.? Time-out procedure was performed and the correct   
patient and procedure were confirmed.? Staff present during time-out: Khang Bahena RN and Reinaldo Anders MD.? Patient prepped and procedure performed in usual   
sterile fashion.? Patient was placed in Trendelenburg prior to   
Polidocanol/Varithena injections.    
    
    
    
Sclerosing Agent:?? 15cc 1% Polidocanol/Varithena    
    
    
    
Site Injected: Right lecc varithena administered in to a 4mm varicose vein   
right distal medial lower leg    
                                       5cc varithena administered in to a 6mm   
varicose vein right mid medial lower leg    
                                       5cc varithena administered in to a 6mm   
varicose vein right medial knee    
    
    
Number of Injections:? right leg    
    
    
    
The patient tolerated the procedure well without complication.? Hemostasis was   
obtained and thigh-high compression stocking was applied with foam pads.?   
Instructed patient to wear stocking for at least 96 hours and sleep with it and   
only remove for showering.? The patient was instructed to? wear stocking for 2   
weeks.? Patient verbalizes understanding and states they will comply.? Patient   
was given post-procedure instructions.     
    
    
    
Patient was discharged in good condition.? Scheduled to undergo limited venous   
ultrasound and? exam on 10/25/2024.    
    
    
IReinaldo MD personally performed the services described in this   
documentation, as scribed by Khang Bahena RN in my presence and it is both accurate  
and complete.    
    
    
IKhang RN, am scribing for, and in the presence of, Dr. Reinaldo Anders and   
in the presence of the patient.

## 2024-10-25 NOTE — V.VEINS.HP
Vital Signs
 10/25/24
13:10
/62
BP Location Right Brachial
BP Position Sitting
BP Cuff Size Adult
BP Source Manual Cuff
Respiration 20
Pulse 66
Pulse Source Monitor
Pulse Oximetry (%) 96
Oxygen Delivery Method Room Air

Varicose Veins

Patient in this day for microfoam chemical ablation 




Reinaldo TOSCANO MD personally performed the services described in this documentation, as scribed by Khang Bahena RN in my presence and it is both accurate and complete.


IKhang RN, am scribing for, and in the presence of, Dr. Reinaldo Anders and in the presence of the patient.
thigh: bilateral (right leg > left leg), knee: bilateral, calf: bilateral, ankle: bilateral and shin: bilateral
aching, burning, cramping, dull and tender
10
3 years
Worsened in recent months: Yes (last 5 months)
standing, sitting and walking
analgesics (Tylenol), elevating extremities, compression stockings and wraps
Reports muscle spasms of leg, fatigue, heaviness, limb pain, edema and leg edema
History of lower extremity trauma: Yes (a piece of cpap machine fell and hit right lower leg resulting in hemtoma)
Superficial thrombophlebitis: Yes (DVT right leg)
Family history of varicose veins: no
Has patient had previous lower extremity venous surgery: Yes
Patient has previously received the following treatment(s) for lower extremity varicose veins: Reports foam therapy and laser therapy
Does patient have a history of pregnancy: no
Has patient had lower extremity venous scan with relux testing: Yes
Support hose used: Yes
Problems walking or doing physical activity: Yes
How does it affect you: pain decreases ability to walk
Do you walk much: No
Do you stand much: Yes

Review of Systems
ROS  
 Narrative 



Reinaldo TOSCANO MD personally performed the services described in this documentation, as scribed by Khang Bahena RN in my presence and it is both accurate and complete.


Khang TOSCANO RN, am scribing for, and in the presence of, Dr. Reinaldo Anders and in the presence of the patient.   
 Status of ROS 10 or more systems reviewed and unremarkable except as noted in history and below   
 Cardiovascular Reports: edema   
 Integumentary/Breast Reports: itching, redness, skin pain, skin tenderness, skin swelling, non-healing lesion and changes in skin color   
 Neurological Reports: numbness in extremities and weakness in extremities   
 Hematologic/Lymphatic Reports: easy bruising and easy bleeding   

PFSH
[facility]
Medical History (Updated 24 @ 07:48 by Maggie Barrios)

Phlebitis and thrombophlebitis of superficial vessels of right lower extremity
 ?I80.01 - Phlebitis and thrombophlebitis of superficial vessels of right lower extremity (ICD-10)
Varicose veins of bilateral lower extremities with pain
 ?I83.813 - Varicose veins of bilateral lower extremities with pain (ICD-10)
Lymphedema of both lower extremities
 ?I89.0 - Lymphedema, not elsewhere classified (ICD-10)
Arthritis
 ?M19.90 - Unspecified osteoarthritis, unspecified site (ICD-10)
Hypertension
 ?I10 - Essential (primary) hypertension (ICD-10)
Deep vein thrombosis
 ?I82.409 - Acute embolism and thrombosis of unspecified deep veins of unspecified lower extremity (ICD-10)
Cardiomyopathy
 ?I42.9 - Cardiomyopathy, unspecified (ICD-10)
Carpal tunnel syndrome
 ?G56.00 - Carpal tunnel syndrome, unspecified upper limb (ICD-10)
Peripheral vascular disease
 ?I73.9 - Peripheral vascular disease, unspecified (ICD-10)
Ulcer of right leg
 ?L97.919 - Non-pressure chronic ulcer of unspecified part of right lower leg with unspecified severity (ICD-10)
Coronary artery disease
 ?I25.10 - Atherosclerotic heart disease of native coronary artery without angina pectoris (ICD-10)
CHF (congestive heart failure)
 ?I50.9 - Heart failure, unspecified (ICD-10)
Obstructive sleep apnea
 ?G47.33 - Obstructive sleep apnea (adult) (pediatric) (ICD-10)
Chronic back pain
 ?M54.9 - Dorsalgia, unspecified (ICD-10)
 ?G89.29 - Other chronic pain (ICD-10)
Postphlebetic syndrome with inflammation
 ?I87.029 - Postthrombotic syndrome with inflammation of unspecified lower extremity (ICD-10)
Type 2 diabetes mellitus
 ?E11.9 - Type 2 diabetes mellitus without complications (ICD-10)


Surgical History (Updated 10/25/24 @ 14:21 by Khang Bahena)

S/P sclerotherapy of varicose veins
 ?Z98.890 - Other specified postprocedural states (ICD-10)
 ?Z86.79 - Personal history of other diseases of the circulatory system (ICD-10)
Status post laser ablation of incompetent vein
 ?Z98.890 - Other specified postprocedural states (ICD-10)
Status post laser ablation of incompetent vein
 ?Z98.890 - Other specified postprocedural states (ICD-10)
History of carpal tunnel surgery of right wrist
 ?Z98.890 - Other specified postprocedural states (ICD-10)
History of cholecystectomy
 ?Z90.49 - Acquired absence of other specified parts of digestive tract (ICD-10)


Family History (Updated 24 @ 11:42 by Khang Bahena)
Sister
 Family history of diabetes mellitus
Father
 Family history of diabetes mellitus
Other
 Family history of CHF (congestive heart failure)
 Family history of hypertension
 Family history of myocardial infarction



Social History (Updated 24 @ 10:55 by Khang Bahena)
Within the past year, how often did you have a drink containing alcohol:  never 
Score interpretation:  A score less than 4 is consistent with normal alcohol consumption. 
Smoking status:  Never smoker 
Non-prescribed substance use:  denies use 



Meds
Home Medications and Allergies
Home Medications

?Medication ?Instructions ?Recorded ?Confirmed ?Type
Lactobacillus acidophilus 10 100 mmu cells PO DAILY 24 History
billion cell capsule    
acetaminophen 500 mg capsule 500 mg PO Q6H PRN pain 24 History
carvedilol 6.25 mg tablet 6.25 mg PO BID 24 History
empagliflozin 10 mg-linagliptin 5 1 tab PO DAILY 24 History
mg tablet    
furosemide 40 mg tablet 40 mg PO BID 24 History
rivaroxaban 10 mg tablet 10 mg PO DAILY 24 History
sacubitril 24 mg-valsartan 26 mg 1 tab PO BID 24 History
tablet    
semaglutide 0.25 mg or 0.5 mg (2 0.25 mg subcut QWEEK 24 History
mg/3 mL) subcutaneous pen injector    
(Ozempic)    
spironolactone 25 mg tablet 25 mg PO DAILY 24 History
(Aldactone)    


Allergies

Allergy/AdvReac Type Severity Reaction Status Date / Time
cefixime Allergy  Unknown Verified 24 11:44
diclofenac Allergy  Unknown Verified 24 11:44



Exam
Narrative
Exam Narrative: 




Reinaldo TOSCANO MD personally performed the services described in this documentation, as scribed by Khang Bahena RN in my presence and it is both accurate and complete.


Khang TOSCANO RN, am scribing for, and in the presence of, Dr. Reinaldo Anders and in the presence of the patient.
Constitutional
Documenting provider has reviewed patient's vital signs: yes
Common normals: oriented x3
Nutritional appearance: overweight
Cardio
Peripheral pulses: posterior tibial pulses present and dorsalis pedis pulses present
Extremity
Common normals: normal capillary refill
General: calf tenderness and edema
Right lower extremity: lower leg Right lower leg: inspection and palpation
Left lower extremity: lower leg Left lower leg: inspection and palpation
Neuro
Common normals: oriented x3

Assessment and Plan
Assessment and Plan
(1) Varicose veins of bilateral lower extremities with pain: 

Plan
f/u evaluation along with right leg limited u/s


Reinaldo TOSCANO MD personally performed the services described in this documentation, as scribed by Khang Bahena RN in my presence and it is both accurate and complete.


Khang TOSCANO RN, am scribing for, and in the presence of, Dr. Reinaldo Anders and in the presence of the patient.

Procedures
Procedure Instructions
Procedures

Right leg microfoam chemical ablation/Varithena:



Risks and benefits of the procedure were discussed at length and informed written consent was obtained.? Time-out procedure was performed and the correct patient and procedure were confirmed.? Staff present during time-out: Khang Bahena RN and Reinaldo Anders MD.? Patient prepped and procedure performed in usual sterile fashion.? Patient was placed in Trendelenburg prior to Polidocanol/Varithena injections.



Sclerosing Agent:?? 15cc 1% Polidocanol/Varithena



Site Injected: Right lecc varithena administered in to a 4mm varicose vein right distal medial lower leg
                                       5cc varithena administered in to a 6mm varicose vein right mid medial lower leg
                                       5cc varithena administered in to a 6mm varicose vein right medial knee


Number of Injections:? right leg



The patient tolerated the procedure well without complication.? Hemostasis was obtained and thigh-high compression stocking was applied with foam pads.? Instructed patient to wear stocking for at least 96 hours and sleep with it and only remove for 
showering.? The patient was instructed to? wear stocking for 2 weeks.? Patient verbalizes understanding and states they will comply.? Patient was given post-procedure instructions. 



Patient was discharged in good condition.? Scheduled to undergo limited venous ultrasound and? exam on 10/25/2024.


IReinaldo MD personally performed the services described in this documentation, as scribed by Khang Bahena RN in my presence and it is both accurate and complete.


IKhang RN, am scribing for, and in the presence of, Dr. Reinaldo Anders and in the presence of the patient. 36.4

## 2024-10-25 NOTE — XMS_ITS
Comprehensive CCD (C-CDA v2.1)  
  
                          Created on: 2024  
  
  
Jocelin Pacheco  
External Reference #: CDR,PersonID:[id number]  
: 1955  
Sex: Male  
  
Demographics  
  
  
                                        Address             [address]  [address]  
   
                                        Home Phone          [phone]  
   
                                        Work Phone          [phone]  
   
                                        Preferred Language  en  
   
                                        Marital Status        
   
                                        Episcopalian Affiliation Unknown  
   
                                        Race                White  
   
                                        Ethnic Group        Not  or Lati  
no  
  
  
Author  
  
  
                                        Organization        [facility]  
  
  
Care Team Providers  
  
  
                                Care Team Member Name Role            Phone  
   
                                Annel Baird      Unavailable     [phone]  
   
                                Alonso Birmingham   Unavailable     [phone]  
   
                                Jacoby Braden Unavailable     [phone]  
   
                                Vera Melvin Unavailable     [phone]  
   
                                Unavailable     Unavailable     [phone]  
   
                                DO Vera Melvin Primary Care Provider [phone]  
   
                                ESVIN Steward Attending Provider [phone]  
   
                                SOWMYA Villarreal Attending Provider [phone]  
   
                                DO Vera Melvin Primary Care Provider [phone]  
   
                                ESVIN Steward Attending Provider [phone]  
   
                                DO JASON Maldonado Attending Provider [phone]  
   
                                COMPA MALDONADO Attending       Unavailable  
   
                                COMPA MALDONADO Referring       Unavailable  
   
                                Ngozi, Dr. Vera Vincent Primary Care    U  
DO Vera Brown Primary Care Provider [phone]  
   
                                ESVIN Steward Attending Provider [phone]  
   
                                MD Jacoby Braden Attending Provider [phone]  
   
                                DO Vera Melvin Primary Care Provider [phone]  
   
                                SOWMYA Espino Attending Provider [phone]  
   
                                Dr. Compa Maldonado Referring       Rachna Melvin, Dr. Vera Vincent Primary Care    U  
daniel Maldonado, Dr. Compa Sr Attending       Rachna Maldonado, Dr. Compa Sr Referring       Unava  
ilmariaelena Melvin, Dr. Vera Melisa Primary Care    U  
Dr. Compa Thomson Attending       Unava  
ilable  
   
                                Nichelle, ANABELLAC Barney Attending Provider [phone]  
   
                                Matias-Roseau, DO Vera Attending Provider [phone]  
   
                                Matias-Roseau DO, Vera D Unavailable     [phone]  
   
                                Juve DPM, Danya SMART Unavailable     [phone]  
   
                                [facility] CORTES, Elroy CELESTE Unavailable     [phone]  
   
                                Matias-Roseau DO, Vera D Primary Care Provider   
[phone]  
   
                                Matias-Roseau, DO Vera Primary Care Provider [phone]  
   
                                SOWMYA Espino Attending Provider [phone]  
   
                                Matias-Roseau DO, Vera Melisa Primary Care Provi  
sherly [phone]  
   
                                SHARLENE AN  Attending       Unavailable  
   
                                MATIAS-EMERY, VERA MELISA Primary Care    Unava  
ilable  
   
                                Matias-Roseau, DO Vera Primary Care Provider [phone]  
   
                                SOWMYA Espino Attending Provider [phone]  
   
                                Matias-Roseau, DO Vera Primary Care Provider [phone]  
   
                                SOWMYA Espino Attending Provider [phone]  
   
                                VERA MELVIN Attending       Unavailab  
le  
   
                                MATIAS-VERA ANTONY Referring       Unavailab  
le  
   
                                DANYA AN Attending       Unavailable  
   
                                MATIASVERA GARCIA Referring       Unavailab  
le  
   
                                LOPEZ JESSICA L   Attending       Unavailable  
   
                                MIRIAN, RAY L Referring       Unavailable  
   
                                DEPOY, ARLENE Attending       Unavailable  
   
                                MIRIAN, RAY L Referring       Unavailable  
   
                                LOPEZ, JESSICA L   Attending       Unavailable  
   
                                MIRIAN, RAY L Referring       Unavailable  
   
                                DEPOY, ARLENE Attending       Unavailable  
   
                                MIRIAN, RAY L Referring       Unavailable  
   
                                AN, DANYA H Attending       Unavailable  
   
                                LOPEZ, JESSICA L   Attending       Unavailable  
   
                                MIRIAN, RAY L Referring       Unavailable  
   
                                DANYA AN Attending       Unavailable  
   
                                MATIAS-VERA ANTONY Attending       Unavailab  
le  
   
                                MATIAS-VERA ANTONY Referring       Unavailab  
le  
   
                                DANYA AN Attending       Unavailable  
   
                                VERA MELVIN Attending       Unavailab  
le  
   
                                MATIAS-EMERYVERA Referring       Unavailab  
le  
   
                                Copsey, Elaine   Admitting       Unavailable  
   
                                Matias-Roseau, Vera Primary Care    Unavailable  
   
                                Copsey, Elaine   Attending       Unavailable  
   
                                Copsey, Elaine   Attending       Unavailable  
   
                                Copsey, Elaine   Admitting       Unavailable  
   
                                Matias-Roseau, Vera Primary Care    Unavailable  
   
                                Copsey, Elaine   Admitting       Unavailable  
   
                                Matias-Roseau, Vera Primary Care    Unavailable  
   
                                Copsey, Elaine   Attending       Unavailable  
  
  
  
Allergies  
  
  
                                                    Allergy   
Classification                          Reported   
Allergen(s)               Allergy Type              Date of   
Onset                     Reaction(s)               Facility  
   
                                                      
(20 sources)        Cefixime            Drug Allergy        20  
22                                      Unknown,   
Unknown   
Reaction                                [facility]  
   
                                                      
(20 sources)                            Diclofenac;   
Translations:   
[Voltaren]                Drug Allergy              20  
22                        [facility]  
   
                                                      
(20 sources)        Nystatin            Drug Allergy        20  
22                                      Unknown,   
Unknown   
Reaction                                [facility]  
   
                                                      
(8 sources)                             Cephalosporins   
(Antibiotic);   
Translations:   
[Cephalosporins]                        Allergy to   
drug (finding)                          20                        [place] [address] Repository  
   
                                                      
(7 sources)                             Hmg-Coa Reductase   
Inhibitors   
(Statins);   
Translations:   
[Statins]                               Allergy to   
drug (finding)                          [facility]   
[facility]  
[address] DO  
Work Phone:   
[phone]  
[phone]  
   
                                                      
(15 sources)                            Benzoate;   
Translations:   
[sodium benzoate]         Drug Allergy              20  
22                                      Unknown   
Reaction                                [facility]  
   
                                                      
(3 sources)               Cefixime                  Allergy to   
substance                               20  
23                        Unknown                   [facility]   
[facility]  
Work Phone:   
[phone]  
1  
   
                                                      
(3 sources)         Nystatin            Drug Allergy        20  
23                        Unknown                   [facility]   
[facility]  
   
                                                      
(1 source)                              Cephalosporins   
(Antibiotic)                            Drug   
Intolerance                             20                        [facility]  
   
                                                      
(2 sources)                             Diclofenac;   
Translations:   
[DICLOFENAC   
SODIUM]                   Drug Allergy              20                        [facility]  
Work Phone:   
[phone]  
   
                                                      
(2 sources)                             HMG-CoA reductase   
inhibitor;   
Translations:   
[STATINS-HMG-COA   
REDUCTASE   
INHIBITORS]                             Drug   
Intolerance                             20                        [facility]  
Work Phone:   
[phone]  
   
                                                      
(1 source)          Cefixime            Drug Allergy        20                                                  [facility]   
Repository  
   
                                                      
(1 source)          Diclofenac          Drug Allergy        20                                                  [facility]   
Repository  
   
                                                      
(1 source)          Nystatin            Drug Allergy        20                                                  [facility]   
Repository  
  
  
  
Medications  
Current Medications  
  
  
  
                      Medication Drug Class(es) Dates      Sig (Normalized) Sig   
(Original)  
   
                                                    3 ML semaglutide   
2.68 MG/ML Pen   
Injector [Ozempic]  
(7 sources)                                         Start:   
2022                              inject 2 mg by   
subcutaneous   
injection every   
week                                    Ozempic (2 MG/DOSE)   
8 MG/3ML 2 mg   
Subcutaneous weekly   
for 30 days 21 Dec,   
2022 Active  
  
  
  
                                                            inject 2 mg by subcu  
taneous injection   
every week                              Ozempic (2 MG/DOSE) 8 MG/3ML 2 mg SQ Onc  
e   
a week. for 28 days E11.8 Active  
   
                                                            inject 2 mg by subcu  
taneous injection   
every week                              Ozempic (2 MG/DOSE) 8 MG/3ML DIAL AND   
INJECT UNDER THE SKIN 2 MG WEEKLY for 28   
Active  
   
                                                            inject 2 mg by subcu  
taneous injection   
every week                              Ozempic (2 MG/DOSE) 8 MG/3ML 2 mg   
Subcutaneous weekly for 30 days Active  
  
  
  
                                                    acetaminophen 500   
mg oral tablet  
(12 sources)                                        Start:   
2020                              take 1   
tablet by   
mouth three   
times daily                             Acetaminophen   
(Tylenol Extra   
Strength) 500 mg   
Tablet Active 500   
MG PO Three times   
daily 2020 1:00am  
   
                                                    carvedilol 6.25 mg   
oral tablet  
(20 sources)                            alpha-Adrenergic   
Blocker,   
beta-Adrenergic   
Blocker                                 Start:   
2017                              take 6.25 mg   
by mouth   
twice daily                             Carvedilol Active   
6.25 MG PO Twice   
daily 2017 1:00am  
   
                                                    empagliflozin 10 mg   
oral tablet  
(20 sources)                            Sodium-Glucose   
Cotransporter 2   
Inhibitor                               Start:   
2022                              take 1   
tablet by   
mouth once   
daily                                   Empagliflozin   
(Jardiance) 10 mg   
tablet Active 10 MG   
PO Daily 2022 12:00am  
   
                                                    furosemide 40 mg   
oral tablet  
(20 sources)              Loop Diuretic             Start:   
2017                              take 1   
tablet by   
mouth twice   
daily                                   Furosemide (Lasix)   
40 mg Tablet Active   
40 MG PO Twice   
daily 2017 1:00am  
  
  
  
                                                            take 1 tablet by lenin  
th every twelve   
hours                                   furosemide (Lasix) 40 MG tablet Take 40   
mg   
by mouth every 12 (twelve) hours. 0 Active  
   
                                                                Lasix TABS TAKE   
1 TABLET TWICE DAILY.   
Quantity: 0 Refills: 0 Ordered: 2022   
DO Active  
  
  
  
                                                    lactobacillus   
acidophilus   
[id number] unt oral   
capsule  
(6 sources)                                         Start:   
2024                              take 10   
capsules by   
mouth once   
daily                                   Lactobacillus   
Acidophilus   
(Probiotic) 10   
billion cell capsule   
Active 100 MMU CELLS   
PO Daily 2024 1:00am  
   
                                                    methylPREDNISolone  
(1 source)                Corticosteroid            Start:   
2024  
End:   
2024                                          methylPREDNISolone   
(Medrol Dospak) 4 MG   
tablets Indications:   
Lumbosacral strain,   
initial encounter ,   
Acute pain of left   
shoulder Take as   
directed on package.   
21 tablet 0   
2024 Active  
   
                                                    Ozempic (2 MG/DOSE) 8   
MG/3ML  
(1 source)                                                  inject 2 mg by   
subcutaneous   
injection   
every week                              Ozempic (2 MG/DOSE)   
8 MG/3ML 2 mg   
Subcutaneous weekly   
for 30 days Active  
   
                                                    Ozempic, 2 MG/DOSE, 8   
MG/3ML solution   
pen-injector  
(3 sources)                                         Start:   
2023                                          Ozempic, 2 MG/DOSE,   
8 MG/3ML solution   
pen-injector  
   
                                                    Probiotic Product   
(PROBIOTIC BLEND PO)  
(3 sources)                                                 take 2 [IU] by   
mouth in the   
morning                                 Probiotic Product   
(PROBIOTIC BLEND PO)   
Take 2 Units by   
mouth in the   
morning. 0 Active  
   
                                                    rivaroxaban 10 mg   
oral tablet  
(20 sources)                            Factor Xa   
Inhibitor                               Start:   
2022                              take 1 tablet   
by mouth once   
daily                                   Rivaroxaban   
(Xarelto) 10 mg   
Tablet Active 10 MG   
PO Daily 2022 12:00am   
for 35 days  
  
  
  
                                                    Start: 2019  
End: 2021           take 10 mg by mouth once daily Rivaroxaban Discontinue  
d 10 MG PO  
   
Daily 2019 12:00am 2021 1:02am  
   
                                                                Xarelto Active  
  
  
  
                                                    rosuvastatin   
calcium 10 mg oral   
tablet  
(3 sources)                             HMG-CoA Reductase   
Inhibitor                               Start:   
2023                              take 1 tablet by   
mouth every   
twenty-four hours                       Rosuvastatin   
Calcium 10 MG 1   
tablet Orally   
Once a day for 30   
day(s)  Active  
   
                                                    sacubitril 24 mg /   
valsartan 26 mg   
oral tablet  
(20 sources)                            Angiotensin 2   
Receptor Blocker                        Start:   
2022                              take 1 tablet by   
mouth twice daily                       Sacubitril-Valsar  
jiménez (Entresto)   
24-26 mg Tablet   
Active 1 TAB PO   
Twice daily 2023   
12:00am  
  
  
  
                                                take 1 tablet by mouth in the mo  
rning Entresto 24-26 MG tablet Take 1 tablet by  
   
mouth in the morning and 1 tablet before   
bedtime. 0 Active  
   
                                                                ENTRESTO 24 mg/2  
6 mg 1 orally twice a day   
Active  
  
  
  
                                                    0.25 mg, 0.5 mg   
dose 1.5 ml   
semaglutide 1.34   
mg/ml pen injector  
(3 sources)                                         Start:   
2022                                          Ozempic (0.25 or   
0.5 MG/DOSE) 2   
MG/1.5ML 0.25 mg   
for one month and   
then increase to   
0.5 mg dose   
Subcutaneous   
weekly for 30 days   
   
Active  
   
                                                    Semaglutide  
(4 sources)                                         Start:   
2024                              inject 0.25 mg by   
subcutaneous   
injection every   
week, then inject   
0.5 mg by   
subcutaneous   
injection every   
week                                    Semaglutide   
(Ozempic) 0.25 mg   
or 0.5 mg (2 mg/3   
mL) pen injector   
Active 0.25 MG   
SUBCUT every week   
3 April 24th, 2024   
12:00am for 4   
weeks; then   
increase to 0.5 mg   
every week  
   
                                                    semaglutide   
(Ozempic) 1 mg/dose   
(4 mg/3 mL) pen   
injector  
(1 source)                                                  inject 1 mg by   
subcutaneous   
injection every   
week                                    semaglutide   
(Ozempic) 1   
mg/dose (4 mg/3   
mL) pen injector   
Inject 1 mg under   
the skin per week.   
Active  
   
                                                    spironolactone 25   
mg oral tablet  
(20 sources)                            Aldosterone   
Antagonist                              Start:   
2022                              take 25 mg by   
mouth once daily                        Spironolactone   
Active 25 MG PO   
Daily 2022 12:00am  
  
  
  
Completed/Discontinued Medications  
  
  
  
                      Medication Drug Class(es) Dates      Sig (Normalized) Sig   
(Original)  
   
                                                    3 ML semaglutide   
1.34 MG/ML Pen   
Injector [Ozempic]  
(7 sources)                                         Start:   
10-                              inject 1 mg by   
subcutaneous   
injection every   
week                                    Ozempic (1 MG/DOSE)   
4 MG/3ML 1 mg   
Subcutaneous Weekly   
for 28 days 28 Oct,   
2022 Not-Taking  
  
  
  
                                        Start: 10-   inject 1 mg by subcu  
taneous   
injection every week                    Ozempic (1 MG/DOSE) 4 MG/3ML 1 mg   
Subcutaneous Weekly for 28 days   
28 Oct, 2022 Active  
   
                                        Start: 2022   inject 1 mg by subcu  
taneous   
injection every week                    Ozempic (1 MG/DOSE) 4 MG/3ML 1 mg   
Subcutaneous Weekly for 28 days   
 Active  
  
  
  
                                                    acetaminophen 325   
mg / HYDROcodone   
bitartrate 5 mg   
oral tablet  
(12 sources)              Opioid Agonist            Start:   
2021  
End:   
2022                              take 1   
tablet by   
mouth   
every   
eight   
hours                                   Hydrocodone-Acetaminophen   
Discontinued 1 TAB PO Q8H 6   
2  8:11am  
   
                                                    acetaminophen 325   
mg / oxyCODONE   
hydrochloride 5   
mg oral tablet  
(12 sources)              Opioid Agonist            Start:   
2019  
End:   
2020                              take 1   
tablet by   
mouth   
every   
four to   
six hours                               Oxycodone-Acetaminophen   
(Percocet) 5-325 mg tablet   
Discontinued 1 TAB PO EVERY   
4-6 HOURS 10 3 November   
29th, 2019 December 2nd,   
2020 1:59pm  
   
                                                    ampicillin 500 mg   
oral capsule  
(12 sources)                            Penicillin-class   
Antibacterial                           Start:   
2019  
End:   
2019                              take 500   
mg by   
mouth   
every six   
hours                                   Ampicillin Discontinued 500   
MG PO Q6H 56 14 2019 12:00am 2019   
3:39pm  
   
                                                    ascorbic acid 500   
mg oral tablet  
(12 sources)              Vitamin C                 Start:   
2020  
End:   
2022                              take 1   
tablet by   
mouth   
twice   
daily                                   Ascorbic Acid (Vitamin C)   
(Vitamin C) 500 mg Tablet   
Discontinued 500 MG PO Twice   
daily 14  1:00am 2022 8:11am  
   
                                                    aspirin 81 mg   
delayed release   
oral tablet  
(12 sources)                            Platelet   
Aggregation   
Inhibitor,   
Nonsteroidal   
Anti-inflammatory   
Drug                                    Start:   
2019  
End:   
2019                              take 1   
tablet by   
mouth   
once   
daily                                   Aspirin (Aspirin Low Dose)   
81 mg Tablet,Delayed Release   
(Dr/Ec) Discontinued 81 MG   
PO Daily 2019   
12:00am 2019   
11:52am  
   
                                                    cephalexin 500 mg   
oral capsule  
(13 sources)                            Cephalosporin   
Antibacterial                           Start:   
2024  
End:   
08-                              take 500   
mg by   
mouth   
twice   
daily                                   Cephalexin Discontinued 500   
MG PO Twice daily 14  12:00am   
2024 3:20pm  
  
  
  
                                                    Start: 2019  
End: 2019                         take 500 mg by mouth every   
five hours                              Cephalexin Discontinued 500 MG PO Q5H   
2019 12:00am May 6th, 2019   
1:54pm  
  
  
  
                                                    ciprofloxacin 500   
mg oral tablet  
(20 sources)                            Quinolone   
Antimicrobial                           Start:   
2021  
End: 2021                         take 1   
tablet by   
mouth twice   
daily                                   Ciprofloxacin Hcl   
(Cipro) 500 mg   
tablet Discontinued   
500 MG PO Twice   
daily  1:00am   
2021   
12:59am  
  
  
  
                                                    Start: 2020  
End: 2021                         take 500 mg by mouth every   
twelve hours                            Ciprofloxacin Hcl Discontinued 500 MG   
PO Q12H 20 10 December 4th, 2020   
1:00am 2021 3:07pm  
   
                                                    Start: 2020  
End: 2020                         take 1 tablet by mouth twice   
daily                                   Ciprofloxacin Hcl (Cipro) 500 mg   
tablet Discontinued 500 MG PO Twice   
daily  12:00am   
2020 1:36pm  
   
                                                    Start: 2020  
End: 2020                         take 1 tablet by mouth twice   
daily                                   Ciprofloxacin Hcl (Cipro) 500 mg   
tablet Discontinued 500 MG PO Twice   
daily  12:00am 2020 10:03am  
  
  
  
                                                    clindamycin 300 mg   
oral capsule  
(20 sources)                            Lincosamide   
Antibacterial                           Start: 2019  
End: 2020                         take 300 mg   
by mouth   
every eight   
hours                                   Clindamycin Hcl   
Discontinued 300   
MG PO Q8H 30 10   
November 29th,   
2019 1:00am   
2020   
3:08pm  
  
  
  
                                                    Start: 2019  
End: 2019                         take 450 mg by mouth every   
eight hours                             Clindamycin Hcl Discontinued 450 MG   
PO Q8H 63  1:00am   
2019 2:00pm  
  
  
  
                                                    clobetasol propionate   
0.0005 mg/mg topical   
ointment  
(20 sources)              Corticosteroid            Start: 2019  
End: 2023                                     Clobetasol Discontinued 1   
APPLIC TOPICAL 3 Times a   
week 2021   
1:32pm 2023   
11:11am apply thin layer   
to right leg 1-2 times   
daily as needed for   
itching.  
  
  
  
                                                    Start: 05-  
End: 2019                                     Clobetasol Discontinued 1 AP  
PLIC TOPICAL .every other day 30 14  
   
May 15th, 2019 12:00am 2019 2:56pm apply thin layer to   
right leg rash with dressing changes  
   
                                                                clobetasol (Jose A  
vate) 0.05 % cream Apply 1 application topically   
every 12 (twelve) hours. 0 Active  
   
                                                                Clobetasol Propi  
maged Not-Taking  
   
                                                                Clobetasol Propi  
maged Active  
  
  
  
                                                    cyclobenzaprine   
hydrochloride 10 mg   
oral tablet  
(19 sources)              Muscle Relaxant           Start:   
2017  
End: 2021                         take 10 mg   
by mouth   
three   
times   
daily                                   Cyclobenzaprine   
Discontinued 10 MG   
PO Three times daily   
2017   
1:00am 2021 12:59am  
   
                                                    dexamethasone 6 mg   
oral tablet  
(12 sources)              Corticosteroid            Start:   
2020  
End: 2021                         take 6 mg   
by mouth   
once daily                              Dexamethasone   
Discontinued 6 MG PO   
Daily 3 3 December   
22nd, 2020 1:00am   
2021   
3:07pm  
   
                                                    doxycycline hyclate   
100 mg oral capsule  
(20 sources)                            Tetracycline-class   
Drug                                    Start:   
2024  
End: 08-                         take 100   
mg by   
mouth   
twice   
daily                                   Doxycycline Hyclate   
Discontinued 100 MG   
PO Twice daily 20 10   
July 8th, 2024   
12:00am 2024 3:20pm  
  
  
  
                                                    Start: 2023  
End: 2024                         take 100 mg by mouth twice   
daily                                   Doxycycline Hyclate Discontinued 100   
MG PO Twice daily  1:00am 2024   
2:30pm  
   
                                                    Start: 2021  
End: 2021                         take 100 mg by mouth twice   
daily                                   Doxycycline Hyclate Discontinued 100   
MG PO Twice daily 20 10 April 8th,   
2021 12:00am 2021 1:31pm  
   
                                                    Start: 2021  
End: 2021                         take 100 mg by mouth twice   
daily                                   Doxycycline Hyclate Discontinued 100   
MG PO Twice daily 20 10 January 29th,   
2021 1:00am 2021 8:01am  
   
                                                    Start: 10-  
End: 2020                         take 100 mg by mouth twice   
daily                                   Doxycycline Hyclate Discontinued 100   
MG PO Twice daily 20 10 October 5th,   
2020 12:00am 2020   
1:59pm  
   
                                                    Start: 2020  
End: 2020                         take 100 mg by mouth twice   
daily                                   Doxycycline Hyclate Discontinued 100   
MG PO Twice daily May 5th, 2020   
12:00am 2020 1:37pm  
   
                                                    Start: 2020  
End: 2020                         take 100 mg by mouth twice   
daily                                   Doxycycline Hyclate Discontinued 100   
MG PO Twice daily  12:00am 2020 1:21pm  
   
                                                    Start: 2019  
End: 2020                         take 100 mg by mouth twice   
daily                                   Doxycycline Hyclate Discontinued 100   
MG PO Twice daily  1:00am 2020   
3:11pm  
   
                                                    Start: 2019  
End: 2019                         take 100 mg by mouth twice   
daily                                   Doxycycline Hyclate Discontinued 100   
MG PO Twice daily  12:00am 2019 3:39pm  
   
                                                    Start: 2019  
End: 2019                         take 100 mg by mouth twice   
daily                                   Doxycycline Hyclate Discontinued 100   
MG PO Twice daily 28 14 May 3rd, 2019   
12:00am May 28th, 2019 3:36pm  
  
  
  
                                                    Ketorolac  
(15 sources)                            Nonsteroidal   
Anti-inflammatory Drug,   
Cyclooxygenase   
Inhibitor                               Start:   
07-                                          Toradol per 15 mg   
15 2013 2 mL  
   
                                                    levoFLOXacin 750   
mg oral tablet  
(12 sources)              Quinolone Antimicrobial   Start:   
2019  
End: 2019                         take 1   
tablet by   
mouth once   
daily                                   Levofloxacin   
(Levaquin) 750 mg   
tablet   
Discontinued 750   
MG PO Daily 10 10   
Lynn 25th, 2019   
12:00am 2019 2:24pm  
   
                                                    meloxicam 15 mg   
oral tablet  
(19 sources)                            Nonsteroidal   
Anti-inflammatory Drug                  Start:   
2017  
End: 2021                         take 15 mg   
by mouth   
once daily                              Meloxicam   
Discontinued 15   
MG PO Daily   
2017 1:00am 2021 1:00am  
   
                                                    metOLazone 2.5 mg   
oral tablet  
(20 sources)              Thiazide-like Diuretic    Start:   
2022  
End: 2023                         take 2.5 mg   
by mouth   
every other   
day                                     Metolazone   
Discontinued 2.5   
MG PO Q2D 2022   
12:00am 2023   
11:10am  
  
  
  
                                                    Start: 2019  
End: 2022                         take 2.5 mg by mouth three   
times weekly in the morning as   
needed                                  Metolazone Discontinued 2.5 MG PO 3   
Times a week 2019 12:00am   
2022 8:10am take three   
times a week 30 minutes prior to am   
lasix as needed  
   
                                                                metOLazone Activ  
e  
  
  
  
                                                    nabumetone 750 mg   
oral tablet  
(20 sources)                            Nonsteroidal   
Anti-inflammatory Drug                  Start:   
2021  
End: 2024                         take 750 mg   
by mouth   
twice daily                             Nabumetone   
Discontinued 750   
MG PO Twice daily   
2021   
1:00am 2024 9:17am  
  
  
  
                                                    Start: 2021  
End: 2024                         take 1 tablet by mouth every   
twelve hours                            nabumetone (Relafen) 750 mg tablet   
Take 1 tablet (750 mg) by mouth every   
12 hours. 2021   
Discontinued (Discontinued by another   
clinician)  
   
                                        Start: 2021   take 1 tablet by lenin  
th once   
daily                                   Nabumetone 750 MG Oral Tablet TAKE 1   
TABLET EVERY 12 HOURS DAILY.   
Quantity: 60 Refills: 2 Ordered:   
2021 Compa Soto MD   
Start : 2021 Active  
  
  
  
                                                    Ozempic (1 MG/DOSE)   
SOPN  
(7 sources)                                                     Ozempic (1 MG/DO  
SE)   
SOPN INJECT 1 UNIT   
Weekly Quantity: 0   
Refills: 0 Ordered:   
2022 DO Active  
   
                                                    permethrin 50 mg/ml   
topical cream  
(12 sources)              Pyrethroid                Start:   
2019  
End:   
2019                                          Permethrin   
Discontinued 1 APPLIC   
TOPICAL Once May 6th,   
2019 12:00am May   
28th, 2019 3:36pm  
   
                                                    predniSONE 20 mg oral   
tablet  
(7 sources)                                         Start:   
2019                              take 2 tablets by   
mouth every   
twenty-four hours                       predniSONE 20 MG 2   
tablet Orally Once a   
day for 5  Not-Taking  
   
                                                    Semaglutide (Ozempic)   
2 mg/dose (8 mg/3 mL)   
pen injector  
(12 sources)                                        Start:   
2022  
End:   
2024                              inject 2 mg by   
subcutaneous injection   
every week                              Semaglutide (Ozempic)   
2 mg/dose (8 mg/3 mL)   
pen injector   
Discontinued 2 MG   
SUBCUT Q7D 2022 12:00am   
2024   
2:31pm  
  
  
  
                                        Start: 2022   inject 2 mg by subcu  
taneous   
injection every week                    Semaglutide (Ozempic) 2 mg/dose   
(8 mg/3 mL) pen injector Active 2   
MG SUBCUT Q7D 2022   
11:00pm  
   
                                        Start: 2022   inject 2 mg by subcu  
taneous   
injection every week                    Semaglutide (Ozempic) 2 mg/dose   
(8 mg/3 mL) pen injector Active 2   
MG SUBCUT Q7D 2022   
12:00am  
  
  
  
                                                    Sulfamethoxazole  
(7 sources)     Sulfonamide Antimicrobial                                 Sulfam  
ethoxazole Not-Taking  
  
  
  
                                                                Sulfamethoxazole  
 Active  
  
  
  
                                                    sulfamethoxazole   
800 mg /   
trimethoprim 160   
mg oral tablet  
(12 sources)                            Dihydrofolate   
Reductase   
Inhibitor   
Antibacterial,   
Sulfonamide   
Antimicrobial                           Start:   
2020  
End:   
2020                              take 1   
tablet by   
mouth   
twice   
daily                                   Sulfamethoxazole-Trimethoprim   
(Bactrim Ds) 800-160 mg tablet   
Discontinued 1 TAB PO Twice   
daily    
1:00am 2020   
3:57pm  
   
                                                    telmisartan 40 mg   
oral tablet  
(20 sources)                            Angiotensin 2   
Receptor Blocker                        Start:   
2017  
End:   
2022                              take 40   
mg by   
mouth   
once   
daily                                   Telmisartan Discontinued 40 MG   
PO Daily 2017   
1:00am 2022   
8:15am  
   
                                                    traMADol   
hydrochloride 50   
mg oral tablet  
(19 sources)              Opioid Agonist            Start:   
2020  
End:   
2020                              take 50   
mg by   
mouth   
twice   
daily                                   Tramadol Discontinued 50 MG PO   
Twice daily 2020   
1:00am 2020   
2:10pm  
   
                                                    triamcinolone   
acetonide 5 mg/ml   
topical cream  
(12 sources)              Corticosteroid            Start:   
2019  
End:   
2019                                          Triamcinolone Acetonide   
Discontinued 1 APPLIC TOPICAL   
Twice daily  12:00am 2019   
12:02am apply to back and arm   
rash twice daily as needed for   
itching.  
   
                                                    Triamcinolone &   
Emollient 0.1 %  
(7 sources)                                                     Triamcinolone &   
Emollient 0.1   
% as directed Externally   
Not-Taking  
   
                                                    vancomycin 250 mg   
oral capsule  
(12 sources)                            Glycopeptide   
Antibacterial                           Start:   
2021  
End:   
2021                              take 250   
mg by   
mouth   
every six   
hours                                   Vancomycin Discontinued 250 MG   
PO Q6H 28    
12:00am 2021 1:01am  
   
                                                    zinc sulfate 220   
mg oral capsule  
(12 sources)                                        Start:   
2020  
End:   
2022                              take 1   
capsule   
by mouth   
once   
daily                                   Zinc Sulfate (Orazinc) 220   
(50) mg Capsule Discontinued   
220 MG PO Daily 7  1:00am 2022 8:11am  
  
  
  
Problems  
Active Problems  
  
  
                      Problem Classification Problem    Date       Documented Da  
te Episodic/Chronic  
   
                                                    Acute and unspecified   
renal failure  
(12 sources)                            Injury of kidney;   
Translations: [Acute   
kidney failure,   
unspecified]                            2020          Episodic  
   
                                                    Allergic reactions  
(12 sources)                            Inflammatory   
dermatosis;   
Translations:   
[Dermatitis,   
unspecified]                            10-          Episodic  
   
                                                    Blindness and vision   
defects  
(7 sources)                             Disorder of vision;   
Translations:   
[Problems with   
sight]                                                      Chronic  
   
                                                    Chronic kidney disease  
(3 sources)                             Chronic kidney   
disease stage 2;   
Translations:   
[Chronic kidney   
disease, stage 2   
(mild)]                                 Onset:   
2023                Chronic  
   
                                                    Chronic ulcer of skin  
(20 sources)                            Ulcer; Translations:   
[Ulcerative lesion]                     Onset:   
2024                Chronic  
   
                                                    Coagulation and   
hemorrhagic disorders  
(4 sources)                             Acquired coagulation   
factor inhibitor   
disorder;   
Translations: [Other   
hemorrhagic disorder   
due to intrinsic   
circulating   
anticoagulants,   
antibodies, or   
inhibitors]                             Onset:   
2023                Chronic  
   
                                                    Congestive heart   
failure;   
nonhypertensive  
(20 sources)                            Congestive heart   
failure;   
Translations: [Heart   
failure,   
unspecified]                            Onset:   
2022  
Resolved:   
2022                                          Chronic  
   
                                                    Coronary   
atherosclerosis and   
other heart disease  
(20 sources)                            Coronary   
arteriosclerosis;   
Translations:   
[Atherosclerotic   
heart disease of   
native coronary   
artery without   
angina pectoris]                        Onset:   
2022  
Resolved:   
2022                                          Chronic  
   
                                                    Diabetes mellitus with   
complications  
(20 sources)                            Disorder due to type   
2 diabetes mellitus;   
Translations: [Type   
2 diabetes mellitus   
with unspecified   
complications]                          Onset:   
2022  
Resolved:   
2022                                          Chronic  
   
                                                    Diabetes mellitus   
without complication  
(8 sources)                             Prediabetes;   
Translations: [Other   
abnormal glucose]                       Onset:   
2024                Episodic  
   
                                                    Disorders of lipid   
metabolism  
(20 sources)                            Mixed   
hyperlipidemia;   
Translations: [Mixed   
hyperlipidemia]                         Onset:   
2022  
Resolved:   
2022                                          Chronic  
   
                                                    Essential hypertension  
(14 sources)                            Hypertensive   
disorder;   
Translations:   
[Essential (primary)   
hypertension]                           2019          Chronic  
   
                                                    Fluid and electrolyte   
disorders  
(12 sources)                            Metabolic acidosis;   
Translations:   
[Metabolic acidosis]                     2020          Episodic  
   
                                                    Genitourinary symptoms   
and ill-defined   
conditions  
(3 sources)                             Urinary   
incontinence;   
Translations:   
[Unspecified urinary   
incontinence]                           Onset:   
10-                08-                Chronic  
   
                                                    Immunizations and   
screening for   
infectious disease  
(16 sources)                            Encounter for   
immunization;   
Translations:   
[Culture positive   
for methicillin   
resistant   
Staphylococcus   
aureus]                                 Onset:   
2021  
Resolved:   
2021                                          Episodic  
   
                                                    Mycoses  
(1 source)                              Onychomycosis;   
Translations: [Tinea   
unguium]                                2024          Episodic  
   
                                                    Nonspecific chest pain  
(6 sources)                             Chest pain;   
Translations: [Chest   
pain, unspecified]                      Onset:   
2023                                          Episodic  
   
                                                    Open wounds of   
extremities  
(20 sources)                            Open wound of right   
lower leg;   
Translations:   
[Unspecified open   
wound, right lower   
leg, initial   
encounter]                              Onset:   
10-                09-                Episodic  
   
                                                    Open wounds of   
extremities  
(3 sources)                             Disorder of lower   
extremity;   
Translations:   
[Unspecified open   
wound, left lower   
leg, initial   
encounter]                              Onset:   
2024                Episodic  
   
                                                    Osteoarthritis  
(20 sources)                            Osteoarthritis of   
knee; Translations:   
[Osteoarthritis of   
knee, unspecified]                      Onset:   
2022  
Resolved:   
2022                                          Chronic  
   
                                                    Other and ill-defined   
heart disease  
(11 sources)                            Left ventricular   
hypertrophy;   
Translations:   
[Cardiomegaly]                          Onset:   
2023                Chronic  
   
                                                    Other circulatory   
disease  
(7 sources)                             H/O: heart disorder;   
Translations:   
[Personal history of   
unspecified   
circulatory disease]                                         Episodic  
   
                                                    Other connective   
tissue disease  
(7 sources)                             H/O: arthritis;   
Translations:   
[Personal history of   
arthritis]                                                  Episodic  
   
                                                    Other connective   
tissue disease  
(1 source)                              Pain in both feet;   
Translations: [Pain   
in right foot]                          2024          Episodic  
   
                                                    Other diseases of   
veins and lymphatics  
(17 sources)                            Postthrombotic   
syndrome;   
Translations:   
[Postthrombotic   
syndrome without   
complications of   
unspecified   
extremity]                                                  Chronic  
   
                                                    Other diseases of   
veins and lymphatics  
(1 source)                              Lymphedema, not   
elsewhere   
classified;   
Translations:   
[Lymphedema, not   
elsewhere   
classified]                             Onset:   
2024                                          Chronic  
   
                                                    Other diseases of   
veins and lymphatics  
(20 sources)                            Peripheral venous   
insufficiency;   
Translations:   
[Venous   
insufficiency   
(chronic)   
(peripheral)]                           Onset:   
2023                Episodic  
   
                                                    Other diseases of   
veins and lymphatics  
(20 sources)                            Stasis dermatitis;   
Translations:   
[Venous   
insufficiency   
(chronic)   
(peripheral)]                           Onset:   
2023                Episodic  
   
                                                    Other diseases of   
veins and lymphatics  
(12 sources)                            Vascular   
insufficiency;   
Translations:   
[Venous   
insufficiency   
(chronic)   
(peripheral)]                           2021          Episodic  
   
                                                    Other diseases of   
veins and lymphatics  
(6 sources)                             Disorder of vein of   
lower extremity;   
Translations:   
[Venous   
insufficiency   
(chronic)   
(peripheral)]                           2021          Episodic  
   
                                                    Other diseases of   
veins and lymphatics  
(1 source)                              Venous insufficiency   
of leg;   
Translations:   
[Venous   
insufficiency   
(chronic)   
(peripheral)]                           2024          Episodic  
   
                                                    Other diseases of   
veins and lymphatics  
(2 sources)                             Venous insufficiency   
(chronic)   
(peripheral);   
Translations:   
[Venous (peripheral)   
insufficiency,   
unspecified]                            Onset:   
10-                09-                Episodic  
   
                                                    Other gastrointestinal   
disorders  
(12 sources)                            Diarrhea;   
Translations:   
[Diarrhea,   
unspecified]                            2020          Episodic  
   
                                                    Other gastrointestinal   
disorders  
(2 sources)                             Diarrhea,   
unspecified                                                 Episodic  
   
                                                    Other hematologic   
conditions  
(7 sources)                             H/O: blood disorder;   
Translations:   
[Personal history of   
diseases of blood   
and blood-forming   
organs]                                                     Episodic  
   
                                                    Other infections;   
including parasitic  
(12 sources)                            Disorder due to   
infection;   
Translations:   
[Unspecified   
infectious disease]                     2021          Episodic  
   
                                                    Other lower   
respiratory disease  
(8 sources)                             Dyspnea on exertion;   
Translations:   
[Shortness of   
breath]                                 Onset:   
2024                Episodic  
   
                                                    Other lower   
respiratory disease  
(12 sources)                            Dyspnea;   
Translations:   
[Dyspnea,   
unspecified]                            2020          Episodic  
   
                                                    Other nervous system   
disorders  
(16 sources)                            Sleep-wake schedule   
disorder, delayed   
phase type;   
Translations:   
[Circadian rhythm   
sleep disorder,   
delayed sleep phase   
type]                                                       Chronic  
   
                                                    Other nervous system   
disorders  
(2 sources)                             Circadian rhythm   
sleep disorder,   
delayed sleep phase   
type                                    Onset:   
2022  
Resolved:   
2022                                          Chronic  
   
                                                    Other nervous system   
disorders  
(7 sources)                             Numbness and   
tingling sensation   
of skin;   
Translations:   
[Disturbance of skin   
sensation]                                                  Episodic  
   
                                                    Other non-traumatic   
joint disorders  
(7 sources)                             Pain in unspecified   
knee; Translations:   
[Knee pain]                                                 Episodic  
   
                                                    Other non-traumatic   
joint disorders  
(3 sources)                             Pain in left   
shoulder;   
Translations: [Pain   
in joint, shoulder   
region]                                 Onset:   
2024                Episodic  
   
                                                    Other nutritional;   
endocrine; and   
metabolic disorders  
(20 sources)                            Body mass index 40+   
- severely obese;   
Translations: [Body   
mass index (BMI)   
50.0-59.9, adult]                       Onset:   
2024                Chronic  
   
                                                    Other nutritional;   
endocrine; and   
metabolic disorders  
(20 sources)                            Body mass index   
(BMI) 50.0-59.9,   
adult; Translations:   
[Body Mass Index   
50.0-59.9, adult]                       Onset:   
2022  
Resolved:   
2022                                          Chronic  
   
                                                    Other nutritional;   
endocrine; and   
metabolic disorders  
(20 sources)                            Obesity;   
Translations:   
[Obesity,   
unspecified]                            2019          Chronic  
   
                                                    Other nutritional;   
endocrine; and   
metabolic disorders  
(14 sources)                            Metabolic syndrome   
X; Translations:   
[Metabolic syndrome]                                         Chronic  
   
                                                    Other nutritional;   
endocrine; and   
metabolic disorders  
(4 sources)               Obesity, unspecified      Onset:   
05-  
Resolved:   
2022                                          Chronic  
   
                                                    Other nutritional;   
endocrine; and   
metabolic disorders  
(6 sources)               Metabolic syndrome        Onset:   
2022  
Resolved:   
2022                                          Chronic  
   
                                                    Other nutritional;   
endocrine; and   
metabolic disorders  
(12 sources)                            Morbid obesity;   
Translations:   
[Morbid (severe)   
obesity due to   
excess calories]                        2022          Chronic  
   
                                                    Other nutritional;   
endocrine; and   
metabolic disorders  
(7 sources)                             Morbid (severe)   
obesity due to   
excess calories;   
Translations:   
[Morbid obesity]                        2022          Chronic  
   
                                                    Other nutritional;   
endocrine; and   
metabolic disorders  
(3 sources)                             Obese class III;   
Translations:   
[Morbid (severe)   
obesity due to   
excess calories]                        10-          Chronic  
   
                                                    Other screening for   
suspected conditions   
(not mental disorders   
or infectious disease)  
(8 sources)                             Cardiovascular   
stress test   
abnormal;   
Translations: [Other   
nonspecific abnormal   
results of function   
study of   
cardiovascular   
system]                                 Onset:   
2024                Episodic  
   
                                                    Other skin disorders  
(12 sources)                            Hemosiderin   
pigmentation of   
lower limb due to   
varicose veins of   
lower limb;   
Translations: [Other   
specified disorders   
of pigmentation]                        2020          Episodic  
   
                                                    Other skin disorders  
(1 source)                              Callosity;   
Translations: [Corns   
and callosities]                        2024          Episodic  
   
                                                    Anastasia-; endo-; and   
myocarditis;   
cardiomyopathy (except   
that caused by   
tuberculosis or   
sexually transmitted   
disease)  
(20 sources)                            Cardiomyopathy;   
Translations:   
[Cardiomyopathy,   
unspecified]                            Onset:   
2022  
Resolved:   
2022                                          Chronic  
   
                                                    Peripheral and   
visceral   
atherosclerosis  
(20 sources)                            Peripheral vascular   
disease;   
Translations:   
[Peripheral vascular   
disease,   
unspecified]                            Onset:   
2023                Chronic  
   
                                                    Phlebitis;   
thrombophlebitis and   
thromboembolism  
(8 sources)                             Deep venous   
thrombosis;   
Translations: [Acute   
venous embolism and   
thrombosis of   
unspecified deep   
vessels of lower   
extremity]                              Onset:   
2024                Episodic  
   
                                                    Residual codes;   
unclassified  
(16 sources)                            Sleep apnea;   
Translations: [Sleep   
apnea, unspecified]                                         Chronic  
   
                                                    Residual codes;   
unclassified  
(20 sources)                            Obstructive sleep   
apnea syndrome;   
Translations:   
[Obstructive sleep   
apnea (adult)   
(pediatric)]                            Onset:   
2023                Chronic  
   
                                                    Residual codes;   
unclassified  
(17 sources)                            Obstructive sleep   
apnea (adult)   
(pediatric);   
Translations:   
[Obstructive sleep   
apnea   
(adult)(pediatric)]                     Onset:   
2022  
Resolved:   
2022                                          Chronic  
   
                                                    Residual codes;   
unclassified  
(4 sources)                             Continuous positive   
airway pressure   
ventilation   
treatment;   
Translations:   
[Dependence on other   
enabling machines   
and devices]                                                Chronic  
   
                                                    Residual codes;   
unclassified  
(3 sources)                             Dependence on   
continuous positive   
airway pressure   
ventilation;   
Translations:   
[Dependence on other   
enabling machines   
and devices]                            Onset:   
2023                Chronic  
   
                                                    Residual codes;   
unclassified  
(8 sources)                             Localized edema;   
Translations:   
[Edema]                                 Onset:   
2022  
Resolved:   
2022                                          Episodic  
   
                                                    Residual codes;   
unclassified  
(6 sources)               Edema, unspecified        Onset:   
2022  
Resolved:   
2022                                          Episodic  
   
                                                    Residual codes;   
unclassified  
(3 sources)                             History of chest   
pain; Translations:   
[Personal history of   
other specified   
diseases]                                                   Episodic  
   
                                                    Residual codes;   
unclassified  
(12 sources)                            Noncompliance with   
treatment;   
Translations:   
[Noncompliance]                         2021          Episodic  
   
                                                    Residual codes;   
unclassified  
(12 sources)                            Edema of right lower   
leg; Translations:   
[Localized edema]                       2022          Episodic  
   
                                                    Residual codes;   
unclassified  
(12 sources)                            Edema of lower   
extremity;   
Translations:   
[Localized edema]                       2019          Episodic  
   
                                                    Residual codes;   
unclassified  
(12 sources)                            Other specified   
health status;   
Translations:   
[Failure of   
outpatient   
treatment]                              2019          Episodic  
   
                                                    Skin and subcutaneous   
tissue infections  
(20 sources)                            Cellulitis of lower   
leg; Translations:   
[Cellulitis of right   
lower limb]                             Onset:   
10-                05-                Episodic  
   
                                                    Spondylosis;   
intervertebral disc   
disorders; other back   
problems  
(6 sources)                             Other specified   
dorsopathies, lumbar   
region                                  Onset:   
2022  
Resolved:   
2022                                          Episodic  
   
                                                    Sprains and strains  
(20 sources)                            Sprain of hip;   
Translations: [Hip   
strain]                                 Onset:   
2024                Episodic  
   
                                                    Superficial injury;   
contusion  
(9 sources)                             Hematoma of right   
lower leg;   
Translations:   
[Contusion of right   
lower leg, initial   
encounter]                              Onset:   
10-                05-                Episodic  
   
                                                    Unclassified  
(15 sources)                            Inflammatory   
disorder;   
Translations:   
[Inflammation]                          2023            
   
                                                    Varicose veins of   
lower extremity  
(20 sources)                            Varicose ulcer of   
lower extremity;   
Translations:   
[Varicose veins of   
right lower   
extremity with ulcer   
other part of lower   
leg]                                    2019          Episodic  
   
                                                    Viral infection  
(12 sources)                            Disease caused by   
2019-nCoV;   
Translations:   
[COVID-19]                              2020          Episodic  
  
  
Past or Other Problems  
  
  
                                                    Problem   
Classification  Problem         Date            Documented Date Episodic/Chronic  
   
                                                    Acquired foot   
deformities  
(3 sources)                             Acquired hammer toe   
of right foot;   
Translations: [Other   
hammer toe(s)   
(acquired), right   
foot]                                   Onset:   
2023  
Resolved:   
2023                Chronic  
   
                                                    Acquired foot   
deformities  
(3 sources)                             Acquired deformity   
of left foot;   
Translations: [Other   
acquired deformities   
of left foot]                           Onset:   
2023                Episodic  
   
                                                    Other congenital   
anomalies  
(3 sources)                             Porokeratosis;   
Translations: [Other   
specified congenital   
malformations of   
skin]                                   Onset:   
2023  
Resolved:   
2023                Chronic  
   
                                                    Other nutritional;   
endocrine; and   
metabolic disorders  
(1 source)                Abnormal weight gain      Onset:   
2022  
Resolved:   
2022                                          Episodic  
   
                                                    Unclassified  
(7 sources)                             Never smoked   
tobacco;   
Translations: [Never   
a smoker]                                                     
   
                                                    Unclassified  
(1 source)                                          Onset:   
2024                  
  
  
  
Results  
  
  
                          Test Name    Value        Interpretation Reference   
Range                                   Facility  
   
                                                    HbA1c HPLC (Bld) [Mass fract  
ion]on 2024   
   
                                                    HbA1c (Bld) [Mass   
fraction]       5.8 %                                           [facility]  
   
                                                    Aerobic Cultureon 2024  
   
   
                                        Aerobic Culture     Result Tab Codes  
Light Normal Skin   
Tiffany 2 Days  
No Anaerobes Isolated   
3 Days  
Gram Stain Result  
Rare Epithelial Cells  
Rare Gram Positive   
Cocci  
No White Blood Cells   
Seen  
PERFORMED BY:  
[place] [address]  
[phone]  
PATHOLOGIST MEDICAL   
DIRECTOR  
BRIDGETTE SWAIN M.D.    Normal                                  [facility]   
[facility]   
[facility]  
   
                                        Comment on above:   Performed By: #### A  
[facility], GS ####  
[address]   
   
                                                    Gram Stainon 2024   
   
                                                    Microscopic observation   
Gram stain Nom (Unsp   
spec)                                   Gram Stain Result  
Rare Epithelial Cells  
Rare Gram Positive   
Cocci  
No White Blood Cells   
Seen  
PERFORMED BY:  
[place] [address]  
[phone]  
PATHOLOGIST MEDICAL   
DIRECTOR  
BRIDGETTE SWAIN M.D.    Normal                                  [facility]   
[facility]   
[facility]  
   
                                        Comment on above:   Performed By: #### A  
[facility], GS ####  
[address]   
   
                                                    Gram stain for investigation  
 of transfusion reactionOrdered By: Elaine Espino on   
2024   
   
                                                    Microscopic observation   
Gram stain Nom (Unsp   
spec)                                   No Anaerobes Isolated   
3 Days                                                      [facility]  
   
                                                    Alanine aminotransferase [En  
zymatic activity/volume] in Serum or PlasmaOrdered   
By:   
Vera Melvin on 2023   
   
                                                    ALT [Catalytic   
activity/Vol]   16 U/L                          7-52            [facility]  
   
                                                    Albumin [Mass/volume] in Ser  
um or Plasma by Bromocresol green (BCG) dye binding   
methoOrdered By: Vera Melvin on 2023   
   
                                                    Albumin BCG dye   
[Mass/Vol]      4.1 g/dL                        3.5-5.7         [facility]  
   
                                                    Alkaline phosphatase [Enzyma  
tic activity/volume] in Serum or PlasmaOrdered By:   
Vera Melvin on 2023   
   
                                                    ALP [Catalytic   
activity/Vol]   24 U/L                                    [facility]  
   
                                                    Aspartate aminotransferase [  
Enzymatic activity/volume] in Serum or PlasmaOrdered  
By:   
Vera Melvin on 2023   
   
                                                    AST [Catalytic   
activity/Vol]   18 U/L                          13-39           [facility]  
   
                                                    Automated erythrocytes count  
 in urine sediment (number/area)Ordered By: Vera Melvin on 2023   
   
                                                    RBC Auto (Urine sed)   
[#/Area]        1-2 [HPF]                       0-4             [facility]  
   
                                                    Automated leukocytes count i  
n urine sediment (number/area)Ordered By: Vera Melvin on 2023   
   
                                                    WBC Auto (Urine sed)   
[#/Area]        None seen [HPF]                 0-4             [facility]  
   
                                                    Bilirubin Test strip Ql (U)O  
rdered By: Vera Melvin on 2023   
   
                      Bilirubin Ql (U) Negative              Negative   [facility]  
   
                                                    Bilirubin.total [Mass/volume  
] in Serum or PlasmaOrdered By: Vera Melvin   
on   
2023   
   
                      Bilirubin [Mass/Vol] 0.7 mg/dL             0.3-1.0    [facility]  
   
                                                    Calcium [Mass/volume] in Ser  
um or PlasmaOrdered By: Vera Melvin on   
2023   
   
                      Calcium [Mass/Vol] 9.3 mg/dL             8.6-10.3   [facility]  
   
                                                    Carbon dioxide, total [Moles  
/volume] in Serum or PlasmaOrdered By: Vera Melvin on 2023   
   
                      CO2 [Moles/Vol] 25.0 mmol/L            21.0-31.0  [facility]  
   
                                                    Chloride [Moles/volume] in S  
santa or PlasmaOrdered By: Vera Melvin on   
2023   
   
                      Chloride [Moles/Vol] 105 mmol/L                 [facility]  
   
                                                    Cholesterol [Mass/volume] in  
 Serum or PlasmaOrdered By: Vera Melvin on   
2023   
   
                      Cholesterol [Mass/Vol] 175 mg/dL             140-200    [facility]  
   
                                        Comment on above:   Chol less than 200 m  
g/dl low riskChol 201-239 mg/dl borderline  
   
riskChol 240 mg/dl and greater high risk   
   
                                                    Cholesterol in LDL Calc [Mas  
s/Vol]Ordered By: Vera Melvin on 2023   
   
                                                    Cholesterol in LDL   
[Mass/Vol]      113 mg/dL                       0-100           [facility]  
   
                                        Comment on above:   LDL ATP III CLASSIFI  
CATIONLDL less than 100 mg/dL OptimalLDL   
100-129 mg/dL Near or above optimalLDL 130-159 mg/dL   
Borderline highLDL 160-189 mg/dL HighLDL greater than 189   
mg/dL Very high   
   
                                                    Cholesterol in VLDL Calc [Ma  
ss/Vol]Ordered By: Vera Melvin on 2023  
   
   
                                                    Cholesterol in VLDL   
[Mass/Vol]      19 mg/dL                                        [facility]  
   
                                                    Color Auto (U)Ordered By: Sa ileana Melvin on 2023   
   
                      Color (U)  Yellow                Yellow     [facility]  
   
                                                    Creatinine [Mass/volume] in   
Serum or PlasmaOrdered By: Vera Melvin on   
2023   
   
                      Creatinine [Mass/Vol] 1.03 mg/dL            0.70-1.30  [facility]  
   
                                                    Creatinine [Mass/volume] in   
UrineOrdered By: Vera Melvin on 2023   
   
                                                    Creatinine (U)   
[Mass/Vol]      107.0 mg/dL                     14.0-26.0       [facility]  
   
                                                    Erythrocyte distribution wid  
th Auto (RBC) [Ratio]Ordered By: Vera Melvin  
 on   
2023   
   
                                                    Erythrocyte   
distribution width   
(RBC) [Ratio]   14.0 %                          12.0-14.8       [facility]  
   
                                                    Globulin Calc (S) [Mass/Vol]  
Ordered By: Vera Melvin on 2023   
   
                      Globulin (S) [Mass/Vol] 3.0 g/dL                         F  
[facility]  
   
                                                    Glucose [Mass/volume] in Ser  
um or PlasmaOrdered By: Vera Melvin on   
2023   
   
                      Glucose [Mass/Vol] 95 mg/dL                   [facility]  
   
                                        Comment on above:   ADA recommended refe  
rence rangeRandom Glucose Reference Range   
is dependent on time and content of last meal. Glucose of more   
than 200 mg/dL in a nonstressed, ambulatory subject supports   
the diagnosis of Diabetes Mellitus.   
   
                                                    Glucose mean value [Mass/vol  
ume] in Blood Estimated from glycated   
hemoglobinOrdered   
By: Vera Melvin on 2023   
   
                                                    Average glucose   
Estimated from glycated   
hemoglobin (Bld)   
[Mass/Vol]      114 mg/dL                                       [facility]  
   
                                                    Hematocrit Auto (Bld) [Volum  
e fraction]Ordered By: Vera Melvin on   
2023   
   
                                                    Hematocrit (Bld)   
[Volume fraction] 44.1 %                          38.8-50.0       [facility]  
   
                                                    Hemoglobin A1c percentageOrd  
ered By: Vera Melvin on 2023   
   
                                                    HbA1c (Bld) [Mass   
fraction]       5.6 %                           4.3-5.6         [facility]  
   
                                        Comment on above:   Increased risk for d  
iabetes: 5.7 - 6.4diabetes: >6.4glycemic   
control for adults with diabetes: <7.0   
   
                                                    Hemoglobin [Mass/volume] in   
BloodOrdered By: Vera Melvin on 2023   
   
                                                    Hemoglobin (Bld)   
[Mass/Vol]      15.0 g/dL                       13.0-17.0       [facility]  
   
                                                    Ketones Auto test strip (U)   
[Mass/Vol]Ordered By: Vera Melvin on   
2023   
   
                      Ketones (U) [Mass/Vol] Negative              Negative   [facility]  
   
                                                    Laboratory - UrinalysisOrder  
ed By: Vera Melvin on 2023   
   
                                                    Hyaline casts LM Ql   
(Urine sed)     None seen [LPF]                 0-8             [facility]  
   
                                                    Leukocytes [#/volume] correc  
radha for nucleated erythrocytes in Blood by Automated  
   
counOrdered By: Vera Melvin on 2023   
   
                                                    WBC corrected for nucl   
RBC Auto (Bld) [#/Vol] 8.9 10*3/uL                     4.1-10.5        [facility]  
   
                                                    MCH Auto (RBC) [Entitic mass  
]Ordered By: Vera Melvin on 2023   
   
                                                    MCH (RBC) [Entitic   
mass]           29.9 pg                         27.5-35.2       [facility]  
   
                                                    MCHC Auto (RBC) [Mass/Vol]Or  
dered By: Vera Melvin on 2023   
   
                      MCHC (RBC) [Mass/Vol] 34.1 g/dL             32.5-35.6  [facility]  
   
                                                    MCV Auto (RBC) [Entitic vol]  
Ordered By: Vera Melvin on 2023   
   
                      MCV (RBC) [Entitic vol] 87.7 fL               83.5-101   F  
[facility]  
   
                                                    Microalbumin [Mass/volume] i  
n UrineOrdered By: Vera Melvin on 2023  
   
   
                                                    Albumin DL <= 20 mg/L   
(U) [Mass/Vol]  mg/dL                           0.0-1.8         [facility]  
   
                                                    Nitrite Test strip Ql (U)Ord  
ered By: Vera Mevlin on 2023   
   
                      Nitrite Ql (U) Negative              Negative   [facility]  
   
                                                    No Panel InformationOrdered   
By: Vera Melvin on 2023   
   
                      Estimated GFR (CKD-EPI) > 60.0 mL/Min                       
  [facility]  
   
                                                    Pharmacy Creatinine   
Clearance (Chem N/A                                             [facility]  
   
                                                    Platelet mean volume Auto (B  
ld) [Entitic vol]Ordered By: Vera Melvin on   
2023   
   
                                                    Platelet mean volume   
(Bld) [Entitic vol] 7.5 fL                          6.6-10.1        [facility]  
   
                                                    Platelets Auto (Bld) [#/Vol]  
Ordered By: Vera Melvin on 2023   
   
                      Platelets (Bld) [#/Vol] 303 10*3/uL            150-450      
[facility]  
   
                                                    Potassium [Moles/volume] in   
Serum or PlasmaOrdered By: Vera Melvin on   
2023   
   
                      Potassium [Moles/Vol] 4.4 mmol/L            3.5-5.1    [facility]  
   
                                                    Prostate specific Ag [Mass/v  
olume] in Serum or PlasmaOrdered By: Vera Antony   
on 2023   
   
                                                    Prostate specific Ag   
[Mass/Vol]      2.660 ng/mL                     0.000-4.000     [facility]  
   
                                                    Protein Auto test strip (U)   
[Mass/Vol]Ordered By: Vera Melvin on   
2023   
   
                      Protein (U) [Mass/Vol] Negative              Negative   [facility]  
   
                                                    Protein [Mass/volume] in Ser  
um or PlasmaOrdered By: Vera Melvin on   
2023   
   
                      Protein [Mass/Vol] 7.1 g/dL              6.4-8.9    [facility]  
   
                                                    RBC Auto (Bld) [#/Vol]Ordere  
d By: Vera Melvin on 2023   
   
                      RBC (Bld) [#/Vol] 5.03 10*6/uL            3.90-5.60  [facility]  
   
                                                    Serum or plasma albumin/glob  
ulin mass ratioOrdered By: Vera Melvin on   
2023   
   
                                                    Albumin/Globulin [Mass   
ratio]          1.4 {ratio}                                     [facility]  
   
                                                    Serum or plasma anion gap de  
terminationOrdered By: Vera Melvin on   
2023   
   
                      Anion gap [Moles/Vol] 10.4 mmol/L            6.0-15.0   [facility]  
   
                                                    Serum or plasma high density  
 lipoprotein (HDL) cholesterol measurementOrdered   
By:   
Vera Melvin on 2023   
   
                                                    Cholesterol in HDL   
[Mass/Vol]      42 mg/dL                        23-92           [facility]  
   
                                        Comment on above:   HDL CHOL ATP-III CLA  
SSIFICATION Cardiovascular RiskHDL > or   
equal to 60 mg/dL LOWHDL < 40 mg/dL HIGH   
   
                                                    Serum or plasma total choles  
terol/high density lipoprotein (HDL) cholesterol   
mass   
ratOrdered By: Vera Melvin on 2023   
   
                                                    Cholesterol.total/Ami  
sterol in HDL [Mass   
ratio]          4.2 {ratio}                     <5.0            [facility]  
   
                                                    Sodium [Moles/volume] in Ser  
um or PlasmaOrdered By: Vera Melvin on   
2023   
   
                      Sodium [Moles/Vol] 136 mmol/L            136-145    [facility]  
   
                                                    Specific gravity Auto test s  
trip (U) [Rel density]Ordered By: Vera Antony on   
2023   
   
                                                    Specific gravity (U)   
[Rel density]   1.022                           1.001-1.030     [facility]  
   
                                                    Squamous epithelial cells de  
tection in urine sediment by light microscopyOrdered  
By:   
Vera Melvin on 2023   
   
                                                    Epithelial   
cells.squamous LM Ql   
(Urine sed)     0-1 [HPF]                       0-2             [facility]  
   
                                                    Triglyceride [Mass/volume] i  
n Serum or PlasmaOrdered By: Vera Melvin on   
2023   
   
                      Triglyceride [Mass/Vol] 99 mg/dL              0-149      F  
[facility]  
   
                                        Comment on above:   TRIG ATP III CLASSIF  
ICATIONTRIG less than 150 mg/dL NormalTRIG  
   
150-199 mg/dL Borderline highTRIG 200-500 mg/dL High TRIG   
greater than 500 mg/dL Very highStandard traceable to the   
Center for Disease Conrtrol and Prevention (CDC) test method.   
   
                                                    Urea nitrogen [Mass/volume]   
in Serum or PlasmaOrdered By: Vera Melvin on  
   
2023   
   
                                                    Urea nitrogen   
[Mass/Vol]      19 mg/dL                        7            [facility]  
   
                                                    Urine bacteria detection by   
automated methodOrdered By: Vera Melvin on   
2023   
   
                      Bacteria Auto Ql (U) None seen             None Seen  [facility]  
   
                                                    Urine clarity by refractomet  
ry automatedOrdered By: Vera Melvin on   
2023   
   
                                                    Clarity Refractometry   
automated (U)   Clear                           Clear           [facility]  
   
                                                    Urine glucose measurement by  
 automated test strip (mass/volume)Ordered By:   
Vera Melvin on 2023   
   
                                                    Glucose Auto test strip   
(U) [Mass/Vol]  >=1000 mg/dL                    Normal          [facility]  
   
                                                    Urine hemoglobin detection b  
y automated test stripOrdered By: Vera Antony on   
2023   
   
                                                    Hemoglobin Auto test   
strip Ql (U)    Negative                        Negative        [facility]  
   
                                                    Urine leukocyte esterase det  
ection by automated test stripOrdered By: Vera Melvin on 2023   
   
                                                    Leukocyte esterase Auto   
test strip Ql (U) Negative                        Negative        [facility]  
   
                                                    Urine microalbumin/creatinin  
e mass ratioOrdered By: Vera Melvin on   
2023   
   
                                                    Albumin/Creatinine DL   
<= 20 mg/L (U) [Mass   
ratio]          TNP                                             [facility]  
   
                                        Comment on above:   Test not performed   
   
                                                    Urobilinogen Auto test strip  
 (U) [Mass/Vol]Ordered By: Vera Melvin on   
2023   
   
                                                    Urobilinogen (U)   
[Mass/Vol]      Normal mg/dL                    Normal          [facility]  
   
                                                    pH Auto test strip (U)Ordere  
d By: Vera Melvin on 2023   
   
                      pH (U)     5.5 [pH]              5.0-9.0    [facility]  
   
                                                    A1C HEMOGLOBINon 2023   
   
                                                    HbA1c (Bld) [Mass   
fraction]       5.6 %                                           [facility]   
[employer]   
[employer]  
Other Phone:   
[phone]  
   
                                                    HbA1c (Bld) [Mass fraction]o  
n 2023   
   
                      A1C HEMOGLOBIN                                  [facility]   
[facility]   
[employer]  
Other Phone:   
[phone]  
   
                                                    Office Visit (Cardiology)on   
2023   
   
                                        Follow-up visit     Diagnoses/Problems  
Assessed  
Chest pain,   
unspecified type   
(786.50) (R07.9)  
CHF (NYHA class II,   
ACC/AHA stage C)   
(428.0) (I50.9)  
LVH (left ventricular   
hypertrophy) (429.3)   
(I51.7)  
Non-ischemic   
cardiomyopathy   
(425.4) (I42.8)  
DVT (deep venous   
thrombosis) (453.40)   
(I82.409)  
Never a smoker  
Prediabetes (790.29)   
(R73.03)  
DIMITRI on CPAP (327.23)   
(G47.33)  
Morbid obesity with   
BMI of 45.0-49.9,   
adult (278.01,V85.42)   
(E66.01,Z68.42)  
Orders  
CHF (NYHA class II,   
ACC/AHA stage C)  
Renew: Spironolactone   
25 MG Oral Tablet;   
TAKE 1 TABLET DAILY  
CHF (NYHA class II,   
ACC/AHA stage C),   
Prediabetes  
Renew: Jardiance 10   
MG Oral Tablet; TAKE   
1 TABLET BY MOUTH   
ONCE DAILY  
Morbid obesity with   
BMI of 45.0-49.9,   
adult  
Healthy Weight Tips;   
Status:Complete -   
Retrospective   
Authorization; Done:   
[id number]  
Some eating tips that   
can help you lose   
weight.;   
Status:Complete -   
Retrospective  
Authorization; Done:   
[id number]  
SocHx: Never a smoker  
Tobacco Use   
Screening;   
Status:Complete;   
Done: [id number]  
Patient Instructions  
Please bring all   
medicines, vitamins,   
and herbal   
supplements with you   
when you come to the   
office.  
Prescriptions will   
not be filled unless   
you are compliant   
with your follow up   
appointments or have   
a follow up   
appointment scheduled   
as per instruction of   
your physician.   
Refills should be   
requested at the time   
of your visit.  
Patient to have labs   
forward to Dr. Compa Maldonado DO   
when completed by pcp   
in 2023  
Follow up in 10   
months with Sharlene An NP  
  
  
Chief Complaint  
JOCELIN PACHECO is being   
seen for a 6 month   
follow-up of.  
Patient is a   
58-year-old gentleman   
returns for   
follow-up. He has had   
an over 30 pound   
weight loss since   
  
Ozempic therapy.  
He has a history of   
mild ASHD, mild to   
moderate nonischemic   
cardiomyopathy with   
persistent ejection   
fraction of 42%. He   
has underlying morbid   
obesity, diabetes,   
obstructive sleep   
apnea, history of   
remote DVT, recurrent   
ulcers on the calf   
due to venous stasis.   
His NYHA   
classification is   
class II-III/C. He is   
still working out   
with cardiac rehab   
and activities that   
are amenable to his   
stature and body   
habitus and   
capability.  
He has no   
hospitalizations for   
heart failure, no   
syncope, no falls, no   
angina  
Recommendations,   
continue GDMT as   
reviewed and   
prescribed, follow-up   
in 8 to 10 months   
with nurse   
practitioner  
  
Surgical History   
Problems  
History of Complete   
colonoscopy  
History of Finger   
surgical procedure  
History of   
Gallbladder surgery  
Past Medical History  
Problems  
History of arthritis   
(V13.4) (Z87.39)  
History of bleeding   
disorder (V12.3)   
(Z86.2)  
History of cardiac   
disorder (V12.50)   
(Z86.79)  
History of Numbness   
and tingling (782.0)   
(R20.0,R20.2)  
History of Vision   
problems (V41.0)   
(H54.7)  
Current Meds  
  
Medication   
NameInstruction  
Carvedilol 6.25 MG   
Oral TabletTAKE 1   
TABLET TWICE DAILY   
WITH MEALS.  
Jardiance 10 MG Oral   
TabletTAKE 1 TABLET   
BY MOUTH ONCE DAILY  
Lasix TABSTAKE 1   
TABLET TWICE DAILY.  
Nabumetone 750 MG   
Oral TabletTAKE 1   
TABLET EVERY 12 HOURS   
DAILY.  
Ozempic (1 MG/DOSE)   
SOPNINJECT 1 UNIT   
Weekly  
Spironolactone 25 MG   
Oral TabletTAKE 1   
TABLET DAILY.  
Xarelto 10 MG Oral   
TabletTake 1 tablet   
daily  
Patient did not bring   
medication list or   
bottles. Updated   
verbally with patient  
Allergies  
Medication  
Cephalosporins  
Adverse Reaction;   
Rash; Recorded By:   
Etelvina Bradshaw;   
2022 8:55:43 AM  
Statins  
Adverse Reaction;   
Rash; Recorded By:   
Etelvina Bradshaw;   
2022 8:55:43 AM  
Voltaren  
Adverse Reaction;   
Rash; Recorded By:   
Etelvina Bradshaw;   
2022 8:55:43 AM  
Social History  
Problems  
Daily caffeine   
consumption  
coffee 1-2 pots a day  
Never a smoker  
No alcohol use  
No illicit drug use  
Review of Systems  
Constitutional: not   
feeling tired.  
Cardiovascular: no   
intermittent leg   
claudication and as   
noted in HPI.  
Respiratory: no cough   
and no shortness of   
breath.  
Gastrointestinal: no   
change in bowel   
habits and no blood   
in stools.  
Integumentary: no   
skin rashes.  
Neurological: no   
seizures and no   
frequent falls.  
All other systems   
have been reviewed   
and are negative for   
complaint.  
  
Vitals  
Vital Signs  
Recorded: [id number]   
10:37AM  
Heart Rate74, L   
Radial  
[id number], LUE,   
Sitting  
[id number], LUE,   
Sitting  
Height5 ft 11 in  
Bqgmjb788 lb  
BMI Deihcjzxxm46.37   
kg/m2  
BSA Calculated2.69  
Tobacco Useb) No  
Falls Screening (Age   
18+)a) No falls   
within the last year  
Physical Exam  
Constitutional: alert   
and in no acute   
distress.  
Neck: neck is supple,   
symmetric, trachea   
midline, no masses   
and no thyromegaly .  
Pulmonary: no   
increased work of   
breathing or signs of   
respiratory distress   
and lungs clear to   
auscultation.  
Cardiovascular:   
carotid pulses 2+   
bilaterally with no   
bruit , JVP was   
normal, no thrills ,   
regular rhythm,   
normal S1 and S2, no   
murmurs , pedal   
pulses 2+ bilaterally   
and no edema .  
Abdomen: abdomen   
non-tender, no masses   
and no hepatomegaly .  
Skin: skin warm and   
dry, normal skin   
turgor .  
Psychiatric judgment   
and insight is normal   
and orien (more   
content not   
included)...        Normal                                   [facility]  
   
                                                    [facility] MUGA SCAN INJECTIONon    
   
                                        [facility] MUGA SCAN INJECTION MRN: [id number]  
Patient Name: JOCELIN PACHECO  
  
STUDY:  
MUGA  
  
Performing facility:  
[facility],  
[address] [place] [address], [address]   
[address],  
[place] [address]  
[facility] Provider: KOBI Maldonado DO,   
[facility]  
PCP: Dr. OLIVER Melvin  
Supervising provider:   
Ze Flores MD  
  
INDICATION:  
Chest Pain; CHF NICM  
  
HISTORY:  
Gender: M; Age: 67 y/o ; Height: 0 cm;   
Weight: 0 kg.  
  
Chest Pain; CHF NICM  
Denies smoking.  
  
COMPARISON:  
Previous nuclear   
testing completed   
[id number] MPI at [facility].  
  
  
ACCESSION NUMBER(S):  
[id number]; [id number]  
  
ORDERING CLINICIAN:  
COMPA MALDONADO  
  
TECHNIQUE:  
The patient received   
an IV injection of 3   
ml of stannous  
pyrophosphate (PYP)   
using the in-vivo   
method of labeling   
red blood  
cells. After 15   
minutes the patient   
received another IV   
injection of  
27.4 mCi of   
Technetium 99m   
pertechnetate. Planar   
image of the left  
ventricle was   
obtained in the Yi   
45.  
  
FINDINGS:  
The right ventricle   
was normal.  
The left ventricle   
was enlarged in size.  
Regional wall motion   
was abnormal.  
Global resting LVEF   
was abnormal- at 42%.  
  
IMPRESSION:  
Normal resting right   
ventricular function.  
Abnormalresting left   
ventricular function.  
Left ventricular   
ejection fraction is   
42%.  
No previous studies   
are available for   
comparison  
  
Electronically signed   
by: ELROY WLAL MD         Normal                                  [facility]  
   
                                                    No Panel Informationon    
   
                                            Normal                [facility]   
[facility]   
250 DO  
Work Phone:   
[phone]  
   
                                                    Creatinine and Glomerular fi  
ltration rate.predicted panel (S/P/Bld)Ordered By: JASON Maldonado on 2023   
   
                      Creatinine [Mass/Vol] 1.12 mg/dL            0.64-1.27  [facility]  
   
                                                    Estimated glomerular filtrat  
ion rate (GFR) non- AmericanOrdered By: JASON Maldonado   
on 2023   
   
                                                    GFR/1.73 sq M.predicted   
among non-blacks MDRD   
(S/P/Bld) [Vol   
rate/Area]      > 60 mL/Min                                     [facility]  
   
                                                    Laboratory - Chemistry and C  
hemistry - challengeOrdered By: JASON Maldonado on   
2023   
   
                                                    Natriuretic peptide B   
(Bld) [Mass/Vol] 13.0 pg/mL                      5-100           [facility]  
   
                                                    No Panel InformationOrdered   
By: JASON Maldonado on 2023   
   
                                                    Estimated GFR (   
American)       > 60 mL/Min                                     [facility]  
   
                                        Comment on above:   GFR estimated refere  
nce range: According to KDOQI guidelines,   
<60 ml/min/1.73m2 is sufficient to diagnose a patient with   
chronic kidney disease.   
   
                                                    Pharmacy Creatinine   
Clearance (Chem N/A                                             [facility]  
   
                                                    No Panel Informationon    
   
                                 10.7\S\10.7 Normal     6.0-15.0   [facility]  
   
[facility]   
250 DO  
Work Phone:   
[phone]  
   
                                 9.0\S\9.0  Normal     8.2-10.2   [facility]   
[facility]   
250 DO  
Work Phone:   
[phone]  
   
                                        Comment on above:   PERFORMED BY:[facility]   
[place], [place] [id number] MEDICAL   
DIRECTORBRIDGETTE SWAIN M.D.   
   
                                 24.1\S\24.1 Normal     22.0-30.0  [facility]  
   
[facility]   
250 DO  
Work Phone:   
[phone]  
   
                                 103\S\103  Normal          [facility]   
[address] DO  
Work Phone:   
[phone]  
   
                                 4.8\S\4.8  Normal     3.5-5.1    [facility]   
[facility]   
250 DO  
Work Phone:   
[phone]  
   
                                                133\S\133       below low   
threshold                 136-146                   [facility]   
250 DO  
Work Phone:   
[phone]  
   
                                 > 60       Normal                [facility]   
250 DO  
Work Phone:   
[phone]  
   
                                        Comment on above:   GFR estimated refere  
nce range: According to KDOQI guidelines,   
<60 ml/min/1.73m2 is sufficient to diagnose a patient with   
chronic kidney disease.   
   
                                 1.12\S\1.12 Normal     0.64-1.27  [facility]   
250 DO  
Work Phone:   
[phone]  
   
                                 17\S\17    Normal     9-23       [facility]   
[address] DO  
Work Phone:   
[phone]  
   
                                 95\S\95    Normal          [facility]   
[address] DO  
Work Phone:   
[phone]  
   
                                        Comment on above:   Random Glucose Refer  
ence Range is dependent on time and   
content of last meal. Glucose of more than 200 mg/dL in a   
nonstressed, ambulatory subject supports the diagnosis of   
Diabetes Mellitus. ADA recommended reference range   
   
                                 13.0\S\13.0 Normal     5-100      [facility]  
   
[facility]   
[address] DO  
Work Phone:   
[phone]  
   
                                        Comment on above:   PERFORMED BY:[facility]   
[place], [place] [phone] MEDICAL   
DIRECTORBRIDGETTE SWAIN M.D.   
   
                                                    Serum or plasma anion gap de  
terminationOrdered By: JASON Maldonado on 2023   
   
                      Anion gap [Moles/Vol] 10.7 mmol/L            6.0-15.0   [facility]  
   
                                                    Serum or plasma calcium cong  
urement (mass/volume)Ordered By: JASON Maldonado on   
2023   
   
                      Calcium [Mass/Vol] 9.0 mg/dL             8.2-10.2   [facility]  
   
                                                    Serum or plasma chloride patricia  
surement (moles/volume)Ordered By: JASON Maldonado on   
2023   
   
                      Chloride [Moles/Vol] 103 mmol/L                 [facility]  
   
                                                    Serum or plasma glucose cong  
urement (mass/volume)Ordered By: JASON Maldonado on   
2023   
   
                      Glucose [Mass/Vol] 95 mg/dL                   [facility]  
   
                                        Comment on above:   ADA recommended refe  
rence rangeRandom Glucose Reference Range   
is dependent on time and content of last meal. Glucose of more   
than 200 mg/dL in a nonstressed, ambulatory subject supports   
the diagnosis of Diabetes Mellitus.   
   
                                                    Serum or plasma potassium me  
asurement (moles/volume)Ordered By: JASON Maldonado on   
2023   
   
                      Potassium [Moles/Vol] 4.8 mmol/L            3.5-5.1    [facility]  
   
                                                    Serum or plasma sodium measu  
rement (moles/volume)Ordered By: JASON Maldonado on   
2023   
   
                      Sodium [Moles/Vol] 133 mmol/L            136-146    [facility]  
   
                                                    Serum or plasma total carbon  
 dioxide measurement (moles/volume)Ordered By: JASON Maldonado   
on 2023   
   
                      CO2 [Moles/Vol] 24.1 mmol/L            22.0-30.0  [facility]  
   
                                                    Serum or plasma urea nitroge  
n measurement (mass/volume)Ordered By: JASON Maldonado on   
2023   
   
                                                    Urea nitrogen   
[Mass/Vol]      17 mg/dL                        9-23            [facility]  
   
                                                    Falls Screening (Age 18+)on   
2023   
   
                                                    Adult depression   
screening assessment No                                              [facility]  
[facility]   
[facility]   
250 DO  
Work Phone:   
[phone]  
   
                                        Fall risk assessment a) No falls within   
the last year                                               [facility]   
[facility]   
250 DO  
Work Phone:   
[phone]  
   
                      Tobacco use status CPHS b) No                            M  
[facility]   
[facility]   
250 DO  
Work Phone:   
[phone]  
   
                                                    Falls Screening (Age   
18+)                                    Patient is not at   
high risk for falls.   
Falls risk guidance   
reviewed today                                              [facility]   
[facility]   
250 DO  
Work Phone:   
[phone]  
   
                                                    Office Visit (Cardiology)on   
2023   
   
                                        Follow-up visit     Diagnoses/Problems  
Assessed  
DIMITRI on CPAP   
(327.23,V46.8)   
(G47.33,Z99.89)  
CHF (NYHA class II,   
ACC/AHA stage C)   
(428.0) (I50.9)  
Non-ischemic   
cardiomyopathy   
(425.4) (I42.8)  
Chest pain,   
unspecified type   
(786.50) (R07.9)  
Morbid obesity with   
BMI of 50.0-59.9,   
adult (278.01,V85.43)   
(E66.01,Z68.43)  
Never a smoker  
DVT (deep venous   
thrombosis) (453.40)   
(I82.409)  
Orders  
Chest pain,   
unspecified type, CHF   
(NYHA class II,   
ACC/AHA stage C),   
Non-ischemic  
cardiomyopathy  
Basic Metabolic   
Panel; Status:Active   
- Retrospective   
Authorization;   
Requested  
for:2023;  
NM [facility] ([facility]   
Cardiac Blood Pool);   
Status:Hold For -   
Scheduling,Retrospect  
anthony  
Authorization;   
Requested   
for:2023;  
Radiologist to   
Determine Optimal   
Study : Y  
What are the   
patient's signs and   
symptoms? : cp  
Brain Natriuretic   
Peptide BNP;   
Status:Active -   
Retrospective   
Authorization;   
Requested  
for:2023;  
CHF (NYHA class II,   
ACC/AHA stage C),   
Non-ischemic   
cardiomyopathy  
Renew: Carvedilol   
6.25 MG Oral Tablet;   
TAKE 1 TABLET TWICE   
DAILY WITH MEALS  
Morbid obesity with   
BMI of 50.0-59.9,   
adult  
Healthy Weight Tips;   
Status:Complete -   
Retrospective   
Authorization; Done:   
2023  
Some eating tips that   
can help you lose   
weight.;   
Status:Complete -   
Retrospective  
Authorization; Done:   
2023  
SocHx: Never a smoker  
Tobacco Use   
Screening;   
Status:Complete;   
Done: [id number]  
Patient Instructions  
Please bring all   
medicines, vitamins,   
and herbal   
supplements with you   
when you come to the   
office.  
Prescriptions will   
not be filled unless   
you are compliant   
with your follow up   
appointments or have   
a follow up   
appointment scheduled   
as per instruction of   
your physician.   
Refills should be   
requested at the time   
of your visit.  
  
Follow up in 6 months  
  
  
Chief Complaint  
JOCELIN PACHECO is being   
seen for a 6 month   
follow-up of.  
62-year-old gentleman   
who returns for   
annual visit he is   
doing well just   
complains atypical   
chest discomfort   
described as tingling   
as well as sharp and   
somewhat painful   
sensation both at   
rest and with   
activities that are   
sporadic and episodic   
only but not   
reproducible. He has   
a history of moderate   
nonischemic   
cardiomyopathy,   
obstructive sleep   
apnea, morbid   
obesity, history of   
remote DVT (remains   
on low-dose Xarelto).  
Stress perfusion   
imaging from 2022 revealed patchy   
tracer uptake,   
proceed ischemia or   
infarction, normal   
left ventricular   
volume with global   
hypokinesis and   
ejection fraction of   
44% normal transient   
ischemic index.  
Heart catheterization   
from  revealed 30   
to 50% ostial   
circumflex disease,   
20 to 25% mid LAD   
disease normal right   
coronary and at that   
time ejection   
fraction of 40 to   
45%.  
Since  with his   
medical history   
continue therapies.   
We have transitioned   
over to Entresto,   
Jardiance, carvedilol   
and spironolactone  
Unfortunately he has   
had no significant   
weight loss despite   
going to cardiac   
rehab 3 times a week.   
He remains 365   
pounds, similar to   
  
Recommendations,   
obtain a MUGA scan   
current therapies,   
obtain appropriate   
laboratories   
including Chem-6 and   
BNP, we will have him   
come back in 6 months   
continues to have any   
further chest   
discomfort will   
prescribe nitrates   
and/or consider   
invasive assessment.  
  
Active Problems   
Problems  
Abnormal stress test   
(794.39) (R94.39)  
DVT (deep venous   
thrombosis) (453.40)   
(I82.409)  
Knee pain (719.46)   
(M25.569)  
LVH (left ventricular   
hypertrophy) (429.3)   
(I51.7)  
Morbid obesity with   
BMI of 50.0-59.9,   
adult (278.01,V85.43)   
(E66.01,Z68.43)  
Never a smoker  
DIMITRI on CPAP   
(327.23,V46.8)   
(G47.33,Z99.89)  
Prediabetes (790.29)   
(R73.03)  
Primary   
osteoarthritis of   
left knee (715.16)   
(M17.12)  
Primary   
osteoarthritis of   
right knee (715.16)   
(M17.11)  
SOB (shortness of   
breath) on exertion   
(786.05) (R06.02)  
Surgical History  
Problems  
History of Complete   
colonoscopy  
History of Finger   
surgical procedure  
History of   
Gallbladder surgery  
Past Medical History  
Problems  
History of arthritis   
(V13.4) (Z87.39)  
History of bleeding   
disorder (V12.3)   
(Z86.2)  
History of cardiac   
disorder (V12.50)   
(Z86.79)  
History of Numbness   
and tingling (782.0)   
(R20.0,R20.2)  
History of Vision   
problems (V41.0)   
(H54.7)  
Current Meds  
  
Medication   
NameInstruction  
Carvedilol 6.25 MG   
Oral TabletTAKE 1   
TABLET TWICE DAILY   
WITH MEALS.  
Entresto 24-26 MG   
Oral TabletTAKE 1   
TABLET BY MOUTH TWICE   
A DAY  
Jardiance 10 MG Oral   
TabletTAKE 1 TABLET   
BY MOUTH ONCE DAILY  
Lasix TABSTAKE 1   
TABLET TWICE DAILY.  
Nabumetone 750 MG   
Oral TabletTAKE 1   
TABLET EVERY 12 HOURS   
DAILY.  
Ozempic (1 MG/DOSE)   
SOPNINJECT 1 UNIT   
Weekly  
Spironolactone 25 MG   
Oral TabletTAKE 1   
TABLET DAILY.  
Xarelto 10 MG Oral   
TabletTake 1 tablet   
daily  
Allergies  
Medication  
Cephalosporins  
Adverse Reaction;   
Rash; Recorded By:   
Etelvina Bradshaw;   
2022 8:55:43 AM  
Statins  
Adverse Reaction;   
Rash; Recorded By:   
Etelvina Bradshaw;   
2022 8:55:43 AM  
Voltaren  
Adverse Reaction;   
Rash; Recorded By:   
Etelvina Bradshaw;   
2022 8:55:43 AM  
Family History  
Mother  
Family history of   
COPD, moderate (more   
content not   
included)...        Normal                                   [employer]  
   
                                                    A1C HEMOGLOBINon 2022   
   
                                                    HbA1c (Bld) [Mass   
fraction]       5.4 %                                           [facility]   
[employer]   
[employer]  
Other Phone:   
[phone]  
   
                                                    HbA1c (Bld) [Mass fraction]o  
n 2022   
   
                      A1C HEMOGLOBIN                                  [place]  
t   
[employer]   
[employer]poration  
Other Phone:   
[phone]  
   
                                                    No Panel Informationon    
   
                                 9.3\S\9.3  Normal     8.2-10.2   [facility]   
[facility]   
250 DO  
Work Phone:   
[phone]  
   
                                        Comment on above:   PERFORMED BY:[facility]   
[place], [place] [id number] [employer]   
DIRECTORBRIDGETTE SWAIN M.D.   
   
                                 25.0\S\25.0 Normal     22.0-30.0  [facility]  
   
[facility]   
250 DO  
Work Phone:   
[phone]  
   
                                 101\S\101  Normal          [facility]   
[facility]   
250 DO  
Work Phone:   
[phone]  
   
                                 4.3\S\4.3  Normal     3.5-5.1    [facility]   
[facility] Liao DO  
Work Phone:   
[phone]  
   
                                                135\S\135       below low   
threshold                 136-146                   [facility]   
[facility]   
250 DO  
Work Phone:   
[phone]  
   
                                 > 60       Normal                [facility]  
Work Phone:   
[phone]  
   
                                        Comment on above:   GFR estimated refere  
nce range: According to KDOQI guidelines,   
<60 ml/min/1.73m2 is sufficient to diagnose a patient with   
chronic kidney disease.   
   
                                 54\S\54    Normal                [facility]  
Work Phone:   
[phone]  
   
                                                1.33\S\1.33     above high   
threshold                 0.64-1.27                 [facility]   
250 DO  
Work Phone:   
[phone]  
   
                                 20\S\20    Normal     9-23       [facility]   
250 DO  
Work Phone:   
[phone]  
   
                                 92\S\92    Normal          [facility]   
250 DO  
Work Phone:   
[phone]  
   
                                        Comment on above:   Random Glucose Refer  
ence Range is dependent on time and   
content of last meal. Glucose of more than 200 mg/dL in a   
nonstressed, ambulatory subject supports the diagnosis of   
Diabetes Mellitus. ADA recommended reference range   
   
                                                    Tobacco Screening.on   
022   
   
                                                    Adult depression   
screening assessment No                                              [facility]   
[facility]   
250 DO  
Work Phone:   
[phone]  
   
                                        Fall risk assessment b) One or more fall  
s   
in the last year                                            [facility]   
[facility]   
250 DO  
Work Phone:   
[phone]  
   
                      Tobacco use status CPHS b) No                            M  
[facility]   
[facility]   
250 DO  
Work Phone:   
[phone]  
   
                                                    Complete Blood Count with Au  
to Diffon 2022   
   
                                                    Erythrocyte   
distribution width   
(RBC) [Ratio]   13.0 %          Normal          11.0-15.0       [facility]   
Medical   
Specialist  
   
                                        Comment on above:   Performed By: #### M  
DIFF, CMP, CBCAD ####  
[facility]  
[address] [place], [place] [id number]   
   
                                                    Hematocrit (Bld)   
[Volume fraction] 42.7 %          Normal          38.5-50.0       [facility]   
Medical   
Specialist  
   
                                        Comment on above:   Performed By: #### M  
DIFF, CMP, CBCAD ####  
[facility]  
[address], [place] [id number]   
   
                                                    Hemoglobin (Bld)   
[Mass/Vol]      14.5 g/dL       Normal          13.0-17.1       [facility]   
Medical   
Specialist  
   
                                        Comment on above:   Performed By: #### M  
DIFF, CMP, CBCAD ####  
[facility]  
[address], [place] [id number]   
   
                                                    MCH (RBC) [Entitic   
mass]           29.4 pg         Normal          27.0-33.0       [facility]   
Specialist  
   
                                        Comment on above:   Performed By: #### M  
DIFF, CMP, CBCAD ####  
[facility]  
[address], [place] [id number]   
   
                      MCHC (RBC) [Mass/Vol] 34.0 g/dL  Normal     32.0-36.0  [facility]   
Specialist  
   
                                        Comment on above:   Performed By: #### M  
DIFF, CMP, CBCAD ####  
[facility]  
[address], [place] [id number]   
   
                      MCV (RBC) [Entitic vol] 87 fL      Normal          N  
[facility]   
Specialist  
   
                                        Comment on above:   Performed By: #### M  
DIFF, CMP, CBCAD ####  
[facility]  
[address], [place] [id number]   
   
                                                    Platelet mean volume   
(Bld) [Entitic vol] 9.40 fL         Normal          7.50-12.50      [place]  
[facility]   
Medical   
Specialist  
   
                                        Comment on above:   Performed By: #### M  
DIFF, CMP, CBCAD ####  
[facility]  
[address], [place] [id number]   
   
                      Platelets (Bld) [#/Vol] 316 10*3/uL Normal     140-400      
[facility]   
Specialist  
   
                                        Comment on above:   Performed By: #### M  
DIFF, CMP, CBCAD ####  
[facility]  
[address] [place] [address]   
   
                      RBC (Bld) [#/Vol] 4.93 10*6/uL Normal     4.20-5.80  [facility]  
   
                                        Comment on above:   Performed By: #### M  
DIFF, CMP, CBCAD ####  
[facility]  
[address], [place] [id number]   
   
                      RDW-SD     40.3 fL    Normal     37.0-50.0  [facility]   
Specialist  
   
                                        Comment on above:   Performed By: #### M  
DIFF, CMP, CBCAD ####  
[facility]  
[address], [place] [id number]   
   
                      REFLEX     Manual Differential Normal                [facility]  
   
                                        Comment on above:   Performed By: #### M  
DIFF, CMP, CBCAD ####  
[facility]  
[address], [place] [id number]   
   
                      WBC (Bld) [#/Vol] 6.9 10*3/uL Normal     3.8-11.0   [facility]   
Medical   
Specialist  
   
                                        Comment on above:   Performed By: #### M  
DIFF, CMP, CBCAD ####  
[facility]  
[address], [place] [id number]   
   
                                                    Comprehensive Metabolic Pane  
feli 2022   
   
                      Albumin [Mass/Vol] 4.4 g/dL   Normal     3.6-5.1    [facility]   
Medical   
Specialist  
   
                                        Comment on above:   Performed By: #### M  
DIFF, CMP, CBCAD ####  
[facility]  
[address], [place] [id number]   
   
                                                    Albumin/Globulin [Mass   
ratio]          1.8 {ratio}     Normal          1.0-2.5         [facility]   
Specialist  
   
                                        Comment on above:   Performed By: #### M  
DIFF, CMP, CBCAD ####  
[facility]  
[address], [place] [id number]   
   
                                                    ALP [Catalytic   
activity/Vol]   34 U/L          Low                       [facility]  
   
                                        Comment on above:   Performed By: #### M  
DIFF, CMP, CBCAD ####  
[facility]  
[address], [place] [id number]   
   
                                                    ALT [Catalytic   
activity/Vol]   19 U/L          Normal          9-46            [facility]   
Specialist  
   
                                        Comment on above:   Result Comment:  Female reference range changed.   
   
                                                            Performed By: #### M  
DIFF, CMP, CBCAD ####  
[facility]  
[address], [place] [id number]   
   
                      Anion gap [Moles/Vol] 21 mmol/L  High       12-20      [facility]  
   
                                        Comment on above:   Result Comment: Effe  
ctive 2020 reference range changed.   
   
                                                            Performed By: #### M  
DIFF, CMP, CBCAD ####  
[facility]  
[address] [place], [place] [id number]   
   
                                                    AST [Catalytic   
activity/Vol]   24 U/L          Normal          10-40           [facility]   
Specialist  
   
                                        Comment on above:   Performed By: #### M  
DIFF, CMP, CBCAD ####  
[facility]  
[address], [place] [id number]   
   
                      Bilirubin [Mass/Vol] 0.56 mg/dL Normal     0.30-1.20  [facility]  
   
                                        Comment on above:   Performed By: #### M  
DIFF, CMP, CBCAD ####  
[facility]  
[address], [place] [id number]   
   
                      BUN/CREA   17 Ratio   Normal     6-22       [facility]  
   
                                        Comment on above:   Performed By: #### M  
DIFF, CMP, CBCAD ####  
[facility]  
[address], [place] [id number]   
   
                      Calcium [Mass/Vol] 9.3 mg/dL  Normal     8.6-10.2   [facility]  
   
                                        Comment on above:   Performed By: #### M  
DIFF, CMP, CBCAD ####  
[facility]  
[address], [place] [id number]   
   
                      Chloride [Moles/Vol] 100 mmol/L Normal          [facility]   
Specialist  
   
                                        Comment on above:   Performed By: #### M  
DIFF, CMP, CBCAD ####  
[facility]  
[address] [facility] [place] [id number]   
   
                      CO2 [Moles/Vol] 20 mmol/L  Normal     20-31      [facility]   
Specialist  
   
                                        Comment on above:   Performed By: #### M  
DIFF, CMP, CBCAD ####  
[facility]  
[address], [place] [id number]   
   
                      Creatinine [Mass/Vol] 1.0 mg/dL  Normal     0.7-1.4    Janell  
[facility]   
Medical   
Specialist  
   
                                        Comment on above:   Performed By: #### M  
DIFF, CMP, CBCAD ####  
[facility]  
[address], [place] [id number]   
   
                      eGFRAA     88 mL/min/1.73m2 Normal     >60        [facility]   
Specialist  
   
                                        Comment on above:   Performed By: #### M  
DIFF, CMP, CBCAD ####  
[facility]  
[address], [place] [id number]   
   
                      eGFRNAA    72 mL/min/1.73m2 Normal     >60        [facility]   
Specialist  
   
                                        Comment on above:   Performed By: #### M  
DIFF, CMP, CBCAD ####  
[facility]  
[address], [place] [id number]   
   
                      Globulin (S) [Mass/Vol] 2.5 g/dL   Normal     1.9-3.7    N  
[facility]   
Specialist  
   
                                        Comment on above:   Performed By: #### M  
DIFF, CMP, CBCAD ####  
[facility]  
[address], [place] [id number]   
   
                      Glucose [Mass/Vol] 111 mg/dL  High       65-99      Juanpablo  
[facility]   
Medical   
Specialist  
   
                                        Comment on above:   Result Comment: For   
FASTING Glucose --- ADA reference ranges:  
Normal 65-99 mg/dl  
Prediabetes 100-125  
Diabetes >/= 126   
   
                                                            Performed By: #### M  
DIFF, CMP, CBCAD ####  
[facility]  
[address], [place] [id number]   
   
                      Potassium [Moles/Vol] 4.5 mmol/L Normal     3.5-5.5    Janell  
[facility]   
Medical   
Specialist  
   
                                        Comment on above:   Performed By: #### M  
DIFF, CMP, CBCAD ####  
[facility]  
[address], [place] [id number]   
   
                      Protein [Mass/Vol] 6.9 g/dL   Normal     6.1-8.1    [place]  
[facility] [place]   
Medical   
Specialist  
   
                                        Comment on above:   Performed By: #### M  
DIFF, CMP, CBCAD ####  
[facility]  
[address], [place] [id number]   
   
                      Sodium [Moles/Vol] 137 mmol/L Normal     135-146    [facility]  
   
                                        Comment on above:   Performed By: #### M  
DIFF, CMP, CBCAD ####  
[facility]  
[address], [place] [id number]   
   
                                                    Urea nitrogen   
[Mass/Vol]      17 mg/dL        Normal          7-25            [facility]   
Specialist  
   
                                        Comment on above:   Performed By: #### M  
DIFF, CMP, CBCAD ####  
[facility]  
[address], [place] [id number]   
   
                                                    Manual Differentialon 2022   
   
                      BAND       0.0 %      Normal                [facility]   
Specialist  
   
                                        Comment on above:   Performed By: #### M  
DIFF, CMP, CBCAD ####  
[facility]  
[address], [place] [id number]   
   
                      BANDABS    0.0 K/uL   Normal                [facility]  
   
                                        Comment on above:   Performed By: #### M  
DIFF, CMP, CBCAD ####  
[facility]  
[address], [place] [id number]   
   
                      BASO       0.0 %      Normal                [facility]   
Specialist  
   
                                        Comment on above:   Performed By: #### M  
DIFF, CMP, CBCAD ####  
[facility]  
[address] [place], [place] [id number]   
   
                      BASOABS    0.0 K/uL   Normal     0.0-0.2    [facility]   
Specialist  
   
                                        Comment on above:   Performed By: #### M  
DIFF, CMP, CBCAD ####  
[facility]  
[address], [place] [id number]   
   
                      EOS        6.0 %      Normal                [facility]   
Specialist  
   
                                        Comment on above:   Performed By: #### M  
DIFF, CMP, CBCAD ####  
[facility]  
[address], [place] [id number]   
   
                      EOSABS     0.4 K/uL   Normal     0.0-0.5    [facility]   
Specialist  
   
                                        Comment on above:   Performed By: #### M  
DIFF, CMP, CBCAD ####  
[facility]  
[address] [place], [place] [id number]   
   
                      LYMPH      32.0 %     Normal                [facility]   
Specialist  
   
                                        Comment on above:   Performed By: #### M  
DIFF, CMP, CBCAD ####  
[facility]  
[address], [place] [id number]   
   
                      LYMPHABS   2.2 K/uL   Normal     0.9-3.9    [facility]   
Specialist  
   
                                        Comment on above:   Performed By: #### M  
DIFF, CMP, CBCAD ####  
[facility]  
[address] [place], [place] [id number]   
   
                      LYMPHATYP  0.0 %      Low        0.9-3.9    [facility]   
Specialist  
   
                                        Comment on above:   Performed By: #### M  
DIFF, CMP, CBCAD ####  
[facility]  
[address], [place] [id number]   
   
                      MONO       16.0 %     Normal                [facility]   
Specialist  
   
                                        Comment on above:   Performed By: #### M  
DIFF, CMP, CBCAD ####  
[facility]  
[address], [place] [id number]   
   
                      MONOABS    1.1 K/uL   High       0.2-0.9    [facility]   
Specialist  
   
                                        Comment on above:   Performed By: #### M  
DIFF, CMP, CBCAD ####  
[facility]  
[address], [place] [id number]   
   
                      SEG        46.0 %     Normal                [facility]   
Specialist  
   
                                        Comment on above:   Performed By: #### M  
DIFF, CMP, CBCAD ####  
[facility]  
[address], [place] [id number]   
   
                      SEGABS     3.2 K/uL   Normal     1.5-7.8    [facility]   
Medical   
Specialist  
   
                                        Comment on above:   Performed By: #### M  
DIFF, CMP, CBCAD ####  
[facility]  
[address], [place] [id number]   
   
                      WBC        6.9 K/uL   Normal     3.8-11.0   [facility]   
Medical   
Specialist  
   
                                        Comment on above:   Performed By: #### M  
DIFF, CMP, CBCAD ####  
[facility]  
[address], [place] [id number]   
   
                                                    XR Chest 2 Views*on 20   
   
                                        XR Chest 2 Views*   HISTORY: Calm and   
shortness of breath  
  
COMPARISON: None   
available  
  
TECHNIQUE: Frontal   
and lateral views of   
the chest  
  
  
FINDINGS:  
  
The cardiomediastinal   
silhouette is within   
normal limits. Small   
subtle bilateral  
lower lobe opacities.   
No pneumothorax or   
pleural effusion. No   
acute osseous   
abnormality.  
No changes of the   
spine.  
  
  
IMPRESSION:  
  
Small subtle   
bilateral lower lobe   
opacities may   
represent   
atelectasis, or   
pneumonia  
in the appropriate   
clinical setting.  
  
  
  
  
  
  
Report reported and   
signed by Marcus Mckeon on 2022   
1321                Normal                                  [facility]   
Specialist  
  
  
  
Vital Signs  
  
  
                          Date Time    Vital Sign   Value        Performing   
Clinician                               Facility  
   
                                                    2024   
11:          Body temperature    97.7 [degF]         DO Vera   
[facility]  
Work Phone:   
[phone]                          [facility]  
   
                                                    2024   
11:                              Diastolic blood   
pressure                  70 mm[Hg]                 DO Vera   
[facility]  
Work Phone:   
[phone]                          [facility]  
   
                                                    2024   
11:          Heart rate          76 /min             DO Vera   
[facility]  
Work Phone:   
[phone]                          [facility]  
   
                                                    2024   
11:          Respiratory rate    18 /min             DO Vera   
[facility]  
Work Phone:   
[phone]                          [facility]  
   
                                                    2024   
11:                              Systolic blood   
pressure                  127 mm[Hg]                DO Vera   
[facility]  
Work Phone:   
[phone]                          [facility]  
   
                                                    2024   
15:          Body height         180.34 cm           DO Vera   
[facility]  
Work Phone:   
[phone]                          [facility]  
   
                                                    2024   
15:                              Body mass index   
(BMI) [Ratio]             51.2 kg/m2                DO Vera   
[facility]  
Work Phone:   
[phone]                          [facility]  
   
                                                    2024   
15:          Body weight         166.46 kg           DO Vera   
[facility]  
Work Phone:   
[phone]                          [facility]  
   
                                                    2024   
13:          Body height         180.34 cm           DO Vera   
[facility]  
Work Phone:   
[phone]                          [facility]  
   
                                                    2024   
13:                              Body mass index   
(BMI) [Ratio]             51.5 kg/m2                DO Vera   
[facility]  
Work Phone:   
[phone]                          [facility]  
   
                                                    2024   
13:          Body weight         167.43 kg           DO Vera   
[facility]  
Work Phone:   
[phone]                          [facility]  
   
                                                    2024   
13:                              Diastolic blood   
pressure                  58 mm[Hg]                 DO Vera   
[facility]  
Work Phone:   
[phone]                          [facility]  
   
                                                    2024   
13:          Heart rate          86 /min             DO Vera   
[facility]  
Work Phone:   
[phone]                          [facility]  
   
                                                    2024   
13:          Respiratory rate    20 /min             DO Vera   
[facility]  
Work Phone:   
[phone]                          [facility]  
   
                                                    2024   
13:                              SaO2% (BldA) [Mass   
fraction]                 97 %                      DO Vera   
[facility]  
Work Phone:   
[phone]                          [facility]  
   
                                                    2024   
13:                              Systolic blood   
pressure                  110 mm[Hg]                DO Vera   
[facility]  
Work Phone:   
[phone]                          [facility]  
   
                                                    2024   
16:          Body height         180.34 cm           DO Vera   
[facility]  
Work Phone:   
[phone]                          [facility]  
   
                                                    2024   
16:                              Body mass index   
(BMI) [Ratio]             51.2 kg/m2                DO Vera   
[facility]  
Work Phone:   
[phone]                          [facility]  
   
                                                    2024   
16:          Body weight         166.46 kg           DO Vera   
[facility]  
Work Phone:   
[phone]                          [facility]  
   
                                                    2024   
15:          Body temperature    98.4 [degF]         DO Vera   
[facility]  
Work Phone:   
[phone]                          [facility]  
   
                                                    2024   
15:                              Diastolic blood   
pressure                  70 mm[Hg]                 DO Vera   
[facility]  
Work Phone:   
[phone]                          [facility]  
   
                                                    2024   
15:          Heart rate          99 /min             DO Vera   
[facility]  
Work Phone:   
[phone]                          [facility]  
   
                                                    2024   
15:          Respiratory rate    18 /min             DO Vera   
[facility]  
Work Phone:   
[phone]                          [facility]  
   
                                                    2024   
15:                              Systolic blood   
pressure                  131 mm[Hg]                DO Vera   
[facility]  
Work Phone:   
[phone]                          [facility]  
   
                                                    2024   
15:          Body height         180.34 cm           DO Vera   
[facility]  
Work Phone:   
[phone]                          [facility]  
   
                                                    2024   
15:                              Body mass index   
(BMI) [Ratio]             52.1 kg/m2                DO Vera   
[facility]  
Work Phone:   
[phone]                          [facility]  
   
                                                    2024   
15:          Body weight         169.64 kg           DO Vera   
[facility]  
Work Phone:   
[phone]                          [facility]  
   
                                                    2024   
15:          Body height         180.34 cm           DO Vera   
[facility]  
Work Phone:   
[phone]                          [facility]  
   
                                                    2024   
15:                              Body mass index   
(BMI) [Ratio]             51.2 kg/m2                DO Vera   
[facility]  
Work Phone:   
[phone]                          [facility]  
   
                                                    2024   
15:          Body weight         166.46 kg           DO Vera   
[facility]  
Work Phone:   
[phone]                          [facility]  
   
                                                    2024   
15:          Body temperature    98.1 [degF]         DO Vera   
[facility]  
Work Phone:   
[phone]                          [facility]  
   
                                                    2024   
15:                              Diastolic blood   
pressure                  75 mm[Hg]                 DO Vera   
[facility]  
Work Phone:   
[phone]                          [facility]  
   
                                                    2024   
15:          Heart rate          76 /min             DO Vera   
[facility]  
Work Phone:   
[phone]                          [facility]  
   
                                                    2024   
15:          Respiratory rate    20 /min             DO Vera   
[facility]  
Work Phone:   
[phone]                          [facility]  
   
                                                    2024   
15:                              Systolic blood   
pressure                  133 mm[Hg]                DO Vera   
[facility]  
Work Phone:   
[phone]                          [facility]  
   
                                                    2024   
15:          Body height         180.34 cm           DO Vera   
[facility]  
Work Phone:   
[phone]                          [facility]  
   
                                                    2024   
15:                              Body mass index   
(BMI) [Ratio]             52.9 kg/m2                DO Vera   
[facility]  
Work Phone:   
[phone]                          [facility]  
   
                                                    2024   
15:          Body weight         172.36 kg           DO Vera   
[facility]  
Work Phone:   
[phone]                          [facility]  
   
                                                    2024   
15:                              Diastolic blood   
pressure                  66 mm[Hg]                 DO Vera   
[facility]  
Work Phone:   
[phone]                          [facility]  
   
                                                    2024   
15:          Heart rate          71 /min             DO Vera   
[facility]  
Work Phone:   
[phone]                          [facility]  
   
                                                    2024   
15:          Respiratory rate    18 /min             DO Vera   
[facility]  
Work Phone:   
[phone]                          [facility]  
   
                                                    2024   
15:                              SaO2% (BldA) [Mass   
fraction]                 97 %                      DO Vera   
[facility]  
Work Phone:   
[phone]                          [facility]  
   
                                                    2024   
15:                              Systolic blood   
pressure                  105 mm[Hg]                DO Vera   
[facility]  
Work Phone:   
[phone]                          [facility]  
   
                                                    2024   
15:          Body temperature    98.8 [degF]         DO Vera   
[facility]  
Work Phone:   
[phone]                          [facility]  
   
                                                    2024   
15:                              Diastolic blood   
pressure                  62 mm[Hg]                 DO Vear   
[facility]  
Work Phone:   
[phone]                          [facility]  
   
                                                    2024   
15:          Heart rate          83 /min             DO Vera   
[facility]  
Work Phone:   
[phone]                          [facility]  
   
                                                    2024   
15:          Respiratory rate    18 /min             DO Vera   
[facility]  
Work Phone:   
[phone]                          [facility]  
   
                                                    2024   
15:                              Systolic blood   
pressure                  114 mm[Hg]                DO Vera   
[facility]  
Work Phone:   
[phone]                          [facility]  
   
                                                    2024   
14:          Body height         180.34 cm           DO Vera   
[facility]  
Work Phone:   
[phone]                          [facility]  
   
                                                    2024   
14:                              Body mass index   
(BMI) [Ratio]             51.2 kg/m2                DO Vera   
[facility]  
Work Phone:   
[phone]                          [facility]  
   
                                                    2024   
14:          Body weight         166.46 kg           DO Vera   
[facility]  
Work Phone:   
[phone]                          [facility]  
   
                                                    2024   
11:                              Body mass index   
(BMI) [Ratio]             52.72 kg/m2               Sharlene An APRN-CNP  
Work Phone:   
[phone]                          [facility]  
   
                                                    2024   
11:          Body weight         171.46 kg           Sharlene An   
APRN-CNP  
Work Phone:   
[phone]                          [facility]  
   
                                                    2024   
11:          Body height         180.3 cm            Sharlene An   
APRN-CNP  
Work Phone:   
[phone]                          [facility]  
   
                                                    2024   
11:                              Diastolic blood   
pressure                  70 mm[Hg]                 Sharlene An   
APRN-CNP  
Work Phone:   
[phone]                          [facility]  
   
                                                    2024   
11:          Heart rate          78 /min             Sharlene An   
APRN-CNP  
Work Phone:   
[phone]                          [facility]  
   
                                                    2024   
11:                              Systolic blood   
pressure                  110 mm[Hg]                Sharlene An   
APRN-CNP  
Work Phone:   
[phone]                          [facility]  
   
                                                    2024   
14:          Body height         180.34 cm           DO Vera   
[facility]  
Work Phone:   
[phone]                          [facility]  
   
                                                    2024   
14:                              Body mass index   
(BMI) [Ratio]             53.3 kg/m2                DO Vera   
[facility]  
Work Phone:   
[phone]                          [facility]  
   
                                                    2024   
14:          Body weight         173.38 kg           DO Vera   
[facility]  
Work Phone:   
[phone]                          [facility]  
   
                                                    2024   
14:                              Diastolic blood   
pressure                  72 mm[Hg]                 DO Vera   
[facility]  
Work Phone:   
[phone]                          [facility]  
   
                                                    2024   
14:          Heart rate          80 /min             DO Vera   
[facility]  
Work Phone:   
[phone]                          [facility]  
   
                                                    2024   
14:          Respiratory rate    20 /min             DO Vera   
[facility]  
Work Phone:   
[phone]                          [facility]  
   
                                                    2024   
14:                              SaO2% (BldA) [Mass   
fraction]                 96 %                      DO Vera   
[facility]  
Work Phone:   
[phone]                          [facility]  
   
                                                    2024   
14:                              Systolic blood   
pressure                  133 mm[Hg]                DO Vera   
[facility]  
Work Phone:   
[phone]                          [facility]  
   
                                                    2024   
14:          Body temperature    97.6 [degF]         DO Vera   
[facility]  
Work Phone:   
[phone]                          [facility]  
   
                                                    2024   
14:                              Diastolic blood   
pressure                  78 mm[Hg]                 DO Vera   
[facility]  
Work Phone:   
[phone]                          [facility]  
   
                                                    2024   
14:          Heart rate          89 /min             DO Vera   
[facility]  
Work Phone:   
[phone]                          [facility]  
   
                                                    2024   
14:          Respiratory rate    24 /min             DO Vera   
[facility]  
Work Phone:   
[phone]                          [facility]  
   
                                                    2024   
14:                              Systolic blood   
pressure                  143 mm[Hg]                DO Vera   
[facility]  
Work Phone:   
[phone]                          [facility]  
   
                                                    2024   
14:          Body height         180.34 cm           DO Vera   
[facility]  
Work Phone:   
[phone]                          [facility]  
   
                                                    2024   
14:                              Body mass index   
(BMI) [Ratio]             51.5 kg/m2                DO Vera   
[facility]  
Work Phone:   
[phone]                          [facility]  
   
                                                    2024   
14:          Body weight         167.82 kg           DO Vera   
[facility]  
Work Phone:   
[phone]                          [facility]  
   
                                                    2024   
15:                              Body mass index   
(BMI) [Ratio]             52.44 kg/m2               Ray Terry NP  
Work Phone:   
[phone]                          [facility]  
   
                                                    2024   
15:          Body temperature    97.7 [degF]         Ray Terry NP  
Work Phone:   
[phone]                          [facility]  
   
                                                    2024   
15:          Body weight         170.55 kg           Ray Terry NP  
Work Phone:   
[phone]                          [facility]  
   
                                                    2024   
15:                              Diastolic blood   
pressure                  70 mm[Hg]                 Ray Terry NP  
Work Phone:   
[phone]                          [facility]  
   
                                                    2024   
15:          Heart rate          83 /min             Ray Terry NP  
Work Phone:   
[phone]                          [facility]  
   
                                                    2024   
15:                              SaO2% (BldA) [Mass   
fraction]                 94 %                      Ray Terry NP  
Work Phone:   
[phone]                          [facility]  
   
                                                    2024   
15:                              Systolic blood   
pressure                  112 mm[Hg]                Ray Terry NP  
Work Phone:   
[phone]                          [facility]  
   
                                                    2023   
11:          Body height         177.8 cm            Jacoby Braden  
Other Phone:   
[phone]                           [facility]   
[employer]   
[employer]  
Other Phone:   
[phone]  
   
                                                    2023   
11:                              Body mass index   
(BMI) [Ratio]             51.86 kg/m2               Jacoby Braden  
Other Phone:   
[phone]                           [facility]   
[employer]   
[employer]  
Other Phone:   
[phone]  
   
                                                    2023   
11:          Body weight         163.98 kg           Jacoby Braden  
Other Phone:   
[phone]                           [facility]   
[employer]   
[employer]  
Other Phone:   
[phone]  
   
                                                    2023   
11:                              Diastolic blood   
pressure                  79 mm[Hg]                 Jacoby Braden  
Other Phone:   
[phone]                           [facility]   
[employer]   
[employer]  
Other Phone:   
[phone]  
   
                                                    2023   
11:          Respiratory rate    18 /min             Jacoby Braden  
Other Phone:   
[phone]                           [facility]   
[employer]   
[employer]  
Other Phone:   
[phone]  
   
                                                    2023   
11:                              SaO2% (BldA) [Mass   
fraction]                 99 %                      Jacoby Braden  
Other Phone:   
[phone]                           [facility]   
[employer]   
[employer]  
Other Phone:   
[phone]  
   
                                                    2023   
11:                              Systolic blood   
pressure                  125 mm[Hg]                Jacoby Braden  
Other Phone:   
[phone]                           [facility]   
[employer]   
[employer]  
Other Phone:   
[phone]  
   
                                                    2023   
13:          Body height         180.34 cm           DO Vera Melvin  
Work Phone:   
[phone]                          [facility]  
   
                                                    2023   
13:                              Body mass index   
(BMI) [Ratio]             49.2 kg/m2                DO Vera   
[place]  
Work Phone:   
[phone]                          [facility]  
   
                                                    2023   
13:          Body weight         160.11 kg           DO Vera   
[place]  
Work Phone:   
[phone]                          [facility]  
   
                                                    2023   
13:          Body temperature    97.7 [degF]         DO Vera   
[place]  
Work Phone:   
[phone]                          [facility]  
   
                                                    2023   
13:                              Diastolic blood   
pressure                  73 mm[Hg]                 DO Vera   
[place]  
Work Phone:   
[phone]                          [facility]  
   
                                                    2023   
13:          Heart rate          81 /min             DO Vera   
[place]  
Work Phone:   
[phone]                          [facility]  
   
                                                    2023   
13:          Respiratory rate    20 /min             DO Vera   
[place]  
Work Phone:   
[phone]                          [facility]  
   
                                                    2023   
13:                              Systolic blood   
pressure                  123 mm[Hg]                DO Vera   
[place]  
Work Phone:   
[phone]                          [facility]  
   
                                                    2023   
14:                              42 1                Vera D   
[place]  
Work Phone:   
[phone]                          [facility]   
[address]   
[place]  
Work Phone:   
[phone]  
   
                                                    Comment on   
above:                                  [id number]   
   
                                                    2023   
14:          Body height         177.8 cm            Annel Fitt  
Other Phone:   
[phone]                           [facility]   
[employer]   
[employer]  
Other Phone:   
[phone]  
   
                                                    2023   
14:                              Body mass index   
(BMI) [Ratio]             52.41 kg/m2               Annel Fitt  
Other Phone:   
[phone]                           [facility]   
[employer]   
[employer]  
Other Phone:   
[phone]  
   
                                                    01-   
14:          Body weight         165.7 kg            Annel Baird  
Other Phone:   
[phone]                           [facility]   
[employer]   
[employer]  
Other Phone:   
[phone]  
   
                                                    2023   
09:          Body height         180.34 cm           Vera D   
[place]  
Work Phone:   
[phone]                          [facility]   
[address]   
[place]  
Work Phone:   
[phone]  
   
                                                    2023   
09:                              Body mass index   
(BMI) [Ratio]             50.91 kg/m2               Vera D   
[place]  
Work Phone:   
[phone]                          [facility]   
[address]   
[place]  
Work Phone:   
[phone]  
   
                                                    2023   
09:                              Body surface area   
Derived from   
formula                   2.72 m2                   Vera D   
[place]  
Work Phone:   
[phone]                          [facility]   
[address]   
[place]  
Work Phone:   
[phone]  
   
                                                    2023   
09:          Body weight         165.56 kg           Vera D   
[place]  
Work Phone:   
[phone]                          [facility]   
[address]   
[place]  
Work Phone:   
[phone]  
   
                                                    2023   
09:                              Diastolic blood   
pressure                  78 mm[Hg]                 Vera D   
[place]  
Work Phone:   
[phone]                          [facility]   
[address]   
[place]  
Work Phone:   
[phone]  
   
                                                    2023   
09:          Heart rate          90 /min             Vera D   
[place]  
Work Phone:   
[phone]                          [facility]   
[address]   
[place]  
Work Phone:   
[phone]  
   
                                                    2023   
09:                              Systolic blood   
pressure                  124 mm[Hg]                Vera D   
[place]  
Work Phone:   
[phone]                          [facility]   
[address]   
[place]  
Work Phone:   
[phone]  
   
                                                    2022   
15:          Body height         177.8 cm            Jacoby Braden  
Other Phone:   
[phone]                           [facility]   
[employer]   
[employer]  
Other Phone:   
[phone]  
   
                                                    2022   
15:                              Body mass index   
(BMI) [Ratio]             53.53 kg/m2               Jacoby Zuñigadiff  
Other Phone:   
[phone]                           [facility]   
[employer]   
[employer]  
Other Phone:   
[phone]  
   
                                                    2022   
15:          Body weight         169.24 kg           Jacoby Zuñigadiff  
Other Phone:   
[phone]                           [facility]   
[employer]   
[employer]  
Other Phone:   
[phone]  
   
                                                    2022   
15:                              Diastolic blood   
pressure                  72 mm[Hg]                 Jacoby Zuñigadiff  
Other Phone:   
[phone]                           [facility]   
[employer]   
[employer]  
Other Phone:   
[phone]  
   
                                                    2022   
15:          Respiratory rate    20 /min             Jacoby Zuñigadiff  
Other Phone:   
[phone]                           [facility]   
[employer]   
[employer]  
Other Phone:   
[phone]  
   
                                                    2022   
15:                              SaO2% (BldA) [Mass   
fraction]                 98 %                      Jacoby Zuñigadiff  
Other Phone:   
[phone]                           [facility]   
[employer]   
[employer]  
Other Phone:   
[phone]  
   
                                                    2022   
15:                              Systolic blood   
pressure                  132 mm[Hg]                Jacoyb Zuñigadiff  
Other Phone:   
[phone]                           [facility]   
[employer]   
[employer]  
Other Phone:   
[phone]  
   
                                                    2022   
11:          Body height         177.8 cm            Annel Fitt  
Other Phone:   
[phone]                           [facility]   
[employer]   
[employer]  
Other Phone:   
[phone]  
   
                                                    2022   
11:                              Body mass index   
(BMI) [Ratio]             52.17 kg/m2               Annel Fitt  
Other Phone:   
[phone]                           [facility]   
[employer]   
[employer]  
Other Phone:   
[phone]  
   
                                                    2022   
11:          Body weight         164.93 kg           Annel Fitt  
Other Phone:   
[phone]                           [facility]   
[employer]   
[employer]  
Other Phone:   
[phone]  
   
                                                    10-   
15:          Body height         177.8 cm            Jacoby Zuñigadiff  
Other Phone:   
[phone]                           [facility]   
[employer]   
[employer]  
Other Phone:   
[phone]  
   
                                                    10-   
15:                              Body mass index   
(BMI) [Ratio]             52.52 kg/m2               Jacoby Zuñigadiff  
Other Phone:   
[phone]                           [facility]   
[employer]   
[employer]  
Other Phone:   
[phone]  
   
                                                    10-   
15:          Body weight         166.06 kg           Jacoby Zuñigadiff  
Other Phone:   
[phone]                           [facility]   
[employer]   
[employer]  
Other Phone:   
[phone]  
   
                                                    10-   
15:                              Diastolic blood   
pressure                  68 mm[Hg]                 Jacoby Zuñigadiff  
Other Phone:   
[phone]                           [facility]   
[employer]   
[employer]  
Other Phone:   
[phone]  
   
                                                    10-   
15:          Respiratory rate    20 /min             Jacoby Zuñigadiff  
Other Phone:   
[phone]                           [facility]   
[employer]   
[employer]  
Other Phone:   
[phone]  
   
                                                    10-   
15:                              SaO2% (BldA) [Mass   
fraction]                 99 %                      Jacoby Zuñigadiff  
Other Phone:   
[phone]                           [facility]   
[employer]   
[employer]  
Other Phone:   
[phone]  
   
                                                    10-   
15:                              Systolic blood   
pressure                  112 mm[Hg]                Jacoby Zuñigadiff  
Other Phone:   
[phone]                           [facility]   
[employer]   
[employer]  
Other Phone:   
[phone]  
   
                                                    2022   
13:          Body height         180.34 cm           DO Vera   
[place]  
Work Phone:   
[phone]                          [facility]  
   
                                                    2022   
13:                              Body mass index   
(BMI) [Ratio]             52.4 kg/m2                DO Evra   
[place]  
Work Phone:   
[phone]                          [facility]  
   
                                                    2022   
13:          Body weight         170.55 kg           DO Vera   
[place]  
Work Phone:   
[phone]                          [facility]  
   
                                                    2022   
13:          Body temperature    98.6 [degF]         DO Vera   
[place]  
Work Phone:   
[phone]                          [facility]  
   
                                                    2022   
13:                              Diastolic blood   
pressure                  69 mm[Hg]                 DO Vera   
[place]  
Work Phone:   
[phone]                          [facility]  
   
                                                    2022   
13:          Heart rate          92 /min             DO Vera   
[place]  
Work Phone:   
[phone]                          [facility]  
   
                                                    2022   
13:          Respiratory rate    18 /min             DO Vera   
[place]  
Work Phone:   
[phone]                          [facility]  
   
                                                    2022   
13:                              Systolic blood   
pressure                  119 mm[Hg]                DO Vera   
[place]  
Work Phone:   
[phone]                          [facility]  
   
                                                    2022   
12:          Body height         177.8 cm            Jacoby Braden  
Other Phone:   
[phone]                           [facility]   
[employer]   
[employer]  
Other Phone:   
[phone]  
   
                                                    2022   
12:                              Body mass index   
(BMI) [Ratio]             53.1 kg/m2                Jacoby Braden  
Other Phone:   
[phone]                           [facility]   
[employer]   
[employer]  
Other Phone:   
[phone]  
   
                                                    2022   
12:          Body weight         167.88 kg           Jacoby Braden  
Other Phone:   
[phone]                           [facility]   
[employer]   
[employer]  
Other Phone:   
[phone]  
   
                                                    2022   
12:                              Diastolic blood   
pressure                  64 mm[Hg]                 Jacoby Braden  
Other Phone:   
[phone]                           [facility]   
[employer]   
[employer]  
Other Phone:   
[phone]  
   
                                                    2022   
12:          Respiratory rate    18 /min             Jacoby Braden  
Other Phone:   
[phone]                           [facility]   
[employer]   
[employer]  
Other Phone:   
[phone]  
   
                                                    2022   
12:                              SaO2% (BldA) [Mass   
fraction]                 98 %                      Jacoby Braden  
Other Phone:   
[phone]                           [facility]   
[employer]   
[employer]  
Other Phone:   
[phone]  
   
                                                    2022   
12:                              Systolic blood   
pressure                  117 mm[Hg]                Jacoby Zuñigadiff  
Other Phone:   
[phone]                           [facility]   
[employer]   
[employer]  
Other Phone:   
[phone]  
   
                                                    2022   
15:          Body height         177.8 cm            Jacoby Zuñigadiff  
Other Phone:   
[phone]                           [facility]   
[employer]   
[employer]  
Other Phone:   
[phone]  
   
                                                    2022   
15:                              Body mass index   
(BMI) [Ratio]             54.81 kg/m2               Jacoby Zuñigadiff  
Other Phone:   
[phone]                           [facility]   
[employer]   
[employer]  
Other Phone:   
[phone]  
   
                                                    2022   
15:          Body weight         173.28 kg           Jacoby Zuñigadiff  
Other Phone:   
[phone]                           [facility]   
[employer]   
[employer]  
Other Phone:   
[phone]  
   
                                                    2022   
15:                              Diastolic blood   
pressure                  63 mm[Hg]                 Jacoby Zuñigadiff  
Other Phone:   
[phone]                           [facility]   
[employer]   
[employer]  
Other Phone:   
[phone]  
   
                                                    2022   
15:          Respiratory rate    20 /min             Jacoby Zuñigadiff  
Other Phone:   
[phone]                           [facility]   
[employer]   
[employer]  
Other Phone:   
[phone]  
   
                                                    2022   
15:                              SaO2% (BldA) [Mass   
fraction]                 97 %                      Jacoby Zuñigadiff  
Other Phone:   
[phone]                           [facility]   
[employer]   
[employer]  
Other Phone:   
[phone]  
   
                                                    2022   
15:                              Systolic blood   
pressure                  107 mm[Hg]                Jacoby Braden  
Other Phone:   
[phone]                           [facility]   
[employer]   
[employer]  
Other Phone:   
[phone]  
   
                                                    2022   
08:                              Diastolic blood   
pressure                  62 mm[Hg]                 Vera SIMS   
[place]  
Work Phone:   
[phone]                          [facility]   
[address]   
[place]  
Work Phone:   
[phone]  
   
                                                    2022   
08:                              Systolic blood   
pressure                  128 mm[Hg]                Vera D   
[place]  
Work Phone:   
[phone]                          [facility]   
[address]   
[place]  
Work Phone:   
[phone]  
   
                                                    2022   
08:          Body height         180.34 cm           Vera D   
[place]  
Work Phone:   
[phone]                          [facility]   
[address]   
[place]  
Work Phone:   
[phone]  
   
                                                    2022   
08:                              Body mass index   
(BMI) [Ratio]             54.12 kg/m2               Vera D   
[place]  
Work Phone:   
[phone]                          [facility]   
[address]   
[place]  
Work Phone:   
[phone]  
   
                                                    2022   
08:                              Body surface area   
Derived from   
formula                   2.79 m2                   Vera D   
[place]  
Work Phone:   
[phone]                          [facility]   
[address]   
[place]  
Work Phone:   
[phone]  
   
                                                    2022   
08:          Body weight         176 kg              Vera D   
[place]  
Work Phone:   
[phone]                          [facility]   
[address]   
[place]  
Work Phone:   
[phone]  
   
                                                    2022   
08:                              Diastolic blood   
pressure                  70 mm[Hg]                 Vera D   
[place]  
Work Phone:   
[phone]                          [facility]   
[address]   
[place]  
Work Phone:   
[phone]  
   
                                                    2022   
08:          Heart rate          78 /min             Vera D   
[place]  
Work Phone:   
[phone]                          [facility]   
[address]   
[place]  
Work Phone:   
[phone]  
   
                                                    2022   
08:                              Systolic blood   
pressure                  132 mm[Hg]                Vera D   
[place]  
Work Phone:   
[phone]                          [facility]   
[address]   
[place]  
Work Phone:   
[phone]  
   
                                                    2022   
15:          Body height         177.8 cm            Annel Fitt  
Other Phone:   
[phone]                           [facility]   
[employer]   
[employer]  
Other Phone:   
[phone]  
   
                                                    2022   
15:                              Body mass index   
(BMI) [Ratio]             55.72 kg/m2               Annel Fitt  
Other Phone:   
[phone]                           [facility]   
[employer]   
[employer]  
Other Phone:   
[phone]  
   
                                                    2022   
15:          Body weight         176.18 kg           Annel Fitt  
Other Phone:   
[phone]                           [facility]   
[employer]   
[employer]  
Other Phone:   
[phone]  
   
                                                    2022   
16:          Body height         177.8 cm            Alonso Birmingham  
Other Phone:   
[phone]                           [facility]   
[employer]   
[employer]  
Other Phone:   
[phone]  
   
                                                    2022   
16:                              Body mass index   
(BMI) [Ratio]             55.59 kg/m2               Alonso Birmingham  
Other Phone:   
[phone]                           [facility]   
[employer]   
[employer]  
Other Phone:   
[phone]  
   
                                                    2022   
16:          Body temperature    99.5 [degF]         Alonso Birmingham  
Other Phone:   
[phone]                           [facility]   
[employer]   
[employer]  
Other Phone:   
[phone]  
   
                                                    2022   
16:          Body weight         175.77 kg           Alonso Birmingham  
Other Phone:   
[phone]                           [facility]   
[employer]   
[employer]  
Other Phone:   
[phone]  
   
                                                    2022   
16:                              Diastolic blood   
pressure                  71 mm[Hg]                 Alonso Birmingham  
Other Phone:   
[phone]                           [facility]   
[employer]   
[employer]  
Other Phone:   
[phone]  
   
                                                    2022   
16:                              SaO2% (BldA) [Mass   
fraction]                 96 %                      Alonso Birmingham  
Other Phone:   
[phone]                           [facility]   
[employer]   
[employer]  
Other Phone:   
[phone]  
   
                                                    2022   
16:                              Systolic blood   
pressure                  114 mm[Hg]                Alonso Birmingham  
Other Phone:   
[phone]                           [facility]   
[employer]   
[employer]  
Other Phone:   
[phone]  
   
                                                    2022   
00:                              60 1                Vera D   
[place]  
Work Phone:   
[phone]                          [facility]   
[address]   
DO  
Work Phone:   
[phone]  
   
                                                    Comment on   
above:                                  [id number]   
   
                                                    2022   
15:          Body height         177.8 cm            Jacoby Braden  
Other Phone:   
[phone]                           [facility]   
[employer]   
[employer]  
Other Phone:   
[phone]  
   
                                                    2022   
15:                              Body mass index   
(BMI) [Ratio]             59.45 kg/m2               Jacoby Braden  
Other Phone:   
[phone]                           [facility]   
[employer]   
[employer]  
Other Phone:   
[phone]  
   
                                                    2022   
15:          Body weight         187.97 kg           Jacoby Braden  
Other Phone:   
[phone]                           [facility]   
[employer]   
[employer]  
Other Phone:   
[phone]  
   
                                                    2022   
15:                              Diastolic blood   
pressure                  57 mm[Hg]                 Jacoby Braden  
Other Phone:   
[phone]                           [facility]   
[employer]   
[employer]  
Other Phone:   
[phone]  
   
                                                    2022   
15:          Respiratory rate    20 /min             Jacoby Braden  
Other Phone:   
[phone]                           [facility]   
[employer]   
[employer]  
Other Phone:   
[phone]  
   
                                                    2022   
15:                              SaO2% (BldA) [Mass   
fraction]                 96 %                      Jacoby Braden  
Other Phone:   
[phone]                           [facility]   
[employer]   
[employer]  
Other Phone:   
[phone]  
   
                                                    2022   
15:                              Systolic blood   
pressure                  125 mm[Hg]                Jacoby Braden  
Other Phone:   
[phone]                           [facility]   
[employer]   
[employer]  
Other Phone:   
[phone]  
   
                                                    2022   
16:          Body height         177.8 cm            Alonso Birmingham  
Other Phone:   
[phone]                           [facility]   
[employer]   
[employer]  
Other Phone:   
[phone]  
   
                                                    2022   
16:                              Body mass index   
(BMI) [Ratio]             59.54 kg/m2               Alonso Birmingham  
Other Phone:   
[phone]                           [facility]   
[employer]   
[employer]  
Other Phone:   
[phone]  
   
                                                    2022   
16:          Body temperature    97.8 [degF]         Alonso Vinnie  
Other Phone:   
[phone]                           [facility]   
[employer]   
[employer]  
Other Phone:   
[phone]  
   
                                                    2022   
16:          Body weight         188.24 kg           Alonso Vinnie  
Other Phone:   
[phone]                           [facility]   
[employer]   
[employer]  
Other Phone:   
[phone]  
   
                                                    2022   
16:                              Diastolic blood   
pressure                  77 mm[Hg]                 Alonso Vinnie  
Other Phone:   
[phone]                           [facility]   
[employer]   
[employer]  
Other Phone:   
[phone]  
   
                                                    2022   
16:                              SaO2% (BldA) [Mass   
fraction]                 93 %                      Alonso Vinnie  
Other Phone:   
[phone]                           [facility]   
[employer]   
[employer]  
Other Phone:   
[phone]  
   
                                                    2022   
16:                              Systolic blood   
pressure                  130 mm[Hg]                Alonso Vinnie  
Other Phone:   
[phone]                           [facility]   
[employer]   
[employer]  
Other Phone:   
[phone]  
  
  
  
Encounters  
  
  
                          Encounter Date Encounter Type Care Provider Facility  
   
                          Start: 10- ambulatory   Elaine Espino Facility:[facility]  
   
                                                    Start: 2024  
End: 2024           ambulatory                VERA D   
MATIAS-EMERY                            Not Available  
   
                                Start: 2024 Registered Recurring DO Vera  
   
[place]  
Work Phone:   
[phone]                          [facility]   
[place]  
Work Phone:   
[phone]  
   
                                                    Start: 2024  
End: 2024           ambulatory                DO Vera   
[place]  
Work Phone:   
[phone]                          [facility]  
Work Phone:   
[phone]  
   
                                                    Start: 2024  
End: 2024           Discharged Recurring      DO Vera   
[place]  
Work Phone:   
[phone]                          [facility]   
[facility]  
   
                                                    Start: 2024  
End: 2024     ambulatory          DANYA AN   Not Available  
   
                                                    Start: 2024  
End: 2024           ambulatory                DO Vera   
[place]  
Work Phone:   
[phone]                          [facility]  
Work Phone:   
[phone]  
   
                                                    Start: 2024  
End: 2024                         Patient encounter   
procedure                               DO Vera   
[place]  
Work Phone:   
[phone]                          [facility]   
[facility]  
Work Phone:   
[phone]  
   
                                Start: 2024 Registered Recurring DO Vera  
   
[place]  
Work Phone:   
[phone]                          [facility]   
Road [facility]  
   
                                Start: 2024 Registered Recurring DO Vera  
   
[place]  
Work Phone:   
[phone]                          [facility]   
[place]  
Work Phone:   
[phone]  
   
                                                    Start: 2024  
End: 2024           ambulatory                DO Vera   
[place]  
Work Phone:   
[phone]                          [facility]  
Work Phone:   
[phone]  
   
                                                    Start: 2024  
End: 2024                         Patient encounter   
procedure                               DO Vera   
[place]  
Work Phone:   
[phone]                          [facility]   
[facility]  
Work Phone:   
[phone]  
   
                                Start: 2024 Registered Recurring DO Vera  
   
[place]  
Work Phone:   
[phone]                          [facility]   
[place]  
Work Phone:   
[phone]  
   
                                Start: 2024 Registered Recurring DO Vrea  
   
[place]  
Work Phone:   
[phone]                          [facility]   
Road [facility]  
   
                                                    Start: 2024  
End: 2024           ambulatory                DO Vera   
[place]  
Work Phone:   
[phone]                          [facility]  
Work Phone:   
[phone]  
   
                                                    Start: 2024  
End: 2024                         Patient encounter   
procedure                               DO Vera   
[place]  
Work Phone:   
[phone]                          [facility]   
[facility]  
Work Phone:   
[phone]  
   
                                Start: 2024 Registered Recurring DO Vera  
   
[place]  
Work Phone:   
[phone]                          [facility]   
[place]  
Work Phone:   
[phone]  
   
                                                    Start: 2024  
End: 2024     ambulatory          SHARLENE HOLM [facility]  
   
Ambulatory  
   
                                                    Start: 2024  
End: 2024                         Office outpatient visit   
15 minutes                              Sharlene HOLM An   
APRN-CNP  
Work Phone:   
[phone]                          [facility]  
   
                                        Comment on above:   Non-ischemic cardiom  
yopathy (Multi) (Primary Dx);  
DIMITRI on CPAP;  
BMI 50.0-59.9, adult (Multi)   
   
                                                    Start: 2024  
End: 2024           ambulatory                VERA MATIAS-ARPAN                            Not Available  
   
                                                    Start: 2024  
End: 2024           ambulatory                DO Vera   
[place]  
Work Phone:   
[phone]                          [facility]  
Work Phone:   
[phone]  
   
                                                    Start: 2024  
End: 2024                         Patient encounter   
procedure                               DO Vera   
[place]  
Work Phone:   
[phone]                          [facility]   
[facility]  
Work Phone:   
[phone]  
   
                                                    Start: 2024  
End: 2024     ambulatory          DANYA AN   Not Available  
   
                                                    Start: 2024  
End: 2024           ambulatory                DO Vera   
[place]  
Work Phone:   
[phone]                          [facility]  
Work Phone:   
[phone]  
   
                                                    Start: 2024  
End: 2024           Discharged Recurring      DO Vera   
[place]  
Work Phone:   
[phone]                          [facility]   
[place]  
Work Phone:   
[phone]  
   
                                                    Start: 2024  
End: 2024     ambulatory          JESSICA L LOPEZ        Not Available  
   
                                                    Start: 2024  
End: 2024     ambulatory          ARLENE DEPOY      Not Available  
   
                                                    Start: 2024  
End: 2024     ambulatory          JESSICA L LOPEZ        Not Available  
   
                                                    Start: 2024  
End: 2024     ambulatory          ARLENE HAIDER      Not Available  
   
                                Start: 02- Bamboo flowsheet Jessica L Lopez   
PT  
Work Phone:   
[phone]                          [facility]  
   
                                Start: 02- Bamboo flowsheet Jessica L Lopez   
PT  
Work Phone:   
[phone]                          [facility]  
   
                                                    Start: 02-  
End: 02-     ambulatory          JESSICA L LOPEZ        Not Available  
   
                                                    Start: 2024  
End: 2024                         Office outpatient visit   
25 minutes                              Ray Terry NP  
Work Phone:   
[phone]                          [facility]  
   
                                        Comment on above:   Lumbosacral strain,   
initial encounter (Primary Dx);  
Acute pain of left shoulder;  
Wound of left lower extremity, initial encounter   
   
                                                    Start: 2024  
End: 2024           ambulatory                VERA MELVIN                            Not Available  
   
                                                    Start: 2024  
End: 2024                         Patient encounter   
procedure                               Danya An   
DPM  
Work Phone:   
[phone]                          [facility]  
   
                                        Comment on above:   Onychomycosis (Prima  
ry Dx);  
Pain in both feet;  
Corns and callosities;  
Other specified peripheral vascular diseases (CMS/HCC);  
Circulating anticoagulant disorder (CMS/HCC)   
   
                                                    Start: 2024  
End: 2024     ambulatory          DANYA AN   Not Available  
   
                                                    Start: 2024  
End: 2024           ambulatory                VERA MATIAS-EMERY                            Not Available  
   
                                                    Start: 2023  
End: 2023     ambulatory          DANYA AN   Not Available  
   
                                                    Start: 2023  
End: 2023           ambulatory                DO Vera [place]  
Work Phone:   
[phone]                          [facility]  
Work Phone:   
[phone]  
   
                                                    Start: 2023  
End: 2023                         Patient encounter   
procedure                               DO Vera Melvin  
Work Phone:   
[phone]                          [facility]  
   
                                                    Start: 2023  
End: 2023           ambulatory                Jacoby Braden  
Other Phone:   
[phone]                           [facility]   
Professional   
Corporation  
Other Phone:   
[phone]  
   
                          Start: 2023 Follow-up encounter [facility]y [facility]   
[facility]  
   
                                Start: 2023 Registered Recurring DO Vera  
   
[place]  
Work Phone:   
[phone]                          [facility]   
Management  
Work Phone:   
[phone]  
   
                                                    Start: 2023  
End: 2023           ambulatory                DO Vera   
[place]  
Work Phone:   
[phone]                          [facility]  
Work Phone:   
[phone]  
   
                                                    Start: 2023  
End: 2023           Discharged Recurring      DO Vera   
[place]  
Work Phone:   
[phone]                          [facility]   
[place]  
Work Phone:   
[phone]  
   
                                Start: 2023 ambulatory      Dr. Compa Maldonado                                 Facility:  
   
                                                    Start: 2023  
End: 2023           ambulatory                DO Vera   
[place]  
Work Phone:   
[phone]                          [facility]  
Work Phone:   
[phone]  
   
                                                    Start: 2023  
End: 2023                         Patient encounter   
procedure                               DO Vera   
[place]  
Work Phone:   
[phone]                          [facility]   
Exercise Program  
   
                                                    Start: 2023  
End: 2023           ambulatory                Jacoby Braden  
Other Phone:   
[phone]                           [facility]   
Professional   
Corporation  
Other Phone:   
[phone]  
   
                          Start: 2023 Telephone encounter [facility]y [facility]   
Care Clinic  
   
                                Start: 2023 Registered Recurring DO Vera  
   
[place]  
Work Phone:   
[phone]                          [facility]   
Management  
Work Phone:   
[phone]  
   
                                Start: 2023 Chart Update    Vera SIMS   
[place]  
Work Phone:   
[phone]                          [facility]   
[address] DO  
Work Phone:   
[phone]  
   
                                        Start: 2023   Patient encounter   
procedure                               Vera SIMS   
[place]  
Work Phone:   
[phone]                          [facility]   
[address] [place]  
Work Phone:   
[phone]  
   
                          Start: 2023 ambulatory   COMPA MALDONADO Facilit  
[id number]  
   
                                Start: 2023 Chart Update    Vera SIMS   
[place]  
Work Phone:   
[phone]                          [facility]   
[address] DO  
Work Phone:   
[phone]  
   
                                                    Start: 2023  
End: 2023           ambulatory                DO Vera Alexisaver-Roseau  
Work Phone:   
[phone]                          [facility]  
Work Phone:   
[phone]  
   
                                                    Start: 2023  
End: 2023                         Patient encounter   
procedure                               DO Vera [place]  
Work Phone:   
[phone]                          [facility]   
[facility]  
   
                                Start: 2023 Registered Recurring DO Vera  
   
[place]  
Work Phone:   
[phone]                          [facility]   
Management  
Work Phone:   
[phone]  
   
                                        Start: 2023   ([facility] RD FU) [facility] F/  
U   
Register[place] Dietician       Annel Baird                 [facility]   
Care Clinic  
   
                                                    Start: 2023  
End: 2023           ambulatory                Jacoby Braden  
Other Phone:   
[phone]                           [facility]   
Professional   
Corporation  
Other Phone:   
[phone]  
   
                          Start: 2023 Telephone encounter [facility]y [facility]   
[facility]  
   
                                        Start: 2023   Office outpatient vi  
sit   
25 minutes                              Vera SIMS   
[place]  
Work Phone:   
[phone]                          [facility]   
[address] DO  
Work Phone:   
[phone]  
   
                                Start: 2023 ambulatory      Dr. Compa Maldonado                                 Facility:  
   
                                                    Start: 2022  
End: 2022           ambulatory                Jacoby Braden  
Other Phone:   
[phone]                           [facility]   
Professional   
Corporation  
Other Phone:   
[phone]  
   
                          Start: 2022 Follow-up encounter [facility]y [facility]   
Care Clinic  
   
                                        Start: 2022   ([facility] RD FU) [facility] F/  
U   
Register[place] Dietician       Annel Baird                 [facility]   
Care Clinic  
   
                                                    Start: 2022  
End: 2022           ambulatory                Annel Mcneilwale  
Other Phone:   
[phone]                           [facility]   
Professional   
Corporation  
Other Phone:   
[phone]  
   
                                                    Start: 10-  
End: 10-           ambulatory                Jacoby Braden  
Other Phone:   
[phone]                           [facility]   
Professional   
Corporation  
Other Phone:   
[phone]  
   
                          Start: 10- Telephone encounter Jacoby ANTHONY  
[facility]   
[facility]  
   
                                                    Start: 10-  
End: 10-           ambulatory                Jacobygeorgia Braden  
Other Phone:   
[phone]                           [facility]   
Professional   
Corporation  
Other Phone:   
[phone]  
   
                          Start: 10- Follow-up encounter Jacoby Zuñigadimeño ANTHONY  
[facility]   
[facility]  
   
                                                    Start: 2022  
End: 2022           ambulatory                DO Vera   
[place]  
Work Phone:   
[phone]                          [facility]  
Work Phone:   
[phone]  
   
                                                    Start: 2022  
End: 2022           Discharged Recurring      DO Vera   
[place]  
Work Phone:   
[phone]                          [facility]   
[place]  
   
                                Start: 2022 Registered Recurring DO Vera  
   
[place]  
Work Phone:   
[phone]                          [facility]   
Management  
   
                                                    Start: 2022  
End: 2022           ambulatory                Jacoby Braden  
Other Phone:   
[phone]                           [facility]   
Professional   
Corporation  
Other Phone:   
[phone]  
   
                          Start: 2022 Follow-up encounter [facility]y [facility]   
Care Clinic  
   
                                                    Start: 2022  
End: 2022           ambulatory                Jacoby Braden  
Other Phone:   
[phone]                           [facility]   
Professional   
Corporation  
Other Phone:   
[phone]  
   
                          Start: 2022 Follow-up encounter [facility]y [facility]   
Care Clinic  
   
                                Start: 2022 Chart Update    Vera SIMS   
[employer]  
Work Phone:   
[phone]                          [facility]   
[facility] DO  
Work Phone:   
[phone]  
   
                                        Start: 2022   Office consultation   
new/estab patient 80 min                Vera LEVI   
[employer]  
Work Phone:   
[phone]                          -[facility]   
[facility] [address] DO  
Work Phone:   
[phone]  
   
                                        Start: 2022   ([facility]) WMN Init  
ial   
Provider                  Annel Baird                 [facility]   
Care Clinic  
   
                                                    Start: 2022  
End: 2022           ambulatory                Annel Baird  
Other Phone:   
[phone]                           [facility]   
[employer]   
[employer]  
Other Phone:   
[phone]  
   
                                                    Start: 2022  
End: 2022           ambulatory                Alonso Birmingham  
Other Phone:   
[phone]                           [facility]   
[employer]   
[employer]  
Other Phone:   
[phone]  
   
                                        Start: 2022   Office outpatient vi  
sit   
25 minutes                Alonso Birmingham              [facility]  
   
                                                    Start: 05-  
End: 05-           ambulatory                Annel Mcneilt  
Other Phone:   
[phone]                           [facility]   
[employer]   
[employer]  
Other Phone:   
[phone]  
   
                                        Start: 05-   IBT FOR OBESITY GROU  
P   
2-10 30M                  Annel Baird                 [facility]   
[facility]  
   
                                                    Start: 2022  
End: 2022           ambulatory                Jacoby Braden  
Other Phone:   
[phone]                           [facility]   
[employer]   
[employer]  
Other Phone:   
[phone]  
   
                          Start: 2022 Nutrition therapy Jacoby Louis  
[facility]   
[facility] Clinic  
   
                                                    Start: 2022  
End: 2022           ambulatory                Annel Baird  
Other Phone:   
[phone]                           [facility]   
[employer]   
[employer]  
Other Phone:   
[phone]  
   
                          Start: 2022 [facility]  
   
                                                    Start: 2022  
End: 2022           ambulatory                Alonso Birmingham  
Other Phone:   
[phone]                           [facility]   
[employer]   
[employer]  
Other Phone:   
[phone]  
   
                                        Start: 2022   Office outpatient ne  
w 45   
minutes                   Alonso Birmingham              [facility]  
   
                                        Start: 2021   ([facility]) [facility]  
vid   
Vaccine                   Annel Baird                 [facility]   
[facility]  
   
                                                    Start: 2021  
End: 2021           ambulatory                Annel Baird  
Other Phone:   
[phone]                           [facility]   
[employer]   
[employer]  
Other Phone:   
[phone]  
  
  
  
Procedures  
  
  
                          Date         Procedure    Procedure Detail Performing   
Clinician  
   
                                        Start: 2024   Investigation of   
transfusion reaction                                DO Vera [facility]  
Work Phone:   
[phone]  
   
                                       Finger operation              Vera SIMS We  
[facility]  
Work Phone:   
[phone]  
   
                                       Operation on gallbladder              Garcia  
katrin D [facility]  
Work Phone:   
[phone]  
   
                                       Total colonoscopy              Vera GOMEZ  
[facility]  
Work Phone:   
[phone]  
  
  
  
Plan of Treatment  
  
  
                          Date         Care Activity Detail       Author  
   
                                                    Start:   
2025  
End: 2025                         Patient encounter   
procedure                               2025 3:20 PM EST   
Office Visit [facility] [address]   
[address] [place]   
[address] [phone]   
Compa Maldonado DO   
[address] [place] [address]   
[phone] (Work)   
[phone] (Fax)                      [facility]  
   
                                                    Start:   
2025                              Medicare Annual Wellness   
(AWV)                                   Medicare Annual Wellness   
(AWV)                                   [facility]  
   
                                                    Start:   
2024                              Urine screening for   
protein                                 Diabetes: Urine Protein   
Screening                               [facility]  
   
                                                    Start:   
2024                              Screening for malignant   
neoplasm of colon                                   [facility]  
   
                                                    Start:   
2024  
End: 2024                         Patient encounter   
procedure                               2024 2:30 PM EDT   
Office Visit [facility]   
[address]   
[place] [id number] [phone]   
Vera Melvin,   
DO [address]   
[address] [place] [address]   
[phone] (Work)   
[phone] (Fax)                      [facility]  
   
                                                    Start:   
2024  
End: 2024                         Patient encounter   
procedure                               2024 2:30 PM EDT   
Procedure Visit [facility]   
PODIATRY [address]   
[address] [place]   
[id number] [phone]   
Danya An DPM   
[address]   
[place] [address]   
[phone] (Work)   
[phone] (Fax)                      [facility]  
   
                                                    Start:   
2024                              Hemoglobin A1c   
measurement               Diabetes: Hemoglobin A1C  [facility]  
   
                                                    Start:   
02-  
End: 02-           ambulatory                02/15/2024 1:00 PM EST   
Evaluation [facility]   
[address]   
[place] [address] [phone] Jessica Lopez, PT [address]   
[address] [place]   
[address] [phone]   
(Work) [phone]   
(Fax)                                   [facility]  
   
                                                    Start:   
2024  
End: 2024                         Patient encounter   
procedure                               2024 3:30 PM EST   
Office Visit [facility]   
[address]   
[place] [address] [phone]                            [facility]  
   
                                                    Start:   
2023                              COVID-19 Vaccine ( season)                         COVID-19 Vaccine ( season)                         [facility]  
   
                                                    Start:   
2023                              FUV, Provider:   
Compa Maldonado, Status:   
Pen, Time: 11:00 AM                     FUV, Provider:   
Compa Maldonado, Status:   
Pen, Time: 11:00 AM                     [facility]   
[facility] [address] DO  
Work Phone:   
[phone]  
   
                                                    Start:   
2023                              МАРИЯ, Provider: [place]   
[facility]   
,[id number], Status:   
Pen, Time: 2:00 PM                      MUGA, Provider: [place]   
[facility]   
01,[id number], Status:   
Pen, Time: 2:00 PM                      [facility]  
Work Phone:   
[phone]  
   
                                                    Start:   
2023                              FUV, Provider:   
Compa Maldonado, Status:   
Pen, Time: 10:00 AM                     FUV, Provider:   
Compa Maldonado, Status:   
Pen, Time: 10:00 AM                     [facility]  
Work Phone:   
[phone]  
   
                                                    Start:   
2016                              RSV Pregnant patients   
and/or patients aged 60+   
years (1 - 1-dose 60+   
series)                                 RSV Pregnant patients   
and/or patients aged 60+   
years (1 - 1-dose 60+   
series)                                 [facility]  
   
                                                    Start:   
2006          Zoster Vaccines (1 of 2) Zoster Vaccines (1 of 2) [facility]  
   
                                                    Start:   
1978                              DTaP/Tdap/Td Vaccines (1   
- Tdap)                                 DTaP/Tdap/Td Vaccines (1   
- Tdap)                                 [facility]  
   
                                                    Start:   
1974                              Diabetes mellitus   
screening                 Diabetes Screening        [facility]  
   
                                                    Start:   
1974          Hepatitis C screening Hepatitis C Screening [facility]  
   
                                                    Start:   
1962                              Pneumococcal Vaccine:   
65+ Years (1 - PCV)                     Pneumococcal Vaccine:   
65+ Years (1 - PCV)                     [facility]  
   
                                                    Start:   
1962                              Pneumococcal Vaccine:   
65+ Years (1 of 2 - PCV)                Pneumococcal Vaccine:   
65+ Years (1 of 2 - PCV)                [facility]  
   
                                                    Start:   
1956                              Hemoglobin A1c   
measurement               Diabetes: Hemoglobin A1C  [facility]  
   
                                                    Start:   
1956          Lipid panel         Lipid Panel         [facility]  
   
                                                    Start:   
1956                              Medicare Annual Wellness   
Visit                                   Medicare Annual Wellness   
Visit (AWV)                             [facility]  
   
                                                    Start:   
1956                              Screening for malignant   
neoplasm of colon                                   [facility]  
  
  
  
Immunizations  
  
  
                      Immunization Date Immunization Notes      Care Provider Jenny llanes  
   
                                        2023          Influenza, Seasonal,  
   
Quadrivalent,   
Adjuvanted                                          Danya An DPM  
Work Phone:   
[phone]                          [facility]  
   
                                        10-          influenza, high dose  
   
seasonal,   
preservative-free                                   Jacoby Wolfff  
Other Phone:   
[phone]                           [facility]   
Professional   
Corporation  
Other Phone:   
[phone]  
   
                                        10-          Fluzone High-Dose   
Quadrivalent 0.7 ML   
Intramuscular   
Suspension Prefilled   
Syringe                                             Vera D   
[facility]  
Work Phone:   
[phone]                          [facility]   
[address] DO  
Work Phone:   
[phone]  
   
                                        10-          influenza virus   
vaccine, unspecified   
formulation                                         DO Vera   
[facility]  
Work Phone:   
[phone]                          [facility]  
   
                                        10-          seasonal influenza,   
intradermal,   
preservative free                                   Sharlene An   
APRN-CNP  
Work Phone:   
[phone]                          [facility]  
Work Phone:   
[phone]  
   
                          2021   COVID-19 Pfizer              Annel Fitt  
Other Phone:   
[phone]                           [facility]  
   
                                        2021          influenza, high dose  
   
seasonal,   
preservative-free                                   Vera D   
[facility]  
Work Phone:   
[phone]                          [facility]  
   
                                        2021          Pfizer-BioNTech   
COVID-19 Vacc 30   
MCG/0.3ML   
Intramuscular   
Suspension                                          Vera D   
[facility]  
Work Phone:   
[phone]                          [facility]  
   
                                        2021          Pfizer-BioNTech   
COVID-19 Vacc 30   
MCG/0.3ML   
Intramuscular   
Suspension                                          Vear D   
[facility]  
Work Phone:   
[phone]                          [facility]  
   
                                        2020          Influenza, injectabl  
e,   
Madin Griselda Canine   
Kidney, preservative   
free, quadrivalent                                  Vera D   
[facility]  
Work Phone:   
[phone]                          [facility]  
Work Phone:   
[phone]  
   
                                        10-          Influenza, injectabl  
e,   
Madin [place] Canine   
Kidney, preservative   
free, quadrivalent                                  Vera D   
[facility]  
Work Phone:   
[phone]                          [facility]  
Work Phone:   
[phone]  
   
                                        2018          influenza, injectabl  
e,   
madin griselda canine   
kidney, preservative   
free                                                Vera LEVI   
[facility]  
Work Phone:   
[phone]                          [facility] [address] DO  
Work Phone:   
[phone]  
   
                                        2017          influenza, injectabl  
e,   
quadrivalent,   
preservative free                                   Danya An   
DPM  
Work Phone:   
[phone]                          [facility]  
   
                                        10-          seasonal influenza,   
intradermal,   
preservative free                                   Vera SIMS   
[facility]  
Work Phone:   
[phone]                          [facility] [address] DO  
Work Phone:   
[phone]  
   
                                        10-          seasonal influenza,   
intradermal,   
preservative free                                   Danya An   
DPM  
Work Phone:   
[phone]                          [facility]  
  
  
  
Payers  
  
  
                          Date         Payer Category Payer        Policy ID  
   
                          2024   Self-pay                  [id number]  
[id number]  
[id number]  
   
                          2023   Private Health Insurance              [id number]  
[id number]  
[id number]  
   
                          2023   Private Health Insurance              [id number]  
[id number]  
   
                          2021   Medicare                  [id number]  
[id number]  
[id number]  
   
                          2021   Private Health Insurance              [id number]  
[id number]   
[id number]  
   
                          2021   Medicare                  [id number]   
[id number]  
   
                          1956   Unknown                   [id number]   
[id number]  
[id number]  
   
                          1956   Unknown                   [id number]   
[id number]  
[id number]  
   
                          1956   Unknown                   [id number]   
[id number]  
[id number]  
   
                          1956   Unknown                   [id number]   
[id number]  
[id number]  
   
                          1956   Unknown                   [id number]   
[id number]  
[id number]  
   
                          1956   Unknown                   [id number]   
[id number]  
[id number]  
   
                          1956   Unknown                   [id number]   
[id number]  
[id number]  
   
                          1956   Unknown                   [id number]   
[id number]  
[id number]  
   
                          1956   Unknown                   [id number]   
[id number]  
[id number]  
   
                          1956   Unknown                   [id number]   
[id number]  
[id number]  
   
                          1956   Unknown                   [id number]   
[id number]  
[id number]  
   
                          1956   Unknown                   [id number]   
[id number]  
[id number]  
   
                          1956   Unknown                   [id number]   
[id number]  
[id number]  
   
                          1956   Unknown                   [id number]   
[id number]  
[id number]  
   
                          1956   Unknown                   [id number]   
[id number]  
[id number]  
   
                          1956   Unknown                   [id number]   
[id number]  
[id number]  
   
                          1956   Unknown                   [id number]   
[id number]  
[id number]  
   
                                                Self-pay        Self Pay Exercis  
e   
Program                                 [id number]   
[id number]  
[id number]  
   
                                       Unknown                   [id number]   
[id number]  
   
                                       Unknown                     
   
                                       Unknown                   [id number]   
[id number]  
[id number]  
   
                                       Unknown                   [id number]   
[id number]  
[id number]  
   
                                       Unknown                   [id number]   
[id number]  
[id number]  
  
  
  
Social History  
  
  
                          Date         Type         Detail       Facility  
   
                                                    Start: 2023  
End: 2024     Sex Assigned At Birth                     [facility]   
Professional   
Corporation  
Other Phone:   
[phone]  
   
                                                    Start: 2023  
End: 2024                         Daily caffeine   
consumption                             Daily caffeine   
consumption                             [facility]   
[facility] [address] DO  
Work Phone:   
[phone]  
   
                                        Comment on above:   coffee 1-2 pots a da  
y;   
   
                                                    Start: 2022  
End: 2024                         Tobacco smoking status   
NHIS                                    Never smoked tobacco   
(finding)                               [facility]  
   
                          Start: 1955 Sex Assigned At Birth Male         F  
[facility]  
   
                                       History of tobacco use [facility]oker [facility]  
   
                                                    Start: 2023  
End: 2024                         Tobacco use and   
exposure                                Smokeless tobacco   
non-user                                [facility]  
   
                                                    Start: 2024  
End: 2024     Alcohol intake      Ex-drinker (finding) [facility]  
   
                                                            How often to you hav  
e   
a drink containing   
alcohol?                  Never                     [facility]  
   
                                                            How many standard   
drinks containing   
alcohol do you have on   
a typical day?            Patient does not drink    [facility]  
   
                          Start: 1955 Sex Assigned At Birth Not on file  N  
[facility]  
   
                                        Start: 2024   Alcoholic beverage   
intake                                  Lifetime non-drinker   
(finding)                               [facility]  
Work Phone:   
[phone]  
   
                                                    Start: 2024  
End: 2024                         Exposure to SARS-CoV-2   
(event)                   Not sure                  [facility]  
  
  
  
Clinical Notes 2021 to 2024  
  
  
                                Note Date & Type Note            Facility  
  
  
  
                                                    2024 Evaluation note   
   
                                           
   
                                        Authored            2024   
2:24pm  
   
                                                    Start weight: 414.4 lbs., he  
 is down 45.2 lbs. today   
with a weight of 369.2lbs. he is down 10.8 lbs.  
since last visit 2024.  
Starting Date: 04/15/2022.  
Ozempic start date 4/15/2022. Ozempic start weight   
414.4lbs. He is down 34.4 lbs. since starting  
Ozempic.  
1. Abnormal weight gain. He notes he is the heaviest   
in his family. His brother has some but much  
less significant weight issues. He notes he was able   
to get his weight down to 368 pounds but was  
unable to keep it down.  
2. Obesity-markedly improved since starting our   
program and with taking Ozempic 2 mg, but had  
significant weight regain of 20.9 pounds off the   
medication and not following up with the program  
since 2023. He is currently on Ozempic 1 mg and   
we will go back to Ozempic 2 mg through patient  
assistance. This is his second visit back since this   
restart. He had previously stopped Ozempic  
due to cost/donut hole. In the future he could   
reconsider going to the [facility] for  
bariatric surgery evaluation. He needs to get his BMI   
down to 45 before the surgeon will replace  
his knee. He is on Jardiance 10 mg which can also   
help with his diabetes and cardiomyopathy/CHF.  
His diabetes is well controlled with his last A1c of   
5.8 on 2024. He has mild CAD along with  
his diabetes and has not been on a statin so I had   
recommended Crestor, but he notes his cardio said   
it was not necessary so he did not start the   
medication. He is eating healthier overall. He should  
follow-up with our nutritionist. He feels that his   
appetite is controlled with Ozempic 2 mg. I have  
recommend again he consider bariatric surgery so he   
could have his knee replaced or find a surgeon  
who will do the surgery at an elevated BMI. In the   
past, he mentioned they found weakness in the  
bottom of the heart and evidently a problem with an   
artery that they could not get. His cardiologist  
however did not feel he needed the statin/Crestor   
that I previously prescribed. He gets very little  
activity due to bone-on-bone arthritis, severe low   
back pain on tramadol, difficulty getting out of  
a chair and also his cardiac function he does get NELSON   
with small amounts of activity. He sees Dr. Maldonado to treat his cardiomyopathy. He did like   
canned foods and he understands that he must cut  
back the salt/processed food. He denies adding salt   
to his foods. He has had a A1c that was 6.6  
indicative of diabetes. He notes he was never told he   
was diabetic before program. He is seeing Dr. Lomeli for his knees and notes he has to get his BMI   
from 59 down to 45 to be able to have his knees  
replaced. Before starting the program he did eat a   
lot of red meat, potatoes and drank a lot of milk  
2% or 4% often buying 3 or 4 gallons at a time. He   
should not skip meals. He did try Adipex for 1  
month in the past but notes he was not able to lose   
but a few pounds so his PCP stopped it. He notes  
his sister does the shopping as he can. He does some   
simple cooking.  
3. Cardiomyopathy/CHF/early CAD-he must continue to   
follow-up with cardiology for evaluation of  
cardiomyopathy/CHF despite medications/known   
cardiomyopathy/previous mild CAD. We obtained his  
previous cardiac catheterization from 2009. He   
notes his preoperative diagnoses were angina  
pectoris, dyspnea on exertion and abnormal Cardiolite   
perfusion stress test. His left anterior  
descending artery showed a 20 to 25% tubular   
stenosis. His circumflex had an ostial stenosis of 30  
to 50% in several views but in one view showed less   
than 30% stenosis and appeared to be mildly  
kinked, his right coronary artery was large dominant   
and normal. His left ventriculogram showed mild  
to moderately reduced systolic dysfunction with an EF   
in the 40 to 45% range globally. He is now  
being treated by Dr. Maldonado.  
4. Type 2 diabetes with A1c controlled at 5.8 on   
2024-continue Ozempic and Jardiance. We could  
consider Metformin but he already has some loose   
stools. We discussed that first-line treatment with   
lifestyle changes, decrease simple sweets and   
starches, will be some increased activity in the  
future and weight loss. He needs to consider   
bariatric surgery.  
5. Obstructive sleep apnea-compliant with his CPAP.   
Treat also with healthy lifestyle changes and  
weight loss. He needs to consider bariatric surgery.  
6. Bilateral OA of the knees-bone-on-bone making it   
very difficult for him to get around. He notes  
his orthopedic doctor wants him to drop from a BMI of   
59 down to 45 before he will do the surgery.  
We will start weight loss and Ozempic. He needs to   
consider bariatric surgery.  
7. Chronic low back pain-treat with healthy lifestyle   
change. He needs to maximize his pain  
management.  
8. Mixed hyperlipidemia-treat with decreasing the   
simple sweets, added sugars, refined starches, and  
bad/added fats, increasing activity and exercise and   
continued long-term weight loss. Monitor with  
healthy lifestyle change. I recommended that he start   
a statin since he is diabetic and had mild CAD  
on his cath but he notes his cardiologist told him it   
was not necessary to start.  
9. Metabolic syndrome-treat with long-term healthy   
lifestyle changes, behavioral changes, healthy  
nutritional changes, increased activity and exercise   
and achieve long-term weight loss.  
Follow up with me in 8 weeks.  
New labs needed: Up-to-date. Monitor A1c at least   
every 6 months/January with his PCP or in our  
office. He will continue other regular lab follow-up   
with his PCP.   
  
  
  
[facility]  
Work Phone: [phone]- Evaluation + Plan note* Assessment & Plan 
  Note - CARLITOS Severino - 2024 10:55 AM EDTAssociated Problem(s):
   BMI 50.0-59.9, adult (Multi)  
Formatting of this note might be different from the original.  
Reviewed the merits of healthy lifestyle choices on overall cardiovascular 
health.  
He had lost weight with Ozempic in the past, I believe there was some difficulty
 obtaining the prescription. Just recently resumed.  
Electronically signed by CARLITOS Severino at 2024 10:59 AM [facility]  
Work Phone: [phone]- Evaluation + Plan note* Assessment & Plan 
  Note - CARLITOS Severino - 2024 10:55 AM EDTAssociated Problem(s):
   Non-ischemic cardiomyopathy (Multi)  
Formatting of this note might be different from the original.  
NICM HF bordeline EF 42% 2023 MUGA (2009 cath minimal CAD EF 40-45%)  
FC II Stage C  
  
GDMT  
Coreg  
Jardiance  
Entresto  
Aldactone  
Electronically signed by CARLITOS Severnio at 2024 10:55 AM [facility]  
Work Phone: [phone]- Miscellaneous Notes* Assessment & Plan Note
   - CARLITOS Severino - 2024 10:55 AM EDTAssociated Problem(s): BMI
   50.0-59.9, adult (Multi)  
Formatting of this note might be different from the original.  
Reviewed the merits of healthy lifestyle choices on overall cardiovascular 
health.  
He had lost weight with Ozempic in the past, I believe there was some difficulty
 obtaining the prescription. Just recently resumed.  
Electronically signed by CARLITOS Severino at 2024 10:59 AM EDT  
  
* Assessment & Plan Note - CARLITOS Severino - 2024 10:55 AM EDT
  Associated Problem(s): Non-ischemic cardiomyopathy (Multi)  
Formatting of this note might be different from the original.  
NICM HF bordeline EF 42% 2023 MUGA (2009 cath minimal CAD EF 40-45%)  
FC II Stage C  
  
GDMT  
Coreg  
Jardiance  
Entresto  
Aldactone  
Electronically signed by CARLITOS Severino at 2024 10:55 AM EDT  
  
documented in this [facility]  
Work Phone: [phone]- History of Present illness Narrative* CARLITOS Severino - 2024 11:00 AM EDTFormatting of this note is 
  different from the original.  
Chief Complaint  
 Doing good   
  
Reason for Visit  
9-month follow-up.  
Patient presents to the office today for outpatient follow-up for nonischemic 
cardiomyopathy.  
Last evaluated in clinic by Dr. Maldonado 2023.  
  
Presents today ambulatory with cane and steady gait.  
Accompanied by patient  
Patient denies any hospitalizations or significant changes to interval medical 
history since last office follow-up.  
He follows routinely with PCP, is due to get annual lab work later this year.  
Also follows routinely with the wound clinic.  
  
History of Present Illness  
Patient is a very pleasant 69-year-old gentleman who presents to the office 
today with no voiced cardiovascular complaints. He continues to follow at the 
wound clinic due to right lower extremity ulcers, has noted some slight 
increasing lower extremity edema. From an activity standpoint he is able to go 
to [employer], walks into [place] with no change in his functional class II 
shortness of breath. He denies any exertional chest pain. No orthopnea or PND. 
Remains compliant with CPAP treatment.  
  
Only concern today is cost of medications. Apparently has now in the donut hole.
 He is on Xarelto due to a prior right lower extremity DVT.  
  
He has some slight increase in his lower extremity edema. Due to office visits 
he has not been taking his Lasix twice daily. Is unable to tolerate compression 
stockings.  
  
Lower extremity wounds, managed by wound clinic. Denies any prior history of 
PAD.  
  
Patient reports that overall has no complaint(s) of chest pain, chest 
pressure/discomfort, claudication, dyspnea, exertional chest 
pressure/discomfort, fatigue, and irregular heart beat  
  
Review of Systems  
Cardiovascular: Positive for leg swelling. Negative for chest pain, dyspnea on 
exertion, irregular heartbeat, near-syncope, orthopnea, palpitations, paroxysmal
 nocturnal dyspnea and syncope.  
  
  
Visit Vitals  
/70 (BP Location: Left arm, Patient Position: Sitting)  
Pulse 78  
Ht 1.803 m (5' 11 )  
Wt (!) 171 kg (378 lb)  
BMI 52.72 kg/m  
Smoking Status Never  
BSA 2.93 m  
  
Physical Exam  
Vitals and nursing note reviewed.  
Constitutional:  
Appearance: Normal appearance.  
Cardiovascular:  
Rate and Rhythm: Normal rate and regular rhythm.  
Heart sounds: Normal heart sounds.  
Pulmonary:  
Effort: Pulmonary effort is normal.  
Breath sounds: Normal breath sounds.  
Musculoskeletal:  
Cervical back: Full passive range of motion without pain.  
Right lower le+ Pitting Edema present.  
Left lower le+ Pitting Edema present.  
Skin:  
General: Skin is cool.  
Neurological:  
Mental Status: He is alert and oriented to person, place, and time.  
Psychiatric:  
Attention and Perception: Attention normal.  
Mood and Affect: Mood normal.  
Behavior: Behavior is cooperative.  
  
  
Allergies  
Allergen Reactions  
Cephalosporins Rash  
Diclofenac Sodium Rash  
Statins-Hmg-Coa Reductase Inhibitors Rash  
  
Current Outpatient Medications  
Medication Instructions  
carvedilol (Coreg) 6.25 mg tablet 1 tablet, oral, 2 times daily with meals  
empagliflozin (Jardiance) 10 mg 1 tablet, oral, Daily  
furosemide (LASIX) 40 mg, oral, 2 times daily  
Ozempic 1 mg, subcutaneous, Weekly  
rivaroxaban (Xarelto) 10 mg tablet 1 tablet, oral, Daily  
sacubitriL-valsartan (Entresto) 24-26 mg tablet 1 tablet, oral, 2 times daily  
spironolactone (Aldactone) 25 mg tablet 1 tablet, oral, Daily  
  
Assessment:  
  
Pleasant 69-year-old gentleman presents for routine follow-up. Nonischemic 
cardiomyopathy LVEF 42% baseline functional class II stage C on highest 
tolerated dose of guideline directed medical treatment. Will provide with 
patient assistance forms due to cost constraints: Discussed that if needed could
 transition back over to Cozaar, price check Farxiga.  
  
Overall patient is pleased with current state of cardiovascular health. At this 
time there are no indications for additional cardiovascular testing or need for 
medication changes.  
  
Non-ischemic cardiomyopathy (Multi)  
NICM HF bordeline EF 42% 2023 MUGA (2009 cath minimal CAD EF 40-45%)  
FC II Stage C  
  
GDMT  
Coreg  
Jardiance  
Entresto  
Aldactone  
  
BMI 50.0-59.9, adult (Multi)  
Reviewed the merits of healthy lifestyle choices on overall cardiovascular 
health.  
He had lost weight with Ozempic in the past, I believe there was some difficulty
 obtaining the prescription. Just recently resumed.  
  
Plan:  
  
Through informed decision making process incorporating patients unique 
circumstances, the followingtreatment plan will be initiated:  
  
1. Prescription drug management of cardiovascular medication for efficacy, 
adherence to treatment, side effect assessment and polypharmacy. Current 
treatment clinically warranted and to continue without modifications.  
  
2. Please complete patient assistance forms and return to office  
  
3. Return for follow-up; in the interim, contact the office if new symptoms 
arise.  
Dr. Maldonado 9 months  
  
Sharlene An MSN, APRDRAKE-CNP, [facility]  
  
Please excuse any errors in grammar or translation related to this dictation. 
Voice recognition software was utilized to prepare this document.  
  
Electronically signed by CARLITOS Severino at 2024 11:02 AM EDT  
  
documented in this [facility]  
Work Phone: [phone]- Instructions* Patient Instructions*   
  
CARLITOS Severino - 2024 11:00 AM EDT  
  
Formatting of this note might be different from the original.  
Please bring all medicines, vitamins, and herbal supplements with you when you 
come to the office.  
  
Prescriptions will not be filled unless you are compliant with your follow up 
appointments or have a follow up appointment scheduled as per instruction of 
your physician. Refills should be requested at the time of your visit.  
  
PLAN:  
Through informed decision making process incorporating patients unique 
circumstances, the followingtreatment plan will be initiated:  
  
1. Prescription drug management of cardiovascular medication for efficacy, 
adherence to treatment, side effect assessment and polypharmacy. Current 
treatment clinically warranted and to continue without modifications.  
  
2. Please complete patient assistance forms and return to office  
  
3. Return for follow-up; in the interim, contact the office if new symptoms 
arise.  
Dr. Maldonado 9 months  
Electronically signed by CARLITOS Severino at 2024 11:38 AM EDT  
  
  
  
documented in this [facility]  
Work Phone: [phone]- Evaluation note*   
  
                                        Author              Jacoby Braden  
[facility]  
   
                                        Authored            2024 2:5  
6pm  
   
                                                    Start weight: 414.4 lbs., he  
 is down 32.0 lbs. today with a weight of 382.4lbs.   
and   
20.9 lbs. since  
his last visit on 2023.  
Starting Date: 04/15/2022.  
Ozempic start date 4/15/2022. Ozempic start weight 414.4lbs. He is down 32.0 
lbs.   
since starting  
Ozempic.  
1. Abnormal weight gain. He notes he is the heaviest in his family. His brother 
has   
some but much  
less significant weight issues. He notes he was able to get his weight down to 
368   
pounds but was  
unable to keep it down.  
2. Obesity-markedly improved since starting our program and with taking Ozempic 
2 mg,   
but had  
significant weight regain of 20.9 pounds off the medication. Due to cost/donut 
hole   
he stopped  
Ozempic. Hopefully he can restart full dose in the future using patient 
assistance.   
He is also on  
Jardiance 10 mg which can also help with his diabetes and cardiomyopathy/CHF. 
His   
diabetes is well  
controlled with his last A1c of 5.6 despite having issues with obtaining with 
his   
diabetic  
medications. He had mild CAD along with his diabetes and has not been on a 
statin so   
I had  
recommended Crestor, but he notes his cardio said it was not necessary so he did
 not   
start the  
medication. He is eating healthier overall. He should follow-up with our   
nutritionist. He feels that   
his appetite is controlled with Ozempic 2 mg. I have recommend again he consider
   
bariatric surgery  
so he could have his knee replaced or find a surgeon who will do the surgery at 
an   
elevated BMI. In  
the past, he mentioned they found weakness in the bottom of the heart and 
evidently a   
problem with  
an artery that they could not get. His cardiologist however did not feel he 
needed   
the  
statin/Crestor that I previously prescribed. He gets very little activity due to
   
bone-on-bone  
arthritis, severe low back pain on tramadol, difficulty getting out of a chair 
and   
also his cardiac  
function he does get NELSON with small amounts of activity. He sees Dr. Maldonado to 
treat   
his  
cardiomyopathy. He did like canned foods and he understands that he must cut 
back the   
salt/processed  
food. He denies adding salt to his foods. He has had a A1c that was 6.6 
indicative of   
diabetes. He  
notes he was never told he was diabetic before program. He is seeing Dr. Lomeli 
for   
his knees and  
notes he has to get his BMI from 59 down to 45 to be able to have his knees 
replaced.   
Before  
starting the program he did eat a lot of red meat, potatoes and drank a lot of 
milk   
2% or 4% often  
buying 3 or 4 gallons at a time. He should not skip meals. He did try Adipex for
 1   
month in the past  
but notes he was not able to lose but a few pounds so his PCP stopped it. He 
notes   
his sister does  
the shopping as he can. He does some simple cooking.  
3. Cardiomyopathy/CHF/early CAD-he must continue to follow-up with cardiology 
for   
evaluation of  
cardiomyopathy/CHF despite medications/known cardiomyopathy/previous mild CAD. 
We   
obtained his  
previous cardiac catheterization from 2009. He notes his preoperative 
diagnoses   
were angina  
pectoris, dyspnea on exertion and abnormal Cardiolite perfusion stress test. His
 left   
anterior  
descending artery showed a 20 to 25% tubular stenosis. His circumflex had an 
ostial   
stenosis of 30  
to 50% in several views but in one view showed less than 30% stenosis and 
appeared to   
be mildly  
kinked, his right coronary artery was large dominant and normal. His left   
ventriculogram showed mild  
to moderately reduced systolic dysfunction with an EF in the 40 to 45% range   
globally. He is now  
being treated by Dr. Maldonado.  
4. Type 2 diabetes with A1c controlled/improved at 5.6-will restart Ozempic and   
continue Jardiance.  
We could consider Metformin but he already has some loose stools. We discussed 
that   
first-line  
treatment with lifestyle changes, decrease simple sweets and starches, will be 
some   
increased  
activity in the future and weight loss. He needs to consider bariatric surgery.  
5. Obstructive sleep apnea-he is compliant with his CPAP. Treat also with 
healthy   
lifestyle changes  
and weight loss. He needs to consider bariatric surgery.  
6. Bilateral OA of the knees-bone-on-bone making it very difficult for him to 
get   
around. He notes  
his orthopedic doctor wants him to drop from a BMI of 59 down to 45 before he 
will do   
the surgery.  
We will start weight loss and Ozempic. He needs to consider bariatric surgery.  
7. Chronic low back pain-treat with healthy lifestyle change. He needs to 
maximize   
his pain  
management.  
8. Mixed hyperlipidemia-treat with decreasing the simple sweets, added sugars,   
refined starches, and  
bad/added fats, increasing activity and exercise and continued long-term weight 
loss.   
Monitor with  
healthy lifestyle change. I recommended that he start a statin since he is 
diabetic   
and had mild CAD  
on his cath but he notes his cardiologist told him it was not necessary to 
start.  
9. Metabolic syndrome-treat with long-term healthy lifestyle changes, behavioral
   
changes, healthy  
nutritional changes, increased activity and exercise and achieve long-term 
weight   
loss.  
Follow up with me in 6 weeks.  
New labs needed: Up-to-date. Monitor A1c at least every 6 months with his PCP or
 in   
our office. He  
will continue other regular lab follow-up with his PCP.   
  
  
  
  
                                        Author              Yvette Grijalva  
[facility]  
   
                                        Authored            2024 3:39p  
m  
   
                                                    Start weight: 414.4 lbs., he  
 is down 34.4 lbs. today with a weight of 380.0lbs.   
he is   
down 2.4 lbs.  
since last visit 2024.. since his last visit on 2023.  
Starting Date: 04/15/2022.  
Ozempic start date 4/15/2022. Ozempic start weight 414.4lbs. He is down 34.4 
lbs.   
since starting  
Ozempic.  
1. Abnormal weight gain. He notes he is the heaviest in his family. His brother 
has   
some but much  
less significant weight issues. He notes he was able to get his weight down to 
368   
pounds but was  
unable to keep it down.  
2. Obesity-markedly improved since starting our program and with taking Ozempic 
2 mg,   
but had  
significant weight regain of 20.9 pounds off the medication. Due to cost/donut 
hole   
he stopped  
Ozempic. Hopefully he can restart full dose in the future using patient 
assistance.   
He is also on  
Jardiance 10 mg which can also help with his diabetes and cardiomyopathy/CHF. 
His   
diabetes is well  
controlled with his last A1c of 5.6 despite having issues with obtaining with 
his   
diabetic  
medications. He had mild CAD along with his diabetes and has not been on a 
statin so   
I had  
recommended Crestor, but he notes his cardio said it was not necessary so he did
 not   
start the  
medication. He is eating healthier overall. He should follow-up with our   
nutritionist. He feels that  
his appetite is controlled with Ozempic 2 mg. I have recommend again he consider
   
bariatric surgery  
so he could have his knee replaced or find a surgeon who will do the surgery at 
an   
elevated BMI. In  
the past, he mentioned they found weakness in the bottom of the heart and 
evidently a   
problem with  
an artery that they could not get. His cardiologist however did not feel he 
needed   
the  
statin/Crestor that I previously prescribed. He gets very little activity due to
   
bone-on-bone  
arthritis, severe low back pain on tramadol, difficulty getting out of a chair 
and   
also his cardiac  
function he does get NELSON with small amounts of activity. He sees Dr. Maldonado to 
treat   
his  
cardiomyopathy. He did like canned foods and he understands that he must cut 
back the   
salt/processed  
food. He denies adding salt to his foods. He has had a A1c that was 6.6 
indicative of   
diabetes. He  
notes he was never told he was diabetic before program. He is seeing Dr. Lomeli 
for   
his knees and  
notes he has to get his BMI from 59 down to 45 to be able to have his knees 
replaced.   
Before  
starting the program he did eat a lot of red meat, potatoes and drank a lot of 
milk   
2% or 4% often  
buying 3 or 4 gallons at a time. He should not skip meals. He did try Adipex for
 1   
month in the past  
but notes he was not able to lose but a few pounds so his PCP stopped it. He 
notes   
his sister does  
the shopping as he can. He does some simple cooking.  
3. Cardiomyopathy/CHF/early CAD-he must continue to follow-up with cardiology 
for   
evaluation of  
cardiomyopathy/CHF despite medications/known cardiomyopathy/previous mild CAD. 
We   
obtained his  
previous cardiac catheterization from 2009. He notes his preoperative 
diagnoses   
were angina  
pectoris, dyspnea on exertion and abnormal Cardiolite perfusion stress test. His
 left   
anterior  
descending artery showed a 20 to 25% tubular stenosis. His circumflex had an 
ostial   
stenosis of 30  
to 50% in several views but in one view showed less than 30% stenosis and 
appeared to   
be mildly  
kinked, his right coronary artery was large dominant and normal. His left   
ventriculogram showed mild  
to moderately reduced systolic dysfunction with an EF in the 40 to 45% range   
globally. He is now  
being treated by Dr. Maldonado.  
4. Type 2 diabetes with A1c controlled/improved at 5.6-will restart Ozempic and   
continue Jardiance.  
We could consider Metformin but he already has some loose stools. We discussed 
that   
first-line  
treatment with lifestyle changes, decrease simple sweets and starches, will be 
some   
increased  
activity in the future and weight loss. He needs to consider bariatric surgery.  
5. Obstructive sleep apnea-he is compliant with his CPAP. Treat also with 
healthy   
lifestyle changes  
and weight loss. He needs to consider bariatric surgery.  
6. Bilateral OA of the knees-bone-on-bone making it very difficult for him to 
get   
around. He notes  
his orthopedic doctor wants him to drop from a BMI of 59 down to 45 before he 
will do   
the surgery.  
We will start weight loss and Ozempic. He needs to consider bariatric surgery.  
7. Chronic low back pain-treat with healthy lifestyle change. He needs to 
maximize   
his pain  
management.  
8. Mixed hyperlipidemia-treat with decreasing the simple sweets, added sugars,   
refined starches, and  
bad/added fats, increasing activity and exercise and continued long-term weight 
loss.   
Monitor with  
healthy lifestyle change. I recommended that he start a statin since he is 
diabetic   
and had mild CAD  
on his cath but he notes his cardiologist told him it was not necessary to 
start.  
9. Metabolic syndrome-treat with long-term healthy lifestyle changes, behavioral
   
changes, healthy  
nutritional changes, increased activity and exercise and achieve long-term 
weight   
loss.  
Follow up with me in 6 weeks.  
New labs needed: Up-to-date. Monitor A1c at least every 6 months with his PCP or
 in   
our office. He  
will continue other regular lab follow-up with his PCP.   
  
  
  
[facility]  
Work Phone: [phone]- Evaluation note*   
  
                                        Author              Jacoby Braden  
[facility]  
   
                                        Authored            2024 2:5  
6pm  
   
                                                    Start weight: 414.4 lbs., he  
 is down 32.0 lbs. today with a weight of 382.4lbs.   
and   
20.9 lbs. since  
his last visit on 2023.  
Starting Date: 04/15/2022.  
Ozempic start date 4/15/2022. Ozempic start weight 414.4lbs. He is down 32.0 
lbs.   
since starting  
Ozempic.  
1. Abnormal weight gain. He notes he is the heaviest in his family. His brother 
has   
some but much  
less significant weight issues. He notes he was able to get his weight down to 
368   
pounds but was  
unable to keep it down.  
2. Obesity-markedly improved since starting our program and with taking Ozempic 
2 mg,   
but had  
significant weight regain of 20.9 pounds off the medication. Due to cost/donut 
hole   
he stopped  
Ozempic. Hopefully he can restart full dose in the future using patient 
assistance.   
He is also on  
Jardiance 10 mg which can also help with his diabetes and cardiomyopathy/CHF. 
His   
diabetes is well  
controlled with his last A1c of 5.6 despite having issues with obtaining with 
his   
diabetic  
medications. He had mild CAD along with his diabetes and has not been on a 
statin so   
I had  
recommended Crestor, but he notes his cardio said it was not necessary so he did
 not   
start the  
medication. He is eating healthier overall. He should follow-up with our   
nutritionist. He feels that   
his appetite is controlled with Ozempic 2 mg. I have recommend again he consider
   
bariatric surgery  
so he could have his knee replaced or find a surgeon who will do the surgery at 
an   
elevated BMI. In  
the past, he mentioned they found weakness in the bottom of the heart and 
evidently a   
problem with  
an artery that they could not get. His cardiologist however did not feel he 
needed   
the  
statin/Crestor that I previously prescribed. He gets very little activity due to
   
bone-on-bone  
arthritis, severe low back pain on tramadol, difficulty getting out of a chair 
and   
also his cardiac  
function he does get NELSON with small amounts of activity. He sees Dr. Maldonado to 
treat   
his  
cardiomyopathy. He did like canned foods and he understands that he must cut 
back the   
salt/processed  
food. He denies adding salt to his foods. He has had a A1c that was 6.6 
indicative of   
diabetes. He  
notes he was never told he was diabetic before program. He is seeing Dr. Lomeli 
for   
his knees and  
notes he has to get his BMI from 59 down to 45 to be able to have his knees 
replaced.   
Before  
starting the program he did eat a lot of red meat, potatoes and drank a lot of 
milk   
2% or 4% often  
buying 3 or 4 gallons at a time. He should not skip meals. He did try Adipex for
 1   
month in the past  
but notes he was not able to lose but a few pounds so his PCP stopped it. He 
notes   
his sister does  
the shopping as he can. He does some simple cooking.  
3. Cardiomyopathy/CHF/early CAD-he must continue to follow-up with cardiology 
for   
evaluation of  
cardiomyopathy/CHF despite medications/known cardiomyopathy/previous mild CAD. 
We   
obtained his  
previous cardiac catheterization from 2009. He notes his preoperative 
diagnoses   
were angina  
pectoris, dyspnea on exertion and abnormal Cardiolite perfusion stress test. His
 left   
anterior  
descending artery showed a 20 to 25% tubular stenosis. His circumflex had an 
ostial   
stenosis of 30  
to 50% in several views but in one view showed less than 30% stenosis and 
appeared to   
be mildly  
kinked, his right coronary artery was large dominant and normal. His left   
ventriculogram showed mild  
to moderately reduced systolic dysfunction with an EF in the 40 to 45% range   
globally. He is now  
being treated by Dr. Maldonado.  
4. Type 2 diabetes with A1c controlled/improved at 5.6-will restart Ozempic and   
continue Jardiance.  
We could consider Metformin but he already has some loose stools. We discussed 
that   
first-line  
treatment with lifestyle changes, decrease simple sweets and starches, will be 
some   
increased  
activity in the future and weight loss. He needs to consider bariatric surgery.  
5. Obstructive sleep apnea-he is compliant with his CPAP. Treat also with 
healthy   
lifestyle changes  
and weight loss. He needs to consider bariatric surgery.  
6. Bilateral OA of the knees-bone-on-bone making it very difficult for him to 
get   
around. He notes  
his orthopedic doctor wants him to drop from a BMI of 59 down to 45 before he 
will do   
the surgery.  
We will start weight loss and Ozempic. He needs to consider bariatric surgery.  
7. Chronic low back pain-treat with healthy lifestyle change. He needs to 
maximize   
his pain  
management.  
8. Mixed hyperlipidemia-treat with decreasing the simple sweets, added sugars,   
refined starches, and  
bad/added fats, increasing activity and exercise and continued long-term weight 
loss.   
Monitor with  
healthy lifestyle change. I recommended that he start a statin since he is 
diabetic   
and had mild CAD  
on his cath but he notes his cardiologist told him it was not necessary to 
start.  
9. Metabolic syndrome-treat with long-term healthy lifestyle changes, behavioral
   
changes, healthy  
nutritional changes, increased activity and exercise and achieve long-term 
weight   
loss.  
Follow up with me in 6 weeks.  
New labs needed: Up-to-date. Monitor A1c at least every 6 months with his PCP or
 in   
our office. He  
will continue other regular lab follow-up with his PCP.   
  
  
  
  
                                        Author              Jacoby Braden  
[facility]  
   
                                        Authored            2024 4:24p  
m  
   
                                                    Start weight: 414.4 lbs., he  
 is down 34.4 lbs. today with a weight of 380.0lbs.   
he is   
down 2.4 lbs.  
since last visit 2024.. since his last visit on 2023.  
Starting Date: 04/15/2022.  
Ozempic start date 4/15/2022. Ozempic start weight 414.4lbs. He is down 34.4 
lbs.   
since starting  
Ozempic.  
1. Abnormal weight gain. He notes he is the heaviest in his family. His brother 
has   
some but much  
less significant weight issues. He notes he was able to get his weight down to 
368   
pounds but was  
unable to keep it down.  
2. Obesity-markedly improved since starting our program and with taking Ozempic 
2 mg,   
but had  
significant weight regain of 20.9 pounds off the medication and not following up
 with   
the program  
since 2023. This is his first visit back since this restart. He had 
previously   
stopped Ozempic  
due to cost/donut hole. He has filled 1 prescription of Ozempic but he thinks it
 was   
$304.  
Hopefully he can continue Ozempic using patient assistance. We are referring him
 to   
[facility] for bariatric surgery evaluation. He needs to get his BMI down to 45 
before   
the surgeon will  
replace his knee. He is on Jardiance 10 mg which can also help with his diabetes
 and  
cardiomyopathy/CHF. His diabetes is well controlled with his last A1c of 5.5 on   
2024 despite  
having issues with obtaining with his diabetic medications. He had mild CAD 
along   
with his diabetes  
and has not been on a statin so I had recommended Crestor, but he notes his 
cardio   
said it was not  
necessary so he did not start the medication. He is eating healthier overall. He
   
should follow-up  
with our nutritionist. He feels that his appetite is controlled with Ozempic 2 
mg. I   
have recommend  
again he consider bariatric surgery so he could have his knee replaced or find a
   
surgeon who will do  
the surgery at an elevated BMI. In the past, he mentioned they found weakness in
 the   
bottom of the  
heart and evidently a problem with an artery that they could not get. His   
cardiologist however did  
not feel he needed the statin/Crestor that I previously prescribed. He gets very
   
little activity due   
to bone-on-bone arthritis, severe low back pain on tramadol, difficulty getting 
out   
of a chair and  
also his cardiac function he does get NELSON with small amounts of activity. He 
sees Dr. Maldonado to  
treat his cardiomyopathy. He did like canned foods and he understands that he 
must   
cut back the  
salt/processed food. He denies adding salt to his foods. He has had a A1c that 
was   
6.6 indicative of  
diabetes. He notes he was never told he was diabetic before program. He is 
seeing Dr. Lomeli for his  
knees and notes he has to get his BMI from 59 down to 45 to be able to have his 
knees   
replaced.  
Before starting the program he did eat a lot of red meat, potatoes and drank a 
lot of   
milk 2% or 4%  
often buying 3 or 4 gallons at a time. He should not skip meals. He did try 
Adipex   
for 1 month in  
the past but notes he was not able to lose but a few pounds so his PCP stopped 
it. He   
notes his  
sister does the shopping as he can. He does some simple cooking.  
3. Cardiomyopathy/CHF/early CAD-he must continue to follow-up with cardiology 
for   
evaluation of  
cardiomyopathy/CHF despite medications/known cardiomyopathy/previous mild CAD. 
We   
obtained his  
previous cardiac catheterization from 2009. He notes his preoperative 
diagnoses   
were angina  
pectoris, dyspnea on exertion and abnormal Cardiolite perfusion stress test. His
 left   
anterior  
descending artery showed a 20 to 25% tubular stenosis. His circumflex had an 
ostial   
stenosis of 30  
to 50% in several views but in one view showed less than 30% stenosis and 
appeared to   
be mildly  
kinked, his right coronary artery was large dominant and normal. His left   
ventriculogram showed mild  
to moderately reduced systolic dysfunction with an EF in the 40 to 45% range   
globally. He is now  
being treated by Dr. Maldonado.  
4. Type 2 diabetes with A1c controlled/improved at 5.5 on 2024-continue to   
titrate up Ozempic as   
available and continue Jardiance. We could consider Metformin but he already has
 some   
loose stools.  
We discussed that first-line treatment with lifestyle changes, decrease simple 
sweets   
and starches,  
will be some increased activity in the future and weight loss. He needs to 
consider   
bariatric  
surgery.  
5. Obstructive sleep apnea-he is compliant with his CPAP. Treat also with 
healthy   
lifestyle changes  
and weight loss. He needs to consider bariatric surgery.  
6. Bilateral OA of the knees-bone-on-bone making it very difficult for him to 
get   
around. He notes  
his orthopedic doctor wants him to drop from a BMI of 59 down to 45 before he 
will do   
the surgery.  
We will start weight loss and Ozempic. He needs to consider bariatric surgery.  
7. Chronic low back pain-treat with healthy lifestyle change. He needs to 
maximize   
his pain  
management.  
8. Mixed hyperlipidemia-treat with decreasing the simple sweets, added sugars,   
refined starches, and   
bad/added fats, increasing activity and exercise and continued long-term weight 
loss.   
Monitor with  
healthy lifestyle change. I recommended that he start a statin since he is 
diabetic   
and had mild CAD  
on his cath but he notes his cardiologist told him it was not necessary to 
start.  
9. Metabolic syndrome-treat with long-term healthy lifestyle changes, behavioral
   
changes, healthy  
nutritional changes, increased activity and exercise and achieve long-term 
weight   
loss.  
Follow up with me in 6 weeks.  
New labs needed: Up-to-date. Monitor A1c at least every 6 months with his PCP or
 in   
our office. He  
will continue other regular lab follow-up with his PCP.   
  
  
  
[facility]  
Work Phone: [phone]- Evaluation note*   
  
                                        Author              Jacoby Braden  
[facility]  
   
                                        Authored            2024 2:5  
6pm  
   
                                                    Start weight: 414.4 lbs., he  
 is down 32.0 lbs. today with a weight of 382.4lbs.   
and   
20.9 lbs. since  
his last visit on 2023.  
Starting Date: 04/15/2022.  
Ozempic start date 4/15/2022. Ozempic start weight 414.4lbs. He is down 32.0 
lbs.   
since starting  
Ozempic.  
1. Abnormal weight gain. He notes he is the heaviest in his family. His brother 
has   
some but much  
less significant weight issues. He notes he was able to get his weight down to 
368   
pounds but was  
unable to keep it down.  
2. Obesity-markedly improved since starting our program and with taking Ozempic 
2 mg,   
but had  
significant weight regain of 20.9 pounds off the medication. Due to cost/donut 
hole   
he stopped  
Ozempic. Hopefully he can restart full dose in the future using patient 
assistance.   
He is also on  
Jardiance 10 mg which can also help with his diabetes and cardiomyopathy/CHF. 
His   
diabetes is well  
controlled with his last A1c of 5.6 despite having issues with obtaining with 
his   
diabetic  
medications. He had mild CAD along with his diabetes and has not been on a 
statin so   
I had  
recommended Crestor, but he notes his cardio said it was not necessary so he did
 not   
start the  
medication. He is eating healthier overall. He should follow-up with our   
nutritionist. He feels that  
his appetite is controlled with Ozempic 2 mg. I have recommend again he consider
   
bariatric surgery  
so he could have his knee replaced or find a surgeon who will do the surgery at 
an   
elevated BMI. In  
the past, he mentioned they found weakness in the bottom of the heart and 
evidently a   
problem with  
an artery that they could not get. His cardiologist however did not feel he 
needed   
the  
statin/Crestor that I previously prescribed. He gets very little activity due to
   
bone-on-bone  
arthritis, severe low back pain on tramadol, difficulty getting out of a chair 
and   
also his cardiac  
function he does get NELSON with small amounts of activity. He sees Dr. Maldonado to 
treat   
his  
cardiomyopathy. He did like canned foods and he understands that he must cut 
back the   
salt/processed  
food. He denies adding salt to his foods. He has had a A1c that was 6.6 
indicative of   
diabetes. He  
notes he was never told he was diabetic before program. He is seeing Dr. Lomeli 
for   
his knees and  
notes he has to get his BMI from 59 down to 45 to be able to have his knees 
replaced.   
Before  
starting the program he did eat a lot of red meat, potatoes and drank a lot of 
milk   
2% or 4% often  
buying 3 or 4 gallons at a time. He should not skip meals. He did try Adipex for
 1   
month in the past  
but notes he was not able to lose but a few pounds so his PCP stopped it. He 
notes   
his sister does  
the shopping as he can. He does some simple cooking.  
3. Cardiomyopathy/CHF/early CAD-he must continue to follow-up with cardiology 
for   
evaluation of  
cardiomyopathy/CHF despite medications/known cardiomyopathy/previous mild CAD. 
We   
obtained his  
previous cardiac catheterization from 2009. He notes his preoperative 
diagnoses   
were angina  
pectoris, dyspnea on exertion and abnormal Cardiolite perfusion stress test. His
 left   
anterior  
descending artery showed a 20 to 25% tubular stenosis. His circumflex had an 
ostial   
stenosis of 30  
to 50% in several views but in one view showed less than 30% stenosis and 
appeared to   
be mildly  
kinked, his right coronary artery was large dominant and normal. His left   
ventriculogram showed mild  
to moderately reduced systolic dysfunction with an EF in the 40 to 45% range   
globally. He is now  
being treated by Dr. Maldonado.  
4. Type 2 diabetes with A1c controlled/improved at 5.6-will restart Ozempic and   
continue Jardiance.  
We could consider Metformin but he already has some loose stools. We discussed 
that   
first-line  
treatment with lifestyle changes, decrease simple sweets and starches, will be 
some   
increased  
activity in the future and weight loss. He needs to consider bariatric surgery.  
5. Obstructive sleep apnea-he is compliant with his CPAP. Treat also with 
healthy   
lifestyle changes  
and weight loss. He needs to consider bariatric surgery.  
6. Bilateral OA of the knees-bone-on-bone making it very difficult for him to 
get   
around. He notes  
his orthopedic doctor wants him to drop from a BMI of 59 down to 45 before he 
will do   
the surgery.  
We will start weight loss and Ozempic. He needs to consider bariatric surgery.  
7. Chronic low back pain-treat with healthy lifestyle change. He needs to 
maximize   
his pain  
management.  
8. Mixed hyperlipidemia-treat with decreasing the simple sweets, added sugars,   
refined starches, and  
bad/added fats, increasing activity and exercise and continued long-term weight 
loss.   
Monitor with  
healthy lifestyle change. I recommended that he start a statin since he is 
diabetic   
and had mild CAD  
on his cath but he notes his cardiologist told him it was not necessary to 
start.  
9. Metabolic syndrome-treat with long-term healthy lifestyle changes, behavioral
   
changes, healthy  
nutritional changes, increased activity and exercise and achieve long-term 
weight   
loss.  
Follow up with me in 6 weeks.  
New labs needed: Up-to-date. Monitor A1c at least every 6 months with his PCP or
 in   
our office. He  
will continue other regular lab follow-up with his PCP.   
  
  
  
  
                                        Author              Jacoby Braden  
[facility]  
   
                                        Authored            2024 4:24p  
m  
   
                                                    Start weight: 414.4 lbs., he  
 is down 34.4 lbs. today with a weight of 380.0lbs.   
he is   
down 2.4 lbs.  
since last visit 2024.. since his last visit on 2023.  
Starting Date: 04/15/2022.  
Ozempic start date 4/15/2022. Ozempic start weight 414.4lbs. He is down 34.4 
lbs.   
since starting  
Ozempic.  
1. Abnormal weight gain. He notes he is the heaviest in his family. His brother 
has   
some but much  
less significant weight issues. He notes he was able to get his weight down to 
368   
pounds but was  
unable to keep it down.  
2. Obesity-markedly improved since starting our program and with taking Ozempic 
2 mg,   
but had  
significant weight regain of 20.9 pounds off the medication and not following up
 with   
the program  
since 2023. This is his first visit back since this restart. He had 
previously   
stopped Ozempic  
due to cost/donut hole. He has filled 1 prescription of Ozempic but he thinks it
 was   
$304.  
Hopefully he can continue Ozempic using patient assistance. We are referring him
 to   
[facility] for bariatric surgery evaluation. He needs to get his BMI down to 45 
before   
the surgeon will  
replace his knee. He is on Jardiance 10 mg which can also help with his diabetes
 and  
cardiomyopathy/CHF. His diabetes is well controlled with his last A1c of 5.5 on   
2024 despite  
having issues with obtaining with his diabetic medications. He had mild CAD 
along   
with his diabetes  
and has not been on a statin so I had recommended Crestor, but he notes his 
cardio   
said it was not  
necessary so he did not start the medication. He is eating healthier overall. He
   
should follow-up  
with our nutritionist. He feels that his appetite is controlled with Ozempic 2 
mg. I   
have recommend  
again he consider bariatric surgery so he could have his knee replaced or find a
   
surgeon who will do  
the surgery at an elevated BMI. In the past, he mentioned they found weakness in
 the   
bottom of the  
heart and evidently a problem with an artery that they could not get. His   
cardiologist however did  
not feel he needed the statin/Crestor that I previously prescribed. He gets very
   
little activity due  
to bone-on-bone arthritis, severe low back pain on tramadol, difficulty getting 
out   
of a chair and  
also his cardiac function he does get NELSON with small amounts of activity. He 
sees Dr. Maldonado to  
treat his cardiomyopathy. He did like canned foods and he understands that he 
must   
cut back the  
salt/processed food. He denies adding salt to his foods. He has had a A1c that 
was   
6.6 indicative of  
diabetes. He notes he was never told he was diabetic before program. He is 
seeing Dr. Lomeli for his   
knees and notes he has to get his BMI from 59 down to 45 to be able to have his 
knees   
replaced.  
Before starting the program he did eat a lot of red meat, potatoes and drank a 
lot of   
milk 2% or 4%  
often buying 3 or 4 gallons at a time. He should not skip meals. He did try 
Adipex   
for 1 month in  
the past but notes he was not able to lose but a few pounds so his PCP stopped 
it. He   
notes his  
sister does the shopping as he can. He does some simple cooking.  
3. Cardiomyopathy/CHF/early CAD-he must continue to follow-up with cardiology 
for   
evaluation of  
cardiomyopathy/CHF despite medications/known cardiomyopathy/previous mild CAD. 
We   
obtained his  
previous cardiac catheterization from 2009. He notes his preoperative 
diagnoses   
were angina  
pectoris, dyspnea on exertion and abnormal Cardiolite perfusion stress test. His
 left   
anterior  
descending artery showed a 20 to 25% tubular stenosis. His circumflex had an 
ostial   
stenosis of 30  
to 50% in several views but in one view showed less than 30% stenosis and 
appeared to   
be mildly  
kinked, his right coronary artery was large dominant and normal. His left   
ventriculogram showed mild  
to moderately reduced systolic dysfunction with an EF in the 40 to 45% range   
globally. He is now  
being treated by Dr. Maldonado.  
4. Type 2 diabetes with A1c controlled/improved at 5.5 on 2024-continue to   
titrate up Ozempic as  
available and continue Jardiance. We could consider Metformin but he already has
 some   
loose stools.  
We discussed that first-line treatment with lifestyle changes, decrease simple 
sweets   
and starches,  
will be some increased activity in the future and weight loss. He needs to 
consider   
bariatric  
surgery.  
5. Obstructive sleep apnea-he is compliant with his CPAP. Treat also with 
healthy   
lifestyle changes  
and weight loss. He needs to consider bariatric surgery.  
6. Bilateral OA of the knees-bone-on-bone making it very difficult for him to 
get   
around. He notes  
his orthopedic doctor wants him to drop from a BMI of 59 down to 45 before he 
will do   
the surgery.  
We will start weight loss and Ozempic. He needs to consider bariatric surgery.  
7. Chronic low back pain-treat with healthy lifestyle change. He needs to 
maximize   
his pain  
management.  
8. Mixed hyperlipidemia-treat with decreasing the simple sweets, added sugars,   
refined starches, and  
bad/added fats, increasing activity and exercise and continued long-term weight 
loss.   
Monitor with  
healthy lifestyle change. I recommended that he start a statin since he is 
diabetic   
and had mild CAD   
on his cath but he notes his cardiologist told him it was not necessary to 
start.  
9. Metabolic syndrome-treat with long-term healthy lifestyle changes, behavioral
   
changes, healthy  
nutritional changes, increased activity and exercise and achieve long-term 
weight   
loss.  
Follow up with me in 6 weeks.  
New labs needed: Up-to-date. Monitor A1c at least every 6 months with his PCP or
 in   
our office. He  
will continue other regular lab follow-up with his PCP.   
  
  
  
  
                                        Author              [facility]  
   
                                        Authored            2024 2:03  
pm  
   
                                                    Start weight: 414.4 lbs., he  
 is down 45.2 lbs. today with a weight of 369.2lbs.   
he is   
down 10.8 lbs.  
since last visit 2024.  
Starting Date: 04/15/2022.  
Ozempic start date 4/15/2022. Ozempic start weight 414.4lbs. He is down 34.4 
lbs.   
since starting  
Ozempic.  
1. Abnormal weight gain. He notes he is the heaviest in his family. His brother 
has   
some but much  
less significant weight issues. He notes he was able to get his weight down to 
368   
pounds but was  
unable to keep it down.  
2. Obesity-markedly improved since starting our program and with taking Ozempic 
2 mg,   
but had  
significant weight regain of 20.9 pounds off the medication and not following up
 with   
the program  
since 2023. This is his first visit back since this restart. He had 
previously   
stopped Ozempic  
due to cost/donut hole. He has filled 1 prescription of Ozempic but he thinks it
 was   
$304.  
Hopefully he can continue Ozempic using patient assistance. We are referring him
 to   
[facility] for bariatric surgery evaluation. He needs to get his BMI down to 45 
before   
the surgeon will  
replace his knee. He is on Jardiance 10 mg which can also help with his diabetes
 and  
cardiomyopathy/CHF. His diabetes is well controlled with his last A1c of 5.5 on   
2024 despite  
having issues with obtaining with his diabetic medications. He had mild CAD 
along   
with his diabetes  
and has not been on a statin so I had recommended Crestor, but he notes his 
cardio   
said it was not  
necessary so he did not start the medication. He is eating healthier overall. He
   
should follow-up  
with our nutritionist. He feels that his appetite is controlled with Ozempic 2 
mg. I   
have recommend  
again he consider bariatric surgery so he could have his knee replaced or find a
   
surgeon who will do  
the surgery at an elevated BMI. In the past, he mentioned they found weakness in
 the   
bottom of the  
heart and evidently a problem with an artery that they could not get. His   
cardiologist however did  
not feel he needed the statin/Crestor that I previously prescribed. He gets very
   
little activity due  
to bone-on-bone arthritis, severe low back pain on tramadol, difficulty getting 
out   
of a chair and  
also his cardiac function he does get NELSON with small amounts of activity. He 
sees Dr. Maldonado to  
treat his cardiomyopathy. He did like canned foods and he understands that he 
must   
cut back the  
salt/processed food. He denies adding salt to his foods. He has had a A1c that 
was   
6.6 indicative of  
diabetes. He notes he was never told he was diabetic before program. He is 
seeing Dr. Lomeli for his  
knees and notes he has to get his BMI from 59 down to 45 to be able to have his 
knees   
replaced.  
Before starting the program he did eat a lot of red meat, potatoes and drank a 
lot of   
milk 2% or 4%  
often buying 3 or 4 gallons at a time. He should not skip meals. He did try 
Adipex   
for 1 month in  
the past but notes he was not able to lose but a few pounds so his PCP stopped 
it. He   
notes his  
sister does the shopping as he can. He does some simple cooking.  
3. Cardiomyopathy/CHF/early CAD-he must continue to follow-up with cardiology 
for   
evaluation of  
cardiomyopathy/CHF despite medications/known cardiomyopathy/previous mild CAD. 
We   
obtained his  
previous cardiac catheterization from 2009. He notes his preoperative 
diagnoses   
were angina  
pectoris, dyspnea on exertion and abnormal Cardiolite perfusion stress test. His
 left   
anterior  
descending artery showed a 20 to 25% tubular stenosis. His circumflex had an 
ostial   
stenosis of 30  
to 50% in several views but in one view showed less than 30% stenosis and 
appeared to   
be mildly  
kinked, his right coronary artery was large dominant and normal. His left   
ventriculogram showed mild  
to moderately reduced systolic dysfunction with an EF in the 40 to 45% range   
globally. He is now  
being treated by Dr. Maldonado.  
4. Type 2 diabetes with A1c controlled/improved at 5.5 on 2024-continue to   
titrate up Ozempic as  
available and continue Jardiance. We could consider Metformin but he already has
 some   
loose stools.  
We discussed that first-line treatment with lifestyle changes, decrease simple 
sweets   
and starches,  
will be some increased activity in the future and weight loss. He needs to 
consider   
bariatric  
surgery.  
5. Obstructive sleep apnea-he is compliant with his CPAP. Treat also with 
healthy   
lifestyle changes  
and weight loss. He needs to consider bariatric surgery.  
6. Bilateral OA of the knees-bone-on-bone making it very difficult for him to 
get   
around. He notes  
his orthopedic doctor wants him to drop from a BMI of 59 down to 45 before he 
will do   
the surgery.  
We will start weight loss and Ozempic. He needs to consider bariatric surgery.  
7. Chronic low back pain-treat with healthy lifestyle change. He needs to 
maximize   
his pain  
management.  
8. Mixed hyperlipidemia-treat with decreasing the simple sweets, added sugars,   
refined starches, and  
bad/added fats, increasing activity and exercise and continued long-term weight 
loss.   
Monitor with  
healthy lifestyle change. I recommended that he start a statin since he is 
diabetic   
and had mild CAD  
on his cath but he notes his cardiologist told him it was not necessary to 
start.  
9. Metabolic syndrome-treat with long-term healthy lifestyle changes, behavioral
   
changes, healthy  
nutritional changes, increased activity and exercise and achieve long-term 
weight   
loss.  
Follow up with me in 6 weeks.  
New labs needed: Up-to-date. Monitor A1c at least every 6 months with his PCP or
 in   
our office. He  
will continue other regular lab follow-up with his PCP.   
  
  
  
[facility]  
Work Phone: [phone]- Progress note  
  
  
  
  
                                        Author              Elaine Espino  
[facility]  
2024 2:29pm  
   
                                        Note Date/Time      2024 2:2  
9pm  
   
                                                    [facility]  
ENTER  
[address] [place] [address]  
  
Wound Center Provider Note  
Signed  
  
Patient: Jocelin Pacheco MR#:   
[id number]  
: 1955 Acct:[id number]  
  
Age/Sex: 69 / M  
  
Copies to: DO Elaine Arboleda, SOWMYA~  
  
  
HPI  
Date of Visit  
Date of Visit:  
Date of Service: 2024  
Time of Service: 14:28  
  
Narrative  
HPI:  
24 Jocelin is a 69-year-old male presenting to [facility]   
wound care program for an initial visit to the office for a right lower 
extremity   
venous stasis ulcer/lymphedema ulcer.? The patient has been a patient of the 
office   
for quite some time now and has seen other providers.  
He had seen vascular in [place] was told that there is nothing else that can be 
done   
for him.  
He has an extensive history of infections so I imagine this will continue to be 
an   
issue for him.  
He appears to need better edema control.  
He was told by ortho that he needs to lose 50 pounds in order to have surgery- 
this   
was 3 years ago and has not been accomplished- he says that he wasn't taking the
   
ozempic as ordered and he gained back the weight he had lost and hence no 
surgery.  
A past venous duplex indicates deep vein issues which are not surgically 
correctable-   
vascular did confirm that he does not need to see them because there is nothing 
they   
have to offer him.  
He is still retired so hopefully there is more opportunity for elevation and   
compression of the legs to support healing of the ulcer.  
Nutrition and probiotics were discussed and suggestions were given.  
Doxycycline was escribed today for what appears to be cellulitis of the RLE.  
Topical gentamicin and unna wrap was ordered and he will return here for 
dressings.  
3/4/24 better, topical flagyl today with unna wrap, will use a gauze wrap under 
the   
paste since he feels like the unna paste causes itching, no infection seen 
today,   
cellulitis appears resolved  
3/18/24 much improved, same topical orders, could be healed in a week or so, no   
infection noted  
  
Subjective  
Pain  
Right Lower Leg:  
Pain Description: Intermittent  
Pain Intensity: 0  
Pain Management Techniques Other/Comment:  not too bad today   
Wound/Ulcer History  
When did wound start?: 2024 Right lower leg  
Mode of Arrival/ : Personal vehicle  
Assistive Device Used Today: Cane  
Lives with:: Alone  
Appetite Description: Within Normal Limits  
Who helps w/ dressing change?: Wound Care Dept  
Smoking Status: Never smoker  
  
[facility]  
Medical History (Updated 24 @ 08:12 by Maria A Crowe RN)  
  
Ulcer of right leg  
Wound of right lower extremity  
Itching with irritation  
Itching  
PVD (peripheral vascular disease)  
 poor veins  Dr. Cameron did vein closure 2019, needs 5 more viens worked 
on.  
Carpal tunnel syndrome  
right arm surgery  
DIMITRI (obstructive sleep apnea)  
Cardiomyopathy  
 lower part of heart is weak   
Back pain  
DVT (deep venous thrombosis)  
 blood clot from right foot to right thigh  on  blood thinner   
Hypertension  
Arthritis  
Bilateral knees,  right is bone on bone  per  Dr. Richard   
  
Surgical History (Reviewed 24 @ 08:12 by Maria A Crowe RN)  
  
Hx of cholecystectomy  
  
Family History (Reviewed 24 @ 08:13 by Maria A Crowe RN)  
Father Diabetes mellitus, type 2  
Sister Diabetes mellitus, type 2  
Brother Heart disease  
Legacy FamHx Relation: Brother(s)  
Father Heart disease  
Diabetes  
Heart failure  
  
Family/Other   
Legacy FamHx Problem: 2 BROTHERS  :2 SISTERS ::NO CHILDREN  
Mother   
Heart disease  
Sister Heart disease  
  
Social History  
Smoking Status: Never smoker  
Substance Use Type: None  
  
Grafts  
History of Graft  
History of Graft?: No  
  
Exam  
Physical Exam  
Vital Signs:  
  
  
  
  
Temp Pulse Resp BP O2 Del Method  
  
97.7 F 87 24 132/74 Room Air  
  
24 14:18 24 14:18 24 14:18 24 14:18 24 14:18  
  
  
  
Const  
General: cooperative, comfortable and no acute distress  
Nutritional Appearance: obese  
Orientation: alert, awake and oriented x3  
  
Lower/Upper Extremity Exam  
Vascular Exam-Edema  
Right Lower Extremity:  
Edema Type: Lymphedema  
Vascular Exam-Pulses  
Right Dorsalis Pedis:  
Pulse Assessment Method: Doppler  
Right Posterior Tibial:  
Pulse Assessment Method: Doppler  
Right Brachial:  
Pulse Assessment Method: NIBP  
  
Objective  
Meds/Allergies  
Home Medications  
  
carvedilol 6.25 mg tablet 6.25 mg PO BID 17 [History Confirmed 24]  
furosemide 40 mg tablet (Lasix) 40 mg PO BID 17 [History Confirmed 
24]  
acetaminophen 500 mg tablet (Tylenol Extra Strength) 500 mg PO TID PRN Pain 
20   
[History Confirmed 24]  
empagliflozin 10 mg tablet (Jardiance) 10 mg PO DAILY 22 [History 
Confirmed   
24]  
rivaroxaban 10 mg tablet (Xarelto) 10 mg PO DAILY 22 [History Confirmed   
24]  
semaglutide 2 mg/dose (8 mg/3 mL) subcutaneous pen injector (Ozempic) 2 mg 
subcut Q7D   
22 [History Confirmed 24]  
spironolactone 25 mg tablet 25 mg PO DAILY 22 [History Confirmed 24]  
sacubitril 24 mg-valsartan 26 mg tablet (Entresto) 1 tab PO BID 23 
[History   
Confirmed 24]  
Lactobacillus acidophilus 10 billion cell capsule (Probiotic) 100 mmu cells PO 
DAILY   
24 [History Confirmed 24]  
doxycycline hyclate 100 mg capsule 100 mg PO BID 14 days #28 caps 24 [Rx   
Confirmed 24]  
  
  
Allergies  
  
cefixime Allergy (Unknown, Verified 24 08:10)  
Unknown Reaction  
diclofenac Allergy (Unknown, Verified 24 08:10)  
Unknown Reaction  
nystatin Allergy (Unknown, Verified 24 08:10)  
Unknown Reaction  
sodium benzoate Allergy (Verified 24 08:10)  
Unknown Reaction  
  
  
Wound/Ulcer  
Right Lower Leg:  
Type: Lymphedema  
Thickness: Skin Breakdown  
Bed Appearance: Dried Exudate, Pink and Yellow  
Percent of Wound Bed Granulated/Red: 90  
Percent of Devitalized: 10  
Length (cm): 0.5  
Width (cm): 0.5  
Depth (cm): 0.1  
CM Sq: 0.250  
Surrounding Tissue Appearance: Hyperpigmented  
Surrounding Tissue Temp: Warm  
Drainage Amount: Moderate  
Drainage Description: Serosanguineous  
Drainage Odor: No Odor  
Results  
Height: 5 ft 11 in  
Weight: 167.829 kg  
Body Mass Index: 51.5  
  
Assessment/Plan  
Assessment/Plan  
(1) Venous stasis ulcer:  
Qualifiers:  
Laterality: right Non-pressure ulcer stage: limited to breakdown of skin 
Varicose   
vein presence: with varicose veins Venous stasis ulcer site: other part of lower
leg   
Qualified Code(s): I83.018 - Varicose veins of right lower extremity with ulcer 
other   
part of lower leg; L97.811 - Non-pressure chronic ulcer of other part of right 
lower   
leg limited to breakdown of skin  
Code(s):  
I83.009 - Varicose veins of unspecified lower extremity with ulcer of 
unspecified   
site; L97.909 - Non-pressure chronic ulcer of unspecified part of unspecified 
lower   
leg with unspecified severity  
Plan:  
w/lymphedema  
(2) Edema of right lower leg:  
Code(s):  
R60.0 - Localized edema  
(3) Morbid obesity due to excess calories:  
Code(s):  
E66.01 - Morbid (severe) obesity due to excess calories  
(4) Lymphedema of both lower extremities:  
Code(s):  
I89.0 - Lymphedema, not elsewhere classified  
(5) PVD (peripheral vascular disease):  
Code(s):  
I73.9 - Peripheral vascular disease, unspecified  
(6) Varicose veins of both legs with edema:  
Code(s):  
I83.893 - Varicose veins of bilateral lower extremities with other complications  
(7) Inflammation:  
  
Plan  
see orders and hpi  
Time spent with patient  
Time Spent With Patient (min): 10  
  
  
  
  
Dictated By: SOWMYA Correa DD/DT: 24  
  
Signed By: <Electronically signed by SOWMYA Espino>  
24 142  
   
  
[facility]  
Work Phone: [phone]- Progress note  
  
  
  
  
                                        Author              Elaine Espino  
[facility]  
2024 3:29pm  
   
                                        Note Date/Time      2024 3:29  
pm  
   
                                                    [facility]  
ENTER  
[address] [place] [address]  
  
Wound Center Provider Note  
Signed  
  
Patient: Jocelin Pacheco MR#:   
[id number]  
: 1955 Acct:[id number]  
  
Age/Sex: 69 / M  
  
Copies to: Vera Melvin,SOWMYA Mayer~  
  
  
HPI  
Date of Visit  
Date of Visit:  
Date of Service: 2024  
Time of Service: 15:26  
  
Narrative  
HPI:  
24 Jocelin is a 69-year-old male presenting to [facility]   
wound care program for an initial visit to the office for a right lower 
extremity   
venous stasis ulcer/lymphedema ulcer.? The patient has been a patient of the 
office   
for quite some time now and has seen other providers.  
He had seen vascular in [place] was told that there is nothing else that can be 
done   
for him.  
He has an extensive history of infections so I imagine this will continue to be 
an   
issue for him.  
He appears to need better edema control.  
He was told by ortho that he needs to lose 50 pounds in order to have surgery- 
this   
was 3 years ago and has not been accomplished- he says that he wasn't taking the
  
ozempic as ordered and he gained back the weight he had lost and hence no 
surgery.  
A past venous duplex indicates deep vein issues which are not surgically 
correctable-   
vascular did confirm that he does not need to see them because there is nothing 
they   
have to offer him.  
He is still retired so hopefully there is more opportunity for elevation and   
compression of the legs to support healing of the ulcer.  
Nutrition and probiotics were discussed and suggestions were given.  
Doxycycline was escribed today for what appears to be cellulitis of the RLE.  
Topical gentamicin and unna wrap was ordered and he will return here for 
dressings.  
3/4/24 better, topical flagyl today with unna wrap, will use a gauze wrap under 
the   
paste since he feels like the unna paste causes itching, no infection seen 
today,   
cellulitis appears resolved  
  
Subjective  
Pain  
Right Lower Leg:  
Pain Description: Intermittent  
Pain Intensity: 8  
Wound/Ulcer History  
When did wound start?: 2024 Right lower leg  
Mode of Arrival/ : Personal vehicle  
Assistive Device Used Today: Cane  
Lives with:: Alone  
Appetite Description: Within Normal Limits  
Who helps w/ dressing change?: Wound Care Dept  
Smoking Status: Never smoker  
  
[facility]  
Medical History (Updated 24 @ 08:12 by Maria A Crowe RN)  
  
Ulcer of right leg  
Wound of right lower extremity  
Itching with irritation  
Itching  
PVD (peripheral vascular disease)  
 poor veins  Dr. Cameron did vein closure 2019, needs 5 more viens worked 
on.  
Carpal tunnel syndrome  
right arm surgery  
DIMITRI (obstructive sleep apnea)  
Cardiomyopathy  
 lower part of heart is weak   
Back pain  
DVT (deep venous thrombosis)  
 blood clot from right foot to right thigh  on  blood thinner   
Hypertension  
Arthritis  
Bilateral knees,  right is bone on bone  per  Dr. Richard   
  
Surgical History (Reviewed 24 @ 08:12 by Maria A Crowe, KIMBERLY)  
  
Hx of cholecystectomy  
  
Family History (Reviewed 24 @ 08:13 by Maria A Crowe RN)  
Father Diabetes mellitus, type 2  
Sister Diabetes mellitus, type 2  
Brother Heart disease  
Legacy FamHx Relation: Brother(s)  
Father Heart disease  
Diabetes  
Heart failure  
  
Family/Other   
Legacy FamHx Problem: 2 BROTHERS  :2 SISTERS ::NO CHILDREN  
Mother   
Heart disease  
Sister Heart disease  
  
Social History  
Smoking Status: Never smoker  
Substance Use Type: None  
  
Grafts  
History of Graft  
History of Graft?: No  
  
Exam  
Physical Exam  
Vital Signs:  
  
  
  
  
Temp Pulse Resp BP O2 Del Method  
  
97.3 F L 85 18 130/60 Room Air  
  
24 15:09 24 15:09 24 15:09 24 15:09 24 15:09  
  
  
  
Const  
General: cooperative, comfortable and no acute distress  
Nutritional Appearance: obese  
Orientation: alert, awake and oriented x3  
  
Lower/Upper Extremity Exam  
Vascular Exam-Edema  
Right Lower Extremity:  
Edema Type: Lymphedema  
Vascular Exam-Pulses  
Right Dorsalis Pedis:  
Pulse Assessment Method: Doppler  
Right Posterior Tibial:  
Pulse Assessment Method: Doppler  
Right Brachial:  
Pulse Assessment Method: NIBP  
  
Objective  
Meds/Allergies  
Home Medications  
  
carvedilol 6.25 mg tablet 6.25 mg PO BID 17 [History Confirmed 24]  
furosemide 40 mg tablet (Lasix) 40 mg PO BID 17 [History Confirmed 
24]  
acetaminophen 500 mg tablet (Tylenol Extra Strength) 500 mg PO TID PRN Pain 
20   
[History Confirmed 24]  
empagliflozin 10 mg tablet (Jardiance) 10 mg PO DAILY 22 [History 
Confirmed   
24]  
rivaroxaban 10 mg tablet (Xarelto) 10 mg PO DAILY 22 [History Confirmed   
24]  
semaglutide 2 mg/dose (8 mg/3 mL) subcutaneous pen injector (Ozempic) 2 mg 
subcut Q7D   
22 [History Confirmed 24]  
spironolactone 25 mg tablet 25 mg PO DAILY 22 [History Confirmed 24]  
sacubitril 24 mg-valsartan 26 mg tablet (Entresto) 1 tab PO BID 23 
[History   
Confirmed 24]  
Lactobacillus acidophilus 10 billion cell capsule (Probiotic) 100 mmu cells PO 
DAILY   
24 [History Confirmed 24]  
doxycycline hyclate 100 mg capsule 100 mg PO BID 14 days #28 caps 24 [Rx   
Confirmed 24]  
  
  
Allergies  
  
cefixime Allergy (Unknown, Verified 24 08:10)  
Unknown Reaction  
diclofenac Allergy (Unknown, Verified 24 08:10)  
Unknown Reaction  
nystatin Allergy (Unknown, Verified 24 08:10)  
Unknown Reaction  
sodium benzoate Allergy (Verified 24 08:10)  
Unknown Reaction  
  
  
Wound/Ulcer  
Right Lower Leg:  
Type: Lymphedema  
Thickness: Skin Breakdown  
Bed Appearance: Dried Exudate, Pink and Yellow  
Percent of Wound Bed Granulated/Red: 80  
Percent of Devitalized: 20  
Length (cm): 1.5  
Width (cm): 1.0  
Depth (cm): 0.1  
CM Sq: 1.500  
Surrounding Tissue Appearance: Hyperpigmented  
Surrounding Tissue Temp: Warm  
Drainage Amount: Moderate  
Drainage Description: Serosanguineous  
Drainage Odor: No Odor  
Procedures  
Time Out: 2 Patient Identifiers, Correct Patient, Correct Side/Site, Correct   
Procedure and Safety Issues Reviewed  
Procedure:  
The right leg ulcer was anesthetized with topical 2% lidocaine gel. A forcep and
  
scissor was used to perform debridement for the removal of 1 cm? of devitalized   
tissue consisting of skin and slough. Debridement was down to healthy bleeding   
tissue. Estimated blood loss was minimal to moderate. Hemostasis was achieved by
  
applying pressure. The ulcer now appears 95% pink and red and the size remains 
the   
same. The patient tolerated well with no pain.  
Results  
Height: 5 ft 11 in  
Weight: 167.829 kg  
Body Mass Index: 51.5  
  
Assessment/Plan  
Assessment/Plan  
(1) Venous stasis ulcer:  
Qualifiers:  
Laterality: right Non-pressure ulcer stage: limited to breakdown of skin 
Varicose   
vein presence: with varicose veins Venous stasis ulcer site: other part of lower
leg   
Qualified Code(s): I83.018 - Varicose veins of right lower extremity with ulcer 
other   
part of lower leg; L97.811 - Non-pressure chronic ulcer of other part of right 
lower   
leg limited to breakdown of skin  
Code(s):  
I83.009 - Varicose veins of unspecified lower extremity with ulcer of 
unspecified   
site; L97.909 - Non-pressure chronic ulcer of unspecified part of unspecified 
lower   
leg with unspecified severity  
Plan:  
w/lymphedema  
(2) Edema of right lower leg:  
Code(s):  
R60.0 - Localized edema  
(3) Morbid obesity due to excess calories:  
Code(s):  
E66.01 - Morbid (severe) obesity due to excess calories  
(4) Lymphedema of both lower extremities:  
Code(s):  
I89.0 - Lymphedema, not elsewhere classified  
(5) PVD (peripheral vascular disease):  
Code(s):  
I73.9 - Peripheral vascular disease, unspecified  
(6) Varicose veins of both legs with edema:  
Code(s):  
I83.893 - Varicose veins of bilateral lower extremities with other complications  
(7) Inflammation:  
  
Plan  
see orders and hpi  
Time spent with patient  
Time Spent With Patient (min): 13  
  
  
  
  
Dictated By: SOWMYA Correa DD/DT: 24  
  
Signed By: <Electronically signed by SOWMYA Espino>  
24 1529  
   
  
[facility]  
Work Phone: [phone]- Progress note  
  
  
  
  
                                        Author              Laila Villarreal  
[facility]  
2024 3:08pm  
   
                                        Note Date/Time      2024   
3:04pm  
   
                                                    [facility]  
ENTER  
[address] [place] [address]  
  
Wound Center Provider Note  
Signed  
  
Patient: Jocelin Pacheco MR#:   
[id number]  
: 1955 Acct:[id number]  
  
Age/Sex: 69 / M  
  
Copies to: SOWMYA Gutierrez,~  
  
  
HPI  
Date of Visit  
Date of Visit:  
Date of Service: 2024  
Time of Service: 15:03  
  
Narrative  
HPI:  
24 Jocelin is a 69-year-old male presenting to [facility]   
wound care program for an initial visit to the office for a right lower 
extremity   
venous stasis ulcer/lymphedema ulcer.? The patient has been a patient of the 
office   
for quite some time now and has seen other providers.  
He had seen vascular in [place] was told that there is nothing else that can be 
done   
for him.  
He has an extensive history of infections so I imagine this will continue to be 
an   
issue for him.  
He appears to need better edema control.  
He was told by ortho that he needs to lose 50 pounds in order to have surgery- 
this   
was 3 years ago and has not been accomplished- he says that he wasn't taking the
  
ozempic as ordered and he gained back the weight he had lost and hence no 
surgery.  
A past venous duplex indicates deep vein issues which are not surgically 
correctable-   
vascular did confirm that he does not need to see them because there is nothing 
they   
have to offer him.  
He is still retired so hopefully there is more opportunity for elevation and   
compression of the legs to support healing of the ulcer.  
Nutrition and probiotics were discussed and suggestions were given.  
Doxycycline was escribed today for what appears to be cellulitis of the RLE.  
Topical gentamicin and unna wrap was ordered and he will return here for 
dressings.  
Above documentation per SOWMYA Amos - included for continuity of care  
  
24- Jocelin presents for follow-up. He is still taking his antibiotic and   
understands that he should complete the entire course. The area is improving.   
Dressing order changed to collagen silver; sorbact; ERGO wrap. Follow-up in 2-3   
weeks.  
  
Subjective  
Pain  
Right Lower Leg:  
Pain Description: Intermittent and Burning  
Pain Intensity: 9  
Pain Management Techniques Other/Comment:  plus itching   
Wound/Ulcer History  
When did wound start?: 2024 Right lower leg  
Mode of Arrival/ : Personal vehicle  
Assistive Device Used Today: Cane  
Lives with:: Alone  
Appetite Description: Within Normal Limits  
Who helps w/ dressing change?: Wound Care Dept  
Smoking Status: Never smoker  
  
[facility]  
Medical History (Updated 24 @ 08:12 by Maria A Crowe RN)  
  
Ulcer of right leg  
Wound of right lower extremity  
Itching with irritation  
Itching  
PVD (peripheral vascular disease)  
 poor veins  Dr. Cameron did vein closure 2019, needs 5 more viens worked 
on.  
Carpal tunnel syndrome  
right arm surgery  
DIMITRI (obstructive sleep apnea)  
Cardiomyopathy  
 lower part of heart is weak   
Back pain  
DVT (deep venous thrombosis)  
 blood clot from right foot to right thigh  on  blood thinner   
Hypertension  
Arthritis  
Bilateral knees,  right is bone on bone  per  Dr. Richard   
  
Surgical History (Reviewed 24 @ 08:12 by Maria A Crowe, KIMBERLY)  
  
Hx of cholecystectomy  
  
Family History (Reviewed 24 @ 08:13 by Maria A Crowe RN)  
Father Diabetes mellitus, type 2  
Sister Diabetes mellitus, type 2  
Brother Heart disease  
Legacy FamHx Relation: Brother(s)  
Father Heart disease  
Diabetes  
Heart failure  
  
Family/Other   
Legacy FamHx Problem: 2 BROTHERS  :2 SISTERS ::NO CHILDREN  
Mother   
Heart disease  
Sister Heart disease  
  
Social History  
Smoking Status: Never smoker  
Substance Use Type: None  
  
Grafts  
History of Graft  
History of Graft?: No  
  
Exam  
Physical Exam  
Vital Signs:  
  
  
  
  
Temp Pulse Resp BP O2 Del Method  
  
97.5 F L 87 24 175/78 H Room Air  
  
24 14:51 24 14:51 24 14:51 24 14:51 24 14:51  
  
  
  
Const  
General: cooperative, comfortable and no acute distress  
Nutritional Appearance: obese  
Orientation: alert, awake and oriented x3  
  
Lower/Upper Extremity Exam  
Vascular Exam-Edema  
Right Lower Extremity:  
Edema Type: Lymphedema  
Vascular Exam-Pulses  
Right Dorsalis Pedis:  
Pulse Assessment Method: Doppler  
Right Posterior Tibial:  
Pulse Assessment Method: Doppler  
Right Brachial:  
Pulse Assessment Method: NIBP  
Left Brachial:  
Pulse Assessment Method: NIBP  
  
Objective  
Meds/Allergies  
Home Medications  
  
carvedilol 6.25 mg tablet 6.25 mg PO BID 17 [History Confirmed 24]  
furosemide 40 mg tablet (Lasix) 40 mg PO BID 17 [History Confirmed 
24]  
acetaminophen 500 mg tablet (Tylenol Extra Strength) 500 mg PO TID PRN Pain 
20   
[History Confirmed 24]  
empagliflozin 10 mg tablet (Jardiance) 10 mg PO DAILY 22 [History 
Confirmed   
24]  
rivaroxaban 10 mg tablet (Xarelto) 10 mg PO DAILY 22 [History Confirmed   
24]  
semaglutide 2 mg/dose (8 mg/3 mL) subcutaneous pen injector (Ozempic) 2 mg 
subcut Q7D   
22 [History Confirmed 24]  
spironolactone 25 mg tablet 25 mg PO DAILY 22 [History Confirmed 24]  
sacubitril 24 mg-valsartan 26 mg tablet (Entresto) 1 tab PO BID 23 
[History   
Confirmed 24]  
Lactobacillus acidophilus 10 billion cell capsule (Probiotic) 100 mmu cells PO 
DAILY   
24 [History Confirmed 24]  
doxycycline hyclate 100 mg capsule 100 mg PO BID 14 days #28 caps 24 [Rx   
Confirmed 24]  
  
  
Allergies  
  
cefixime Allergy (Unknown, Verified 24 08:10)  
Unknown Reaction  
diclofenac Allergy (Unknown, Verified 24 08:10)  
Unknown Reaction  
nystatin Allergy (Unknown, Verified 24 08:10)  
Unknown Reaction  
sodium benzoate Allergy (Verified 24 08:10)  
Unknown Reaction  
  
  
Wound/Ulcer  
Right Lower Leg:  
Type: Lymphedema  
Thickness: Skin Breakdown  
Bed Appearance: Dried Exudate, Pink and Yellow  
Percent of Wound Bed Granulated/Red: 80  
Percent of Devitalized: 20  
Length (cm): 2.5  
Width (cm): 2  
Depth (cm): 0.1  
CM Sq: 5.000  
Surrounding Tissue Appearance: Hyperpigmented  
Surrounding Tissue Temp: Warm  
Drainage Amount: Moderate  
Drainage Description: Serosanguineous  
Drainage Odor: No Odor  
Results  
Height: 5 ft 11 in  
Weight: 167.829 kg  
Body Mass Index: 51.5  
  
Assessment/Plan  
Assessment/Plan  
(1) Venous stasis ulcer:  
Qualifiers:  
Laterality: right Non-pressure ulcer stage: limited to breakdown of skin 
Varicose   
vein presence: with varicose veins Venous stasis ulcer site: other part of lower
leg   
Qualified Code(s): I83.018 - Varicose veins of right lower extremity with ulcer 
other   
part of lower leg; L97.811 - Non-pressure chronic ulcer of other part of right 
lower   
leg limited to breakdown of skin  
Code(s):  
I83.009 - Varicose veins of unspecified lower extremity with ulcer of 
unspecified   
site; L97.909 - Non-pressure chronic ulcer of unspecified part of unspecified 
lower   
leg with unspecified severity  
Plan:  
w/lymphedema  
(2) Edema of right lower leg:  
Code(s):  
R60.0 - Localized edema  
(3) Morbid obesity due to excess calories:  
Code(s):  
E66.01 - Morbid (severe) obesity due to excess calories  
(4) Lymphedema of both lower extremities:  
Code(s):  
I89.0 - Lymphedema, not elsewhere classified  
(5) PVD (peripheral vascular disease):  
Code(s):  
I73.9 - Peripheral vascular disease, unspecified  
(6) Varicose veins of both legs with edema:  
Code(s):  
I83.893 - Varicose veins of bilateral lower extremities with other complications  
(7) Inflammation:  
  
Plan  
see orders and hpi  
Time spent with patient  
Time Spent With Patient (min): 15  
  
  
  
  
Dictated By: SOWMYA Gutierrez DD/DT: 24 1503  
  
Signed By: <Electronically signed by SOWMYA Villarreal>  
24 2747  
   
  
[facility]  
Work Phone: [phone]- Progress note  
  
  
  
  
                                        Author              Elaine Espino  
[facility]  
2024 8:20am  
   
                                        Note Date/Time      2024   
8:20am  
   
                                                    [facility]  
ENTER  
[address] [place] [address]  
  
Wound Center Provider Note  
Signed  
  
Patient: Jocelin Pacheco MR#:   
[id number]  
: 1955 Acct:[id number]  
  
Age/Sex: 69 / M  
  
Copies to: DO Elaine Arboleda, SOWMYA~  
  
  
HPI  
Date of Visit  
Date of Visit:  
Date of Service: 2024  
Time of Service: 08:17  
  
Narrative  
HPI:  
24 Jocelin is a 69-year-old male presenting to [facility]   
wound care program for an initial visit to the office for a right lower 
extremity   
venous stasis ulcer/lymphedema ulcer.? The patient has been a patient of the 
office   
for quite some time now and has seen other providers.  
He had seen vascular in [place] was told that there is nothing else that can be 
done   
for him.  
He has an extensive history of infections so I imagine this will continue to be 
an   
issue for him.  
He appears to need better edema control.  
He was told by ortho that he needs to lose 50 pounds in order to have surgery- 
this   
was 3 years ago and has not been accomplished- he says that he wasn't taking the
  
ozempic as ordered and he gained back the weight he had lost and hence no 
surgery.  
A past venous duplex indicates deep vein issues which are not surgically 
correctable-   
vascular did confirm that he does not need to see them because there is nothing 
they   
have to offer him.  
He is still retired so hopefully there is more opportunity for elevation and   
compression of the legs to support healing of the ulcer.  
Nutrition and probiotics were discussed and suggestions were given.  
Doxycycline was escribed today for what appears to be cellulitis of the RLE.  
Topical gentamicin and unna wrap was ordered and he will return here for 
dressings.  
  
Subjective  
Pain  
Right Lower Leg:  
Pain Description: Burning  
Pain Intensity: 10  
Wound/Ulcer History  
When did wound start?: 2024 Right lower leg  
Mode of Arrival/ : Personal vehicle  
Assistive Device Used Today: Cane  
Lives with:: Alone  
Appetite Description: Within Normal Limits  
Who helps w/ dressing change?: Wound Care Dept  
Smoking Status: Never smoker  
  
[facility]  
Medical History (Updated 24 @ 08:12 by Mari aA Crowe, KIMBERLY)  
  
Ulcer of right leg  
Wound of right lower extremity  
Itching with irritation  
Itching  
PVD (peripheral vascular disease)  
 poor veins  Dr. Cameron did vein closure 2019, needs 5 more viens worked 
on.  
Carpal tunnel syndrome  
right arm surgery  
DIMITRI (obstructive sleep apnea)  
Cardiomyopathy  
 lower part of heart is weak   
Back pain  
DVT (deep venous thrombosis)  
 blood clot from right foot to right thigh  on  blood thinner   
Hypertension  
Arthritis  
Bilateral knees,  right is bone on bone  per  Dr. Richard   
  
Surgical History (Reviewed 24 @ 08:12 by Maria A Crowe, RN)  
  
Hx of cholecystectomy  
  
Family History (Reviewed 24 @ 08:13 by Maria A Crowe RN)  
Father Diabetes mellitus, type 2  
Sister Diabetes mellitus, type 2  
Brother Heart disease  
Legacy FamHx Relation: Brother(s)  
Father Heart disease  
Diabetes  
Heart failure  
  
Family/Other   
Legacy FamHx Problem: 2 BROTHERS  :2 SISTERS ::NO CHILDREN  
Mother   
Heart disease  
Sister Heart disease  
  
Social History  
Smoking Status: Never smoker  
Substance Use Type: None  
  
Grafts  
History of Graft  
History of Graft?: No  
  
Exam  
Physical Exam  
Vital Signs:  
  
  
  
  
Temp Pulse Resp BP O2 Del Method  
  
97.2 F L 81 24 121/74 Room Air  
  
24 08:11 24 08:11 24 08:11 24 08:11 24 08:11  
  
  
  
Const  
General: cooperative, comfortable and no acute distress  
Nutritional Appearance: obese  
Orientation: alert, awake and oriented x3  
  
Lower/Upper Extremity Exam  
Vascular Exam-Edema  
Right Lower Extremity:  
Edema Type: Lymphedema  
Vascular Exam-Pulses  
Right Dorsalis Pedis:  
Pulse Assessment Method: Doppler  
Right Posterior Tibial:  
Pulse Assessment Method: Doppler  
Right Brachial:  
Pulse Assessment Method: NIBP  
  
Objective  
Meds/Allergies  
Home Medications  
  
carvedilol 6.25 mg tablet 6.25 mg PO BID 17 [History Confirmed 24]  
furosemide 40 mg tablet (Lasix) 40 mg PO BID 17 [History Confirmed 
24]  
acetaminophen 500 mg tablet (Tylenol Extra Strength) 500 mg PO TID PRN Pain 
20   
[History Confirmed 24]  
empagliflozin 10 mg tablet (Jardiance) 10 mg PO DAILY 22 [History 
Confirmed   
24]  
rivaroxaban 10 mg tablet (Xarelto) 10 mg PO DAILY 22 [History Confirmed   
24]  
semaglutide 2 mg/dose (8 mg/3 mL) subcutaneous pen injector (Ozempic) 2 mg 
subcut Q7D   
22 [History Confirmed 24]  
spironolactone 25 mg tablet 25 mg PO DAILY 22 [History Confirmed 24]  
sacubitril 24 mg-valsartan 26 mg tablet (Entresto) 1 tab PO BID 23 
[History   
Confirmed 24]  
Lactobacillus acidophilus 10 billion cell capsule (Probiotic) 100 mmu cells PO 
DAILY   
24 [History Confirmed 24]  
doxycycline hyclate 100 mg capsule 100 mg PO BID 14 days #28 caps 24 [Rx   
Confirmed 24]  
  
  
Allergies  
  
cefixime Allergy (Unknown, Verified 24 08:10)  
Unknown Reaction  
diclofenac Allergy (Unknown, Verified 24 08:10)  
Unknown Reaction  
nystatin Allergy (Unknown, Verified 24 08:10)  
Unknown Reaction  
sodium benzoate Allergy (Verified 24 08:10)  
Unknown Reaction  
  
  
Wound/Ulcer  
Right Lower Leg:  
Type: Lymphedema  
Thickness: Skin Breakdown  
Bed Appearance: Dried Exudate, Epithelial Tissue or Bridge, Pink and Yellow  
Percent of Wound Bed Granulated/Red: 80  
Percent of Devitalized: 20  
Length (cm): 4  
Width (cm): 1.8  
Depth (cm): 0.1  
CM Sq: 7.200  
Surrounding Tissue Appearance: Dark Red  
Surrounding Tissue Temp: Warm  
Drainage Amount: Moderate  
Drainage Description: Serosanguineous  
Drainage Odor: No Odor  
Results  
Height: 5 ft 11 in  
Weight: 167.829 kg  
Body Mass Index: 51.5  
  
Assessment/Plan  
Assessment/Plan  
(1) Venous stasis ulcer:  
Qualifiers:  
Laterality: right Non-pressure ulcer stage: limited to breakdown of skin 
Varicose   
vein presence: with varicose veins Venous stasis ulcer site: other part of lower
leg   
Qualified Code(s): I83.018 - Varicose veins of right lower extremity with ulcer 
other   
part of lower leg; L97.811 - Non-pressure chronic ulcer of other part of right 
lower   
leg limited to breakdown of skin  
Code(s):  
I83.009 - Varicose veins of unspecified lower extremity with ulcer of 
unspecified   
site; L97.909 - Non-pressure chronic ulcer of unspecified part of unspecified 
lower   
leg with unspecified severity  
Plan:  
w/lymphedema  
(2) Edema of right lower leg:  
Code(s):  
R60.0 - Localized edema  
(3) Morbid obesity due to excess calories:  
Code(s):  
E66.01 - Morbid (severe) obesity due to excess calories  
(4) Lymphedema of both lower extremities:  
Code(s):  
I89.0 - Lymphedema, not elsewhere classified  
(5) PVD (peripheral vascular disease):  
Code(s):  
I73.9 - Peripheral vascular disease, unspecified  
(6) Varicose veins of both legs with edema:  
Code(s):  
I83.893 - Varicose veins of bilateral lower extremities with other complications  
(7) Inflammation:  
  
Plan  
see orders and hpi  
Time spent with patient  
Time Spent With Patient (min): 14  
  
  
  
  
Dictated By: SOWMYA Correa DD/DT: 24  
  
Signed By: <Electronically signed by SOWMYA Espino>  
24 0820  
   
  
[facility]  
Work Phone: [phone]- History of Present illness Narrative* 
  Ray Terry, NP - 2024 3:30 PM ESTFormatting of this note is 
  different from the original.  
Images from the original note were not included.  
  
Jocelin Pacheco is a 69 y.o. male presents with chief complaint of Back Pain (Pt 
presents with lower back pain with onset of 2 weeks with pain increasing. Took 
tylenol with no relief. Has used ice on it a few times. Left shoulder is 
hurting. Denies any trouble urinating. )  
  
HPI:  
  
Back Pain  
  
Here for low back pain, left side hurts more than the right side. Denies any 
injury or reason for increased back pain. Tried ice and tylenol.  
  
HISTORIES:  
  
PAST MEDICAL HISTORY:  
Past Medical History:  
Diagnosis Date  
Acquired hammer toe of right foot  
Cardiomyopathy (CMS/HCC)  
d/t morbid obesity  
Chronic anticoagulation  
CKD (chronic kidney disease), stage II  
Class 3 obesity (CMS/HCC)  
CPAP (continuous positive airway pressure) dependence  
Diabetes mellitus type 2 in obese (CMS/HCC)  
History of 2019 novel coronavirus disease (COVID-19) 2020  
Received Remdesivir and dexamethasone as an in-patient  
Hx of being hospitalized 2019  
Hospitalized for Lt. lower extremity cellulitis and chronic open venous stasis 
ulcer  
Hx of carpal tunnel syndrome  
Hx of deep venous thrombosis  
DIMITRI on CPAP  
Peripheral venous insufficiency  
Porokeratosis  
Primary osteoarthritis of both knees  
Stasis dermatitis of both legs  
  
  
SURGICAL HISTORY:  
Past Surgical History:  
Procedure Laterality Date  
CARDIAC CATHETERIZATION 2009  
Due to Chest pain  
CARPAL TUNNEL RELEASE Right 2010  
Dr. Estrada  
CHOLECYSTECTOMY 2002  
FOOT NEUROMA SURGERY Bilateral   
Hammertoe repair as well  
HERNIA REPAIR  
IR INJECTION JOINT 2020  
(Knee) Synvisc injection with Dr Lomeli (Ortho)  
IR INJECTION JOINT   
(Knee) Synvisc done by Dr Richard  
SKIN GRAFT 2019  
From right thigh to right foot due to non-healing wound/ulcer. Done by Dr Blakely  
VEIN LIGATION Bilateral 2019  
Procedures in April and Oct done by Dr Lyle Mascorro  
  
  
SOCIAL HISTORY:  
Social History  
  
Tobacco Use  
Smoking status: Never  
Passive exposure: Past  
Smokeless tobacco: Never  
Substance Use Topics  
Alcohol use: Not Currently  
Drug use: Never  
  
Depression: Not at risk (2024)  
PHQ-2  
PHQ-2 Score: 0  
  
  
FAMILY HISTORY:  
Family History  
Problem Relation Name Age of Onset  
Hypertension Mother  
Heart disease Mother  
Diabetes Father  
Heart attack Sister  
Diabetes Sister  
Stroke Brother  
Heart attack Brother  
ALS Brother  
  
  
MEDICATIONS:  
Current Outpatient Medications  
Medication Instructions  
carvedilol (COREG) 6.25 mg, Oral, 2 times daily  
clobetasol (Temovate) 0.05 % cream 1 application , Topical, Every 12 hours  
Entresto 24-26 MG tablet 1 tablet, Oral, 2 times daily  
furosemide (LASIX) 40 mg, Oral, Every 12 hours  
Jardiance 10 mg, Oral, Every 24 hours  
Ozempic, 2 MG/DOSE, 8 MG/3ML solution pen-injector  
Probiotic Product (PROBIOTIC BLEND PO) 2 Units, Oral, Daily  
spironolactone (ALDACTONE) 25 mg, Oral, Daily  
Xarelto 10 MG tablet TAKE 1 TABLET BY MOUTH EVERY DAY WITH FOOD  
  
  
ALLERGIES:  
Allergies  
Allergen Reactions  
Cefixime Unknown  
Nystatin Unknown  
Sodium Benzoate  
Other Reaction(s): Unknown Reaction  
Diclofenac Rash  
  
  
REVIEW OF SYMPTOMS:  
  
Review of Systems  
Musculoskeletal: Positive for back pain.  
Shoulder pain, left. Rates shoulder pain as a 5 and the back as a 9 or 10 on a 
0-10 scale.  
Psychiatric/Behavioral: Negative for sleep disturbance.  
  
  
PHYSICAL EXAM:  
  
Visit Vitals  
/70  
Pulse 83  
Temp 97.7 F  
Wt 376 lb  
SpO2 94%  
BMI 52.44 kg/m  
Smoking Status Never  
BSA 2.93 m  
  
  
Physical Exam  
Constitutional:  
Appearance: He is obese. He is not ill-appearing.  
Eyes:  
Extraocular Movements: Extraocular movements intact.  
Musculoskeletal:  
General: Tenderness present.  
Comments: Low back pain  
Skin:  
General: Skin is warm and dry.  
Neurological:  
Mental Status: He is alert and oriented to person, place, and time.  
Gait: Gait abnormal.  
Psychiatric:  
Mood and Affect: Mood normal.  
Thought Content: Thought content normal.  
Judgment: Judgment normal.  
  
ASSESSMENT AND PLAN:  
  
Assessment/Plan  
Diagnoses and all orders for this visit:  
Lumbosacral strain, initial encounter  
- methylPREDNISolone (Medrol Dospak) 4 MG tablets; Take as directed on package.  
- Ambulatory referral to Physical Therapy; Future  
Acute pain of left shoulder  
- methylPREDNISolone (Medrol Dospak) 4 MG tablets; Take as directed on package.  
- Ambulatory referral to Physical Therapy; Future  
Wound of left lower extremity, initial encounter  
--pt states he will call wound center  
Dictated, but not read  
  
Patient presents today for an acute visit. He s been having low back pain for 
approximately two weeks. He also mentioned that his left shoulder is painful. It
s worse with abduction and he can only raise the left shoulder approximately 90 
. he denies any actual injury. He states he s been taking Tylenol and using ice 
to his back.  
  
At this point in time we re not gonna order x-rays. He does have tenderness of 
the lumbosacral spine and point tenderness in the deltoid area. He s advised ice
the left shoulder. He can use Tylenol. For the low back pain is tender across 
the whole lumbosacral spine. He is not having any radicular type symptoms. I 
will give him a low-dose Medrol dose pack since his A1c is good at 5.5. And he 
can use Tylenol as needed. And suds are not indicated since he is on Xarelto. 
Will refer him to physical therapy and evaluate and treat for the back pain in 
the left shoulder. If not improving will consider x-rays. He also mentioned he 
does have the start of a wound on his left posterior lower extremity.Picture was
taken. He states [facility]. I advised him to use Aquaphor or 
Lantisepticointment to the area. He agrees. He ll return here in May for his 
regular scheduled appointment to see Dr. Matias.  
  
  
Electronically signed by Ray Terry NP at 2024 4:20 PM EST  
  
documented in this encounterPerry County Memorial HospitalCndoyarsnk08- History of Present illness
Narrative* Danya An DPM - 2024 3:15 PM ESTFormatting of this 
  note is different from the original.  
HPI:  
Patient presents in office today for routine nailcare and callous care.  
Location: Right great toe.  
Duration: ongoing x1 month.  
Severity of the symptoms: mild , considered 5 out of 10.  
Onset of pain: gradual.  
Status: stable.  
Context: hard to trim , hard to reach.  
Current symptoms include: toe redness , toe swelling ,  
Relieved by: debridement.  
Characteristics of the nails: red, swollen, painful.  
Aggravated by: shoe gear, pressure.  
Risk factors: curvature of nails.  
  
Examination:  
General Examination:  
GENERAL EXAMINATIONalert, well hydrated, in no distress , awake, aware of 
surroundings.  
FOOT EXAM:  
Date of Last Foot Exam 24  
  
Vascular:  
DORSALIS PEDIS PULSE:2/4, bilaterally.  
POSTERIOR TIBIAL PULSE:1/4, bilaterally.  
TEMPERATURE GRADIENT:within normal limits, warm to cool.  
EDEMA:mild, bilateral lower extremity. erythema to the right 2nd digit is 
resolved. Continued erythema noted.  
CAPILLARY FILLING TIME(sec):bilateral, digits 1-5, less than 3 seconds.  
  
Neurologic:  
VIBRATORY:abnormal.  
SEMMES-LUKE 5.07 MONOFILAMENTnormal.  
  
Dermatologic:  
HYPERKERATOSIS:Location:, Submetatarsal Head 4 right, medial IPJ of the hallux 
bilateral, distal right 3rd digit.  
NAIL PATHOLOGY:digits 1-5 bilateral are thickened, discolored, crumbly, 
dystrophic, elongated and with subungual debris. Incurvation to the lateral 
border right hallux nail. Upon debridement there isno underlying opening noted 
in the skin. The surrounding granuloma, erythema has resolved along thenail 
fold.  
SKIN PATHOLOGY:atrophic, dry, decreased hair growth bilateral.  
  
Orthopedic:  
FOOT MORPHOLOGY:Metatarsus adductus bilateral .  
DEFORMITIES:Rigidly contracted second digit right with elevation at the second 
MPJ. There is overriding and rubbing between the distal right second digit and 
lateral right hallux. Semirigid contracted digits 2 through 4 left, 3 and 4 
right .  
PAIN ELICITED WITH PALPATION OFArea of porokeratosis sub-fourth MPJ right. Pain 
with palpation to the MPJ of the right 2nd digit, but pain is improving .  
MUSCLE STRENGTH5/5 for all pedal groups tested  
  
Assessment:  
1. Right foot pain - M79.671  
2. Dermatophytosis of nail - B35.1 (Primary)  
3. Left foot pain - M79.672  
4. 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  
5. Corns and callosities - L84  
6. Porokeratosis - Q82.8  
7. Acquired pes planovalgus of left foot - M21.6X2  
8. Acquired pes planovalgus of right foot - M21.41  
9. Acquired hammer toe of right foot - M20.41  
10. Circulating anticoagulant disorder - D68.318  
11. Venous insufficiency - I87.2  
12. Ingrowing nail - L60.0  
  
Plan:  
Dermatophytosis of nail  
1. Nails 1-5 bilateral were debrided in length and thickness by manual and 
mechanical means.  
2. Advised patient on the importance of continued foot care including daily 
monitoring of their feet for any new complaints or concerns that may arise.  
3. Continue use of good supportive shoe gear to help prevent complications.  
4. RTC: 9-12 weeks or as needed if problems arise.  
  
Corns and callosities  
1. Hyperkeratosis/porokeratosis as above noted was debrided.  
2. Instructed patient on use of aperture pads or Silipos padding/toe spacers to 
prevent rubbing andcontinued development of the hyperkeratosis.  
3. Also discussed use of moisturizing creams for overall increased hydration to 
the skin.  
4. Discussed continued use of proper foot gear to avoid excess pressure over the
callous site.  
Electronically signed by Danya An DPM at 2024 1:53 PM EST  
  
documented in this encounterPerry County Memorial [id number]- Evaluation note*   
  
                      Encounter Date Diagnosis  Assessment Notes Treatment Notes  
 Treatment   
Clinical Notes  
   
                                        04 Aug, 2023        Type 2 diabetes   
mellitus with   
unspecified   
complications (ICD-10   
- E11.8)                                                      
   
                                        04 Aug, 2023        BMI 50.0-59.9, adult  
   
(ICD-10 - Z68.43)                                             
   
                                        04 Aug, 2023        Knee osteoarthritis   
(ICD-10 - M17.9)                                              
   
                                        04 Aug, 2023        Mixed hyperlipidemia  
   
(ICD-10 - E78.2)                                              
   
                                        04 Aug, 2023        CAD (coronary artery  
   
disease) (ICD-10 -   
I25.10)                                                       
   
                                        04 Aug, 2023        Obstructive sleep   
apnea (adult)   
(pediatric) (ICD-10 -   
G47.33)                                                       
   
                                        04 Aug, 2023        Cardiomyopathy   
(ICD-10 - I42.9)                                              
   
                                        04 Aug, 2023        CHF (congestive hear  
t   
failure) (ICD-10 -   
I50.9)                                                        
   
                                        04 Aug, 2023        Edema (ICD-10 -   
R60.9)                                                        
   
                                        04 Aug, 2023        Low back derangement  
   
syndrome (ICD-10 -   
M53.86)                                                       
   
                                        04 Aug, 2023        Metabolic syndrome X  
   
(ICD-10 - E88.81)                                             
   
                                        04 Aug, 2023        Encounter for   
immunization (ICD-10   
- Z23)                                                        
  
  
[facility]  
Other Phone: [phone]- Evaluation note*   
  
                                        Author              Jacoby Braden  
[facility]  
   
                                        Authored            2024 2:5  
6pm  
   
                                                    Start weight: 414.4 lbs., he  
 is down 32.0 lbs. today with a weight of 382.4lbs.   
and   
20.9 lbs. since  
his last visit on 2023.  
Starting Date: 04/15/2022.  
Ozempic start date 4/15/2022. Ozempic start weight 414.4lbs. He is down 32.0 
lbs.   
since starting  
Ozempic.  
1. Abnormal weight gain. He notes he is the heaviest in his family. His brother 
has   
some but much  
less significant weight issues. He notes he was able to get his weight down to 
368   
pounds but was  
unable to keep it down.  
2. Obesity-markedly improved since starting our program and with taking Ozempic 
2 mg,   
but had  
significant weight regain of 20.9 pounds off the medication. Due to cost/donut 
hole   
he stopped  
Ozempic. Hopefully he can restart full dose in the future using patient 
assistance.   
He is also on  
Jardiance 10 mg which can also help with his diabetes and cardiomyopathy/CHF. 
His   
diabetes is well  
controlled with his last A1c of 5.6 despite having issues with obtaining with 
his   
diabetic  
medications. He had mild CAD along with his diabetes and has not been on a 
statin so   
I had  
recommended Crestor, but he notes his cardio said it was not necessary so he did
not   
start the  
medication. He is eating healthier overall. He should follow-up with our   
nutritionist. He feels that   
his appetite is controlled with Ozempic 2 mg. I have recommend again he consider
  
bariatric surgery  
so he could have his knee replaced or find a surgeon who will do the surgery at 
an   
elevated BMI. In  
the past, he mentioned they found weakness in the bottom of the heart and 
evidently a   
problem with  
an artery that they could not get. His cardiologist however did not feel he 
needed   
the  
statin/Crestor that I previously prescribed. He gets very little activity due to
  
bone-on-bone  
arthritis, severe low back pain on tramadol, difficulty getting out of a chair 
and   
also his cardiac  
function he does get NELSON with small amounts of activity. He sees Dr. Maldonado to 
treat   
his  
cardiomyopathy. He did like canned foods and he understands that he must cut 
back the   
salt/processed  
food. He denies adding salt to his foods. He has had a A1c that was 6.6 
indicative of   
diabetes. He  
notes he was never told he was diabetic before program. He is seeing Dr. Lomeli 
for   
his knees and  
notes he has to get his BMI from 59 down to 45 to be able to have his knees 
replaced.   
Before  
starting the program he did eat a lot of red meat, potatoes and drank a lot of 
milk   
2% or 4% often  
buying 3 or 4 gallons at a time. He should not skip meals. He did try Adipex for
1   
month in the past  
but notes he was not able to lose but a few pounds so his PCP stopped it. He 
notes   
his sister does  
the shopping as he can. He does some simple cooking.  
3. Cardiomyopathy/CHF/early CAD-he must continue to follow-up with cardiology 
for   
evaluation of  
cardiomyopathy/CHF despite medications/known cardiomyopathy/previous mild CAD. 
We   
obtained his  
previous cardiac catheterization from 2009. He notes his preoperative 
diagnoses   
were angina  
pectoris, dyspnea on exertion and abnormal Cardiolite perfusion stress test. His
left   
anterior  
descending artery showed a 20 to 25% tubular stenosis. His circumflex had an 
ostial   
stenosis of 30  
to 50% in several views but in one view showed less than 30% stenosis and 
appeared to   
be mildly  
kinked, his right coronary artery was large dominant and normal. His left   
ventriculogram showed mild  
to moderately reduced systolic dysfunction with an EF in the 40 to 45% range   
globally. He is now  
being treated by Dr. Maldonado.  
4. Type 2 diabetes with A1c controlled/improved at 5.6-will restart Ozempic and   
continue Jardiance.  
We could consider Metformin but he already has some loose stools. We discussed 
that   
first-line  
treatment with lifestyle changes, decrease simple sweets and starches, will be 
some   
increased  
activity in the future and weight loss. He needs to consider bariatric surgery.  
5. Obstructive sleep apnea-he is compliant with his CPAP. Treat also with 
healthy   
lifestyle changes  
and weight loss. He needs to consider bariatric surgery.  
6. Bilateral OA of the knees-bone-on-bone making it very difficult for him to 
get   
around. He notes  
his orthopedic doctor wants him to drop from a BMI of 59 down to 45 before he 
will do   
the surgery.  
We will start weight loss and Ozempic. He needs to consider bariatric surgery.  
7. Chronic low back pain-treat with healthy lifestyle change. He needs to 
maximize   
his pain  
management.  
8. Mixed hyperlipidemia-treat with decreasing the simple sweets, added sugars,   
refined starches, and   
bad/added fats, increasing activity and exercise and continued long-term weight 
loss.   
Monitor with  
healthy lifestyle change. I recommended that he start a statin since he is 
diabetic   
and had mild CAD  
on his cath but he notes his cardiologist told him it was not necessary to 
start.  
9. Metabolic syndrome-treat with long-term healthy lifestyle changes, behavioral
  
changes, healthy  
nutritional changes, increased activity and exercise and achieve long-term 
weight   
loss.  
Follow up with me in 6 weeks.  
New labs needed: Up-to-date. Monitor A1c at least every 6 months with his PCP or
in   
our office. He  
will continue other regular lab follow-up with his PCP.   
  
  
  
[facility]  
Work Phone: [phone]- Progress note  
  
  
  
  
                                        Author              Elaine Espino  
[facility]  
2023 11:24am  
   
                                        Note Date/Time      2023 11:24  
am  
   
                                                    [facility]  
ENTER  
[address] [place] [address]  
  
Wound Center Provider Note  
Signed  
  
Patient: Jocelin Pacheco MR#:   
[id number]  
: 1955 Acct:[id number]  
  
Age/Sex: 68 / M  
  
Copies to: DO Elaine Arboleda APRN~  
  
  
HPI  
Date of Visit  
Date of Visit:  
Date of Service: 2023  
Time of Service: 11:20  
  
Narrative  
HPI:  
23 Jocelin is a 68-year-old male presenting to [facility]   
wound care program for an initial visit to the office for a right lower 
extremity   
venous stasis ulcer/lymphedema ulcer.? The patient has been a patient of the 
office   
for quite some time now and has seen other providers.  
He had seen vascular in [place] was told that there is nothing else that can be 
done   
for him.  
He has an extensive history of infections so I imagine this will continue to be 
an   
issue for him.  
He appears to need better edema control.  
He was told by ortho that he needs to lose 50 pounds in order to have surgery- 
this   
was 2 years ago and has not been accomplished.  
A past venous duplex indicates deep vein issues which are not surgically 
correctable-   
vascular did confirm that he does not need to see them because there is nothing 
they   
have to offer him.  
He is still retired so hopefully there is more opportunity for elevation and   
compression of the legs to support healing of the ulcer.  
Nutrition and probiotics were discussed and suggestions were given.  
Doxycycline was escribed today for what appears to be cellulitis of the RLE.  
23 looks better, is still taking the antibiotics, change to collagen silver   
topically and clobetasol to periwound skin that he says is itchy, will return 
for   
dressing changes, will bring his compression socks from home to the next visit 
since   
most of our wraps tend to roll down on his legs, says that he will use the   
compression pumps  
  
Subjective  
Pain  
Right Lower Leg:  
Pain Description: Burning  
Pain Intensity: 0  
Wound/Ulcer History  
When did wound start?: 2023  
Mode of Arrival/ : Personal vehicle  
Assistive Device Used Today: Cane  
Lives with:: Alone  
Appetite Description: Within Normal Limits  
Who helps w/ dressing change?: Wound Care Dept  
Why Do You Need Help?: Can't Reach Ulcer  
Smoking Status: Never smoker  
  
[facility]  
Medical History (Updated 23 @ 11:23 by SOWMYA Correa)  
  
Arthritis  
Bilateral knees,  right is bone on bone  per  Dr. Richard   
Back pain  
Cardiomyopathy  
 lower part of heart is weak   
Carpal tunnel syndrome  
right arm surgery  
DVT (deep venous thrombosis)  
 blood clot from right foot to right thigh  on  blood thinner   
Hypertension  
Itching  
Itching with irritation  
DIMITRI (obstructive sleep apnea)  
PVD (peripheral vascular disease)  
 poor veins  Dr. Cameron did vein closure 2019, needs 5 more viens worked 
on.  
Wound of right lower extremity  
  
Surgical History (Reviewed 23 @ 11:09 by Maren Martinez, RN)  
  
Hx of cholecystectomy  
  
Family History (Reviewed 23 @ 11:09 by Maren Martinez, KIMBERLY)  
Father Diabetes mellitus, type 2  
Sister Diabetes mellitus, type 2  
  
Social History  
Smoking Status: Never smoker  
Substance Use Type: None  
  
Grafts  
History of Graft  
History of Graft?: Yes  
  
Exam  
Physical Exam  
Vital Signs:  
  
  
  
  
Temp Pulse Resp BP O2 Del Method  
  
97.7 F 80 18 117/73 Room Air  
  
23 11:05 23 11:05 23 11:05 23 11:05 23 11:05  
  
  
  
Const  
General: cooperative, comfortable and no acute distress  
Nutritional Appearance: obese  
Orientation: alert, awake and oriented x3  
  
Lower/Upper Extremity Exam  
Vascular Exam-Edema  
Right Lower Extremity:  
Edema Type: Lymphedema  
Vascular Exam-Pulses  
Right Brachial:  
Pulse Assessment Method: NIBP  
Right Dorsalis Pedis:  
Pulse Assessment Method: Palpation  
Right Posterior Tibial:  
Pulse Assessment Method: Palpation  
  
Objective  
Meds/Allergies  
Home Medications  
  
carvedilol 6.25 mg tablet 6.25 mg PO BID 17 [History Confirmed 23]  
furosemide 40 mg tablet (Lasix) 40 mg PO BID 17 [History Confirmed 
23]  
acetaminophen 500 mg tablet (Tylenol Extra Strength) 500 mg PO TID PRN Pain 
20   
[History Confirmed 23]  
nabumetone 750 mg tablet 750 mg PO BID 21 [History Confirmed 23]  
empagliflozin 10 mg tablet (Jardiance) 10 mg PO DAILY 22 [History 
Confirmed   
23]  
rivaroxaban 10 mg tablet (Xarelto) 10 mg PO DAILY 22 [History Confirmed   
23]  
semaglutide 2 mg/dose (8 mg/3 mL) subcutaneous pen injector (Ozempic) 2 mg 
subcut Q7D   
22 [History Confirmed 23]  
spironolactone 25 mg tablet 25 mg PO DAILY 22 [History Confirmed 23]  
doxycycline hyclate 100 mg capsule 100 mg PO BID 14 days #28 caps 23 [Rx   
Confirmed 23]  
sacubitril 24 mg-valsartan 26 mg tablet (Entresto) 1 tab PO BID 23 
[History   
Confirmed 23]  
  
  
Allergies  
  
cefixime Allergy (Verified 23 11:09)  
Unknown Reaction  
diclofenac Allergy (Verified 23 11:09)  
Unknown Reaction  
nystatin Allergy (Verified 23 11:09)  
Unknown Reaction  
sodium benzoate Allergy (Verified 23 11:09)  
Unknown Reaction  
  
  
Wound/Ulcer  
Right Lower Leg:  
Type: Venous Stasis Ulcer  
Thickness: Skin Breakdown  
Bed Appearance: Beefy Red, Epithelial Tissue or Bridge, Pink and Yellow  
Percent of Wound Bed Granulated/Red: 75  
Percent of Devitalized: 25  
Length (cm): 4.5  
Width (cm): 17.5  
Depth (cm): 0.2  
CM Sq: 78.750  
Surrounding Tissue Appearance: Hyperpigmented  
Surrounding Tissue Temp: Warm  
Drainage Amount: Moderate  
Drainage Description: Serosanguineous  
Drainage Odor: No Odor  
Procedures  
Time Out: 2 Patient Identifiers, Correct Patient, Correct Side/Site, Correct   
Procedure and Safety Issues Reviewed  
Procedure:  
The right leg ulcer was anesthetized with topical 2% lidocaine gel. A curette 
was   
used to perform debridement for the removal of 10 cm? of devitalized tissue   
consisting of skin and slough. Debridement was down to healthy bleeding tissue.   
Estimated blood loss was minimal. Hemostasis was achieved by applying pressure. 
The   
right leg ulcer now appears 95% pink and red and the size remainsthe same. The   
patient tolerated well with no pain.  
Results  
Height: 5 ft 11 in  
Weight: 160.118 kg  
Body Mass Index: 49.2  
  
Assessment/Plan  
Assessment/Plan  
(1) Venous stasis ulcer:  
Qualifiers:  
Laterality: right Non-pressure ulcer stage: limited to breakdown of skin 
Varicose   
vein presence: with varicose veins Venous stasis ulcer site: other part of lower
leg   
Qualified Code(s): I83.018 - Varicose veins of right lower extremity with ulcer 
other   
part of lower leg; L97.811 - Non-pressure chronic ulcer of other part of right 
lower   
leg limited to breakdown of skin  
Code(s):  
I83.009 - Varicose veins of unspecified lower extremity with ulcer of 
unspecified   
site; L97.909 - Non-pressure chronic ulcer of unspecified part of unspecified 
lower   
leg with unspecified severity  
Status: Chronic  
(2) Edema of right lower leg:  
Code(s):  
R60.0 - Localized edema  
Status: Chronic  
(3) Morbid obesity due to excess calories:  
Code(s):  
E66.01 - Morbid (severe) obesity due to excess calories  
Status: Chronic  
(4) Lymphedema of both lower extremities:  
Code(s):  
I89.0 - Lymphedema, not elsewhere classified  
Status: Suspected  
(5) PVD (peripheral vascular disease):  
Code(s):  
I73.9 - Peripheral vascular disease, unspecified  
Status: Chronic  
(6) Varicose veins of both legs with edema:  
Code(s):  
I83.893 - Varicose veins of bilateral lower extremities with other complications  
Status: Chronic  
(7) Inflammation:  
Status: Chronic  
(8) Cellulitis:  
Assessment/Problem Details:  
rle  
Qualifiers:  
Laterality: right Site of cellulitis: extremity Site of cellulitis of extremity:
  
lower extremity Qualified Code(s): L03.115 - Cellulitis of right lower limb  
Code(s):  
L03.90 - Cellulitis, unspecified  
Status: Resolved  
Time spent with patient  
Time Spent With Patient (min): 15  
  
  
  
  
Dictated By: SOWMYA Correa DD/DT: 23  
  
Signed By: <Electronically signed by SOWMYA Espino>  
[address] [place] [address] Ctr  
Work Phone: [phone]- Progress note  
  
  
  
  
                                        Author              Elaine Espino  
[facility]  
2023 11:30am  
   
                                        Note Date/Time      2023 11:2  
7am  
   
                                                    [facility]  
ENTER  
[address] [place] [address]  
  
Wound Center Provider Note  
Signed  
  
Patient: Jocelin Pacheco MR#:   
[id number]  
: 1955 Acct:[id number]  
  
Age/Sex: 68 / M  
  
Copies to: DO Elaine Arboleda APRN~  
  
  
HPI  
Date of Visit  
Date of Visit:  
Date of Service: 2023  
Time of Service: 11:21  
  
Narrative  
HPI:  
23 Jocelin is a 68-year-old male presenting to [facility]   
wound care program for an initial visit to the office for a right lower 
extremity   
venous stasis ulcer/lymphedema ulcer.? The patient has been a patient of the 
office   
for quite some time now and has seen other providers.  
He had seen vascular in [place] was told that there is nothing else that can be 
done   
for him.  
He has an extensive history of infections so I imagine this will continue to be 
an   
issue for him.  
He appears to need better edema control.  
He was told by ortho that he needs to lose 50 pounds in order to have surgery- 
this   
was 2 years ago and has not been accomplished.  
A past venous duplex indicates deep vein issues which are not surgically 
correctable-   
vascular did confirm that he does not need to see them because there is nothing 
they   
have to offer him.  
He is still retired so hopefully there is more opportunity for elevation and   
compression of the legs to support healing of the ulcer.  
Nutrition and probiotics were discussed and suggestions were given.  
Doxycycline was escribed today for what appears to be cellulitis of the RLE.  
  
Subjective  
Pain  
Right Lower Leg:  
Pain Intensity: 10  
Wound/Ulcer History  
When did wound start?: 2023  
Mode of Arrival/ : Personal vehicle  
Assistive Device Used Today: Beste  
Lives with:: Alone  
Appetite Description: Within Normal Limits  
Who helps w/ dressing change?: Wound Care Dept  
Why Do You Need Help?: Can't Reach Ulcer  
Smoking Status: Never smoker  
  
[facility]  
Medical History (Updated 23 @ 11:25 by SOWMYA Correa)  
  
Arthritis  
Bilateral knees,  right is bone on bone  per  Dr. Richard   
Back pain  
Cardiomyopathy  
 lower part of heart is weak   
Carpal tunnel syndrome  
right arm surgery  
DVT (deep venous thrombosis)  
 blood clot from right foot to right thigh  on  blood thinner   
Hypertension  
Itching  
Itching with irritation  
DIMITRI (obstructive sleep apnea)  
PVD (peripheral vascular disease)  
 poor veins  Dr. Cameron did vein closure 2019, needs 5 more viens worked 
on.  
Wound of right lower extremity  
  
Surgical History (Reviewed 23 @ 11:09 by Maren Martinez RN)  
  
Hx of cholecystectomy  
  
Family History (Reviewed 23 @ 11:09 by Maren Martinez RN)  
Father Diabetes mellitus, type 2  
Sister Diabetes mellitus, type 2  
  
Social History  
Smoking Status: Never smoker  
Substance Use Type: None  
  
Grafts  
History of Graft  
History of Graft?: Yes  
  
Exam  
Physical Exam  
Vital Signs:  
  
  
  
  
Temp Pulse Resp BP O2 Del Method  
  
97.5 F L 88 18 138/80 Room Air  
  
23 11:04 23 11:04 23 11:04 23 11:04 23 11:04  
  
  
  
Const  
General: cooperative, comfortable and no acute distress  
Nutritional Appearance: obese  
Orientation: alert, awake and oriented x3  
  
Lower/Upper Extremity Exam  
Vascular Exam-Edema  
Right Lower Extremity:  
Edema Type: Lymphedema  
Vascular Exam-Pulses  
Right Brachial:  
Pulse Assessment Method: NIBP  
Right Dorsalis Pedis:  
Pulse Assessment Method: Palpation  
Right Posterior Tibial:  
Pulse Assessment Method: Palpation  
  
Objective  
Meds/Allergies  
Home Medications  
  
carvedilol 6.25 mg tablet 6.25 mg PO BID 17 [History Confirmed 23]  
furosemide 40 mg tablet (Lasix) 40 mg PO BID 17 [History Confirmed 
23]  
acetaminophen 500 mg tablet (Tylenol Extra Strength) 500 mg PO TID PRN Pain 
20   
[History Confirmed 23]  
nabumetone 750 mg tablet 750 mg PO BID 21 [History Confirmed 23]  
empagliflozin 10 mg tablet (Jardiance) 10 mg PO DAILY 22 [History 
Confirmed   
23]  
rivaroxaban 10 mg tablet (Xarelto) 10 mg PO DAILY 22 [History Confirmed   
23]  
semaglutide 2 mg/dose (8 mg/3 mL) subcutaneous pen injector (Ozempic) 2 mg 
subcut Q7D   
22 [History Confirmed 23]  
spironolactone 25 mg tablet 25 mg PO DAILY 22 [History Confirmed 23]  
doxycycline hyclate 100 mg capsule 100 mg PO BID 14 days #28 caps 23 [Rx]  
sacubitril 24 mg-valsartan 26 mg tablet (Entresto) 1 tab PO BID 23 
[History   
Confirmed 23]  
  
  
Allergies  
  
cefixime Allergy (Verified 23 11:09)  
Unknown Reaction  
diclofenac Allergy (Verified 23 11:09)  
Unknown Reaction  
nystatin Allergy (Verified 23 11:09)  
Unknown Reaction  
sodium benzoate Allergy (Verified 23 11:09)  
Unknown Reaction  
  
  
Wound/Ulcer  
Right Lower Leg:  
Type: Venous Stasis Ulcer  
Thickness: Skin Breakdown  
Bed Appearance: Beefy Red, Black Dry, Epithelial Tissue or Bridge and Yellow  
Percent of Wound Bed Granulated/Red: 50  
Percent of Devitalized: 50  
Length (cm): 3.5  
Width (cm): 18.5  
Depth (cm): 0.2  
CM Sq: 64.750  
Surrounding Tissue Appearance: Bright Red  
Surrounding Tissue Temp: Increased Warmth  
Drainage Amount: Moderate  
Drainage Description: Serosanguineous  
Drainage Odor: No Odor  
Results  
Height: 5 ft 11 in  
Weight: 160.118 kg  
Body Mass Index: 49.2  
  
Assessment/Plan  
Assessment/Plan  
(1) Venous stasis ulcer:  
Qualifiers:  
Venous stasis ulcer site: other part of lower leg Varicose vein presence: with   
varicose veins Laterality: right Non-pressure ulcer stage: limited to breakdown 
of   
skin Qualified Code(s): I83.018 - Varicose veins of right lower extremity with 
ulcer   
other part of lower leg; L97.811 - Non-pressurechronic ulcer of other part of 
right   
lower leg limited to breakdown of skin  
Code(s):  
I83.009 - Varicose veins of unspecified lower extremity with ulcer of 
unspecified   
site; L97.909 - Non-pressure chronic ulcer of unspecified part of unspecified 
lower   
leg with unspecified severity  
Status: Chronic  
(2) Cellulitis:  
Assessment/Problem Details:  
rle  
Qualifiers:  
Site of cellulitis: extremity Site of cellulitis of extremity: lower extremity   
Laterality: right Qualified Code(s): L03.115 - Cellulitis of right lower limb  
Code(s):  
L03.90 - Cellulitis, unspecified  
Status: Acute  
(3) Edema of right lower leg:  
Code(s):  
R60.0 - Localized edema  
Status: Chronic  
(4) PVD (peripheral vascular disease):  
Code(s):  
I73.9 - Peripheral vascular disease, unspecified  
Status: Chronic  
(5) Varicose veins of both legs with edema:  
Code(s):  
I83.893 - Varicose veins of bilateral lower extremities with other complications  
Status: Chronic  
(6) Morbid obesity due to excess calories:  
Code(s):  
E66.01 - Morbid (severe) obesity due to excess calories  
Status: Chronic  
(7) Lymphedema of both lower extremities:  
Code(s):  
I89.0 - Lymphedema, not elsewhere classified  
Status: Suspected  
(8) Inflammation:  
Status: Chronic  
Time spent with patient  
Time Spent With Patient (min): 20  
  
  
  
  
Dictated By: SOWMYA Correa DD/DT: 23 1121  
  
Signed By: <Electronically signed by SOWMYA Espino>  
23 1130  
   
  
[facility]  
Work Phone: [phone]- Evaluation note*   
  
                      Encounter Date Diagnosis  Assessment Notes Treatment Notes  
 Treatment   
Clinical Notes  
   
                                                Type 2 diabetes   
mellitus with   
unspecified   
complications (ICD-10   
- E11.8)                                                      
  
  
[facility]  
Other Phone: [phone]- Evaluation note*   
  
                      Encounter Date Diagnosis  Assessment Notes Treatment Notes  
 Treatment   
Clinical Notes  
   
                                                Obesity,   
unspecified   
classification,   
unspecified obesity   
type, unspecified   
whether serious   
comorbidity present   
(ICD-10 - E66.9)                                              
   
                                                BMI 50.0-59.9,   
adult (ICD-10 -   
Z68.43)                                                       
   
                           Other                     Summary of Visi  
t:  
(A) emphasized   
increasing fruits   
and vegetables  
(B) reviewed the   
plate method  
(C) discussed   
foods high in   
sodium  
                                          
  
  
[facility]  
Other Phone: [phone]- Evaluation note*   
  
                      Encounter Date Diagnosis  Assessment Notes Treatment Notes  
 Treatment   
Clinical Notes  
   
                                        21 Dec, 2022        Type 2 diabetes   
mellitus with   
unspecified   
complications (ICD-10   
- E11.8)                                                      
   
                                        21 Dec, 2022        BMI 50.0-59.9, adult  
   
(ICD-10 - Z68.43)                                             
   
                                        21 Dec, 2022        Diarrhea (ICD-10 -   
R19.7)                                                        
   
                                        21 Dec, 2022        Obstructive sleep   
apnea (adult)   
(pediatric) (ICD-10 -   
G47.33)                                                       
   
                                        21 Dec, 2022        Mixed hyperlipidemia  
   
(ICD-10 - E78.2)                                              
   
                                        21 Dec, 2022        CAD (coronary artery  
   
disease) (ICD-10 -   
I25.10)                                                       
   
                                        21 Dec, 2022        Knee osteoarthritis   
(ICD-10 - M17.9)                                              
   
                                        21 Dec, 2022        Cardiomyopathy   
(ICD-10 - I42.9)                                              
   
                                        21 Dec, 2022        CHF (congestive hear  
t   
failure) (ICD-10 -   
I50.9)                                                        
   
                                        21 Dec, 2022        Edema (ICD-10 -   
R60.9)                                                        
   
                                        21 Dec, 2022        Low back derangement  
   
syndrome (ICD-10 -   
M53.86)                                                       
   
                                        21 Dec, 2022        Metabolic syndrome X  
   
(ICD-10 - E88.81)                                             
   
                                        21 Dec, 2022        Encounter for   
immunization (ICD-10   
- Z23)                                                        
  
  
[facility]  
Other Phone: [phone]- Evaluation note*   
  
                      Encounter Date Diagnosis  Assessment Notes Treatment Notes  
 Treatment   
Clinical Notes  
   
                                                Obesity,   
unspecified   
classification,   
unspecified obesity   
type, unspecified   
whether serious   
comorbidity present   
(ICD-10 - E66.9)                                              
   
                                                BMI 50.0-59.9,   
adult (ICD-10 -   
Z68.43)                                                       
   
                           Other                     Summary of Visi  
t:  
(A) encourage   
patient to trial   
different fruits,   
as well as   
temporarily   
reducing/eliminati  
ng milk to improve   
diarrhea  
(B) discussed food   
options for   
traveling on the   
road  
(C) discussed   
options for   
oatmeal  
                                          
  
  
[facility]  
Other Phone: [phone]- Evaluation note*   
  
                      Encounter Date Diagnosis  Assessment Notes Treatment Notes  
 Treatment   
Clinical Notes  
   
                                        20 Oct, 2022        Type 2 diabetes   
mellitus with   
unspecified   
complications (ICD-10   
- E11.8)                                                      
   
                                        20 Oct, 2022        BMI 50.0-59.9, adult  
   
(ICD-10 - Z68.43)                                             
   
                                        20 Oct, 2022        Diarrhea (ICD-10 -   
R19.7)                                                        
   
                                        20 Oct, 2022        Obstructive sleep   
apnea (adult)   
(pediatric) (ICD-10 -   
G47.33)                                                       
   
                                        20 Oct, 2022        Mixed hyperlipidemia  
   
(ICD-10 - E78.2)                                              
   
                                        20 Oct, 2022        CAD (coronary artery  
   
disease) (ICD-10 -   
I25.10)                                                       
   
                                        20 Oct, 2022        Knee osteoarthritis   
(ICD-10 - M17.9)                                              
   
                                        20 Oct, 2022        Cardiomyopathy   
(ICD-10 - I42.9)                                              
   
                                        20 Oct, 2022        CHF (congestive hear  
t   
failure) (ICD-10 -   
I50.9)                                                        
   
                                        20 Oct, 2022        Edema (ICD-10 -   
R60.9)                                                        
   
                                        20 Oct, 2022        Low back derangement  
   
syndrome (ICD-10 -   
M53.86)                                                       
   
                                        20 Oct, 2022        Metabolic syndrome X  
   
(ICD-10 - E88.81)                                             
   
                                        20 Oct, 2022        Encounter for   
immunization (ICD-10   
- Z23)                                              Patient denies   
current illness,   
previous allergic   
reaction to   
influenza   
vaccine, eggs, or   
other vaccines,   
and   
Guillain-[place]   
Syndrome. Patient   
given current   
editions of   
influenza Vaccine   
Information   
Statement (VIS).  
                                          
  
  
[facility]  
Other Phone: [phone]- Progress note  
  
  
  
  
                                        Author              Laila Villarreal  
[facility]  
2022 1:59pm  
   
                                        Note Date/Time      2022  
 1:59pm  
   
                                                    [facility]  
ENTER  
[address] [place] [address]  
  
Wound Center Provider Note  
Signed  
  
Patient: Jocelin Pacheco MR#:   
[id number]  
: 1955 Acct:[id number]  
  
Age/Sex: 67 / M  
  
Copies to: SOWMYA Gutierrez,~  
  
  
HPI  
Date of Visit  
Date of Visit:  
Date of Service: 2022  
Time of Service: 13:55  
  
Narrative  
HPI:  
Jocelin is a 67-year-old male presenting to the [facility] for a 
follow-up   
visit for evaluation and treatment of a Right Lower Leg Wound. Jocelin reports 
that he   
bumped his leg on an end table approximately one month ago. He has been a 
patient in   
our office before due to venous stasis ulcers. He has 2+ pitting edema to his 
RLE. He   
does have compression pumps at home and admits to using them approximately once 
a   
week. He has been seen by vascular in the past in [place] where he was told no   
surgical intervention could be performed. The right leg is entirely healed. The 
area   
will be padded and protected today. His right leg will be wrapped in ACE wrap 
for   
compression. He understands that he should use his compression pumps daily, and   
compression stockings are recommended as well. He will reach out to our office 
for   
further wound care needs.  
  
Subjective  
Pain  
Right Lower Leg:  
Pain Description: Throbbing  
Pain Intensity: 0  
Wound/Ulcer History  
When did wound start?: 2022 right medial lower leg  
Mode of Arrival/ : Personal vehicle  
Assistive Device Used Today: Cane  
Lives with:: Alone  
Appetite Description: Within Normal Limits  
Who helps w/ dressing change?: Wound Care Dept  
Smoking Status: Never smoker  
  
[facility]  
Medical History (Updated 22 @ 13:56 by Laila Villarreal, SOWMYA)  
  
Arthritis  
Bilateral knees,  right is bone on bone  per  Dr. Richard   
Back pain  
Cardiomyopathy  
 lower part of heart is weak   
Carpal tunnel syndrome  
right arm surgery  
DVT (deep venous thrombosis)  
 blood clot from right foot to right thigh  on  blood thinner   
Hypertension  
Itching  
Itching with irritation  
DIMITRI (obstructive sleep apnea)  
PVD (peripheral vascular disease)  
 poor veins  Dr. Cameron did vein closure 2019, needs 5 more viens worked 
on.  
Wound of right lower extremity  
  
Surgical History (Reviewed 22 @ 08:05 by Maria A Crowe, KIMBERLY)  
  
Hx of cholecystectomy  
  
Family History (Reviewed 22 @ 08:05 by Maria A Crowe RN)  
Father Diabetes mellitus, type 2  
Sister Diabetes mellitus, type 2  
  
Social History  
Smoking Status: Never smoker  
Substance Use Type: None  
  
Grafts  
History of Graft  
History of Graft?: No  
  
Exam  
Physical Exam  
Vital Signs:  
  
  
  
  
Temp Pulse Resp BP O2 Del Method  
  
98.6 F 92 H 18 119/69 Room Air  
  
22 13:46 22 13:46 22 13:46 22 13:46 22 13:46  
  
  
  
Const  
General: cooperative, comfortable and no acute distress  
Nutritional Appearance: obese  
Orientation: alert, awake and oriented x3  
  
Lower/Upper Extremity Exam  
Vascular Exam-Edema  
Right Lower Extremity:  
Edema Degree: 1+  
Edema Type: Pitting  
Vascular Exam-Pulses  
Right Dorsalis Pedis:  
Pulse Assessment Method: Palpation  
Right Posterior Tibial:  
Pulse Assessment Method: Palpation  
Right Brachial:  
Pulse Assessment Method: NIBP  
  
Objective  
Meds/Allergies  
Home Medications  
  
carvedilol 6.25 mg tablet 6.25 mg PO BID 17 [History Confirmed 22]  
furosemide 40 mg tablet (Lasix) 40 mg PO BID 17 [History Confirmed 
22]  
acetaminophen 500 mg tablet (Tylenol Extra Strength) 500 mg PO TID PRN Pain 
20   
[History Confirmed 22]  
nabumetone 750 mg tablet 750 mg PO BID 21 [History Confirmed 22]  
clobetasol 0.05 % topical ointment 1 applic topical 3XW PRN Rash 21 
[History   
Confirmed 22]  
empagliflozin 10 mg tablet (Jardiance) 10 mg PO DAILY 22 [History 
Confirmed   
22]  
metolazone 2.5 mg tablet 2.5 mg PO Q2D 22 [History Confirmed 22]  
rivaroxaban 10 mg tablet (Xarelto) 10 mg PO DAILY 22 [History Confirmed   
22]  
semaglutide 2 mg/dose (8 mg/3 mL) subcutaneous pen injector (Ozempic) 2 mg 
subcut Q7D   
22 [History Confirmed 22]  
spironolactone 25 mg tablet 25 mg PO DAILY 22 [History Confirmed 22]  
telmisartan 40 mg tablet (Micardis) 40 mg PO DAILY 22 [History Confirmed   
22]  
  
  
Allergies  
  
cefixime Allergy (Verified 22 10:26)  
Unknown Reaction  
diclofenac Allergy (Verified 22 10:26)  
Unknown Reaction  
nystatin Allergy (Verified 22 10:26)  
Unknown Reaction  
sodium benzoate Allergy (Verified 22 10:26)  
Unknown Reaction  
  
  
Wound/Ulcer  
Right Lower Posterior Leg:  
Bed Appearance: Beefy Red and Pink  
Length (cm): 0  
Width (cm): 0  
Depth (cm): 0  
CM Sq: 0.000  
Surrounding Tissue Appearance: Hyperpigmented  
Surrounding Tissue Temp: Warm  
Drainage Odor: No Odor  
Results  
Height: 5 ft 11 in  
Weight: 170.551 kg  
Body Mass Index: 52.4  
  
Assessment/Plan  
Assessment/Plan  
(1) Traumatic open wound of right lower leg:  
Assessment/Problem Details:  
22- Right lower medial leg- healed  
Code(s):  
S81.801A - Unspecified open wound, right lower leg, initial encounter  
Status: Resolved  
(2) Venous stasis ulcer:  
Assessment/Problem Details:  
new 22- healed 22  
Code(s):  
I83.009 - Varicose veins of unspecified lower extremity with ulcer of 
unspecified   
site; L97.909 - Non-pressure chronic ulcer of unspecified part of unspecified 
lower   
leg with unspecified severity  
Status: Resolved  
(3) Edema of right lower leg:  
Code(s):  
R60.0 - Localized edema  
Status: Chronic  
(4) PVD (peripheral vascular disease):  
Code(s):  
I73.9 - Peripheral vascular disease, unspecified  
Status: Chronic  
(5) Varicose veins of both legs with edema:  
Code(s):  
I83.893 - Varicose veins of bilateral lower extremities with other complications  
Status: Chronic  
(6) Morbid obesity due to excess calories:  
Code(s):  
E66.01 - Morbid (severe) obesity due to excess calories  
Status: Chronic  
Time spent with patient  
Time Spent With Patient (min): 20  
  
  
  
  
Dictated By: SOWMYA Gutierrez DD/DT: 22 1355  
  
Signed By: <Electronically signed by SOWMYA Villarreal>  
22 1359  
   
  
[facility]  
Work Phone: [phone]- Progress note  
  
  
  
  
                                        Author              Laila Villarreal  
[facility]  
2022 1:58pm  
   
                                        Note Date/Time      2022  
 1:58pm  
   
                                                    [facility]  
ENTER  
[address] [place] [address]  
  
Wound Center Provider Note  
Signed  
  
Patient: Jocelin Pacheco MR#:   
[id number]  
: 1955 Acct:[id number]  
  
Age/Sex: 67 / M  
  
Copies to: SOWMYA Gutierrez DO~  
  
  
HPI  
Date of Visit  
Date of Visit:  
Date of Service: 2022  
Time of Service: 13:54  
  
Narrative  
HPI:  
Jocelin is a 67-year-old male presenting to the [facility] for a 
follow-up   
visit for evaluation and treatment of a Right Lower Leg Wound. Jocelin reports 
that he   
bumped his leg on an end table approximately one month ago. He has been a 
patient in   
our office before due to venous stasis ulcers. He has 2+ pitting edema to his 
RLE. He   
does have compression pumps at home and admits to using them approximately once 
a   
week. He has been seen by vascular in the past in [place] where he was told no   
surgical intervention could be performed. The initial traumatic wound is healed.
He   
does present with a new ulcer to right lower posterior leg. His wound will be 
topped   
with collagen powder and topped with versatel. His leg will be wrapped in an 
Ergo K2   
for compression to reduce edema. Jocelin does seem to be tolerating the Ergo wrap   
better. Healing of the wound will be dependent on dressing changes being 
performed as   
ordered, a nutritious diet somewhat higher in protein, loss of excess weight, 
control   
of edema with compression and use of compression pumps, and preventing/treating   
infection if it were to arise. There are no signs of acute infection on exam 
today.   
Dressing changes will be done in our office twice a week. Jocelin also reports 
that he   
has been exercising regularly over the past month to help with weight loss.  
  
Subjective  
Pain  
Right Lower Leg:  
Pain Description: Throbbing  
Pain Intensity: 2  
Wound/Ulcer History  
When did wound start?: 2022 right medial lower leg  
Mode of Arrival/ : Personal vehicle  
Assistive Device Used Today: Cane  
Lives with:: Alone  
Appetite Description: Within Normal Limits  
Who helps w/ dressing change?: Wound Care Dept  
Smoking Status: Never smoker  
  
[facility]  
Medical History (Updated 22 @ 13:55 by SOWMYA Gutierrez)  
  
Arthritis  
Bilateral knees,  right is bone on bone  per  Dr. Richard   
Back pain  
Cardiomyopathy  
 lower part of heart is weak   
Carpal tunnel syndrome  
right arm surgery  
DVT (deep venous thrombosis)  
 blood clot from right foot to right thigh  on  blood thinner   
Hypertension  
Itching  
Itching with irritation  
DIMITRI (obstructive sleep apnea)  
PVD (peripheral vascular disease)  
 poor veins  Dr. Cameron did vein closure 2019, needs 5 more viens worked 
on.  
Wound of right lower extremity  
  
Surgical History (Reviewed 22 @ 08:05 by Maria A Crowe RN)  
  
Hx of cholecystectomy  
  
Family History (Reviewed 22 @ 08:05 by Maria A Crowe RN)  
Father Diabetes mellitus, type 2  
Sister Diabetes mellitus, type 2  
  
Social History  
Smoking Status: Never smoker  
Substance Use Type: None  
  
Grafts  
History of Graft  
History of Graft?: No  
  
Exam  
Physical Exam  
Vital Signs:  
  
  
  
  
Temp Pulse Resp BP O2 Del Method  
  
98.1 F 80 20 116/72 Room Air  
  
22 13:48 22 13:48 22 13:48 22 13:48 22 13:48  
  
  
  
Const  
General: cooperative, comfortable and no acute distress  
Nutritional Appearance: obese  
Orientation: alert, awake and oriented x3  
  
Lower/Upper Extremity Exam  
Vascular Exam-Edema  
Right Lower Extremity:  
Edema Degree: 2+  
Edema Type: Pitting  
Vascular Exam-Pulses  
Right Dorsalis Pedis:  
Pulse Assessment Method: Doppler  
Right Posterior Tibial:  
Pulse Assessment Method: Doppler  
Right Brachial:  
Pulse Assessment Method: NIBP  
  
Objective  
Meds/Allergies  
Home Medications  
  
carvedilol 6.25 mg tablet 6.25 mg PO BID 17 [History Confirmed 22]  
furosemide 40 mg tablet (Lasix) 40 mg PO BID 17 [History Confirmed 
22]  
acetaminophen 500 mg tablet (Tylenol Extra Strength) 500 mg PO TID PRN Pain 
20   
[History Confirmed 22]  
nabumetone 750 mg tablet 750 mg PO BID 21 [History Confirmed 22]  
clobetasol 0.05 % topical ointment 1 applic topical 3XW PRN Rash 21 
[History   
Confirmed 22]  
empagliflozin 10 mg tablet (Jardiance) 10 mg PO DAILY 22 [History 
Confirmed   
22]  
metolazone 2.5 mg tablet 2.5 mg PO Q2D 22 [History Confirmed 22]  
rivaroxaban 10 mg tablet (Xarelto) 10 mg PO DAILY 22 [History Confirmed   
22]  
semaglutide 2 mg/dose (8 mg/3 mL) subcutaneous pen injector (Ozempic) 2 mg 
subcut Q7D   
22 [History Confirmed 22]  
spironolactone 25 mg tablet 25 mg PO DAILY 22 [History Confirmed 22]  
telmisartan 40 mg tablet (Micardis) 40 mg PO DAILY 22 [History Confirmed   
22]  
  
  
Allergies  
  
cefixime Allergy (Verified 22 10:26)  
Unknown Reaction  
diclofenac Allergy (Verified 22 10:26)  
Unknown Reaction  
nystatin Allergy (Verified 22 10:26)  
Unknown Reaction  
sodium benzoate Allergy (Verified 22 10:26)  
Unknown Reaction  
  
  
Wound/Ulcer  
Right Lower Medial Leg:  
Type: Traumatic (laceration (without foreign body))  
Thickness: Full  
Bed Appearance: Beefy Red and Pink  
Percent of Wound Bed Granulated/Red: 100  
Percent of Devitalized: 0  
Length (cm): 0  
Width (cm): 0  
Depth (cm): 0  
CM Sq: 0.000  
Surrounding Tissue Appearance: Hyperpigmented  
Surrounding Tissue Temp: Warm  
Drainage Amount: None  
Drainage Description: Serosanguineous  
Drainage Odor: No Odor  
Right Lower Posterior Leg:  
Bed Appearance: Beefy Red and Pink  
Percent of Wound Bed Granulated/Red: 100  
Percent of Devitalized: 0  
Length (cm): 2.4  
Width (cm): 1.9  
Depth (cm): 0.1  
CM Sq: 4.560  
Surrounding Tissue Appearance: Hyperpigmented  
Surrounding Tissue Temp: Warm  
Drainage Amount: Moderate  
Drainage Description: Serosanguineous  
Drainage Odor: No Odor  
Results  
Height: 5 ft 11 in  
Weight: 170.551 kg  
Body Mass Index: 52.4  
  
Assessment/Plan  
Assessment/Plan  
(1) Traumatic open wound of right lower leg:  
Assessment/Problem Details:  
22- Right lower medial leg- healed  
Code(s):  
S81.801A - Unspecified open wound, right lower leg, initial encounter  
Status: Resolved  
(2) Venous stasis ulcer:  
Assessment/Problem Details:  
new 22  
Code(s):  
I83.009 - Varicose veins of unspecified lower extremity with ulcer of 
unspecified   
site; L97.909 - Non-pressure chronic ulcer of unspecified part of unspecified 
lower   
leg with unspecified severity  
Status: Acute  
(3) Edema of right lower leg:  
Code(s):  
R60.0 - Localized edema  
Status: Chronic  
(4) PVD (peripheral vascular disease):  
Code(s):  
I73.9 - Peripheral vascular disease, unspecified  
Status: Chronic  
(5) Varicose veins of both legs with edema:  
Code(s):  
I83.893 - Varicose veins of bilateral lower extremities with other complications  
Status: Chronic  
(6) Morbid obesity due to excess calories:  
Code(s):  
E66.01 - Morbid (severe) obesity due to excess calories  
Status: Chronic  
Time spent with patient  
Time Spent With Patient (min): 20  
  
  
  
  
Dictated By: SOWMYA Gutierrez DD/DT: 22 1354  
  
Signed By: <Electronically signed by SOWMYA Villarreal>  
22 1358  
   
  
[facility]  
Work Phone: [phone]- Evaluation note*   
  
                      Encounter Date Diagnosis  Assessment Notes Treatment Notes  
 Treatment   
Clinical Notes  
   
                                        08 Sep, 2022        Type 2 diabetes   
mellitus with   
unspecified   
complications (ICD-10   
- E11.8)                                                      
   
                                        08 Sep, 2022        BMI 50.0-59.9, adult  
   
(ICD-10 - Z68.43)                                             
   
                                        08 Sep, 2022        Obstructive sleep   
apnea (adult)   
(pediatric) (ICD-10 -   
G47.33)                                                       
   
                                        08 Sep, 2022        Mixed hyperlipidemia  
   
(ICD-10 - E78.2)                                              
   
                                        08 Sep, 2022        CAD (coronary artery  
   
disease) (ICD-10 -   
I25.10)                                                       
   
                                        08 Sep, 2022        Knee osteoarthritis   
(ICD-10 - M17.9)                                              
   
                                        08 Sep, 2022        Cardiomyopathy   
(ICD-10 - I42.9)                                              
   
                                        08 Sep, 2022        CHF (congestive hear  
t   
failure) (ICD-10 -   
I50.9)                                                        
   
                                        08 Sep, 2022        Edema (ICD-10 -   
R60.9)                                                        
   
                                        08 Sep, 2022        Low back derangement  
   
syndrome (ICD-10 -   
M53.86)                                                       
   
                                        08 Sep, 2022        Metabolic syndrome X  
   
(ICD-10 - E88.81)                                             
  
  
[facility]  
Other Phone: [phone]- Progress note  
  
  
  
  
                                        Author              Laila Villarreal  
[facility]  
2022 10:41am  
   
                                        Note Date/Time      2022 10  
:40am  
   
                                                    [facility]  
ENTER  
[address] [place] [address]  
  
Wound Center Provider Note  
Signed  
  
Patient: Jocelin Pacheco MR#:   
[id number]  
: 1955 Acct:[id number]  
  
Age/Sex: 67 / M  
  
Copies to: SOWMYA Gutierrez DO~  
  
  
HPI  
Date of Visit  
Date of Visit:  
Date of Service: 2022  
Time of Service: 10:37  
  
Narrative  
HPI:  
Jocelin is a 67-year-old male presenting to the [facility] for a 
follow-up   
visit for evaluation and treatment of a Right Lower Leg Wound. Jocelin reports 
that he   
bumped his leg on an end table approximately one month ago. He has been a 
patient in   
our office before due to venous stasis ulcers. He has 2+ pitting edema to his 
RLE. He   
does have compression pumps at home and admits to using them approximately once 
a   
week. He has been seen by vascular in the past in [place] where he was told no   
surgical intervention could be performed. The wound did show improvement. The 
wound   
bed is more pink/red. His wound will be topped with collagen powder. His leg 
will be   
wrapped in an Ergo K2 for compression to reduce edema. He did experience 
sensitivity   
(itching) to the standard Unna boot. Clobetasol was applied anastasia wound and we 
will   
monitor for improvement with the Ergo wrap. Healing of the wound will be 
dependent on   
dressing changes being performed as ordered, a nutritious diet somewhat higher 
in   
protein, loss of excess weight, control of edema with compression and use of   
compression pumps, and preventing/treating infection if it were to arise. 
Thereare no   
signs of acute infection on exam today. Dressing changes will be done inour 
office   
twice a week. Jocelin also reports that he has been exercising regularly over the 
past   
month to help with weight loss.  
  
Subjective  
Pain  
Right Lower Leg:  
Pain Description: Soreness  
Pain Intensity: 7  
Wound/Ulcer History  
When did wound start?: 2022 right medial lower leg  
Mode of Arrival/ : Personal vehicle  
Assistive Device Used Today: Cane  
Lives with:: Alone  
Appetite Description: Within Normal Limits  
Who helps w/ dressing change?: Wound Care Dept  
Smoking Status: Never smoker  
  
[facility]  
Medical History (Updated 22 @ 08:17 by Maria A Crowe RN)  
  
Arthritis  
Bilateral knees,  right is bone on bone  per  Dr. Richard   
Back pain  
Cardiomyopathy  
 lower part of heart is weak   
Carpal tunnel syndrome  
right arm surgery  
DVT (deep venous thrombosis)  
 blood clot from right foot to right thigh  on  blood thinner   
Hypertension  
Itching  
Itching with irritation  
DIMITRI (obstructive sleep apnea)  
PVD (peripheral vascular disease)  
 poor veins  Dr. Cameron did vein closure 2019, needs 5 more viens worked 
on.  
Wound of right lower extremity  
  
Surgical History (Reviewed 22 @ 08:05 by Maria A Crowe, KIMBERLY)  
  
Hx of cholecystectomy  
  
Family History (Reviewed 22 @ 08:05 by Maria A Crowe RN)  
Father Diabetes mellitus, type 2  
Sister Diabetes mellitus, type 2  
  
Social History  
Smoking Status: Never smoker  
Substance Use Type: None  
  
Grafts  
History of Graft  
History of Graft?: No  
  
Exam  
Physical Exam  
Vital Signs:  
  
  
  
  
Temp Pulse Resp BP O2 Del Method  
  
97.3 F L 103 H 18 139/72 Room Air  
  
22 10:24 22 10:24 22 10:24 22 10:24 22 10:24  
  
  
  
Const  
General: cooperative, comfortable and no acute distress  
Nutritional Appearance: obese  
Orientation: alert, awake and oriented x3  
  
Lower/Upper Extremity Exam  
Vascular Exam-Edema  
Right Lower Extremity:  
Edema Degree: 2+  
Edema Type: Pitting  
Vascular Exam-Pulses  
Right Dorsalis Pedis:  
Pulse Assessment Method: Doppler  
Right Posterior Tibial:  
Pulse Assessment Method: Doppler  
Right Brachial:  
Pulse Assessment Method: NIBP  
  
Objective  
Meds/Allergies  
Home Medications  
  
carvedilol 6.25 mg tablet 6.25 mg PO BID 17 [History Confirmed 22]  
furosemide 40 mg tablet (Lasix) 40 mg PO BID 17 [History Confirmed 
22]  
acetaminophen 500 mg tablet (Tylenol Extra Strength) 500 mg PO TID PRN Pain 
20   
[History Confirmed 22]  
nabumetone 750 mg tablet 750 mg PO BID 21 [History Confirmed 22]  
clobetasol 0.05 % topical ointment 1 applic topical 3XW PRN Rash 21 
[History   
Confirmed 22]  
empagliflozin 10 mg tablet (Jardiance) 10 mg PO DAILY 22 [History 
Confirmed   
22]  
metolazone 2.5 mg tablet 2.5 mg PO Q2D 22 [History Confirmed 22]  
rivaroxaban 10 mg tablet (Xarelto) 10 mg PO DAILY 22 [History Confirmed   
22]  
semaglutide 2 mg/dose (8 mg/3 mL) subcutaneous pen injector (Ozempic) 2 mg 
subcut Q7D   
22 [History Confirmed 22]  
spironolactone 25 mg tablet 25 mg PO DAILY 22 [History Confirmed 22]  
telmisartan 40 mg tablet (Micardis) 40 mg PO DAILY 22 [History Confirmed   
22]  
  
  
Allergies  
  
cefixime Allergy (Verified 22 10:26)  
Unknown Reaction  
diclofenac Allergy (Verified 22 10:26)  
Unknown Reaction  
nystatin Allergy (Verified 22 10:26)  
Unknown Reaction  
sodium benzoate Allergy (Verified 22 10:26)  
Unknown Reaction  
  
  
Wound/Ulcer  
Right Lower Medial Leg:  
Type: Traumatic (laceration (without foreign body))  
Thickness: Full  
Bed Appearance: Beefy Red, Pink and Yellow  
Percent of Wound Bed Granulated/Red: 95  
Percent of Devitalized: 5  
Length (cm): 2.1  
Width (cm): 1.4  
Depth (cm): 0.1  
CM Sq: 2.940  
Surrounding Tissue Appearance: Hyperpigmented  
Surrounding Tissue Temp: Warm  
Drainage Amount: Moderate  
Drainage Description: Serosanguineous  
Drainage Odor: No Odor  
Lidocaine Applied Topically: 2% Jelly  
Results  
Height: 5 ft 11 in  
Weight: 170.551 kg  
Body Mass Index: 52.4  
  
Assessment/Plan  
Assessment/Plan  
(1) Traumatic open wound of right lower leg:  
Assessment/Problem Details:  
22- Right lower medial leg  
Code(s):  
S81.801A - Unspecified open wound, right lower leg, initial encounter  
Status: Chronic  
(2) Edema of right lower leg:  
Code(s):  
R60.0 - Localized edema  
Status: Chronic  
(3) PVD (peripheral vascular disease):  
Code(s):  
I73.9 - Peripheral vascular disease, unspecified  
Status: Chronic  
(4) Varicose veins of both legs with edema:  
Code(s):  
I83.893 - Varicose veins of bilateral lower extremities with other complications  
Status: Chronic  
(5) Morbid obesity due to excess calories:  
Code(s):  
E66.01 - Morbid (severe) obesity due to excess calories  
Status: Chronic  
Time spent with patient  
Time Spent With Patient (min): 15  
  
  
  
  
Dictated By: SOWMYA Gutierrez DD/DT: 22 1037  
  
Signed By: <Electronically signed by SOWMYA Villarreal>  
22 1041  
   
  
[facility]  
Work Phone: [phone]- Progress note  
  
  
  
  
                                        Author              Laila Villarreal  
[facility]  
2022 8:03am  
   
                                        Note Date/Time      2022 8:  
03am  
   
                                                    [facility]  
ENTER  
[address] [place] [address]  
  
Wound Center Provider Note  
Signed  
  
Patient: Jocelin Pacheco MR#:   
[id number]  
: 1955 Acct:[id number]  
  
Age/Sex: 67 / M  
  
Copies to: SOWMYA Gutierrez DO~  
  
  
HPI  
Date of Visit  
Date of Visit:  
Date of Service: 2022  
Time of Service: 07:50  
  
Narrative  
HPI:  
Jocelin is a 67-year-old male presenting to the [facility] for an 
initial   
visit for evaluation and treatment of a Right Lower Leg Wound. Jocelin reports 
that he   
bumped his leg on an end table approximately one month ago. He has been a 
patient in   
our office before due to venous stasis ulcers. He presents today with the wound 
open   
to air. He has 2+ pitting edema to his RLE. He does have compression pumps at 
home   
and admits to using them approximately once a week. He has been seen by vascular
in   
the past in [place] where he was told no surgical intervention could be 
performed.   
The wound will be topped witha honey/collagen mixture and an alginate silver. 
His leg   
will be wrapped in an Unna boot for compression to reduce edema. Healing of the 
wound   
will be dependent on dressing changes being performed as ordered, a nutritious 
diet   
somewhathigher in protein, loss of excess weight, control of edema with 
compression   
and use of compression pumps, and preventing/treating infection if it were to 
arise.   
There are no signs of acute infection on exam today. Dressing changes will be 
done in   
our office twice a week. Jocelin also reports that he has been exercising 
regularly   
over the past month to help with weight loss.  
  
Subjective  
Pain  
Right Lower Leg:  
Pain Description: Soreness  
Pain Intensity: 8  
Wound/Ulcer History  
When did wound start?: 2022 right medial lower leg  
Mode of Arrival/ : Personal vehicle  
Assistive Device Used Today: Cane  
Lives with:: Alone  
Appetite Description: Within Normal Limits  
Who helps w/ dressing change?: Wound Care Dept  
Smoking Status: Never smoker  
  
[facility]  
Medical History (Reviewed 21 @ 01:50 by Gumaro Malin DO)  
  
Arthritis  
Bilateral knees,  right is bone on bone  per  Dr. Richard   
Back pain  
Cardiomyopathy  
 lower part of heart is weak   
Carpal tunnel syndrome  
right arm surgery  
DVT (deep venous thrombosis)  
 blood clot from right foot to right thigh  on  blood thinner   
Hypertension  
Itching  
Itching with irritation  
DIMITRI (obstructive sleep apnea)  
PVD (peripheral vascular disease)  
 poor veins  Dr. Cameron did vein closure 2019, needs 5 more viens worked 
on.  
  
Surgical History (Reviewed 21 @ 01:50 by Gumaro Malin DO)  
  
Hx of cholecystectomy  
  
Family History (Reviewed 21 @ 01:50 by Gumaro Malin DO)  
Father Diabetes mellitus, type 2  
Sister Diabetes mellitus, type 2  
  
Social History  
Smoking Status: Never smoker  
Substance Use Type: None  
  
Grafts  
History of Graft  
History of Graft?: No  
  
Exam  
Physical Exam  
Vital Signs:  
  
  
  
  
Temp Pulse Resp BP O2 Del Method  
  
97.7 F 97 H 24 130/70 Room Air  
  
22 07:37 22 07:37 22 07:37 22 07:37 22 07:37  
  
  
  
Const  
General: cooperative, comfortable and no acute distress  
Nutritional Appearance: obese  
Orientation: alert, awake and oriented x3  
  
Lower/Upper Extremity Exam  
Vascular Exam-Edema  
Right Lower Extremity:  
Edema Degree: 2+  
Edema Type: Pitting  
Vascular Exam-Pulses  
Right Dorsalis Pedis:  
Pulse Assessment Method: Palpation  
Right Posterior Tibial:  
Pulse Assessment Method: Doppler  
Right Brachial:  
Pulse Assessment Method: NIBP  
  
Objective  
Meds/Allergies  
Home Medications  
  
carvedilol 6.25 mg tablet 6.25 mg PO BID 17 [History Confirmed 21]  
furosemide 40 mg tablet (Lasix) 40 mg PO BID 17 [History Confirmed 
21]  
telmisartan 40 mg tablet 40 mg PO DAILY 17 [History Confirmed 21]  
metolazone 2.5 mg tablet 2.5 mg PO 3XW PRN Edema 19 [History Confirmed   
21]  
acetaminophen 500 mg tablet (Tylenol Extra Strength) 500 mg PO TID PRN Pain 
20   
[History Confirmed 21]  
ascorbic acid (vitamin C) 500 mg tablet (Vitamin C) 500 mg PO BID 7 days #14 
tabs   
20 [Rx Confirmed 21]  
zinc sulfate 50 mg zinc (220 mg) capsule (Orazinc) 220 mg PO DAILY 7 days #7 
caps   
20 [Rx Confirmed 21]  
nabumetone 750 mg tablet 750 mg PO BID 21 [History Confirmed 21]  
hydrocodone 5 mg-acetaminophen 325 mg tablet 1 tab PO Q8H PRN severe pain 
(scalescore   
7-10) 2 days #6 tabs 21 [Rx Confirmed 21]  
clobetasol 0.05 % topical ointment 1 applic topical 3XW PRN Rash 21 
[History   
Confirmed 21]  
  
  
Allergies  
  
cefixime Allergy (Verified 21 13:49)  
Unknown Reaction  
diclofenac Allergy (Verified 21 13:49)  
Unknown Reaction  
nystatin Allergy (Verified 21 13:49)  
Unknown Reaction  
sodium benzoate Allergy (Verified 21 13:49)  
Unknown Reaction  
  
  
Wound/Ulcer  
Right Lower Medial Leg:  
Type: Traumatic (laceration (without foreign body))  
Thickness: Full  
Bed Appearance: Brown, Devitalized and Dried Exudate  
Percent of Wound Bed Granulated/Red: 0  
Percent of Devitalized: 100  
Length (cm): 2.5  
Width (cm): 1.5  
Depth (cm): 0.1  
CM Sq: 3.750  
Surrounding Tissue Appearance: Hyperpigmented  
Surrounding Tissue Temp: Warm  
Drainage Amount: Scant  
Drainage Description: Serosanguineous  
Drainage Odor: No Odor  
Lidocaine Applied Topically: 2% Jelly  
Procedures  
Time Out: 2 Patient Identifiers, Correct Patient, Correct Side/Site, Correct   
Procedure and Safety Issues Reviewed  
Procedure:  
The right lower medial leg wound was anesthetized with 2% topical lidocaine. A   
curette was used to perform debridement for the removal of 3.7 cm? of 
devitalized   
tissue consisting of skin, slough, and exudate. Debridement was down to more 
healthy,   
bleeding tissue. Estimated blood loss was minimal. Hemostasis was achieved by   
applying direct pressure. The wound now appears 50% more pink and red and 
remains the   
same in size. The patient tolerated the procedure well with minimal pain.  
Results  
Height: 5 ft 11 in  
Weight: 170.551 kg  
Body Mass Index: 52.4  
  
Assessment/Plan  
Assessment/Plan  
(1) Traumatic open wound of right lower leg:  
Assessment/Problem Details:  
22- Right lower medial leg  
Code(s):  
S81.801A - Unspecified open wound, right lower leg, initial encounter  
Status: Chronic  
(2) Edema of right lower leg:  
Code(s):  
R60.0 - Localized edema  
Status: Chronic  
(3) PVD (peripheral vascular disease):  
Code(s):  
I73.9 - Peripheral vascular disease, unspecified  
Status: Chronic  
(4) Varicose veins of both legs with edema:  
Code(s):  
I83.893 - Varicose veins of bilateral lower extremities with other complications  
Status: Chronic  
(5) Morbid obesity due to excess calories:  
Code(s):  
E66.01 - Morbid (severe) obesity due to excess calories  
Status: Chronic  
Time spent with patient  
Time Spent With Patient (min): 20  
  
  
  
  
Dictated By: SOWMYA Gutierrez DD/DT: 22 0750  
  
Signed By: <Electronically signed by SOWMYA Villarreal>  
22 0803  
   
  
[facility]  
Work Phone: [phone]- Evaluation note*   
  
                      Encounter Date Diagnosis  Assessment Notes Treatment Notes  
 Treatment   
Clinical Notes  
   
                                                Type 2 diabetes   
mellitus with   
unspecified   
complications (ICD-10   
- E11.8)                                                      
   
                                                BMI 50.0-59.9, adult  
   
(ICD-10 - Z68.43)                                             
   
                                                Obstructive sleep   
apnea (adult)   
(pediatric) (ICD-10 -   
G47.33)                                                       
   
                                                Mixed hyperlipidemia  
   
(ICD-10 - E78.2)                                              
   
                                                CAD (coronary artery  
   
disease) (ICD-10 -   
I25.10)                                                       
   
                                                Knee osteoarthritis   
(ICD-10 - M17.9)                                              
   
                                                Cardiomyopathy   
(ICD-10 - I42.9)                                              
   
                                                CHF (congestive hear  
t   
failure) (ICD-10 -   
I50.9)                                                        
   
                                                Edema (ICD-10 -   
R60.9)                                                        
   
                                                Low back derangement  
   
syndrome (ICD-10 -   
M53.86)                                                       
   
                                                Metabolic syndrome X  
   
(ICD-10 - E88.81)                                             
  
  
[facility]  
Other Phone: [phone]- Evaluation note*   
  
                      Encounter Date Diagnosis  Assessment Notes Treatment Notes  
 Treatment   
Clinical Notes  
   
                                                Obesity,   
unspecified   
classification,   
unspecified obesity   
type, unspecified   
whether serious   
comorbidity present   
(ICD-10 - E66.9)                                              
   
                                                BMI 50.0-59.9,   
adult (ICD-10 -   
Z68.43)                                                       
   
                           Other                     Summary of Visi  
t:  
(A) reviewed plate   
method  
(B) emphasized   
reducing calorie   
consumption   
through beverages  
(C) continue   
logging all food   
and drink  
                                          
  
  
[facility]  
Other Phone: [phone]- Evaluation note*   
  
                      Encounter Date Diagnosis  Assessment Notes Treatment Notes  
 Treatment   
Clinical Notes  
   
                                                Obstructive sleep   
apnea (adult)   
(pediatric) (ICD-10   
- G47.33)                                           His machine should   
be set for CPAP   
auto minimum 12   
And maximum 18. We   
will try to get   
download 1 way or   
another to confirm   
that he has good   
control. He does   
seem to tolerate   
the pressure   
without difficulty   
and does wear the   
mask when he is in   
bed. I encouraged   
him to ensure   
regular sleep-wake   
cycles with   
adequate sleep   
time  
                                          
   
                                                Sleep phase   
syndrome, delayed   
(ICD-10 - G47.21)                                   His original   
history reported   
substantial sleep   
phase delay, but   
he is now   
reporting almost   
chaotic sleep-wake   
cycles. I   
encouraged him to   
have regular   
sleep-wake times,   
and to avoid being   
awake in the bed   
even if unable to   
sleep.  
                                          
   
                                                BMI 50.0-59.9,   
adult (ICD-10 -   
Z68.43)                                             He asked about   
inspire therapy,   
but his current   
body weight is   
above the cutoff   
to qualify for   
that treatment. He   
is encouraged to   
continue efforts   
at weight   
reduction  
                                          
  
  
[facility]  
Other Phone: [phone]- Evaluation note*   
  
                      Encounter Date Diagnosis  Assessment Notes Treatment Notes  
 Treatment   
Clinical Notes  
   
                                        10 May, 2022        Obesity,   
unspecified   
classification,   
unspecified   
obesity type,   
unspecified   
whether serious   
comorbidity   
present (ICD-10 -   
E66.9)                                                        
   
                                        10 May, 2022        BMI 50.0-59.9,   
adult (ICD-10 -   
Z68.43)                                                       
   
                          10 May, 2022 Other                     Summary of Visi  
t:  
(A) Presentation of   
Plate Method   
discussed  
(B) Sample meal ideas   
reviewed  
(C) exercise   
recommendations   
reviewed  
Patient set the   
following goals:  
- patient set   
personal goal using   
given handout.  
                                          
  
  
[facility]  
Other Phone: [phone]- Evaluation note*   
  
                      Encounter Date Diagnosis  Assessment Notes Treatment Notes  
 Treatment   
Clinical Notes  
   
                                                Abnormal weight gain  
   
(ICD-10 - R63.5)                                              
   
                                                Type 2 diabetes   
mellitus with   
unspecified   
complications (ICD-10   
- E11.8)                                                      
   
                                                Obstructive sleep   
apnea (adult)   
(pediatric) (ICD-10 -   
G47.33)                                                       
   
                                                Mixed hyperlipidemia  
   
(ICD-10 - E78.2)                                              
   
                                                Knee osteoarthritis   
(ICD-10 - M17.9)                                              
   
                                                Cardiomyopathy   
(ICD-10 - I42.9)                                              
   
                                                CHF (congestive hear  
t   
failure) (ICD-10 -   
I50.9)                                                        
   
                                                BMI 50.0-59.9, adult  
   
(ICD-10 - Z68.43)                                             
   
                                        14 2022        Edema (ICD-10 -   
R60.9)                                                        
   
                                        14 2022        Low back derangement  
   
syndrome (ICD-10 -   
M53.86)                                                       
   
                                                Metabolic syndrome X  
   
(ICD-10 - E88.81)                                             
   
                                                CAD (coronary artery  
   
disease) (ICD-10 -   
I25.10)                                                       
  
  
[facility]  
Other Phone: [phone]- Evaluation note*   
  
                      Encounter Date Diagnosis  Assessment Notes Treatment Notes  
 Treatment   
Clinical Notes  
   
                                                Obstructive sleep   
apnea (adult)   
(pediatric)   
(ICD-10 - G47.33)                                     
  
  
He had original sleep   
study in  with an   
AHI of 63 and with   
21% of the time spent   
hypoxic. He was   
titrated and treated   
with CPAP, but   
struggled some with   
usage at that time.   
He was lost to   
follow-up not long   
after. However he now   
reports he has been   
using the machine   
regularly. The most   
current download   
available to us is   
from , and at   
that time he did have   
good compliance with   
9 hours of usage and   
with adequate control   
of sleep apnea.   
However I am   
concerned because of   
his very high [place]   
Sleepiness Scale, his   
difficult to sort out   
sleep history, and   
his severe persistent   
hypoxia when   
initially diagnosed   
(which raises the   
stakes and emphasizes   
the importance of   
effective treatment).   
His exam does show   
bilateral lower   
extremity edema which   
can be an indication   
of suboptimally   
treated sleep apnea   
with pulmonary   
hypertension. Given   
the uncertainty of   
the overall situation   
I feel full   
reassessment with   
polysomnography and   
possible split-night   
is advisable. In the   
meantime we will try   
to get him a new auto   
CPAP machine as his   
current device is   
both antiquated and   
subject to recall                         
  
  
   
                                                BMI 50.0-59.9,   
adult (ICD-10 -   
Z68.43)                                               
  
  
His substantial   
morbid obesity does   
pose considerable   
risks for   
cardiopulmonary   
complications and he   
is encouraged to   
bring body weight   
down. Not only will   
this improve quality   
of life but can have   
substantial impact on   
sleep apnea and   
pulmonary   
hypertension with   
hypoventilation,   
should these problems   
be present                                
  
  
   
                                                Lower extremity   
edema (ICD-10 -   
R60.0)                                                
  
  
He mayHis original   
sleep test showed   
severe sleep apnea   
with substantial   
nocturnal hypoxia. I   
am concerned for   
breakthrough hypoxia   
with hypoventilation;   
some patients may   
require control of   
sleep apnea but   
additionally may   
require oxygen   
therapy bled into the   
device                                    
  
  
   
                                                Sleep phase   
syndrome, delayed   
(ICD-10 - G47.21)                                     
  
  
His self-reported   
sleep pattern has not   
no nighttime sleep   
with substantial   
sleep only beginning   
at 6 AM. However it   
is quite difficult to   
sort out what is   
going on and he is   
not certain that he   
goes sleepless   
through the night. We   
will try to schedule   
him for a sleep phase   
delayed sleep study                       
  
  
   
                           Other                     The diagnosis o  
f   
Sleep Apnea was   
reviewed in detail,   
and handout materials   
were provided. The   
patient expresses   
good understanding,   
We also reviewed the   
medical and accident   
risks associated with   
sleep apnea and   
excessive fatigue.   
The patient is   
advised to adhere to   
a proper sleep   
hygiene schedule and   
to assure adequate   
total sleep time; The   
patient was advised   
to continue efforts   
at progressive weight   
loss, including   
decreased overall   
caloric intake   
quantity and better   
food choices.The   
patient was advised   
to call or return any   
difficulties arise,   
including changes in   
symptoms and/or   
problems with   
treatment                                 
  
  
[facility]  
Other Phone: [phone] Evaluation note*   
  
                      Encounter Date Diagnosis  Assessment Notes Treatment Notes  
 Treatment   
Clinical Notes  
   
                                        22 Dec, 2021        Encounter for   
immunization   
(ICD-10 - Z23)                                        
  
  
Patient presents   
for COVID-19   
vaccination   
BOOSTER.   
Pre-screening form   
answers evaluated   
with patient.   
Patient denies   
current illness or   
allergic reaction   
to component of   
COVID-19 vaccine.   
Patient provided   
with current copy   
of EUA.                                   
  
  
  
  
[facility]  
Other Phone: [phone] complaint Narrative - Reported* JOCELIN PACHECO is 
  being seen for a consultation for an abnormal ECG and abnormal test(s) 
  results.  
* 67-year-old gentleman seen in cardiology consultation at the request of 
  primary care for worsening shortness of breath and exertional dyspnea. Patient
  had abnormal Lexiscan imaging revealing global hypokinesis with ejection 
  fraction of 44% and a normal transient ischemic index but no evidence of isc
  hemia. Prior heart catheterization performed by myself in  revealed 
  minimal coronary disease with reduced left ventricular function  
* Today's ECG is normal. Echocardiogram reveals LVH with normal left ventricular
  function and a septal thickness of 1.8 cm with no outflow tract obstructive 
  physiology.  
* Patient has a history of morbid obesity, obstructive sleep apnea, history of 
  right lower extremity DVT, essential hypertension.  
* The driving symptom currently is shortness of breath and dyspnea with exertion
  now with evidence ofLV dysfunction and morbid obesity  
* We have counseled him on dietary discretion, weight loss and exercise and 
  aerobic training as a means towards improving his overall demeanor/physiology 
  and anatomy and body habitus.  
* I would simply recommend appropriate guideline directed medical therapies with
  Entresto, spironolactone, SGLT2 inhibitor (Jardiance), discontinue 
  telmisartan, with appropriate laboratory and clinicalfollow-up 
  thereafterwards. Extensive education and counseling performed with the patient
  for over 30 minutes this morning, he is agreeing to the above will follow up 
  as described  
-[facility] DO  
Work Phone: [phone] noteNo InformationNort Coast Professional 
Corporation  
Other Phone: [phone] note*   
  
                          Diagnosis    Onset Date   Resolution   Status  
   
                          Edema of right lower leg                           chr  
onic  
   
                          Morbid obesity due to excess calories                   
          chronic  
   
                          PVD (peripheral vascular disease)                       
      chronic  
   
                          Varicose veins of both legs with edema                  
           chronic  
   
                          Traumatic open wound of right lower leg                 
            resolved  
   
                          Venous stasis ulcer                           resolved  
  
  
[facility]  
Work Phone: [phone] noteNo assessment information available
[facility]  
Work Phone: [phone] note*   
  
                          Diagnosis    Onset Date   Resolution   Status  
   
                          Edema of right lower leg                           chr  
onic  
   
                          Inflammation                           chronic  
   
                          Morbid obesity due to excess calories                   
          chronic  
   
                          PVD (peripheral vascular disease)                       
      chronic  
   
                          Varicose veins of both legs with edema                  
           chronic  
   
                          Cellulitis                             resolved  
   
                          Venous stasis ulcer                           resolved  
  
  
[facility]  
Work Phone: [phone] note*   
  
                                                    Diagnosis  
   
                                                      
  
  
Onychomycosis- Primary  
  
  
Dermatophytosis of nail  
   
                                                      
  
  
Pain in both feet  
   
                                                      
  
  
Corns and callosities  
   
                                                      
  
  
Other specified peripheral vascular diseases (CMS/HCC)  
   
                                                      
  
  
Circulating anticoagulant disorder (CMS/HCC)  
  
  
Other and unspecified coagulation defects  
  
documented in this encounter  
[facility]   
  
                                                    Diagnosis  
   
                                                      
  
  
Lumbosacral strain, initial encounter- Primary  
   
                                                      
  
  
Acute pain of left shoulder  
   
                                                      
  
  
Wound of left lower extremity, initial encounter  
  
documented in this encounter  
[facility]   
  
                          Diagnosis    Onset Date   Resolution   Status  
   
                          Edema of right lower leg                           chr  
onic  
   
                          Inflammation                           chronic  
   
                          Morbid obesity due to excess calories                   
          chronic  
   
                          PVD (peripheral vascular disease)                       
      chronic  
   
                          Varicose veins of both legs with edema                  
           chronic  
   
                          Venous stasis ulcer                           resolved  
  
  
[facility]  
Work Phone: [phone] note*   
  
                                                    Diagnosis  
   
                                                      
  
  
Non-ischemic cardiomyopathy (Multi)- Primary  
  
  
Other primary cardiomyopathies  
   
                                                      
  
  
DIMITRI on CPAP  
   
                                                      
  
  
BMI 50.0-59.9, adult (Multi)  
  
documented in this encounter  
[facility]  
Work Phone: [phone] general Narrative - Reported*   
  
                                Type            Description     Date  
   
                                Medical History Osteoarthritis Bilateral Knees   
   
                                Medical History Hypertension      
   
                                Medical History Chronic Back Pain   
   
                                Medical History Cardiomyopathy    
   
                                Medical History DIMITRI               
   
                                Medical History vein closure      
   
                                Medical History Venous Ulcer with Pain   
   
                                Surgical History gall bladder      
   
                                Surgical History hand surgery      
   
                                Surgical History heart catheterization   
   
                                Hospitalization History infection         
  
  
[facility]  
Other Phone: [phone] note  
  
  
  
  
                                        Author              Elaine Espino  
[facility]  
2023 1:19pm  
   
                                        Note Date/Time      2023 1:19  
pm  
   
                                                    [facility]  
ENTER  
[address] [place] [address]  
  
Wound Center Provider Note  
Signed  
  
Patient: Jocelin Pacheco MR#:   
[id number]  
: 1955 Acct:[id number]  
  
Age/Sex: 68 / M  
  
Copies to: DO Elaine Arboleda, SOWMYA~  
  
  
HPI  
Date of Visit  
Date of Visit:  
Date of Service: 2023  
Time of Service: 13:17  
  
Narrative  
HPI:  
23 Jocelin is a 68-year-old male presenting to [facility]   
wound care program for an initial visit to the office for a right lower 
extremity   
venous stasis ulcer/lymphedema ulcer.? The patient has been a patient of the 
office   
for quite some time now and has seen other providers.  
He had seen vascular in [place] was told that there is nothing else that can be 
done   
for him.  
He has an extensive history of infections so I imagine this will continue to be 
an   
issue for him.  
He appears to need better edema control.  
He was told by ortho that he needs to lose 50 pounds in order to have surgery- 
this   
was 2 years ago and has not been accomplished.  
A past venous duplex indicates deep vein issues which are not surgically 
correctable-   
vascular did confirm that he does not need to see them because there is nothing 
they   
have to offer him.  
He is still retired so hopefully there is more opportunity for elevation and   
compression of the legs to support healing of the ulcer.  
Nutrition and probiotics were discussed and suggestions were given.  
Doxycycline was escribed today for what appears to be cellulitis of the RLE.  
23 looks better, is still taking the antibiotics, change to collagen silver   
topically and clobetasol to periwound skin that he says is itchy, will return 
for   
dressing changes, will bring his compression socks from home to the next visit 
since   
most of our wraps tend to roll down on his legs, says that he will use the   
compression pumps  
23 healed, steroid and compression sock applied, aware to call sooner than 
later   
if something reopens  
  
Subjective  
Pain  
Right Lower Leg:  
Pain Intensity: 0  
Wound/Ulcer History  
When did wound start?: 2023  
Mode of Arrival/ : Personal vehicle  
Assistive Device Used Today: Cane  
Lives with:: Alone  
Appetite Description: Within Normal Limits  
Who helps w/ dressing change?: Wound Care Dept  
Why Do You Need Help?: Can't Reach Ulcer  
Smoking Status: Never smoker  
  
[facility]  
Medical History (Updated 23 @ 13:19 by SOWMYA Correa)  
  
Arthritis  
Bilateral knees,  right is bone on bone  per  Dr. Richard   
Back pain  
Cardiomyopathy  
 lower part of heart is weak   
Carpal tunnel syndrome  
right arm surgery  
DVT (deep venous thrombosis)  
 blood clot from right foot to right thigh  on  blood thinner   
Hypertension  
Itching  
Itching with irritation  
DIMITRI (obstructive sleep apnea)  
PVD (peripheral vascular disease)  
 poor veins  Dr. Cameron did vein closure 2019, needs 5 more viens worked 
on.  
Wound of right lower extremity  
  
Surgical History (Reviewed 23 @ 11:09 by Maren Martienz RN)  
  
Hx of cholecystectomy  
  
Family History (Reviewed 23 @ 11:09 by Maren Martinez RN)  
Father Diabetes mellitus, type 2  
Sister Diabetes mellitus, type 2  
  
Social History  
Smoking Status: Never smoker  
Substance Use Type: None  
  
Grafts  
History of Graft  
History of Graft?: Yes  
  
Exam  
Physical Exam  
Vital Signs:  
  
  
  
  
Temp Pulse Resp BP O2 Del Method  
  
97.7 F 81 20 123/73 Room Air  
  
23 13:12 23 13:12 23 13:12 23 13:12 23 13:12  
  
  
  
Const  
General: cooperative, comfortable and no acute distress  
Nutritional Appearance: obese  
Orientation: alert, awake and oriented x3  
  
Lower/Upper Extremity Exam  
Vascular Exam-Edema  
Right Lower Extremity:  
Edema Type: Lymphedema  
Vascular Exam-Pulses  
Right Brachial:  
Pulse Assessment Method: NIBP  
  
Objective  
Meds/Allergies  
Home Medications  
  
carvedilol 6.25 mg tablet 6.25 mg PO BID 17 [History Confirmed 23]  
furosemide 40 mg tablet (Lasix) 40 mg PO BID 17 [History Confirmed 
23]  
acetaminophen 500 mg tablet (Tylenol Extra Strength) 500 mg PO TID PRN Pain 
20   
[History Confirmed 23]  
nabumetone 750 mg tablet 750 mg PO BID 21 [History Confirmed 23]  
empagliflozin 10 mg tablet (Jardiance) 10 mg PO DAILY 22 [History 
Confirmed   
23]  
rivaroxaban 10 mg tablet (Xarelto) 10 mg PO DAILY 22 [History Confirmed   
23]  
semaglutide 2 mg/dose (8 mg/3 mL) subcutaneous pen injector (Ozempic) 2 mg 
subcut Q7D   
22 [History Confirmed 23]  
spironolactone 25 mg tablet 25 mg PO DAILY 22 [History Confirmed 23]  
doxycycline hyclate 100 mg capsule 100 mg PO BID 14 days #28 caps 23 [Rx   
Confirmed 23]  
sacubitril 24 mg-valsartan 26 mg tablet (Entresto) 1 tab PO BID 23 
[History   
Confirmed 23]  
  
  
Allergies  
  
cefixime Allergy (Verified 23 11:09)  
Unknown Reaction  
diclofenac Allergy (Verified 23 11:09)  
Unknown Reaction  
nystatin Allergy (Verified 23 11:09)  
Unknown Reaction  
sodium benzoate Allergy (Verified 23 11:09)  
Unknown Reaction  
  
  
Wound/Ulcer  
Right Lower Leg:  
Type: Venous Stasis Ulcer  
Thickness: Skin Breakdown  
Length (cm): 0  
Width (cm): 0  
Depth (cm): 0  
CM Sq: 0.000  
Surrounding Tissue Appearance: Hyperpigmented  
Surrounding Tissue Temp: Warm  
Drainage Amount: None  
Drainage Odor: No Odor  
Results  
Height: 5 ft 11 in  
Weight: 160.118 kg  
Body Mass Index: 49.2  
  
Assessment/Plan  
Assessment/Plan  
(1) Venous stasis ulcer:  
Qualifiers:  
Laterality: right Non-pressure ulcer stage: limited to breakdown of skin 
Varicose   
vein presence: with varicose veins Venous stasis ulcer site: other part of lower
leg   
Qualified Code(s): I83.018 - Varicose veins of right lower extremity with ulcer 
other   
part of lower leg; L97.811 - Non-pressure chronic ulcer of other part of right 
lower   
leg limited to breakdown of skin  
Code(s):  
I83.009 - Varicose veins of unspecified lower extremity with ulcer of 
unspecified   
site; L97.909 - Non-pressure chronic ulcer of unspecified part of unspecified 
lower   
leg with unspecified severity  
Status: Resolved  
(2) Edema of right lower leg:  
Code(s):  
R60.0 - Localized edema  
Status: Chronic  
(3) Morbid obesity due to excess calories:  
Code(s):  
E66.01 - Morbid (severe) obesity due to excess calories  
Status: Chronic  
(4) Lymphedema of both lower extremities:  
Code(s):  
I89.0 - Lymphedema, not elsewhere classified  
Status: Suspected  
(5) PVD (peripheral vascular disease):  
Code(s):  
I73.9 - Peripheral vascular disease, unspecified  
Status: Chronic  
(6) Varicose veins of both legs with edema:  
Code(s):  
I83.893 - Varicose veins of bilateral lower extremities with other complications  
Status: Chronic  
(7) Inflammation:  
Status: Chronic  
Time spent with patient  
Time Spent With Patient (min): 10  
  
  
  
  
Dictated By: SOWMYA Correa DD/DT: 23  
  
Signed By: <Electronically signed by SOWMYA Espino>  
23 1319  
   
  
[facility]  
Work Phone: [phone] for referral (narrative)* Consultation 
  (Routine) - Authorized  
  
                          Specialty    Diagnoses / Procedures Referred By Contac  
t Referred To Contact  
   
                                        Cardiology            
  
  
Diagnoses  
  
  
Non-ischemic cardiomyopathy   
(Multi)  
  
  
  
Procedures  
  
  
Follow Up In Cardiology                   
  
  
Sharlene An APRN-CNP  
  
  
[address]  
  
  
[facility], [address] [address]  
  
  
Phone: [phone]  
  
  
Fax: [phone]                         
  
  
[facility],   
  
  
[address] [facility] [address]  
  
  
[place] [address]  
  
  
Phone: [phone]  
  
  
Fax: [phone]  
  
  
  
                    Referral ID Status    Reason    Start Date Expiration Date V  
isits   
Requested                               Visits   
Authorized  
   
                [id number] Authorized         2024 1       1  
  
  
  
  
Electronically signed by Sharlene URBINA at 2024 11:39 AM EDT  
  
  
[facility]  
Work Phone: [phone]  
  
Summary Purpose  
  
  
                                                      
  
  
  
Family History  
No Family History Records FoundUnknown Family Member  
  
                                Name            Dates           Details  
   
                                                    Family history of diabetes m  
ellitus: Sister, Mother,   
Father(V18.0, Z83.3)  
                                                    Status:Active  
   
                                                    COPD, moderate: Mother, Sist  
er  
                                                    Status:Active  
  
                              Unknown Family Member  
  
                                Name            Dates           Details  
   
                                                    Family history of diabetes m  
ellitus: Sister, Mother,   
Father(V18.0, Z83.3)  
                                                    Status:Active  
   
                                                    COPD, moderate: Mother, Sist  
er  
                                                    Status:Active  
  
  
  
                          Relationship Condition    Age at Onset Recorded Date/T  
ceasar  
   
                          father       Type 2 diabetes mellitus Unknown        
   
                          sister       Type 2 diabetes mellitus Unknown        
  
                              Unknown Family Member  
  
                                Name            Dates           Details  
   
                                                    Family history of diabetes m  
ellitus: Sister, Mother,   
Father(V18.0, Z83.3)  
                                                    Status:Active  
   
                                                    COPD, moderate: Mother, Sist  
er  
                                                    Status:Active  
  
                              Unknown Family Member  
  
                                Name            Dates           Details  
   
                                                    Family history of diabetes m  
ellitus: Sister, Mother,   
Father(V18.0, Z83.3)  
                                                    Status:Active  
   
                                                    COPD, moderate: Mother, Sist  
er  
                                                    Status:Active  
  
                              Unknown Family Member  
  
                                Name            Dates           Details  
   
                                                    Family history of diabetes m  
ellitus: Sister, Mother,   
Father(V18.0, Z83.3)  
                                                    Status:Active  
   
                                                    COPD, moderate: Mother, Sist  
er  
                                                    Status:Active  
  
                              Unknown Family Member  
  
                                Name            Dates           Details  
   
                                                    Family history of diabetes m  
ellitus: Sister, Mother,   
Father(V18.0, Z83.3)  
                                                    Status:Active  
   
                                                    COPD, moderate: Mother, Sist  
er  
                                                    Status:Active  
  
  
  
                          Relationship Condition    Age at Onset Recorded Date/T  
ceasar  
   
                          father       Type 2 diabetes mellitus Unknown        
   
                          sister       Type 2 diabetes mellitus Unknown        
   
                          brother      Heart disease Unknown        
   
                          father       Heart disease Unknown        
   
                                       Diabetes mellitus Unknown        
   
                                       Heart failure Unknown        
   
                                            Unknown        
   
                          family member      Unknown        
   
                          Not Specified      Unknown        
   
                                       Heart disease Unknown        
   
                          sister       Heart disease Unknown        
  
  
  
                          Relationship Condition    Age at Onset Recorded Date/T  
ceasar  
   
                          father       Type 2 diabetes mellitus Unknown        
   
                          sister       Type 2 diabetes mellitus Unknown        
   
                          brother      Heart disease Unknown        
   
                          father       Heart disease Unknown        
   
                                       Diabetes mellitus Unknown        
   
                                       Heart failure Unknown        
   
                                            Unknown        
   
                          family member      Unknown        
   
                          mother            Unknown        
   
                                       Heart disease Unknown        
   
                          sister       Heart disease Unknown        
  
  
  
Advance Directives  
No Advanced Directives Records Found  
  
                                Advance Directive Response        Recorded Date/  
Time  
   
                                Advance Directives No               9:56pm  
  
  
  
                                Advance Directive Response        Recorded Date/  
Time  
   
                                Advance Directives No               8:56pm  
  
  
  
Chief Complaint and Reason for Visit  
  
  
                                        Chief Complaint     Obesity  
Open Wound  
   
                                        Reason for Visit    Edema of right lower  
 leg  
Morbid obesity due to excess calories  
PVD (peripheral vascular disease)  
Varicose veins of both legs with edema  
Traumatic open wound of right lower leg  
Venous stasis ulcer  
  
  
  
                                        Chief Complaint     Obesity  
R07.9 I50.9 I42.8  
  
  
  
                                        Chief Complaint     Obesity  
exercise  
  
  
  
                                        Chief Complaint     Open Wound  
   
                                        Reason for Visit    Edema of right lower  
 leg  
Inflammation  
Morbid obesity due to excess calories  
PVD (peripheral vascular disease)  
Varicose veins of both legs with edema  
Cellulitis  
Venous stasis ulcer  
  
  
  
                                        Chief Complaint     Open Wound  
Obesity  
D68.318 N18.2 R73.03 E66.01  
   
                                        Reason for Visit    Edema of right lower  
 leg  
Inflammation  
Morbid obesity due to excess calories  
PVD (peripheral vascular disease)  
Varicose veins of both legs with edema  
Cellulitis  
Venous stasis ulcer  
  
  
  
                                        Chief Complaint     Open Wound  
   
                                        Reason for Visit    Edema of right lower  
 leg  
Inflammation  
Morbid obesity due to excess calories  
PVD (peripheral vascular disease)  
Varicose veins of both legs with edema  
Venous stasis ulcer  
  
  
  
                                        Chief Complaint     Open Wound  
WMN f/u restart  
   
                                        Reason for Visit    Edema of right lower  
 leg  
Inflammation  
Morbid obesity due to excess calories  
PVD (peripheral vascular disease)  
Varicose veins of both legs with edema  
Venous stasis ulcer  
Adult BMI 50.0-59.9 kg/sq m  
Mixed hyperlipidemia  
DIMITRI on CPAP  
Osteoarthritis of knee  
Type 2 diabetes mellitus with unspecified complications  
  
  
  
                                        Chief Complaint     Open Wound  
WMN f/u restart  
Open Wound  
   
                                        Reason for Visit    Edema of right lower  
 leg  
Inflammation  
Morbid obesity due to excess calories  
PVD (peripheral vascular disease)  
Varicose veins of both legs with edema  
Venous stasis ulcer  
Adult BMI 50.0-59.9 kg/sq m  
Mixed hyperlipidemia  
DIMITRI on CPAP  
Osteoarthritis of knee  
Type 2 diabetes mellitus with unspecified complications  
Adult BMI 50.0-59.9 kg/sq m  
Hematoma of right lower leg  
Type 2 diabetes mellitus with unspecified complications  
  
  
  
                                        Chief Complaint     Open Wound  
WMN f/u restart  
Bilateral lower extremity edema  
Open Wound  
   
                                        Reason for Visit    Edema of right lower  
 leg  
Inflammation  
Morbid obesity due to excess calories  
PVD (peripheral vascular disease)  
Varicose veins of both legs with edema  
Venous stasis ulcer  
Adult BMI 50.0-59.9 kg/sq m  
Mixed hyperlipidemia  
DIMITRI on CPAP  
Osteoarthritis of knee  
Type 2 diabetes mellitus with unspecified complications  
Adult BMI 50.0-59.9 kg/sq m  
CAD (coronary artery disease)  
Mixed hyperlipidemia  
DIMITRI on CPAP  
Osteoarthritis of knee  
Type 2 diabetes mellitus with unspecified complications  
HTN (hypertension)  
Adult BMI 50.0-59.9 kg/sq m  
Hematoma of right lower leg  
Leg wound, right  
Type 2 diabetes mellitus with unspecified complications  
Cellulitis  
  
  
  
                                        Chief Complaint     WMN f/u restart  
Open Wound  
Bilateral lower extremity edema  
   
                                        Reason for Visit    Adult BMI 50.0-59.9   
kg/sq m  
Mixed hyperlipidemia  
DIMITRI on CPAP  
Osteoarthritis of knee  
Type 2 diabetes mellitus with unspecified complications  
Adult BMI 50.0-59.9 kg/sq m  
CAD (coronary artery disease)  
Mixed hyperlipidemia  
DIMITRI on CPAP  
Osteoarthritis of knee  
Type 2 diabetes mellitus with unspecified complications  
HTN (hypertension)  
Adult BMI 50.0-59.9 kg/sq m  
Hematoma of right lower leg  
Leg wound, right  
Type 2 diabetes mellitus with unspecified complications  
Cellulitis  
  
  
  
                                        Chief Complaint     Bilateral lower extr  
emity edema  
Open Wound  
   
                                        Reason for Visit    Adult BMI 50.0-59.9   
kg/sq m  
Mixed hyperlipidemia  
DIMITRI on CPAP  
Osteoarthritis of knee  
Type 2 diabetes mellitus with unspecified complications  
Adult BMI 50.0-59.9 kg/sq m  
Hematoma of right lower leg  
Leg wound, right  
Type 2 diabetes mellitus with unspecified complications  
Urinary incontinence  
Venous insufficiency of right lower extremity  
Cellulitis  
  
  
  
Chief Complaint  
* JOCELIN PACHECO is being seen for a 6 month follow-up of.  
* 62-year-old gentleman who returns for annual visit he is doing well just 
  complains atypical chest discomfort described as tingling as well as sharp and
  somewhat painful sensation both at rest and with activities that are sporadic 
  and episodic only but not reproducible. He has a history of moderate n
  onischemic cardiomyopathy, obstructive sleep apnea, morbid obesity, history of
  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  
* Stress perfusion imaging from 2022 revealed patchy tracer uptake, proceed
  ischemia or infarction, normal left ventricular volume with global hypokinesis
  and ejection fraction of 44% normal transient ischemic index.  
* Heart catheterization from  revealed 30 to 50% ostial circumflex disease, 
  20 to 25% mid LAD disease normal right coronary and at that time ejection 
  fraction of 40 to 45%.  
* Since  with his medical history continue therapies. We have transitioned 
  over to Entresto, Jardiance, carvedilol and spironolactone  
* Unfortunately he has had no significant weight loss despite going to cardiac 
  rehab 3 times a week. He remains 365 pounds, similar to   
* Recommendations, obtain a MUGA scan current therapies, obtain appropriate 
  laboratories including Chem-6 and BNP, we will have him come back in 6 months 
  continues to have any further chest discomfort will prescribe nitrates and/or 
  consider invasive assessment.  
  
  
Reason for Referral  
  
  
                          Specialty    Diagnoses / Procedures Referred By Contac  
t Referred To Contact  
   
                                        Physical Therapy      
  
  
Diagnoses  
  
  
Lumbosacral strain,   
initial encounter  
  
  
Acute pain of left   
shoulder  
  
  
  
Procedures  
  
  
VT OFFICE/OUTPATIENT NEW   
HIGH MDM 60 MINUTES                       
  
  
Ray Terry, NP  
  
  
[address]  
  
  
[place] [address]  
  
  
Phone: [phone]  
  
  
Fax: [phone]                         
  
  
Jessica Lopez, PT  
  
  
[address]  
  
  
[address]  
  
  
[place]   
[address]  
  
  
Phone: [phone]  
  
  
Fax: [phone]  
  
  
  
                      Referral ID Status     Reason     Start Date Expiration   
Date                                    Visits   
Requested                               Visits   
Authorized  
   
                                [id number]          Authorized        
  
  
Consult and   
Treat           2024        10              10  
  
  
  
Additional Source Comments  
  
  
  
                                                    PREGNANCY (unrecognized sect  
ion and content)  
   
                                                    No Pregnancy Status Records   
FoundNo Pregnancy Status Records FoundNo Pregnancy   
Status   
Records FoundNo Pregnancy Status Records FoundNo Pregnancy Status Records 
FoundNo   
Pregnancy Status Records FoundNo Pregnancy Status Records Found  
  
  
  
  
                                                    INFORMATION SOURCE (unrecogn  
ized section and content)  
   
                                          
  
  
  
                                        DATE CREATED        AUTHOR  
   
                                2022                      [facility]  
dical Specialist  
  
  
  
                                DATE CREATED    AUTHOR          AUTHOR'S ORGANIZ  
ATION  
   
                                2023                       [facility]  
  
  
  
                                DATE CREATED    AUTHOR          AUTHOR'S ORGANIZ  
ATION  
   
                                2023                       [facility]  
  
  
  
                                DATE CREATED    AUTHOR          AUTHOR'S ORGANIZ  
ATION  
   
                                08/10/2023                      [facility]  
ical [facility]  
  
  
  
                                DATE CREATED    AUTHOR          AUTHOR'S ORGANIZ  
ATION  
   
                                05/10/2024                      [facility]  
[facility]  
  
  
  
                                DATE CREATED    AUTHOR          AUTHOR'S ORGANIZ  
ATION  
   
                                2024                      [facility]  
dical Specialists [facility]  
  
  
  
                                DATE CREATED    AUTHOR          AUTHOR'S ORGANIZ  
ATION  
   
                                10/23/2024                      [facility]  
[facility]ician Group  
  
  
  
  
  
                                                    REASON FOR VISIT (unrecogniz  
ed section and content)  
   
                                          
  
  
  
                                        Reason              Comments  
   
                                        Back Pain           Pt presents with low  
er back pain with onset of 2 weeks with pain   
increasing. Took tylenol with no relief. Has used ice on it a few times.   
Left shoulder is hurting. Denies any trouble urinating.  
  
  
  
                                        Reason              Comments  
   
                                        Follow-up           10m per wss  
  
  
  
  
  
                                                    Care Teams (unrecognized sec  
tion and content)  
   
                                          
  
  
  
                                                    Team Status: Active   
   
                          Member       Role         Status       Dates  
   
                          Vera Melvin , DO Primary Care Provider Active   
        
  
  
  
                                                    Team Status: Inactive   
   
                          Member       Role         Status       Dates  
   
                          Vera Melvin , DO Primary Care Provider Active   
      Start: 2024  
End: 2024  
   
                          SOWMYA Correa Attending Provider Active       St  
art: 2024  
End: 2024  
  
  
  
                                                    Team Status: Active   
   
                          Member       Role         Status       Dates  
   
                          Vera Melvin , DO Primary Care Provider Active   
        
   
                          ESVIN Haro Attending Provider Active         
  
  
  
                                                    Team Status: Inactive   
   
                          Member       Role         Status       Dates  
   
                          Vera Melvin , DO Primary Care Provider Active   
        
   
                          SOWMYA Correa Attending Provider Active         
  
  
  
                                                    Team Status: Inactive   
   
                          Member       Role         Status       Dates  
   
                          Vera Melvin , DO Primary Care Provider, Attend  
ing Provider Active         
  
  
  
                                                    Team Status: Inactive   
   
                          Member       Role         Status       Dates  
   
                          Vera Melvin , DO Primary Care Provider Active   
        
   
                          Jacoby Braden MD Attending Provider Active         
  
  
  
                                                    Team Status: Inactive   
   
                          Member       Role         Status       Dates  
   
                          Vera Melvin , DO Primary Care Provider Active   
        
   
                          SOWMYA Gutierrez Attending Provider Active       
    
  
  
  
                                                    Team Status: Inactive   
   
                          Member       Role         Status       Dates  
   
                          Vera Melvin ,  Primary Care Provider Active   
        
   
                          JASON Maldonado DO Attending Provider Active         
  
  
  
                      Team Member Relationship Specialty  Start Date End Date  
   
                                                      
  
  
Vera Melvin DO  
  
  
NPI: [id number]  
  
  
[address]  
  
  
[place] [address]  
  
  
[phone] (Work)  
  
  
[phone] (Fax) PCP - ACO Reach                 23           
   
                                                      
  
  
Vera Melvin DO  
  
  
NPI: [id number]  
  
  
[address]  
  
  
[place] [address]  
  
  
[phone] (Work)  
  
  
[phone] (Fax) PCP - General   Internal Medicine 24            
   
                                                      
  
  
Danya An DPM  
  
  
NPI: [id number]  
  
  
[address]  
  
  
[place] [address]  
  
  
[phone] (Work)  
  
  
[phone] (Fax) Referring Physician Podiatry        23          
   
                                                      
  
  
Elroy Wall MD  
  
  
NPI: [id number]  
  
  
[address] [address]  
  
  
[phone] (Work)  
  
  
[phone] (Fax) Referring Physician Cardiology      23          
   
                                                      
  
  
Steff Espino    Nurse Practitioner Wound Care      23          
  
  
  
                      Team Member Relationship Specialty  Start Date End Date  
   
                                                      
  
  
Vera Melvin DO  
  
  
NPI: [id number]  
  
  
[address]  
  
  
[place] [address]  
  
  
[phone] (Work)  
  
  
[phone] (Fax) PCP - ACO Reach                 23           
   
                                                      
  
  
Vera Melvin DO  
  
  
NPI: [id number]  
  
  
[address]  
  
  
[place] [address]  
  
  
[phone] (Work)  
  
  
[phone] (Fax) PCP - General   Internal Medicine 24            
   
                                                      
  
  
Danya An DPM  
  
  
NPI: [id number]  
  
  
[address]  
  
  
[place] [address]  
  
  
[phone] (Work)  
  
  
[phone] (Fax) Referring Physician Podiatry        23          
   
                                                      
  
  
Elroy Wall MD  
  
  
NPI: [id number]  
  
  
[address] [address]  
  
  
[phone] (Work)  
  
  
[phone] (Fax) Referring Physician Cardiology      23          
   
                                                      
  
  
Steff Espino    Nurse Practitioner Wound Care      23          
  
  
  
                      Team Member Relationship Specialty  Start Date End Date  
   
                                                      
  
  
Vera Melvin DO  
  
  
NPI: [id number]  
  
  
[address]  
  
  
[place] [address]  
  
  
[phone] (Work)  
  
  
[phone] (Fax) PCP - ACO Reach                 23           
   
                                                      
  
  
Vera Melvin DO  
  
  
NPI: [id number]  
  
  
[address]  
  
  
[place] [address]  
  
  
[phone] (Work)  
  
  
[phone] (Fax) PCP - General   Internal Medicine 24            
   
                                                      
  
  
Danya An DPM  
  
  
NPI: [id number]  
  
  
[address]  
  
  
[place] [address]  
  
  
[phone] (Work)  
  
  
[phone] (Fax) Referring Physician Podiatry        23          
   
                                                      
  
  
Elroy Wall MD  
  
  
NPI: [id number]  
  
  
[address]  
  
  
[place] [address]  
  
  
[phone] (Work)  
  
  
[phone] (Fax) Referring Physician Cardiology      23          
   
                                                      
  
  
Steff Espino    Nurse Practitioner Wound Care      23          
  
  
  
                                                    Team Status: Inactive   
   
                          Member       Role         Status       Dates  
   
                          Vera Melvin DO Primary Care Provider Active   
      Start: 2024  
End: 2024  
   
                          Jacoby Braden MD Attending Provider Active         
Start: 2024  
End: 2024  
  
  
  
                      Team Member Relationship Specialty  Start Date End Date  
   
                                                      
  
  
Vera Melvin DO  
  
  
NPI: [id number]  
  
  
[address]  
  
  
[place] [address]  
  
  
[phone] (Work)  
  
  
[phone] (Fax) PCP - General                   00            
  
  
  
                                                    Team Status: Active   
   
                          Member       Role         Status       Keyanna Melvin DO Primary Care Provider Active   
      Start: 2024  
  
   
                          SOWMYA Correa Attending Provider Active       St  
art: 2024  
  
  
  
  
                                                    Team Status: Inactive   
   
                          Member       Role         Status       Keyanna Melvin DO Primary Care Provider Active   
      Start: 2024  
End: 2024  
   
                          Jacoby Braden MD Attending Provider Active         
Start: 2024  
End: 2024  
  
  
  
                                                    Team Status: Active   
   
                          Member       Role         Status       Keyanna Melvin DO Primary Care Provider Active   
      Start: 2024  
  
   
                          SOWMYA Correa Attending Provider Active       St  
art: 2024  
  
  
  
  
                                                    Team Status: Active   
   
                          Member       Role         Status       Dates  
   
                          Vera Melvin DO Primary Care Provider Active   
      Start: 2024  
  
   
                          SOWMYA Correa Attending Provider Active       St  
art: 2024  
  
  
  
  
                                                    Team Status: Inactive   
   
                          Member       Role         Status       Keyanna Melvin DO Primary Care Provider Active   
      Start: 2024  
End: 2024  
   
                          ROM Peng Attending Provider Active        
 Start: 2024  
End: 2024  
  
  
  
                                                    Team Status: Active   
   
                          Member       Role         Status       Keyanna  
   
                          Vera Melvin ,  Primary Care Provider Active   
      Start: 2024  
  
   
                          SOWMYA Correa Attending Provider Active       St  
art: 2024  
  
  
  
  
                                                    Team Status: Active   
   
                          Member       Role         Status       Keyanna  
   
                          Vera Melvin ,  Primary Care Provider Active   
      Start: 2024  
  
   
                          SOWMYA Correa Attending Provider Active       St  
art: 2024  
  
  
  
  
                                                    Team Status: Inactive   
   
                          Member       Role         Status       Keyanna  
   
                          Vera Melvin ,  Primary Care Provider Active   
      Start: 2024  
End: 2024  
   
                          Jacoby Braden MD Attending Provider Active         
Start: 2024  
End: 2024  
  
  
  
                                                    Team Status: Inactive   
   
                          Member       Role         Status       Keyanna  
   
                          Vera Melvin DO Primary Care Provider Active   
      Start: 2024  
End: 2024  
   
                          SOWMYA Correa Attending Provider Active       St  
art: 2024  
End: 2024  
  
  
  
                                                    Team Status: Active   
   
                          Member       Role         Status       Keyanna  
   
                          Vera Melvin DO Primary Care Provider Active   
      Start: 2024  
  
   
                          SOWMYA Correa Attending Provider Active       St  
art: 2024  
  
  
  
  
  
  
                                                    Goals (unrecognized section   
and content)  
   
                                                    Goals may be documented in a  
n alternate section  
  
FOR RECORDS PERTAINING TO PATIENTS WHO ARE OR HAVE BEEN ENROLLED IN A CHEMICAL 
DEPENDENCY/SUBSTANCEABUSE PROGRAM, SOME INFORMATION MAY BE OMITTED. This 
clinical summary was aggregated from multiple sources. Caution should be 
exercised in using it in the provision of clinical care. This summary normalizes
information from multiple sources, and as a consequence, information in this 
document may materially change the coding, format and clinical context of 
patient data. In addition, data may be omitted in some cases. CLINICAL DECISIONS
SHOULD BE BASED ON THE PRIMARY CLINICAL RECORDS. [facility] [employer] provides 
no warranty or guarantee of the accuracy or completeness of information in this 
document.

## 2024-10-25 NOTE — P.DS_ITS
Discharge Plan    
Discharge    
Disposition: Home, Self-Care    
    
Outpatient Diagnostics:    
VC Facility EST LMTD  (Routine)  Timeframe:  2 Weeks    
   Facility: [facility] - [facility]    
   Ordered By:  Reinaldo Anders    
VC EXT Venous RT LMTD  (Routine)  Timeframe:  2 Weeks    
   Facility: [facility] - Location[facility]    
   Ordered By:  Reinaldo Anders    
    
Follow Up Appointments: 10/25/2024     
    
    
Plan of Treatment:    
f/u evaluation with physician along with right leg limited u/s    
    
Patient Instructions:  Polidocanol (By injection) (Asclera, Varithena)    
    
Print Language: English    
    
Discharge Date/Time: 10/25/24 14:27

## 2024-10-25 NOTE — W.VEIN
Discharge Plan
Discharge
Disposition: Home, Self-Care

Outpatient Diagnostics:
VC Facility EST LMTD  (Routine)  Timeframe:  2 Weeks
   Facility: [facility] - Location[facility]
   Ordered By:  Reinaldo Anders
VC EXT Venous RT LMTD  (Routine)  Timeframe:  2 Weeks
   Facility: [facility] - Location[facility]
   Ordered By:  Reinaldo Anders

Follow Up Appointments: 10/25/2024 


Plan of Treatment:
f/u evaluation with physician along with right leg limited u/s

Patient Instructions:  Polidocanol (By injection) (Asclera, Varithena)

Print Language: English

Discharge Date/Time: 10/25/24 14:27

## 2024-10-25 NOTE — VEIN_ITS
[address] [address] 
     [phone] 
  
  
Patient Name: 
JOCELIN ARORA 
  
MRN: [id number]    YOB: 1955    Sex: M 
Assigned Patient Location:  
Current Patient Location:   
Accession/Order Number: [id number] 
Exam Date: 10/25/2024  13:04    Report Date: 10/25/2024  16:10 
  
At the request of: 
NICK FERNANDEZ   
  
Procedure:  VC INJ Foam Sclerosant WUS MLT 
  
PROCEDURE: VC INJ Foam Sclerosant WUS MLT 
  
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 
  
Pre-operative Diagnosis: CEAP class C6 venous insufficiency with pain,  
tenderness, edema and incompetent branch saphenous vein(s), chronic venous  
insufficiency right leg secondary to venous incompetence 
Post-operative Diagnosis: CEAP class C6 venous insufficiency with pain,  
tenderness, edema and incompetent branch saphenous vein(s), chronic venous  
insufficiency right leg secondary to venous incompetence 
Procedure Performed: 1. Ultrasound-guided microfoam chemical ablation with  
Varithenaregistered  
 2. Intraoperative ultrasound guidance 
  
Physician: Reinaldo Anders M.D. 
  
Anesthesia: None 
  
Indications for Procedure: 69 year old male. Symptoms including lower  
extremity  
pain, swelling, dilated bulging veins for many years despite conservative  
medical therapy including medical compression stockings, exercise and  
analgesics. Prior procedures include endovenous laser ablation. 
  
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 Duplex scan showed reflux  
and enlarged diameters up to 6 mm. 
  
The patient underwent informed consent including management options where the  
complications of infection, bleeding, pain, and skin injury were discussed.  
Particular attention was spent discussing thrombus extension and deep vein  
thrombosis as well as the possibility of pulmonary embolus and treatment with  
oral or injectable blood thinners. 
  
Procedure: The patient walked to the procedure room. All applicable staff  
donned appropriate apparel. A procedure timeout was performed to confirm  
correct patient, correct extremity, correct procedure, and correct room set-up  
  
including presence of all applicable supplies, devices, and drugs. 
  
A duplex ultrasound, performed by myself confirmed the location and  
incompetence of branch saphenous varicosities and their course was marked on  
the skin together with the dilated tributaries. The extent of treatment of the  
  
vein and the associated varicosities was determined through ultrasound  
mapping. 
  
The skin was prepped and then punctured with a butterfly needle and advanced  
under ultrasound guidance.  
  
The Varithenaregistered canister was activated and the canister was primed and  
  
purged as required in the instructions for use. Varithenaregistered was drawn  
into a sterile syringe.  
  
Varithenaregistered was slowly administered at 0.5-1.0 cc/second with close  
observation by ultrasound of its course in the vessels. Total volume utilized  
was: 15 mL (5 mL into a 4 mm varicosity distal medial lower leg; 5 mL into a 6  
  
mm varicosity mid medial lower leg; 5 mL into a 6 mm varicosity medial to the  
knee). 
  
Following administration of Varithenaregistered the leg was elevated and the  
patient was asked to repeatedly dorsiflex the ankle to limit flow of  
Varithenaregistered into perforating veins. Once appropriate spasm had been  
confirmed in the treated veins, the vascular catheter was removed from the leg  
  
and light pressure was applied over the puncture site for hemostasis. 
  
The common femoral and deep superficial veins were then evaluated for flow and  
  
compressibility prior to dressing placement. The lower extremity was kept  
elevated at 45 degrees above the horizontal and cording material was applied  
over the saphenous segments and tributaries to allow for eccentric compression  
  
over the target vessels including the targeted saphenous vein(s). A multilayer  
  
dressing was applied consisting of foam pads, coban and thigh-high 20-30 mm Hg  
  
compression elastic support hose were placed on the patient. The leg was  
lowered only after compression had been applied and the patient was  
immediately  
ambulatory. The patient ambulated 10 minutes under supervision and was without  
  
apparent concerns at time of release. 
  
Post-care instructions include advising patient to keep post-treatment  
bandages  
in place and dry for 48 hours, avoid extended periods of inactivity, avoid  
heavy exercise for one week, wear compression stockings on the treated leg  
continuously for two weeks, to walk daily for 10 minutes over the next month.  
The patient was instructed to take an anti-inflammatory medicine as needed and  
  
to follow up for color duplex scan of the Saphenous veins, the treated branch  
saphenous varicosities, the adjacent deep veins, and additional treatment  
within 7 days.  
  
PERSONNEL: Khang Bahena RN 
  
  
Electronically authenticated by: REINALDO ANDERS   Date: 10/25/2024  16:10

## 2024-10-30 ENCOUNTER — HOSPITAL ENCOUNTER
Age: 69
Discharge: HOME | End: 2024-10-30
Payer: MEDICARE

## 2024-10-30 VITALS — BODY MASS INDEX: 51 KG/M2

## 2024-10-30 DIAGNOSIS — I80.01: Primary | ICD-10-CM

## 2024-10-30 PROCEDURE — G0463 HOSPITAL OUTPT CLINIC VISIT: HCPCS

## 2024-10-30 PROCEDURE — 93971 EXTREMITY STUDY: CPT

## 2024-10-30 NOTE — VEINCLINIC_ITS
Vital Signs    
    
    
                                    10/30/24    
                                      12:09    
     
Height                             5 ft 11 in    
     
Weight                               166 kg    
     
BMI                                   51.0    
    
    
Varicose Veins    
Patient in today for follow up ultrasound of right lower extremity following   
treatment of Varithena/microfoam completed on 10/24/24.    
    
Reinaldo TOSCANO MD personally performed the services described in this   
documentation, as scribed by Maggie Barrios RDMS in my presence and it is   
both accurate and complete.    
    
    
    
EVERTON, Maggie Barrios RDMS, am scribing for, and in the presence of, Dr. Sommer Anders and in the presence of the patient.    
thigh: bilateral (right leg > left leg), knee: bilateral, calf: bilateral,   
ankle: bilateral and shin: bilateral    
aching, burning, cramping, dull and tender    
10    
3 years    
Worsened in recent months: Yes (last 5 months)    
standing, sitting and walking    
analgesics (Tylenol), elevating extremities, compression stockings and wraps    
Reports muscle spasms of leg, fatigue, heaviness, limb pain, edema and leg edema    
History of lower extremity trauma: Yes (a piece of cpap machine fell and hit   
right lower leg resulting in hemtoma)    
Superficial thrombophlebitis: Yes (DVT right leg)    
Family history of varicose veins: no    
Has patient had previous lower extremity venous surgery: Yes    
Patient has previously received the following treatment(s) for lower extremity   
varicose veins: Reports foam therapy and laser therapy    
Does patient have a history of pregnancy: no    
Has patient had lower extremity venous scan with relux testing: Yes    
Support hose used: Yes    
Problems walking or doing physical activity: Yes    
How does it affect you: pain decreases ability to walk    
Do you walk much: No    
Do you stand much: Yes    
    
Review of Systems    
    
    
ROS      
    
 Narrative     
Reinaldo TOSCANO MD personally performed the services described in this   
documentation, as scribed by Maggie Barrios RDMS in my presence and it is   
both accurate and complete.    
    
    
    
Maggie TOSCANO RDMS, am scribing for, and in the presence of, Dr. Sommer Anders and in the presence of the patient.       
    
 Status of ROS                          10 or more systems reviewed and unremark    
able except as noted in     
history and below       
    
 Cardiovascular Reports: edema       
    
 Integumentary/Breast Reports: itching, redness, skin pain, skin tenderness,   
skin swelling, non-healing lesion and changes in skin color       
    
 Neurological Reports: numbness in extremities and weakness in extremities       
    
 Hematologic/Lymphatic Reports: easy bruising and easy bleeding       
    
    
[facility]    
[facility]    
Medical History (Updated 09/18/24 @ 07:48 by Maggie Barrios)    
    
Phlebitis and thrombophlebitis of superficial vessels of right lower extremity    
   ?I80.01 - Phlebitis and thrombophlebitis of superficial vessels of right   
   lower extremity (ICD-10)    
Varicose veins of bilateral lower extremities with pain    
   ?I83.813 - Varicose veins of bilateral lower extremities with pain (ICD-10)    
Lymphedema of both lower extremities    
   ?I89.0 - Lymphedema, not elsewhere classified (ICD-10)    
Arthritis    
   ?M19.90 - Unspecified osteoarthritis, unspecified site (ICD-10)    
Hypertension    
   ?I10 - Essential (primary) hypertension (ICD-10)    
Deep vein thrombosis    
   ?I82.409 - Acute embolism and thrombosis of unspecified deep veins of u  
   nspecified lower extremity (ICD-10)    
Cardiomyopathy    
   ?I42.9 - Cardiomyopathy, unspecified (ICD-10)    
Carpal tunnel syndrome    
   ?G56.00 - Carpal tunnel syndrome, unspecified upper limb (ICD-10)    
Peripheral vascular disease    
   ?I73.9 - Peripheral vascular disease, unspecified (ICD-10)    
Ulcer of right leg    
   ?L97.919 - Non-pressure chronic ulcer of unspecified part of right lower leg   
   with unspecified severity (ICD-10)    
Coronary artery disease    
   ?I25.10 - Atherosclerotic heart disease of native coronary artery without   
   angina pectoris (ICD-10)    
CHF (congestive heart failure)    
   ?I50.9 - Heart failure, unspecified (ICD-10)    
Obstructive sleep apnea    
   ?G47.33 - Obstructive sleep apnea (adult) (pediatric) (ICD-10)    
Chronic back pain    
   ?M54.9 - Dorsalgia, unspecified (ICD-10)    
   ?G89.29 - Other chronic pain (ICD-10)    
Postphlebetic syndrome with inflammation    
   ?I87.029 - Postthrombotic syndrome with inflammation of unspecified lower   
   extremity (ICD-10)    
Type 2 diabetes mellitus    
   ?E11.9 - Type 2 diabetes mellitus without complications (ICD-10)    
    
    
Surgical History (Updated 10/25/24 @ 14:21 by Khang Bahena)    
    
S/P sclerotherapy of varicose veins    
   ?Z98.890 - Other specified postprocedural states (ICD-10)    
   ?Z86.79 - Personal history of other diseases of the circulatory system (ICD-  
   10)    
Status post laser ablation of incompetent vein    
   ?Z98.890 - Other specified postprocedural states (ICD-10)    
Status post laser ablation of incompetent vein    
   ?Z98.890 - Other specified postprocedural states (ICD-10)    
History of carpal tunnel surgery of right wrist    
   ?Z98.890 - Other specified postprocedural states (ICD-10)    
History of cholecystectomy    
   ?Z90.49 - Acquired absence of other specified parts of digestive tract (ICD-  
   10)    
    
    
Family History (Updated 08/19/24 @ 11:42 by Khang Bahena)    
Sister   Family history of diabetes mellitus    
Father   Family history of diabetes mellitus    
Other   Family history of CHF (congestive heart failure)    
   Family history of hypertension    
   Family history of myocardial infarction    
    
    
    
Social History (Updated 08/20/24 @ 10:55 by Khang Bahena)    
Within the past year, how often did you have a drink containing alcohol:  never     
Score interpretation:  A score less than 4 is consistent with normal alcohol   
consumption.     
Smoking status:  Never smoker     
Non-prescribed substance use:  denies use     
    
    
    
Meds    
Home Medications and Allergies    
                                Home Medications    
    
    
    
?Medication ?Instructions ?Recorded ?Confirmed ?Type    
     
Lactobacillus acidophilus 10 100 mmu cells PO DAILY 08/19/24 08/19/24 History    
    
billion cell capsule        
     
acetaminophen 500 mg capsule 500 mg PO Q6H PRN pain 08/19/24 08/19/24 History    
     
carvedilol 6.25 mg tablet 6.25 mg PO BID 08/19/24 08/19/24 History    
     
empagliflozin 10 mg-linagliptin 5 1 tab PO DAILY 08/19/24 08/19/24 History    
    
mg tablet        
     
furosemide 40 mg tablet 40 mg PO BID 08/19/24 08/19/24 History    
     
rivaroxaban 10 mg tablet 10 mg PO DAILY 08/19/24 08/19/24 History    
     
sacubitril 24 mg-valsartan 26 mg 1 tab PO BID 08/19/24 08/19/24 History    
    
tablet        
     
semaglutide 0.25 mg or 0.5 mg (2 0.25 mg subcut QWEEK 08/19/24 08/19/24 History    
    
mg/3 mL) subcutaneous pen injector        
    
(Ozempic)        
     
spironolactone 25 mg tablet 25 mg PO DAILY 08/19/24 08/19/24 History    
    
(Aldactone)        
    
    
    
                                    Allergies    
    
    
    
Allergy/AdvReac Type Severity Reaction Status Date / Time    
     
cefixime Allergy  Unknown Verified 08/19/24 11:44    
     
diclofenac Allergy  Unknown Verified 08/19/24 11:44    
    
    
    
    
Exam    
Narrative    
Exam Narrative:     
    
Reinaldo TOSCANO MD personally performed the services described in this   
documentation, as scribed by Maggie Barrios RDMS in my presence and it is   
both accurate and complete.    
    
    
    
Maggie TOSCANO RDMS, am scribing for, and in the presence of, Dr. Sommer Anders and in the presence of the patient.    
Constitutional    
Documenting provider has reviewed patient's vital signs: yes    
Common normals: oriented x3    
Nutritional appearance: overweight    
Cardio    
Peripheral pulses: posterior tibial pulses present and dorsalis pedis pulses   
present    
Extremity    
Common normals: normal capillary refill    
General: calf tenderness and edema    
Right lower extremity: lower leg Right lower leg: inspection and palpation    
Left lower extremity: lower leg Left lower leg: inspection and palpation    
Neuro    
Common normals: oriented x3    
    
Results    
Imaging    
Venous US:     
      Radiologist's impression:     
Chemically induced thrombus in right varicose veins. Thrombus extends into 5 cm   
segment of PTV mid lower leg.    
    
    
Reinaldo TOSCANO MD personally performed the services described in this   
documentation, as scribed by Maggie Barrios RDMS in my presence and it is   
both accurate and complete.    
    
    
    
Maggie TOSCANO RDMS am scribing for, and in the presence of, Dr. Sommer Anders and in the presence of the patient.    
    
Assessment and Plan    
Assessment and Plan    
(1) Phlebitis and thrombophlebitis of superficial vessels of right lower   
extremity:     
    
Plan    
Plan is for patient to return for Varithena/microfoam of right leg on 11/6/24.    
    
    
Reinaldo TOSCANO MD personally performed the services described in this   
documentation, as scribed by Maggie Barrios RDMS in my presence and it is   
both accurate and complete.    
    
    
    
Maggie TOSCANO RDMS, am scribing for, and in the presence of, Dr. Sommer Anders and in the presence of the patient.

## 2024-10-30 NOTE — VEIN_ITS
Patient Name:      
JOCELIN ARORA 
      
MR#: [id number]      
: 1955 
Accession #: [id number] 
Exam Date: 10/30/2024  
Ordering Doctor: DR KAYY JUNIOR M.D. 
  
RADIOLOGY REPORT 
  
PROCEDURE:     VC EXT VENOUS RT LMTD 
  
COMPARISON:     VC EXT VENOUS RT LMTD, 10/11/2024. 
  
INDICATIONS:     I80.01 - Phlebitis and thrombophlebitis of superficial veins  
right leg 
  
TECHNIQUE:     Lower extremity grey scale and Duplex Doppler evaluation of the  
deep venous system from the inguinal ligament through the calf veins.   
  
FINDINGS:       
REGION:     Right lower extremity.   
THROMBI:     Positive for DVT. 
     Chemically induced thrombus in mutliple varicose veins. Thrombus extends  
into PTV mid lower leg in a 5 cm segment. 
COMPRESSIBILITY:     Non-compressible segments corresponding to thrombus 
FLOW:     Areas of no flow corresponding to thrombus 
OTHER:     Multiple large varicose veins remain. Largest is mid medial thigh  
measurs 8.2 mm. 
  
  
CONCLUSION:      
1. Successful post ablation occlusion of right leg treated branch saphenous  
varicosities. 
2. Short segment of deep vein thrombus within the mid distal posterior tibial  
vein. 
  
  
Dictated by: Kayy Junior M.D. on 10/30/2024 at 12:41      
Approved by: Kayy Junior M.D. on 10/30/2024 at 12:42

## 2024-10-30 NOTE — V.VEINS.HP
Vital Signs
 10/30/24
12:09
Height 5 ft 11 in
Weight 166 kg
BMI 51.0

Varicose Veins
Patient in today for follow up ultrasound of right lower extremity following treatment of Varithena/microfoam completed on 10/24/24.

Reinaldo TOSCANO MD personally performed the services described in this documentation, as scribed by Maggie Barrios RDMS in my presence and it is both accurate and complete.



EVERTON, Maggie Barrios RDMS, am scribing for, and in the presence of, Dr. Sommer Anders and in the presence of the patient.
thigh: bilateral (right leg > left leg), knee: bilateral, calf: bilateral, ankle: bilateral and shin: bilateral
aching, burning, cramping, dull and tender
10
3 years
Worsened in recent months: Yes (last 5 months)
standing, sitting and walking
analgesics (Tylenol), elevating extremities, compression stockings and wraps
Reports muscle spasms of leg, fatigue, heaviness, limb pain, edema and leg edema
History of lower extremity trauma: Yes (a piece of cpap machine fell and hit right lower leg resulting in hemtoma)
Superficial thrombophlebitis: Yes (DVT right leg)
Family history of varicose veins: no
Has patient had previous lower extremity venous surgery: Yes
Patient has previously received the following treatment(s) for lower extremity varicose veins: Reports foam therapy and laser therapy
Does patient have a history of pregnancy: no
Has patient had lower extremity venous scan with relux testing: Yes
Support hose used: Yes
Problems walking or doing physical activity: Yes
How does it affect you: pain decreases ability to walk
Do you walk much: No
Do you stand much: Yes

Review of Systems
ROS  
 Narrative 
Reinaldo TOSCANO MD personally performed the services described in this documentation, as scribed by Maggie Barrios RDMS in my presence and it is both accurate and complete.



Maggie TOSCANO RDMS, am scribing for, and in the presence of, Dr. Sommer Anders and in the presence of the patient.   
 Status of ROS 10 or more systems reviewed and unremarkable except as noted in history and below   
 Cardiovascular Reports: edema   
 Integumentary/Breast Reports: itching, redness, skin pain, skin tenderness, skin swelling, non-healing lesion and changes in skin color   
 Neurological Reports: numbness in extremities and weakness in extremities   
 Hematologic/Lymphatic Reports: easy bruising and easy bleeding   

[facility]
Medical History (Updated 09/18/24 @ 07:48 by Maggie Barrios)

Phlebitis and thrombophlebitis of superficial vessels of right lower extremity
 ?I80.01 - Phlebitis and thrombophlebitis of superficial vessels of right lower extremity (ICD-10)
Varicose veins of bilateral lower extremities with pain
 ?I83.813 - Varicose veins of bilateral lower extremities with pain (ICD-10)
Lymphedema of both lower extremities
 ?I89.0 - Lymphedema, not elsewhere classified (ICD-10)
Arthritis
 ?M19.90 - Unspecified osteoarthritis, unspecified site (ICD-10)
Hypertension
 ?I10 - Essential (primary) hypertension (ICD-10)
Deep vein thrombosis
 ?I82.409 - Acute embolism and thrombosis of unspecified deep veins of unspecified lower extremity (ICD-10)
Cardiomyopathy
 ?I42.9 - Cardiomyopathy, unspecified (ICD-10)
Carpal tunnel syndrome
 ?G56.00 - Carpal tunnel syndrome, unspecified upper limb (ICD-10)
Peripheral vascular disease
 ?I73.9 - Peripheral vascular disease, unspecified (ICD-10)
Ulcer of right leg
 ?L97.919 - Non-pressure chronic ulcer of unspecified part of right lower leg with unspecified severity (ICD-10)
Coronary artery disease
 ?I25.10 - Atherosclerotic heart disease of native coronary artery without angina pectoris (ICD-10)
CHF (congestive heart failure)
 ?I50.9 - Heart failure, unspecified (ICD-10)
Obstructive sleep apnea
 ?G47.33 - Obstructive sleep apnea (adult) (pediatric) (ICD-10)
Chronic back pain
 ?M54.9 - Dorsalgia, unspecified (ICD-10)
 ?G89.29 - Other chronic pain (ICD-10)
Postphlebetic syndrome with inflammation
 ?I87.029 - Postthrombotic syndrome with inflammation of unspecified lower extremity (ICD-10)
Type 2 diabetes mellitus
 ?E11.9 - Type 2 diabetes mellitus without complications (ICD-10)


Surgical History (Updated 10/25/24 @ 14:21 by Khang Bahena)

S/P sclerotherapy of varicose veins
 ?Z98.890 - Other specified postprocedural states (ICD-10)
 ?Z86.79 - Personal history of other diseases of the circulatory system (ICD-10)
Status post laser ablation of incompetent vein
 ?Z98.890 - Other specified postprocedural states (ICD-10)
Status post laser ablation of incompetent vein
 ?Z98.890 - Other specified postprocedural states (ICD-10)
History of carpal tunnel surgery of right wrist
 ?Z98.890 - Other specified postprocedural states (ICD-10)
History of cholecystectomy
 ?Z90.49 - Acquired absence of other specified parts of digestive tract (ICD-10)


Family History (Updated 08/19/24 @ 11:42 by Khang Bahena)
Sister
 Family history of diabetes mellitus
Father
 Family history of diabetes mellitus
Other
 Family history of CHF (congestive heart failure)
 Family history of hypertension
 Family history of myocardial infarction



Social History (Updated 08/20/24 @ 10:55 by Khang Bahena)
Within the past year, how often did you have a drink containing alcohol:  never 
Score interpretation:  A score less than 4 is consistent with normal alcohol consumption. 
Smoking status:  Never smoker 
Non-prescribed substance use:  denies use 



Meds
Home Medications and Allergies
Home Medications

?Medication ?Instructions ?Recorded ?Confirmed ?Type
Lactobacillus acidophilus 10 100 mmu cells PO DAILY 08/19/24 08/19/24 History
billion cell capsule    
acetaminophen 500 mg capsule 500 mg PO Q6H PRN pain 08/19/24 08/19/24 History
carvedilol 6.25 mg tablet 6.25 mg PO BID 08/19/24 08/19/24 History
empagliflozin 10 mg-linagliptin 5 1 tab PO DAILY 08/19/24 08/19/24 History
mg tablet    
furosemide 40 mg tablet 40 mg PO BID 08/19/24 08/19/24 History
rivaroxaban 10 mg tablet 10 mg PO DAILY 08/19/24 08/19/24 History
sacubitril 24 mg-valsartan 26 mg 1 tab PO BID 08/19/24 08/19/24 History
tablet    
semaglutide 0.25 mg or 0.5 mg (2 0.25 mg subcut QWEEK 08/19/24 08/19/24 History
mg/3 mL) subcutaneous pen injector    
(Ozempic)    
spironolactone 25 mg tablet 25 mg PO DAILY 08/19/24 08/19/24 History
(Aldactone)    


Allergies

Allergy/AdvReac Type Severity Reaction Status Date / Time
cefixime Allergy  Unknown Verified 08/19/24 11:44
diclofenac Allergy  Unknown Verified 08/19/24 11:44



Exam
Narrative
Exam Narrative: 

Reinaldo TOSCANO MD personally performed the services described in this documentation, as scribed by Maggie Barrios RDMS in my presence and it is both accurate and complete.



Maggie TOSCANO RDMS, am scribing for, and in the presence of, Dr. Sommer Anders and in the presence of the patient.
Constitutional
Documenting provider has reviewed patient's vital signs: yes
Common normals: oriented x3
Nutritional appearance: overweight
Cardio
Peripheral pulses: posterior tibial pulses present and dorsalis pedis pulses present
Extremity
Common normals: normal capillary refill
General: calf tenderness and edema
Right lower extremity: lower leg Right lower leg: inspection and palpation
Left lower extremity: lower leg Left lower leg: inspection and palpation
Neuro
Common normals: oriented x3

Results
Imaging
Venous US: 
      Radiologist's impression: 
Chemically induced thrombus in right varicose veins. Thrombus extends into 5 cm segment of PTV mid lower leg.


Reinaldo TOSCANO MD personally performed the services described in this documentation, as scribed by Maggie Barrios RDMS in my presence and it is both accurate and complete.



Maggie TOSCANO RDMS, am scribing for, and in the presence of, Dr. Sommer Anders and in the presence of the patient.

Assessment and Plan
Assessment and Plan
(1) Phlebitis and thrombophlebitis of superficial vessels of right lower extremity: 

Plan
Plan is for patient to return for Varithena/microfoam of right leg on 11/6/24.


Reinaldo TOSCANO MD personally performed the services described in this documentation, as scribed by Maggie Barrios RDMS in my presence and it is both accurate and complete.



Maggie TOSCANO RDMS, am scribing for, and in the presence of, Dr. Sommer Anders and in the presence of the patient.

## 2024-10-30 NOTE — P.DS_ITS
Discharge Plan    
Discharge    
Disposition: Home, Self-Care    
    
Outpatient Diagnostics:    
VC INJ Foam Sclerosant WUS MLT  (Routine)  Timeframe:  1 Month    
   Facility: [facility] - Location: [facility]    
   Ordered By:  Reinaldo Anders    
    
Follow Up Appointments: 11/6/24     
    
    
Plan of Treatment:    
Varithena right leg    
    
Print Language: English    
    
Discharge Date/Time: 10/30/24 14:11

## 2024-10-30 NOTE — VEIN_ITS
Patient Name:      
JOCELIN ARORA 
      
MR#: [id number]      
: 1955 
Accession #: [id number] 
Exam Date: 10/30/2024  
Ordering Doctor: DR KAYY JUNIOR M.D. 
  
RADIOLOGY REPORT 
  
PROCEDURE:  FACILITY EST LMTD 
  
[facility] - OFFICE VISIT FOLLOW UP 
  
[facility] 
  
PROGRESS NOTES: 
  
The patient reports improvement in leg symptoms.  There has been interval  
reduction in varicosities.  The patient has followed our recommendations to  
walk 20-30 minutes once or twice per day since the procedure.  
  
Physical exam demonstrates decrease in varicosities of the leg. Persistent  
varicosities are identified along the right. 
  
Review of the ultrasound performed the same day demonstrates occlusive  
thrombus extending throughout the treated vein(s), see separate report,  
consistent with a successful ablation.  Short segment of thrombus within the  
mid-distal posterior tibial vein.  No thrombus extending into or beyond the  
saphenofemoral junction. 
  
The patient expressed a desire to proceed with treatment of remaining  
incompetent varicosities of right leg.   The patient was informed that  
treatment was a process and would require 1-2 procedures/sessions. 
  
ORDER #: [id number] VEIN/[facility] EST LMTD  
IMPRESSION:  
1. Successful ablation of the right leg treated branch saphenous vein(s).  
2. Persistent varicose veins and right lower extremity symptoms.  
3. Small deep vein thrombus within distal posterior tibial vein of right leg.  
  
Patient is already on Xarelto and will continue.  
   
PLAN:  
   
1. Microfoam chemical ablation of right leg remaining incompetent branch   
saphenous varicosities.    
   
   
Nurse notes, history and physical were reviewed and confirmed, see attached   
forms.  
The nurse was present throughout the physical exam and consultation  
   
   
   
   
   
Dictated by: Kayy Junior M.D. on 10/30/2024 at 12:42       
Approved by: Kayy Junior M.D. on 10/30/2024 at 12:44

## 2024-10-30 NOTE — XMS_ITS
Comprehensive CCD (C-CDA v2.1)  
  
                          Created on: 2024  
  
  
Jocelin Pacheco  
External Reference #: CDR,PersonID:[id number]  
: [id number]  
Sex: Male  
  
Demographics  
  
  
                                        Address             [address]  [address]  
   
                                        Home Phone          [phone]  
   
                                        Work Phone          [phone]  
   
                                        Preferred Language  en  
   
                                        Marital Status        
   
                                        Pentecostalism Affiliation Unknown  
   
                                        Race                White  
   
                                        Ethnic Group        Not  or Lati  
no  
  
  
Author  
  
  
                                        Organization        [facility]  
  
  
Care Team Providers  
  
  
                                Care Team Member Name Role            Phone  
   
                                Annel Baird      Unavailable     [phone]  
   
                                Alonso Birmingham   Unavailable     [phone]  
   
                                Jacoby Braden Unavailable     [phone]  
   
                                Vera Lea Unavailable     [phone]  
   
                                Unavailable     Unavailable     [phone]  
   
                                DO Vera Lea Primary Care Provider [phone]  
   
                                ESVIN Stweard Attending Provider [phone]  
   
                                SOWMYA Villarreal Attending Provider [phone]  
   
                                DO Vera Lea Primary Care Provider [phone]  
   
                                ESVIN Steward Attending Provider [phone]  
   
                                DO JASON Whitley Attending Provider [phone]  
   
                                COMPA WHITLEY Attending       Unavailable  
   
                                COMPA WHITLEY Referring       Unavailable  
   
                                Ngozi, Dr. Vera Vincent Primary [facility]    U  
DO Vera Brown Primary Care Provider [phone]  
   
                                ESVIN Steward Attending Provider [phone]  
   
                                MD Jacoby Braden Attending Provider [phone]  
   
                                DO Vera Lea Primary Care Provider [phone]  
   
                                SOWMYA Espino Attending Provider [phone]  
   
                                Dr. Compa Whitley Referring       Rachna Lea, Dr. Vera Vincent [facility]    U  
daniel Whitley, Dr. Compa Sr Attending       Rachna Whitley, Dr. Compa Sr Referring       Unava  
ilmariaelena Lea, Dr. Vera Melisa Primary Care    U  
Dr. Compa Thomson Attending       Unava  
ilable  
   
                                Nichelle, ANABELLAC Barney Attending Provider [phone]  
   
                                Matias-Peoria, DO Vera Attending Provider [phone]  
   
                                Matias-Peoria DO, Vera D Unavailable     [phone]  
   
                                Juve FUNEZM, Danya SMART Unavailable     [phone]  
   
                                [facility]uke CORTES, Elroy CELESTE Unavailable     [phone]  
   
                                Matias-Peoria DO, Vera D Primary Care Provider   
[phone]  
   
                                Matias-Peoria, DO Vera Primary Care Provider [phone]  
   
                                SOWMYA Espino Attending Provider [phone]  
   
                                Matias-Peoria DO, Vera Melisa Primary Care Provi  
sherly [phone]  
   
                                SHARLENE AN  Attending       Unavailable  
   
                                MATIAS-EMERY, VERA MELISA Primary Care    Unava  
ilable  
   
                                Matias-Peoria, DO Vera Primary Care Provider [phone]  
   
                                SOWMYA Espino Attending Provider [phone]  
   
                                Matias-Peoria, DO Vera Primary Care Provider [phone]  
   
                                SOWMYA Espino Attending Provider [phone]  
   
                                VERA LAE Attending       Unavailab  
le  
   
                                MATIAS-VERA ANTONY Referring       Unavailab  
le  
   
                                DANYA AN Attending       Unavailable  
   
                                MATIASVERA GARCIA Referring       Unavailab  
le  
   
                                LOPEZ, JESSICA L   Attending       Unavailable  
   
                                MIRIAN, RAY L Referring       Unavailable  
   
                                DEPOY, ARLENE Attending       Unavailable  
   
                                MIRIAN, RAY L Referring       Unavailable  
   
                                LOPEZ, JESSICA L   Attending       Unavailable  
   
                                MIRIAN, RAY L Referring       Unavailable  
   
                                DEPOY, RALENE Attending       Unavailable  
   
                                MIRIAN, RAY L Referring       Unavailable  
   
                                AN, DANYA H Attending       Unavailable  
   
                                LOPEZ, JESSICA L   Attending       Unavailable  
   
                                MIRIAN, RAY L Referring       Unavailable  
   
                                DANYA AN Attending       Unavailable  
   
                                MATIAS-VERA ANTONY Attending       Unavailab  
le  
   
                                MATIAS-EMERVERA LANE Referring       Unavailab  
le  
   
                                DANYA AN Attending       Unavailable  
   
                                MATIAS-VERA ANTONY Attending       Unavailab  
le  
   
                                MATIAS-EMERYVERA Referring       Unavailab  
le  
   
                                Elaine Espino   Attending       Unavailable  
   
                                Copsey, Elaine   Admitting       Unavailable  
   
                                Matias-Peoria, Vera Primary Care    Unavailable  
   
                                Copsey, Elaine   Admitting       Unavailable  
   
                                Matias-Peoria, Vera Primary Care    Unavailable  
   
                                Copsey, Elaine   Attending       Unavailable  
   
                                Copsey, Elaine   Admitting       Unavailable  
   
                                Matias-Peoria, Vera Primary Care    Unavailable  
   
                                Copsey, Elaine   Attending       Unavailable  
  
  
  
Allergies  
  
  
                                                    Allergy   
Classification                          Reported   
Allergen(s)               Allergy Type              Date of   
Onset                     Reaction(s)               Facility  
   
                                                      
(20 sources)        Cefixime            Drug Allergy        20  
22                                      Unknown,   
Unknown   
Reaction                                [facility]  
   
                                                      
(20 sources)                            Diclofenac;   
Translations:   
[Voltaren]                Drug Allergy              20  
22                        [facility]  
   
                                                      
(20 sources)        Nystatin            Drug Allergy        20  
22                                      Unknown,   
Unknown   
Reaction                                [facility]  
   
                                                      
(8 sources)                             Cephalosporins   
(Antibiotic);   
Translations:   
[Cephalosporins]                        Allergy to   
drug (finding)                          20                        [place] [address] Repository  
   
                                                      
(7 sources)                             Hmg-Coa Reductase   
Inhibitors   
(Statins);   
Translations:   
[Statins]                               Allergy to   
drug (finding)                          [facility]   
[facility]  
[address] DO  
Work Phone:   
[phone]  
[phone]  
   
                                                      
(15 sources)                            Benzoate;   
Translations:   
[sodium benzoate]         Drug Allergy              20  
22                                      Unknown   
Reaction                                [facility]  
   
                                                      
(3 sources)               Cefixime                  Allergy to   
substance                               20  
23                        Unknown                   [facility]   
[facility]  
Work Phone:   
[phone]  
1  
   
                                                      
(3 sources)         Nystatin            Drug Allergy        20  
23                        Unknown                   [facility]   
[facility]  
   
                                                      
(1 source)                              Cephalosporins   
(Antibiotic)                            Drug   
Intolerance                             20                        [facility]  
   
                                                      
(2 sources)                             Diclofenac;   
Translations:   
[DICLOFENAC   
SODIUM]                   Drug Allergy              20                        [facility]  
Work Phone:   
[phone]  
   
                                                      
(2 sources)                             HMG-CoA reductase   
inhibitor;   
Translations:   
[STATINS-HMG-COA   
REDUCTASE   
INHIBITORS]                             Drug   
Intolerance                             20                        [facility]  
Work Phone:   
[phone]  
   
                                                      
(1 source)          Cefixime            Drug Allergy        20                                                  [facility]   
Repository  
   
                                                      
(1 source)          Diclofenac          Drug Allergy        20                                                  [facility]   
Repository  
   
                                                      
(1 source)          Nystatin            Drug Allergy        20                                                  [facility]   
Repository  
  
  
  
Medications  
Current Medications  
  
  
  
                      Medication Drug Class(es) Dates      Sig (Normalized) Sig   
(Original)  
   
                                                    3 ML semaglutide   
2.68 MG/ML Pen   
Injector [Ozempic]  
(7 sources)                                         Start:   
2022                              inject 2 mg by   
subcutaneous   
injection every   
week                                    Ozempic (2 MG/DOSE)   
8 MG/3ML 2 mg   
Subcutaneous weekly   
for 30 days 21 Dec,   
2022 Active  
  
  
  
                                                            inject 2 mg by subcu  
taneous injection   
every week                              Ozempic (2 MG/DOSE) 8 MG/3ML 2 mg SQ Onc  
e   
a week. for 28 days E11.8 Active  
   
                                                            inject 2 mg by subcu  
taneous injection   
every week                              Ozempic (2 MG/DOSE) 8 MG/3ML DIAL AND   
INJECT UNDER THE SKIN 2 MG WEEKLY for 28   
Active  
   
                                                            inject 2 mg by subcu  
taneous injection   
every week                              Ozempic (2 MG/DOSE) 8 MG/3ML 2 mg   
Subcutaneous weekly for 30 days Active  
  
  
  
                                                    acetaminophen 500   
mg oral tablet  
(12 sources)                                        Start:   
2020                              take 1   
tablet by   
mouth three   
times daily                             Acetaminophen   
(Tylenol Extra   
Strength) 500 mg   
Tablet Active 500   
MG PO Three times   
daily 2020 1:00am  
   
                                                    carvedilol 6.25 mg   
oral tablet  
(20 sources)                            alpha-Adrenergic   
Blocker,   
beta-Adrenergic   
Blocker                                 Start:   
2017                              take 6.25 mg   
by mouth   
twice daily                             Carvedilol Active   
6.25 MG PO Twice   
daily 2017 1:00am  
   
                                                    empagliflozin 10 mg   
oral tablet  
(20 sources)                            Sodium-Glucose   
Cotransporter 2   
Inhibitor                               Start:   
2022                              take 1   
tablet by   
mouth once   
daily                                   Empagliflozin   
(Jardiance) 10 mg   
tablet Active 10 MG   
PO Daily 2022 12:00am  
   
                                                    furosemide 40 mg   
oral tablet  
(20 sources)              Loop Diuretic             Start:   
2017                              take 1   
tablet by   
mouth twice   
daily                                   Furosemide (Lasix)   
40 mg Tablet Active   
40 MG PO Twice   
daily 2017 1:00am  
  
  
  
                                                            take 1 tablet by lenin  
th every twelve   
hours                                   furosemide (Lasix) 40 MG tablet Take 40   
mg   
by mouth every 12 (twelve) hours. 0 Active  
   
                                                                Lasix TABS TAKE   
1 TABLET TWICE DAILY.   
Quantity: 0 Refills: 0 Ordered: 2022   
DO Active  
  
  
  
                                                    lactobacillus   
acidophilus   
[id number] unt oral   
capsule  
(6 sources)                                         Start:   
2024                              take 10   
capsules by   
mouth once   
daily                                   Lactobacillus   
Acidophilus   
(Probiotic) 10   
billion cell capsule   
Active 100 MMU CELLS   
PO Daily 2024 1:00am  
   
                                                    methylPREDNISolone  
(1 source)                Corticosteroid            Start:   
2024  
End:   
2024                                          methylPREDNISolone   
(Medrol Dospak) 4 MG   
tablets Indications:   
Lumbosacral strain,   
initial encounter ,   
Acute pain of left   
shoulder Take as   
directed on package.   
21 tablet 0   
2024 Active  
   
                                                    Ozempic (2 MG/DOSE) 8   
MG/3ML  
(1 source)                                                  inject 2 mg by   
subcutaneous   
injection   
every week                              Ozempic (2 MG/DOSE)   
8 MG/3ML 2 mg   
Subcutaneous weekly   
for 30 days Active  
   
                                                    Ozempic, 2 MG/DOSE, 8   
MG/3ML solution   
pen-injector  
(3 sources)                                         Start:   
2023                                          Ozempic, 2 MG/DOSE,   
8 MG/3ML solution   
pen-injector  
   
                                                    Probiotic Product   
(PROBIOTIC BLEND PO)  
(3 sources)                                                 take 2 [IU] by   
mouth in the   
morning                                 Probiotic Product   
(PROBIOTIC BLEND PO)   
Take 2 Units by   
mouth in the   
morning. 0 Active  
   
                                                    rivaroxaban 10 mg   
oral tablet  
(20 sources)                            Factor Xa   
Inhibitor                               Start:   
2022                              take 1 tablet   
by mouth once   
daily                                   Rivaroxaban   
(Xarelto) 10 mg   
Tablet Active 10 MG   
PO Daily 2022 12:00am   
for 35 days  
  
  
  
                                                    Start: 2019  
End: 2021           take 10 mg by mouth once daily Rivaroxaban Discontinue  
d 10 MG PO  
   
Daily 2019 12:00am 2021 1:02am  
   
                                                                Xarelto Active  
  
  
  
                                                    rosuvastatin   
calcium 10 mg oral   
tablet  
(3 sources)                             HMG-CoA Reductase   
Inhibitor                               Start:   
2023                              take 1 tablet by   
mouth every   
twenty-four hours                       Rosuvastatin   
Calcium 10 MG 1   
tablet Orally   
Once a day for 30   
day(s)  Active  
   
                                                    sacubitril 24 mg /   
valsartan 26 mg   
oral tablet  
(20 sources)                            Angiotensin 2   
Receptor Blocker                        Start:   
2022                              take 1 tablet by   
mouth twice daily                       Sacubitril-Valsar  
jiménez (Entresto)   
24-26 mg Tablet   
Active 1 TAB PO   
Twice daily 2023   
12:00am  
  
  
  
                                                take 1 tablet by mouth in the mo  
rning Entresto 24-26 MG tablet Take 1 tablet by  
   
mouth in the morning and 1 tablet before   
bedtime. 0 Active  
   
                                                                ENTRESTO 24 mg/2  
6 mg 1 orally twice a day   
Active  
  
  
  
                                                    0.25 mg, 0.5 mg   
dose 1.5 ml   
semaglutide 1.34   
mg/ml pen injector  
(3 sources)                                         Start:   
2022                                          Ozempic (0.25 or   
0.5 MG/DOSE) 2   
MG/1.5ML 0.25 mg   
for one month and   
then increase to   
0.5 mg dose   
Subcutaneous   
weekly for 30 days   
   
Active  
   
                                                    Semaglutide  
(4 sources)                                         Start:   
2024                              inject 0.25 mg by   
subcutaneous   
injection every   
week, then inject   
0.5 mg by   
subcutaneous   
injection every   
week                                    Semaglutide   
(Ozempic) 0.25 mg   
or 0.5 mg (2 mg/3   
mL) pen injector   
Active 0.25 MG   
SUBCUT every week   
3 April 24th, 2024   
12:00am for 4   
weeks; then   
increase to 0.5 mg   
every week  
   
                                                    semaglutide   
(Ozempic) 1 mg/dose   
(4 mg/3 mL) pen   
injector  
(1 source)                                                  inject 1 mg by   
subcutaneous   
injection every   
week                                    semaglutide   
(Ozempic) 1   
mg/dose (4 mg/3   
mL) pen injector   
Inject 1 mg under   
the skin per week.   
Active  
   
                                                    spironolactone 25   
mg oral tablet  
(20 sources)                            Aldosterone   
Antagonist                              Start:   
2022                              take 25 mg by   
mouth once daily                        Spironolactone   
Active 25 MG PO   
Daily 2022 12:00am  
  
  
  
Completed/Discontinued Medications  
  
  
  
                      Medication Drug Class(es) Dates      Sig (Normalized) Sig   
(Original)  
   
                                                    3 ML semaglutide   
1.34 MG/ML Pen   
Injector [Ozempic]  
(7 sources)                                         Start:   
10-                              inject 1 mg by   
subcutaneous   
injection every   
week                                    Ozempic (1 MG/DOSE)   
4 MG/3ML 1 mg   
Subcutaneous Weekly   
for 28 days 28 Oct,   
2022 Not-Taking  
  
  
  
                                        Start: 10-   inject 1 mg by subcu  
taneous   
injection every week                    Ozempic (1 MG/DOSE) 4 MG/3ML 1 mg   
Subcutaneous Weekly for 28 days   
28 Oct, 2022 Active  
   
                                        Start: 2022   inject 1 mg by subcu  
taneous   
injection every week                    Ozempic (1 MG/DOSE) 4 MG/3ML 1 mg   
Subcutaneous Weekly for 28 days   
 Active  
  
  
  
                                                    acetaminophen 325   
mg / HYDROcodone   
bitartrate 5 mg   
oral tablet  
(12 sources)              Opioid Agonist            Start:   
2021  
End:   
2022                              take 1   
tablet by   
mouth   
every   
eight   
hours                                   Hydrocodone-Acetaminophen   
Discontinued 1 TAB PO Q8H 6   
2  8:11am  
   
                                                    acetaminophen 325   
mg / oxyCODONE   
hydrochloride 5   
mg oral tablet  
(12 sources)              Opioid Agonist            Start:   
2019  
End:   
2020                              take 1   
tablet by   
mouth   
every   
four to   
six hours                               Oxycodone-Acetaminophen   
(Percocet) 5-325 mg tablet   
Discontinued 1 TAB PO EVERY   
4-6 HOURS 10 3 November   
29th, 2019 December 2nd,   
2020 1:59pm  
   
                                                    ampicillin 500 mg   
oral capsule  
(12 sources)                            Penicillin-class   
Antibacterial                           Start:   
2019  
End:   
2019                              take 500   
mg by   
mouth   
every six   
hours                                   Ampicillin Discontinued 500   
MG PO Q6H 56 14 2019 12:00am 2019   
3:39pm  
   
                                                    ascorbic acid 500   
mg oral tablet  
(12 sources)              Vitamin C                 Start:   
2020  
End:   
2022                              take 1   
tablet by   
mouth   
twice   
daily                                   Ascorbic Acid (Vitamin C)   
(Vitamin C) 500 mg Tablet   
Discontinued 500 MG PO Twice   
daily 14  1:00am 2022 8:11am  
   
                                                    aspirin 81 mg   
delayed release   
oral tablet  
(12 sources)                            Platelet   
Aggregation   
Inhibitor,   
Nonsteroidal   
Anti-inflammatory   
Drug                                    Start:   
2019  
End:   
2019                              take 1   
tablet by   
mouth   
once   
daily                                   Aspirin (Aspirin Low Dose)   
81 mg Tablet,Delayed Release   
(Dr/Ec) Discontinued 81 MG   
PO Daily 2019   
12:00am 2019   
11:52am  
   
                                                    cephalexin 500 mg   
oral capsule  
(13 sources)                            Cephalosporin   
Antibacterial                           Start:   
2024  
End:   
08-                              take 500   
mg by   
mouth   
twice   
daily                                   Cephalexin Discontinued 500   
MG PO Twice daily 14  12:00am   
2024 3:20pm  
  
  
  
                                                    Start: 2019  
End: 2019                         take 500 mg by mouth every   
five hours                              Cephalexin Discontinued 500 MG PO Q5H   
2019 12:00am May 6th, 2019   
1:54pm  
  
  
  
                                                    ciprofloxacin 500   
mg oral tablet  
(20 sources)                            Quinolone   
Antimicrobial                           Start:   
2021  
End: 2021                         take 1   
tablet by   
mouth twice   
daily                                   Ciprofloxacin Hcl   
(Cipro) 500 mg   
tablet Discontinued   
500 MG PO Twice   
daily  1:00am   
2021   
12:59am  
  
  
  
                                                    Start: 2020  
End: 2021                         take 500 mg by mouth every   
twelve hours                            Ciprofloxacin Hcl Discontinued 500 MG   
PO Q12H 20 10 December 4th, 2020   
1:00am 2021 3:07pm  
   
                                                    Start: 2020  
End: 2020                         take 1 tablet by mouth twice   
daily                                   Ciprofloxacin Hcl (Cipro) 500 mg   
tablet Discontinued 500 MG PO Twice   
daily  12:00am   
2020 1:36pm  
   
                                                    Start: 2020  
End: 2020                         take 1 tablet by mouth twice   
daily                                   Ciprofloxacin Hcl (Cipro) 500 mg   
tablet Discontinued 500 MG PO Twice   
daily  12:00am 2020 10:03am  
  
  
  
                                                    clindamycin 300 mg   
oral capsule  
(20 sources)                            Lincosamide   
Antibacterial                           Start: 2019  
End: 2020                         take 300 mg   
by mouth   
every eight   
hours                                   Clindamycin Hcl   
Discontinued 300   
MG PO Q8H 30 10   
November 29th,   
2019 1:00am   
2020   
3:08pm  
  
  
  
                                                    Start: 2019  
End: 2019                         take 450 mg by mouth every   
eight hours                             Clindamycin Hcl Discontinued 450 MG   
PO Q8H 63  1:00am   
2019 2:00pm  
  
  
  
                                                    clobetasol propionate   
0.0005 mg/mg topical   
ointment  
(20 sources)              Corticosteroid            Start: 2019  
End: 2023                                     Clobetasol Discontinued 1   
APPLIC TOPICAL 3 Times a   
week 2021   
1:32pm 2023   
11:11am apply thin layer   
to right leg 1-2 times   
daily as needed for   
itching.  
  
  
  
                                                    Start: 05-  
End: 2019                                     Clobetasol Discontinued 1 AP  
PLIC TOPICAL .every other day 30 14  
   
May 15th, 2019 12:00am 2019 2:56pm apply thin layer to   
right leg rash with dressing changes  
   
                                                                clobetasol (Jose A  
vate) 0.05 % cream Apply 1 application topically   
every 12 (twelve) hours. 0 Active  
   
                                                                Clobetasol Propi  
maged Not-Taking  
   
                                                                Clobetasol Propi  
maged Active  
  
  
  
                                                    cyclobenzaprine   
hydrochloride 10 mg   
oral tablet  
(19 sources)              Muscle Relaxant           Start:   
2017  
End: 2021                         take 10 mg   
by mouth   
three   
times   
daily                                   Cyclobenzaprine   
Discontinued 10 MG   
PO Three times daily   
2017   
1:00am 2021 12:59am  
   
                                                    dexamethasone 6 mg   
oral tablet  
(12 sources)              Corticosteroid            Start:   
2020  
End: 2021                         take 6 mg   
by mouth   
once daily                              Dexamethasone   
Discontinued 6 MG PO   
Daily 3 3 December   
22nd, 2020 1:00am   
2021   
3:07pm  
   
                                                    doxycycline hyclate   
100 mg oral capsule  
(20 sources)                            Tetracycline-class   
Drug                                    Start:   
2024  
End: 08-                         take 100   
mg by   
mouth   
twice   
daily                                   Doxycycline Hyclate   
Discontinued 100 MG   
PO Twice daily 20 10   
July 8th, 2024   
12:00am 2024 3:20pm  
  
  
  
                                                    Start: 2023  
End: 2024                         take 100 mg by mouth twice   
daily                                   Doxycycline Hyclate Discontinued 100   
MG PO Twice daily  1:00am 2024   
2:30pm  
   
                                                    Start: 2021  
End: 2021                         take 100 mg by mouth twice   
daily                                   Doxycycline Hyclate Discontinued 100   
MG PO Twice daily 20 10 April 8th,   
2021 12:00am 2021 1:31pm  
   
                                                    Start: 2021  
End: 2021                         take 100 mg by mouth twice   
daily                                   Doxycycline Hyclate Discontinued 100   
MG PO Twice daily 20 10 January 29th,   
2021 1:00am 2021 8:01am  
   
                                                    Start: 10-  
End: 2020                         take 100 mg by mouth twice   
daily                                   Doxycycline Hyclate Discontinued 100   
MG PO Twice daily 20 10 October 5th,   
2020 12:00am 2020   
1:59pm  
   
                                                    Start: 2020  
End: 2020                         take 100 mg by mouth twice   
daily                                   Doxycycline Hyclate Discontinued 100   
MG PO Twice daily May 5th, 2020   
12:00am 2020 1:37pm  
   
                                                    Start: 2020  
End: 2020                         take 100 mg by mouth twice   
daily                                   Doxycycline Hyclate Discontinued 100   
MG PO Twice daily  12:00am 2020 1:21pm  
   
                                                    Start: 2019  
End: 2020                         take 100 mg by mouth twice   
daily                                   Doxycycline Hyclate Discontinued 100   
MG PO Twice daily  1:00am 2020   
3:11pm  
   
                                                    Start: 2019  
End: 2019                         take 100 mg by mouth twice   
daily                                   Doxycycline Hyclate Discontinued 100   
MG PO Twice daily  12:00am 2019 3:39pm  
   
                                                    Start: 2019  
End: 2019                         take 100 mg by mouth twice   
daily                                   Doxycycline Hyclate Discontinued 100   
MG PO Twice daily 28 14 May 3rd, 2019   
12:00am May 28th, 2019 3:36pm  
  
  
  
                                                    Ketorolac  
(15 sources)                            Nonsteroidal   
Anti-inflammatory Drug,   
Cyclooxygenase   
Inhibitor                               Start:   
07-                                          Toradol per 15 mg   
15 2013 2 mL  
   
                                                    levoFLOXacin 750   
mg oral tablet  
(12 sources)              Quinolone Antimicrobial   Start:   
2019  
End: 2019                         take 1   
tablet by   
mouth once   
daily                                   Levofloxacin   
(Levaquin) 750 mg   
tablet   
Discontinued 750   
MG PO Daily 10 10   
Lynn 25th, 2019   
12:00am 2019 2:24pm  
   
                                                    meloxicam 15 mg   
oral tablet  
(19 sources)                            Nonsteroidal   
Anti-inflammatory Drug                  Start:   
2017  
End: 2021                         take 15 mg   
by mouth   
once daily                              Meloxicam   
Discontinued 15   
MG PO Daily   
2017 1:00am 2021 1:00am  
   
                                                    metOLazone 2.5 mg   
oral tablet  
(20 sources)              Thiazide-like Diuretic    Start:   
2022  
End: 2023                         take 2.5 mg   
by mouth   
every other   
day                                     Metolazone   
Discontinued 2.5   
MG PO Q2D 2022   
12:00am 2023   
11:10am  
  
  
  
                                                    Start: 2019  
End: 2022                         take 2.5 mg by mouth three   
times weekly in the morning as   
needed                                  Metolazone Discontinued 2.5 MG PO 3   
Times a week 2019 12:00am   
2022 8:10am take three   
times a week 30 minutes prior to am   
lasix as needed  
   
                                                                metOLazone Activ  
e  
  
  
  
                                                    nabumetone 750 mg   
oral tablet  
(20 sources)                            Nonsteroidal   
Anti-inflammatory Drug                  Start:   
2021  
End: 2024                         take 750 mg   
by mouth   
twice daily                             Nabumetone   
Discontinued 750   
MG PO Twice daily   
2021   
1:00am 2024 9:17am  
  
  
  
                                                    Start: 2021  
End: 2024                         take 1 tablet by mouth every   
twelve hours                            nabumetone (Relafen) 750 mg tablet   
Take 1 tablet (750 mg) by mouth every   
12 hours. 2021   
Discontinued (Discontinued by another   
clinician)  
   
                                        Start: 2021   take 1 tablet by lenin  
th once   
daily                                   Nabumetone 750 MG Oral Tablet TAKE 1   
TABLET EVERY 12 HOURS DAILY.   
Quantity: 60 Refills: 2 Ordered:   
2021 Compa Soto MD   
Start : 2021 Active  
  
  
  
                                                    Ozempic (1 MG/DOSE)   
SOPN  
(7 sources)                                                     Ozempic (1 MG/DO  
SE)   
SOPN INJECT 1 UNIT   
Weekly Quantity: 0   
Refills: 0 Ordered:   
2022 DO Active  
   
                                                    permethrin 50 mg/ml   
topical cream  
(12 sources)              Pyrethroid                Start:   
2019  
End:   
2019                                          Permethrin   
Discontinued 1 APPLIC   
TOPICAL Once May 6th,   
2019 12:00am May   
28th, 2019 3:36pm  
   
                                                    predniSONE 20 mg oral   
tablet  
(7 sources)                                         Start:   
2019                              take 2 tablets by   
mouth every   
twenty-four hours                       predniSONE 20 MG 2   
tablet Orally Once a   
day for 5  Not-Taking  
   
                                                    Semaglutide (Ozempic)   
2 mg/dose (8 mg/3 mL)   
pen injector  
(12 sources)                                        Start:   
2022  
End:   
2024                              inject 2 mg by   
subcutaneous injection   
every week                              Semaglutide (Ozempic)   
2 mg/dose (8 mg/3 mL)   
pen injector   
Discontinued 2 MG   
SUBCUT Q7D 2022 12:00am   
2024   
2:31pm  
  
  
  
                                        Start: 2022   inject 2 mg by subcu  
taneous   
injection every week                    Semaglutide (Ozempic) 2 mg/dose   
(8 mg/3 mL) pen injector Active 2   
MG SUBCUT Q7D 2022   
11:00pm  
   
                                        Start: 2022   inject 2 mg by subcu  
taneous   
injection every week                    Semaglutide (Ozempic) 2 mg/dose   
(8 mg/3 mL) pen injector Active 2   
MG SUBCUT Q7D 2022   
12:00am  
  
  
  
                                                    Sulfamethoxazole  
(7 sources)     Sulfonamide Antimicrobial                                 Sulfam  
ethoxazole Not-Taking  
  
  
  
                                                                Sulfamethoxazole  
 Active  
  
  
  
                                                    sulfamethoxazole   
800 mg /   
trimethoprim 160   
mg oral tablet  
(12 sources)                            Dihydrofolate   
Reductase   
Inhibitor   
Antibacterial,   
Sulfonamide   
Antimicrobial                           Start:   
2020  
End:   
2020                              take 1   
tablet by   
mouth   
twice   
daily                                   Sulfamethoxazole-Trimethoprim   
(Bactrim Ds) 800-160 mg tablet   
Discontinued 1 TAB PO Twice   
daily    
1:00am 2020   
3:57pm  
   
                                                    telmisartan 40 mg   
oral tablet  
(20 sources)                            Angiotensin 2   
Receptor Blocker                        Start:   
2017  
End:   
2022                              take 40   
mg by   
mouth   
once   
daily                                   Telmisartan Discontinued 40 MG   
PO Daily 2017   
1:00am 2022   
8:15am  
   
                                                    traMADol   
hydrochloride 50   
mg oral tablet  
(19 sources)              Opioid Agonist            Start:   
2020  
End:   
2020                              take 50   
mg by   
mouth   
twice   
daily                                   Tramadol Discontinued 50 MG PO   
Twice daily 2020   
1:00am 2020   
2:10pm  
   
                                                    triamcinolone   
acetonide 5 mg/ml   
topical cream  
(12 sources)              Corticosteroid            Start:   
2019  
End:   
2019                                          Triamcinolone Acetonide   
Discontinued 1 APPLIC TOPICAL   
Twice daily  12:00am 2019   
12:02am apply to back and arm   
rash twice daily as needed for   
itching.  
   
                                                    Triamcinolone &   
Emollient 0.1 %  
(7 sources)                                                     Triamcinolone &   
Emollient 0.1   
% as directed Externally   
Not-Taking  
   
                                                    vancomycin 250 mg   
oral capsule  
(12 sources)                            Glycopeptide   
Antibacterial                           Start:   
2021  
End:   
2021                              take 250   
mg by   
mouth   
every six   
hours                                   Vancomycin Discontinued 250 MG   
PO Q6H 28    
12:00am 2021 1:01am  
   
                                                    zinc sulfate 220   
mg oral capsule  
(12 sources)                                        Start:   
2020  
End:   
2022                              take 1   
capsule   
by mouth   
once   
daily                                   Zinc Sulfate (Orazinc) 220   
(50) mg Capsule Discontinued   
220 MG PO Daily 7  1:00am 2022 8:11am  
  
  
  
Problems  
Active Problems  
  
  
                      Problem Classification Problem    Date       Documented Da  
te Episodic/Chronic  
   
                                                    Acute and unspecified   
renal failure  
(12 sources)                            Injury of kidney;   
Translations: [Acute   
kidney failure,   
unspecified]                            2020          Episodic  
   
                                                    Allergic reactions  
(12 sources)                            Inflammatory   
dermatosis;   
Translations:   
[Dermatitis,   
unspecified]                            10-          Episodic  
   
                                                    Blindness and vision   
defects  
(7 sources)                             Disorder of vision;   
Translations:   
[Problems with   
sight]                                                      Chronic  
   
                                                    Chronic kidney disease  
(3 sources)                             Chronic kidney   
disease stage 2;   
Translations:   
[Chronic kidney   
disease, stage 2   
(mild)]                                 Onset:   
2023                Chronic  
   
                                                    Chronic ulcer of skin  
(20 sources)                            Ulcer; Translations:   
[Ulcerative lesion]                     Onset:   
2024                Chronic  
   
                                                    Coagulation and   
hemorrhagic disorders  
(4 sources)                             Acquired coagulation   
factor inhibitor   
disorder;   
Translations: [Other   
hemorrhagic disorder   
due to intrinsic   
circulating   
anticoagulants,   
antibodies, or   
inhibitors]                             Onset:   
2023                Chronic  
   
                                                    Congestive heart   
failure;   
nonhypertensive  
(20 sources)                            Congestive heart   
failure;   
Translations: [Heart   
failure,   
unspecified]                            Onset:   
2022  
Resolved:   
2022                                          Chronic  
   
                                                    Coronary   
atherosclerosis and   
other heart disease  
(20 sources)                            Coronary   
arteriosclerosis;   
Translations:   
[Atherosclerotic   
heart disease of   
native coronary   
artery without   
angina pectoris]                        Onset:   
2022  
Resolved:   
2022                                          Chronic  
   
                                                    Diabetes mellitus with   
complications  
(20 sources)                            Disorder due to type   
2 diabetes mellitus;   
Translations: [Type   
2 diabetes mellitus   
with unspecified   
complications]                          Onset:   
2022  
Resolved:   
2022                                          Chronic  
   
                                                    Diabetes mellitus   
without complication  
(8 sources)                             Prediabetes;   
Translations: [Other   
abnormal glucose]                       Onset:   
2024                Episodic  
   
                                                    Disorders of lipid   
metabolism  
(20 sources)                            Mixed   
hyperlipidemia;   
Translations: [Mixed   
hyperlipidemia]                         Onset:   
2022  
Resolved:   
2022                                          Chronic  
   
                                                    Essential hypertension  
(14 sources)                            Hypertensive   
disorder;   
Translations:   
[Essential (primary)   
hypertension]                           2019          Chronic  
   
                                                    Fluid and electrolyte   
disorders  
(12 sources)                            Metabolic acidosis;   
Translations:   
[Metabolic acidosis]                     2020          Episodic  
   
                                                    Genitourinary symptoms   
and ill-defined   
conditions  
(3 sources)                             Urinary   
incontinence;   
Translations:   
[Unspecified urinary   
incontinence]                           Onset:   
10-                08-                Chronic  
   
                                                    Immunizations and   
screening for   
infectious disease  
(16 sources)                            Encounter for   
immunization;   
Translations:   
[Culture positive   
for methicillin   
resistant   
Staphylococcus   
aureus]                                 Onset:   
2021  
Resolved:   
2021                                          Episodic  
   
                                                    Mycoses  
(1 source)                              Onychomycosis;   
Translations: [Tinea   
unguium]                                2024          Episodic  
   
                                                    Nonspecific chest pain  
(6 sources)                             Chest pain;   
Translations: [Chest   
pain, unspecified]                      Onset:   
2023                                          Episodic  
   
                                                    Open wounds of   
extremities  
(20 sources)                            Open wound of right   
lower leg;   
Translations:   
[Unspecified open   
wound, right lower   
leg, initial   
encounter]                              Onset:   
10-                09-                Episodic  
   
                                                    Open wounds of   
extremities  
(3 sources)                             Disorder of lower   
extremity;   
Translations:   
[Unspecified open   
wound, left lower   
leg, initial   
encounter]                              Onset:   
2024                Episodic  
   
                                                    Osteoarthritis  
(20 sources)                            Osteoarthritis of   
knee; Translations:   
[Osteoarthritis of   
knee, unspecified]                      Onset:   
2022  
Resolved:   
2022                                          Chronic  
   
                                                    Other and ill-defined   
heart disease  
(11 sources)                            Left ventricular   
hypertrophy;   
Translations:   
[Cardiomegaly]                          Onset:   
2023                Chronic  
   
                                                    Other circulatory   
disease  
(7 sources)                             H/O: heart disorder;   
Translations:   
[Personal history of   
unspecified   
circulatory disease]                                         Episodic  
   
                                                    Other connective   
tissue disease  
(7 sources)                             H/O: arthritis;   
Translations:   
[Personal history of   
arthritis]                                                  Episodic  
   
                                                    Other connective   
tissue disease  
(1 source)                              Pain in both feet;   
Translations: [Pain   
in right foot]                          2024          Episodic  
   
                                                    Other diseases of   
veins and lymphatics  
(17 sources)                            Postthrombotic   
syndrome;   
Translations:   
[Postthrombotic   
syndrome without   
complications of   
unspecified   
extremity]                                                  Chronic  
   
                                                    Other diseases of   
veins and lymphatics  
(1 source)                              Lymphedema, not   
elsewhere   
classified;   
Translations:   
[Lymphedema, not   
elsewhere   
classified]                             Onset:   
2024                                          Chronic  
   
                                                    Other diseases of   
veins and lymphatics  
(20 sources)                            Peripheral venous   
insufficiency;   
Translations:   
[Venous   
insufficiency   
(chronic)   
(peripheral)]                           Onset:   
2023                Episodic  
   
                                                    Other diseases of   
veins and lymphatics  
(20 sources)                            Stasis dermatitis;   
Translations:   
[Venous   
insufficiency   
(chronic)   
(peripheral)]                           Onset:   
2023                Episodic  
   
                                                    Other diseases of   
veins and lymphatics  
(12 sources)                            Vascular   
insufficiency;   
Translations:   
[Venous   
insufficiency   
(chronic)   
(peripheral)]                           2021          Episodic  
   
                                                    Other diseases of   
veins and lymphatics  
(6 sources)                             Disorder of vein of   
lower extremity;   
Translations:   
[Venous   
insufficiency   
(chronic)   
(peripheral)]                           2021          Episodic  
   
                                                    Other diseases of   
veins and lymphatics  
(1 source)                              Venous insufficiency   
of leg;   
Translations:   
[Venous   
insufficiency   
(chronic)   
(peripheral)]                           2024          Episodic  
   
                                                    Other diseases of   
veins and lymphatics  
(2 sources)                             Venous insufficiency   
(chronic)   
(peripheral);   
Translations:   
[Venous (peripheral)   
insufficiency,   
unspecified]                            Onset:   
10-                09-                Episodic  
   
                                                    Other gastrointestinal   
disorders  
(12 sources)                            Diarrhea;   
Translations:   
[Diarrhea,   
unspecified]                            2020          Episodic  
   
                                                    Other gastrointestinal   
disorders  
(2 sources)                             Diarrhea,   
unspecified                                                 Episodic  
   
                                                    Other hematologic   
conditions  
(7 sources)                             H/O: blood disorder;   
Translations:   
[Personal history of   
diseases of blood   
and blood-forming   
organs]                                                     Episodic  
   
                                                    Other infections;   
including parasitic  
(12 sources)                            Disorder due to   
infection;   
Translations:   
[Unspecified   
infectious disease]                     2021          Episodic  
   
                                                    Other lower   
respiratory disease  
(8 sources)                             Dyspnea on exertion;   
Translations:   
[Shortness of   
breath]                                 Onset:   
2024                Episodic  
   
                                                    Other lower   
respiratory disease  
(12 sources)                            Dyspnea;   
Translations:   
[Dyspnea,   
unspecified]                            2020          Episodic  
   
                                                    Other nervous system   
disorders  
(16 sources)                            Sleep-wake schedule   
disorder, delayed   
phase type;   
Translations:   
[Circadian rhythm   
sleep disorder,   
delayed sleep phase   
type]                                                       Chronic  
   
                                                    Other nervous system   
disorders  
(2 sources)                             Circadian rhythm   
sleep disorder,   
delayed sleep phase   
type                                    Onset:   
2022  
Resolved:   
2022                                          Chronic  
   
                                                    Other nervous system   
disorders  
(7 sources)                             Numbness and   
tingling sensation   
of skin;   
Translations:   
[Disturbance of skin   
sensation]                                                  Episodic  
   
                                                    Other non-traumatic   
joint disorders  
(7 sources)                             Pain in unspecified   
knee; Translations:   
[Knee pain]                                                 Episodic  
   
                                                    Other non-traumatic   
joint disorders  
(3 sources)                             Pain in left   
shoulder;   
Translations: [Pain   
in joint, shoulder   
region]                                 Onset:   
2024                Episodic  
   
                                                    Other nutritional;   
endocrine; and   
metabolic disorders  
(20 sources)                            Body mass index 40+   
- severely obese;   
Translations: [Body   
mass index (BMI)   
50.0-59.9, adult]                       Onset:   
2024                Chronic  
   
                                                    Other nutritional;   
endocrine; and   
metabolic disorders  
(20 sources)                            Body mass index   
(BMI) 50.0-59.9,   
adult; Translations:   
[Body Mass Index   
50.0-59.9, adult]                       Onset:   
2022  
Resolved:   
2022                                          Chronic  
   
                                                    Other nutritional;   
endocrine; and   
metabolic disorders  
(20 sources)                            Obesity;   
Translations:   
[Obesity,   
unspecified]                            2019          Chronic  
   
                                                    Other nutritional;   
endocrine; and   
metabolic disorders  
(14 sources)                            Metabolic syndrome   
X; Translations:   
[Metabolic syndrome]                                         Chronic  
   
                                                    Other nutritional;   
endocrine; and   
metabolic disorders  
(4 sources)               Obesity, unspecified      Onset:   
05-  
Resolved:   
2022                                          Chronic  
   
                                                    Other nutritional;   
endocrine; and   
metabolic disorders  
(6 sources)               Metabolic syndrome        Onset:   
2022  
Resolved:   
2022                                          Chronic  
   
                                                    Other nutritional;   
endocrine; and   
metabolic disorders  
(12 sources)                            Morbid obesity;   
Translations:   
[Morbid (severe)   
obesity due to   
excess calories]                        2022          Chronic  
   
                                                    Other nutritional;   
endocrine; and   
metabolic disorders  
(7 sources)                             Morbid (severe)   
obesity due to   
excess calories;   
Translations:   
[Morbid obesity]                        2022          Chronic  
   
                                                    Other nutritional;   
endocrine; and   
metabolic disorders  
(3 sources)                             Obese class III;   
Translations:   
[Morbid (severe)   
obesity due to   
excess calories]                        10-          Chronic  
   
                                                    Other screening for   
suspected conditions   
(not mental disorders   
or infectious disease)  
(8 sources)                             Cardiovascular   
stress test   
abnormal;   
Translations: [Other   
nonspecific abnormal   
results of function   
study of   
cardiovascular   
system]                                 Onset:   
2024                Episodic  
   
                                                    Other skin disorders  
(12 sources)                            Hemosiderin   
pigmentation of   
lower limb due to   
varicose veins of   
lower limb;   
Translations: [Other   
specified disorders   
of pigmentation]                        2020          Episodic  
   
                                                    Other skin disorders  
(1 source)                              Callosity;   
Translations: [Corns   
and callosities]                        2024          Episodic  
   
                                                    Anastasia-; endo-; and   
myocarditis;   
cardiomyopathy (except   
that caused by   
tuberculosis or   
sexually transmitted   
disease)  
(20 sources)                            Cardiomyopathy;   
Translations:   
[Cardiomyopathy,   
unspecified]                            Onset:   
2022  
Resolved:   
2022                                          Chronic  
   
                                                    Peripheral and   
visceral   
atherosclerosis  
(20 sources)                            Peripheral vascular   
disease;   
Translations:   
[Peripheral vascular   
disease,   
unspecified]                            Onset:   
2023                Chronic  
   
                                                    Phlebitis;   
thrombophlebitis and   
thromboembolism  
(8 sources)                             Deep venous   
thrombosis;   
Translations: [Acute   
venous embolism and   
thrombosis of   
unspecified deep   
vessels of lower   
extremity]                              Onset:   
2024                Episodic  
   
                                                    Residual codes;   
unclassified  
(16 sources)                            Sleep apnea;   
Translations: [Sleep   
apnea, unspecified]                                         Chronic  
   
                                                    Residual codes;   
unclassified  
(20 sources)                            Obstructive sleep   
apnea syndrome;   
Translations:   
[Obstructive sleep   
apnea (adult)   
(pediatric)]                            Onset:   
2023                Chronic  
   
                                                    Residual codes;   
unclassified  
(17 sources)                            Obstructive sleep   
apnea (adult)   
(pediatric);   
Translations:   
[Obstructive sleep   
apnea   
(adult)(pediatric)]                     Onset:   
2022  
Resolved:   
2022                                          Chronic  
   
                                                    Residual codes;   
unclassified  
(4 sources)                             Continuous positive   
airway pressure   
ventilation   
treatment;   
Translations:   
[Dependence on other   
enabling machines   
and devices]                                                Chronic  
   
                                                    Residual codes;   
unclassified  
(3 sources)                             Dependence on   
continuous positive   
airway pressure   
ventilation;   
Translations:   
[Dependence on other   
enabling machines   
and devices]                            Onset:   
2023                Chronic  
   
                                                    Residual codes;   
unclassified  
(8 sources)                             Localized edema;   
Translations:   
[Edema]                                 Onset:   
2022  
Resolved:   
2022                                          Episodic  
   
                                                    Residual codes;   
unclassified  
(6 sources)               Edema, unspecified        Onset:   
2022  
Resolved:   
2022                                          Episodic  
   
                                                    Residual codes;   
unclassified  
(3 sources)                             History of chest   
pain; Translations:   
[Personal history of   
other specified   
diseases]                                                   Episodic  
   
                                                    Residual codes;   
unclassified  
(12 sources)                            Noncompliance with   
treatment;   
Translations:   
[Noncompliance]                         2021          Episodic  
   
                                                    Residual codes;   
unclassified  
(12 sources)                            Edema of right lower   
leg; Translations:   
[Localized edema]                       2022          Episodic  
   
                                                    Residual codes;   
unclassified  
(12 sources)                            Edema of lower   
extremity;   
Translations:   
[Localized edema]                       2019          Episodic  
   
                                                    Residual codes;   
unclassified  
(12 sources)                            Other specified   
health status;   
Translations:   
[Failure of   
outpatient   
treatment]                              2019          Episodic  
   
                                                    Skin and subcutaneous   
tissue infections  
(20 sources)                            Cellulitis of lower   
leg; Translations:   
[Cellulitis of right   
lower limb]                             Onset:   
10-                05-                Episodic  
   
                                                    Spondylosis;   
intervertebral disc   
disorders; other back   
problems  
(6 sources)                             Other specified   
dorsopathies, lumbar   
region                                  Onset:   
2022  
Resolved:   
2022                                          Episodic  
   
                                                    Sprains and strains  
(20 sources)                            Sprain of hip;   
Translations: [Hip   
strain]                                 Onset:   
2024                Episodic  
   
                                                    Superficial injury;   
contusion  
(9 sources)                             Hematoma of right   
lower leg;   
Translations:   
[Contusion of right   
lower leg, initial   
encounter]                              Onset:   
10-                05-                Episodic  
   
                                                    Unclassified  
(15 sources)                            Inflammatory   
disorder;   
Translations:   
[Inflammation]                          2023            
   
                                                    Varicose veins of   
lower extremity  
(20 sources)                            Varicose ulcer of   
lower extremity;   
Translations:   
[Varicose veins of   
right lower   
extremity with ulcer   
other part of lower   
leg]                                    Onset:   
10-                12-                Episodic  
   
                                                    Viral infection  
(12 sources)                            Disease caused by   
2019-nCoV;   
Translations:   
[COVID-19]                              2020          Episodic  
  
  
Past or Other Problems  
  
  
                                                    Problem   
Classification  Problem         Date            Documented Date Episodic/Chronic  
   
                                                    Acquired foot   
deformities  
(3 sources)                             Acquired hammer toe   
of right foot;   
Translations: [Other   
hammer toe(s)   
(acquired), right   
foot]                                   Onset:   
2023  
Resolved:   
2023                Chronic  
   
                                                    Acquired foot   
deformities  
(3 sources)                             Acquired deformity   
of left foot;   
Translations: [Other   
acquired deformities   
of left foot]                           Onset:   
2023                Episodic  
   
                                                    Other congenital   
anomalies  
(3 sources)                             Porokeratosis;   
Translations: [Other   
specified congenital   
malformations of   
skin]                                   Onset:   
2023  
Resolved:   
2023                Chronic  
   
                                                    Other nutritional;   
endocrine; and   
metabolic disorders  
(1 source)                Abnormal weight gain      Onset:   
2022  
Resolved:   
2022                                          Episodic  
   
                                                    Unclassified  
(7 sources)                             Never smoked   
tobacco;   
Translations: [Never   
a smoker]                                                     
   
                                                    Unclassified  
(1 source)                                          Onset:   
2024                  
  
  
  
Results  
  
  
                          Test Name    Value        Interpretation Reference   
Range                                   Facility  
   
                                                    HbA1c HPLC (Bld) [Mass fract  
ion]on 2024   
   
                                                    HbA1c (Bld) [Mass   
fraction]       5.8 %                                           [facility]  
   
                                                    Aerobic Cultureon 2024  
   
   
                                        Aerobic Culture     Result Tab Codes  
Light Normal Skin   
Tiffany 2 Days  
No Anaerobes Isolated   
3 Days  
Gram Stain Result  
Rare Epithelial Cells  
Rare Gram Positive   
Cocci  
No White Blood Cells   
Seen  
PERFORMED BY:  
[place] [address]  
[phone]  
PATHOLOGIST MEDICAL   
DIRECTOR  
BRIDGETTE SWAIN M.D.    Normal                                  [facility]   
[facility]  
   
                                        Comment on above:   Performed By: #### A  
[facility], GS ####  
[address]   
   
                                                    Gram Stainon 2024   
   
                                                    Microscopic observation   
Gram stain Nom (Unsp   
spec)                                   Gram Stain Result  
Rare Epithelial Cells  
Rare Gram Positive   
Cocci  
No White Blood Cells   
Seen  
PERFORMED BY:  
[place] [address]  
[phone]  
PATHOLOGIST MEDICAL   
DIRECTOR  
BRIDGETTE SWAIN M.D.    Normal                                  [facility]   
[facility]   
[facility]  
   
                                        Comment on above:   Performed By: #### A  
[facility], GS ####  
[address]   
   
                                                    Gram stain for investigation  
 of transfusion reactionOrdered By: Elaine Espino on   
2024   
   
                                                    Microscopic observation   
Gram stain Nom (Unsp   
spec)                                   No Anaerobes Isolated   
3 Days                                                      [facility]  
   
                                                    Alanine aminotransferase [En  
zymatic activity/volume] in Serum or PlasmaOrdered   
By:   
Vera Lea on 2023   
   
                                                    ALT [Catalytic   
activity/Vol]   16 U/L                          7-            [facility]  
   
                                                    Albumin [Mass/volume] in Ser  
um or Plasma by Bromocresol green (BCG) dye binding   
methoOrdered By: Vera Lea on 2023   
   
                                                    Albumin BCG dye   
[Mass/Vol]      4.1 g/dL                        3.5-5.7         [facility]  
   
                                                    Alkaline phosphatase [Enzyma  
tic activity/volume] in Serum or PlasmaOrdered By:   
Vera Lea on 2023   
   
                                                    ALP [Catalytic   
activity/Vol]   24 U/L                                    [facility]  
   
                                                    Aspartate aminotransferase [  
Enzymatic activity/volume] in Serum or PlasmaOrdered  
By:   
Vera Lea on 2023   
   
                                                    AST [Catalytic   
activity/Vol]   18 U/L                          13-39           [facility]  
   
                                                    Automated erythrocytes count  
 in urine sediment (number/area)Ordered By: Vera Lea on 2023   
   
                                                    RBC Auto (Urine sed)   
[#/Area]        1-2 [HPF]                       0-4             [facility]  
   
                                                    Automated leukocytes count i  
n urine sediment (number/area)Ordered By: Vera Lea on 2023   
   
                                                    WBC Auto (Urine sed)   
[#/Area]        None seen [HPF]                 0-4             [facility]  
   
                                                    Bilirubin Test strip Ql (U)O  
rdered By: Vera Lea on 2023   
   
                      Bilirubin Ql (U) Negative              Negative   [facility]  
   
                                                    Bilirubin.total [Mass/volume  
] in Serum or PlasmaOrdered By: Vera Lea   
on   
2023   
   
                      Bilirubin [Mass/Vol] 0.7 mg/dL             0.3-1.0    [facility]  
   
                                                    Calcium [Mass/volume] in Ser  
um or PlasmaOrdered By: Vera Lea on   
2023   
   
                      Calcium [Mass/Vol] 9.3 mg/dL             8.6-10.3   [facility]  
   
                                                    Carbon dioxide, total [Moles  
/volume] in Serum or PlasmaOrdered By: Vera Lea on 2023   
   
                      CO2 [Moles/Vol] 25.0 mmol/L            21.0-31.0  [facility]  
   
                                                    Chloride [Moles/volume] in S  
santa or PlasmaOrdered By: Vera Lea on   
2023   
   
                      Chloride [Moles/Vol] 105 mmol/L                 [facility]  
   
                                                    Cholesterol [Mass/volume] in  
 Serum or PlasmaOrdered By: Vera Lea on   
2023   
   
                      Cholesterol [Mass/Vol] 175 mg/dL             140-200    [facility]  
   
                                        Comment on above:   Chol less than 200 m  
g/dl low riskChol 201-239 mg/dl borderline  
   
riskChol 240 mg/dl and greater high risk   
   
                                                    Cholesterol in LDL Calc [Mas  
s/Vol]Ordered By: Vera Lea on 2023   
   
                                                    Cholesterol in LDL   
[Mass/Vol]      113 mg/dL                       0-100           [facility]  
   
                                        Comment on above:   LDL ATP III CLASSIFI  
CATIONLDL less than 100 mg/dL OptimalLDL   
100-129 mg/dL Near or above optimalLDL 130-159 mg/dL   
Borderline highLDL 160-189 mg/dL HighLDL greater than 189   
mg/dL Very high   
   
                                                    Cholesterol in VLDL Calc [Ma  
ss/Vol]Ordered By: Vera Lea on 2023  
   
   
                                                    Cholesterol in VLDL   
[Mass/Vol]      19 mg/dL                                        [facility]  
   
                                                    Color Auto (U)Ordered By: Sa ileana Lea on 2023   
   
                      Color (U)  Yellow                Yellow     [facility]  
   
                                                    Creatinine [Mass/volume] in   
Serum or PlasmaOrdered By: Vera Lae on   
2023   
   
                      Creatinine [Mass/Vol] 1.03 mg/dL            0.70-1.30  [facility]  
   
                                                    Creatinine [Mass/volume] in   
UrineOrdered By: Vera Lea on 2023   
   
                                                    Creatinine (U)   
[Mass/Vol]      107.0 mg/dL                     14.0-26.0       [facility]  
   
                                                    Erythrocyte distribution wid  
th Auto (RBC) [Ratio]Ordered By: Vera Lea  
 on   
2023   
   
                                                    Erythrocyte   
distribution width   
(RBC) [Ratio]   14.0 %                          12.0-14.8       [facility]  
   
                                                    Globulin Calc (S) [Mass/Vol]  
Ordered By: Vera Lea on 2023   
   
                      Globulin (S) [Mass/Vol] 3.0 g/dL                         F  
[facility]  
   
                                                    Glucose [Mass/volume] in Ser  
um or PlasmaOrdered By: Vera Lea on   
2023   
   
                      Glucose [Mass/Vol] 95 mg/dL                   [facility]  
   
                                        Comment on above:   ADA recommended refe  
rence rangeRandom Glucose Reference Range   
is dependent on time and content of last meal. Glucose of more   
than 200 mg/dL in a nonstressed, ambulatory subject supports   
the diagnosis of Diabetes Mellitus.   
   
                                                    Glucose mean value [Mass/vol  
ume] in Blood Estimated from glycated   
hemoglobinOrdered   
By: Vera Lea on 2023   
   
                                                    Average glucose   
Estimated from glycated   
hemoglobin (Bld)   
[Mass/Vol]      114 mg/dL                                       [facility]  
   
                                                    Hematocrit Auto (Bld) [Volum  
e fraction]Ordered By: Vera Lea on   
2023   
   
                                                    Hematocrit (Bld)   
[Volume fraction] 44.1 %                          38.8-50.0       [facility]  
   
                                                    Hemoglobin A1c percentageOrd  
ered By: Vera Lea on 2023   
   
                                                    HbA1c (Bld) [Mass   
fraction]       5.6 %                           4.3-5.6         [facility]  
   
                                        Comment on above:   Increased risk for d  
iabetes: 5.7 - 6.4diabetes: >6.4glycemic   
control for adults with diabetes: <7.0   
   
                                                    Hemoglobin [Mass/volume] in   
BloodOrdered By: Vera Lea on 2023   
   
                                                    Hemoglobin (Bld)   
[Mass/Vol]      15.0 g/dL                       13.0-17.0       [facility]  
   
                                                    Ketones Auto test strip (U)   
[Mass/Vol]Ordered By: Vera Lea on   
2023   
   
                      Ketones (U) [Mass/Vol] Negative              Negative   [facility]  
   
                                                    Laboratory - UrinalysisOrder  
ed By: Vera Lea on 2023   
   
                                                    Hyaline casts LM Ql   
(Urine sed)     None seen [LPF]                 0-8             [facility]  
   
                                                    Leukocytes [#/volume] correc  
radha for nucleated erythrocytes in Blood by Automated  
   
counOrdered By: Vera Lea on 2023   
   
                                                    WBC corrected for nucl   
RBC Auto (Bld) [#/Vol] 8.9 10*3/uL                     4.1-10.5        [facility]  
   
                                                    MCH Auto (RBC) [Entitic mass  
]Ordered By: Vera Lea on 2023   
   
                                                    MCH (RBC) [Entitic   
mass]           29.9 pg                         27.5-35.2       [facility]  
   
                                                    MCHC Auto (RBC) [Mass/Vol]Or  
dered By: Vera Lea on 2023   
   
                      MCHC (RBC) [Mass/Vol] 34.1 g/dL             32.5-35.6  [facility]  
   
                                                    MCV Auto (RBC) [Entitic vol]  
Ordered By: Vera Lea on 2023   
   
                      MCV (RBC) [Entitic vol] 87.7 fL               83.5-101   F  
[facility]  
   
                                                    Microalbumin [Mass/volume] i  
n UrineOrdered By: Vera Lea on 2023  
   
   
                                                    Albumin DL <= 20 mg/L   
(U) [Mass/Vol]  mg/dL                           0.0-1.8         [facility]  
   
                                                    Nitrite Test strip Ql (U)Ord  
ered By: Vera Lea on 2023   
   
                      Nitrite Ql (U) Negative              Negative   [facility]  
   
                                                    No Panel InformationOrdered   
By: Vera Lea on 2023   
   
                      Estimated GFR (CKD-EPI) > 60.0 mL/Min                       
  [facility]  
   
                                                    Pharmacy Creatinine   
Clearance (Chem N/A                                             [facility]  
   
                                                    Platelet mean volume Auto (B  
ld) [Entitic vol]Ordered By: Vera Lea on   
2023   
   
                                                    Platelet mean volume   
(Bld) [Entitic vol] 7.5 fL                          6.6-10.1        [facility]  
   
                                                    Platelets Auto (Bld) [#/Vol]  
Ordered By: Vera Lea on 2023   
   
                      Platelets (Bld) [#/Vol] 303 10*3/uL            150-450      
[facility]  
   
                                                    Potassium [Moles/volume] in   
Serum or PlasmaOrdered By: Vera Lea on   
2023   
   
                      Potassium [Moles/Vol] 4.4 mmol/L            3.5-5.1    [facility]  
   
                                                    Prostate specific Ag [Mass/v  
olume] in Serum or PlasmaOrdered By: Vera Atnony   
on 2023   
   
                                                    Prostate specific Ag   
[Mass/Vol]      2.660 ng/mL                     0.000-4.000     [facility]  
   
                                                    Protein Auto test strip (U)   
[Mass/Vol]Ordered By: Vera Lea on   
2023   
   
                      Protein (U) [Mass/Vol] Negative              Negative   [facility]  
   
                                                    Protein [Mass/volume] in Ser  
um or PlasmaOrdered By: Vera Lea on   
2023   
   
                      Protein [Mass/Vol] 7.1 g/dL              6.4-8.9    [facility]  
   
                                                    RBC Auto (Bld) [#/Vol]Ordere  
d By: Vera Lea on 2023   
   
                      RBC (Bld) [#/Vol] 5.03 10*6/uL            3.90-5.60  [facility]  
   
                                                    Serum or plasma albumin/glob  
ulin mass ratioOrdered By: Vera Lea on   
2023   
   
                                                    Albumin/Globulin [Mass   
ratio]          1.4 {ratio}                                     [facility]  
   
                                                    Serum or plasma anion gap de  
terminationOrdered By: Vera Lea on   
2023   
   
                      Anion gap [Moles/Vol] 10.4 mmol/L            6.0-15.0   [facility]  
   
                                                    Serum or plasma high density  
 lipoprotein (HDL) cholesterol measurementOrdered   
By:   
Vera Lea on 2023   
   
                                                    Cholesterol in HDL   
[Mass/Vol]      42 mg/dL                        23-92           [facility]  
   
                                        Comment on above:   HDL CHOL ATP-III CLA  
SSIFICATION Cardiovascular RiskHDL > or   
equal to 60 mg/dL LOWHDL < 40 mg/dL HIGH   
   
                                                    Serum or plasma total choles  
terol/high density lipoprotein (HDL) cholesterol   
mass   
ratOrdered By: Vera Lea on 2023   
   
                                                    Cholesterol.total/Ami  
sterol in HDL [Mass   
ratio]          4.2 {ratio}                     <5.0            [facility]  
   
                                                    Sodium [Moles/volume] in Ser  
um or PlasmaOrdered By: Vera Lea on   
2023   
   
                      Sodium [Moles/Vol] 136 mmol/L            136-145    [facility]  
   
                                                    Specific gravity Auto test s  
trip (U) [Rel density]Ordered By: Vera Antony on   
2023   
   
                                                    Specific gravity (U)   
[Rel density]   1.022                           1.001-1.030     [facility]  
   
                                                    Squamous epithelial cells de  
tection in urine sediment by light microscopyOrdered  
By:   
Vera Lea on 2023   
   
                                                    Epithelial   
cells.squamous LM Ql   
(Urine sed)     0-1 [HPF]                       0-2             [facility]  
   
                                                    Triglyceride [Mass/volume] i  
n Serum or PlasmaOrdered By: Vera Lea on   
2023   
   
                      Triglyceride [Mass/Vol] 99 mg/dL              0-149      F  
[facility]  
   
                                        Comment on above:   TRIG ATP III CLASSIF  
ICATIONTRIG less than 150 mg/dL NormalTRIG  
   
150-199 mg/dL Borderline highTRIG 200-500 mg/dL High TRIG   
greater than 500 mg/dL Very highStandard traceable to the   
Center for Disease Conrtrol and Prevention (CDC) test method.   
   
                                                    Urea nitrogen [Mass/volume]   
in Serum or PlasmaOrdered By: Vera Lea on  
   
2023   
   
                                                    Urea nitrogen   
[Mass/Vol]      19 mg/dL                        7-            [facility]  
   
                                                    Urine bacteria detection by   
automated methodOrdered By: Vera Lea on   
2023   
   
                      Bacteria Auto Ql (U) None seen             None Seen  [facility]  
   
                                                    Urine clarity by refractomet  
ry automatedOrdered By: Vera Lea on   
2023   
   
                                                    Clarity Refractometry   
automated (U)   Clear                           Clear           [facility]  
   
                                                    Urine glucose measurement by  
 automated test strip (mass/volume)Ordered By:   
Vera Lea on 2023   
   
                                                    Glucose Auto test strip   
(U) [Mass/Vol]  >=1000 mg/dL                    Normal          [facility]  
   
                                                    Urine hemoglobin detection b  
y automated test stripOrdered By: Vera Antony on   
2023   
   
                                                    Hemoglobin Auto test   
strip Ql (U)    Negative                        Negative        [facility]  
   
                                                    Urine leukocyte esterase det  
ection by automated test stripOrdered By: Vera Lea on 2023   
   
                                                    Leukocyte esterase Auto   
test strip Ql (U) Negative                        Negative        [facility]  
   
                                                    Urine microalbumin/creatinin  
e mass ratioOrdered By: Vera Lea on   
2023   
   
                                                    Albumin/Creatinine DL   
<= 20 mg/L (U) [Mass   
ratio]          TNP                                             [facility]  
   
                                        Comment on above:   Test not performed   
   
                                                    Urobilinogen Auto test strip  
 (U) [Mass/Vol]Ordered By: Vera Lea on   
2023   
   
                                                    Urobilinogen (U)   
[Mass/Vol]      Normal mg/dL                    Normal          [facility]  
   
                                                    pH Auto test strip (U)Ordere  
d By: Vera Lea on 2023   
   
                      pH (U)     5.5 [pH]              5.0-9.0    [facility]  
   
                                                    A1C HEMOGLOBINon 2023   
   
                                                    HbA1c (Bld) [Mass   
fraction]       5.6 %                                           [facility]   
[facility]   
[employer]  
Other Phone:   
[phone]  
   
                                                    HbA1c (Bld) [Mass fraction]o  
n 2023   
   
                      A1C HEMOGLOBIN                                  [facility]  
t   
[facility]   
[facility]  
Other Phone:   
[phone]  
   
                                                    Office Visit (Cardiology)on   
2023   
   
                                        Follow-up visit     Diagnoses/Problems  
Assessed  
Chest pain,   
unspecified type   
(786.50) (R07.9)  
CHF (NYHA class II,   
ACC/AHA stage C)   
(428.0) (I50.9)  
LVH (left ventricular   
hypertrophy) (429.3)   
(I51.7)  
Non-ischemic   
cardiomyopathy   
(425.4) (I42.8)  
DVT (deep venous   
thrombosis) (453.40)   
(I82.409)  
Never a smoker  
Prediabetes (790.29)   
(R73.03)  
DIMITRI on CPAP (327.23)   
(G47.33)  
Morbid obesity with   
BMI of 45.0-49.9,   
adult (278.01,V85.42)   
(E66.01,Z68.42)  
Orders  
CHF (NYHA class II,   
ACC/AHA stage C)  
Renew: Spironolactone   
25 MG Oral Tablet;   
TAKE 1 TABLET DAILY  
CHF (NYHA class II,   
ACC/AHA stage C),   
Prediabetes  
Renew: Jardiance 10   
MG Oral Tablet; TAKE   
1 TABLET BY MOUTH   
ONCE DAILY  
Morbid obesity with   
BMI of 45.0-49.9,   
adult  
Healthy Weight Tips;   
Status:Complete -   
Retrospective   
Authorization; Done:   
[id number]  
Some eating tips that   
can help you lose   
weight.;   
Status:Complete -   
Retrospective  
Authorization; Done:   
[id number]  
SocHx: Never a smoker  
Tobacco Use   
Screening;   
Status:Complete;   
Done: [id number]  
Patient Instructions  
Please bring all   
medicines, vitamins,   
and herbal   
supplements with you   
when you come to the   
office.  
Prescriptions will   
not be filled unless   
you are compliant   
with your follow up   
appointments or have   
a follow up   
appointment scheduled   
as per instruction of   
your physician.   
Refills should be   
requested at the time   
of your visit.  
Patient to have labs   
forward to Dr. Compa Whitley DO   
when completed by pcp   
in 2023  
Follow up in 10   
months with Sharlene An NP  
  
  
Chief Complaint  
JOCELIN PACHECO is being   
seen for a 6 month   
follow-up of.  
Patient is a   
58-year-old gentleman   
returns for   
follow-up. He has had   
an over 30 pound   
weight loss since   
  
Ozempic therapy.  
He has a history of   
mild ASHD, mild to   
moderate nonischemic   
cardiomyopathy with   
persistent ejection   
fraction of 42%. He   
has underlying morbid   
obesity, diabetes,   
obstructive sleep   
apnea, history of   
remote DVT, recurrent   
ulcers on the calf   
due to venous stasis.   
His NYHA   
classification is   
class II-III/C. He is   
still working out   
with cardiac rehab   
and activities that   
are amenable to his   
stature and body   
habitus and   
capability.  
He has no   
hospitalizations for   
heart failure, no   
syncope, no falls, no   
angina  
Recommendations,   
continue GDMT as   
reviewed and   
prescribed, follow-up   
in 8 to 10 months   
with nurse   
practitioner  
  
Surgical History   
Problems  
History of Complete   
colonoscopy  
History of Finger   
surgical procedure  
History of   
Gallbladder surgery  
Past Medical History  
Problems  
History of arthritis   
(V13.4) (Z87.39)  
History of bleeding   
disorder (V12.3)   
(Z86.2)  
History of cardiac   
disorder (V12.50)   
(Z86.79)  
History of Numbness   
and tingling (782.0)   
(R20.0,R20.2)  
History of Vision   
problems (V41.0)   
(H54.7)  
Current Meds  
  
Medication   
NameInstruction  
Carvedilol 6.25 MG   
Oral TabletTAKE 1   
TABLET TWICE DAILY   
WITH MEALS.  
Jardiance 10 MG Oral   
TabletTAKE 1 TABLET   
BY MOUTH ONCE DAILY  
Lasix TABSTAKE 1   
TABLET TWICE DAILY.  
Nabumetone 750 MG   
Oral TabletTAKE 1   
TABLET EVERY 12 HOURS   
DAILY.  
Ozempic (1 MG/DOSE)   
SOPNINJECT 1 UNIT   
Weekly  
Spironolactone 25 MG   
Oral TabletTAKE 1   
TABLET DAILY.  
Xarelto 10 MG Oral   
TabletTake 1 tablet   
daily  
Patient did not bring   
medication list or   
bottles. Updated   
verbally with patient  
Allergies  
Medication  
Cephalosporins  
Adverse Reaction;   
Rash; Recorded By:   
Etelvina Bradshaw;   
2022 8:55:43 AM  
Statins  
Adverse Reaction;   
Rash; Recorded By:   
Etelvina Bradshaw;   
2022 8:55:43 AM  
Voltaren  
Adverse Reaction;   
Rash; Recorded By:   
Etelvina Bradshaw;   
2022 8:55:43 AM  
Social History  
Problems  
Daily caffeine   
consumption  
coffee 1-2 pots a day  
Never a smoker  
No alcohol use  
No illicit drug use  
Review of Systems  
Constitutional: not   
feeling tired.  
Cardiovascular: no   
intermittent leg   
claudication and as   
noted in HPI.  
Respiratory: no cough   
and no shortness of   
breath.  
Gastrointestinal: no   
change in bowel   
habits and no blood   
in stools.  
Integumentary: no   
skin rashes.  
Neurological: no   
seizures and no   
frequent falls.  
All other systems   
have been reviewed   
and are negative for   
complaint.  
  
Vitals  
Vital Signs  
Recorded: [id number]   
10:37AM  
Heart Rate74, L   
Radial  
[id number], LUE,   
Sitting  
Floijhund76, LUE,   
Sitting  
Height5 ft 11 in  
Uhawpd613 lb  
BMI Dvzdevcmqt58.37   
kg/m2  
BSA Calculated2.69  
Tobacco Useb) No  
Falls Screening (Age   
18+)a) No falls   
within the last year  
Physical Exam  
Constitutional: alert   
and in no acute   
distress.  
Neck: neck is supple,   
symmetric, trachea   
midline, no masses   
and no thyromegaly .  
Pulmonary: no   
increased work of   
breathing or signs of   
respiratory distress   
and lungs clear to   
auscultation.  
Cardiovascular:   
carotid pulses 2+   
bilaterally with no   
bruit , JVP was   
normal, no thrills ,   
regular rhythm,   
normal S1 and S2, no   
murmurs , pedal   
pulses 2+ bilaterally   
and no edema .  
Abdomen: abdomen   
non-tender, no masses   
and no hepatomegaly .  
Skin: skin warm and   
dry, normal skin   
turgor .  
Psychiatric judgment   
and insight is normal   
and orien (more   
content not   
included)...        Normal                                   [facility]  
   
                                                    [facility] SCAN INJECTIONon    
   
                                        [facility] SCAN INJECTION MRN: [id number]  
Patient Name: JOCELIN PACHECO  
  
STUDY:  
MUGA  
  
Performing facility:  
[facility],  
[address] [place] [address], [address]   
[address],  
[place] [address]  
[facility] Provider: KOBI Whitley DO,   
[facility]  
PCP: Dr. OLIVER Lea  
Supervising provider:   
Ze Flores MD  
  
INDICATION:  
Chest Pain; CHF NICM  
  
HISTORY:  
Gender: M; Age: 67 y/o ; Height: 0 cm;   
Weight: 0 kg.  
  
Chest Pain; CHF NICM  
Denies smoking.  
  
COMPARISON:  
Previous nuclear   
testing completed   
[id number] MPI at [facility].  
  
  
ACCESSION NUMBER(S):  
[id number]; [id number]  
  
ORDERING CLINICIAN:  
COMPA WHITLEY  
  
TECHNIQUE:  
The patient received   
an IV injection of 3   
ml of stannous  
pyrophosphate (PYP)   
using the in-vivo   
method of labeling   
red blood  
cells. After 15   
minutes the patient   
received another IV   
injection of  
27.4 mCi of   
Technetium 99m   
pertechnetate. Planar   
image of the left  
ventricle was   
obtained in the TONG   
45.  
  
FINDINGS:  
The right ventricle   
was normal.  
The left ventricle   
was enlarged in size.  
Regional wall motion   
was abnormal.  
Global resting LVEF   
was abnormal- at 42%.  
  
IMPRESSION:  
Normal resting right   
ventricular function.  
Abnormalresting left   
ventricular function.  
Left ventricular   
ejection fraction is   
42%.  
No previous studies   
are available for   
comparison  
  
Electronically signed   
by: ELROY AGUDELO MD         Normal                                  [facility]  
   
                                                    No Panel Informationon    
   
                                            Normal                -[facility]   
[place] [address] DO  
Work Phone:   
[phone]  
   
                                                    Creatinine and Glomerular fi  
ltration rate.predicted panel (S/P/Bld)Ordered By: JASON Whitley on 2023   
   
                      Creatinine [Mass/Vol] 1.12 mg/dL            0.64-1.27  [facility]  
   
                                                    Estimated glomerular filtrat  
ion rate (GFR) non- AmericanOrdered By: JASON Whitley   
on 2023   
   
                                                    GFR/1.73 sq M.predicted   
among non-blacks MDRD   
(S/P/Bld) [Vol   
rate/Area]      > 60 mL/Min                                     [facility]  
   
                                                    Laboratory - Chemistry and C  
hemistry - challengeOrdered By: JASON Whitley on   
2023   
   
                                                    Natriuretic peptide B   
(Bld) [Mass/Vol] 13.0 pg/mL                      5-100           [facility]  
   
                                                    No Panel InformationOrdered   
By: JASON Whitley on 2023   
   
                                                    Estimated GFR (   
American)       > 60 mL/Min                                     [facility]  
   
                                        Comment on above:   GFR estimated refere  
nce range: According to KDOQI guidelines,   
<60 ml/min/1.73m2 is sufficient to diagnose a patient with   
chronic kidney disease.   
   
                                                    Pharmacy Creatinine   
Clearance (Chem N/A                                             [facility]  
   
                                                    No Panel Informationon    
   
                                 10.7\S\10.7 Normal     6.0-15.0   [facility]  
   
[facility]   
[address] DO  
Work Phone:   
[phone]  
   
                                 9.0\S\9.0  Normal     8.2-10.2   [facility]   
[facility]   
[address] DO  
Work Phone:   
[phone]  
   
                                        Comment on above:   PERFORMED BY:[place] [address] [address]   
[place], [place] [id number] MEDICAL   
DIRECTORBRIDGETTE SWAIN M.D.   
   
                                 24.1\S\24.1 Normal     22.0-30.0  [facility]  
   
[facility]   
[address] DO  
Work Phone:   
[phone]  
   
                                 103\S\103  Normal          [facility]   
250 DO  
Work Phone:   
[phone]  
   
                                 4.8\S\4.8  Normal     3.5-5.1    [facility]   
250 DO  
Work Phone:   
[phone]  
   
                                                133\S\133       below low   
threshold                 136-146                   [facility]   
[facility]   
250 DO  
Work Phone:   
[phone]  
   
                                 > 60       Normal                [facility]   
[facility]   
[address] DO  
Work Phone:   
[phone]  
   
                                        Comment on above:   GFR estimated refere  
nce range: According to KDOQI guidelines,   
<60 ml/min/1.73m2 is sufficient to diagnose a patient with   
chronic kidney disease.   
   
                                 1.12\S\1.12 Normal     0.64-1.27  [facility]   
[address] DO  
Work Phone:   
[phone]  
   
                                 17\S\17    Normal     9-23       [facility]   
[facility]   
[address] DO  
Work Phone:   
[phone]  
   
                                 95\S\95    Normal          [facility]   
[address] DO  
Work Phone:   
[phone]  
   
                                        Comment on above:   Random Glucose Refer  
ence Range is dependent on time and   
content of last meal. Glucose of more than 200 mg/dL in a   
nonstressed, ambulatory subject supports the diagnosis of   
Diabetes Mellitus. ADA recommended reference range   
   
                                 13.0\S\13.0 Normal     5-100      [facility]  
   
[facility]   
[address] DO  
Work Phone:   
[phone]  
   
                                        Comment on above:   PERFORMED BY:[place] [address]   
[place], [place] [id number] MEDICAL   
DIRECTORBRIDGETTE SWAIN M.D.   
   
                                                    Serum or plasma anion gap de  
terminationOrdered By: JASON Whitley on 2023   
   
                      Anion gap [Moles/Vol] 10.7 mmol/L            6.0-15.0   [facility]  
   
                                                    Serum or plasma calcium cong  
urement (mass/volume)Ordered By: JASON Whitley on   
2023   
   
                      Calcium [Mass/Vol] 9.0 mg/dL             8.2-10.2   [facility]  
   
                                                    Serum or plasma chloride patricia  
surement (moles/volume)Ordered By: JASON Whitley on   
2023   
   
                      Chloride [Moles/Vol] 103 mmol/L                 [facility]  
   
                                                    Serum or plasma glucose cong  
urement (mass/volume)Ordered By: JASON Whitley on   
2023   
   
                      Glucose [Mass/Vol] 95 mg/dL                   [facility]  
   
                                        Comment on above:   ADA recommended refe  
rence rangeRandom Glucose Reference Range   
is dependent on time and content of last meal. Glucose of more   
than 200 mg/dL in a nonstressed, ambulatory subject supports   
the diagnosis of Diabetes Mellitus.   
   
                                                    Serum or plasma potassium me  
asurement (moles/volume)Ordered By: JASON Whitley on   
2023   
   
                      Potassium [Moles/Vol] 4.8 mmol/L            3.5-5.1    [facility]  
   
                                                    Serum or plasma sodium measu  
rement (moles/volume)Ordered By: JASON Whitley on   
2023   
   
                      Sodium [Moles/Vol] 133 mmol/L            136-146    [facility]  
   
                                                    Serum or plasma total carbon  
 dioxide measurement (moles/volume)Ordered By: JASON Whitley   
on 2023   
   
                      CO2 [Moles/Vol] 24.1 mmol/L            22.0-30.0  [facility]  
   
                                                    Serum or plasma urea nitroge  
n measurement (mass/volume)Ordered By: JASON Whitley on   
2023   
   
                                                    Urea nitrogen   
[Mass/Vol]      17 mg/dL                        9-23            [facility]  
   
                                                    Falls Screening (Age 18+)on   
2023   
   
                                                    Adult depression   
screening assessment No                                              [facility]   
[facility]   
250 DO  
Work Phone:   
[phone]  
   
                                        Fall risk assessment a) No falls within   
the last year                                               [facility]   
[facility]   
250 DO  
Work Phone:   
[phone]  
   
                      Tobacco use status CP b) No                            M  
[facility]   
[facility]   
250 DO  
Work Phone:   
[phone]  
   
                                                    Falls Screening (Age   
18+)                                    Patient is not at   
high risk for falls.   
Falls risk guidance   
reviewed today                                              [facility]   
[facility]   
250 DO  
Work Phone:   
[phone]  
   
                                                    Office Visit (Cardiology)on   
2023   
   
                                        Follow-up visit     Diagnoses/Problems  
Assessed  
DIMITRI on CPAP   
(327.23,V46.8)   
(G47.33,Z99.89)  
CHF (NYHA class II,   
ACC/AHA stage C)   
(428.0) (I50.9)  
Non-ischemic   
cardiomyopathy   
(425.4) (I42.8)  
Chest pain,   
unspecified type   
(786.50) (R07.9)  
Morbid obesity with   
BMI of 50.0-59.9,   
adult (278.01,V85.43)   
(E66.01,Z68.43)  
Never a smoker  
DVT (deep venous   
thrombosis) (453.40)   
(I82.409)  
Orders  
Chest pain,   
unspecified type, CHF   
(NYHA class II,   
ACC/AHA stage C),   
Non-ischemic  
cardiomyopathy  
Basic Metabolic   
Panel; Status:Active   
- Retrospective   
Authorization;   
Requested  
for:2023;  
NM [facility] ([place]   
Cardiac Blood Pool);   
Status:Hold For -   
Scheduling,Retrospect  
anthony  
Authorization;   
Requested   
for:2023;  
Radiologist to   
Determine Optimal   
Study : Y  
What are the   
patient's signs and   
symptoms? : cp  
Brain Natriuretic   
Peptide BNP;   
Status:Active -   
Retrospective   
Authorization;   
Requested  
for:2023;  
CHF (NYHA class II,   
ACC/AHA stage C),   
Non-ischemic   
cardiomyopathy  
Renew: Carvedilol   
6.25 MG Oral Tablet;   
TAKE 1 TABLET TWICE   
DAILY WITH MEALS  
Morbid obesity with   
BMI of 50.0-59.9,   
adult  
Healthy Weight Tips;   
Status:Complete -   
Retrospective   
Authorization; Done:   
2023  
Some eating tips that   
can help you lose   
weight.;   
Status:Complete -   
Retrospective  
Authorization; Done:   
[id number]  
SocHx: Never a smoker  
Tobacco Use   
Screening;   
Status:Complete;   
Done: [id number]  
Patient Instructions  
Please bring all   
medicines, vitamins,   
and herbal   
supplements with you   
when you come to the   
office.  
Prescriptions will   
not be filled unless   
you are compliant   
with your follow up   
appointments or have   
a follow up   
appointment scheduled   
as per instruction of   
your physician.   
Refills should be   
requested at the time   
of your visit.  
  
Follow up in 6 months  
  
  
Chief Complaint  
JOCELIN PACHECO is being   
seen for a 6 month   
follow-up of.  
62-year-old gentleman   
who returns for   
annual visit he is   
doing well just   
complains atypical   
chest discomfort   
described as tingling   
as well as sharp and   
somewhat painful   
sensation both at   
rest and with   
activities that are   
sporadic and episodic   
only but not   
reproducible. He has   
a history of moderate   
nonischemic   
cardiomyopathy,   
obstructive sleep   
apnea, morbid   
obesity, history of   
remote DVT (remains   
on low-dose Xarelto).  
Stress perfusion   
imaging from 2022 revealed patchy   
tracer uptake,   
proceed ischemia or   
infarction, normal   
left ventricular   
volume with global   
hypokinesis and   
ejection fraction of   
44% normal transient   
ischemic index.  
Heart catheterization   
from  revealed 30   
to 50% ostial   
circumflex disease,   
20 to 25% mid LAD   
disease normal right   
coronary and at that   
time ejection   
fraction of 40 to   
45%.  
Since  with his   
medical history   
continue therapies.   
We have transitioned   
over to Entresto,   
Jardiance, carvedilol   
and spironolactone  
Unfortunately he has   
had no significant   
weight loss despite   
going to cardiac   
rehab 3 times a week.   
He remains 365   
pounds, similar to   
  
Recommendations,   
obtain a MUGA scan   
current therapies,   
obtain appropriate   
laboratories   
including Chem-6 and   
BNP, we will have him   
come back in 6 months   
continues to have any   
further chest   
discomfort will   
prescribe nitrates   
and/or consider   
invasive assessment.  
  
Active Problems   
Problems  
Abnormal stress test   
(794.39) (R94.39)  
DVT (deep venous   
thrombosis) (453.40)   
(I82.409)  
Knee pain (719.46)   
(M25.569)  
LVH (left ventricular   
hypertrophy) (429.3)   
(I51.7)  
Morbid obesity with   
BMI of 50.0-59.9,   
adult (278.01,V85.43)   
(E66.01,Z68.43)  
Never a smoker  
DIMITRI on CPAP   
(327.23,V46.8)   
(G47.33,Z99.89)  
Prediabetes (790.29)   
(R73.03)  
Primary   
osteoarthritis of   
left knee (715.16)   
(M17.12)  
Primary   
osteoarthritis of   
right knee (715.16)   
(M17.11)  
SOB (shortness of   
breath) on exertion   
(786.05) (R06.02)  
Surgical History  
Problems  
History of Complete   
colonoscopy  
History of Finger   
surgical procedure  
History of   
Gallbladder surgery  
Past Medical History  
Problems  
History of arthritis   
(V13.4) (Z87.39)  
History of bleeding   
disorder (V12.3)   
(Z86.2)  
History of cardiac   
disorder (V12.50)   
(Z86.79)  
History of Numbness   
and tingling (782.0)   
(R20.0,R20.2)  
History of Vision   
problems (V41.0)   
(H54.7)  
Current Meds  
  
Medication   
NameInstruction  
Carvedilol 6.25 MG   
Oral TabletTAKE 1   
TABLET TWICE DAILY   
WITH MEALS.  
Entresto 24-26 MG   
Oral TabletTAKE 1   
TABLET BY MOUTH TWICE   
A DAY  
Jardiance 10 MG Oral   
TabletTAKE 1 TABLET   
BY MOUTH ONCE DAILY  
Lasix TABSTAKE 1   
TABLET TWICE DAILY.  
Nabumetone 750 MG   
Oral TabletTAKE 1   
TABLET EVERY 12 HOURS   
DAILY.  
Ozempic (1 MG/DOSE)   
SOPNINJECT 1 UNIT   
Weekly  
Spironolactone 25 MG   
Oral TabletTAKE 1   
TABLET DAILY.  
Xarelto 10 MG Oral   
TabletTake 1 tablet   
daily  
Allergies  
Medication  
Cephalosporins  
Adverse Reaction;   
Rash; Recorded By:   
Etelvina Bradshaw;   
2022 8:55:43 AM  
Statins  
Adverse Reaction;   
Rash; Recorded By:   
Etelvina Bradshaw;   
2022 8:55:43 AM  
Voltaren  
Adverse Reaction;   
Rash; Recorded By:   
Etelvina Bradshaw;   
2022 8:55:43 AM  
Family History  
Mother  
Family history of   
COPD, moderate (more   
content not   
included)...        Normal                                   [facility] [employer] [facility]  
   
                                                    A1C HEMOGLOBINon 2022   
   
                                                    HbA1c (Bld) [Mass   
fraction]       5.4 %                                           [facility]   
[employer]   
[employer]  
Other Phone:   
[phone]  
   
                                                    HbA1c (Bld) [Mass fraction]o  
n 2022   
   
                      A1C HEMOGLOBIN                                  [place]  
t   
[employer]   
[employer]  
Other Phone:   
[phone]  
   
                                                    No Panel Informationon    
   
                                 9.3\S\9.3  Normal     8.2-10.2   [facility]   
[facility]   
250 DO  
Work Phone:   
[phone]  
   
                                        Comment on above:   PERFORMED BY:[facility] [place], [place] [id number] MEDICAL   
DIRECTORBRIDGETTE SWAIN M.D.   
   
                                 25.0\S\25.0 Normal     22.0-30.0  [facility]  
   
[facility]   
250 DO  
Work Phone:   
[phone]  
   
                                 101\S\101  Normal          [facility]   
[facility] Liao DO  
Work Phone:   
[phone]  
   
                                 4.3\S\4.3  Normal     3.5-5.1    [facility]   
[facility] Liao DO  
Work Phone:   
[phone]  
   
                                                135\S\135       below low   
threshold                 136-146                   [facility] Liao DO  
Work Phone:   
[phone]  
   
                                 > 60       Normal                [facility]  
Work Phone:   
[phone]  
   
                                        Comment on above:   GFR estimated refere  
nce range: According to KDOQI guidelines,   
<60 ml/min/1.73m2 is sufficient to diagnose a patient with   
chronic kidney disease.   
   
                                 54\S\54    Normal                [facility]  
Work Phone:   
[phone]  
   
                                                1.33\S\1.33     above high   
threshold                 0.64-1.27                 [facility]  
Work Phone:   
[phone]  
   
                                 20\S\20    Normal     9-23       [facility]   
250 DO  
Work Phone:   
[phone]  
   
                                 92\S\92    Normal          [facility]  
Work Phone:   
[phone]  
   
                                        Comment on above:   Random Glucose Refer  
ence Range is dependent on time and   
content of last meal. Glucose of more than 200 mg/dL in a   
nonstressed, ambulatory subject supports the diagnosis of   
Diabetes Mellitus. ADA recommended reference range   
   
                                                    Tobacco Screening.on   
022   
   
                                                    Adult depression   
screening assessment No                                              [facility]   
[facility]   
250 DO  
Work Phone:   
[phone]  
   
                                        Fall risk assessment b) One or more fall  
s   
in the last year                                            [facility]   
[facility]   
250 DO  
Work Phone:   
[phone]  
   
                      Tobacco use status CPHS b) No                            M  
[facility]   
Bid[employer] DO  
Work Phone:   
[phone]  
   
                                                    Complete Blood Count with Au  
to Diffon 2022   
   
                                                    Erythrocyte   
distribution width   
(RBC) [Ratio]   13.0 %          Normal          11.0-15.0       [facility]   
Medical   
Specialist  
   
                                        Comment on above:   Performed By: #### M  
DIFF, CMP, CBCAD ####  
[facility]  
[address] [place], [place] [id number]   
   
                                                    Hematocrit (Bld)   
[Volume fraction] 42.7 %          Normal          38.5-50.0       [facility]   
Medical   
Specialist  
   
                                        Comment on above:   Performed By: #### M  
DIFF, CMP, CBCAD ####  
[facility]  
[address] [place], [place] [id number]   
   
                                                    Hemoglobin (Bld)   
[Mass/Vol]      14.5 g/dL       Normal          13.0-17.1       [facility]   
Medical   
Specialist  
   
                                        Comment on above:   Performed By: #### M  
DIFF, CMP, CBCAD ####  
[facility]  
[address] [facility] [place] [id number]   
   
                                                    MCH (RBC) [Entitic   
mass]           29.4 pg         Normal          27.0-33.0       [facility]   
Specialist  
   
                                        Comment on above:   Performed By: #### M  
DIFF, CMP, CBCAD ####  
[facility]  
[address] [facility] [place] [id number]   
   
                      MCHC (RBC) [Mass/Vol] 34.0 g/dL  Normal     32.0-36.0  [facility]   
Specialist  
   
                                        Comment on above:   Performed By: #### M  
DIFF, CMP, CBCAD ####  
[facility]  
[address] [facility] [place] [id number]   
   
                      MCV (RBC) [Entitic vol] 87 fL      Normal          N  
[facility]   
Specialist  
   
                                        Comment on above:   Performed By: #### M  
DIFF, CMP, CBCAD ####  
[facility]  
[address] [facility] [place] [id number]   
   
                                                    Platelet mean volume   
(Bld) [Entitic vol] 9.40 fL         Normal          7.50-12.50      [facility]  
   
                                        Comment on above:   Performed By: #### M  
DIFF, CMP, CBCAD ####  
[facility]  
[address] [place], [place] [id number]   
   
                      Platelets (Bld) [#/Vol] 316 10*3/uL Normal     140-400      
[facility]   
Specialist  
   
                                        Comment on above:   Performed By: #### M  
DIFF, CMP, CBCAD ####  
[facility]  
[address] [place], [place] [id number]   
   
                      RBC (Bld) [#/Vol] 4.93 10*6/uL Normal     4.20-5.80  [facility]  
   
                                        Comment on above:   Performed By: #### M  
DIFF, CMP, CBCAD ####  
[facility]  
[address] [place], [place] [id number]   
   
                      RDW-SD     40.3 fL    Normal     37.0-50.0  [facility]   
Specialist  
   
                                        Comment on above:   Performed By: #### M  
DIFF, CMP, CBCAD ####  
[facility]  
[address] [place], [place] [id number]   
   
                      REFLEX     Manual Differential Normal                [facility]  
   
                                        Comment on above:   Performed By: #### M  
DIFF, CMP, CBCAD ####  
[facility]  
[address] [place], [place] [id number]   
   
                      WBC (Bld) [#/Vol] 6.9 10*3/uL Normal     3.8-11.0   [facility]   
Medical   
Specialist  
   
                                        Comment on above:   Performed By: #### M  
DIFF, CMP, CBCAD ####  
[facility]  
[address] [place], [place] [id number]   
   
                                                    Comprehensive Metabolic Pane  
feli 2022   
   
                      Albumin [Mass/Vol] 4.4 g/dL   Normal     3.6-5.1    [facility]   
Medical   
Specialist  
   
                                        Comment on above:   Performed By: #### M  
DIFF, CMP, CBCAD ####  
[facility]  
[address] [place], [place] [id number]   
   
                                                    Albumin/Globulin [Mass   
ratio]          1.8 {ratio}     Normal          1.0-2.5         [facility]   
Specialist  
   
                                        Comment on above:   Performed By: #### M  
DIFF, CMP, CBCAD ####  
[facility]  
[address] [place], [place] [id number]   
   
                                                    ALP [Catalytic   
activity/Vol]   34 U/L          Low                       [facility]  
   
                                        Comment on above:   Performed By: #### M  
DIFF, CMP, CBCAD ####  
[facility]  
[address] [place], [place] [id number]   
   
                                                    ALT [Catalytic   
activity/Vol]   19 U/L          Normal          9-46            [facility]  
   
                                        Comment on above:   Result Comment:  Female reference range changed.   
   
                                                            Performed By: #### M  
DIFF, CMP, CBCAD ####  
[facility]  
[address] [place], [place] [id number]   
   
                      Anion gap [Moles/Vol] 21 mmol/L  High       12-20      [facility]  
   
                                        Comment on above:   Result Comment: Effe  
ctive 2020 reference range changed.   
   
                                                            Performed By: #### M  
DIFF, CMP, CBCAD ####  
[facility]  
[address] [place], [place] [id number]   
   
                                                    AST [Catalytic   
activity/Vol]   24 U/L          Normal          10-40           [facility]  
   
                                        Comment on above:   Performed By: #### M  
DIFF, CMP, CBCAD ####  
[facility]  
[address] [place], [place] [id number]   
   
                      Bilirubin [Mass/Vol] 0.56 mg/dL Normal     0.30-1.20  [facility]  
   
                                        Comment on above:   Performed By: #### M  
DIFF, CMP, CBCAD ####  
[facility]  
[address] [place], [place] [id number]   
   
                      BUN/CREA   17 Ratio   Normal     6-22       [facility]  
   
                                        Comment on above:   Performed By: #### M  
DIFF, CMP, CBCAD ####  
[facility]  
[address] [place], [place] [id number]   
   
                      Calcium [Mass/Vol] 9.3 mg/dL  Normal     8.6-10.2   [facility]  
   
                                        Comment on above:   Performed By: #### M  
DIFF, CMP, CBCAD ####  
[facility]  
[address] [place], [place] [id number]   
   
                      Chloride [Moles/Vol] 100 mmol/L Normal          [facility]  
   
                                        Comment on above:   Performed By: #### M  
DIFF, CMP, CBCAD ####  
[facility]  
[address] [place], [place] [id number]   
   
                      CO2 [Moles/Vol] 20 mmol/L  Normal     20-31      [facility]   
Specialist  
   
                                        Comment on above:   Performed By: #### M  
DIFF, CMP, CBCAD ####  
[facility]  
[address] [place], [place] [id number]   
   
                      Creatinine [Mass/Vol] 1.0 mg/dL  Normal     0.7-1.4    [place]  
[facility]   
Medical   
Specialist  
   
                                        Comment on above:   Performed By: #### M  
DIFF, CMP, CBCAD ####  
[facility]  
[address] [place], [place] [id number]   
   
                      eGFRAA     88 mL/min/1.73m2 Normal     >60        [facility]   
Specialist  
   
                                        Comment on above:   Performed By: #### M  
DIFF, CMP, CBCAD ####  
[facility]  
[address] [place], [place] [id number]   
   
                      eGFRNAA    72 mL/min/1.73m2 Normal     >60        [facility]   
Specialist  
   
                                        Comment on above:   Performed By: #### M  
DIFF, CMP, CBCAD ####  
[facility]  
[address] [place], [place] [id number]   
   
                      Globulin (S) [Mass/Vol] 2.5 g/dL   Normal     1.9-3.7    DRAKE  
[facility]   
Specialist  
   
                                        Comment on above:   Performed By: #### M  
DIFF, CMP, CBCAD ####  
[facility]  
[address] [place], [place] [id number]   
   
                      Glucose [Mass/Vol] 111 mg/dL  High       65-99      [facility]  
[facility] [place]   
Medical   
Specialist  
   
                                        Comment on above:   Result Comment: For   
FASTING Glucose --- ADA reference ranges:  
Normal 65-99 mg/dl  
Prediabetes 100-125  
Diabetes >/= 126   
   
                                                            Performed By: #### M  
DIFF, CMP, CBCAD ####  
[facility]  
[address] [place], [place] [id number]   
   
                      Potassium [Moles/Vol] 4.5 mmol/L Normal     3.5-5.5    [facility]   
Medical   
Specialist  
   
                                        Comment on above:   Performed By: #### M  
DIFF, CMP, CBCAD ####  
[facility]  
[address] [place], [place] [id number]   
   
                      Protein [Mass/Vol] 6.9 g/dL   Normal     6.1-8.1    [facility]  
[facility] [place]   
Medical   
Specialist  
   
                                        Comment on above:   Performed By: #### M  
DIFF, CMP, CBCAD ####  
[facility]  
[address] [place], [place] [id number]   
   
                      Sodium [Moles/Vol] 137 mmol/L Normal     135-146    [facility]  
[facility] [place]   
Medical   
Specialist  
   
                                        Comment on above:   Performed By: #### M  
DIFF, CMP, CBCAD ####  
[facility]  
[address] [place], [place] [id number]   
   
                                                    Urea nitrogen   
[Mass/Vol]      17 mg/dL        Normal          7-25            [facility]   
Medical   
Specialist  
   
                                        Comment on above:   Performed By: #### M  
DIFF, CMP, CBCAD ####  
[facility]  
[address] [place], [place] [id number]   
   
                                                    Manual Differentialon 2022   
   
                      BAND       0.0 %      Normal                [facility]   
Specialist  
   
                                        Comment on above:   Performed By: #### M  
DIFF, CMP, CBCAD ####  
[facility]  
[address] [place], [place] [id number]   
   
                      BANDABS    0.0 K/uL   Normal                [facility]   
Medical   
Specialist  
   
                                        Comment on above:   Performed By: #### M  
DIFF, CMP, CBCAD ####  
[facility]  
[address] [place], [place] [id number]   
   
                      BASO       0.0 %      Normal                [facility]   
Medical   
Specialist  
   
                                        Comment on above:   Performed By: #### M  
DIFF, CMP, CBCAD ####  
[facility]  
[address] [place], [place] [id number]   
   
                      BASOABS    0.0 K/uL   Normal     0.0-0.2    [facility]   
Medical   
Specialist  
   
                                        Comment on above:   Performed By: #### M  
DIFF, CMP, CBCAD ####  
[facility]  
[address] [place], [place] [id number]   
   
                      EOS        6.0 %      Normal                [facility]   
Medical   
Specialist  
   
                                        Comment on above:   Performed By: #### M  
DIFF, CMP, CBCAD ####  
[facility]  
[address] [place], [place] [id number]   
   
                      EOSABS     0.4 K/uL   Normal     0.0-0.5    [facility]   
Medical   
Specialist  
   
                                        Comment on above:   Performed By: #### M  
DIFF, CMP, CBCAD ####  
[facility]  
[address] [place], [place] [id number]   
   
                      LYMPH      32.0 %     Normal                [facility]   
Medical   
Specialist  
   
                                        Comment on above:   Performed By: #### M  
DIFF, CMP, CBCAD ####  
[facility]  
[address] [place], [place] [id number]   
   
                      LYMPHABS   2.2 K/uL   Normal     0.9-3.9    [facility]   
Specialist  
   
                                        Comment on above:   Performed By: #### M  
DIFF, CMP, CBCAD ####  
[facility]  
[address] [place], [place] [id number]   
   
                      LYMPHATYP  0.0 %      Low        0.9-3.9    [facility]   
Medical   
Specialist  
   
                                        Comment on above:   Performed By: #### M  
DIFF, CMP, CBCAD ####  
[facility]  
[address] [place], [place] [id number]   
   
                      MONO       16.0 %     Normal                [facility]   
Medical   
Specialist  
   
                                        Comment on above:   Performed By: #### M  
DIFF, CMP, CBCAD ####  
[facility]  
[address] [place], [place] [id number]   
   
                      MONOABS    1.1 K/uL   High       0.2-0.9    [facility]   
Medical   
Specialist  
   
                                        Comment on above:   Performed By: #### M  
DIFF, CMP, CBCAD ####  
[facility]  
[address] [place], [place] [id number]   
   
                      SEG        46.0 %     Normal                [facility]   
Medical   
Specialist  
   
                                        Comment on above:   Performed By: #### M  
DIFF, CMP, CBCAD ####  
[facility]  
[address] [place], [place] [id number]   
   
                      SEGABS     3.2 K/uL   Normal     1.5-7.8    [facility]   
Medical   
Specialist  
   
                                        Comment on above:   Performed By: #### M  
DIFF, CMP, CBCAD ####  
[facility]  
[address] [place], [place] [id number]   
   
                      WBC        6.9 K/uL   Normal     3.8-11.0   [facility]   
Medical   
Specialist  
   
                                        Comment on above:   Performed By: #### M  
DIFF, CMP, CBCAD ####  
[facility]  
[address] [place], [place] [id number]   
   
                                                    XR Chest 2 Views*on 20   
   
                                        XR Chest 2 Views*   HISTORY: Calm and   
shortness of breath  
  
COMPARISON: None   
available  
  
TECHNIQUE: Frontal   
and lateral views of   
the chest  
  
  
FINDINGS:  
  
The cardiomediastinal   
silhouette is within   
normal limits. Small   
subtle bilateral  
lower lobe opacities.   
No pneumothorax or   
pleural effusion. No   
acute osseous   
abnormality.  
No changes of the   
spine.  
  
  
IMPRESSION:  
  
Small subtle   
bilateral lower lobe   
opacities may   
represent   
atelectasis, or   
pneumonia  
in the appropriate   
clinical setting.  
  
  
  
  
  
  
Report reported and   
signed by Marcus Mckeon on 2022   
1321                Normal                                  [facility]   
Specialist  
  
  
  
Vital Signs  
  
  
                          Date Time    Vital Sign   Value        Performing   
Clinician                               Facility  
   
                                                    2024   
11:          Body temperature    97.7 [degF]         DO Vera   
[place]  
Work Phone:   
[phone]                          [facility]  
   
                                                    2024   
11:                              Diastolic blood   
pressure                  70 mm[Hg]                 DO Vera   
[place]  
Work Phone:   
[phone]                          [facility]  
   
                                                    2024   
11:          Heart rate          76 /min             DO Evra   
[place]  
Work Phone:   
[phone]                          [facility]  
   
                                                    2024   
11:          Respiratory rate    18 /min             DO Vera   
[place]  
Work Phone:   
[phone]                          [facility]  
   
                                                    2024   
11:                              Systolic blood   
pressure                  127 mm[Hg]                DO Vera   
[place]  
Work Phone:   
[phone]                          [facility]  
   
                                                    2024   
15:          Body height         180.34 cm           DO Vera   
[place]  
Work Phone:   
[phone]                          [facility]  
   
                                                    2024   
15:                              Body mass index   
(BMI) [Ratio]             51.2 kg/m2                DO Vera   
[place]  
Work Phone:   
[phone]                          [facility]  
   
                                                    2024   
15:          Body weight         166.46 kg           DO Vera   
[place]  
Work Phone:   
[phone]                          [facility]  
   
                                                    2024   
13:          Body height         180.34 cm           DO Vera   
[place]  
Work Phone:   
[phone]                          [facility]  
   
                                                    2024   
13:                              Body mass index   
(BMI) [Ratio]             51.5 kg/m2                DO Vera   
[place]  
Work Phone:   
[phone]                          [facility]  
   
                                                    2024   
13:          Body weight         167.43 kg           DO Vera   
[place]  
Work Phone:   
[phone]                          [facility]  
   
                                                    2024   
13:                              Diastolic blood   
pressure                  58 mm[Hg]                 DO Vera   
[place]  
Work Phone:   
[phone]                          [facility]  
   
                                                    2024   
13:          Heart rate          86 /min             DO Vera   
[place]  
Work Phone:   
[phone]                          [facility]  
   
                                                    2024   
13:          Respiratory rate    20 /min             DO Vera   
[facility]  
Work Phone:   
[phone]                          [facility]  
   
                                                    2024   
13:                              SaO2% (BldA) [Mass   
fraction]                 97 %                      DO Vera   
[place]  
Work Phone:   
[phone]                          [facility]  
   
                                                    2024   
13:                              Systolic blood   
pressure                  110 mm[Hg]                DO Vera   
[place]  
Work Phone:   
[phone]                          [facility]  
   
                                                    2024   
16:          Body height         180.34 cm           DO Vera   
[place]  
Work Phone:   
[phone]                          [facility]  
   
                                                    2024   
16:                              Body mass index   
(BMI) [Ratio]             51.2 kg/m2                DO Vera   
[place]  
Work Phone:   
[phone]                          [facility]  
   
                                                    2024   
16:          Body weight         166.46 kg           DO Vera   
[place]  
Work Phone:   
[phone]                          [facility]  
   
                                                    2024   
15:          Body temperature    98.4 [degF]         DO Vera   
[place]  
Work Phone:   
[phone]                          [facility]  
   
                                                    2024   
15:                              Diastolic blood   
pressure                  70 mm[Hg]                 DO Vera   
[place]  
Work Phone:   
[phone]                          [facility]  
   
                                                    2024   
15:          Heart rate          99 /min             DO Vera   
[place]  
Work Phone:   
[phone]                          [facility]  
   
                                                    2024   
15:          Respiratory rate    18 /min             DO Vera   
[place]  
Work Phone:   
[phone]                          [facility]  
   
                                                    2024   
15:                              Systolic blood   
pressure                  131 mm[Hg]                DO Vera   
[place]  
Work Phone:   
[phone]                          [facility]  
   
                                                    2024   
15:          Body height         180.34 cm           DO Vera   
[place]  
Work Phone:   
[phone]                          [facility]  
   
                                                    2024   
15:                              Body mass index   
(BMI) [Ratio]             52.1 kg/m2                DO Vera   
[place]  
Work Phone:   
[phone]                          [facility]  
   
                                                    2024   
15:          Body weight         169.64 kg           DO Vera   
[place]  
Work Phone:   
[phone]                          [facility]  
   
                                                    2024   
15:          Body height         180.34 cm           DO Vera   
[place]  
Work Phone:   
[phone]                          [facility]  
   
                                                    2024   
15:                              Body mass index   
(BMI) [Ratio]             51.2 kg/m2                DO Vera   
[place]  
Work Phone:   
[phone]                          [facility]  
   
                                                    2024   
15:          Body weight         166.46 kg           DO Vera   
[place]  
Work Phone:   
[phone]                          [facility]  
   
                                                    2024   
15:          Body temperature    98.1 [degF]         DO Vera   
[place]  
Work Phone:   
[phone]                          [facility]  
   
                                                    2024   
15:                              Diastolic blood   
pressure                  75 mm[Hg]                 DO Vera   
[place]  
Work Phone:   
[phone]                          [facility]  
   
                                                    2024   
15:          Heart rate          76 /min             DO Vera   
[place]  
Work Phone:   
[phone]                          [facility]  
   
                                                    2024   
15:          Respiratory rate    20 /min             DO Vera   
[place]  
Work Phone:   
[phone]                          [facility]  
   
                                                    2024   
15:                              Systolic blood   
pressure                  133 mm[Hg]                DO Vera   
[place]  
Work Phone:   
[phone]                          [facility]  
   
                                                    2024   
15:          Body height         180.34 cm           DO Vera   
[place]  
Work Phone:   
[phone]                          [facility]  
   
                                                    2024   
15:                              Body mass index   
(BMI) [Ratio]             52.9 kg/m2                DO Vera   
[place]  
Work Phone:   
[phone]                          [facility]  
   
                                                    2024   
15:          Body weight         172.36 kg           DO Vera   
[place]  
Work Phone:   
[phone]                          [facility]  
   
                                                    2024   
15:                              Diastolic blood   
pressure                  66 mm[Hg]                 DO Vera   
[place]  
Work Phone:   
[phone]                          [facility]  
   
                                                    2024   
15:          Heart rate          71 /min             DO Vera   
[place]  
Work Phone:   
[phone]                          [facility]  
   
                                                    2024   
15:          Respiratory rate    18 /min             DO Vera   
[place]  
Work Phone:   
[phone]                          [facility]  
   
                                                    2024   
15:                              SaO2% (BldA) [Mass   
fraction]                 97 %                      DO Vera   
[place]  
Work Phone:   
[phone]                          [facility]  
   
                                                    2024   
15:                              Systolic blood   
pressure                  105 mm[Hg]                DO Vera   
[place]  
Work Phone:   
[phone]                          [facility]  
   
                                                    2024   
15:          Body temperature    98.8 [degF]         DO Vera   
[place]  
Work Phone:   
[phone]                          [facility]  
   
                                                    2024   
15:                              Diastolic blood   
pressure                  62 mm[Hg]                 DO Vera   
[place]  
Work Phone:   
[phone]                          [facility]  
   
                                                    2024   
15:          Heart rate          83 /min             DO Vera   
[place]  
Work Phone:   
[phone]                          [facility]  
   
                                                    2024   
15:          Respiratory rate    18 /min             DO Vera   
[place]  
Work Phone:   
[phone]                          [facility]  
   
                                                    2024   
15:                              Systolic blood   
pressure                  114 mm[Hg]                DO Vera   
[place]  
Work Phone:   
[phone]                          [facility]  
   
                                                    2024   
14:          Body height         180.34 cm           DO Vera   
[place]  
Work Phone:   
[phone]                          [facility]  
   
                                                    2024   
14:                              Body mass index   
(BMI) [Ratio]             51.2 kg/m2                DO Vera   
[place]  
Work Phone:   
[phone]                          [facility]  
   
                                                    2024   
14:          Body weight         166.46 kg           DO Vera   
[place]  
Work Phone:   
[phone]                          [facility]  
   
                                                    2024   
11:                              Body mass index   
(BMI) [Ratio]             52.72 kg/m2               Sharlene An   
APRN-CNP  
Work Phone:   
[phone]                          [facility]  
   
                                                    2024   
11:          Body weight         171.46 kg           Sharlene An   
APRN-CNP  
Work Phone:   
[phone]                          [facility]  
   
                                                    2024   
11:          Body height         180.3 cm            Sharlene An   
APRN-CNP  
Work Phone:   
[phone]                          [facility]  
   
                                                    2024   
11:                              Diastolic blood   
pressure                  70 mm[Hg]                 Sharlene An   
APRN-CNP  
Work Phone:   
[phone]                          [facility]  
   
                                                    2024   
11:          Heart rate          78 /min             Sharlene An   
APRN-CNP  
Work Phone:   
[phone]                          [facility]  
   
                                                    2024   
11:                              Systolic blood   
pressure                  110 mm[Hg]                Sharlene An   
APRN-CNP  
Work Phone:   
[phone]                          [facility]  
   
                                                    2024   
14:          Body height         180.34 cm           DO Vera   
[place]  
Work Phone:   
[phone]                          [facility]  
   
                                                    2024   
14:                              Body mass index   
(BMI) [Ratio]             53.3 kg/m2                DO Vera   
[place]  
Work Phone:   
[phone]                          [facility]  
   
                                                    2024   
14:          Body weight         173.38 kg           DO Vera   
[place]  
Work Phone:   
[phone]                          [facility]  
   
                                                    2024   
14:                              Diastolic blood   
pressure                  72 mm[Hg]                 DO Vera   
[place]  
Work Phone:   
[phone]                          [facility]  
   
                                                    2024   
14:          Heart rate          80 /min             DO Vera   
[place]  
Work Phone:   
[phone]                          [facility]  
   
                                                    2024   
14:          Respiratory rate    20 /min             DO Vera   
[place]  
Work Phone:   
[phone]                          [facility]  
   
                                                    2024   
14:                              SaO2% (BldA) [Mass   
fraction]                 96 %                      DO Vera   
[place]  
Work Phone:   
[phone]                          [facility]  
   
                                                    2024   
14:                              Systolic blood   
pressure                  133 mm[Hg]                DO Vera   
[place]  
Work Phone:   
[phone]                          [facility]  
   
                                                    2024   
14:          Body temperature    97.6 [degF]         DO Vera   
[place]  
Work Phone:   
[phone]                          [facility]  
   
                                                    2024   
14:                              Diastolic blood   
pressure                  78 mm[Hg]                 DO Vera   
[place]  
Work Phone:   
[phone]                          [facility]  
   
                                                    2024   
14:          Heart rate          89 /min             DO Vera   
[place]  
Work Phone:   
[phone]                          [facility]  
   
                                                    2024   
14:          Respiratory rate    24 /min             DO Vera   
[place]  
Work Phone:   
[phone]                          [facility]  
   
                                                    2024   
14:                              Systolic blood   
pressure                  143 mm[Hg]                DO Vera   
[place]  
Work Phone:   
[phone]                          [facility]  
   
                                                    2024   
14:          Body height         180.34 cm           DO Vera   
[place]  
Work Phone:   
[phone]                          [facility]  
   
                                                    2024   
14:                              Body mass index   
(BMI) [Ratio]             51.5 kg/m2                DO Vera   
[place]  
Work Phone:   
[phone]                          [facility]  
   
                                                    2024   
14:          Body weight         167.82 kg           DO Vera   
[place]  
Work Phone:   
[phone]                          [facility]  
   
                                                    2024   
15:                              Body mass index   
(BMI) [Ratio]             52.44 kg/m2               Ray Terry NP  
Work Phone:   
[phone]                          [facility]  
   
                                                    2024   
15:          Body temperature    97.7 [degF]         Ray Terry NP  
Work Phone:   
[phone]                          [facility]  
   
                                                    2024   
15:          Body weight         170.55 kg           Ray Terry NP  
Work Phone:   
[phone]                          [facility]  
   
                                                    2024   
15:                              Diastolic blood   
pressure                  70 mm[Hg]                 Ray Terry NP  
Work Phone:   
[phone]                          [facility]  
   
                                                    2024   
15:          Heart rate          83 /min             Ray Terry NP  
Work Phone:   
[phone]                          [facility] [employer]  
   
                                                    2024   
15:                              SaO2% (BldA) [Mass   
fraction]                 94 %                      Ray Terry NP  
Work Phone:   
[phone]                          [facility] [employer]  
   
                                                    2024   
15:                              Systolic blood   
pressure                  112 mm[Hg]                Ray Terry NP  
Work Phone:   
[phone]                          [facility]  
   
                                                    2023   
11:          Body height         177.8 cm            Jacoby Braden  
Other Phone:   
[phone]                           [facility]   
[employer]   
[employer]  
Other Phone:   
[phone]  
   
                                                    2023   
11:                              Body mass index   
(BMI) [Ratio]             51.86 kg/m2               Jacoby Braden  
Other Phone:   
[phone]                           [facility]   
[employer]   
[employer]  
Other Phone:   
[phone]  
   
                                                    2023   
11:          Body weight         163.98 kg           Jacoby Braden  
Other Phone:   
[phone]                           [facility]   
[employer]   
[employer]  
Other Phone:   
[phone]  
   
                                                    2023   
11:                              Diastolic blood   
pressure                  79 mm[Hg]                 Jacoby Braden  
Other Phone:   
[phone]                           [facility]   
[employer]   
[employer]  
Other Phone:   
[phone]  
   
                                                    2023   
11:          Respiratory rate    18 /min             Jacoby Braden  
Other Phone:   
[phone]                           [facility]   
[employer]   
[employer]  
Other Phone:   
[phone]  
   
                                                    2023   
11:                              SaO2% (BldA) [Mass   
fraction]                 99 %                      Jacoby Braden  
Other Phone:   
[phone]                           [facility]   
[employer]   
[employer]  
Other Phone:   
[phone]  
   
                                                    2023   
11:                              Systolic blood   
pressure                  125 mm[Hg]                Jacoby Braden  
Other Phone:   
[phone]                           [facility]   
[employer]   
[employer]  
Other Phone:   
[phone]  
   
                                                    2023   
13:          Body height         180.34 cm           DO Vera   
[place]  
Work Phone:   
[phone]                          [facility]  
   
                                                    2023   
13:                              Body mass index   
(BMI) [Ratio]             49.2 kg/m2                DO Vera   
[place]  
Work Phone:   
[phone]                          [facility]  
   
                                                    2023   
13:          Body weight         160.11 kg           DO Vera   
[place]  
Work Phone:   
[phone]                          [facility]  
   
                                                    2023   
13:          Body temperature    97.7 [degF]         DO Vera   
[place]  
Work Phone:   
[phone]                          [facility]  
   
                                                    2023   
13:                              Diastolic blood   
pressure                  73 mm[Hg]                 DO Vera   
[place]  
Work Phone:   
[phone]                          [facility]  
   
                                                    2023   
13:          Heart rate          81 /min             DO Vera   
[place]  
Work Phone:   
[phone]                          [facility]  
   
                                                    2023   
13:          Respiratory rate    20 /min             DO Vera   
[place]  
Work Phone:   
[phone]                          [facility]  
   
                                                    2023   
13:                              Systolic blood   
pressure                  123 mm[Hg]                DO Vera   
[place]  
Work Phone:   
[phone]                          [facility]  
   
                                                    2023   
14:                              42 1                Vera D   
[place]  
Work Phone:   
[phone]                          [facility]   
[address]   
[place]  
Work Phone:   
[phone]  
   
                                                    Comment on   
above:                                  [id number]   
   
                                                    2023   
14:          Body height         177.8 cm            Annel Baird  
Other Phone:   
[phone]                           [facility]   
[employer]   
[employer]  
Other Phone:   
[phone]  
   
                                                    2023   
14:                              Body mass index   
(BMI) [Ratio]             52.41 kg/m2               Annel Fitt  
Other Phone:   
[phone]                           [facility]   
[employer]   
[employer]  
Other Phone:   
[phone]  
   
                                                    2023   
14:          Body weight         165.7 kg            Annel Baird  
Other Phone:   
[phone]                           [facility]   
[employer]   
[employer]  
Other Phone:   
[phone]  
   
                                                    2023   
09:          Body height         180.34 cm           Vera D   
[place]  
Work Phone:   
[phone]                          [facility]   
[address]   
[place]  
Work Phone:   
[phone]  
   
                                                    2023   
09:                              Body mass index   
(BMI) [Ratio]             50.91 kg/m2               Vera D   
[place]  
Work Phone:   
[phone]                          [facility]   
[address]   
[place]  
Work Phone:   
[phone]  
   
                                                    2023   
09:                              Body surface area   
Derived from   
formula                   2.72 m2                   Vera D   
[place]  
Work Phone:   
[phone]                          [facility]   
[address]   
[place]  
Work Phone:   
[phone]  
   
                                                    2023   
09:          Body weight         165.56 kg           Vera D   
[place]  
Work Phone:   
[phone]                          [facility]   
[address]   
[place]  
Work Phone:   
[phone]  
   
                                                    2023   
09:                              Diastolic blood   
pressure                  78 mm[Hg]                 Vera D   
[place]  
Work Phone:   
[phone]                          [facility]   
[address]   
[place]  
Work Phone:   
[phone]  
   
                                                    2023   
09:          Heart rate          90 /min             Vera D   
[place]  
Work Phone:   
[phone]                          [facility]   
[address]   
[place]  
Work Phone:   
[phone]  
   
                                                    2023   
09:                              Systolic blood   
pressure                  124 mm[Hg]                Vera D   
[place]  
Work Phone:   
[phone]                          [facility]   
[address]   
[place]  
Work Phone:   
[phone]  
   
                                                    2022   
15:          Body height         177.8 cm            Jacoby Braden  
Other Phone:   
[phone]                           [facility]   
[employer]   
[employer]  
Other Phone:   
[phone]  
   
                                                    2022   
15:                              Body mass index   
(BMI) [Ratio]             53.53 kg/m2               Jacoby Braden  
Other Phone:   
[phone]                           [facility]   
[employer]   
[employer]  
Other Phone:   
[phone]  
   
                                                    2022   
15:          Body weight         169.24 kg           Jacoby Zuñigadiff  
Other Phone:   
[phone]                           [facility]   
[employer]   
[employer]  
Other Phone:   
[phone]  
   
                                                    2022   
15:                              Diastolic blood   
pressure                  72 mm[Hg]                 Jacoby Zuñigadiff  
Other Phone:   
[phone]                           [facility]   
[employer]   
[employer]  
Other Phone:   
[phone]  
   
                                                    2022   
15:          Respiratory rate    20 /min             Jacboy Zuñigadiff  
Other Phone:   
[phone]                           [facility]   
[employer]   
[employer]  
Other Phone:   
[phone]  
   
                                                    2022   
15:                              SaO2% (BldA) [Mass   
fraction]                 98 %                      Jacoby Zuñigadiff  
Other Phone:   
[phone]                           [facility]   
[employer]   
[employer]  
Other Phone:   
[phone]  
   
                                                    2022   
15:                              Systolic blood   
pressure                  132 mm[Hg]                Jacoby Zuñigadiff  
Other Phone:   
[phone]                           [facility]   
[employer]   
[employer]  
Other Phone:   
[phone]  
   
                                                    2022   
11:          Body height         177.8 cm            Annel Mcneilt  
Other Phone:   
[phone]                           [facility]   
[employer]   
[employer]  
Other Phone:   
[phone]  
   
                                                    2022   
11:                              Body mass index   
(BMI) [Ratio]             52.17 kg/m2               Annel Fitt  
Other Phone:   
[phone]                           [facility]   
[employer]   
[employer]  
Other Phone:   
[phone]  
   
                                                    2022   
11:          Body weight         164.93 kg           Annel Fitt  
Other Phone:   
[phone]                           [facility]   
[employer]   
[employer]  
Other Phone:   
[phone]  
   
                                                    10-   
15:          Body height         177.8 cm            Jacoby Zuñigadiff  
Other Phone:   
[phone]                           [facility]   
[employer]   
[employer]  
Other Phone:   
[phone]  
   
                                                    10-   
15:                              Body mass index   
(BMI) [Ratio]             52.52 kg/m2               Jacoby Zuñigadiff  
Other Phone:   
[phone]                           [facility]   
[employer]   
[employer]  
Other Phone:   
[phone]  
   
                                                    10-   
15:          Body weight         166.06 kg           Jacoby Zuñigadiff  
Other Phone:   
[phone]                           [facility]   
[employer]   
[employer]  
Other Phone:   
[phone]  
   
                                                    10-   
15:                              Diastolic blood   
pressure                  68 mm[Hg]                 Jacoby Zuñiagdiff  
Other Phone:   
[phone]                           [facility]   
[employer]   
[employer]  
Other Phone:   
[phone]  
   
                                                    10-   
15:          Respiratory rate    20 /min             Jacoby Zuñigadiff  
Other Phone:   
[phone]                           [facility]   
[employer]   
[employer]  
Other Phone:   
[phone]  
   
                                                    10-   
15:                              SaO2% (BldA) [Mass   
fraction]                 99 %                      Jacoby Zuñigadiff  
Other Phone:   
[phone]                           [facility]   
[employer]   
[employer]  
Other Phone:   
[phone]  
   
                                                    10-   
15:                              Systolic blood   
pressure                  112 mm[Hg]                Jacoby Zuñigadiff  
Other Phone:   
[phone]                           [facility]   
[employer]   
[employer]  
Other Phone:   
[phone]  
   
                                                    2022   
13:          Body height         180.34 cm           DO Vera   
[place]  
Work Phone:   
[phone]                          [facility]  
   
                                                    2022   
13:                              Body mass index   
(BMI) [Ratio]             52.4 kg/m2                DO Vera   
[place]  
Work Phone:   
[phone]                          [facility]  
   
                                                    2022   
13:          Body weight         170.55 kg           DO Vera   
[place]  
Work Phone:   
[phone]                          [facility]  
   
                                                    2022   
13:          Body temperature    98.6 [degF]         DO Vera   
[place]  
Work Phone:   
[phone]                          [facility]  
   
                                                    2022   
13:                              Diastolic blood   
pressure                  69 mm[Hg]                 DO Vera   
[place]  
Work Phone:   
[phone]                          [facility]  
   
                                                    2022   
13:          Heart rate          92 /min             DO Vera   
[place]  
Work Phone:   
[phone]                          [facility]  
   
                                                    2022   
13:          Respiratory rate    18 /min             DO Vera   
[place]  
Work Phone:   
[phone]                          [facility]  
   
                                                    2022   
13:                              Systolic blood   
pressure                  119 mm[Hg]                DO Vera   
[place]  
Work Phone:   
[phone]                          [facility]  
   
                                                    2022   
12:          Body height         177.8 cm            Jacoby Braden  
Other Phone:   
[phone]                           [facility]   
[employer]   
[employer]  
Other Phone:   
[phone]  
   
                                                    2022   
12:                              Body mass index   
(BMI) [Ratio]             53.1 kg/m2                Jacoby Braden  
Other Phone:   
[phone]                           [facility]   
[employer]   
[employer]  
Other Phone:   
[phone]  
   
                                                    2022   
12:          Body weight         167.88 kg           Jacoby Braden  
Other Phone:   
[phone]                           [facility]   
[employer]   
[employer]  
Other Phone:   
[phone]  
   
                                                    2022   
12:                              Diastolic blood   
pressure                  64 mm[Hg]                 Jacoby Braden  
Other Phone:   
[phone]                           [facility]   
[employer]   
[employer]  
Other Phone:   
[phone]  
   
                                                    2022   
12:          Respiratory rate    18 /min             Jacoby Braden  
Other Phone:   
[phone]                           [facility]   
[employer]   
[employer]  
Other Phone:   
[phone]  
   
                                                    2022   
12:                              SaO2% (BldA) [Mass   
fraction]                 98 %                      Jacoby Braden  
Other Phone:   
[phone]                           [facility]   
[employer]   
[employer]  
Other Phone:   
[phone]  
   
                                                    2022   
12:                              Systolic blood   
pressure                  117 mm[Hg]                Jacoby Braden  
Other Phone:   
[phone]                           [facility]   
[employer]   
[employer]  
Other Phone:   
[phone]  
   
                                                    2022   
15:          Body height         177.8 cm            Jacoby Braden  
Other Phone:   
[phone]                           [facility]   
[employer]   
[employer]  
Other Phone:   
[phone]  
   
                                                    2022   
15:                              Body mass index   
(BMI) [Ratio]             54.81 kg/m2               Jacoby Braden  
Other Phone:   
[phone]                           [facility]   
[employer]   
[employer]  
Other Phone:   
[phone]  
   
                                                    2022   
15:          Body weight         173.28 kg           Jacoby Braden  
Other Phone:   
[phone]                           [facility]   
[employer]   
[employer]  
Other Phone:   
[phone]  
   
                                                    2022   
15:                              Diastolic blood   
pressure                  63 mm[Hg]                 Jacoby Braden  
Other Phone:   
[phone]                           [facility]   
[employer]   
[employer]  
Other Phone:   
[phone]  
   
                                                    2022   
15:          Respiratory rate    20 /min             Jacoby Braden  
Other Phone:   
[phone]                           [facility]   
[employer]   
[employer]  
Other Phone:   
[phone]  
   
                                                    2022   
15:                              SaO2% (BldA) [Mass   
fraction]                 97 %                      Jacoby Braden  
Other Phone:   
[phone]                           [facility]   
[employer]   
[employer]  
Other Phone:   
[phone]  
   
                                                    2022   
15:                              Systolic blood   
pressure                  107 mm[Hg]                Jacoby Braden  
Other Phone:   
[phone]                           [facility]   
[employer]   
[employer]  
Other Phone:   
[phone]  
   
                                                    2022   
08:                              Diastolic blood   
pressure                  62 mm[Hg]                 Vera [place]  
Work Phone:   
[phone]                          [facility]   
[address]   
[place]  
Work Phone:   
[phone]  
   
                                                    2022   
08:                              Systolic blood   
pressure                  128 mm[Hg]                Vera D   
[place]  
Work Phone:   
[phone]                          [facility]   
[address]   
[place]  
Work Phone:   
[phone]  
   
                                                    2022   
08:          Body height         180.34 cm           Vera D   
[place]  
Work Phone:   
[phone]                          [facility]   
[address]   
[place]  
Work Phone:   
[phone]  
   
                                                    2022   
08:                              Body mass index   
(BMI) [Ratio]             54.12 kg/m2               Vera D   
[place]  
Work Phone:   
[phone]                          [facility]   
[address]   
[place]  
Work Phone:   
[phone]  
   
                                                    2022   
08:                              Body surface area   
Derived from   
formula                   2.79 m2                   Vera D   
[place]  
Work Phone:   
[phone]                          [facility]   
[address]   
[place]  
Work Phone:   
[phone]  
   
                                                    2022   
08:          Body weight         176 kg              Vera D   
[place]  
Work Phone:   
[phone]                          [facility]   
[address]   
[place]  
Work Phone:   
[phone]  
   
                                                    2022   
08:                              Diastolic blood   
pressure                  70 mm[Hg]                 Vera D   
[place]  
Work Phone:   
[phone]                          [facility]   
[address]   
[place]  
Work Phone:   
[phone]  
   
                                                    2022   
08:          Heart rate          78 /min             Vera D   
[place]  
Work Phone:   
[phone]                          [facility]   
[address]   
[place]  
Work Phone:   
[phone]  
   
                                                    2022   
08:                              Systolic blood   
pressure                  132 mm[Hg]                Vera D   
[place]  
Work Phone:   
[phone]                          [facility]   
[address]   
[place]  
Work Phone:   
[phone]  
   
                                                    2022   
15:          Body height         177.8 cm            Annel Fitt  
Other Phone:   
[phone]                           [facility]   
[employer]   
[employer]  
Other Phone:   
[phone]  
   
                                                    2022   
15:                              Body mass index   
(BMI) [Ratio]             55.72 kg/m2               Annel Fitt  
Other Phone:   
[phone]                           [facility]   
[employer]   
[employer]  
Other Phone:   
[phone]  
   
                                                    2022   
15:          Body weight         176.18 kg           Annel Fitt  
Other Phone:   
[phone]                           [facility]   
[employer]   
[employer]  
Other Phone:   
[phone]  
   
                                                    2022   
16:          Body height         177.8 cm            Alonso Birmingham  
Other Phone:   
[phone]                           [facility]   
[employer]   
[employer]  
Other Phone:   
[phone]  
   
                                                    2022   
16:                              Body mass index   
(BMI) [Ratio]             55.59 kg/m2               Alonso Birmingham  
Other Phone:   
[phone]                           [facility]   
[employer]   
[employer]  
Other Phone:   
[phone]  
   
                                                    2022   
16:          Body temperature    99.5 [degF]         Alonso Birmingham  
Other Phone:   
[phone]                           [facility]   
[employer]   
[employer]  
Other Phone:   
[phone]  
   
                                                    2022   
16:          Body weight         175.77 kg           Alonso Birmingham  
Other Phone:   
[phone]                           [facility]   
[employer]   
[employer]  
Other Phone:   
[phone]  
   
                                                    2022   
16:                              Diastolic blood   
pressure                  71 mm[Hg]                 Alonso Birmingham  
Other Phone:   
[phone]                           [facility]   
[employer]   
[employer]  
Other Phone:   
[phone]  
   
                                                    2022   
16:                              SaO2% (BldA) [Mass   
fraction]                 96 %                      Alonso Birmingham  
Other Phone:   
[phone]                           [facility]   
[employer]   
[employer]  
Other Phone:   
[phone]  
   
                                                    2022   
16:                              Systolic blood   
pressure                  114 mm[Hg]                Alonso Birmingham  
Other Phone:   
[phone]                           [facility]   
[employer]   
[employer]  
Other Phone:   
[phone]  
   
                                                    2022   
00:                              60 1                Vera D   
[place]  
Work Phone:   
[phone]                          [facility]   
[facility] [address]   
DO  
Work Phone:   
[phone]  
   
                                                    Comment on   
above:                                  [id number]   
   
                                                    2022   
15:          Body height         177.8 cm            Jacoby Zuñigadiff  
Other Phone:   
[phone]                           [facility]   
[employer]   
[employer]  
Other Phone:   
[phone]  
   
                                                    2022   
15:                              Body mass index   
(BMI) [Ratio]             59.45 kg/m2               Jacoby Zuñigadiff  
Other Phone:   
[phone]                           [facility]   
[employer]   
[employer]  
Other Phone:   
[phone]  
   
                                                    2022   
15:          Body weight         187.97 kg           Jacoby Zuñigadiff  
Other Phone:   
[phone]                           [facility]   
[employer]   
[employer]  
Other Phone:   
[phone]  
   
                                                    2022   
15:                              Diastolic blood   
pressure                  57 mm[Hg]                 Jacoby Zuñigadiff  
Other Phone:   
[phone]                           [facility]   
[employer]   
[employer]  
Other Phone:   
[phone]  
   
                                                    2022   
15:          Respiratory rate    20 /min             Jacoby Zuñigadiff  
Other Phone:   
[phone]                           [facility]   
[employer]   
[employer]  
Other Phone:   
[phone]  
   
                                                    2022   
15:                              SaO2% (BldA) [Mass   
fraction]                 96 %                      Jacoby Zuñigadiff  
Other Phone:   
[phone]                           [facility]   
[employer]   
[employer]  
Other Phone:   
[phone]  
   
                                                    2022   
15:                              Systolic blood   
pressure                  125 mm[Hg]                Jacoby Asiya  
Other Phone:   
[phone]                           [facility]   
[employer]   
[employer]  
Other Phone:   
[phone]  
   
                                                    2022   
16:          Body height         177.8 cm            Alonso Birmingham  
Other Phone:   
[phone]                           [facility]   
[employer]   
[employer]  
Other Phone:   
[phone]  
   
                                                    2022   
16:                              Body mass index   
(BMI) [Ratio]             59.54 kg/m2               Alonso Birmingham  
Other Phone:   
[phone]                           [facility]   
[employer]   
[employer]  
Other Phone:   
[phone]  
   
                                                    2022   
16:          Body temperature    97.8 [degF]         Alonso Birmingham  
Other Phone:   
[phone]                           [facility]   
[employer]  
Other Phone:   
[phone]  
   
                                                    2022   
16:          Body weight         188.24 kg           Alonso Vinnie  
Other Phone:   
[phone]                           [facility]   
[employer]   
[employer]  
Other Phone:   
[phone]  
   
                                                    2022   
16:                              Diastolic blood   
pressure                  77 mm[Hg]                 Alonso Vinnie  
Other Phone:   
[phone]                           [facility]   
[employer]   
[employer]  
Other Phone:   
[phone]  
   
                                                    2022   
16:                              SaO2% (BldA) [Mass   
fraction]                 93 %                      Alonso Vinnie  
Other Phone:   
[phone]                           [facility]   
[employer]   
[employer]  
Other Phone:   
[phone]  
   
                                                    2022   
16:                              Systolic blood   
pressure                  130 mm[Hg]                Alonso Vinnie  
Other Phone:   
[phone]                           [facility]   
[employer]   
[employer]  
Other Phone:   
[phone]  
  
  
  
Encounters  
  
  
                          Encounter Date Encounter Type Care Provider Facility  
   
                          Start: 10- ambulatory   Elaine Espino Facility:[facility]  
   
                                                    Start: 2024  
End: 2024           ambulatory                VERA MATIAS-BERNARDINOY                            Not Available  
   
                                Start: 2024 Registered Recurring DO Vera  
   
[place]  
Work Phone:   
[phone]                          [facility]   
[place]  
Work Phone:   
[phone]  
   
                                                    Start: 2024  
End: 2024           ambulatory                DO Vera   
[place]  
Work Phone:   
[phone]                          [facility]  
Work Phone:   
[phone]  
   
                                                    Start: 2024  
End: 2024           Discharged Recurring      DO Vera   
[place]  
Work Phone:   
[phone]                          [facility]   
[facility]  
   
                                                    Start: 2024  
End: 2024     ambulatory          DANYA AN   Not Available  
   
                                                    Start: 2024  
End: 2024           ambulatory                DO Vera   
[place]  
Work Phone:   
[phone]                          [facility]  
Work Phone:   
[phone]  
   
                                                    Start: 2024  
End: 2024                         Patient encounter   
procedure                               DO Vera   
[place]  
Work Phone:   
[phone]                          [facility]   
[facility]  
Work Phone:   
[phone]  
   
                                Start: 2024 Registered Recurring DO Vera  
   
[place]  
Work Phone:   
[phone]                          [facility]  
   
                                Start: 2024 Registered Recurring DO Vera  
   
[place]  
Work Phone:   
[phone]                          [facility]   
[place]  
Work Phone:   
[phone]  
   
                                                    Start: 2024  
End: 2024           ambulatory                DO Vera   
[place]  
Work Phone:   
[phone]                          [facility]  
Work Phone:   
[phone]  
   
                                                    Start: 2024  
End: 2024                         Patient encounter   
procedure                               DO Vera   
[place]  
Work Phone:   
[phone]                          [facility]   
[facility]  
Work Phone:   
[phone]  
   
                                Start: 2024 Registered Recurring DO Vera  
   
[place]  
Work Phone:   
[phone]                          [facility]   
[place]  
Work Phone:   
[phone]  
   
                                Start: 2024 Registered Recurring DO Vera  
   
[place]  
Work Phone:   
[phone]                          [facility]   
[facility]  
   
                                                    Start: 2024  
End: 2024           ambulatory                DO Vera   
[place]  
Work Phone:   
[phone]                          [facility]  
Work Phone:   
[phone]  
   
                                                    Start: 2024  
End: 2024                         Patient encounter   
procedure                               DO Vera   
[place]  
Work Phone:   
[phone]                          [facility]   
[facility]  
Work Phone:   
[phone]  
   
                                Start: 2024 Registered Recurring DO Vera  
   
[place]  
Work Phone:   
[phone]                          [facility]   
[place]  
Work Phone:   
[phone]  
   
                                                    Start: 2024  
End: 2024     ambulatory          SHARLENE HOLM [facility]  
   
Ambulatory  
   
                                                    Start: 2024  
End: 2024                         Office outpatient visit   
15 minutes                              Sharlene HOLM [place]   
APRN-CNP  
Work Phone:   
[phone]                          [facility]  
   
                                        Comment on above:   Non-ischemic cardiom  
yopathy (Multi) (Primary Dx);  
DIMITRI on CPAP;  
BMI 50.0-59.9, adult (Multi)   
   
                                                    Start: 2024  
End: 2024           ambulatory                VERA MATIAS-ARPAN                            Not Available  
   
                                                    Start: 2024  
End: 2024           ambulatory                DO Vera   
[place]  
Work Phone:   
[phone]                          [facility]  
Work Phone:   
[phone]  
   
                                                    Start: 2024  
End: 2024                         Patient encounter   
procedure                               DO Vera   
[place]  
Work Phone:   
[phone]                          [facility]   
[facility]  
Work Phone:   
[phone]  
   
                                                    Start: 2024  
End: 2024     ambulatory          DANYA AN   Not Available  
   
                                                    Start: 2024  
End: 2024           ambulatory                DO Vera   
[place]  
Work Phone:   
[phone]                          [facility]  
Work Phone:   
[phone]  
   
                                                    Start: 2024  
End: 2024           Discharged Recurring      DO Vera   
[place]  
Work Phone:   
[phone]                          [facility]   
[place]  
Work Phone:   
[phone]  
   
                                                    Start: 2024  
End: 2024     ambulatory          JESSICA LOPEZ        Not Available  
   
                                                    Start: 2024  
End: 2024     ambulatory          ARLENE DEPOY      Not Available  
   
                                                    Start: 2024  
End: 2024     ambulatory          JESSICA L LOPEZ        Not Available  
   
                                                    Start: 2024  
End: 2024     ambulatory          ARLENE DEPOY      Not Available  
   
                                Start: 02- Bamboo flowsheet Jessica L Lopez   
PT  
Work Phone:   
[phone]                          [facility]  
   
                                Start: 02- Bamboo flowsheet Jessica L Lopez   
PT  
Work Phone:   
[phone]                          [facility]  
   
                                                    Start: 02-  
End: 02-     ambulatory          JESSICA L LOPEZ        Not Available  
   
                                                    Start: 2024  
End: 2024                         Office outpatient visit   
25 minutes                              Ray Terry NP  
Work Phone:   
[phone]                          [facility]  
   
                                        Comment on above:   Lumbosacral strain,   
initial encounter (Primary Dx);  
Acute pain of left shoulder;  
Wound of left lower extremity, initial encounter   
   
                                                    Start: 2024  
End: 2024           ambulatory                VERA MATIAS-EMERY                            Not Available  
   
                                                    Start: 2024  
End: 2024                         Patient encounter   
procedure                               Danya An   
DPM  
Work Phone:   
[phone]                          [facility]  
   
                                        Comment on above:   Onychomycosis (Prima  
ry Dx);  
Pain in both feet;  
Corns and callosities;  
Other specified peripheral vascular diseases (CMS/HCC);  
Circulating anticoagulant disorder (CMS/HCC)   
   
                                                    Start: 2024  
End: 2024     ambulatory          DANYA AN   Not Available  
   
                                                    Start: 2024  
End: 2024           ambulatory                VERA D   
MATIAS-EMERY                            Not Available  
   
                                                    Start: 2023  
End: 2023     ambulatory          DANYA AN   Not Available  
   
                                                    Start: 2023  
End: 2023           ambulatory                DO Vera   
[place]  
Work Phone:   
[phone]                          [facility]  
Work Phone:   
[phone]  
   
                                                    Start: 2023  
End: 2023                         Patient encounter   
procedure                               DO Vera [place]  
Work Phone:   
[phone]                          [facility]   
[facility]  
   
                                                    Start: 2023  
End: 2023           ambulatory                Jacoby Braden  
Other Phone:   
[phone]                           [facility]   
[employer]   
[employer]  
Other Phone:   
[phone]  
   
                          Start: 2023 Follow-up encounter [facility]   
[facility]  
   
                                Start: 2023 Registered Recurring DO Vera  
   
[place]  
Work Phone:   
[phone]                          [facility]   
Management  
Work Phone:   
[phone]  
   
                                                    Start: 2023  
End: 2023           ambulatory                DO Vera   
[place]  
Work Phone:   
[phone]                          [facility]  
Work Phone:   
[phone]  
   
                                                    Start: 2023  
End: 2023           Discharged Recurring      DO Vera   
[place]  
Work Phone:   
[phone]                          [facility]   
[place]  
Work Phone:   
[phone]  
   
                                Start: 2023 ambulatory      Dr. Compa wylie   
[place]                                 Facility:  
   
                                                    Start: 2023  
End: 2023           ambulatory                DO Vera   
[place]  
Work Phone:   
[phone]                          [facility]  
Work Phone:   
[phone]  
   
                                                    Start: 2023  
End: 2023                         Patient encounter   
procedure                               DO Vera   
[place]  
Work Phone:   
[phone]                          [facility]   
Exercise Program  
   
                                                    Start: 2023  
End: 2023           ambulatory                Jacoby Braden  
Other Phone:   
[phone]                           [facility]   
[employer]   
[employer]  
Other Phone:   
[phone]  
   
                          Start: 2023 Telephone encounter [facility]   
[facility]  
   
                                Start: 2023 Registered Recurring DO Vera  
   
[place]  
Work Phone:   
[phone]                          [facility]   
Management  
Work Phone:   
[phone]  
   
                                Start: 2023 Chart Update    Vera Washingtony  
Work Phone:   
[phone]                          [facility]   
[facility] [address] DO  
Work Phone:   
[phone]  
   
                                        Start: 2023   Patient encounter   
procedure                               Vera [place]  
Work Phone:   
[phone]                          [facility]   
[facility] [address] [place]  
Work Phone:   
[phone]  
   
                          Start: 2023 ambulatory   COMPA WHITLEY Facilit  
[id number]  
   
                                Start: 2023 Chart Update    Vera [place]  
Work Phone:   
[phone]                          [facility]   
[facility] [address] DO  
Work Phone:   
[phone]  
   
                                                    Start: 2023  
End: 2023           ambulatory                DO Vera [place]  
Work Phone:   
[phone]                          [facility]  
Work Phone:   
[phone]  
   
                                                    Start: 2023  
End: 2023                         Patient encounter   
procedure                               DO Vera [place]  
Work Phone:   
[phone]                          [facility]   
[facility]  
   
                                Start: 2023 Registered Recurring DO Vera [place]  
Work Phone:   
[phone]                          [facility]   
Management  
Work Phone:   
[phone]  
   
                                        Start: 2023   ([facility] [address] FU) [facility] F/  
U   
Registerd Dietician       Annle Baird                 [facility]   
[facility]  
   
                                                    Start: 2023  
End: 2023           ambulatory                Jacoby Braden  
Other Phone:   
[phone]                           [facility]   
Professional   
Corporation  
Other Phone:   
[phone]  
   
                          Start: 2023 Telephone encounter Jacboy [facility]   
[facility]  
   
                                        Start: 2023   Office outpatient vi  
sit   
25 minutes                              Vera LEVI   
[place]  
Work Phone:   
[phone]                          [facility]   
[facility] [address] DO  
Work Phone:   
[phone]  
   
                                Start: 2023 ambulatory      Dr. Compa Whitley                                 Facility:  
   
                                                    Start: 2022  
End: 2022           ambulatory                Jacoby Braden  
Other Phone:   
[phone]                           [facility]   
[employer]   
[employer]  
Other Phone:   
[phone]  
   
                          Start: 2022 Follow-up encounter [facility]   
[facility]  
   
                                        Start: 2022   ([facility] RD FU) [facility] F/  
U   
Registerd Dietician       Annel Baird                 [facility]   
[facility]  
   
                                                    Start: 2022  
End: 2022           ambulatory                Annel Baird  
Other Phone:   
[phone]                           [facility]   
[employer]   
[employer]ation  
Other Phone:   
[phone]  
   
                                                    Start: 10-  
End: 10-           ambulatory                Jacoby Braden  
Other Phone:   
[phone]                           [facility]   
Professional   
[employer]poration  
Other Phone:   
[phone]  
   
                          Start: 10- Telephone encounter Jacoby ANTHONY  
[facility]   
[facility]  
   
                                                    Start: 10-  
End: 10-           ambulatory                Jacoby Braden  
Other Phone:   
[phone]                           [facility]   
Professional   
Corporation  
Other Phone:   
[phone]  
   
                          Start: 10- Follow-up encounter Jacoby ANTHONY  
[facility]   
[facility]  
   
                                                    Start: 2022  
End: 2022           ambulatory                DO Vera   
[place]  
Work Phone:   
[phone]                          [facility]  
Work Phone:   
[phone]  
   
                                                    Start: 2022  
End: 2022           Discharged Recurring      DO Vera   
[place]  
Work Phone:   
[phone]                          [facility]   
[place]  
   
                                Start: 2022 Registered Recurring DO Vera  
   
[place]  
Work Phone:   
[phone]                          [facility]   
Management  
   
                                                    Start: 2022  
End: 2022           ambulatory                Jacoby Braden  
Other Phone:   
[phone]                           [facility]   
Professional   
Corporation  
Other Phone:   
[phone]  
   
                          Start: 2022 Follow-up encounter [facility]  
   
                                                    Start: 2022  
End: 2022           ambulatory                Jacoby Braden  
Other Phone:   
[phone]                           [facility]   
[employer]   
[employer]  
Other Phone:   
[phone]  
   
                          Start: 2022 Follow-up encounter Jacoby Braden GABRIELLE  
[facility]  
   
                                Start: 2022 Chart Update    Vera SIMS   
[email or web address]  
Work Phone:   
[phone]                          [facility]   
[facility] [address] DO  
Work Phone:   
[phone]  
   
                                        Start: 2022   Office consultation   
new/estab patient 80 min                Vera LEVI   
[address]  
Work Phone:   
[phone]                          -[facility]   
[facility] [address] DO  
Work Phone:   
[phone]  
   
                                        Start: 2022   ([facility]) [facility] Init  
ial   
Provider                  Annel Baird                 [facility]  
   
                                                    Start: 2022  
End: 2022           ambulatory                Annel Baird  
Other Phone:   
[phone]                           [facility]   
[employer]   
[employer]  
Other Phone:   
[phone]  
   
                                                    Start: 2022  
End: 2022           ambulatory                Alonso Birmingham  
Other Phone:   
[phone]                           [facility]   
[employer]   
[employer]  
Other Phone:   
[phone]  
   
                                        Start: 2022   Office outpatient vi  
sit   
25 minutes                Alonso Birmingham              [facility]  
   
                                                    Start: 05-  
End: 05-           ambulatory                Annel Baird  
Other Phone:   
[phone]                           [facility]   
[employer]   
[employer]  
Other Phone:   
[phone]  
   
                                        Start: 05-   IBT FOR OBESITY GROU  
P   
2-10 30M                  Annel Baird                 [facility]   
[facility]  
   
                                                    Start: 2022  
End: 2022           ambulatory                Jacoby Braden  
Other Phone:   
[phone]                           [facility]   
[employer]   
[employer]  
Other Phone:   
[phone]  
   
                          Start: 2022 Nutrition therapy Jacoby Braden [facility]   
[facility]  
   
                                                    Start: 2022  
End: 2022           ambulatory                Annel Fitt  
Other Phone:   
[phone]                           [facility]   
[employer]   
[employer]  
Other Phone:   
[phone]  
   
                          Start: 2022 [facility]  
   
                                                    Start: 2022  
End: 2022           ambulatory                Alonso Birmingham  
Other Phone:   
[phone]                           [facility]   
Professional   
Corporation  
Other Phone:   
[phone]  
   
                                        Start: 2022   Office outpatient ne  
w 45   
minutes                   Alonso Birmingham              [facility]  
   
                                        Start: 2021   ([facility]) [facility]  
vid   
Vaccine                   Annel Baird                 [facility]   
[facility]  
   
                                                    Start: 2021  
End: 2021           ambulatory                Annel Baird  
Other Phone:   
[phone]                           [facility]   
Professional   
Corporation  
Other Phone:   
[phone]  
  
  
  
Procedures  
  
  
                          Date         Procedure    Procedure Detail Performing   
Clinician  
   
                                        Start: 2024   Investigation of   
transfusion reaction                                DO Vera [place]  
Work Phone:   
[phone]  
   
                                       Finger operation              Vera SIMS We  
[place]  
Work Phone:   
[phone]  
   
                                       Operation on gallbladder              Garcia  
katrin LEVI [place]  
Work Phone:   
[phone]  
   
                                       Total colonoscopy              Vera GOMEZ [place]  
Work Phone:   
[phone]  
  
  
  
Plan of Treatment  
  
  
                          Date         Care Activity Detail       Author  
   
                                                    Start:   
2025  
End: 2025                         Patient encounter   
procedure                               2025 3:20 PM EST   
Office Visit [facility] [address]   
[address] [place]   
[address] [phone]   
Compa Whitley DO   
[address] [place] [address]   
[phone] (Work)   
[phone] (Fax)                      [facility]  
   
                                                    Start:   
2025                              Medicare Annual Wellness   
(AWV)                                   Medicare Annual Wellness   
(AWV)                                   [facility]  
   
                                                    Start:   
2024                              Urine screening for   
protein                                 Diabetes: Urine Protein   
Screening                               [facility]  
   
                                                    Start:   
2024                              Screening for malignant   
neoplasm of colon                                   [facility]  
   
                                                    Start:   
2024  
End: 2024                         Patient encounter   
procedure                               2024 2:30 PM EDT   
Office Visit [facility]   
[address]   
[place] [address] [phone]   
Matias-Vera Antony,   
DO [address]   
[address] [place] [address]   
[phone] (Work)   
[phone] (Fax)                      [facility]  
   
                                                    Start:   
2024  
End: 2024                         Patient encounter   
procedure                               2024 2:30 PM EDT   
Procedure Visit [facility]   
PODIATRY [address]   
[address] [place]   
[address] [phone]   
Danya An, DPM   
[address]   
[place] [address]   
[phone] (Work)   
[phone] (Fax)                      [facility]  
   
                                                    Start:   
2024                              Hemoglobin A1c   
measurement               Diabetes: Hemoglobin A1C  [facility]  
   
                                                    Start:   
02-  
End: 02-           ambulatory                02/15/2024 1:00 PM EST   
Evaluation [facility]   
[address]   
[place] [address] [phone] Jessica Lopez   
L, PT [address]   
[address] [place]   
[address] [phone]   
(Work) [phone]   
(Fax)                                   [facility]  
   
                                                    Start:   
2024  
End: 2024                         Patient encounter   
procedure                               2024 3:30 PM EST   
Office Visit [facility]   
[address]   
[place] [address] [phone]                            [facility]  
   
                                                    Start:   
2023                              COVID-19 Vaccine ( season)                         COVID-19 Vaccine ( season)                         [facility]  
   
                                                    Start:   
2023                              FUV, Provider:   
Compa Whitley, Status:   
Pen, Time: 11:00 AM                     FUV, Provider:   
Compa Whitley, Status:   
Pen, Time: 11:00 AM                     [facility]   
[facility] [address] DO  
Work Phone:   
[phone]  
   
                                                    Start:   
2023                              МАРИЯ, Provider: [place] FRYEI NUCLEAR   
[id number], Status:   
Pen, Time: 2:00 PM                      MUGA, Provider: [place] FRYEI NUCLEAR   
,[id number], Status:   
Pen, Time: 2:00 PM                      [facility]  
Work Phone:   
[phone]  
   
                                                    Start:   
2023                              FUV, Provider:   
Compa Whitley, Status:   
Pen, Time: 10:00 AM                     FUV, Provider:   
Compa Whitley, Status:   
Pen, Time: 10:00 AM                     [facility]  
Work Phone:   
[phone]  
   
                                                    Start:   
2016                              RSV Pregnant patients   
and/or patients aged 60+   
years (1 - 1-dose 60+   
series)                                 RSV Pregnant patients   
and/or patients aged 60+   
years (1 - 1-dose 60+   
series)                                 [facility]  
   
                                                    Start:   
2006          Zoster Vaccines (1 of 2) Zoster Vaccines (1 of 2) [facility]  
   
                                                    Start:   
1978                              DTaP/Tdap/Td Vaccines (1   
- Tdap)                                 DTaP/Tdap/Td Vaccines (1   
- Tdap)                                 [facility]  
   
                                                    Start:   
1974                              Diabetes mellitus   
screening                 Diabetes Screening        [facility]  
   
                                                    Start:   
1974          Hepatitis C screening Hepatitis C Screening [facility]  
   
                                                    Start:   
1962                              Pneumococcal Vaccine:   
65+ Years (1 - PCV)                     Pneumococcal Vaccine:   
65+ Years (1 - PCV)                     [facility]  
   
                                                    Start:   
1962                              Pneumococcal Vaccine:   
65+ Years (1 of 2 - PCV)                Pneumococcal Vaccine:   
65+ Years (1 of 2 - PCV)                [facility]  
   
                                                    Start:   
1956                              Hemoglobin A1c   
measurement               Diabetes: Hemoglobin A1C  [facility]  
   
                                                    Start:   
1956          Lipid panel         Lipid Panel         [facility]  
   
                                                    Start:   
1956                              Medicare Annual Wellness   
Visit                                   Medicare Annual Wellness   
Visit (AWV)                             [facility]  
   
                                                    Start:   
1956                              Screening for malignant   
neoplasm of colon                                   [facility]  
  
  
  
Immunizations  
  
  
                      Immunization Date Immunization Notes      Care Provider Jenny llanes  
   
                                        2023          Influenza, Seasonal,  
   
Quadrivalent,   
Adjuvanted                                          Danya An DPM  
Work Phone:   
[phone]                          [facility]  
   
                                        10-          influenza, high dose  
   
seasonal,   
preservative-free                                   Jacoby Asiya  
Other Phone:   
[phone]                           [facility]   
Professional   
Corporation  
Other Phone:   
[phone]  
   
                                        10-          Fluzone High-Dose   
Quadrivalent 0.7 ML   
Intramuscular   
Suspension Prefilled   
Syringe                                             Vera D   
[place]  
Work Phone:   
[phone]                          [facility]   
[address] DO  
Work Phone:   
[phone]  
   
                                        10-          influenza virus   
vaccine, unspecified   
formulation                                         DO Vera   
[place]  
Work Phone:   
[phone]                          [facility]  
   
                                        10-          seasonal influenza,   
intradermal,   
preservative free                                   Sharlene An   
APRN-CNP  
Work Phone:   
[phone]                          [facility]  
Work Phone:   
[phone]  
   
                          2021   COVID-19 Pfizer              Annel Fitt  
Other Phone:   
[phone]                           [facility]  
   
                                        2021          influenza, high dose  
   
seasonal,   
preservative-free                                   Vera D   
[place]  
Work Phone:   
[phone]                          [facility]  
   
                                        2021          Pfizer-BioNTech   
COVID-19 Vacc 30   
MCG/0.3ML   
Intramuscular   
Suspension                                          Vera D   
[place]  
Work Phone:   
[phone]                          [facility]  
   
                                        2021          Pfizer-BioNTech   
COVID-19 Vacc 30   
MCG/0.3ML   
Intramuscular   
Suspension                                          Vera D   
[place]  
Work Phone:   
[phone]                          [facility]  
   
                                        2020          Influenza, injectabl  
e,   
Madin Griselda Canine   
Kidney, preservative   
free, quadrivalent                                  Vera D   
[place]  
Work Phone:   
[phone]                          [facility]  
Work Phone:   
[phone]  
   
                                        10-          Influenza, injectabl  
e,   
Madin Griselda Canine   
Kidney, preservative   
free, quadrivalent                                  Vera D   
[place]  
Work Phone:   
[phone]                          [facility]  
Work Phone:   
[phone]  
   
                                        2018          influenza, injectabl  
e,   
madin griselda canine   
kidney, preservative   
free                                                Vera LEVI   
[place]  
Work Phone:   
[phone]                          [facility] [address] DO  
Work Phone:   
[phone]  
   
                                        2017          influenza, injectabl  
e,   
quadrivalent,   
preservative free                                   Danya An   
DPM  
Work Phone:   
[phone]                          [facility]  
   
                                        10-          seasonal influenza,   
intradermal,   
preservative free                                   Vera SIMS   
[place]  
Work Phone:   
[phone]                          [facility]   
[facility] [address] DO  
Work Phone:   
[phone]  
   
                                        10-          seasonal influenza,   
intradermal,   
preservative free                                   Danya An   
DPM  
Work Phone:   
[phone]                          [facility]  
  
  
  
Payers  
  
  
                          Date         Payer Category Payer        Policy ID  
   
                          2024   Self-pay                  [id number]  
[id number]  
[id number]  
   
                          2023   Private Health Insurance              [id number]  
[id number]  
[id number]  
   
                          2023   Private Health Insurance              [id number]  
[id number]  
   
                          2021   Medicare                  [id number]  
[id number]  
[id number]  
   
                          2021   Private Health Insurance              CLI  
[id number]   
[id number]  
   
                          2021   Medicare                  [id number]   
[id number]  
   
                          1956   Unknown                   [id number]   
[id number]  
[id number]  
   
                          1956   Unknown                   [id number]   
[id number]  
[id number]  
   
                          1956   Unknown                   [id number]   
[id number]  
[id number]  
   
                          1956   Unknown                   [id number]   
[id number]  
[id number]  
   
                          1956   Unknown                   [id number]   
[id number]  
[id number]  
   
                          1956   Unknown                   [id number]   
[id number]  
[id number]  
   
                          1956   Unknown                   [id number]   
[id number]  
[id number]  
   
                          1956   Unknown                   [id number]   
[id number]  
[id number]  
   
                          1956   Unknown                   [id number]   
[id number]  
[id number]  
   
                          1956   Unknown                   [id number]   
[id number]  
[id number]  
   
                          1956   Unknown                   [id number]   
[id number]  
[id number]  
   
                          1956   Unknown                   [id number]   
[id number]  
[id number]  
   
                          1956   Unknown                   [id number]   
[id number]  
[id number]  
   
                          1956   Unknown                   [id number]   
[id number]  
[id number]  
   
                          1956   Unknown                   [id number]   
[id number]  
[id number]  
   
                          1956   Unknown                   [id number]   
[id number]  
[id number]  
   
                          1956   Unknown                   [id number]   
[id number]  
[id number]  
   
                                                Self-pay        Self Pay [employer]  
e   
Program                                 [id number]   
[id number]  
[id number]  
   
                                       Unknown                   [id number]   
[id number]  
   
                                       Unknown                     
   
                                       Unknown                   [id number]   
[id number]  
[id number]  
   
                                       Unknown                   [id number]   
[id number]  
[id number]  
   
                                       Unknown                   [id number]   
[id number]  
[id number]  
  
  
  
Social History  
  
  
                          Date         Type         Detail       Facility  
   
                                                    Start: 2023  
End: 2024     Sex Assigned At Birth                     [facility]   
Professional   
Corporation  
Other Phone:   
[phone]  
   
                                                    Start: 2023  
End: 2024                         Daily caffeine   
consumption                             Daily caffeine   
consumption                             [facility]   
[facility] [address] DO  
Work Phone:   
[phone]  
   
                                        Comment on above:   coffee 1-2 pots a da  
y;   
   
                                                    Start: 2022  
End: 2024                         Tobacco smoking status   
NHIS                                    Never smoked tobacco   
(finding)                               [facility]  
   
                          Start: 1955 Sex Assigned At Birth Male         F  
[facility]  
   
                                       History of tobacco use Passive smoker [facility]  
   
                                                    Start: 2023  
End: 2024                         Tobacco use and   
exposure                                Smokeless tobacco   
non-user                                [facility]  
   
                                                    Start: 2024  
End: 2024     Alcohol intake      Ex-drinker (finding) [facility]  
   
                                                            How often to you hav  
e   
a drink containing   
alcohol?                  Never                     [facility]  
   
                                                            How many standard   
drinks containing   
alcohol do you have on   
a typical day?            Patient does not drink    [facility]  
   
                          Start: 1955 Sex Assigned At Birth Not on file  N  
[facility]  
   
                                        Start: 2024   Alcoholic beverage   
intake                                  Lifetime non-drinker   
(finding)                               [facility]  
Work Phone:   
[phone]  
   
                                                    Start: 2024  
End: 2024                         Exposure to SARS-CoV-2   
(event)                   Not sure                  [facility]  
  
  
  
Clinical Notes 2021 to 2024  
  
  
                                Note Date & Type Note            Facility  
  
  
  
                                                    2024 Evaluation note   
   
                                           
   
                                        Authored            2024   
2:24pm  
   
                                                    Start weight: 414.4 lbs., he  
 is down 45.2 lbs. today   
with a weight of 369.2lbs. he is down 10.8 lbs.  
since last visit 2024.  
Starting Date: 04/15/2022.  
Ozempic start date 4/15/2022. Ozempic start weight   
414.4lbs. He is down 34.4 lbs. since starting  
Ozempic.  
1. Abnormal weight gain. He notes he is the heaviest   
in his family. His brother has some but much  
less significant weight issues. He notes he was able   
to get his weight down to 368 pounds but was  
unable to keep it down.  
2. Obesity-markedly improved since starting our   
program and with taking Ozempic 2 mg, but had  
significant weight regain of 20.9 pounds off the   
medication and not following up with the program  
since 2023. He is currently on Ozempic 1 mg and   
we will go back to Ozempic 2 mg through patient  
assistance. This is his second visit back since this   
restart. He had previously stopped Ozempic  
due to cost/donut hole. In the future he could   
reconsider going to the [facility] for  
bariatric surgery evaluation. He needs to get his BMI   
down to 45 before the surgeon will replace  
his knee. He is on Jardiance 10 mg which can also   
help with his diabetes and cardiomyopathy/CHF.  
His diabetes is well controlled with his last A1c of   
5.8 on 2024. He has mild CAD along with  
his diabetes and has not been on a statin so I had   
recommended Crestor, but he notes his cardio said  
it was not necessary so he did not start the   
medication. He is eating healthier overall. He should  
follow-up with our nutritionist. He feels that his   
appetite is controlled with Ozempic 2 mg. I have  
recommend again he consider bariatric surgery so he   
could have his knee replaced or find a surgeon  
who will do the surgery at an elevated BMI. In the   
past, he mentioned they found weakness in the  
bottom of the heart and evidently a problem with an   
artery that they could not get. His cardiologist  
however did not feel he needed the statin/Crestor   
that I previously prescribed. He gets very little  
activity due to bone-on-bone arthritis, severe low   
back pain on tramadol, difficulty getting out of  
a chair and also his cardiac function he does get NELSON   
with small amounts of activity. He sees Dr. Whitley to treat his cardiomyopathy. He did like   
canned foods and he understands that he must cut  
back the salt/processed food. He denies adding salt   
to his foods. He has had a A1c that was 6.6  
indicative of diabetes. He notes he was never told he   
was diabetic before program. He is seeing Dr. Lomeli for his knees and notes he has to get his BMI   
from 59 down to 45 to be able to have his knees  
replaced. Before starting the program he did eat a   
lot of red meat, potatoes and drank a lot of milk   
2% or 4% often buying 3 or 4 gallons at a time. He   
should not skip meals. He did try Adipex for 1  
month in the past but notes he was not able to lose   
but a few pounds so his PCP stopped it. He notes  
his sister does the shopping as he can. He does some   
simple cooking.  
3. Cardiomyopathy/CHF/early CAD-he must continue to   
follow-up with cardiology for evaluation of  
cardiomyopathy/CHF despite medications/known   
cardiomyopathy/previous mild CAD. We obtained his  
previous cardiac catheterization from 2009. He   
notes his preoperative diagnoses were angina  
pectoris, dyspnea on exertion and abnormal Cardiolite   
perfusion stress test. His left anterior  
descending artery showed a 20 to 25% tubular   
stenosis. His circumflex had an ostial stenosis of 30  
to 50% in several views but in one view showed less   
than 30% stenosis and appeared to be mildly  
kinked, his right coronary artery was large dominant   
and normal. His left ventriculogram showed mild  
to moderately reduced systolic dysfunction with an EF   
in the 40 to 45% range globally. He is now  
being treated by Dr. Whitley.  
4. Type 2 diabetes with A1c controlled at 5.8 on   
2024-continue Ozempic and Jardiance. We could  
consider Metformin but he already has some loose   
stools. We discussed that first-line treatment with  
lifestyle changes, decrease simple sweets and   
starches, will be some increased activity in the  
future and weight loss. He needs to consider   
bariatric surgery.  
5. Obstructive sleep apnea-compliant with his CPAP.   
Treat also with healthy lifestyle changes and  
weight loss. He needs to consider bariatric surgery.  
6. Bilateral OA of the knees-bone-on-bone making it   
very difficult for him to get around. He notes  
his orthopedic doctor wants him to drop from a BMI of   
59 down to 45 before he will do the surgery.  
We will start weight loss and Ozempic. He needs to   
consider bariatric surgery.  
7. Chronic low back pain-treat with healthy lifestyle   
change. He needs to maximize his pain  
management.  
8. Mixed hyperlipidemia-treat with decreasing the   
simple sweets, added sugars, refined starches, and   
bad/added fats, increasing activity and exercise and   
continued long-term weight loss. Monitor with  
healthy lifestyle change. I recommended that he start   
a statin since he is diabetic and had mild CAD  
on his cath but he notes his cardiologist told him it   
was not necessary to start.  
9. Metabolic syndrome-treat with long-term healthy   
lifestyle changes, behavioral changes, healthy  
nutritional changes, increased activity and exercise   
and achieve long-term weight loss.  
Follow up with me in 8 weeks.  
New labs needed: Up-to-date. Monitor A1c at least   
every 6 months/January with his PCP or in our  
office. He will continue other regular lab follow-up   
with his PCP.   
  
  
  
[facility]  
Work Phone: [phone]- Evaluation + Plan note* Assessment & Plan 
  Note - CARLITOS Severino - 2024 10:55 AM EDTAssociated Problem(s):
   BMI 50.0-59.9, adult (Multi)  
Formatting of this note might be different from the original.  
Reviewed the merits of healthy lifestyle choices on overall cardiovascular 
health.  
He had lost weight with Ozempic in the past, I believe there was some difficulty
 obtaining the prescription. Just recently resumed.  
Electronically signed by CARLITOS Severino at 2024 10:59 AM [facility]  
Work Phone: [phone]- Evaluation + Plan note* Assessment & Plan 
  Note - CARLITOS Severino - 2024 10:55 AM EDTAssociated Problem(s):
   Non-ischemic cardiomyopathy (Multi)  
Formatting of this note might be different from the original.  
NICM HF bordeline EF 42% 2023 MUGA (2009 cath minimal CAD EF 40-45%)  
FC II Stage C  
  
GDMT  
Coreg  
Jardiance  
Entresto  
Aldactone  
Electronically signed by CARLITOS Severino at 2024 10:55 AM [facility]  
Work Phone: [phone]- Miscellaneous Notes* Assessment & Plan Note
   - CARLITOS Severino - 2024 10:55 AM EDTAssociated Problem(s): BMI
   50.0-59.9, adult (Multi)  
Formatting of this note might be different from the original.  
Reviewed the merits of healthy lifestyle choices on overall cardiovascular 
health.  
He had lost weight with Ozempic in the past, I believe there was some difficulty
 obtaining the prescription. Just recently resumed.  
Electronically signed by CARLITOS Severino at 2024 10:59 AM EDT  
  
* Assessment & Plan Note - CARLITOS Severino - 2024 10:55 AM EDT
  Associated Problem(s): Non-ischemic cardiomyopathy (Multi)  
Formatting of this note might be different from the original.  
NICM HF bordeline EF 42% 2023 MUGA (2009 cath minimal CAD EF 40-45%)  
FC II Stage C  
  
GDMT  
Coreg  
Jardiance  
Entresto  
Aldactone  
Electronically signed by CARLITOS Severino at 2024 10:55 AM EDT  
  
documented in this [facility]  
Work Phone: [phone]- History of Present illness Narrative* CARLITOS Severino - 2024 11:00 AM EDTFormatting of this note is 
  different from the original.  
Chief Complaint  
 Doing good   
  
Reason for Visit  
9-month follow-up.  
Patient presents to the office today for outpatient follow-up for nonischemic 
cardiomyopathy.  
Last evaluated in clinic by Dr. Whitley 2023.  
  
Presents today ambulatory with cane and steady gait.  
Accompanied by patient  
Patient denies any hospitalizations or significant changes to interval medical 
history since last office follow-up.  
He follows routinely with PCP, is due to get annual lab work later this year.  
Also follows routinely with the wound clinic.  
  
History of Present Illness  
Patient is a very pleasant 69-year-old gentleman who presents to the office 
today with no voiced cardiovascular complaints. He continues to follow at the 
wound clinic due to right lower extremity ulcers, has noted some slight 
increasing lower extremity edema. From an activity standpoint he is able to go 
to [employer], walks [place] the [place] with no change in his functional class II 
shortness of breath. He denies any exertional chest pain. No orthopnea or PND. 
Remains compliant with CPAP treatment.  
  
Only concern today is cost of medications. Apparently has now in the donut hole.
 He is on Xarelto due to a prior right lower extremity DVT.  
  
He has some slight increase in his lower extremity edema. Due to office visits 
he has not been taking his Lasix twice daily. Is unable to tolerate compression 
stockings.  
  
Lower extremity wounds, managed by wound clinic. Denies any prior history of 
PAD.  
  
Patient reports that overall has no complaint(s) of chest pain, chest 
pressure/discomfort, claudication, dyspnea, exertional chest 
pressure/discomfort, fatigue, and irregular heart beat  
  
Review of Systems  
Cardiovascular: Positive for leg swelling. Negative for chest pain, dyspnea on 
exertion, irregular heartbeat, near-syncope, orthopnea, palpitations, paroxysmal
 nocturnal dyspnea and syncope.  
  
  
Visit Vitals  
/70 (BP Location: Left arm, Patient Position: Sitting)  
Pulse 78  
Ht 1.803 m (5' 11 )  
Wt (!) 171 kg (378 lb)  
BMI 52.72 kg/m  
Smoking Status Never  
BSA 2.93 m  
  
Physical Exam  
Vitals and nursing note reviewed.  
Constitutional:  
Appearance: Normal appearance.  
Cardiovascular:  
Rate and Rhythm: Normal rate and regular rhythm.  
Heart sounds: Normal heart sounds.  
Pulmonary:  
Effort: Pulmonary effort is normal.  
Breath sounds: Normal breath sounds.  
Musculoskeletal:  
Cervical back: Full passive range of motion without pain.  
Right lower le+ Pitting Edema present.  
Left lower le+ Pitting Edema present.  
Skin:  
General: Skin is cool.  
Neurological:  
Mental Status: He is alert and oriented to person, place, and time.  
Psychiatric:  
Attention and Perception: Attention normal.  
Mood and Affect: Mood normal.  
Behavior: Behavior is cooperative.  
  
  
Allergies  
Allergen Reactions  
Cephalosporins Rash  
Diclofenac Sodium Rash  
Statins-Hmg-Coa Reductase Inhibitors Rash  
  
Current Outpatient Medications  
Medication Instructions  
carvedilol (Coreg) 6.25 mg tablet 1 tablet, oral, 2 times daily with meals  
empagliflozin (Jardiance) 10 mg 1 tablet, oral, Daily  
furosemide (LASIX) 40 mg, oral, 2 times daily  
Ozempic 1 mg, subcutaneous, Weekly  
rivaroxaban (Xarelto) 10 mg tablet 1 tablet, oral, Daily  
sacubitriL-valsartan (Entresto) 24-26 mg tablet 1 tablet, oral, 2 times daily  
spironolactone (Aldactone) 25 mg tablet 1 tablet, oral, Daily  
  
Assessment:  
  
Pleasant 69-year-old gentleman presents for routine follow-up. Nonischemic 
cardiomyopathy LVEF 42% baseline functional class II stage C on highest 
tolerated dose of guideline directed medical treatment. Will provide with 
patient assistance forms due to cost constraints: Discussed that if needed could
 transition back over to Cozaar, price check Farxiga.  
  
Overall patient is pleased with current state of cardiovascular health. At this 
time there are no indications for additional cardiovascular testing or need for 
medication changes.  
  
Non-ischemic cardiomyopathy (Multi)  
NICM HF bordeline EF 42% 2023 MUGA (2009 cath minimal CAD EF 40-45%)  
FC II Stage C  
  
GDMT  
Coreg  
Jardiance  
Entresto  
Aldactone  
  
BMI 50.0-59.9, adult (Multi)  
Reviewed the merits of healthy lifestyle choices on overall cardiovascular 
health.  
He had lost weight with Ozempic in the past, I believe there was some difficulty
 obtaining the prescription. Just recently resumed.  
  
Plan:  
  
Through informed decision making process incorporating patients unique 
circumstances, the followingtreatment plan will be initiated:  
  
1. Prescription drug management of cardiovascular medication for efficacy, 
adherence to treatment, side effect assessment and polypharmacy. Current 
treatment clinically warranted and to continue without modifications.  
  
2. Please complete patient assistance forms and return to office  
  
3. Return for follow-up; in the interim, contact the office if new symptoms 
arise.  
Dr. Whitley 9 months  
  
Sharlene An MSN, APRDRAKE-CNP, [facility] [place]  
  
Please excuse any errors in grammar or translation related to this dictation. 
Voice recognition software was utilized to prepare this document.  
  
Electronically signed by CARLITOS Severino at 2024 11:02 AM EDT  
  
documented in this [facility]  
Work Phone: [phone]- Instructions* Patient Instructions*   
  
CARLITOS Severino - 2024 11:00 AM EDT  
  
Formatting of this note might be different from the original.  
Please bring all medicines, vitamins, and herbal supplements with you when you 
come to the office.  
  
Prescriptions will not be filled unless you are compliant with your follow up 
appointments or have a follow up appointment scheduled as per instruction of 
your physician. Refills should be requested at the time of your visit.  
  
PLAN:  
Through informed decision making process incorporating patients unique 
circumstances, the followingtreatment plan will be initiated:  
  
1. Prescription drug management of cardiovascular medication for efficacy, 
adherence to treatment, side effect assessment and polypharmacy. Current 
treatment clinically warranted and to continue without modifications.  
  
2. Please complete patient assistance forms and return to office  
  
3. Return for follow-up; in the interim, contact the office if new symptoms 
arise.  
Dr. Whitley 9 months  
Electronically signed by CARLITOS Severino at 2024 11:38 AM EDT  
  
  
  
documented in this [facility]  
Work Phone: [phone]- Evaluation note*   
  
                                        Author              Jacoby Braden  
[facility]  
   
                                        Authored            2024 2:5  
6pm  
   
                                                    Start weight: 414.4 lbs., he  
 is down 32.0 lbs. today with a weight of 382.4lbs.   
and   
20.9 lbs. since  
his last visit on 2023.  
Starting Date: 04/15/2022.  
Ozempic start date 4/15/2022. Ozempic start weight 414.4lbs. He is down 32.0 
lbs.   
since starting  
Ozempic.  
1. Abnormal weight gain. He notes he is the heaviest in his family. His brother 
has   
some but much  
less significant weight issues. He notes he was able to get his weight down to 
368   
pounds but was  
unable to keep it down.  
2. Obesity-markedly improved since starting our program and with taking Ozempic 
2 mg,   
but had  
significant weight regain of 20.9 pounds off the medication. Due to cost/donut 
hole   
he stopped  
Ozempic. Hopefully he can restart full dose in the future using patient 
assistance.   
He is also on  
Jardiance 10 mg which can also help with his diabetes and cardiomyopathy/CHF. 
His   
diabetes is well  
controlled with his last A1c of 5.6 despite having issues with obtaining with 
his   
diabetic  
medications. He had mild CAD along with his diabetes and has not been on a 
statin so   
I had  
recommended Crestor, but he notes his cardio said it was not necessary so he did
 not   
start the  
medication. He is eating healthier overall. He should follow-up with our   
nutritionist. He feels that  
his appetite is controlled with Ozempic 2 mg. I have recommend again he consider
   
bariatric surgery  
so he could have his knee replaced or find a surgeon who will do the surgery at 
an   
elevated BMI. In  
the past, he mentioned they found weakness in the bottom of the heart and 
evidently a   
problem with  
an artery that they could not get. His cardiologist however did not feel he 
needed   
the  
statin/Crestor that I previously prescribed. He gets very little activity due to
   
bone-on-bone  
arthritis, severe low back pain on tramadol, difficulty getting out of a chair 
and   
also his cardiac  
function he does get NELSON with small amounts of activity. He sees Dr. Whitley to 
treat   
his  
cardiomyopathy. He did like canned foods and he understands that he must cut 
back the   
salt/processed  
food. He denies adding salt to his foods. He has had a A1c that was 6.6 
indicative of   
diabetes. He  
notes he was never told he was diabetic before program. He is seeing Dr. Lomeli 
for   
his knees and  
notes he has to get his BMI from 59 down to 45 to be able to have his knees 
replaced.   
Before  
starting the program he did eat a lot of red meat, potatoes and drank a lot of 
milk   
2% or 4% often  
buying 3 or 4 gallons at a time. He should not skip meals. He did try Adipex for
 1   
month in the past  
but notes he was not able to lose but a few pounds so his PCP stopped it. He 
notes   
his sister does  
the shopping as he can. He does some simple cooking.  
3. Cardiomyopathy/CHF/early CAD-he must continue to follow-up with cardiology 
for   
evaluation of  
cardiomyopathy/CHF despite medications/known cardiomyopathy/previous mild CAD. 
We   
obtained his  
previous cardiac catheterization from 2009. He notes his preoperative 
diagnoses   
were angina  
pectoris, dyspnea on exertion and abnormal Cardiolite perfusion stress test. His
 left   
anterior  
descending artery showed a 20 to 25% tubular stenosis. His circumflex had an 
ostial   
stenosis of 30  
to 50% in several views but in one view showed less than 30% stenosis and 
appeared to   
be mildly  
kinked, his right coronary artery was large dominant and normal. His left   
ventriculogram showed mild  
to moderately reduced systolic dysfunction with an EF in the 40 to 45% range   
globally. He is now  
being treated by Dr. Whitley.  
4. Type 2 diabetes with A1c controlled/improved at 5.6-will restart Ozempic and   
continue Jardiance.  
We could consider Metformin but he already has some loose stools. We discussed 
that   
first-line  
treatment with lifestyle changes, decrease simple sweets and starches, will be 
some   
increased  
activity in the future and weight loss. He needs to consider bariatric surgery.  
5. Obstructive sleep apnea-he is compliant with his CPAP. Treat also with 
healthy   
lifestyle changes  
and weight loss. He needs to consider bariatric surgery.  
6. Bilateral OA of the knees-bone-on-bone making it very difficult for him to 
get   
around. He notes  
his orthopedic doctor wants him to drop from a BMI of 59 down to 45 before he 
will do   
the surgery.  
We will start weight loss and Ozempic. He needs to consider bariatric surgery.  
7. Chronic low back pain-treat with healthy lifestyle change. He needs to 
maximize   
his pain  
management.  
8. Mixed hyperlipidemia-treat with decreasing the simple sweets, added sugars,   
refined starches, and   
bad/added fats, increasing activity and exercise and continued long-term weight 
loss.   
Monitor with  
healthy lifestyle change. I recommended that he start a statin since he is 
diabetic   
and had mild CAD  
on his cath but he notes his cardiologist told him it was not necessary to 
start.  
9. Metabolic syndrome-treat with long-term healthy lifestyle changes, behavioral
   
changes, healthy  
nutritional changes, increased activity and exercise and achieve long-term 
weight   
loss.  
Follow up with me in 6 weeks.  
New labs needed: Up-to-date. Monitor A1c at least every 6 months with his PCP or
 in   
our office. He  
will continue other regular lab follow-up with his PCP.   
  
  
  
  
                                        Author              Yvette Grijavla  
[facility]  
   
                                        Authored            2024 3:39p  
m  
   
                                                    Start weight: 414.4 lbs., he  
 is down 34.4 lbs. today with a weight of 380.0lbs.   
he is   
down 2.4 lbs.  
since last visit 2024.. since his last visit on 2023.  
Starting Date: 04/15/2022.  
Ozempic start date 4/15/2022. Ozempic start weight 414.4lbs. He is down 34.4 
lbs.   
since starting  
Ozempic.  
1. Abnormal weight gain. He notes he is the heaviest in his family. His brother 
has   
some but much  
less significant weight issues. He notes he was able to get his weight down to 
368   
pounds but was  
unable to keep it down.  
2. Obesity-markedly improved since starting our program and with taking Ozempic 
2 mg,   
but had  
significant weight regain of 20.9 pounds off the medication. Due to cost/donut 
hole   
he stopped  
Ozempic. Hopefully he can restart full dose in the future using patient 
assistance.   
He is also on  
Jardiance 10 mg which can also help with his diabetes and cardiomyopathy/CHF. 
His   
diabetes is well  
controlled with his last A1c of 5.6 despite having issues with obtaining with 
his   
diabetic  
medications. He had mild CAD along with his diabetes and has not been on a 
statin so   
I had  
recommended Crestor, but he notes his cardio said it was not necessary so he did
 not   
start the  
medication. He is eating healthier overall. He should follow-up with our   
nutritionist. He feels that  
his appetite is controlled with Ozempic 2 mg. I have recommend again he consider
   
bariatric surgery  
so he could have his knee replaced or find a surgeon who will do the surgery at 
an   
elevated BMI. In  
the past, he mentioned they found weakness in the bottom of the heart and 
evidently a   
problem with  
an artery that they could not get. His cardiologist however did not feel he 
needed   
the  
statin/Crestor that I previously prescribed. He gets very little activity due to
   
bone-on-bone  
arthritis, severe low back pain on tramadol, difficulty getting out of a chair 
and   
also his cardiac  
function he does get NELSON with small amounts of activity. He sees Dr. Whitley to 
treat   
his  
cardiomyopathy. He did like canned foods and he understands that he must cut 
back the   
salt/processed   
food. He denies adding salt to his foods. He has had a A1c that was 6.6 
indicative of   
diabetes. He  
notes he was never told he was diabetic before program. He is seeing Dr. Lomeli 
for   
his knees and  
notes he has to get his BMI from 59 down to 45 to be able to have his knees 
replaced.   
Before  
starting the program he did eat a lot of red meat, potatoes and drank a lot of 
milk   
2% or 4% often  
buying 3 or 4 gallons at a time. He should not skip meals. He did try Adipex for
 1   
month in the past  
but notes he was not able to lose but a few pounds so his PCP stopped it. He 
notes   
his sister does  
the shopping as he can. He does some simple cooking.  
3. Cardiomyopathy/CHF/early CAD-he must continue to follow-up with cardiology 
for   
evaluation of  
cardiomyopathy/CHF despite medications/known cardiomyopathy/previous mild CAD. 
We   
obtained his  
previous cardiac catheterization from 2009. He notes his preoperative 
diagnoses   
were angina  
pectoris, dyspnea on exertion and abnormal Cardiolite perfusion stress test. His
 left   
anterior  
descending artery showed a 20 to 25% tubular stenosis. His circumflex had an 
ostial   
stenosis of 30  
to 50% in several views but in one view showed less than 30% stenosis and 
appeared to   
be mildly  
kinked, his right coronary artery was large dominant and normal. His left   
ventriculogram showed mild  
to moderately reduced systolic dysfunction with an EF in the 40 to 45% range   
globally. He is now  
being treated by Dr. Whitley.  
4. Type 2 diabetes with A1c controlled/improved at 5.6-will restart Ozempic and   
continue Jardiance.  
We could consider Metformin but he already has some loose stools. We discussed 
that   
first-line  
treatment with lifestyle changes, decrease simple sweets and starches, will be 
some   
increased  
activity in the future and weight loss. He needs to consider bariatric surgery.  
5. Obstructive sleep apnea-he is compliant with his CPAP. Treat also with 
healthy   
lifestyle changes  
and weight loss. He needs to consider bariatric surgery.  
6. Bilateral OA of the knees-bone-on-bone making it very difficult for him to 
get   
around. He notes  
his orthopedic doctor wants him to drop from a BMI of 59 down to 45 before he 
will do   
the surgery.  
We will start weight loss and Ozempic. He needs to consider bariatric surgery.  
7. Chronic low back pain-treat with healthy lifestyle change. He needs to 
maximize   
his pain  
management.  
8. Mixed hyperlipidemia-treat with decreasing the simple sweets, added sugars,   
refined starches, and  
bad/added fats, increasing activity and exercise and continued long-term weight 
loss.   
Monitor with  
healthy lifestyle change. I recommended that he start a statin since he is 
diabetic   
and had mild CAD   
on his cath but he notes his cardiologist told him it was not necessary to 
start.  
9. Metabolic syndrome-treat with long-term healthy lifestyle changes, behavioral
   
changes, healthy  
nutritional changes, increased activity and exercise and achieve long-term 
weight   
loss.  
Follow up with me in 6 weeks.  
New labs needed: Up-to-date. Monitor A1c at least every 6 months with his PCP or
 in   
our office. He  
will continue other regular lab follow-up with his PCP.   
  
  
  
[facility]  
Work Phone: [phone]- Evaluation note*   
  
                                        Author              Jacoby Braden  
[facility]  
   
                                        Authored            2024 2:5  
6pm  
   
                                                    Start weight: 414.4 lbs., he  
 is down 32.0 lbs. today with a weight of 382.4lbs.   
and   
20.9 lbs. since  
his last visit on 2023.  
Starting Date: 04/15/2022.  
Ozempic start date 4/15/2022. Ozempic start weight 414.4lbs. He is down 32.0 
lbs.   
since starting  
Ozempic.  
1. Abnormal weight gain. He notes he is the heaviest in his family. His brother 
has   
some but much  
less significant weight issues. He notes he was able to get his weight down to 
368   
pounds but was  
unable to keep it down.  
2. Obesity-markedly improved since starting our program and with taking Ozempic 
2 mg,   
but had  
significant weight regain of 20.9 pounds off the medication. Due to cost/donut 
hole   
he stopped  
Ozempic. Hopefully he can restart full dose in the future using patient 
assistance.   
He is also on  
Jardiance 10 mg which can also help with his diabetes and cardiomyopathy/CHF. 
His   
diabetes is well  
controlled with his last A1c of 5.6 despite having issues with obtaining with 
his   
diabetic  
medications. He had mild CAD along with his diabetes and has not been on a 
statin so   
I had  
recommended Crestor, but he notes his cardio said it was not necessary so he did
 not   
start the  
medication. He is eating healthier overall. He should follow-up with our   
nutritionist. He feels that  
his appetite is controlled with Ozempic 2 mg. I have recommend again he consider
   
bariatric surgery  
so he could have his knee replaced or find a surgeon who will do the surgery at 
an   
elevated BMI. In  
the past, he mentioned they found weakness in the bottom of the heart and 
evidently a   
problem with  
an artery that they could not get. His cardiologist however did not feel he 
needed   
the  
statin/Crestor that I previously prescribed. He gets very little activity due to
   
bone-on-bone  
arthritis, severe low back pain on tramadol, difficulty getting out of a chair 
and   
also his cardiac  
function he does get NELSON with small amounts of activity. He sees Dr. Whitley to 
treat   
his  
cardiomyopathy. He did like canned foods and he understands that he must cut 
back the   
salt/processed  
food. He denies adding salt to his foods. He has had a A1c that was 6.6 
indicative of   
diabetes. He  
notes he was never told he was diabetic before program. He is seeing Dr. Lomeli 
for   
his knees and  
notes he has to get his BMI from 59 down to 45 to be able to have his knees 
replaced.   
Before  
starting the program he did eat a lot of red meat, potatoes and drank a lot of 
milk   
2% or 4% often  
buying 3 or 4 gallons at a time. He should not skip meals. He did try Adipex for
 1   
month in the past  
but notes he was not able to lose but a few pounds so his PCP stopped it. He 
notes   
his sister does  
the shopping as he can. He does some simple cooking.  
3. Cardiomyopathy/CHF/early CAD-he must continue to follow-up with cardiology 
for   
evaluation of  
cardiomyopathy/CHF despite medications/known cardiomyopathy/previous mild CAD. 
We   
obtained his  
previous cardiac catheterization from 2009. He notes his preoperative 
diagnoses   
were angina  
pectoris, dyspnea on exertion and abnormal Cardiolite perfusion stress test. His
 left   
anterior  
descending artery showed a 20 to 25% tubular stenosis. His circumflex had an 
ostial   
stenosis of 30  
to 50% in several views but in one view showed less than 30% stenosis and 
appeared to   
be mildly  
kinked, his right coronary artery was large dominant and normal. His left   
ventriculogram showed mild  
to moderately reduced systolic dysfunction with an EF in the 40 to 45% range   
globally. He is now  
being treated by Dr. Whitley.  
4. Type 2 diabetes with A1c controlled/improved at 5.6-will restart Ozempic and   
continue Jardiance.  
We could consider Metformin but he already has some loose stools. We discussed 
that   
first-line  
treatment with lifestyle changes, decrease simple sweets and starches, will be 
some   
increased  
activity in the future and weight loss. He needs to consider bariatric surgery.  
5. Obstructive sleep apnea-he is compliant with his CPAP. Treat also with 
healthy   
lifestyle changes  
and weight loss. He needs to consider bariatric surgery.  
6. Bilateral OA of the knees-bone-on-bone making it very difficult for him to 
get   
around. He notes  
his orthopedic doctor wants him to drop from a BMI of 59 down to 45 before he 
will do   
the surgery.  
We will start weight loss and Ozempic. He needs to consider bariatric surgery.  
7. Chronic low back pain-treat with healthy lifestyle change. He needs to 
maximize   
his pain  
management.  
8. Mixed hyperlipidemia-treat with decreasing the simple sweets, added sugars,   
refined starches, and   
bad/added fats, increasing activity and exercise and continued long-term weight 
loss.   
Monitor with  
healthy lifestyle change. I recommended that he start a statin since he is 
diabetic   
and had mild CAD  
on his cath but he notes his cardiologist told him it was not necessary to 
start.  
9. Metabolic syndrome-treat with long-term healthy lifestyle changes, behavioral
   
changes, healthy  
nutritional changes, increased activity and exercise and achieve long-term 
weight   
loss.  
Follow up with me in 6 weeks.  
New labs needed: Up-to-date. Monitor A1c at least every 6 months with his PCP or
 in   
our office. He  
will continue other regular lab follow-up with his PCP.   
  
  
  
  
                                        Author              Jacoby Braden  
[facility]  
   
                                        Authored            2024 4:24p  
m  
   
                                                    Start weight: 414.4 lbs., he  
 is down 34.4 lbs. today with a weight of 380.0lbs.   
he is   
down 2.4 lbs.  
since last visit 2024.. since his last visit on 2023.  
Starting Date: 04/15/2022.  
Ozempic start date 4/15/2022. Ozempic start weight 414.4lbs. He is down 34.4 
lbs.   
since starting  
Ozempic.  
1. Abnormal weight gain. He notes he is the heaviest in his family. His brother 
has   
some but much  
less significant weight issues. He notes he was able to get his weight down to 
368   
pounds but was  
unable to keep it down.  
2. Obesity-markedly improved since starting our program and with taking Ozempic 
2 mg,   
but had  
significant weight regain of 20.9 pounds off the medication and not following up
 with   
the program  
since 2023. This is his first visit back since this restart. He had 
previously   
stopped Ozempic  
due to cost/donut hole. He has filled 1 prescription of Ozempic but he thinks it
 was   
$304.  
Hopefully he can continue Ozempic using patient assistance. We are referring him
 to   
[facility] for bariatric surgery evaluation. He needs to get his BMI down to 45 
before   
the surgeon will  
replace his knee. He is on Jardiance 10 mg which can also help with his diabetes
 and  
cardiomyopathy/CHF. His diabetes is well controlled with his last A1c of 5.5 on   
2024 despite  
having issues with obtaining with his diabetic medications. He had mild CAD 
along   
with his diabetes  
and has not been on a statin so I had recommended Crestor, but he notes his 
cardio   
said it was not  
necessary so he did not start the medication. He is eating healthier overall. He
   
should follow-up  
with our nutritionist. He feels that his appetite is controlled with Ozempic 2 
mg. I   
have recommend  
again he consider bariatric surgery so he could have his knee replaced or find a
   
surgeon who will do   
the surgery at an elevated BMI. In the past, he mentioned they found weakness in
 the   
bottom of the  
heart and evidently a problem with an artery that they could not get. His   
cardiologist however did  
not feel he needed the statin/Crestor that I previously prescribed. He gets very
   
little activity due  
to bone-on-bone arthritis, severe low back pain on tramadol, difficulty getting 
out   
of a chair and  
also his cardiac function he does get NELSON with small amounts of activity. He 
sees Dr. Whitley to  
treat his cardiomyopathy. He did like canned foods and he understands that he 
must   
cut back the  
salt/processed food. He denies adding salt to his foods. He has had a A1c that 
was   
6.6 indicative of  
diabetes. He notes he was never told he was diabetic before program. He is 
seeing Dr. Lomeli for his  
knees and notes he has to get his BMI from 59 down to 45 to be able to have his 
knees   
replaced.  
Before starting the program he did eat a lot of red meat, potatoes and drank a 
lot of   
milk 2% or 4%  
often buying 3 or 4 gallons at a time. He should not skip meals. He did try 
Adipex   
for 1 month in  
the past but notes he was not able to lose but a few pounds so his PCP stopped 
it. He   
notes his  
sister does the shopping as he can. He does some simple cooking.  
3. Cardiomyopathy/CHF/early CAD-he must continue to follow-up with cardiology 
for   
evaluation of  
cardiomyopathy/CHF despite medications/known cardiomyopathy/previous mild CAD. 
We   
obtained his  
previous cardiac catheterization from 2009. He notes his preoperative 
diagnoses   
were angina  
pectoris, dyspnea on exertion and abnormal Cardiolite perfusion stress test. His
 left   
anterior  
descending artery showed a 20 to 25% tubular stenosis. His circumflex had an 
ostial   
stenosis of 30  
to 50% in several views but in one view showed less than 30% stenosis and 
appeared to   
be mildly  
kinked, his right coronary artery was large dominant and normal. His left   
ventriculogram showed mild  
to moderately reduced systolic dysfunction with an EF in the 40 to 45% range   
globally. He is now  
being treated by Dr. Whitley.  
4. Type 2 diabetes with A1c controlled/improved at 5.5 on 2024-continue to   
titrate up Ozempic as  
available and continue Jardiance. We could consider Metformin but he already has
 some   
loose stools.  
We discussed that first-line treatment with lifestyle changes, decrease simple 
sweets   
and starches,  
will be some increased activity in the future and weight loss. He needs to 
consider   
bariatric  
surgery.  
5. Obstructive sleep apnea-he is compliant with his CPAP. Treat also with 
healthy   
lifestyle changes  
and weight loss. He needs to consider bariatric surgery.  
6. Bilateral OA of the knees-bone-on-bone making it very difficult for him to 
get   
around. He notes  
his orthopedic doctor wants him to drop from a BMI of 59 down to 45 before he 
will do   
the surgery.  
We will start weight loss and Ozempic. He needs to consider bariatric surgery.  
7. Chronic low back pain-treat with healthy lifestyle change. He needs to 
maximize   
his pain  
management.  
8. Mixed hyperlipidemia-treat with decreasing the simple sweets, added sugars,   
refined starches, and  
bad/added fats, increasing activity and exercise and continued long-term weight 
loss.   
Monitor with  
healthy lifestyle change. I recommended that he start a statin since he is 
diabetic   
and had mild CAD  
on his cath but he notes his cardiologist told him it was not necessary to 
start.  
9. Metabolic syndrome-treat with long-term healthy lifestyle changes, behavioral
   
changes, healthy  
nutritional changes, increased activity and exercise and achieve long-term 
weight   
loss.  
Follow up with me in 6 weeks.  
New labs needed: Up-to-date. Monitor A1c at least every 6 months with his PCP or
 in   
our office. He  
will continue other regular lab follow-up with his PCP.   
  
  
  
[facility]  
Work Phone: [phone]- Evaluation note*   
  
                                        Author              Jacoby Braden  
[facility]  
   
                                        Authored            2024 2:5  
6pm  
   
                                                    Start weight: 414.4 lbs., he  
 is down 32.0 lbs. today with a weight of 382.4lbs.   
and   
20.9 lbs. since  
his last visit on 2023.  
Starting Date: 04/15/2022.  
Ozempic start date 4/15/2022. Ozempic start weight 414.4lbs. He is down 32.0 
lbs.   
since starting  
Ozempic.  
1. Abnormal weight gain. He notes he is the heaviest in his family. His brother 
has   
some but much  
less significant weight issues. He notes he was able to get his weight down to 
368   
pounds but was  
unable to keep it down.  
2. Obesity-markedly improved since starting our program and with taking Ozempic 
2 mg,   
but had  
significant weight regain of 20.9 pounds off the medication. Due to cost/donut 
hole   
he stopped  
Ozempic. Hopefully he can restart full dose in the future using patient 
assistance.   
He is also on  
Jardiance 10 mg which can also help with his diabetes and cardiomyopathy/CHF. 
His   
diabetes is well  
controlled with his last A1c of 5.6 despite having issues with obtaining with 
his   
diabetic  
medications. He had mild CAD along with his diabetes and has not been on a 
statin so   
I had  
recommended Crestor, but he notes his cardio said it was not necessary so he did
 not   
start the  
medication. He is eating healthier overall. He should follow-up with our   
nutritionist. He feels that  
his appetite is controlled with Ozempic 2 mg. I have recommend again he consider
   
bariatric surgery  
so he could have his knee replaced or find a surgeon who will do the surgery at 
an   
elevated BMI. In  
the past, he mentioned they found weakness in the bottom of the heart and 
evidently a   
problem with  
an artery that they could not get. His cardiologist however did not feel he 
needed   
the  
statin/Crestor that I previously prescribed. He gets very little activity due to
   
bone-on-bone  
arthritis, severe low back pain on tramadol, difficulty getting out of a chair 
and   
also his cardiac  
function he does get NELSON with small amounts of activity. He sees Dr. Whitley to 
treat   
his  
cardiomyopathy. He did like canned foods and he understands that he must cut 
back the   
salt/processed   
food. He denies adding salt to his foods. He has had a A1c that was 6.6 
indicative of   
diabetes. He  
notes he was never told he was diabetic before program. He is seeing Dr. Loemli 
for   
his knees and  
notes he has to get his BMI from 59 down to 45 to be able to have his knees 
replaced.   
Before  
starting the program he did eat a lot of red meat, potatoes and drank a lot of 
milk   
2% or 4% often  
buying 3 or 4 gallons at a time. He should not skip meals. He did try Adipex for
 1   
month in the past  
but notes he was not able to lose but a few pounds so his PCP stopped it. He 
notes   
his sister does  
the shopping as he can. He does some simple cooking.  
3. Cardiomyopathy/CHF/early CAD-he must continue to follow-up with cardiology 
for   
evaluation of  
cardiomyopathy/CHF despite medications/known cardiomyopathy/previous mild CAD. 
We   
obtained his  
previous cardiac catheterization from 2009. He notes his preoperative 
diagnoses   
were angina  
pectoris, dyspnea on exertion and abnormal Cardiolite perfusion stress test. His
 left   
anterior  
descending artery showed a 20 to 25% tubular stenosis. His circumflex had an 
ostial   
stenosis of 30  
to 50% in several views but in one view showed less than 30% stenosis and 
appeared to   
be mildly  
kinked, his right coronary artery was large dominant and normal. His left   
ventriculogram showed mild  
to moderately reduced systolic dysfunction with an EF in the 40 to 45% range   
globally. He is now  
being treated by Dr. Whitley.  
4. Type 2 diabetes with A1c controlled/improved at 5.6-will restart Ozempic and   
continue Jardiance.  
We could consider Metformin but he already has some loose stools. We discussed 
that   
first-line  
treatment with lifestyle changes, decrease simple sweets and starches, will be 
some   
increased  
activity in the future and weight loss. He needs to consider bariatric surgery.  
5. Obstructive sleep apnea-he is compliant with his CPAP. Treat also with 
healthy   
lifestyle changes  
and weight loss. He needs to consider bariatric surgery.  
6. Bilateral OA of the knees-bone-on-bone making it very difficult for him to 
get   
around. He notes  
his orthopedic doctor wants him to drop from a BMI of 59 down to 45 before he 
will do   
the surgery.  
We will start weight loss and Ozempic. He needs to consider bariatric surgery.  
7. Chronic low back pain-treat with healthy lifestyle change. He needs to 
maximize   
his pain  
management.  
8. Mixed hyperlipidemia-treat with decreasing the simple sweets, added sugars,   
refined starches, and  
bad/added fats, increasing activity and exercise and continued long-term weight 
loss.   
Monitor with  
healthy lifestyle change. I recommended that he start a statin since he is 
diabetic   
and had mild CAD   
on his cath but he notes his cardiologist told him it was not necessary to 
start.  
9. Metabolic syndrome-treat with long-term healthy lifestyle changes, behavioral
   
changes, healthy  
nutritional changes, increased activity and exercise and achieve long-term 
weight   
loss.  
Follow up with me in 6 weeks.  
New labs needed: Up-to-date. Monitor A1c at least every 6 months with his PCP or
 in   
our office. He  
will continue other regular lab follow-up with his PCP.   
  
  
  
  
                                        Author              Jacoby Braden  
[facility]  
   
                                        Authored            2024 4:24p  
m  
   
                                                    Start weight: 414.4 lbs., he  
 is down 34.4 lbs. today with a weight of 380.0lbs.   
he is   
down 2.4 lbs.  
since last visit 2024.. since his last visit on 2023.  
Starting Date: 04/15/2022.  
Ozempic start date 4/15/2022. Ozempic start weight 414.4lbs. He is down 34.4 
lbs.   
since starting  
Ozempic.  
1. Abnormal weight gain. He notes he is the heaviest in his family. His brother 
has   
some but much  
less significant weight issues. He notes he was able to get his weight down to 
368   
pounds but was  
unable to keep it down.  
2. Obesity-markedly improved since starting our program and with taking Ozempic 
2 mg,   
but had  
significant weight regain of 20.9 pounds off the medication and not following up
 with   
the program  
since 2023. This is his first visit back since this restart. He had 
previously   
stopped Ozempic  
due to cost/donut hole. He has filled 1 prescription of Ozempic but he thinks it
 was   
$304.  
Hopefully he can continue Ozempic using patient assistance. We are referring him
 to   
[facility] for bariatric surgery evaluation. He needs to get his BMI down to 45 
before   
the surgeon will  
replace his knee. He is on Jardiance 10 mg which can also help with his diabetes
 and  
cardiomyopathy/CHF. His diabetes is well controlled with his last A1c of 5.5 on   
2024 despite  
having issues with obtaining with his diabetic medications. He had mild CAD 
along   
with his diabetes  
and has not been on a statin so I had recommended Crestor, but he notes his 
cardio   
said it was not  
necessary so he did not start the medication. He is eating healthier overall. He
   
should follow-up  
with our nutritionist. He feels that his appetite is controlled with Ozempic 2 
mg. I   
have recommend  
again he consider bariatric surgery so he could have his knee replaced or find a
   
surgeon who will do  
the surgery at an elevated BMI. In the past, he mentioned they found weakness in
 the   
bottom of the  
heart and evidently a problem with an artery that they could not get. His   
cardiologist however did  
not feel he needed the statin/Crestor that I previously prescribed. He gets very
   
little activity due  
to bone-on-bone arthritis, severe low back pain on tramadol, difficulty getting 
out   
of a chair and  
also his cardiac function he does get NELSON with small amounts of activity. He 
sees Dr. Whitley to  
treat his cardiomyopathy. He did like canned foods and he understands that he 
must   
cut back the  
salt/processed food. He denies adding salt to his foods. He has had a A1c that 
was   
6.6 indicative of  
diabetes. He notes he was never told he was diabetic before program. He is 
seeing Dr. Lomeli for his   
knees and notes he has to get his BMI from 59 down to 45 to be able to have his 
knees   
replaced.  
Before starting the program he did eat a lot of red meat, potatoes and drank a 
lot of   
milk 2% or 4%  
often buying 3 or 4 gallons at a time. He should not skip meals. He did try 
Adipex   
for 1 month in  
the past but notes he was not able to lose but a few pounds so his PCP stopped 
it. He   
notes his  
sister does the shopping as he can. He does some simple cooking.  
3. Cardiomyopathy/CHF/early CAD-he must continue to follow-up with cardiology 
for   
evaluation of  
cardiomyopathy/CHF despite medications/known cardiomyopathy/previous mild CAD. 
We   
obtained his  
previous cardiac catheterization from 2009. He notes his preoperative 
diagnoses   
were angina  
pectoris, dyspnea on exertion and abnormal Cardiolite perfusion stress test. His
 left   
anterior  
descending artery showed a 20 to 25% tubular stenosis. His circumflex had an 
ostial   
stenosis of 30  
to 50% in several views but in one view showed less than 30% stenosis and 
appeared to   
be mildly  
kinked, his right coronary artery was large dominant and normal. His left   
ventriculogram showed mild   
to moderately reduced systolic dysfunction with an EF in the 40 to 45% range   
globally. He is now  
being treated by Dr. Whitley.  
4. Type 2 diabetes with A1c controlled/improved at 5.5 on 2024-continue to   
titrate up Ozempic as  
available and continue Jardiance. We could consider Metformin but he already has
 some   
loose stools.  
We discussed that first-line treatment with lifestyle changes, decrease simple 
sweets   
and starches,  
will be some increased activity in the future and weight loss. He needs to 
consider   
bariatric  
surgery.  
5. Obstructive sleep apnea-he is compliant with his CPAP. Treat also with 
healthy   
lifestyle changes  
and weight loss. He needs to consider bariatric surgery.  
6. Bilateral OA of the knees-bone-on-bone making it very difficult for him to 
get   
around. He notes  
his orthopedic doctor wants him to drop from a BMI of 59 down to 45 before he 
will do   
the surgery.  
We will start weight loss and Ozempic. He needs to consider bariatric surgery.  
7. Chronic low back pain-treat with healthy lifestyle change. He needs to 
maximize   
his pain  
management.  
8. Mixed hyperlipidemia-treat with decreasing the simple sweets, added sugars,   
refined starches, and  
bad/added fats, increasing activity and exercise and continued long-term weight 
loss.   
Monitor with  
healthy lifestyle change. I recommended that he start a statin since he is 
diabetic   
and had mild CAD  
on his cath but he notes his cardiologist told him it was not necessary to 
start.  
9. Metabolic syndrome-treat with long-term healthy lifestyle changes, behavioral
   
changes, healthy  
nutritional changes, increased activity and exercise and achieve long-term 
weight   
loss.  
Follow up with me in 6 weeks.  
New labs needed: Up-to-date. Monitor A1c at least every 6 months with his PCP or
 in   
our office. He  
will continue other regular lab follow-up with his PCP.   
  
  
  
  
                                        Author              Constance Yates  
[facility]  
   
                                        Authored            2024 2:03  
pm  
   
                                                    Start weight: 414.4 lbs., he  
 is down 45.2 lbs. today with a weight of 369.2lbs.   
he is   
down 10.8 lbs.  
since last visit 2024.  
Starting Date: 04/15/2022.  
Ozempic start date 4/15/2022. Ozempic start weight 414.4lbs. He is down 34.4 
lbs.   
since starting  
Ozempic.  
1. Abnormal weight gain. He notes he is the heaviest in his family. His brother 
has   
some but much  
less significant weight issues. He notes he was able to get his weight down to 
368   
pounds but was  
unable to keep it down.  
2. Obesity-markedly improved since starting our program and with taking Ozempic 
2 mg,   
but had  
significant weight regain of 20.9 pounds off the medication and not following up
 with   
the program  
since 2023. This is his first visit back since this restart. He had 
previously   
stopped Ozempic  
due to cost/donut hole. He has filled 1 prescription of Ozempic but he thinks it
 was   
$304.  
Hopefully he can continue Ozempic using patient assistance. We are referring him
 to   
[facility] for bariatric surgery evaluation. He needs to get his BMI down to 45 
before   
the surgeon will  
replace his knee. He is on Jardiance 10 mg which can also help with his diabetes
 and  
cardiomyopathy/CHF. His diabetes is well controlled with his last A1c of 5.5 on   
2024 despite  
having issues with obtaining with his diabetic medications. He had mild CAD 
along   
with his diabetes  
and has not been on a statin so I had recommended Crestor, but he notes his 
cardio   
said it was not  
necessary so he did not start the medication. He is eating healthier overall. He
   
should follow-up  
with our nutritionist. He feels that his appetite is controlled with Ozempic 2 
mg. I   
have recommend  
again he consider bariatric surgery so he could have his knee replaced or find a
   
surgeon who will do  
the surgery at an elevated BMI. In the past, he mentioned they found weakness in
 the   
bottom of the  
heart and evidently a problem with an artery that they could not get. His   
cardiologist however did  
not feel he needed the statin/Crestor that I previously prescribed. He gets very
   
little activity due  
to bone-on-bone arthritis, severe low back pain on tramadol, difficulty getting 
out   
of a chair and  
also his cardiac function he does get NELSON with small amounts of activity. He 
sees Dr. Whitley to  
treat his cardiomyopathy. He did like canned foods and he understands that he 
must   
cut back the  
salt/processed food. He denies adding salt to his foods. He has had a A1c that 
was   
6.6 indicative of  
diabetes. He notes he was never told he was diabetic before program. He is 
seeing Dr. Loemli for his   
knees and notes he has to get his BMI from 59 down to 45 to be able to have his 
knees   
replaced.  
Before starting the program he did eat a lot of red meat, potatoes and drank a 
lot of   
milk 2% or 4%  
often buying 3 or 4 gallons at a time. He should not skip meals. He did try 
Adipex   
for 1 month in  
the past but notes he was not able to lose but a few pounds so his PCP stopped 
it. He   
notes his  
sister does the shopping as he can. He does some simple cooking.  
3. Cardiomyopathy/CHF/early CAD-he must continue to follow-up with cardiology 
for   
evaluation of  
cardiomyopathy/CHF despite medications/known cardiomyopathy/previous mild CAD. 
We   
obtained his  
previous cardiac catheterization from 2009. He notes his preoperative 
diagnoses   
were angina  
pectoris, dyspnea on exertion and abnormal Cardiolite perfusion stress test. His
 left   
anterior  
descending artery showed a 20 to 25% tubular stenosis. His circumflex had an 
ostial   
stenosis of 30  
to 50% in several views but in one view showed less than 30% stenosis and 
appeared to   
be mildly  
kinked, his right coronary artery was large dominant and normal. His left   
ventriculogram showed mild   
to moderately reduced systolic dysfunction with an EF in the 40 to 45% range   
globally. He is now  
being treated by Dr. Whitley.  
4. Type 2 diabetes with A1c controlled/improved at 5.5 on 2024-continue to   
titrate up Ozempic as  
available and continue Jardiance. We could consider Metformin but he already has
 some   
loose stools.  
We discussed that first-line treatment with lifestyle changes, decrease simple 
sweets   
and starches,  
will be some increased activity in the future and weight loss. He needs to 
consider   
bariatric  
surgery.  
5. Obstructive sleep apnea-he is compliant with his CPAP. Treat also with 
healthy   
lifestyle changes  
and weight loss. He needs to consider bariatric surgery.  
6. Bilateral OA of the knees-bone-on-bone making it very difficult for him to 
get   
around. He notes  
his orthopedic doctor wants him to drop from a BMI of 59 down to 45 before he 
will do   
the surgery.  
We will start weight loss and Ozempic. He needs to consider bariatric surgery.  
7. Chronic low back pain-treat with healthy lifestyle change. He needs to 
maximize   
his pain  
management.  
8. Mixed hyperlipidemia-treat with decreasing the simple sweets, added sugars,   
refined starches, and  
bad/added fats, increasing activity and exercise and continued long-term weight 
loss.   
Monitor with  
healthy lifestyle change. I recommended that he start a statin since he is 
diabetic   
and had mild CAD   
on his cath but he notes his cardiologist told him it was not necessary to 
start.  
9. Metabolic syndrome-treat with long-term healthy lifestyle changes, behavioral
   
changes, healthy  
nutritional changes, increased activity and exercise and achieve long-term 
weight   
loss.  
Follow up with me in 6 weeks.  
New labs needed: Up-to-date. Monitor A1c at least every 6 months with his PCP or
 in   
our office. He  
will continue other regular lab follow-up with his PCP.   
  
  
  
[facility]  
Work Phone: [phone]- Progress note  
  
  
  
  
                                        Author              Elaine Espino  
[facility]  
2024 2:29pm  
   
                                        Note Date/Time      2024 2:2  
9pm  
   
                                                    [facility]  
ENTER  
[address] [place] [address]  
  
Wound Center Provider Note  
Signed  
  
Patient: Jocelin Pacheco MR#:   
[id number]  
: 1955 Acct:[id number]  
  
Age/Sex: 69 / M  
  
Copies to: DO Elaine Arboleda, APRDRAKE~  
  
  
HPI  
Date of Visit  
Date of Visit:  
Date of Service: 2024  
Time of Service: 14:28  
  
Narrative  
HPI:  
24 Jocelin is a 69-year-old male presenting to [facility]   
wound care program for an initial visit to the office for a right lower 
extremity   
venous stasis ulcer/lymphedema ulcer.? The patient has been a patient of the 
office   
for quite some time now and has seen other providers.  
He had seen vascular in [place] was told that there is nothing else that can be 
done   
for him.  
He has an extensive history of infections so I imagine this will continue to be 
an   
issue for him.  
He appears to need better edema control.  
He was told by ortho that he needs to lose 50 pounds in order to have surgery- 
this   
was 3 years ago and has not been accomplished- he says that he wasn't taking the
   
ozempic as ordered and he gained back the weight he had lost and hence no 
surgery.  
A past venous duplex indicates deep vein issues which are not surgically 
correctable-   
vascular did confirm that he does not need to see them because there is nothing 
they   
have to offer him.  
He is still retired so hopefully there is more opportunity for elevation and   
compression of the legs to support healing of the ulcer.  
Nutrition and probiotics were discussed and suggestions were given.  
Doxycycline was escribed today for what appears to be cellulitis of the RLE.  
Topical gentamicin and unna wrap was ordered and he will return here for 
dressings.  
3/4/24 better, topical flagyl today with unna wrap, will use a gauze wrap under 
the   
paste since he feels like the unna paste causes itching, no infection seen 
today,   
cellulitis appears resolved  
3/18/24 much improved, same topical orders, could be healed in a week or so, no   
infection noted  
  
Subjective  
Pain  
Right Lower Leg:  
Pain Description: Intermittent  
Pain Intensity: 0  
Pain Management Techniques Other/Comment:  not too bad today   
Wound/Ulcer History  
When did wound start?: 2024 Right lower leg  
Mode of Arrival/ : Personal vehicle  
Assistive Device Used Today: Cane  
Lives with:: Alone  
Appetite Description: Within Normal Limits  
Who helps w/ dressing change?: Wound Care Dept  
Smoking Status: Never smoker  
  
[facility]  
Medical History (Updated 24 @ 08:12 by Maria A Crowe RN)  
  
Ulcer of right leg  
Wound of right lower extremity  
Itching with irritation  
Itching  
PVD (peripheral vascular disease)  
 poor veins  Dr. Cameron did vein closure 2019, needs 5 more viens worked 
on.  
Carpal tunnel syndrome  
right arm surgery  
DIMITRI (obstructive sleep apnea)  
Cardiomyopathy  
 lower part of heart is weak   
Back pain  
DVT (deep venous thrombosis)  
 blood clot from right foot to right thigh  on  blood thinner   
Hypertension  
Arthritis  
Bilateral knees,  right is bone on bone  per  Dr. Richard   
  
Surgical History (Reviewed 24 @ 08:12 by Maria A Crowe RN)  
  
Hx of cholecystectomy  
  
Family History (Reviewed 24 @ 08:13 by Maria A Crowe RN)  
Father Diabetes mellitus, type 2  
Sister Diabetes mellitus, type 2  
Brother Heart disease  
Legacy FamHx Relation: Brother(s)  
Father Heart disease  
Diabetes  
Heart failure  
  
Family/Other   
Legacy FamHx Problem: 2 BROTHERS  :2 SISTERS ::NO CHILDREN  
Mother   
Heart disease  
Sister Heart disease  
  
Social History  
Smoking Status: Never smoker  
Substance Use Type: None  
  
Grafts  
History of Graft  
History of Graft?: No  
  
Exam  
Physical Exam  
Vital Signs:  
  
  
  
  
Temp Pulse Resp BP O2 Del Method  
  
97.7 F 87 24 132/74 Room Air  
  
24 14:18 24 14:18 24 14:18 24 14:18 24 14:18  
  
  
  
Const  
General: cooperative, comfortable and no acute distress  
Nutritional Appearance: obese  
Orientation: alert, awake and oriented x3  
  
Lower/Upper Extremity Exam  
Vascular Exam-Edema  
Right Lower Extremity:  
Edema Type: Lymphedema  
Vascular Exam-Pulses  
Right Dorsalis Pedis:  
Pulse Assessment Method: Doppler  
Right Posterior Tibial:  
Pulse Assessment Method: Doppler  
Right Brachial:  
Pulse Assessment Method: NIBP  
  
Objective  
Meds/Allergies  
Home Medications  
  
carvedilol 6.25 mg tablet 6.25 mg PO BID 17 [History Confirmed 24]  
furosemide 40 mg tablet (Lasix) 40 mg PO BID 17 [History Confirmed 
24]  
acetaminophen 500 mg tablet (Tylenol Extra Strength) 500 mg PO TID PRN Pain 
20   
[History Confirmed 24]  
empagliflozin 10 mg tablet (Jardiance) 10 mg PO DAILY 22 [History 
Confirmed   
24]  
rivaroxaban 10 mg tablet (Xarelto) 10 mg PO DAILY 22 [History Confirmed   
24]  
semaglutide 2 mg/dose (8 mg/3 mL) subcutaneous pen injector (Ozempic) 2 mg 
subcut Q7D   
22 [History Confirmed 24]  
spironolactone 25 mg tablet 25 mg PO DAILY 22 [History Confirmed 24]  
sacubitril 24 mg-valsartan 26 mg tablet (Entresto) 1 tab PO BID 23 
[History   
Confirmed 24]  
Lactobacillus acidophilus 10 billion cell capsule (Probiotic) 100 mmu cells PO 
DAILY   
24 [History Confirmed 24]  
doxycycline hyclate 100 mg capsule 100 mg PO BID 14 days #28 caps 24 [Rx   
Confirmed 24]  
  
  
Allergies  
  
cefixime Allergy (Unknown, Verified 24 08:10)  
Unknown Reaction  
diclofenac Allergy (Unknown, Verified 24 08:10)  
Unknown Reaction  
nystatin Allergy (Unknown, Verified 24 08:10)  
Unknown Reaction  
sodium benzoate Allergy (Verified 24 08:10)  
Unknown Reaction  
  
  
Wound/Ulcer  
Right Lower Leg:  
Type: Lymphedema  
Thickness: Skin Breakdown  
Bed Appearance: Dried Exudate, Pink and Yellow  
Percent of Wound Bed Granulated/Red: 90  
Percent of Devitalized: 10  
Length (cm): 0.5  
Width (cm): 0.5  
Depth (cm): 0.1  
CM Sq: 0.250  
Surrounding Tissue Appearance: Hyperpigmented  
Surrounding Tissue Temp: Warm  
Drainage Amount: Moderate  
Drainage Description: Serosanguineous  
Drainage Odor: No Odor  
Results  
Height: 5 ft 11 in  
Weight: 167.829 kg  
Body Mass Index: 51.5  
  
Assessment/Plan  
Assessment/Plan  
(1) Venous stasis ulcer:  
Qualifiers:  
Laterality: right Non-pressure ulcer stage: limited to breakdown of skin 
Varicose   
vein presence: with varicose veins Venous stasis ulcer site: other part of lower
leg   
Qualified Code(s): I83.018 - Varicose veins of right lower extremity with ulcer 
other   
part of lower leg; L97.811 - Non-pressure chronic ulcer of other part of right 
lower   
leg limited to breakdown of skin  
Code(s):  
I83.009 - Varicose veins of unspecified lower extremity with ulcer of 
unspecified   
site; L97.909 - Non-pressure chronic ulcer of unspecified part of unspecified 
lower   
leg with unspecified severity  
Plan:  
w/lymphedema  
(2) Edema of right lower leg:  
Code(s):  
R60.0 - Localized edema  
(3) Morbid obesity due to excess calories:  
Code(s):  
E66.01 - Morbid (severe) obesity due to excess calories  
(4) Lymphedema of both lower extremities:  
Code(s):  
I89.0 - Lymphedema, not elsewhere classified  
(5) PVD (peripheral vascular disease):  
Code(s):  
I73.9 - Peripheral vascular disease, unspecified  
(6) Varicose veins of both legs with edema:  
Code(s):  
I83.893 - Varicose veins of bilateral lower extremities with other complications  
(7) Inflammation:  
  
Plan  
see orders and hpi  
Time spent with patient  
Time Spent With Patient (min): 10  
  
  
  
  
Dictated By: SOWMYA Correa DD/DT: 24  
  
Signed By: <Electronically signed by SOWMYA Espino>  
24  
   
  
[facility]  
Work Phone: [phone]- Progress note  
  
  
  
  
                                        Author              Elaine Espino  
[facility]  
2024 3:29pm  
   
                                        Note Date/Time      2024 3:29  
pm  
   
                                                    [facility]  
ENTER  
[address] [place] [address]  
  
Wound Center Provider Note  
Signed  
  
Patient: Jocelin Pacheco MR#:   
[id number]  
: 1955 Acct:[id number]  
  
Age/Sex: 69 / M  
  
Copies to: Vera Lea,SOWMYA Mayer~  
  
  
HPI  
Date of Visit  
Date of Visit:  
Date of Service: 2024  
Time of Service: 15:26  
  
Narrative  
HPI:  
24 Jocelin is a 69-year-old male presenting to [facility]   
wound care program for an initial visit to the office for a right lower 
extremity   
venous stasis ulcer/lymphedema ulcer.? The patient has been a patient of the 
office   
for quite some time now and has seen other providers.  
He had seen vascular in [place] was told that there is nothing else that can be 
done   
for him.  
He has an extensive history of infections so I imagine this will continue to be 
an   
issue for him.  
He appears to need better edema control.  
He was told by ortho that he needs to lose 50 pounds in order to have surgery- 
this   
was 3 years ago and has not been accomplished- he says that he wasn't taking the
  
ozempic as ordered and he gained back the weight he had lost and hence no 
surgery.  
A past venous duplex indicates deep vein issues which are not surgically 
correctable-   
vascular did confirm that he does not need to see them because there is nothing 
they   
have to offer him.  
He is still retired so hopefully there is more opportunity for elevation and   
compression of the legs to support healing of the ulcer.  
Nutrition and probiotics were discussed and suggestions were given.  
Doxycycline was escribed today for what appears to be cellulitis of the RLE.  
Topical gentamicin and unna wrap was ordered and he will return here for 
dressings.  
3/4/24 better, topical flagyl today with unna wrap, will use a gauze wrap under 
the   
paste since he feels like the unna paste causes itching, no infection seen 
today,   
cellulitis appears resolved  
  
Subjective  
Pain  
Right Lower Leg:  
Pain Description: Intermittent  
Pain Intensity: 8  
Wound/Ulcer History  
When did wound start?: 2024 Right lower leg  
Mode of Arrival/ : Personal vehicle  
Assistive Device Used Today: Cane  
Lives with:: Alone  
Appetite Description: Within Normal Limits  
Who helps w/ dressing change?: Wound Care Dept  
Smoking Status: Never smoker  
  
[facility]  
Medical History (Updated 24 @ 08:12 by Maria A Crowe RN)  
  
Ulcer of right leg  
Wound of right lower extremity  
Itching with irritation  
Itching  
PVD (peripheral vascular disease)  
 poor veins  Dr. Cameron did vein closure 2019, needs 5 more viens worked 
on.  
Carpal tunnel syndrome  
right arm surgery  
DIMITRI (obstructive sleep apnea)  
Cardiomyopathy  
 lower part of heart is weak   
Back pain  
DVT (deep venous thrombosis)  
 blood clot from right foot to right thigh  on  blood thinner   
Hypertension  
Arthritis  
Bilateral knees,  right is bone on bone  per  Dr. Richard   
  
Surgical History (Reviewed 24 @ 08:12 by Maria A Crowe RN)  
  
Hx of cholecystectomy  
  
Family History (Reviewed 24 @ 08:13 by Maria A Crowe RN)  
Father Diabetes mellitus, type 2  
Sister Diabetes mellitus, type 2  
Brother Heart disease  
Legacy FamHx Relation: Brother(s)  
Father Heart disease  
Diabetes  
Heart failure  
  
Family/Other   
Legacy FamHx Problem: 2 BROTHERS  :2 SISTERS ::NO CHILDREN  
Mother   
Heart disease  
Sister Heart disease  
  
Social History  
Smoking Status: Never smoker  
Substance Use Type: None  
  
Grafts  
History of Graft  
History of Graft?: No  
  
Exam  
Physical Exam  
Vital Signs:  
  
  
  
  
Temp Pulse Resp BP O2 Del Method  
  
97.3 F L 85 18 130/60 Room Air  
  
24 15:09 24 15:09 24 15:09 24 15:09 24 15:09  
  
  
  
Const  
General: cooperative, comfortable and no acute distress  
Nutritional Appearance: obese  
Orientation: alert, awake and oriented x3  
  
Lower/Upper Extremity Exam  
Vascular Exam-Edema  
Right Lower Extremity:  
Edema Type: Lymphedema  
Vascular Exam-Pulses  
Right Dorsalis Pedis:  
Pulse Assessment Method: Doppler  
Right Posterior Tibial:  
Pulse Assessment Method: Doppler  
Right Brachial:  
Pulse Assessment Method: NIBP  
  
Objective  
Meds/Allergies  
Home Medications  
  
carvedilol 6.25 mg tablet 6.25 mg PO BID 17 [History Confirmed 24]  
furosemide 40 mg tablet (Lasix) 40 mg PO BID 17 [History Confirmed 
24]  
acetaminophen 500 mg tablet (Tylenol Extra Strength) 500 mg PO TID PRN Pain 
20   
[History Confirmed 24]  
empagliflozin 10 mg tablet (Jardiance) 10 mg PO DAILY 22 [History 
Confirmed   
24]  
rivaroxaban 10 mg tablet (Xarelto) 10 mg PO DAILY 22 [History Confirmed   
24]  
semaglutide 2 mg/dose (8 mg/3 mL) subcutaneous pen injector (Ozempic) 2 mg 
subcut Q7D   
22 [History Confirmed 24]  
spironolactone 25 mg tablet 25 mg PO DAILY 22 [History Confirmed 24]  
sacubitril 24 mg-valsartan 26 mg tablet (Entresto) 1 tab PO BID 23 
[History   
Confirmed 24]  
Lactobacillus acidophilus 10 billion cell capsule (Probiotic) 100 mmu cells PO 
DAILY   
24 [History Confirmed 24]  
doxycycline hyclate 100 mg capsule 100 mg PO BID 14 days #28 caps 24 [Rx   
Confirmed 24]  
  
  
Allergies  
  
cefixime Allergy (Unknown, Verified 24 08:10)  
Unknown Reaction  
diclofenac Allergy (Unknown, Verified 24 08:10)  
Unknown Reaction  
nystatin Allergy (Unknown, Verified 24 08:10)  
Unknown Reaction  
sodium benzoate Allergy (Verified 24 08:10)  
Unknown Reaction  
  
  
Wound/Ulcer  
Right Lower Leg:  
Type: Lymphedema  
Thickness: Skin Breakdown  
Bed Appearance: Dried Exudate, Pink and Yellow  
Percent of Wound Bed Granulated/Red: 80  
Percent of Devitalized: 20  
Length (cm): 1.5  
Width (cm): 1.0  
Depth (cm): 0.1  
CM Sq: 1.500  
Surrounding Tissue Appearance: Hyperpigmented  
Surrounding Tissue Temp: Warm  
Drainage Amount: Moderate  
Drainage Description: Serosanguineous  
Drainage Odor: No Odor  
Procedures  
Time Out: 2 Patient Identifiers, Correct Patient, Correct Side/Site, Correct   
Procedure and Safety Issues Reviewed  
Procedure:  
The right leg ulcer was anesthetized with topical 2% lidocaine gel. A forcep and
  
scissor was used to perform debridement for the removal of 1 cm? of devitalized   
tissue consisting of skin and slough. Debridement was down to healthy bleeding   
tissue. Estimated blood loss was minimal to moderate. Hemostasis was achieved by
  
applying pressure. The ulcer now appears 95% pink and red and the size remains 
the   
same. The patient tolerated well with no pain.  
Results  
Height: 5 ft 11 in  
Weight: 167.829 kg  
Body Mass Index: 51.5  
  
Assessment/Plan  
Assessment/Plan  
(1) Venous stasis ulcer:  
Qualifiers:  
Laterality: right Non-pressure ulcer stage: limited to breakdown of skin 
Varicose   
vein presence: with varicose veins Venous stasis ulcer site: other part of lower
leg   
Qualified Code(s): I83.018 - Varicose veins of right lower extremity with ulcer 
other   
part of lower leg; L97.811 - Non-pressure chronic ulcer of other part of right 
lower   
leg limited to breakdown of skin  
Code(s):  
I83.009 - Varicose veins of unspecified lower extremity with ulcer of 
unspecified   
site; L97.909 - Non-pressure chronic ulcer of unspecified part of unspecified 
lower   
leg with unspecified severity  
Plan:  
w/lymphedema  
(2) Edema of right lower leg:  
Code(s):  
R60.0 - Localized edema  
(3) Morbid obesity due to excess calories:  
Code(s):  
E66.01 - Morbid (severe) obesity due to excess calories  
(4) Lymphedema of both lower extremities:  
Code(s):  
I89.0 - Lymphedema, not elsewhere classified  
(5) PVD (peripheral vascular disease):  
Code(s):  
I73.9 - Peripheral vascular disease, unspecified  
(6) Varicose veins of both legs with edema:  
Code(s):  
I83.893 - Varicose veins of bilateral lower extremities with other complications  
(7) Inflammation:  
  
Plan  
see orders and hpi  
Time spent with patient  
Time Spent With Patient (min): 13  
  
  
  
  
Dictated By: SOWMYA Correa DD/DT: 24 152  
  
Signed By: <Electronically signed by SOWMYA Espino>  
24 1529  
   
  
[facility]  
Work Phone: [phone]- Progress note  
  
  
  
  
                                        Author              Laila Villarreal  
[facility]  
2024 3:08pm  
   
                                        Note Date/Time      2024   
3:04pm  
   
                                                    [facility]  
ENTER  
[address] [place] [address]  
  
Wound Center Provider Note  
Signed  
  
Patient: Jocelin Pacheco MR#:   
[id number]  
: 1955 Acct:[id number]  
  
Age/Sex: 69 / M  
  
Copies to: SOWMYA Gutierrez DO~  
  
  
HPI  
Date of Visit  
Date of Visit:  
Date of Service: 2024  
Time of Service: 15:03  
  
Narrative  
HPI:  
24 Jocelin is a 69-year-old male presenting to [facility]   
wound care program for an initial visit to the office for a right lower 
extremity   
venous stasis ulcer/lymphedema ulcer.? The patient has been a patient of the 
office   
for quite some time now and has seen other providers.  
He had seen vascular in [place] was told that there is nothing else that can be 
done   
for him.  
He has an extensive history of infections so I imagine this will continue to be 
an   
issue for him.  
He appears to need better edema control.  
He was told by ortho that he needs to lose 50 pounds in order to have surgery- 
this   
was 3 years ago and has not been accomplished- he says that he wasn't taking the
  
ozempic as ordered and he gained back the weight he had lost and hence no 
surgery.  
A past venous duplex indicates deep vein issues which are not surgically 
correctable-   
vascular did confirm that he does not need to see them because there is nothing 
they   
have to offer him.  
He is still retired so hopefully there is more opportunity for elevation and   
compression of the legs to support healing of the ulcer.  
Nutrition and probiotics were discussed and suggestions were given.  
Doxycycline was escribed today for what appears to be cellulitis of the RLE.  
Topical gentamicin and unna wrap was ordered and he will return here for 
dressings.  
Above documentation per SOWMYA Amos - included for continuity of care  
  
24- Jocelin presents for follow-up. He is still taking his antibiotic and   
understands that he should complete the entire course. The area is improving.   
Dressing order changed to collagen silver; sorbact; ERGO wrap. Follow-up in 2-3   
weeks.  
  
Subjective  
Pain  
Right Lower Leg:  
Pain Description: Intermittent and Burning  
Pain Intensity: 9  
Pain Management Techniques Other/Comment:  plus itching   
Wound/Ulcer History  
When did wound start?: 2024 Right lower leg  
Mode of Arrival/ : Personal vehicle  
Assistive Device Used Today: Cane  
Lives with:: Alone  
Appetite Description: Within Normal Limits  
Who helps w/ dressing change?: Wound Care Dept  
Smoking Status: Never smoker  
  
[facility]  
Medical History (Updated 24 @ 08:12 by Maria A Crowe RN)  
  
Ulcer of right leg  
Wound of right lower extremity  
Itching with irritation  
Itching  
PVD (peripheral vascular disease)  
 poor veins  Dr. Cameron did vein closure 2019, needs 5 more viens worked 
on.  
Carpal tunnel syndrome  
right arm surgery  
DIMITRI (obstructive sleep apnea)  
Cardiomyopathy  
 lower part of heart is weak   
Back pain  
DVT (deep venous thrombosis)  
 blood clot from right foot to right thigh  on  blood thinner   
Hypertension  
Arthritis  
Bilateral knees,  right is bone on bone  per  Dr. Richard   
  
Surgical History (Reviewed 24 @ 08:12 by Maria A Crowe RN)  
  
Hx of cholecystectomy  
  
Family History (Reviewed 24 @ 08:13 by Maria A Crowe RN)  
Father Diabetes mellitus, type 2  
Sister Diabetes mellitus, type 2  
Brother Heart disease  
Legacy FamHx Relation: Brother(s)  
Father Heart disease  
Diabetes  
Heart failure  
  
Family/Other   
Legacy FamHx Problem: 2 BROTHERS  :2 SISTERS ::NO CHILDREN  
Mother   
Heart disease  
Sister Heart disease  
  
Social History  
Smoking Status: Never smoker  
Substance Use Type: None  
  
Grafts  
History of Graft  
History of Graft?: No  
  
Exam  
Physical Exam  
Vital Signs:  
  
  
  
  
Temp Pulse Resp BP O2 Del Method  
  
97.5 F L 87 24 175/78 H Room Air  
  
24 14:51 24 14:51 24 14:51 24 14:51 24 14:51  
  
  
  
Const  
General: cooperative, comfortable and no acute distress  
Nutritional Appearance: obese  
Orientation: alert, awake and oriented x3  
  
Lower/Upper Extremity Exam  
Vascular Exam-Edema  
Right Lower Extremity:  
Edema Type: Lymphedema  
Vascular Exam-Pulses  
Right Dorsalis Pedis:  
Pulse Assessment Method: Doppler  
Right Posterior Tibial:  
Pulse Assessment Method: Doppler  
Right Brachial:  
Pulse Assessment Method: NIBP  
Left Brachial:  
Pulse Assessment Method: NIBP  
  
Objective  
Meds/Allergies  
Home Medications  
  
carvedilol 6.25 mg tablet 6.25 mg PO BID 17 [History Confirmed 24]  
furosemide 40 mg tablet (Lasix) 40 mg PO BID 17 [History Confirmed 
24]  
acetaminophen 500 mg tablet (Tylenol Extra Strength) 500 mg PO TID PRN Pain 
20   
[History Confirmed 24]  
empagliflozin 10 mg tablet (Jardiance) 10 mg PO DAILY 22 [History 
Confirmed   
24]  
rivaroxaban 10 mg tablet (Xarelto) 10 mg PO DAILY 22 [History Confirmed   
24]  
semaglutide 2 mg/dose (8 mg/3 mL) subcutaneous pen injector (Ozempic) 2 mg 
subcut Q7D   
22 [History Confirmed 24]  
spironolactone 25 mg tablet 25 mg PO DAILY 22 [History Confirmed 24]  
sacubitril 24 mg-valsartan 26 mg tablet (Entresto) 1 tab PO BID 23 
[History   
Confirmed 24]  
Lactobacillus acidophilus 10 billion cell capsule (Probiotic) 100 mmu cells PO 
DAILY   
24 [History Confirmed 24]  
doxycycline hyclate 100 mg capsule 100 mg PO BID 14 days #28 caps 24 [Rx   
Confirmed 24]  
  
  
Allergies  
  
cefixime Allergy (Unknown, Verified 24 08:10)  
Unknown Reaction  
diclofenac Allergy (Unknown, Verified 24 08:10)  
Unknown Reaction  
nystatin Allergy (Unknown, Verified 24 08:10)  
Unknown Reaction  
sodium benzoate Allergy (Verified 24 08:10)  
Unknown Reaction  
  
  
Wound/Ulcer  
Right Lower Leg:  
Type: Lymphedema  
Thickness: Skin Breakdown  
Bed Appearance: Dried Exudate, Pink and Yellow  
Percent of Wound Bed Granulated/Red: 80  
Percent of Devitalized: 20  
Length (cm): 2.5  
Width (cm): 2  
Depth (cm): 0.1  
CM Sq: 5.000  
Surrounding Tissue Appearance: Hyperpigmented  
Surrounding Tissue Temp: Warm  
Drainage Amount: Moderate  
Drainage Description: Serosanguineous  
Drainage Odor: No Odor  
Results  
Height: 5 ft 11 in  
Weight: 167.829 kg  
Body Mass Index: 51.5  
  
Assessment/Plan  
Assessment/Plan  
(1) Venous stasis ulcer:  
Qualifiers:  
Laterality: right Non-pressure ulcer stage: limited to breakdown of skin 
Varicose   
vein presence: with varicose veins Venous stasis ulcer site: other part of lower
leg   
Qualified Code(s): I83.018 - Varicose veins of right lower extremity with ulcer 
other   
part of lower leg; L97.811 - Non-pressure chronic ulcer of other part of right 
lower   
leg limited to breakdown of skin  
Code(s):  
I83.009 - Varicose veins of unspecified lower extremity with ulcer of 
unspecified   
site; L97.909 - Non-pressure chronic ulcer of unspecified part of unspecified 
lower   
leg with unspecified severity  
Plan:  
w/lymphedema  
(2) Edema of right lower leg:  
Code(s):  
R60.0 - Localized edema  
(3) Morbid obesity due to excess calories:  
Code(s):  
E66.01 - Morbid (severe) obesity due to excess calories  
(4) Lymphedema of both lower extremities:  
Code(s):  
I89.0 - Lymphedema, not elsewhere classified  
(5) PVD (peripheral vascular disease):  
Code(s):  
I73.9 - Peripheral vascular disease, unspecified  
(6) Varicose veins of both legs with edema:  
Code(s):  
I83.893 - Varicose veins of bilateral lower extremities with other complications  
(7) Inflammation:  
  
Plan  
see orders and hpi  
Time spent with patient  
Time Spent With Patient (min): 15  
  
  
  
  
Dictated By: SOWMYA Gutierrez DD/DT: 24 1503  
  
Signed By: <Electronically signed by SOWMYA Villarreal>  
24 1508  
   
  
[facility]  
Work Phone: [phone]- Progress note  
  
  
  
  
                                        Author              Elaine Espino  
[facility]  
2024 8:20am  
   
                                        Note Date/Time      2024   
8:20am  
   
                                                    [facility]  
ENTER  
[address] [place] [address]  
  
Wound Center Provider Note  
Signed  
  
Patient: Jocelin Pacheco MR#:   
[id number]  
: 1955 Acct:[id number]  
  
Age/Sex: 69 / M  
  
Copies to: Vera Lea,DO Elaine Espino, APRN~  
  
  
HPI  
Date of Visit  
Date of Visit:  
Date of Service: 2024  
Time of Service: 08:17  
  
Narrative  
HPI:  
24 Jocelin is a 69-year-old male presenting to [facility]   
wound care program for an initial visit to the office for a right lower 
extremity   
venous stasis ulcer/lymphedema ulcer.? The patient has been a patient of the 
office   
for quite some time now and has seen other providers.  
He had seen vascular in [place] was told that there is nothing else that can be 
done   
for him.  
He has an extensive history of infections so I imagine this will continue to be 
an   
issue for him.  
He appears to need better edema control.  
He was told by ortho that he needs to lose 50 pounds in order to have surgery- 
this   
was 3 years ago and has not been accomplished- he says that he wasn't taking the
  
ozempic as ordered and he gained back the weight he had lost and hence no 
surgery.  
A past venous duplex indicates deep vein issues which are not surgically 
correctable-   
vascular did confirm that he does not need to see them because there is nothing 
they   
have to offer him.  
He is still retired so hopefully there is more opportunity for elevation and   
compression of the legs to support healing of the ulcer.  
Nutrition and probiotics were discussed and suggestions were given.  
Doxycycline was escribed today for what appears to be cellulitis of the RLE.  
Topical gentamicin and unna wrap was ordered and he will return here for 
dressings.  
  
Subjective  
Pain  
Right Lower Leg:  
Pain Description: Burning  
Pain Intensity: 10  
Wound/Ulcer History  
When did wound start?: 2024 Right lower leg  
Mode of Arrival/ : Personal vehicle  
Assistive Device Used Today: Cane  
Lives with:: Alone  
Appetite Description: Within Normal Limits  
Who helps w/ dressing change?: Wound Care Dept  
Smoking Status: Never smoker  
  
[facility]  
Medical History (Updated 24 @ 08:12 by Maria A Crowe, KIMBERLY)  
  
Ulcer of right leg  
Wound of right lower extremity  
Itching with irritation  
Itching  
PVD (peripheral vascular disease)  
 poor veins  Dr. Cameron did vein closure 2019, needs 5 more viens worked 
on.  
Carpal tunnel syndrome  
right arm surgery  
DIMITRI (obstructive sleep apnea)  
Cardiomyopathy  
 lower part of heart is weak   
Back pain  
DVT (deep venous thrombosis)  
 blood clot from right foot to right thigh  on  blood thinner   
Hypertension  
Arthritis  
Bilateral knees,  right is bone on bone  per  Dr. Richard   
  
Surgical History (Reviewed 24 @ 08:12 by Maria A Crowe, RN)  
  
Hx of cholecystectomy  
  
Family History (Reviewed 24 @ 08:13 by Maria A Crowe RN)  
Father Diabetes mellitus, type 2  
Sister Diabetes mellitus, type 2  
Brother Heart disease  
Legacy FamHx Relation: Brother(s)  
Father Heart disease  
Diabetes  
Heart failure  
  
Family/Other   
Legacy FamHx Problem: 2 BROTHERS  :2 SISTERS ::NO CHILDREN  
Mother   
Heart disease  
Sister Heart disease  
  
Social History  
Smoking Status: Never smoker  
Substance Use Type: None  
  
Grafts  
History of Graft  
History of Graft?: No  
  
Exam  
Physical Exam  
Vital Signs:  
  
  
  
  
Temp Pulse Resp BP O2 Del Method  
  
97.2 F L 81 24 121/74 Room Air  
  
24 08:11 24 08:11 24 08:11 24 08:11 24 08:11  
  
  
  
Const  
General: cooperative, comfortable and no acute distress  
Nutritional Appearance: obese  
Orientation: alert, awake and oriented x3  
  
Lower/Upper Extremity Exam  
Vascular Exam-Edema  
Right Lower Extremity:  
Edema Type: Lymphedema  
Vascular Exam-Pulses  
Right Dorsalis Pedis:  
Pulse Assessment Method: Doppler  
Right Posterior Tibial:  
Pulse Assessment Method: Doppler  
Right Brachial:  
Pulse Assessment Method: NIBP  
  
Objective  
Meds/Allergies  
Home Medications  
  
carvedilol 6.25 mg tablet 6.25 mg PO BID 17 [History Confirmed 24]  
furosemide 40 mg tablet (Lasix) 40 mg PO BID 17 [History Confirmed 
24]  
acetaminophen 500 mg tablet (Tylenol Extra Strength) 500 mg PO TID PRN Pain 
20   
[History Confirmed 24]  
empagliflozin 10 mg tablet (Jardiance) 10 mg PO DAILY 22 [History 
Confirmed   
24]  
rivaroxaban 10 mg tablet (Xarelto) 10 mg PO DAILY 22 [History Confirmed   
24]  
semaglutide 2 mg/dose (8 mg/3 mL) subcutaneous pen injector (Ozempic) 2 mg 
subcut Q7D   
22 [History Confirmed 24]  
spironolactone 25 mg tablet 25 mg PO DAILY 22 [History Confirmed 24]  
sacubitril 24 mg-valsartan 26 mg tablet (Entresto) 1 tab PO BID 23 
[History   
Confirmed 24]  
Lactobacillus acidophilus 10 billion cell capsule (Probiotic) 100 mmu cells PO 
DAILY   
24 [History Confirmed 24]  
doxycycline hyclate 100 mg capsule 100 mg PO BID 14 days #28 caps 24 [Rx   
Confirmed 24]  
  
  
Allergies  
  
cefixime Allergy (Unknown, Verified 24 08:10)  
Unknown Reaction  
diclofenac Allergy (Unknown, Verified 24 08:10)  
Unknown Reaction  
nystatin Allergy (Unknown, Verified 24 08:10)  
Unknown Reaction  
sodium benzoate Allergy (Verified 24 08:10)  
Unknown Reaction  
  
  
Wound/Ulcer  
Right Lower Leg:  
Type: Lymphedema  
Thickness: Skin Breakdown  
Bed Appearance: Dried Exudate, Epithelial Tissue or Bridge, Pink and Yellow  
Percent of Wound Bed Granulated/Red: 80  
Percent of Devitalized: 20  
Length (cm): 4  
Width (cm): 1.8  
Depth (cm): 0.1  
CM Sq: 7.200  
Surrounding Tissue Appearance: Dark Red  
Surrounding Tissue Temp: Warm  
Drainage Amount: Moderate  
Drainage Description: Serosanguineous  
Drainage Odor: No Odor  
Results  
Height: 5 ft 11 in  
Weight: 167.829 kg  
Body Mass Index: 51.5  
  
Assessment/Plan  
Assessment/Plan  
(1) Venous stasis ulcer:  
Qualifiers:  
Laterality: right Non-pressure ulcer stage: limited to breakdown of skin 
Varicose   
vein presence: with varicose veins Venous stasis ulcer site: other part of lower
leg   
Qualified Code(s): I83.018 - Varicose veins of right lower extremity with ulcer 
other   
part of lower leg; L97.811 - Non-pressure chronic ulcer of other part of right 
lower   
leg limited to breakdown of skin  
Code(s):  
I83.009 - Varicose veins of unspecified lower extremity with ulcer of 
unspecified   
site; L97.909 - Non-pressure chronic ulcer of unspecified part of unspecified 
lower   
leg with unspecified severity  
Plan:  
w/lymphedema  
(2) Edema of right lower leg:  
Code(s):  
R60.0 - Localized edema  
(3) Morbid obesity due to excess calories:  
Code(s):  
E66.01 - Morbid (severe) obesity due to excess calories  
(4) Lymphedema of both lower extremities:  
Code(s):  
I89.0 - Lymphedema, not elsewhere classified  
(5) PVD (peripheral vascular disease):  
Code(s):  
I73.9 - Peripheral vascular disease, unspecified  
(6) Varicose veins of both legs with edema:  
Code(s):  
I83.893 - Varicose veins of bilateral lower extremities with other complications  
(7) Inflammation:  
  
Plan  
see orders and hpi  
Time spent with patient  
Time Spent With Patient (min): 14  
  
  
  
  
Dictated By: SOWMYA Correa DD/DT: 24  
  
Signed By: <Electronically signed by SOWMYA Espino>  
24 0820  
   
  
[facility]  
Work Phone: [phone]- History of Present illness Narrative* 
  Ray Terry, NP - 2024 3:30 PM ESTFormatting of this note is 
  different from the original.  
Images from the original note were not included.  
  
Jocelin Pacheco is a 69 y.o. male presents with chief complaint of Back Pain (Pt 
presents with lower back pain with onset of 2 weeks with pain increasing. Took 
tylenol with no relief. Has used ice on it a few times. Left shoulder is 
hurting. Denies any trouble urinating. )  
  
HPI:  
  
Back Pain  
  
Here for low back pain, left side hurts more than the right side. Denies any 
injury or reason for increased back pain. Tried ice and tylenol.  
  
HISTORIES:  
  
PAST MEDICAL HISTORY:  
Past Medical History:  
Diagnosis Date  
Acquired hammer toe of right foot  
Cardiomyopathy (CMS/HCC)  
d/t morbid obesity  
Chronic anticoagulation  
CKD (chronic kidney disease), stage II  
Class 3 obesity (CMS/HCC)  
CPAP (continuous positive airway pressure) dependence  
Diabetes mellitus type 2 in obese (CMS/HCC)  
History of 2019 novel coronavirus disease (COVID-19) 2020  
Received Remdesivir and dexamethasone as an in-patient  
Hx of being hospitalized 2019  
Hospitalized for Lt. lower extremity cellulitis and chronic open venous stasis 
ulcer  
Hx of carpal tunnel syndrome  
Hx of deep venous thrombosis  
DIMITRI on CPAP  
Peripheral venous insufficiency  
Porokeratosis  
Primary osteoarthritis of both knees  
Stasis dermatitis of both legs  
  
  
SURGICAL HISTORY:  
Past Surgical History:  
Procedure Laterality Date  
CARDIAC CATHETERIZATION 2009  
Due to Chest pain  
CARPAL TUNNEL RELEASE Right 2010  
Dr. Estrada  
CHOLECYSTECTOMY   
FOOT NEUROMA SURGERY Bilateral   
Hammertoe repair as well  
HERNIA REPAIR  
IR INJECTION JOINT 2020  
(Knee) Synvisc injection with Dr Lomeli (Ortho)  
IR INJECTION JOINT   
(Knee) Synvisc done by Dr Richard  
SKIN GRAFT 2019  
From right thigh to right foot due to non-healing wound/ulcer. Done by Dr Blakely  
VEIN LIGATION Bilateral 2019  
Procedures in April and Oct done by Dr Lyle Mascorro  
  
  
SOCIAL HISTORY:  
Social History  
  
Tobacco Use  
Smoking status: Never  
Passive exposure: Past  
Smokeless tobacco: Never  
Substance Use Topics  
Alcohol use: Not Currently  
Drug use: Never  
  
Depression: Not at risk (2024)  
PHQ-2  
PHQ-2 Score: 0  
  
  
FAMILY HISTORY:  
Family History  
Problem Relation Name Age of Onset  
Hypertension Mother  
Heart disease Mother  
Diabetes Father  
Heart attack Sister  
Diabetes Sister  
Stroke Brother  
Heart attack Brother  
ALS Brother  
  
  
MEDICATIONS:  
Current Outpatient Medications  
Medication Instructions  
carvedilol (COREG) 6.25 mg, Oral, 2 times daily  
clobetasol (Temovate) 0.05 % cream 1 application , Topical, Every 12 hours  
Entresto 24-26 MG tablet 1 tablet, Oral, 2 times daily  
furosemide (LASIX) 40 mg, Oral, Every 12 hours  
Jardiance 10 mg, Oral, Every 24 hours  
Ozempic, 2 MG/DOSE, 8 MG/3ML solution pen-injector  
Probiotic Product (PROBIOTIC BLEND PO) 2 Units, Oral, Daily  
spironolactone (ALDACTONE) 25 mg, Oral, Daily  
Xarelto 10 MG tablet TAKE 1 TABLET BY MOUTH EVERY DAY WITH FOOD  
  
  
ALLERGIES:  
Allergies  
Allergen Reactions  
Cefixime Unknown  
Nystatin Unknown  
Sodium Benzoate  
Other Reaction(s): Unknown Reaction  
Diclofenac Rash  
  
  
REVIEW OF SYMPTOMS:  
  
Review of Systems  
Musculoskeletal: Positive for back pain.  
Shoulder pain, left. Rates shoulder pain as a 5 and the back as a 9 or 10 on a 
0-10 scale.  
Psychiatric/Behavioral: Negative for sleep disturbance.  
  
  
PHYSICAL EXAM:  
  
Visit Vitals  
/70  
Pulse 83  
Temp 97.7 F  
Wt 376 lb  
SpO2 94%  
BMI 52.44 kg/m  
Smoking Status Never  
BSA 2.93 m  
  
  
Physical Exam  
Constitutional:  
Appearance: He is obese. He is not ill-appearing.  
Eyes:  
Extraocular Movements: Extraocular movements intact.  
Musculoskeletal:  
General: Tenderness present.  
Comments: Low back pain  
Skin:  
General: Skin is warm and dry.  
Neurological:  
Mental Status: He is alert and oriented to person, place, and time.  
Gait: Gait abnormal.  
Psychiatric:  
Mood and Affect: Mood normal.  
Thought Content: Thought content normal.  
Judgment: Judgment normal.  
  
ASSESSMENT AND PLAN:  
  
Assessment/Plan  
Diagnoses and all orders for this visit:  
Lumbosacral strain, initial encounter  
- methylPREDNISolone (Medrol Dospak) 4 MG tablets; Take as directed on package.  
- Ambulatory referral to Physical Therapy; Future  
Acute pain of left shoulder  
- methylPREDNISolone (Medrol Dospak) 4 MG tablets; Take as directed on package.  
- Ambulatory referral to Physical Therapy; Future  
Wound of left lower extremity, initial encounter  
--pt states he will call wound center  
Dictated, but not read  
  
Patient presents today for an acute visit. He s been having low back pain for 
approximately two weeks. He also mentioned that his left shoulder is painful. It
s worse with abduction and he can only raise the left shoulder approximately 90 
. he denies any actual injury. He states he s been taking Tylenol and using ice 
to his back.  
  
At this point in time we re not gonna order x-rays. He does have tenderness of 
the lumbosacral spine and point tenderness in the deltoid area. He s advised ice
the left shoulder. He can use Tylenol. For the low back pain is tender across 
the whole lumbosacral spine. He is not having any radicular type symptoms. I 
will give him a low-dose Medrol dose pack since his A1c is good at 5.5. And he 
can use Tylenol as needed. And suds are not indicated since he is on Xarelto. 
Will refer him to physical therapy and evaluate and treat for the back pain in 
the left shoulder. If not improving will consider x-rays. He also mentioned he 
does have the start of a wound on his left posterior lower extremity.Picture was
taken. He states he will call [facility] clinic. I advised him to use Aquaphor or 
Lantisepticointment to the area. He agrees. He ll return here in May for his 
regular scheduled appointment to see Dr. Matias.  
  
  
Electronically signed by Ray Terry NP at 2024 4:20 PM EST  
  
documented in this encounterResearch Psychiatric [id number]- History of Present illness
Narrative* Danya An DPM - 2024 3:15 PM ESTFormatting of this 
  note is different from the original.  
HPI:  
Patient presents in office today for routine nailcare and callous care.  
Location: Right great toe.  
Duration: ongoing x1 month.  
Severity of the symptoms: mild , considered 5 out of 10.  
Onset of pain: gradual.  
Status: stable.  
Context: hard to trim , hard to reach.  
Current symptoms include: toe redness , toe swelling ,  
Relieved by: debridement.  
Characteristics of the nails: red, swollen, painful.  
Aggravated by: shoe gear, pressure.  
Risk factors: curvature of nails.  
  
Examination:  
General Examination:  
GENERAL EXAMINATIONalert, well hydrated, in no distress , awake, aware of 
surroundings.  
FOOT EXAM:  
Date of Last Foot Exam 24  
  
Vascular:  
DORSALIS PEDIS PULSE:2/4, bilaterally.  
POSTERIOR TIBIAL PULSE:1/4, bilaterally.  
TEMPERATURE GRADIENT:within normal limits, warm to cool.  
EDEMA:mild, bilateral lower extremity. erythema to the right 2nd digit is 
resolved. Continued erythema noted.  
CAPILLARY FILLING TIME(sec):bilateral, digits 1-5, less than 3 seconds.  
  
Neurologic:  
VIBRATORY:abnormal.  
SEMMES-LUKE 5.07 MONOFILAMENTnormal.  
  
Dermatologic:  
HYPERKERATOSIS:Location:, Submetatarsal Head 4 right, medial IPJ of the hallux 
bilateral, distal right 3rd digit.  
NAIL PATHOLOGY:digits 1-5 bilateral are thickened, discolored, crumbly, 
dystrophic, elongated and with subungual debris. Incurvation to the lateral 
border right hallux nail. Upon debridement there isno underlying opening noted 
in the skin. The surrounding granuloma, erythema has resolved along thenail 
fold.  
SKIN PATHOLOGY:atrophic, dry, decreased hair growth bilateral.  
  
Orthopedic:  
FOOT MORPHOLOGY:Metatarsus adductus bilateral .  
DEFORMITIES:Rigidly contracted second digit right with elevation at the second 
MPJ. There is overriding and rubbing between the distal right second digit and 
lateral right hallux. Semirigid contracted digits 2 through 4 left, 3 and 4 
right .  
PAIN ELICITED WITH PALPATION OFArea of porokeratosis sub-fourth MPJ right. Pain 
with palpation to the MPJ of the right 2nd digit, but pain is improving .  
MUSCLE STRENGTH5/5 for all pedal groups tested  
  
Assessment:  
1. Right foot pain - M79.671  
2. Dermatophytosis of nail - B35.1 (Primary)  
3. Left foot pain - M79.672  
4. 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  
5. Corns and callosities - L84  
6. Porokeratosis - Q82.8  
7. Acquired pes planovalgus of left foot - M21.6X2  
8. Acquired pes planovalgus of right foot - M21.41  
9. Acquired hammer toe of right foot - M20.41  
10. Circulating anticoagulant disorder - D68.318  
11. Venous insufficiency - I87.2  
12. Ingrowing nail - L60.0  
  
Plan:  
Dermatophytosis of nail  
1. Nails 1-5 bilateral were debrided in length and thickness by manual and 
mechanical means.  
2. Advised patient on the importance of continued foot care including daily 
monitoring of their feet for any new complaints or concerns that may arise.  
3. Continue use of good supportive shoe gear to help prevent complications.  
4. RTC: 9-12 weeks or as needed if problems arise.  
  
Corns and callosities  
1. Hyperkeratosis/porokeratosis as above noted was debrided.  
2. Instructed patient on use of aperture pads or Silipos padding/toe spacers to 
prevent rubbing andcontinued development of the hyperkeratosis.  
3. Also discussed use of moisturizing creams for overall increased hydration to 
the skin.  
4. Discussed continued use of proper foot gear to avoid excess pressure over the
callous site.  
Electronically signed by Danya An DPM at 2024 1:53 PM EST  
  
documented in this encounterResearch [facility] [id number]- Evaluation note*   
  
                      Encounter Date Diagnosis  Assessment Notes Treatment Notes  
 Treatment   
Clinical Notes  
   
                                        04 Aug, 2023        Type 2 diabetes   
mellitus with   
unspecified   
complications (ICD-10   
- E11.8)                                                      
   
                                        04 Aug, 2023        BMI 50.0-59.9, adult  
   
(ICD-10 - Z68.43)                                             
   
                                        04 Aug, 2023        Knee osteoarthritis   
(ICD-10 - M17.9)                                              
   
                                        04 Aug, 2023        Mixed hyperlipidemia  
   
(ICD-10 - E78.2)                                              
   
                                        04 Aug, 2023        CAD (coronary artery  
   
disease) (ICD-10 -   
I25.10)                                                       
   
                                        04 Aug, 2023        Obstructive sleep   
apnea (adult)   
(pediatric) (ICD-10 -   
G47.33)                                                       
   
                                        04 Aug, 2023        Cardiomyopathy   
(ICD-10 - I42.9)                                              
   
                                        04 Aug, 2023        CHF (congestive hear  
t   
failure) (ICD-10 -   
I50.9)                                                        
   
                                        04 Aug, 2023        Edema (ICD-10 -   
R60.9)                                                        
   
                                        04 Aug, 2023        Low back derangement  
   
syndrome (ICD-10 -   
M53.86)                                                       
   
                                        04 Aug, 2023        Metabolic syndrome X  
   
(ICD-10 - E88.81)                                             
   
                                        04 Aug, 2023        Encounter for   
immunization (ICD-10   
- Z23)                                                        
  
  
[employer]  
Other Phone: [phone]- Evaluation note*   
  
                                        Author              Jacoby Braden  
[facility]  
   
                                        Authored            2024 2:5  
6pm  
   
                                                    Start weight: 414.4 lbs., he  
 is down 32.0 lbs. today with a weight of 382.4lbs.   
and   
20.9 lbs. since  
his last visit on 2023.  
Starting Date: 04/15/2022.  
Ozempic start date 4/15/2022. Ozempic start weight 414.4lbs. He is down 32.0 
lbs.   
since starting  
Ozempic.  
1. Abnormal weight gain. He notes he is the heaviest in his family. His brother 
has   
some but much  
less significant weight issues. He notes he was able to get his weight down to 
368   
pounds but was  
unable to keep it down.  
2. Obesity-markedly improved since starting our program and with taking Ozempic 
2 mg,   
but had  
significant weight regain of 20.9 pounds off the medication. Due to cost/donut 
hole   
he stopped  
Ozempic. Hopefully he can restart full dose in the future using patient 
assistance.   
He is also on  
Jardiance 10 mg which can also help with his diabetes and cardiomyopathy/CHF. 
His   
diabetes is well  
controlled with his last A1c of 5.6 despite having issues with obtaining with 
his   
diabetic  
medications. He had mild CAD along with his diabetes and has not been on a 
statin so   
I had  
recommended Crestor, but he notes his cardio said it was not necessary so he did
not   
start the  
medication. He is eating healthier overall. He should follow-up with our   
nutritionist. He feels that  
his appetite is controlled with Ozempic 2 mg. I have recommend again he consider
  
bariatric surgery  
so he could have his knee replaced or find a surgeon who will do the surgery at 
an   
elevated BMI. In  
the past, he mentioned they found weakness in the bottom of the heart and 
evidently a   
problem with  
an artery that they could not get. His cardiologist however did not feel he 
needed   
the  
statin/Crestor that I previously prescribed. He gets very little activity due to
  
bone-on-bone  
arthritis, severe low back pain on tramadol, difficulty getting out of a chair 
and   
also his cardiac  
function he does get NELSON with small amounts of activity. He sees Dr. Whitley to 
treat   
his  
cardiomyopathy. He did like canned foods and he understands that he must cut 
back the   
salt/processed  
food. He denies adding salt to his foods. He has had a A1c that was 6.6 
indicative of   
diabetes. He  
notes he was never told he was diabetic before program. He is seeing Dr. Lomeli 
for   
his knees and  
notes he has to get his BMI from 59 down to 45 to be able to have his knees 
replaced.   
Before  
starting the program he did eat a lot of red meat, potatoes and drank a lot of 
milk   
2% or 4% often  
buying 3 or 4 gallons at a time. He should not skip meals. He did try Adipex for
1   
month in the past   
but notes he was not able to lose but a few pounds so his PCP stopped it. He 
notes   
his sister does  
the shopping as he can. He does some simple cooking.  
3. Cardiomyopathy/CHF/early CAD-he must continue to follow-up with cardiology 
for   
evaluation of  
cardiomyopathy/CHF despite medications/known cardiomyopathy/previous mild CAD. 
We   
obtained his  
previous cardiac catheterization from 2009. He notes his preoperative 
diagnoses   
were angina  
pectoris, dyspnea on exertion and abnormal Cardiolite perfusion stress test. His
left   
anterior  
descending artery showed a 20 to 25% tubular stenosis. His circumflex had an 
ostial   
stenosis of 30  
to 50% in several views but in one view showed less than 30% stenosis and 
appeared to   
be mildly  
kinked, his right coronary artery was large dominant and normal. His left   
ventriculogram showed mild  
to moderately reduced systolic dysfunction with an EF in the 40 to 45% range   
globally. He is now  
being treated by Dr. Whitley.  
4. Type 2 diabetes with A1c controlled/improved at 5.6-will restart Ozempic and   
continue Jardiance.  
We could consider Metformin but he already has some loose stools. We discussed 
that   
first-line  
treatment with lifestyle changes, decrease simple sweets and starches, will be 
some   
increased  
activity in the future and weight loss. He needs to consider bariatric surgery.  
5. Obstructive sleep apnea-he is compliant with his CPAP. Treat also with 
healthy   
lifestyle changes  
and weight loss. He needs to consider bariatric surgery.  
6. Bilateral OA of the knees-bone-on-bone making it very difficult for him to 
get   
around. He notes  
his orthopedic doctor wants him to drop from a BMI of 59 down to 45 before he 
will do   
the surgery.  
We will start weight loss and Ozempic. He needs to consider bariatric surgery.  
7. Chronic low back pain-treat with healthy lifestyle change. He needs to 
maximize   
his pain  
management.  
8. Mixed hyperlipidemia-treat with decreasing the simple sweets, added sugars,   
refined starches, and  
bad/added fats, increasing activity and exercise and continued long-term weight 
loss.   
Monitor with  
healthy lifestyle change. I recommended that he start a statin since he is 
diabetic   
and had mild CAD  
on his cath but he notes his cardiologist told him it was not necessary to 
start.  
9. Metabolic syndrome-treat with long-term healthy lifestyle changes, behavioral
  
changes, healthy  
nutritional changes, increased activity and exercise and achieve long-term 
weight   
loss.  
Follow up with me in 6 weeks.  
New labs needed: Up-to-date. Monitor A1c at least every 6 months with his PCP or
in   
our office. He  
will continue other regular lab follow-up with his PCP.   
  
  
  
[facility]  
Work Phone: [phone]- Progress note  
  
  
  
  
                                        Author              Elaine Espino  
[facility]  
2023 11:24am  
   
                                        Note Date/Time      2023 11:24  
am  
   
                                                    [facility]  
ENTER  
[address] [place] [address]  
  
Wound Center Provider Note  
Signed  
  
Patient: Jocelin Pacheco MR#:   
[id number]  
: 1955 Acct:[id number]  
  
Age/Sex: 68 / M  
  
Copies to: DO Elaine Arboleda APRN~  
  
  
HPI  
Date of Visit  
Date of Visit:  
Date of Service: 2023  
Time of Service: 11:20  
  
Narrative  
HPI:  
23 Jocelin is a 68-year-old male presenting to [facility]   
wound care program for an initial visit to the office for a right lower 
extremity   
venous stasis ulcer/lymphedema ulcer.? The patient has been a patient of the 
office   
for quite some time now and has seen other providers.  
He had seen vascular in [place] was told that there is nothing else that can be 
done   
for him.  
He has an extensive history of infections so I imagine this will continue to be 
an   
issue for him.  
He appears to need better edema control.  
He was told by ortho that he needs to lose 50 pounds in order to have surgery- 
this   
was 2 years ago and has not been accomplished.  
A past venous duplex indicates deep vein issues which are not surgically 
correctable-   
vascular did confirm that he does not need to see them because there is nothing 
they   
have to offer him.  
He is still retired so hopefully there is more opportunity for elevation and   
compression of the legs to support healing of the ulcer.  
Nutrition and probiotics were discussed and suggestions were given.  
Doxycycline was escribed today for what appears to be cellulitis of the RLE.  
23 looks better, is still taking the antibiotics, change to collagen silver   
topically and clobetasol to periwound skin that he says is itchy, will return 
for   
dressing changes, will bring his compression socks from home to the next visit 
since   
most of our wraps tend to roll down on his legs, says that he will use the   
compression pumps  
  
Subjective  
Pain  
Right Lower Leg:  
Pain Description: Burning  
Pain Intensity: 0  
Wound/Ulcer History  
When did wound start?: 2023  
Mode of Arrival/ : Personal vehicle  
Assistive Device Used Today: Cane  
Lives with:: Alone  
Appetite Description: Within Normal Limits  
Who helps w/ dressing change?: Wound Care Dept  
Why Do You Need Help?: Can't Reach Ulcer  
Smoking Status: Never smoker  
  
[facility]  
Medical History (Updated 23 @ 11:23 by SOWMYA Correa)  
  
Arthritis  
Bilateral knees,  right is bone on bone  per  Dr. Richard   
Back pain  
Cardiomyopathy  
 lower part of heart is weak   
Carpal tunnel syndrome  
right arm surgery  
DVT (deep venous thrombosis)  
 blood clot from right foot to right thigh  on  blood thinner   
Hypertension  
Itching  
Itching with irritation  
DIMITRI (obstructive sleep apnea)  
PVD (peripheral vascular disease)  
 poor veins  Dr. Cameron did vein closure 2019, needs 5 more viens worked 
on.  
Wound of right lower extremity  
  
Surgical History (Reviewed 23 @ 11:09 by Maren Martinez, RN)  
  
Hx of cholecystectomy  
  
Family History (Reviewed 23 @ 11:09 by Maren Martinez, KIMBERLY)  
Father Diabetes mellitus, type 2  
Sister Diabetes mellitus, type 2  
  
Social History  
Smoking Status: Never smoker  
Substance Use Type: None  
  
Grafts  
History of Graft  
History of Graft?: Yes  
  
Exam  
Physical Exam  
Vital Signs:  
  
  
  
  
Temp Pulse Resp BP O2 Del Method  
  
97.7 F 80 18 117/73 Room Air  
  
23 11:05 23 11:05 23 11:05 23 11:05 23 11:05  
  
  
  
Const  
General: cooperative, comfortable and no acute distress  
Nutritional Appearance: obese  
Orientation: alert, awake and oriented x3  
  
Lower/Upper Extremity Exam  
Vascular Exam-Edema  
Right Lower Extremity:  
Edema Type: Lymphedema  
Vascular Exam-Pulses  
Right Brachial:  
Pulse Assessment Method: NIBP  
Right Dorsalis Pedis:  
Pulse Assessment Method: Palpation  
Right Posterior Tibial:  
Pulse Assessment Method: Palpation  
  
Objective  
Meds/Allergies  
Home Medications  
  
carvedilol 6.25 mg tablet 6.25 mg PO BID 17 [History Confirmed 23]  
furosemide 40 mg tablet (Lasix) 40 mg PO BID 17 [History Confirmed 
23]  
acetaminophen 500 mg tablet (Tylenol Extra Strength) 500 mg PO TID PRN Pain 
20   
[History Confirmed 23]  
nabumetone 750 mg tablet 750 mg PO BID 21 [History Confirmed 23]  
empagliflozin 10 mg tablet (Jardiance) 10 mg PO DAILY 22 [History 
Confirmed   
23]  
rivaroxaban 10 mg tablet (Xarelto) 10 mg PO DAILY 22 [History Confirmed   
23]  
semaglutide 2 mg/dose (8 mg/3 mL) subcutaneous pen injector (Ozempic) 2 mg 
subcut Q7D   
22 [History Confirmed 23]  
spironolactone 25 mg tablet 25 mg PO DAILY 22 [History Confirmed 23]  
doxycycline hyclate 100 mg capsule 100 mg PO BID 14 days #28 caps 23 [Rx   
Confirmed 23]  
sacubitril 24 mg-valsartan 26 mg tablet (Entresto) 1 tab PO BID 23 
[History   
Confirmed 23]  
  
  
Allergies  
  
cefixime Allergy (Verified 23 11:09)  
Unknown Reaction  
diclofenac Allergy (Verified 23 11:09)  
Unknown Reaction  
nystatin Allergy (Verified 23 11:09)  
Unknown Reaction  
sodium benzoate Allergy (Verified 23 11:09)  
Unknown Reaction  
  
  
Wound/Ulcer  
Right Lower Leg:  
Type: Venous Stasis Ulcer  
Thickness: Skin Breakdown  
Bed Appearance: Beefy Red, Epithelial Tissue or Bridge, Pink and Yellow  
Percent of Wound Bed Granulated/Red: 75  
Percent of Devitalized: 25  
Length (cm): 4.5  
Width (cm): 17.5  
Depth (cm): 0.2  
CM Sq: 78.750  
Surrounding Tissue Appearance: Hyperpigmented  
Surrounding Tissue Temp: Warm  
Drainage Amount: Moderate  
Drainage Description: Serosanguineous  
Drainage Odor: No Odor  
Procedures  
Time Out: 2 Patient Identifiers, Correct Patient, Correct Side/Site, Correct   
Procedure and Safety Issues Reviewed  
Procedure:  
The right leg ulcer was anesthetized with topical 2% lidocaine gel. A curette 
was   
used to perform debridement for the removal of 10 cm? of devitalized tissue   
consisting of skin and slough. Debridement was down to healthy bleeding tissue.   
Estimated blood loss was minimal. Hemostasis was achieved by applying pressure. 
The   
right leg ulcer now appears 95% pink and red and the size remainsthe same. The   
patient tolerated well with no pain.  
Results  
Height: 5 ft 11 in  
Weight: 160.118 kg  
Body Mass Index: 49.2  
  
Assessment/Plan  
Assessment/Plan  
(1) Venous stasis ulcer:  
Qualifiers:  
Laterality: right Non-pressure ulcer stage: limited to breakdown of skin 
Varicose   
vein presence: with varicose veins Venous stasis ulcer site: other part of lower
leg   
Qualified Code(s): I83.018 - Varicose veins of right lower extremity with ulcer 
other   
part of lower leg; L97.811 - Non-pressure chronic ulcer of other part of right 
lower   
leg limited to breakdown of skin  
Code(s):  
I83.009 - Varicose veins of unspecified lower extremity with ulcer of 
unspecified   
site; L97.909 - Non-pressure chronic ulcer of unspecified part of unspecified 
lower   
leg with unspecified severity  
Status: Chronic  
(2) Edema of right lower leg:  
Code(s):  
R60.0 - Localized edema  
Status: Chronic  
(3) Morbid obesity due to excess calories:  
Code(s):  
E66.01 - Morbid (severe) obesity due to excess calories  
Status: Chronic  
(4) Lymphedema of both lower extremities:  
Code(s):  
I89.0 - Lymphedema, not elsewhere classified  
Status: Suspected  
(5) PVD (peripheral vascular disease):  
Code(s):  
I73.9 - Peripheral vascular disease, unspecified  
Status: Chronic  
(6) Varicose veins of both legs with edema:  
Code(s):  
I83.893 - Varicose veins of bilateral lower extremities with other complications  
Status: Chronic  
(7) Inflammation:  
Status: Chronic  
(8) Cellulitis:  
Assessment/Problem Details:  
rle  
Qualifiers:  
Laterality: right Site of cellulitis: extremity Site of cellulitis of extremity:
  
lower extremity Qualified Code(s): L03.115 - Cellulitis of right lower limb  
Code(s):  
L03.90 - Cellulitis, unspecified  
Status: Resolved  
Time spent with patient  
Time Spent With Patient (min): 15  
  
  
  
  
Dictated By: SOWMYA Correa DD/DT: 23  
  
Signed By: <Electronically signed by SOWMYA Espino>  
23 [facility]  
   
  
[facility]  
Work Phone: [phone]- Progress note  
  
  
  
  
                                        Author              Elaine Espino  
[facility]  
2023 11:30am  
   
                                        Note Date/Time      2023 11:2  
7am  
   
                                                    [facility]  
ENTER  
[address] [place] [address]  
  
Wound Center Provider Note  
Signed  
  
Patient: Jocelin Pacheco MR#:   
[id number]  
: 1955 Acct:[id number]  
  
Age/Sex: 68 / M  
  
Copies to: DO Elaine Arboleda APRN~  
  
  
HPI  
Date of Visit  
Date of Visit:  
Date of Service: 2023  
Time of Service: 11:21  
  
Narrative  
HPI:  
23 Jocelin is a 68-year-old male presenting to [facility]   
wound care program for an initial visit to the office for a right lower 
extremity   
venous stasis ulcer/lymphedema ulcer.? The patient has been a patient of the 
office   
for quite some time now and has seen other providers.  
He had seen vascular in [place] was told that there is nothing else that can be 
done   
for him.  
He has an extensive history of infections so I imagine this will continue to be 
an   
issue for him.  
He appears to need better edema control.  
He was told by ortho that he needs to lose 50 pounds in order to have surgery- 
this   
was 2 years ago and has not been accomplished.  
A past venous duplex indicates deep vein issues which are not surgically 
correctable-   
vascular did confirm that he does not need to see them because there is nothing 
they   
have to offer him.  
He is still retired so hopefully there is more opportunity for elevation and   
compression of the legs to support healing of the ulcer.  
Nutrition and probiotics were discussed and suggestions were given.  
Doxycycline was escribed today for what appears to be cellulitis of the RLE.  
  
Subjective  
Pain  
Right Lower Leg:  
Pain Intensity: 10  
Wound/Ulcer History  
When did wound start?: 2023  
Mode of Arrival/ : Personal vehicle  
Assistive Device Used Today: Cane  
Lives with:: Alone  
Appetite Description: Within Normal Limits  
Who helps w/ dressing change?: Wound Care Dept  
Why Do You Need Help?: Can't Reach Ulcer  
Smoking Status: Never smoker  
  
[facility]  
Medical History (Updated 23 @ 11:25 by SOWMYA Correa)  
  
Arthritis  
Bilateral knees,  right is bone on bone  per  Dr. Richard   
Back pain  
Cardiomyopathy  
 lower part of heart is weak   
Carpal tunnel syndrome  
right arm surgery  
DVT (deep venous thrombosis)  
 blood clot from right foot to right thigh  on  blood thinner   
Hypertension  
Itching  
Itching with irritation  
DIMITRI (obstructive sleep apnea)  
PVD (peripheral vascular disease)  
 poor veins  Dr. Cameron did vein closure 2019, needs 5 more viens worked 
on.  
Wound of right lower extremity  
  
Surgical History (Reviewed 23 @ 11:09 by Maren Martinez RN)  
  
Hx of cholecystectomy  
  
Family History (Reviewed 23 @ 11:09 by Maren Mratinez RN)  
Father Diabetes mellitus, type 2  
Sister Diabetes mellitus, type 2  
  
Social History  
Smoking Status: Never smoker  
Substance Use Type: None  
  
Grafts  
History of Graft  
History of Graft?: Yes  
  
Exam  
Physical Exam  
Vital Signs:  
  
  
  
  
Temp Pulse Resp BP O2 Del Method  
  
97.5 F L 88 18 138/80 Room Air  
  
23 11:04 23 11:04 23 11:04 23 11:04 23 11:04  
  
  
  
Const  
General: cooperative, comfortable and no acute distress  
Nutritional Appearance: obese  
Orientation: alert, awake and oriented x3  
  
Lower/Upper Extremity Exam  
Vascular Exam-Edema  
Right Lower Extremity:  
Edema Type: Lymphedema  
Vascular Exam-Pulses  
Right Brachial:  
Pulse Assessment Method: NIBP  
Right Dorsalis Pedis:  
Pulse Assessment Method: Palpation  
Right Posterior Tibial:  
Pulse Assessment Method: Palpation  
  
Objective  
Meds/Allergies  
Home Medications  
  
carvedilol 6.25 mg tablet 6.25 mg PO BID 17 [History Confirmed 23]  
furosemide 40 mg tablet (Lasix) 40 mg PO BID 17 [History Confirmed 
23]  
acetaminophen 500 mg tablet (Tylenol Extra Strength) 500 mg PO TID PRN Pain 
20   
[History Confirmed 23]  
nabumetone 750 mg tablet 750 mg PO BID 21 [History Confirmed 23]  
empagliflozin 10 mg tablet (Jardiance) 10 mg PO DAILY 22 [History 
Confirmed   
23]  
rivaroxaban 10 mg tablet (Xarelto) 10 mg PO DAILY 22 [History Confirmed   
23]  
semaglutide 2 mg/dose (8 mg/3 mL) subcutaneous pen injector (Ozempic) 2 mg 
subcut Q7D   
22 [History Confirmed 23]  
spironolactone 25 mg tablet 25 mg PO DAILY 22 [History Confirmed 23]  
doxycycline hyclate 100 mg capsule 100 mg PO BID 14 days #28 caps 23 [Rx]  
sacubitril 24 mg-valsartan 26 mg tablet (Entresto) 1 tab PO BID 23 
[History   
Confirmed 23]  
  
  
Allergies  
  
cefixime Allergy (Verified 23 11:09)  
Unknown Reaction  
diclofenac Allergy (Verified 23 11:09)  
Unknown Reaction  
nystatin Allergy (Verified 23 11:09)  
Unknown Reaction  
sodium benzoate Allergy (Verified 23 11:09)  
Unknown Reaction  
  
  
Wound/Ulcer  
Right Lower Leg:  
Type: Venous Stasis Ulcer  
Thickness: Skin Breakdown  
Bed Appearance: Beefy Red, Black Dry, Epithelial Tissue or Bridge and Yellow  
Percent of Wound Bed Granulated/Red: 50  
Percent of Devitalized: 50  
Length (cm): 3.5  
Width (cm): 18.5  
Depth (cm): 0.2  
CM Sq: 64.750  
Surrounding Tissue Appearance: Bright Red  
Surrounding Tissue Temp: Increased Warmth  
Drainage Amount: Moderate  
Drainage Description: Serosanguineous  
Drainage Odor: No Odor  
Results  
Height: 5 ft 11 in  
Weight: 160.118 kg  
Body Mass Index: 49.2  
  
Assessment/Plan  
Assessment/Plan  
(1) Venous stasis ulcer:  
Qualifiers:  
Venous stasis ulcer site: other part of lower leg Varicose vein presence: with   
varicose veins Laterality: right Non-pressure ulcer stage: limited to breakdown 
of   
skin Qualified Code(s): I83.018 - Varicose veins of right lower extremity with 
ulcer   
other part of lower leg; L97.811 - Non-pressurechronic ulcer of other part of 
right   
lower leg limited to breakdown of skin  
Code(s):  
I83.009 - Varicose veins of unspecified lower extremity with ulcer of 
unspecified   
site; L97.909 - Non-pressure chronic ulcer of unspecified part of unspecified 
lower   
leg with unspecified severity  
Status: Chronic  
(2) Cellulitis:  
Assessment/Problem Details:  
rle  
Qualifiers:  
Site of cellulitis: extremity Site of cellulitis of extremity: lower extremity   
Laterality: right Qualified Code(s): L03.115 - Cellulitis of right lower limb  
Code(s):  
L03.90 - Cellulitis, unspecified  
Status: Acute  
(3) Edema of right lower leg:  
Code(s):  
R60.0 - Localized edema  
Status: Chronic  
(4) PVD (peripheral vascular disease):  
Code(s):  
I73.9 - Peripheral vascular disease, unspecified  
Status: Chronic  
(5) Varicose veins of both legs with edema:  
Code(s):  
I83.893 - Varicose veins of bilateral lower extremities with other complications  
Status: Chronic  
(6) Morbid obesity due to excess calories:  
Code(s):  
E66.01 - Morbid (severe) obesity due to excess calories  
Status: Chronic  
(7) Lymphedema of both lower extremities:  
Code(s):  
I89.0 - Lymphedema, not elsewhere classified  
Status: Suspected  
(8) Inflammation:  
Status: Chronic  
Time spent with patient  
Time Spent With Patient (min): 20  
  
  
  
  
Dictated By: SOWMYA Correa DD/DT: 23 1121  
  
Signed By: <Electronically signed by SOWMYA Espino>  
23 1130  
   
  
[facility]  
Work Phone: [phone]- Evaluation note*   
  
                      Encounter Date Diagnosis  Assessment Notes Treatment Notes  
 Treatment   
Clinical Notes  
   
                                                Type 2 diabetes   
mellitus with   
unspecified   
complications (ICD-10   
- E11.8)                                                      
  
  
[facility]  
Other Phone: [phone]- Evaluation note*   
  
                      Encounter Date Diagnosis  Assessment Notes Treatment Notes  
 Treatment   
Clinical Notes  
   
                                                Obesity,   
unspecified   
classification,   
unspecified obesity   
type, unspecified   
whether serious   
comorbidity present   
(ICD-10 - E66.9)                                              
   
                                                BMI 50.0-59.9,   
adult (ICD-10 -   
Z68.43)                                                       
   
                           Other                     Summary of Visi  
t:  
(A) emphasized   
increasing fruits   
and vegetables  
(B) reviewed the   
plate method  
(C) discussed   
foods high in   
sodium  
                                          
  
  
[facility]  
Other Phone: [phone]- Evaluation note*   
  
                      Encounter Date Diagnosis  Assessment Notes Treatment Notes  
 Treatment   
Clinical Notes  
   
                                        21 Dec, 2022        Type 2 diabetes   
mellitus with   
unspecified   
complications (ICD-10   
- E11.8)                                                      
   
                                        21 Dec, 2022        BMI 50.0-59.9, adult  
   
(ICD-10 - Z68.43)                                             
   
                                        21 Dec, 2022        Diarrhea (ICD-10 -   
R19.7)                                                        
   
                                        21 Dec, 2022        Obstructive sleep   
apnea (adult)   
(pediatric) (ICD-10 -   
G47.33)                                                       
   
                                        21 Dec, 2022        Mixed hyperlipidemia  
   
(ICD-10 - E78.2)                                              
   
                                        21 Dec, 2022        CAD (coronary artery  
   
disease) (ICD-10 -   
I25.10)                                                       
   
                                        21 Dec, 2022        Knee osteoarthritis   
(ICD-10 - M17.9)                                              
   
                                        21 Dec, 2022        Cardiomyopathy   
(ICD-10 - I42.9)                                              
   
                                        21 Dec, 2022        CHF (congestive hear  
t   
failure) (ICD-10 -   
I50.9)                                                        
   
                                        21 Dec, 2022        Edema (ICD-10 -   
R60.9)                                                        
   
                                        21 Dec, 2022        Low back derangement  
   
syndrome (ICD-10 -   
M53.86)                                                       
   
                                        21 Dec, 2022        Metabolic syndrome X  
   
(ICD-10 - E88.81)                                             
   
                                        21 Dec, 2022        Encounter for   
immunization (ICD-10   
- Z23)                                                        
  
  
[facility]  
Other Phone: [phone]- Evaluation note*   
  
                      Encounter Date Diagnosis  Assessment Notes Treatment Notes  
 Treatment   
Clinical Notes  
   
                                                Obesity,   
unspecified   
classification,   
unspecified obesity   
type, unspecified   
whether serious   
comorbidity present   
(ICD-10 - E66.9)                                              
   
                                                BMI 50.0-59.9,   
adult (ICD-10 -   
Z68.43)                                                       
   
                           Other                     Summary of Visi  
t:  
(A) encourage   
patient to trial   
different fruits,   
as well as   
temporarily   
reducing/eliminati  
ng milk to improve   
diarrhea  
(B) discussed food   
options for   
traveling on the   
road  
(C) discussed   
options for   
oatmeal  
                                          
  
  
[facility]  
Other Phone: [phone]- Evaluation note*   
  
                      Encounter Date Diagnosis  Assessment Notes Treatment Notes  
 Treatment   
Clinical Notes  
   
                                        20 Oct, 2022        Type 2 diabetes   
mellitus with   
unspecified   
complications (ICD-10   
- E11.8)                                                      
   
                                        20 Oct, 2022        BMI 50.0-59.9, adult  
   
(ICD-10 - Z68.43)                                             
   
                                        20 Oct, 2022        Diarrhea (ICD-10 -   
R19.7)                                                        
   
                                        20 Oct, 2022        Obstructive sleep   
apnea (adult)   
(pediatric) (ICD-10 -   
G47.33)                                                       
   
                                        20 Oct, 2022        Mixed hyperlipidemia  
   
(ICD-10 - E78.2)                                              
   
                                        20 Oct, 2022        CAD (coronary artery  
   
disease) (ICD-10 -   
I25.10)                                                       
   
                                        20 Oct, 2022        Knee osteoarthritis   
(ICD-10 - M17.9)                                              
   
                                        20 Oct, 2022        Cardiomyopathy   
(ICD-10 - I42.9)                                              
   
                                        20 Oct, 2022        CHF (congestive hear  
t   
failure) (ICD-10 -   
I50.9)                                                        
   
                                        20 Oct, 2022        Edema (ICD-10 -   
R60.9)                                                        
   
                                        20 Oct, 2022        Low back derangement  
   
syndrome (ICD-10 -   
M53.86)                                                       
   
                                        20 Oct, 2022        Metabolic syndrome X  
   
(ICD-10 - E88.81)                                             
   
                                        20 Oct, 2022        Encounter for   
immunization (ICD-10   
- Z23)                                              Patient denies   
current illness,   
previous allergic   
reaction to   
influenza   
vaccine, eggs, or   
other vaccines,   
and   
Guillain-[place]   
Syndrome. Patient   
given current   
editions of   
influenza Vaccine   
Information   
Statement (VIS).  
                                          
  
  
[facility]  
Other Phone: [phone]- Progress note  
  
  
  
  
                                        Author              Laila Villarreal  
[facility]  
2022 1:59pm  
   
                                        Note Date/Time      2022  
 1:59pm  
   
                                                    [facility]  
ENTER  
[address] [place] [address]  
  
Wound Center Provider Note  
Signed  
  
Patient: Jocelin Pacheco MR#:   
[id number]  
: 1955 Acct:[id number]  
  
Age/Sex: 67 / M  
  
Copies to: SOWMYA Gutierrez,DO~  
  
  
HPI  
Date of Visit  
Date of Visit:  
Date of Service: 2022  
Time of Service: 13:55  
  
Narrative  
HPI:  
Jocelin is a 67-year-old male presenting to the [facility] for a 
follow-up   
visit for evaluation and treatment of a Right Lower Leg Wound. Jocelin reports 
that he   
bumped his leg on an end table approximately one month ago. He has been a 
patient in   
our office before due to venous stasis ulcers. He has 2+ pitting edema to his 
RLE. He   
does have compression pumps at home and admits to using them approximately once 
a   
week. He has been seen by vascular in the past in [place] where he was told no   
surgical intervention could be performed. The right leg is entirely healed. The 
area   
will be padded and protected today. His right leg will be wrapped in ACE wrap 
for   
compression. He understands that he should use his compression pumps daily, and   
compression stockings are recommended as well. He will reach out to our office 
for   
further wound care needs.  
  
Subjective  
Pain  
Right Lower Leg:  
Pain Description: Throbbing  
Pain Intensity: 0  
Wound/Ulcer History  
When did wound start?: 2022 right medial lower leg  
Mode of Arrival/ : Personal vehicle  
Assistive Device Used Today: Cane  
Lives with:: Alone  
Appetite Description: Within Normal Limits  
Who helps w/ dressing change?: Wound Care Dept  
Smoking Status: Never smoker  
  
[facility]  
Medical History (Updated 22 @ 13:56 by SOWMYA Gutierrez)  
  
Arthritis  
Bilateral knees,  right is bone on bone  per  Dr. Richard   
Back pain  
Cardiomyopathy  
 lower part of heart is weak   
Carpal tunnel syndrome  
right arm surgery  
DVT (deep venous thrombosis)  
 blood clot from right foot to right thigh  on  blood thinner   
Hypertension  
Itching  
Itching with irritation  
DIMITRI (obstructive sleep apnea)  
PVD (peripheral vascular disease)  
 poor veins  Dr. Cameron did vein closure 2019, needs 5 more viens worked 
on.  
Wound of right lower extremity  
  
Surgical History (Reviewed 22 @ 08:05 by Maria A Crowe, KIMBERLY)  
  
Hx of cholecystectomy  
  
Family History (Reviewed 22 @ 08:05 by Maria A Crowe, KIMBERLY)  
Father Diabetes mellitus, type 2  
Sister Diabetes mellitus, type 2  
  
Social History  
Smoking Status: Never smoker  
Substance Use Type: None  
  
Grafts  
History of Graft  
History of Graft?: No  
  
Exam  
Physical Exam  
Vital Signs:  
  
  
  
  
Temp Pulse Resp BP O2 Del Method  
  
98.6 F 92 H 18 119/69 Room Air  
  
22 13:46 22 13:46 22 13:46 22 13:46 22 13:46  
  
  
  
Const  
General: cooperative, comfortable and no acute distress  
Nutritional Appearance: obese  
Orientation: alert, awake and oriented x3  
  
Lower/Upper Extremity Exam  
Vascular Exam-Edema  
Right Lower Extremity:  
Edema Degree: 1+  
Edema Type: Pitting  
Vascular Exam-Pulses  
Right Dorsalis Pedis:  
Pulse Assessment Method: Palpation  
Right Posterior Tibial:  
Pulse Assessment Method: Palpation  
Right Brachial:  
Pulse Assessment Method: NIBP  
  
Objective  
Meds/Allergies  
Home Medications  
  
carvedilol 6.25 mg tablet 6.25 mg PO BID 17 [History Confirmed 22]  
furosemide 40 mg tablet (Lasix) 40 mg PO BID 17 [History Confirmed 
22]  
acetaminophen 500 mg tablet (Tylenol Extra Strength) 500 mg PO TID PRN Pain 
20   
[History Confirmed 22]  
nabumetone 750 mg tablet 750 mg PO BID 21 [History Confirmed 22]  
clobetasol 0.05 % topical ointment 1 applic topical 3XW PRN Rash 21 
[History   
Confirmed 22]  
empagliflozin 10 mg tablet (Jardiance) 10 mg PO DAILY 22 [History 
Confirmed   
22]  
metolazone 2.5 mg tablet 2.5 mg PO Q2D 22 [History Confirmed 22]  
rivaroxaban 10 mg tablet (Xarelto) 10 mg PO DAILY 22 [History Confirmed   
22]  
semaglutide 2 mg/dose (8 mg/3 mL) subcutaneous pen injector (Ozempic) 2 mg 
subcut Q7D   
22 [History Confirmed 22]  
spironolactone 25 mg tablet 25 mg PO DAILY 22 [History Confirmed 22]  
telmisartan 40 mg tablet (Micardis) 40 mg PO DAILY 22 [History Confirmed   
22]  
  
  
Allergies  
  
cefixime Allergy (Verified 22 10:26)  
Unknown Reaction  
diclofenac Allergy (Verified 22 10:26)  
Unknown Reaction  
nystatin Allergy (Verified 22 10:26)  
Unknown Reaction  
sodium benzoate Allergy (Verified 22 10:26)  
Unknown Reaction  
  
  
Wound/Ulcer  
Right Lower Posterior Leg:  
Bed Appearance: Beefy Red and Pink  
Length (cm): 0  
Width (cm): 0  
Depth (cm): 0  
CM Sq: 0.000  
Surrounding Tissue Appearance: Hyperpigmented  
Surrounding Tissue Temp: Warm  
Drainage Odor: No Odor  
Results  
Height: 5 ft 11 in  
Weight: 170.551 kg  
Body Mass Index: 52.4  
  
Assessment/Plan  
Assessment/Plan  
(1) Traumatic open wound of right lower leg:  
Assessment/Problem Details:  
22- Right lower medial leg- healed  
Code(s):  
S81.801A - Unspecified open wound, right lower leg, initial encounter  
Status: Resolved  
(2) Venous stasis ulcer:  
Assessment/Problem Details:  
new 22- healed 22  
Code(s):  
I83.009 - Varicose veins of unspecified lower extremity with ulcer of 
unspecified   
site; L97.909 - Non-pressure chronic ulcer of unspecified part of unspecified 
lower   
leg with unspecified severity  
Status: Resolved  
(3) Edema of right lower leg:  
Code(s):  
R60.0 - Localized edema  
Status: Chronic  
(4) PVD (peripheral vascular disease):  
Code(s):  
I73.9 - Peripheral vascular disease, unspecified  
Status: Chronic  
(5) Varicose veins of both legs with edema:  
Code(s):  
I83.893 - Varicose veins of bilateral lower extremities with other complications  
Status: Chronic  
(6) Morbid obesity due to excess calories:  
Code(s):  
E66.01 - Morbid (severe) obesity due to excess calories  
Status: Chronic  
Time spent with patient  
Time Spent With Patient (min): 20  
  
  
  
  
Dictated By: SOWMYA Gutierrez DD/DT: 22 1355  
  
Signed By: <Electronically signed by SOWMYA Villarreal>  
22 1359  
   
  
[facility]  
Work Phone: [phone]- Progress note  
  
  
  
  
                                        Author              Laila Villarreal  
[facility]  
2022 1:58pm  
   
                                        Note Date/Time      2022  
 1:58pm  
   
                                                    [facility]  
ENTER  
[address] [place] [address]  
  
Wound Center Provider Note  
Signed  
  
Patient: Jocelin Pacheco MR#:   
[id number]  
: 1955 Acct:[id number]  
  
Age/Sex: 67 / M  
  
Copies to: SOWYMA Gutierrez DO~  
  
  
HPI  
Date of Visit  
Date of Visit:  
Date of Service: 2022  
Time of Service: 13:54  
  
Narrative  
HPI:  
Jocelin is a 67-year-old male presenting to the [facility] for a 
follow-up   
visit for evaluation and treatment of a Right Lower Leg Wound. Jocelin reports 
that he   
bumped his leg on an end table approximately one month ago. He has been a 
patient in   
our office before due to venous stasis ulcers. He has 2+ pitting edema to his 
RLE. He   
does have compression pumps at home and admits to using them approximately once 
a   
week. He has been seen by vascular in the past in [place] where he was told no   
surgical intervention could be performed. The initial traumatic wound is healed.
He   
does present with a new ulcer to right lower posterior leg. His wound will be 
topped   
with collagen powder and topped with versatel. His leg will be wrapped in an 
Ergo K2   
for compression to reduce edema. Jocelin does seem to be tolerating the Ergo wrap   
better. Healing of the wound will be dependent on dressing changes being 
performed as   
ordered, a nutritious diet somewhat higher in protein, loss of excess weight, 
control   
of edema with compression and use of compression pumps, and preventing/treating   
infection if it were to arise. There are no signs of acute infection on exam 
today.   
Dressing changes will be done in our office twice a week. Jocelin also reports 
that he   
has been exercising regularly over the past month to help with weight loss.  
  
Subjective  
Pain  
Right Lower Leg:  
Pain Description: Throbbing  
Pain Intensity: 2  
Wound/Ulcer History  
When did wound start?: 2022 right medial lower leg  
Mode of Arrival/ : Personal vehicle  
Assistive Device Used Today: Cane  
Lives with:: Alone  
Appetite Description: Within Normal Limits  
Who helps w/ dressing change?: Wound Care Dept  
Smoking Status: Never smoker  
  
[facility]  
Medical History (Updated 22 @ 13:55 by SOWMYA Gutierrez)  
  
Arthritis  
Bilateral knees,  right is bone on bone  per  Dr. Richard   
Back pain  
Cardiomyopathy  
 lower part of heart is weak   
Carpal tunnel syndrome  
right arm surgery  
DVT (deep venous thrombosis)  
 blood clot from right foot to right thigh  on  blood thinner   
Hypertension  
Itching  
Itching with irritation  
DIMITRI (obstructive sleep apnea)  
PVD (peripheral vascular disease)  
 poor veins  Dr. Cameron did vein closure 2019, needs 5 more viens worked 
on.  
Wound of right lower extremity  
  
Surgical History (Reviewed 22 @ 08:05 by Maria A Crowe RN)  
  
Hx of cholecystectomy  
  
Family History (Reviewed 22 @ 08:05 by Maria A Crowe RN)  
Father Diabetes mellitus, type 2  
Sister Diabetes mellitus, type 2  
  
Social History  
Smoking Status: Never smoker  
Substance Use Type: None  
  
Grafts  
History of Graft  
History of Graft?: No  
  
Exam  
Physical Exam  
Vital Signs:  
  
  
  
  
Temp Pulse Resp BP O2 Del Method  
  
98.1 F 80 20 116/72 Room Air  
  
22 13:48 22 13:48 22 13:48 22 13:48 22 13:48  
  
  
  
Const  
General: cooperative, comfortable and no acute distress  
Nutritional Appearance: obese  
Orientation: alert, awake and oriented x3  
  
Lower/Upper Extremity Exam  
Vascular Exam-Edema  
Right Lower Extremity:  
Edema Degree: 2+  
Edema Type: Pitting  
Vascular Exam-Pulses  
Right Dorsalis Pedis:  
Pulse Assessment Method: Doppler  
Right Posterior Tibial:  
Pulse Assessment Method: Doppler  
Right Brachial:  
Pulse Assessment Method: NIBP  
  
Objective  
Meds/Allergies  
Home Medications  
  
carvedilol 6.25 mg tablet 6.25 mg PO BID 17 [History Confirmed 22]  
furosemide 40 mg tablet (Lasix) 40 mg PO BID 17 [History Confirmed 
22]  
acetaminophen 500 mg tablet (Tylenol Extra Strength) 500 mg PO TID PRN Pain 
20   
[History Confirmed 22]  
nabumetone 750 mg tablet 750 mg PO BID 21 [History Confirmed 22]  
clobetasol 0.05 % topical ointment 1 applic topical 3XW PRN Rash 21 
[History   
Confirmed 22]  
empagliflozin 10 mg tablet (Jardiance) 10 mg PO DAILY 22 [History 
Confirmed   
22]  
metolazone 2.5 mg tablet 2.5 mg PO Q2D 22 [History Confirmed 22]  
rivaroxaban 10 mg tablet (Xarelto) 10 mg PO DAILY 22 [History Confirmed   
22]  
semaglutide 2 mg/dose (8 mg/3 mL) subcutaneous pen injector (Ozempic) 2 mg 
subcut Q7D   
22 [History Confirmed 22]  
spironolactone 25 mg tablet 25 mg PO DAILY 22 [History Confirmed 22]  
telmisartan 40 mg tablet (Micardis) 40 mg PO DAILY 22 [History Confirmed   
22]  
  
  
Allergies  
  
cefixime Allergy (Verified 22 10:26)  
Unknown Reaction  
diclofenac Allergy (Verified 22 10:26)  
Unknown Reaction  
nystatin Allergy (Verified 22 10:26)  
Unknown Reaction  
sodium benzoate Allergy (Verified 22 10:26)  
Unknown Reaction  
  
  
Wound/Ulcer  
Right Lower Medial Leg:  
Type: Traumatic (laceration (without foreign body))  
Thickness: Full  
Bed Appearance: Beefy Red and Pink  
Percent of Wound Bed Granulated/Red: 100  
Percent of Devitalized: 0  
Length (cm): 0  
Width (cm): 0  
Depth (cm): 0  
CM Sq: 0.000  
Surrounding Tissue Appearance: Hyperpigmented  
Surrounding Tissue Temp: Warm  
Drainage Amount: None  
Drainage Description: Serosanguineous  
Drainage Odor: No Odor  
Right Lower Posterior Leg:  
Bed Appearance: Beefy Red and Pink  
Percent of Wound Bed Granulated/Red: 100  
Percent of Devitalized: 0  
Length (cm): 2.4  
Width (cm): 1.9  
Depth (cm): 0.1  
CM Sq: 4.560  
Surrounding Tissue Appearance: Hyperpigmented  
Surrounding Tissue Temp: Warm  
Drainage Amount: Moderate  
Drainage Description: Serosanguineous  
Drainage Odor: No Odor  
Results  
Height: 5 ft 11 in  
Weight: 170.551 kg  
Body Mass Index: 52.4  
  
Assessment/Plan  
Assessment/Plan  
(1) Traumatic open wound of right lower leg:  
Assessment/Problem Details:  
22- Right lower medial leg- healed  
Code(s):  
S81.801A - Unspecified open wound, right lower leg, initial encounter  
Status: Resolved  
(2) Venous stasis ulcer:  
Assessment/Problem Details:  
new 22  
Code(s):  
I83.009 - Varicose veins of unspecified lower extremity with ulcer of 
unspecified   
site; L97.909 - Non-pressure chronic ulcer of unspecified part of unspecified 
lower   
leg with unspecified severity  
Status: Acute  
(3) Edema of right lower leg:  
Code(s):  
R60.0 - Localized edema  
Status: Chronic  
(4) PVD (peripheral vascular disease):  
Code(s):  
I73.9 - Peripheral vascular disease, unspecified  
Status: Chronic  
(5) Varicose veins of both legs with edema:  
Code(s):  
I83.893 - Varicose veins of bilateral lower extremities with other complications  
Status: Chronic  
(6) Morbid obesity due to excess calories:  
Code(s):  
E66.01 - Morbid (severe) obesity due to excess calories  
Status: Chronic  
Time spent with patient  
Time Spent With Patient (min): 20  
  
  
  
  
Dictated By: SOWMYA Gutierrez DD/DT: 22  
  
Signed By: <Electronically signed by SOWMYA Villarreal>  
22 1358  
   
  
[facility]  
Work Phone: [phone]- Evaluation note*   
  
                      Encounter Date Diagnosis  Assessment Notes Treatment Notes  
 Treatment   
Clinical Notes  
   
                                        08 Sep, 2022        Type 2 diabetes   
mellitus with   
unspecified   
complications (ICD-10   
- E11.8)                                                      
   
                                        08 Sep, 2022        BMI 50.0-59.9, adult  
   
(ICD-10 - Z68.43)                                             
   
                                        08 Sep, 2022        Obstructive sleep   
apnea (adult)   
(pediatric) (ICD-10 -   
G47.33)                                                       
   
                                        08 Sep, 2022        Mixed hyperlipidemia  
   
(ICD-10 - E78.2)                                              
   
                                        08 Sep, 2022        CAD (coronary artery  
   
disease) (ICD-10 -   
I25.10)                                                       
   
                                        08 Sep, 2022        Knee osteoarthritis   
(ICD-10 - M17.9)                                              
   
                                        08 Sep, 2022        Cardiomyopathy   
(ICD-10 - I42.9)                                              
   
                                        08 Sep, 2022        CHF (congestive hear  
t   
failure) (ICD-10 -   
I50.9)                                                        
   
                                        08 Sep, 2022        Edema (ICD-10 -   
R60.9)                                                        
   
                                        08 Sep, 2022        Low back derangement  
   
syndrome (ICD-10 -   
M53.86)                                                       
   
                                        08 Sep, 2022        Metabolic syndrome X  
   
(ICD-10 - E88.81)                                             
  
  
[facility]  
Other Phone: [phone]- Progress note  
  
  
  
  
                                        Author              Laila Villarreal  
[facility]  
2022 10:41am  
   
                                        Note Date/Time      2022 10  
:40am  
   
                                                    [facility]  
ENTER  
[address] [place] [address]  
  
Wound Center Provider Note  
Signed  
  
Patient: Jocelin Pacheco MR#:   
[id number]  
: 1955 Acct:[id number]  
  
Age/Sex: 67 / M  
  
Copies to: SOWMYA Gutierrez DO~  
  
  
HPI  
Date of Visit  
Date of Visit:  
Date of Service: 2022  
Time of Service: 10:37  
  
Narrative  
HPI:  
Jocelin is a 67-year-old male presenting to the [facility] for a 
follow-up   
visit for evaluation and treatment of a Right Lower Leg Wound. Jocelin reports 
that he   
bumped his leg on an end table approximately one month ago. He has been a 
patient in   
our office before due to venous stasis ulcers. He has 2+ pitting edema to his 
RLE. He   
does have compression pumps at home and admits to using them approximately once 
a   
week. He has been seen by vascular in the past in [place] where he was told no   
surgical intervention could be performed. The wound did show improvement. The 
wound   
bed is more pink/red. His wound will be topped with collagen powder. His leg 
will be   
wrapped in an Ergo K2 for compression to reduce edema. He did experience 
sensitivity   
(itching) to the standard Unna boot. Clobetasol was applied anastasia wound and we 
will   
monitor for improvement with the Ergo wrap. Healing of the wound will be 
dependent on   
dressing changes being performed as ordered, a nutritious diet somewhat higher 
in   
protein, loss of excess weight, control of edema with compression and use of   
compression pumps, and preventing/treating infection if it were to arise. 
Thereare no   
signs of acute infection on exam today. Dressing changes will be done inour 
office   
twice a week. Jocelin also reports that he has been exercising regularly over the 
past   
month to help with weight loss.  
  
Subjective  
Pain  
Right Lower Leg:  
Pain Description: Soreness  
Pain Intensity: 7  
Wound/Ulcer History  
When did wound start?: 2022 right medial lower leg  
Mode of Arrival/ : Personal vehicle  
Assistive Device Used Today: Cane  
Lives with:: Alone  
Appetite Description: Within Normal Limits  
Who helps w/ dressing change?: Wound Care Dept  
Smoking Status: Never smoker  
  
[facility]  
Medical History (Updated 22 @ 08:17 by Maria A Crowe RN)  
  
Arthritis  
Bilateral knees,  right is bone on bone  per  Dr. Richard   
Back pain  
Cardiomyopathy  
 lower part of heart is weak   
Carpal tunnel syndrome  
right arm surgery  
DVT (deep venous thrombosis)  
 blood clot from right foot to right thigh  on  blood thinner   
Hypertension  
Itching  
Itching with irritation  
DIMITRI (obstructive sleep apnea)  
PVD (peripheral vascular disease)  
 poor veins  Dr. Cameron did vein closure 2019, needs 5 more viens worked 
on.  
Wound of right lower extremity  
  
Surgical History (Reviewed 22 @ 08:05 by Maria A Crowe RN)  
  
Hx of cholecystectomy  
  
Family History (Reviewed 22 @ 08:05 by Maria A Crowe RN)  
Father Diabetes mellitus, type 2  
Sister Diabetes mellitus, type 2  
  
Social History  
Smoking Status: Never smoker  
Substance Use Type: None  
  
Grafts  
History of Graft  
History of Graft?: No  
  
Exam  
Physical Exam  
Vital Signs:  
  
  
  
  
Temp Pulse Resp BP O2 Del Method  
  
97.3 F L 103 H 18 139/72 Room Air  
  
22 10:24 22 10:24 22 10:24 22 10:24 22 10:24  
  
  
  
Const  
General: cooperative, comfortable and no acute distress  
Nutritional Appearance: obese  
Orientation: alert, awake and oriented x3  
  
Lower/Upper Extremity Exam  
Vascular Exam-Edema  
Right Lower Extremity:  
Edema Degree: 2+  
Edema Type: Pitting  
Vascular Exam-Pulses  
Right Dorsalis Pedis:  
Pulse Assessment Method: Doppler  
Right Posterior Tibial:  
Pulse Assessment Method: Doppler  
Right Brachial:  
Pulse Assessment Method: NIBP  
  
Objective  
Meds/Allergies  
Home Medications  
  
carvedilol 6.25 mg tablet 6.25 mg PO BID 17 [History Confirmed 22]  
furosemide 40 mg tablet (Lasix) 40 mg PO BID 17 [History Confirmed 
22]  
acetaminophen 500 mg tablet (Tylenol Extra Strength) 500 mg PO TID PRN Pain 
20   
[History Confirmed 22]  
nabumetone 750 mg tablet 750 mg PO BID 21 [History Confirmed 22]  
clobetasol 0.05 % topical ointment 1 applic topical 3XW PRN Rash 21 
[History   
Confirmed 22]  
empagliflozin 10 mg tablet (Jardiance) 10 mg PO DAILY 22 [History 
Confirmed   
22]  
metolazone 2.5 mg tablet 2.5 mg PO Q2D 22 [History Confirmed 22]  
rivaroxaban 10 mg tablet (Xarelto) 10 mg PO DAILY 22 [History Confirmed   
22]  
semaglutide 2 mg/dose (8 mg/3 mL) subcutaneous pen injector (Ozempic) 2 mg 
subcut Q7D   
22 [History Confirmed 22]  
spironolactone 25 mg tablet 25 mg PO DAILY 22 [History Confirmed 22]  
telmisartan 40 mg tablet (Micardis) 40 mg PO DAILY 22 [History Confirmed   
22]  
  
  
Allergies  
  
cefixime Allergy (Verified 22 10:26)  
Unknown Reaction  
diclofenac Allergy (Verified 22 10:26)  
Unknown Reaction  
nystatin Allergy (Verified 22 10:26)  
Unknown Reaction  
sodium benzoate Allergy (Verified 08/29/22 10:26)  
Unknown Reaction  
  
  
Wound/Ulcer  
Right Lower Medial Leg:  
Type: Traumatic (laceration (without foreign body))  
Thickness: Full  
Bed Appearance: Beefy Red, Pink and Yellow  
Percent of Wound Bed Granulated/Red: 95  
Percent of Devitalized: 5  
Length (cm): 2.1  
Width (cm): 1.4  
Depth (cm): 0.1  
CM Sq: 2.940  
Surrounding Tissue Appearance: Hyperpigmented  
Surrounding Tissue Temp: Warm  
Drainage Amount: Moderate  
Drainage Description: Serosanguineous  
Drainage Odor: No Odor  
Lidocaine Applied Topically: 2% Jelly  
Results  
Height: 5 ft 11 in  
Weight: 170.551 kg  
Body Mass Index: 52.4  
  
Assessment/Plan  
Assessment/Plan  
(1) Traumatic open wound of right lower leg:  
Assessment/Problem Details:  
22- Right lower medial leg  
Code(s):  
S81.801A - Unspecified open wound, right lower leg, initial encounter  
Status: Chronic  
(2) Edema of right lower leg:  
Code(s):  
R60.0 - Localized edema  
Status: Chronic  
(3) PVD (peripheral vascular disease):  
Code(s):  
I73.9 - Peripheral vascular disease, unspecified  
Status: Chronic  
(4) Varicose veins of both legs with edema:  
Code(s):  
I83.893 - Varicose veins of bilateral lower extremities with other complications  
Status: Chronic  
(5) Morbid obesity due to excess calories:  
Code(s):  
E66.01 - Morbid (severe) obesity due to excess calories  
Status: Chronic  
Time spent with patient  
Time Spent With Patient (min): 15  
  
  
  
  
Dictated By: SOWMYA Gutierrez DD/DT: 22 1037  
  
Signed By: <Electronically signed by SWOMYA Villarreal>  
22 1041  
   
  
[facility]  
Work Phone: [phone]- Progress note  
  
  
  
  
                                        Author              Laila Villarreal  
[facility]  
2022 8:03am  
   
                                        Note Date/Time      2022 8:  
03am  
   
                                                    [facility]  
ENTER  
[address] [place] [address]  
  
Wound Center Provider Note  
Signed  
  
Patient: Jocelin Pacheco MR#:   
[id number]  
: 1955 Acct:[id number]  
  
Age/Sex: 67 / M  
  
Copies to: SOWMYA Gutierrez DO~  
  
  
HPI  
Date of Visit  
Date of Visit:  
Date of Service: 2022  
Time of Service: 07:50  
  
Narrative  
HPI:  
Jocelin is a 67-year-old male presenting to the [facility] for an 
initial   
visit for evaluation and treatment of a Right Lower Leg Wound. Jocelin reports 
that he   
bumped his leg on an end table approximately one month ago. He has been a 
patient in   
our office before due to venous stasis ulcers. He presents today with the wound 
open   
to air. He has 2+ pitting edema to his RLE. He does have compression pumps at 
home   
and admits to using them approximately once a week. He has been seen by vascular
in   
the past in [place] where he was told no surgical intervention could be 
performed.   
The wound will be topped witha honey/collagen mixture and an alginate silver. 
His leg   
will be wrapped in an Unna boot for compression to reduce edema. Healing of the 
wound   
will be dependent on dressing changes being performed as ordered, a nutritious 
diet   
somewhathigher in protein, loss of excess weight, control of edema with 
compression   
and use of compression pumps, and preventing/treating infection if it were to 
arise.   
There are no signs of acute infection on exam today. Dressing changes will be 
done in   
our office twice a week. Jocelin also reports that he has been exercising 
regularly   
over the past month to help with weight loss.  
  
Subjective  
Pain  
Right Lower Leg:  
Pain Description: Soreness  
Pain Intensity: 8  
Wound/Ulcer History  
When did wound start?: 2022 right medial lower leg  
Mode of Arrival/ : Personal vehicle  
Assistive Device Used Today: Cane  
Lives with:: Alone  
Appetite Description: Within Normal Limits  
Who helps w/ dressing change?: Wound Care Dept  
Smoking Status: Never smoker  
  
[facility]  
Medical History (Reviewed 21 @ 01:50 by Gumaro Malin DO)  
  
Arthritis  
Bilateral knees,  right is bone on bone  per  Dr. Richard   
Back pain  
Cardiomyopathy  
 lower part of heart is weak   
Carpal tunnel syndrome  
right arm surgery  
DVT (deep venous thrombosis)  
 blood clot from right foot to right thigh  on  blood thinner   
Hypertension  
Itching  
Itching with irritation  
DIMITRI (obstructive sleep apnea)  
PVD (peripheral vascular disease)  
 poor veins  Dr. Cameron did vein closure 2019, needs 5 more viens worked 
on.  
  
Surgical History (Reviewed 21 @ 01:50 by Gumaro Malin DO)  
  
Hx of cholecystectomy  
  
Family History (Reviewed 21 @ 01:50 by Gumaro Malin DO)  
Father Diabetes mellitus, type 2  
Sister Diabetes mellitus, type 2  
  
Social History  
Smoking Status: Never smoker  
Substance Use Type: None  
  
Grafts  
History of Graft  
History of Graft?: No  
  
Exam  
Physical Exam  
Vital Signs:  
  
  
  
  
Temp Pulse Resp BP O2 Del Method  
  
97.7 F 97 H 24 130/70 Room Air  
  
22 07:37 22 07:37 22 07:37 22 07:37 22 07:37  
  
  
  
Const  
General: cooperative, comfortable and no acute distress  
Nutritional Appearance: obese  
Orientation: alert, awake and oriented x3  
  
Lower/Upper Extremity Exam  
Vascular Exam-Edema  
Right Lower Extremity:  
Edema Degree: 2+  
Edema Type: Pitting  
Vascular Exam-Pulses  
Right Dorsalis Pedis:  
Pulse Assessment Method: Palpation  
Right Posterior Tibial:  
Pulse Assessment Method: Doppler  
Right Brachial:  
Pulse Assessment Method: NIBP  
  
Objective  
Meds/Allergies  
Home Medications  
  
carvedilol 6.25 mg tablet 6.25 mg PO BID 17 [History Confirmed 21]  
furosemide 40 mg tablet (Lasix) 40 mg PO BID 17 [History Confirmed 
21]  
telmisartan 40 mg tablet 40 mg PO DAILY 17 [History Confirmed 21]  
metolazone 2.5 mg tablet 2.5 mg PO 3XW PRN Edema 19 [History Confirmed   
21]  
acetaminophen 500 mg tablet (Tylenol Extra Strength) 500 mg PO TID PRN Pain 
20   
[History Confirmed 21]  
ascorbic acid (vitamin C) 500 mg tablet (Vitamin C) 500 mg PO BID 7 days #14 
tabs   
20 [Rx Confirmed 21]  
zinc sulfate 50 mg zinc (220 mg) capsule (Orazinc) 220 mg PO DAILY 7 days #7 
caps   
20 [Rx Confirmed 21]  
nabumetone 750 mg tablet 750 mg PO BID 21 [History Confirmed 21]  
hydrocodone 5 mg-acetaminophen 325 mg tablet 1 tab PO Q8H PRN severe pain 
(scalescore   
7-10) 2 days #6 tabs 21 [Rx Confirmed 21]  
clobetasol 0.05 % topical ointment 1 applic topical 3XW PRN Rash 21 
[History   
Confirmed 21]  
  
  
Allergies  
  
cefixime Allergy (Verified 21 13:49)  
Unknown Reaction  
diclofenac Allergy (Verified 21 13:49)  
Unknown Reaction  
nystatin Allergy (Verified 21 13:49)  
Unknown Reaction  
sodium benzoate Allergy (Verified 21 13:49)  
Unknown Reaction  
  
  
Wound/Ulcer  
Right Lower Medial Leg:  
Type: Traumatic (laceration (without foreign body))  
Thickness: Full  
Bed Appearance: Brown, Devitalized and Dried Exudate  
Percent of Wound Bed Granulated/Red: 0  
Percent of Devitalized: 100  
Length (cm): 2.5  
Width (cm): 1.5  
Depth (cm): 0.1  
CM Sq: 3.750  
Surrounding Tissue Appearance: Hyperpigmented  
Surrounding Tissue Temp: Warm  
Drainage Amount: Scant  
Drainage Description: Serosanguineous  
Drainage Odor: No Odor  
Lidocaine Applied Topically: 2% Jelly  
Procedures  
Time Out: 2 Patient Identifiers, Correct Patient, Correct Side/Site, Correct   
Procedure and Safety Issues Reviewed  
Procedure:  
The right lower medial leg wound was anesthetized with 2% topical lidocaine. A   
curette was used to perform debridement for the removal of 3.7 cm? of 
devitalized   
tissue consisting of skin, slough, and exudate. Debridement was down to more 
healthy,   
bleeding tissue. Estimated blood loss was minimal. Hemostasis was achieved by   
applying direct pressure. The wound now appears 50% more pink and red and 
remains the   
same in size. The patient tolerated the procedure well with minimal pain.  
Results  
Height: 5 ft 11 in  
Weight: 170.551 kg  
Body Mass Index: 52.4  
  
Assessment/Plan  
Assessment/Plan  
(1) Traumatic open wound of right lower leg:  
Assessment/Problem Details:  
22- Right lower medial leg  
Code(s):  
S81.801A - Unspecified open wound, right lower leg, initial encounter  
Status: Chronic  
(2) Edema of right lower leg:  
Code(s):  
R60.0 - Localized edema  
Status: Chronic  
(3) PVD (peripheral vascular disease):  
Code(s):  
I73.9 - Peripheral vascular disease, unspecified  
Status: Chronic  
(4) Varicose veins of both legs with edema:  
Code(s):  
I83.893 - Varicose veins of bilateral lower extremities with other complications  
Status: Chronic  
(5) Morbid obesity due to excess calories:  
Code(s):  
E66.01 - Morbid (severe) obesity due to excess calories  
Status: Chronic  
Time spent with patient  
Time Spent With Patient (min): 20  
  
  
  
  
Dictated By: SOWMYA Gutierrez DD/DT: 22 0750  
  
Signed By: <Electronically signed by SOWMYA Villarreal>  
22 0803  
   
  
[facility]  
Work Phone: [phone]- Evaluation note*   
  
                      Encounter Date Diagnosis  Assessment Notes Treatment Notes  
 Treatment   
Clinical Notes  
   
                                                Type 2 diabetes   
mellitus with   
unspecified   
complications (ICD-10   
- E11.8)                                                      
   
                                                BMI 50.0-59.9, adult  
   
(ICD-10 - Z68.43)                                             
   
                                                Obstructive sleep   
apnea (adult)   
(pediatric) (ICD-10 -   
G47.33)                                                       
   
                                                Mixed hyperlipidemia  
   
(ICD-10 - E78.2)                                              
   
                                                CAD (coronary artery  
   
disease) (ICD-10 -   
I25.10)                                                       
   
                                                Knee osteoarthritis   
(ICD-10 - M17.9)                                              
   
                                                Cardiomyopathy   
(ICD-10 - I42.9)                                              
   
                                                CHF (congestive hear  
t   
failure) (ICD-10 -   
I50.9)                                                        
   
                                                Edema (ICD-10 -   
R60.9)                                                        
   
                                                Low back derangement  
   
syndrome (ICD-10 -   
M53.86)                                                       
   
                                                Metabolic syndrome X  
   
(ICD-10 - E88.81)                                             
  
  
[facility]  
Other Phone: [phone]- Evaluation note*   
  
                      Encounter Date Diagnosis  Assessment Notes Treatment Notes  
 Treatment   
Clinical Notes  
   
                                                Obesity,   
unspecified   
classification,   
unspecified obesity   
type, unspecified   
whether serious   
comorbidity present   
(ICD-10 - E66.9)                                              
   
                                                BMI 50.0-59.9,   
adult (ICD-10 -   
Z68.43)                                                       
   
                           Other                     Summary of Visi  
t:  
(A) reviewed plate   
method  
(B) emphasized   
reducing calorie   
consumption   
through beverages  
(C) continue   
logging all food   
and drink  
                                          
  
  
[facility]  
Other Phone: [phone]- Evaluation note*   
  
                      Encounter Date Diagnosis  Assessment Notes Treatment Notes  
 Treatment   
Clinical Notes  
   
                                                Obstructive sleep   
apnea (adult)   
(pediatric) (ICD-10   
- G47.33)                                           His machine should   
be set for CPAP   
auto minimum 12   
And maximum 18. We   
will try to get   
download 1 way or   
another to confirm   
that he has good   
control. He does   
seem to tolerate   
the pressure   
without difficulty   
and does wear the   
mask when he is in   
bed. I encouraged   
him to ensure   
regular sleep-wake   
cycles with   
adequate sleep   
time  
                                          
   
                                                Sleep phase   
syndrome, delayed   
(ICD-10 - G47.21)                                   His original   
history reported   
substantial sleep   
phase delay, but   
he is now   
reporting almost   
chaotic sleep-wake   
cycles. I   
encouraged him to   
have regular   
sleep-wake times,   
and to avoid being   
awake in the bed   
even if unable to   
sleep.  
                                          
   
                                                BMI 50.0-59.9,   
adult (ICD-10 -   
Z68.43)                                             He asked about   
inspire therapy,   
but his current   
body weight is   
above the cutoff   
to qualify for   
that treatment. He   
is encouraged to   
continue efforts   
at weight   
reduction  
                                          
  
  
[facility]  
Other Phone: [phone]- Evaluation note*   
  
                      Encounter Date Diagnosis  Assessment Notes Treatment Notes  
 Treatment   
Clinical Notes  
   
                                        10 May, 2022        Obesity,   
unspecified   
classification,   
unspecified   
obesity type,   
unspecified   
whether serious   
comorbidity   
present (ICD-10 -   
E66.9)                                                        
   
                                        10 May, 2022        BMI 50.0-59.9,   
adult (ICD-10 -   
Z68.43)                                                       
   
                          10 May, 2022 Other                     Summary of Visi  
t:  
(A) Presentation of   
Plate Method   
discussed  
(B) Sample meal ideas   
reviewed  
(C) exercise   
recommendations   
reviewed  
Patient set the   
following goals:  
- patient set   
personal goal using   
given handout.  
                                          
  
  
[facility]  
Other Phone: [phone]- Evaluation note*   
  
                      Encounter Date Diagnosis  Assessment Notes Treatment Notes  
 Treatment   
Clinical Notes  
   
                                                Abnormal weight gain  
   
(ICD-10 - R63.5)                                              
   
                                                Type 2 diabetes   
mellitus with   
unspecified   
complications (ICD-10   
- E11.8)                                                      
   
                                                Obstructive sleep   
apnea (adult)   
(pediatric) (ICD-10 -   
G47.33)                                                       
   
                                        14 2022        Mixed hyperlipidemia  
   
(ICD-10 - E78.2)                                              
   
                                                Knee osteoarthritis   
(ICD-10 - M17.9)                                              
   
                                                Cardiomyopathy   
(ICD-10 - I42.9)                                              
   
                                                CHF (congestive hear  
t   
failure) (ICD-10 -   
I50.9)                                                        
   
                                                BMI 50.0-59.9, adult  
   
(ICD-10 - Z68.43)                                             
   
                                        14 2022        Edema (ICD-10 -   
R60.9)                                                        
   
                                        14 2022        Low back derangement  
   
syndrome (ICD-10 -   
M53.86)                                                       
   
                                                Metabolic syndrome X  
   
(ICD-10 - E88.81)                                             
   
                                                CAD (coronary artery  
   
disease) (ICD-10 -   
I25.10)                                                       
  
  
[facility]  
Other Phone: [phone]- Evaluation note*   
  
                      Encounter Date Diagnosis  Assessment Notes Treatment Notes  
 Treatment   
Clinical Notes  
   
                                                Obstructive sleep   
apnea (adult)   
(pediatric)   
(ICD-10 - G47.33)                                     
  
  
He had original sleep   
study in  with an   
AHI of 63 and with   
21% of the time spent   
hypoxic. He was   
titrated and treated   
with CPAP, but   
struggled some with   
usage at that time.   
He was lost to   
follow-up not long   
after. However he now   
reports he has been   
using the machine   
regularly. The most   
current download   
available to us is   
from , and at   
that time he did have   
good compliance with   
9 hours of usage and   
with adequate control   
of sleep apnea.   
However I am   
concerned because of   
his very high Tram   
Sleepiness Scale, his   
difficult to sort out   
sleep history, and   
his severe persistent   
hypoxia when   
initially diagnosed   
(which raises the   
stakes and emphasizes   
the importance of   
effective treatment).   
His exam does show   
bilateral lower   
extremity edema which   
can be an indication   
of suboptimally   
treated sleep apnea   
with pulmonary   
hypertension. Given   
the uncertainty of   
the overall situation   
I feel full   
reassessment with   
polysomnography and   
possible split-night   
is advisable. In the   
meantime we will try   
to get him a new auto   
CPAP machine as his   
current device is   
both antiquated and   
subject to recall                         
  
  
   
                                                BMI 50.0-59.9,   
adult (ICD-10 -   
Z68.43)                                               
  
  
His substantial   
morbid obesity does   
pose considerable   
risks for   
cardiopulmonary   
complications and he   
is encouraged to   
bring body weight   
down. Not only will   
this improve quality   
of life but can have   
substantial impact on   
sleep apnea and   
pulmonary   
hypertension with   
hypoventilation,   
should these problems   
be present                                
  
  
   
                                                Lower extremity   
edema (ICD-10 -   
R60.0)                                                
  
  
He mayHis original   
sleep test showed   
severe sleep apnea   
with substantial   
nocturnal hypoxia. I   
am concerned for   
breakthrough hypoxia   
with hypoventilation;   
some patients may   
require control of   
sleep apnea but   
additionally may   
require oxygen   
therapy bled into the   
device                                    
  
  
   
                                                Sleep phase   
syndrome, delayed   
(ICD-10 - G47.21)                                     
  
  
His self-reported   
sleep pattern has not   
no nighttime sleep   
with substantial   
sleep only beginning   
at 6 AM. However it   
is quite difficult to   
sort out what is   
going on and he is   
not certain that he   
goes sleepless   
through the night. We   
will try to schedule   
him for a sleep phase   
delayed sleep study                       
  
  
   
                           Other                     The diagnosis o  
f   
Sleep Apnea was   
reviewed in detail,   
and handout materials   
were provided. The   
patient expresses   
good understanding,   
We also reviewed the   
medical and accident   
risks associated with   
sleep apnea and   
excessive fatigue.   
The patient is   
advised to adhere to   
a proper sleep   
hygiene schedule and   
to assure adequate   
total sleep time; The   
patient was advised   
to continue efforts   
at progressive weight   
loss, including   
decreased overall   
caloric intake   
quantity and better   
food choices.The   
patient was advised   
to call or return any   
difficulties arise,   
including changes in   
symptoms and/or   
problems with   
treatment                                 
  
  
[facility]  
Other Phone: [phone] Evaluation note*   
  
                      Encounter Date Diagnosis  Assessment Notes Treatment Notes  
 Treatment   
Clinical Notes  
   
                                        22 Dec, 2021        Encounter for   
immunization   
(ICD-10 - Z23)                                        
  
  
Patient presents   
for COVID-19   
vaccination   
BOOSTER.   
Pre-screening form   
answers evaluated   
with patient.   
Patient denies   
current illness or   
allergic reaction   
to component of   
COVID-19 vaccine.   
Patient provided   
with current copy   
of EUA.                                   
  
  
  
  
[facility]  
Other Phone: [phone] complaint Narrative - Reported* JOCELIN PACHECO is 
  being seen for a consultation for an abnormal ECG and abnormal test(s) 
  results.  
* 67-year-old gentleman seen in cardiology consultation at the request of 
  primary care for worsening shortness of breath and exertional dyspnea. Patient
  had abnormal Lexiscan imaging revealing global hypokinesis with ejection 
  fraction of 44% and a normal transient ischemic index but no evidence of isc
  hemia. Prior heart catheterization performed by myself in  revealed 
  minimal coronary disease with reduced left ventricular function  
* Today's ECG is normal. Echocardiogram reveals LVH with normal left ventricular
  function and a septal thickness of 1.8 cm with no outflow tract obstructive 
  physiology.  
* Patient has a history of morbid obesity, obstructive sleep apnea, history of 
  right lower extremity DVT, essential hypertension.  
* The driving symptom currently is shortness of breath and dyspnea with exertion
  now with evidence ofLV dysfunction and morbid obesity  
* We have counseled him on dietary discretion, weight loss and exercise and 
  aerobic training as a means towards improving his overall demeanor/physiology 
  and anatomy and body habitus.  
* I would simply recommend appropriate guideline directed medical therapies with
  Entresto, spironolactone, SGLT2 inhibitor (Jardiance), discontinue 
  telmisartan, with appropriate laboratory and clinicalfollow-up 
  thereafterwards. Extensive education and counseling performed with the patient
  for over 30 minutes this morning, he is agreeing to the above will follow up 
  as described  
-[facility] DO  
Work Phone: [phone] noteNo InformationNoJefferson [facility]  
Other Phone: [phone] note*   
  
                          Diagnosis    Onset Date   Resolution   Status  
   
                          Edema of right lower leg                           chr  
onic  
   
                          Morbid obesity due to excess calories                   
          chronic  
   
                          PVD (peripheral vascular disease)                       
      chronic  
   
                          Varicose veins of both legs with edema                  
           chronic  
   
                          Traumatic open wound of right lower leg                 
            resolved  
   
                          Venous stasis ulcer                           resolved  
  
  
[facility]  
Work Phone: [phone] noteNo assessment information available
[facility]  
Work Phone: [phone] note*   
  
                          Diagnosis    Onset Date   Resolution   Status  
   
                          Edema of right lower leg                           chr  
onic  
   
                          Inflammation                           chronic  
   
                          Morbid obesity due to excess calories                   
          chronic  
   
                          PVD (peripheral vascular disease)                       
      chronic  
   
                          Varicose veins of both legs with edema                  
           chronic  
   
                          Cellulitis                             resolved  
   
                          Venous stasis ulcer                           resolved  
  
  
[facility]  
Work Phone: [phone] note*   
  
                                                    Diagnosis  
   
                                                      
  
  
Onychomycosis- Primary  
  
  
Dermatophytosis of nail  
   
                                                      
  
  
Pain in both feet  
   
                                                      
  
  
Corns and callosities  
   
                                                      
  
  
Other specified peripheral vascular diseases (CMS/HCC)  
   
                                                      
  
  
Circulating anticoagulant disorder (CMS/HCC)  
  
  
Other and unspecified coagulation defects  
  
documented in this encounter  
[facility]   
  
                                                    Diagnosis  
   
                                                      
  
  
Lumbosacral strain, initial encounter- Primary  
   
                                                      
  
  
Acute pain of left shoulder  
   
                                                      
  
  
Wound of left lower extremity, initial encounter  
  
documented in this encounter  
[facility]   
  
                          Diagnosis    Onset Date   Resolution   Status  
   
                          Edema of right lower leg                           chr  
onic  
   
                          Inflammation                           chronic  
   
                          Morbid obesity due to excess calories                   
          chronic  
   
                          PVD (peripheral vascular disease)                       
      chronic  
   
                          Varicose veins of both legs with edema                  
           chronic  
   
                          Venous stasis ulcer                           resolved  
  
  
[facility]  
Work Phone: [phone] note*   
  
                                                    Diagnosis  
   
                                                      
  
  
Non-ischemic cardiomyopathy (Multi)- Primary  
  
  
Other primary cardiomyopathies  
   
                                                      
  
  
DIMITRI on CPAP  
   
                                                      
  
  
BMI 50.0-59.9, adult (Multi)  
  
documented in this encounter  
[facility]  
Work Phone: [phone] general Narrative - Reported*   
  
                                Type            Description     Date  
   
                                Medical History Osteoarthritis Bilateral Knees   
   
                                Medical History Hypertension      
   
                                Medical History Chronic Back Pain   
   
                                Medical History Cardiomyopathy    
   
                                Medical History DIMITRI               
   
                                Medical History vein closure      
   
                                Medical History Venous Ulcer with Pain   
   
                                Surgical History gall bladder      
   
                                Surgical History hand surgery      
   
                                Surgical History heart catheterization   
   
                                Hospitalization History infection         
  
  
[facility]  
Other Phone: [phone] note  
  
  
  
  
                                        Author              Elaine Espino  
[facility]  
2023 1:19pm  
   
                                        Note Date/Time      2023 1:19  
pm  
   
                                                    [facility]  
ENTER  
[address] [place] [address]  
  
Wound Center Provider Note  
Signed  
  
Patient: Jocelin Pacheco MR#:   
[id number]  
: 1955 Acct:[id number]  
  
Age/Sex: 68 / M  
  
Copies to: DO Elaine Arboleda, SOWMYA~  
  
  
HPI  
Date of Visit  
Date of Visit:  
Date of Service: 2023  
Time of Service: 13:17  
  
Narrative  
HPI:  
23 Jocelin is a 68-year-old male presenting to [facility]   
wound care program for an initial visit to the office for a right lower 
extremity   
venous stasis ulcer/lymphedema ulcer.? The patient has been a patient of the 
office   
for quite some time now and has seen other providers.  
He had seen vascular in [place] was told that there is nothing else that can be 
done   
for him.  
He has an extensive history of infections so I imagine this will continue to be 
an   
issue for him.  
He appears to need better edema control.  
He was told by ortho that he needs to lose 50 pounds in order to have surgery- 
this   
was 2 years ago and has not been accomplished.  
A past venous duplex indicates deep vein issues which are not surgically 
correctable-   
vascular did confirm that he does not need to see them because there is nothing 
they   
have to offer him.  
He is still retired so hopefully there is more opportunity for elevation and   
compression of the legs to support healing of the ulcer.  
Nutrition and probiotics were discussed and suggestions were given.  
Doxycycline was escribed today for what appears to be cellulitis of the RLE.  
23 looks better, is still taking the antibiotics, change to collagen silver   
topically and clobetasol to periwound skin that he says is itchy, will return 
for   
dressing changes, will bring his compression socks from home to the next visit 
since   
most of our wraps tend to roll down on his legs, says that he will use the   
compression pumps  
23 healed, steroid and compression sock applied, aware to call sooner than 
later   
if something reopens  
  
Subjective  
Pain  
Right Lower Leg:  
Pain Intensity: 0  
Wound/Ulcer History  
When did wound start?: 2023  
Mode of Arrival/ : Personal vehicle  
Assistive Device Used Today: Cane  
Lives with:: Alone  
Appetite Description: Within Normal Limits  
Who helps w/ dressing change?: Wound Care Dept  
Why Do You Need Help?: Can't Reach Ulcer  
Smoking Status: Never smoker  
  
[facility]  
Medical History (Updated 23 @ 13:19 by SOWMYA Correa)  
  
Arthritis  
Bilateral knees,  right is bone on bone  per  Dr. Richard   
Back pain  
Cardiomyopathy  
 lower part of heart is weak   
Carpal tunnel syndrome  
right arm surgery  
DVT (deep venous thrombosis)  
 blood clot from right foot to right thigh  on  blood thinner   
Hypertension  
Itching  
Itching with irritation  
DIMITRI (obstructive sleep apnea)  
PVD (peripheral vascular disease)  
 poor veins  Dr. Cameron did vein closure 2019, needs 5 more viens worked 
on.  
Wound of right lower extremity  
  
Surgical History (Reviewed 23 @ 11:09 by Maren Martinez RN)  
  
Hx of cholecystectomy  
  
Family History (Reviewed 23 @ 11:09 by Maren Martinez RN)  
Father Diabetes mellitus, type 2  
Sister Diabetes mellitus, type 2  
  
Social History  
Smoking Status: Never smoker  
Substance Use Type: None  
  
Grafts  
History of Graft  
History of Graft?: Yes  
  
Exam  
Physical Exam  
Vital Signs:  
  
  
  
  
Temp Pulse Resp BP O2 Del Method  
  
97.7 F 81 20 123/73 Room Air  
  
23 13:12 23 13:12 23 13:12 23 13:12 23 13:12  
  
  
  
Const  
General: cooperative, comfortable and no acute distress  
Nutritional Appearance: obese  
Orientation: alert, awake and oriented x3  
  
Lower/Upper Extremity Exam  
Vascular Exam-Edema  
Right Lower Extremity:  
Edema Type: Lymphedema  
Vascular Exam-Pulses  
Right Brachial:  
Pulse Assessment Method: NIBP  
  
Objective  
Meds/Allergies  
Home Medications  
  
carvedilol 6.25 mg tablet 6.25 mg PO BID 17 [History Confirmed 23]  
furosemide 40 mg tablet (Lasix) 40 mg PO BID 17 [History Confirmed 
23]  
acetaminophen 500 mg tablet (Tylenol Extra Strength) 500 mg PO TID PRN Pain 
20   
[History Confirmed 23]  
nabumetone 750 mg tablet 750 mg PO BID 21 [History Confirmed 23]  
empagliflozin 10 mg tablet (Jardiance) 10 mg PO DAILY 22 [History 
Confirmed   
23]  
rivaroxaban 10 mg tablet (Xarelto) 10 mg PO DAILY 22 [History Confirmed   
23]  
semaglutide 2 mg/dose (8 mg/3 mL) subcutaneous pen injector (Ozempic) 2 mg 
subcut Q7D   
22 [History Confirmed 23]  
spironolactone 25 mg tablet 25 mg PO DAILY 22 [History Confirmed 23]  
doxycycline hyclate 100 mg capsule 100 mg PO BID 14 days #28 caps 23 [Rx   
Confirmed 23]  
sacubitril 24 mg-valsartan 26 mg tablet (Entresto) 1 tab PO BID 23 
[History   
Confirmed 23]  
  
  
Allergies  
  
cefixime Allergy (Verified 23 11:09)  
Unknown Reaction  
diclofenac Allergy (Verified 23 11:09)  
Unknown Reaction  
nystatin Allergy (Verified 23 11:09)  
Unknown Reaction  
sodium benzoate Allergy (Verified 23 11:09)  
Unknown Reaction  
  
  
Wound/Ulcer  
Right Lower Leg:  
Type: Venous Stasis Ulcer  
Thickness: Skin Breakdown  
Length (cm): 0  
Width (cm): 0  
Depth (cm): 0  
CM Sq: 0.000  
Surrounding Tissue Appearance: Hyperpigmented  
Surrounding Tissue Temp: Warm  
Drainage Amount: None  
Drainage Odor: No Odor  
Results  
Height: 5 ft 11 in  
Weight: 160.118 kg  
Body Mass Index: 49.2  
  
Assessment/Plan  
Assessment/Plan  
(1) Venous stasis ulcer:  
Qualifiers:  
Laterality: right Non-pressure ulcer stage: limited to breakdown of skin 
Varicose   
vein presence: with varicose veins Venous stasis ulcer site: other part of lower
leg   
Qualified Code(s): I83.018 - Varicose veins of right lower extremity with ulcer 
other   
part of lower leg; L97.811 - Non-pressure chronic ulcer of other part of right 
lower   
leg limited to breakdown of skin  
Code(s):  
I83.009 - Varicose veins of unspecified lower extremity with ulcer of 
unspecified   
site; L97.909 - Non-pressure chronic ulcer of unspecified part of unspecified 
lower   
leg with unspecified severity  
Status: Resolved  
(2) Edema of right lower leg:  
Code(s):  
R60.0 - Localized edema  
Status: Chronic  
(3) Morbid obesity due to excess calories:  
Code(s):  
E66.01 - Morbid (severe) obesity due to excess calories  
Status: Chronic  
(4) Lymphedema of both lower extremities:  
Code(s):  
I89.0 - Lymphedema, not elsewhere classified  
Status: Suspected  
(5) PVD (peripheral vascular disease):  
Code(s):  
I73.9 - Peripheral vascular disease, unspecified  
Status: Chronic  
(6) Varicose veins of both legs with edema:  
Code(s):  
I83.893 - Varicose veins of bilateral lower extremities with other complications  
Status: Chronic  
(7) Inflammation:  
Status: Chronic  
Time spent with patient  
Time Spent With Patient (min): 10  
  
  
  
  
Dictated By: SOWMYA Correa DD/DT: 23  
  
Signed By: <Electronically signed by SOWMYA Espino>  
23 1319  
   
  
[facility]  
Work Phone: [phone] for referral (narrative)* Consultation 
  (Routine) - Authorized  
  
                          Specialty    Diagnoses / Procedures Referred By Contac  
t Referred To Contact  
   
                                        Cardiology            
  
  
Diagnoses  
  
  
Non-ischemic cardiomyopathy   
(Multi)  
  
  
  
Procedures  
  
  
Follow Up In Cardiology                   
  
  
Sharlene An APRN-CNP  
  
  
[address] [facility], [address] [address]  
  
  
Phone: [phone]  
  
  
Fax: [phone]                         
  
  
[place],   
  
  
[address] [facility] [address], [address]  
  
  
[place] [address]  
  
  
Phone: [phone]  
  
  
Fax: [phone]  
  
  
  
                    Referral ID Status    Reason    Start Date Expiration Date V  
isits   
Requested                               Visits   
Authorized  
   
                [id number] Authorized         2024 1       1  
  
  
  
  
Electronically signed by Sharlene URBINA at 2024 11:39 AM EDT  
  
  
[facility]  
Work Phone: [phone]  
  
Summary Purpose  
  
  
                                                      
  
  
  
Family History  
No Family History Records FoundUnknown Family Member  
  
                                Name            Dates           Details  
   
                                                    Family history of diabetes m  
ellitus: Sister, Mother,   
Father(V18.0, Z83.3)  
                                                    Status:Active  
   
                                                    COPD, moderate: Mother, Sist  
er  
                                                    Status:Active  
  
                              Unknown Family Member  
  
                                Name            Dates           Details  
   
                                                    Family history of diabetes m  
ellitus: Sister, Mother,   
Father(V18.0, Z83.3)  
                                                    Status:Active  
   
                                                    COPD, moderate: Mother, Sist  
er  
                                                    Status:Active  
  
  
  
                          Relationship Condition    Age at Onset Recorded Date/T  
ceasar  
   
                          father       Type 2 diabetes mellitus Unknown        
   
                          sister       Type 2 diabetes mellitus Unknown        
  
                              Unknown Family Member  
  
                                Name            Dates           Details  
   
                                                    Family history of diabetes m  
ellitus: Sister, Mother,   
Father(V18.0, Z83.3)  
                                                    Status:Active  
   
                                                    COPD, moderate: Mother, Sist  
er  
                                                    Status:Active  
  
                              Unknown Family Member  
  
                                Name            Dates           Details  
   
                                                    Family history of diabetes m  
ellitus: Sister, Mother,   
Father(V18.0, Z83.3)  
                                                    Status:Active  
   
                                                    COPD, moderate: Mother, Sist  
er  
                                                    Status:Active  
  
                              Unknown Family Member  
  
                                Name            Dates           Details  
   
                                                    Family history of diabetes m  
ellitus: Sister, Mother,   
Father(V18.0, Z83.3)  
                                                    Status:Active  
   
                                                    COPD, moderate: Mother, Sist  
er  
                                                    Status:Active  
  
                              Unknown Family Member  
  
                                Name            Dates           Details  
   
                                                    Family history of diabetes m  
ellitus: Sister, Mother,   
Father(V18.0, Z83.3)  
                                                    Status:Active  
   
                                                    COPD, moderate: Mother, Sist  
er  
                                                    Status:Active  
  
  
  
                          Relationship Condition    Age at Onset Recorded Date/T  
ceasar  
   
                          father       Type 2 diabetes mellitus Unknown        
   
                          sister       Type 2 diabetes mellitus Unknown        
   
                          brother      Heart disease Unknown        
   
                          father       Heart disease Unknown        
   
                                       Diabetes mellitus Unknown        
   
                                       Heart failure Unknown        
   
                                            Unknown        
   
                          family member      Unknown        
   
                          Not Specified      Unknown        
   
                                       Heart disease Unknown        
   
                          sister       Heart disease Unknown        
  
  
  
                          Relationship Condition    Age at Onset Recorded Date/T  
ceasar  
   
                          father       Type 2 diabetes mellitus Unknown        
   
                          sister       Type 2 diabetes mellitus Unknown        
   
                          brother      Heart disease Unknown        
   
                          father       Heart disease Unknown        
   
                                       Diabetes mellitus Unknown        
   
                                       Heart failure Unknown        
   
                                            Unknown        
   
                          family member      Unknown        
   
                          mother            Unknown        
   
                                       Heart disease Unknown        
   
                          sister       Heart disease Unknown        
  
  
  
Advance Directives  
No Advanced Directives Records Found  
  
                                Advance Directive Response        Recorded Date/  
Time  
   
                                Advance Directives No               9:56pm  
  
  
  
                                Advance Directive Response        Recorded Date/  
Time  
   
                                Advance Directives No               8:56pm  
  
  
  
Chief Complaint and Reason for Visit  
  
  
                                        Chief Complaint     Obesity  
Open Wound  
   
                                        Reason for Visit    Edema of right lower  
 leg  
Morbid obesity due to excess calories  
PVD (peripheral vascular disease)  
Varicose veins of both legs with edema  
Traumatic open wound of right lower leg  
Venous stasis ulcer  
  
  
  
                                        Chief Complaint     Obesity  
R07.9 I50.9 I42.8  
  
  
  
                                        Chief Complaint     Obesity  
exercise  
  
  
  
                                        Chief Complaint     Open Wound  
   
                                        Reason for Visit    Edema of right lower  
 leg  
Inflammation  
Morbid obesity due to excess calories  
PVD (peripheral vascular disease)  
Varicose veins of both legs with edema  
Cellulitis  
Venous stasis ulcer  
  
  
  
                                        Chief Complaint     Open Wound  
Obesity  
D68.318 N18.2 R73.03 E66.01  
   
                                        Reason for Visit    Edema of right lower  
 leg  
Inflammation  
Morbid obesity due to excess calories  
PVD (peripheral vascular disease)  
Varicose veins of both legs with edema  
Cellulitis  
Venous stasis ulcer  
  
  
  
                                        Chief Complaint     Open Wound  
   
                                        Reason for Visit    Edema of right lower  
 leg  
Inflammation  
Morbid obesity due to excess calories  
PVD (peripheral vascular disease)  
Varicose veins of both legs with edema  
Venous stasis ulcer  
  
  
  
                                        Chief Complaint     Open Wound  
WMN f/u restart  
   
                                        Reason for Visit    Edema of right lower  
 leg  
Inflammation  
Morbid obesity due to excess calories  
PVD (peripheral vascular disease)  
Varicose veins of both legs with edema  
Venous stasis ulcer  
Adult BMI 50.0-59.9 kg/sq m  
Mixed hyperlipidemia  
DIMITRI on CPAP  
Osteoarthritis of knee  
Type 2 diabetes mellitus with unspecified complications  
  
  
  
                                        Chief Complaint     Open Wound  
WMN f/u restart  
Open Wound  
   
                                        Reason for Visit    Edema of right lower  
 leg  
Inflammation  
Morbid obesity due to excess calories  
PVD (peripheral vascular disease)  
Varicose veins of both legs with edema  
Venous stasis ulcer  
Adult BMI 50.0-59.9 kg/sq m  
Mixed hyperlipidemia  
DIMITRI on CPAP  
Osteoarthritis of knee  
Type 2 diabetes mellitus with unspecified complications  
Adult BMI 50.0-59.9 kg/sq m  
Hematoma of right lower leg  
Type 2 diabetes mellitus with unspecified complications  
  
  
  
                                        Chief Complaint     Open Wound  
WMN f/u restart  
Bilateral lower extremity edema  
Open Wound  
   
                                        Reason for Visit    Edema of right lower  
 leg  
Inflammation  
Morbid obesity due to excess calories  
PVD (peripheral vascular disease)  
Varicose veins of both legs with edema  
Venous stasis ulcer  
Adult BMI 50.0-59.9 kg/sq m  
Mixed hyperlipidemia  
DIMITRI on CPAP  
Osteoarthritis of knee  
Type 2 diabetes mellitus with unspecified complications  
Adult BMI 50.0-59.9 kg/sq m  
CAD (coronary artery disease)  
Mixed hyperlipidemia  
DIMITRI on CPAP  
Osteoarthritis of knee  
Type 2 diabetes mellitus with unspecified complications  
HTN (hypertension)  
Adult BMI 50.0-59.9 kg/sq m  
Hematoma of right lower leg  
Leg wound, right  
Type 2 diabetes mellitus with unspecified complications  
Cellulitis  
  
  
  
                                        Chief Complaint     WMN f/u restart  
Open Wound  
Bilateral lower extremity edema  
   
                                        Reason for Visit    Adult BMI 50.0-59.9   
kg/sq m  
Mixed hyperlipidemia  
DIMITRI on CPAP  
Osteoarthritis of knee  
Type 2 diabetes mellitus with unspecified complications  
Adult BMI 50.0-59.9 kg/sq m  
CAD (coronary artery disease)  
Mixed hyperlipidemia  
DIMITRI on CPAP  
Osteoarthritis of knee  
Type 2 diabetes mellitus with unspecified complications  
HTN (hypertension)  
Adult BMI 50.0-59.9 kg/sq m  
Hematoma of right lower leg  
Leg wound, right  
Type 2 diabetes mellitus with unspecified complications  
Cellulitis  
  
  
  
                                        Chief Complaint     Bilateral lower extr  
emity edema  
Open Wound  
   
                                        Reason for Visit    Adult BMI 50.0-59.9   
kg/sq m  
Mixed hyperlipidemia  
DIMITRI on CPAP  
Osteoarthritis of knee  
Type 2 diabetes mellitus with unspecified complications  
Adult BMI 50.0-59.9 kg/sq m  
Hematoma of right lower leg  
Leg wound, right  
Type 2 diabetes mellitus with unspecified complications  
Urinary incontinence  
Venous insufficiency of right lower extremity  
Cellulitis  
  
  
  
Chief Complaint  
* JOCELIN PACHECO is being seen for a 6 month follow-up of.  
* 62-year-old gentleman who returns for annual visit he is doing well just 
  complains atypical chest discomfort described as tingling as well as sharp and
  somewhat painful sensation both at rest and with activities that are sporadic 
  and episodic only but not reproducible. He has a history of moderate n
  onischemic cardiomyopathy, obstructive sleep apnea, morbid obesity, history of
  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  
* Stress perfusion imaging from 2022 revealed patchy tracer uptake, proceed
  ischemia or infarction, normal left ventricular volume with global hypokinesis
  and ejection fraction of 44% normal transient ischemic index.  
* Heart catheterization from  revealed 30 to 50% ostial circumflex disease, 
  20 to 25% mid LAD disease normal right coronary and at that time ejection 
  fraction of 40 to 45%.  
* Since  with his medical history continue therapies. We have transitioned 
  over to Entresto, Jardiance, carvedilol and spironolactone  
* Unfortunately he has had no significant weight loss despite going to cardiac 
  rehab 3 times a week. He remains 365 pounds, similar to 202  
* Recommendations, obtain a MUGA scan current therapies, obtain appropriate 
  laboratories including Chem-6 and BNP, we will have him come back in 6 months 
  continues to have any further chest discomfort will prescribe nitrates and/or 
  consider invasive assessment.  
  
  
Reason for Referral  
  
  
                          Specialty    Diagnoses / Procedures Referred By Contac  
t Referred To Contact  
   
                                        Physical Therapy      
  
  
Diagnoses  
  
  
Lumbosacral strain,   
initial encounter  
  
  
Acute pain of left   
shoulder  
  
  
  
Procedures  
  
  
IN OFFICE/OUTPATIENT NEW   
HIGH MDM 60 MINUTES                       
  
  
Ray Terry, NP  
  
  
[address]  
  
  
[place] [address]  
  
  
Phone: [phone]  
  
  
Fax: [phone]                         
  
  
Jessica Lopez, PT  
  
  
[address]  
  
  
[address]  
  
  
[place]   
[address]  
  
  
Phone: [phone]  
  
  
Fax: [phone]  
  
  
  
                      Referral ID Status     Reason     Start Date Expiration   
Date                                    Visits   
Requested                               Visits   
Authorized  
   
                                [id number]          Authorized        
  
  
Consult and   
Treat           2024        10              10  
  
  
  
Additional Source Comments  
  
  
  
                                                    PREGNANCY (unrecognized sect  
ion and content)  
   
                                                    No Pregnancy Status Records   
FoundNo Pregnancy Status Records FoundNo Pregnancy   
Status   
Records FoundNo Pregnancy Status Records FoundNo Pregnancy Status Records 
FoundNo   
Pregnancy Status Records FoundNo Pregnancy Status Records Found  
  
  
  
  
                                                    INFORMATION SOURCE (unrecogn  
ized section and content)  
   
                                          
  
  
  
                                        DATE CREATED        AUTHOR  
   
                                2022                      [facility]  
dical Specialist  
  
  
  
                                DATE CREATED    AUTHOR          AUTHOR'S ORGANIZ  
ATION  
   
                                2023                       [facility]  
 [facility]  
  
  
  
                                DATE CREATED    AUTHOR          AUTHOR'S ORGANIZ  
ATION  
   
                                2023                       [employer]  
  
  
  
                                DATE CREATED    AUTHOR          AUTHOR'S ORGANIZ  
ATION  
   
                                08/10/2023                      [facility]  
  
  
  
                                DATE CREATED    AUTHOR          AUTHOR'S ORGANIZ  
ATION  
   
                                05/10/2024                      [facility]  
  
  
  
                                DATE CREATED    AUTHOR          AUTHOR'S ORGANIZ  
ATION  
   
                                2024                      [facility]  
d[facility]  
  
  
  
                                DATE CREATED    AUTHOR          AUTHOR'S ORGANIZ  
ATION  
   
                                10/29/2024                      [facility]  
ysician Group  
  
  
  
  
  
                                                    REASON FOR VISIT (unrecogniz  
ed section and content)  
   
                                          
  
  
  
                                        Reason              Comments  
   
                                        Back Pain           Pt presents with low  
er back pain with onset of 2 weeks with pain   
increasing. Took tylenol with no relief. Has used ice on it a few times.   
Left shoulder is hurting. Denies any trouble urinating.  
  
  
  
                                        Reason              Comments  
   
                                        Follow-up           10m per wss  
  
  
  
  
  
                                                    Care Teams (unrecognized sec  
tion and content)  
   
                                          
  
  
  
                                                    Team Status: Active   
   
                          Member       Role         Status       Dates  
   
                          Vera Lea , DO Primary Care Provider Active   
        
  
  
  
                                                    Team Status: Inactive   
   
                          Member       Role         Status       Dates  
   
                          Vera Lea , DO Primary Care Provider Active   
      Start: 2024  
End: 2024  
   
                          SOWMYA Correa Attending Provider Active       St  
art: 2024  
End: 2024  
  
  
  
                                                    Team Status: Active   
   
                          Member       Role         Status       Dates  
   
                          Vera Lea , DO Primary Care Provider Active   
        
   
                          ESVIN Haro Attending Provider Active         
  
  
  
                                                    Team Status: Inactive   
   
                          Member       Role         Status       Dates  
   
                          Vera Lea , DO Primary Care Provider Active   
        
   
                          SOWMYA Correa Attending Provider Active         
  
  
  
                                                    Team Status: Inactive   
   
                          Member       Role         Status       Dates  
   
                          Vera Lea , DO Primary Care Provider, Attend  
ing Provider Active         
  
  
  
                                                    Team Status: Inactive   
   
                          Member       Role         Status       Dates  
   
                          Vera Lea , DO Primary Care Provider Active   
        
   
                          Jacoby Braden MD Attending Provider Active         
  
  
  
                                                    Team Status: Inactive   
   
                          Member       Role         Status       Dates  
   
                          Vera Lea , DO Primary Care Provider Active   
        
   
                          SOWMYA Gutierrez Attending Provider Active       
    
  
  
  
                                                    Team Status: Inactive   
   
                          Member       Role         Status         
   
                          Vera Lea , DO Primary Care Provider Active   
        
   
                          JASON Whitley DO Attending Provider Active         
  
  
  
                      Team Member Relationship Specialty  Start Date End Date  
   
                                                      
  
  
Vera Lea DO  
  
  
NPI: [id number]  
  
  
[address]  
  
  
[place] [address]  
  
  
[phone] (Work)  
  
  
[phone] (Fax) PCP - ACO Reach                 23           
   
                                                      
  
  
Vera Lea DO  
  
  
NPI: [id number]  
  
  
[address]  
  
  
[place] [address]  
  
  
[phone] (Work)  
  
  
[phone] (Fax) PCP - General   Internal Medicine 24            
   
                                                      
  
  
Danya An DPM  
  
  
NPI: [id number]  
  
  
[address]  
  
  
[place] [address]  
  
  
[phone] (Work)  
  
  
[phone] (Fax) Referring Physician Podiatry        23          
   
                                                      
  
  
Elroy Agudelo MD  
  
  
NPI: [id number]  
  
  
[address] [address]  
  
  
[phone] (Work)  
  
  
[phone] (Fax) Referring Physician Cardiology      23          
   
                                                      
  
  
Steff Espino    Nurse Practitioner Wound Care      23          
  
  
  
                      Team Member Relationship Specialty  Start Date End Date  
   
                                                      
  
  
Vera Lea DO  
  
  
NPI: [id number]  
  
  
[address]  
  
  
[place] [address]  
  
  
[phone] (Work)  
  
  
[phone] (Fax) PCP - ACO Reach                 23           
   
                                                      
  
  
Vera Lea DO  
  
  
NPI: [id number]  
  
  
[address]  
  
  
[place] [address]  
  
  
[phone] (Work)  
  
  
[phone] (Fax) PCP - General   Internal Medicine 24            
   
                                                      
  
  
Danya An DPM  
  
  
NPI: [id number]  
  
  
[address]  
  
  
[place] [address]  
  
  
[phone] (Work)  
  
  
[phone] (Fax) Referring Physician Podiatry        23          
   
                                                      
  
  
Elroy Agudelo MD  
  
  
NPI: [id number]  
  
  
[address] [address]  
  
  
[phone] (Work)  
  
  
[phone] (Fax) Referring Physician Cardiology      23          
   
                                                      
  
  
Steff Espino    Nurse Practitioner Wound Care      23          
  
  
  
                      Team Member Relationship Specialty  Start Date End Date  
   
                                                      
  
  
Vera Lea DO  
  
  
NPI: [id number]  
  
  
[address]  
  
  
[place] [address]  
  
  
[phone] (Work)  
  
  
[phone] (Fax) PCP - ACO Reach                 23           
   
                                                      
  
  
eVra Lea DO  
  
  
NPI: [id number]  
  
  
[address]  
  
  
[place] [address]  
  
  
[phone] (Work)  
  
  
[phone] (Fax) PCP - General   Internal Medicine 24            
   
                                                      
  
  
Danya An DPM  
  
  
NPI: [id number]  
  
  
[address]  
  
  
[place] [address]  
  
  
[phone] (Work)  
  
  
[phone] (Fax) Referring Physician Podiatry        23          
   
                                                      
  
  
Elroy Agudelo MD  
  
  
NPI: [id number]  
  
  
[address]  
  
  
[place] [address]  
  
  
[phone] (Work)  
  
  
[phone] (Fax) Referring Physician Cardiology      23          
   
                                                      
  
  
Steff Espino    Nurse Practitioner Wound Care      23          
  
  
  
                                                    Team Status: Inactive   
   
                          Member       Role         Status       Dates  
   
                          Vera Lea DO Primary Care Provider Active   
      Start: 2024  
End: 2024  
   
                          Jacoby Braden MD Attending Provider Active         
Start: 2024  
End: 2024  
  
  
  
                      Team Member Relationship Specialty  Start Date End Date  
   
                                                      
  
  
Vera Lea DO  
  
  
NPI: [id number]  
  
  
[address]  
  
  
[place] [address]  
  
  
[phone] (Work)  
  
  
[phone] (Fax) PCP - General                   00            
  
  
  
                                                    Team Status: Active   
   
                          Member       Role         Status       Keyanna Lea DO Primary Care Provider Active   
      Start: 2024  
  
   
                          SOWMYA Correa Attending Provider Active       St  
art: 2024  
  
  
  
  
                                                    Team Status: Inactive   
   
                          Member       Role         Status       Keyanna Lea DO Primary Care Provider Active   
      Start: 2024  
End: 2024  
   
                          Jacoby Braden MD Attending Provider Active         
Start: 2024  
End: 2024  
  
  
  
                                                    Team Status: Active   
   
                          Member       Role         Status       Keyanna Lea DO Primary Care Provider Active   
      Start: 2024  
  
   
                          SOWMYA Correa Attending Provider Active       St  
art: 2024  
  
  
  
  
                                                    Team Status: Active   
   
                          Member       Role         Status       Keyanna Lea DO Primary Care Provider Active   
      Start: 2024  
  
   
                          SOWMYA Correa Attending Provider Active       St  
art: 2024  
  
  
  
  
                                                    Team Status: Inactive   
   
                          Member       Role         Status       Keyanna Lea DO Primary Care Provider Active   
      Start: 2024  
End: 2024  
   
                          ROM Peng Attending Provider Active        
 Start: 2024  
End: 2024  
  
  
  
                                                    Team Status: Active   
   
                          Member       Role         Status       Keyanna  
   
                          Vera MatiasDO Monica Primary Care Provider Active   
      Start: 2024  
  
   
                          SOWMYA Correa Attending Provider Active       St  
art: 2024  
  
  
  
  
                                                    Team Status: Active   
   
                          Member       Role         Status       Keyanna  
   
                          Vera MatiasDO Monica Primary Care Provider Active   
      Start: 2024  
  
   
                          SOWMYA Correa Attending Provider Active       St  
art: 2024  
  
  
  
  
                                                    Team Status: Inactive   
   
                          Member       Role         Status       Keyanna  
   
                          Vera Lea DO Primary Care Provider Active   
      Start: 2024  
End: 2024  
   
                          Jacoby Braden MD Attending Provider Active         
Start: 2024  
End: 2024  
  
  
  
                                                    Team Status: Inactive   
   
                          Member       Role         Status       Keyanna  
   
                          Vera MatiasDO Monica Primary Care Provider Active   
      Start: 2024  
End: 2024  
   
                          SOWMYA Correa Attending Provider Active       St  
art: 2024  
End: 2024  
  
  
  
                                                    Team Status: Active   
   
                          Member       Role         Status       Keyanna  
   
                          Vera Lea DO Primary Care Provider Active   
      Start: 2024  
  
   
                          SOWMYA Correa Attending Provider Active       St  
art: 2024  
  
  
  
  
  
  
                                                    Goals (unrecognized section   
and content)  
   
                                                    Goals may be documented in a  
n alternate section  
  
FOR RECORDS PERTAINING TO PATIENTS WHO ARE OR HAVE BEEN ENROLLED IN A CHEMICAL 
DEPENDENCY/SUBSTANCEABUSE PROGRAM, SOME INFORMATION MAY BE OMITTED. This 
clinical summary was aggregated from multiple sources. Caution should be 
exercised in using it in the provision of clinical care. This summary normalizes
information from multiple sources, and as a consequence, information in this 
document may materially change the coding, format and clinical context of 
patient data. In addition, data may be omitted in some cases. CLINICAL DECISIONS
SHOULD BE BASED ON THE PRIMARY CLINICAL RECORDS. [employer] provides 
no warranty or guarantee of the accuracy or completeness of information in this 
document.

## 2024-11-07 ENCOUNTER — HOSPITAL ENCOUNTER
Dept: HOSPITAL 101 - VC | Age: 69
Discharge: HOME | End: 2024-11-07
Payer: MEDICARE

## 2024-11-07 VITALS — SYSTOLIC BLOOD PRESSURE: 139 MMHG | HEART RATE: 73 BPM | OXYGEN SATURATION: 97 % | DIASTOLIC BLOOD PRESSURE: 83 MMHG

## 2024-11-07 DIAGNOSIS — I83.813: Primary | ICD-10-CM

## 2024-11-07 PROCEDURE — 36466 NJX NONCMPND SCLRSNT MLT VN: CPT

## 2024-11-15 ENCOUNTER — HOSPITAL ENCOUNTER
Age: 69
Discharge: HOME | End: 2024-11-15
Payer: MEDICARE

## 2024-11-15 DIAGNOSIS — I80.01: Primary | ICD-10-CM

## 2024-11-15 PROCEDURE — G0463 HOSPITAL OUTPT CLINIC VISIT: HCPCS

## 2024-11-15 PROCEDURE — 93971 EXTREMITY STUDY: CPT

## 2024-11-27 ENCOUNTER — HOSPITAL ENCOUNTER
Dept: HOSPITAL 101 - VC | Age: 69
Discharge: HOME | End: 2024-11-27
Payer: MEDICARE

## 2024-11-27 VITALS — SYSTOLIC BLOOD PRESSURE: 142 MMHG | DIASTOLIC BLOOD PRESSURE: 78 MMHG | HEART RATE: 84 BPM

## 2024-11-27 DIAGNOSIS — I83.813: Primary | ICD-10-CM

## 2024-11-27 PROCEDURE — 36478 ENDOVENOUS LASER 1ST VEIN: CPT

## 2024-11-27 RX ADMIN — LIDOCAINE HYDROCHLORIDE 0 ML: 10 INJECTION, SOLUTION INFILTRATION; PERINEURAL at 12:59

## 2024-12-11 ENCOUNTER — HOSPITAL ENCOUNTER
Age: 69
Discharge: HOME | End: 2024-12-11
Payer: MEDICARE

## 2024-12-11 VITALS — BODY MASS INDEX: 51 KG/M2

## 2024-12-11 DIAGNOSIS — I80.01: Primary | ICD-10-CM

## 2024-12-11 PROCEDURE — G0463 HOSPITAL OUTPT CLINIC VISIT: HCPCS

## 2024-12-11 PROCEDURE — 93971 EXTREMITY STUDY: CPT

## 2024-12-11 NOTE — W.VEIN
Discharge Plan
Discharge
Disposition: Home, Self-Care

Outpatient Diagnostics:
VC INJ Foam Sclerosant WUS MLT  (Routine)  Timeframe:  2 Weeks
   Facility: [facility] - Location[facility]
   Ordered By:  Alonso Nation

Follow Up Appointments: 12/18/24 


Print Language: English

## 2024-12-11 NOTE — VEINCLINIC_ITS
Vital Signs    
    
    
                                    12/11/24    
                                      11:39    
     
Height                             5 ft 11 in    
     
Weight                               166 kg    
     
BMI                                   51.0    
    
    
Varicose Veins    
    
Patient in today for follow up ultrasound of right lower extremity following   
EVLT of right perforators completed on 11/27/24.    
    
    
Alonso TOSCANO MD personally performed the services described in this   
documentation, as scribed by Maggie Barrios RDMS in my presence and it is   
both accurate and complete.    
    
    
    
I, Maggie Barrios RDMS, am scribing for, and in the presence of, Dr. Alonso Nation and in the presence of the patient.    
thigh: bilateral (right leg > left leg), knee: bilateral, calf: bilateral,   
ankle: bilateral and shin: bilateral    
aching, burning, cramping, dull and tender    
10    
3 years    
Worsened in recent months: Yes (last 5 months)    
standing, sitting and walking    
analgesics (Tylenol), elevating extremities, compression stockings and wraps    
Reports muscle spasms of leg, fatigue, heaviness, limb pain, edema and leg edema    
History of lower extremity trauma: Yes (a piece of cpap machine fell and hit   
right lower leg resulting in hemtoma)    
Superficial thrombophlebitis: Yes (DVT right leg)    
Family history of varicose veins: no    
Has patient had previous lower extremity venous surgery: Yes    
Patient has previously received the following treatment(s) for lower extremity   
varicose veins: Reports foam therapy and laser therapy    
Does patient have a history of pregnancy: no    
Has patient had lower extremity venous scan with relux testing: Yes    
Support hose used: Yes    
Problems walking or doing physical activity: Yes    
How does it affect you: pain decreases ability to walk    
Do you walk much: No    
Do you stand much: Yes    
    
Review of Systems    
    
    
ROS      
    
 Narrative     
Alonso TOSCANO MD personally performed the services described in this   
documentation, as scribed by Maggie Barrios RDMS in my presence and it is   
both accurate and complete.    
    
    
    
I, Maggie Barrios RDMS, am scribing for, and in the presence of, Dr. Alonso Nation and in the presence of the patient.       
    
 Status of ROS                          10 or more systems reviewed and unremark    
able except as noted in     
history and below       
    
 Cardiovascular Reports: edema       
    
 Integumentary/Breast Reports: itching, redness, skin pain, skin tenderness,   
skin swelling, non-healing lesion and changes in skin color       
    
 Neurological Reports: numbness in extremities and weakness in extremities       
    
 Hematologic/Lymphatic Reports: easy bruising and easy bleeding       
    
    
PFSH    
[facility]    
Medical History (Updated 11/27/24 @ 13:44 by Khang Bahena)    
    
Phlebitis and thrombophlebitis of superficial vessels of right lower extremity    
   ?I80.01 - Phlebitis and thrombophlebitis of superficial vessels of right   
   lower extremity (ICD-10)    
Phlebitis and thrombophlebitis of superficial vessels of right lower extremity    
   ?I80.01 - Phlebitis and thrombophlebitis of superficial vessels of right   
   lower extremity (ICD-10)    
Varicose veins of bilateral lower extremities with pain    
   ?I83.813 - Varicose veins of bilateral lower extremities with pain (ICD-10)    
Lymphedema of both lower extremities    
   ?I89.0 - Lymphedema, not elsewhere classified (ICD-10)    
Arthritis    
   ?M19.90 - Unspecified osteoarthritis, unspecified site (ICD-10)    
Hypertension    
   ?I10 - Essential (primary) hypertension (ICD-10)    
Deep vein thrombosis    
   ?I82.409 - Acute embolism and thrombosis of unspecified deep veins of   
   unspecified lower extremity (ICD-10)    
Cardiomyopathy    
   ?I42.9 - Cardiomyopathy, unspecified (ICD-10)    
Carpal tunnel syndrome    
   ?G56.00 - Carpal tunnel syndrome, unspecified upper limb (ICD-10)    
Peripheral vascular disease    
   ?I73.9 - Peripheral vascular disease, unspecified (ICD-10)    
Ulcer of right leg    
   ?L97.919 - Non-pressure chronic ulcer of unspecified part of right lower leg   
   with unspecified severity (ICD-10)    
Coronary artery disease    
   ?I25.10 - Atherosclerotic heart disease of native coronary artery without   
   angina pectoris (ICD-10)    
CHF (congestive heart failure)    
   ?I50.9 - Heart failure, unspecified (ICD-10)    
Obstructive sleep apnea    
   ?G47.33 - Obstructive sleep apnea (adult) (pediatric) (ICD-10)    
Chronic back pain    
   ?M54.9 - Dorsalgia, unspecified (ICD-10)    
   ?G89.29 - Other chronic pain (ICD-10)    
Postphlebetic syndrome with inflammation    
   ?I87.029 - Postthrombotic syndrome with inflammation of unspecified lower   
   extremity (ICD-10)    
Type 2 diabetes mellitus    
   ?E11.9 - Type 2 diabetes mellitus without complications (ICD-10)    
    
    
Surgical History (Updated 11/27/24 @ 13:39 by Khang Bahena)    
    
Status post laser ablation of incompetent vein    
   ?Z98.890 - Other specified postprocedural states (ICD-10)    
S/P sclerotherapy of varicose veins    
   ?Z98.890 - Other specified postprocedural states (ICD-10)    
   ?Z86.79 - Personal history of other diseases of the circulatory system (ICD-  
   10)    
Status post laser ablation of incompetent vein    
   ?Z98.890 - Other specified postprocedural states (ICD-10)    
Status post laser ablation of incompetent vein    
   ?Z98.890 - Other specified postprocedural states (ICD-10)    
History of carpal tunnel surgery of right wrist    
   ?Z98.890 - Other specified postprocedural states (ICD-10)    
History of cholecystectomy    
   ?Z90.49 - Acquired absence of other specified parts of digestive tract (ICD-  
   10)    
    
    
Family History (Updated 08/19/24 @ 11:42 by Khang Bahena)    
Sister   Family history of diabetes mellitus    
Father   Family history of diabetes mellitus    
Other   Family history of CHF (congestive heart failure)    
   Family history of hypertension    
   Family history of myocardial infarction    
    
    
    
Social History (Updated 08/20/24 @ 10:55 by Khang Bahena)    
Within the past year, how often did you have a drink containing alcohol:  never     
Score interpretation:  A score less than 4 is consistent with normal alcohol   
consumption.     
Smoking status:  Never smoker     
Non-prescribed substance use:  denies use     
    
    
    
Meds    
Home Medications and Allergies    
                                Home Medications    
    
    
    
?Medication ?Instructions ?Recorded ?Confirmed ?Type    
     
Lactobacillus acidophilus 10 100 mmu cells PO DAILY 08/19/24 08/19/24 History    
    
billion cell capsule        
     
acetaminophen 500 mg capsule 500 mg PO Q6H PRN pain 08/19/24 08/19/24 History    
     
carvedilol 6.25 mg tablet 6.25 mg PO BID 08/19/24 08/19/24 History    
     
empagliflozin 10 mg-linagliptin 5 1 tab PO DAILY 08/19/24 08/19/24 History    
    
mg tablet        
     
furosemide 40 mg tablet 40 mg PO BID 08/19/24 08/19/24 History    
     
rivaroxaban 10 mg tablet 10 mg PO DAILY 08/19/24 08/19/24 History    
     
sacubitril 24 mg-valsartan 26 mg 1 tab PO BID 08/19/24 08/19/24 History    
    
tablet        
     
semaglutide 0.25 mg or 0.5 mg (2 0.25 mg subcut QWEEK 08/19/24 08/19/24 History    
    
mg/3 mL) subcutaneous pen injector        
    
(Ozempic)        
     
spironolactone 25 mg tablet 25 mg PO DAILY 08/19/24 08/19/24 History    
    
(Aldactone)        
    
    
    
                                    Allergies    
    
    
    
Allergy/AdvReac Type Severity Reaction Status Date / Time    
     
cefixime Allergy  Unknown Verified 08/19/24 11:44    
     
diclofenac Allergy  Unknown Verified 08/19/24 11:44    
    
    
    
    
Exam    
Narrative    
Exam Narrative:     
    
Alonso TOSCANO MD personally performed the services described in this   
documentation, as scribed by Maggie Barrios RDMS in my presence and it is   
both accurate and complete.    
    
    
    
Maggie TOSCANO RDMS, am scribing for, and in the presence of, Dr. Alonso Nation and in the presence of the patient.    
Constitutional    
Documenting provider has reviewed patient's vital signs: yes    
Common normals: oriented x3    
Nutritional appearance: overweight    
Cardio    
Peripheral pulses: posterior tibial pulses present and dorsalis pedis pulses   
present    
Extremity    
Common normals: normal capillary refill    
General: calf tenderness and edema    
Right lower extremity: lower leg Right lower leg: inspection and palpation    
Left lower extremity: lower leg Left lower leg: inspection and palpation    
Neuro    
Common normals: oriented x3    
    
Results    
Imaging    
Venous US:     
      Radiologist's impression:     
Heat induced thrombus in multiple perforators right lower leg.    
    
    
Alonso TOSCANO MD personally performed the services described in this   
documentation, as scribed by Maggie Barrios RDMS in my presence and it is   
both accurate and complete.    
    
    
    
Maggie TOSCANO RDMS am scribing for, and in the presence of, Dr. Alonso Nation and in the presence of the patient.    
    
Assessment and Plan    
Assessment and Plan    
(1) Phlebitis and thrombophlebitis of superficial vessels of right lower   
extremity:     
    
Plan    
Plan is for patient to return for Varithena/microfoam of right leg on 12/18/24.    
    
    
Alonso TOSCANO MD personally performed the services described in this   
documentation, as scribed by Maggie Barrios RDMS in my presence and it is   
both accurate and complete.    
    
    
    
Maggie TOSCANO RDMS, am scribing for, and in the presence of, Dr. Alonso Nation and in the presence of the patient.

## 2024-12-11 NOTE — P.DS_ITS
Discharge Plan    
Discharge    
Disposition: Home, Self-Care    
    
Outpatient Diagnostics:    
VC INJ Foam Sclerosant WUS MLT  (Routine)  Timeframe:  2 Weeks    
   Facility: [facility] - [facility]    
   Ordered By:  Alonso Nation    
    
Follow Up Appointments: 12/18/24     
    
    
Print Language: English

## 2024-12-11 NOTE — V.VEINS.HP
Vital Signs
 12/11/24
11:39
Height 5 ft 11 in
Weight 166 kg
BMI 51.0

Varicose Veins

Patient in today for follow up ultrasound of right lower extremity following EVLT of right perforators completed on 11/27/24.


Alonso TOSCANO MD personally performed the services described in this documentation, as scribed by Maggie Barrios RDMS in my presence and it is both accurate and complete.



I, Maggie Barrios RDMS, am scribing for, and in the presence of, Dr. Alonso Nation and in the presence of the patient.
thigh: bilateral (right leg > left leg), knee: bilateral, calf: bilateral, ankle: bilateral and shin: bilateral
aching, burning, cramping, dull and tender
10
3 years
Worsened in recent months: Yes (last 5 months)
standing, sitting and walking
analgesics (Tylenol), elevating extremities, compression stockings and wraps
Reports muscle spasms of leg, fatigue, heaviness, limb pain, edema and leg edema
History of lower extremity trauma: Yes (a piece of cpap machine fell and hit right lower leg resulting in hemtoma)
Superficial thrombophlebitis: Yes (DVT right leg)
Family history of varicose veins: no
Has patient had previous lower extremity venous surgery: Yes
Patient has previously received the following treatment(s) for lower extremity varicose veins: Reports foam therapy and laser therapy
Does patient have a history of pregnancy: no
Has patient had lower extremity venous scan with relux testing: Yes
Support hose used: Yes
Problems walking or doing physical activity: Yes
How does it affect you: pain decreases ability to walk
Do you walk much: No
Do you stand much: Yes

Review of Systems
ROS  
 Narrative 
Alonso TOSCANO MD personally performed the services described in this documentation, as scribed by Maggie Barrios RDMS in my presence and it is both accurate and complete.



I, Maggie Barrios RDMS, am scribing for, and in the presence of, Dr. Alonso Nation and in the presence of the patient.   
 Status of ROS 10 or more systems reviewed and unremarkable except as noted in history and below   
 Cardiovascular Reports: edema   
 Integumentary/Breast Reports: itching, redness, skin pain, skin tenderness, skin swelling, non-healing lesion and changes in skin color   
 Neurological Reports: numbness in extremities and weakness in extremities   
 Hematologic/Lymphatic Reports: easy bruising and easy bleeding   

PFSH
[facility]
Medical History (Updated 11/27/24 @ 13:44 by Khang Bahena)

Phlebitis and thrombophlebitis of superficial vessels of right lower extremity
 ?I80.01 - Phlebitis and thrombophlebitis of superficial vessels of right lower extremity (ICD-10)
Phlebitis and thrombophlebitis of superficial vessels of right lower extremity
 ?I80.01 - Phlebitis and thrombophlebitis of superficial vessels of right lower extremity (ICD-10)
Varicose veins of bilateral lower extremities with pain
 ?I83.813 - Varicose veins of bilateral lower extremities with pain (ICD-10)
Lymphedema of both lower extremities
 ?I89.0 - Lymphedema, not elsewhere classified (ICD-10)
Arthritis
 ?M19.90 - Unspecified osteoarthritis, unspecified site (ICD-10)
Hypertension
 ?I10 - Essential (primary) hypertension (ICD-10)
Deep vein thrombosis
 ?I82.409 - Acute embolism and thrombosis of unspecified deep veins of unspecified lower extremity (ICD-10)
Cardiomyopathy
 ?I42.9 - Cardiomyopathy, unspecified (ICD-10)
Carpal tunnel syndrome
 ?G56.00 - Carpal tunnel syndrome, unspecified upper limb (ICD-10)
Peripheral vascular disease
 ?I73.9 - Peripheral vascular disease, unspecified (ICD-10)
Ulcer of right leg
 ?L97.919 - Non-pressure chronic ulcer of unspecified part of right lower leg with unspecified severity (ICD-10)
Coronary artery disease
 ?I25.10 - Atherosclerotic heart disease of native coronary artery without angina pectoris (ICD-10)
CHF (congestive heart failure)
 ?I50.9 - Heart failure, unspecified (ICD-10)
Obstructive sleep apnea
 ?G47.33 - Obstructive sleep apnea (adult) (pediatric) (ICD-10)
Chronic back pain
 ?M54.9 - Dorsalgia, unspecified (ICD-10)
 ?G89.29 - Other chronic pain (ICD-10)
Postphlebetic syndrome with inflammation
 ?I87.029 - Postthrombotic syndrome with inflammation of unspecified lower extremity (ICD-10)
Type 2 diabetes mellitus
 ?E11.9 - Type 2 diabetes mellitus without complications (ICD-10)


Surgical History (Updated 11/27/24 @ 13:39 by Khang Bahena)

Status post laser ablation of incompetent vein
 ?Z98.890 - Other specified postprocedural states (ICD-10)
S/P sclerotherapy of varicose veins
 ?Z98.890 - Other specified postprocedural states (ICD-10)
 ?Z86.79 - Personal history of other diseases of the circulatory system (ICD-10)
Status post laser ablation of incompetent vein
 ?Z98.890 - Other specified postprocedural states (ICD-10)
Status post laser ablation of incompetent vein
 ?Z98.890 - Other specified postprocedural states (ICD-10)
History of carpal tunnel surgery of right wrist
 ?Z98.890 - Other specified postprocedural states (ICD-10)
History of cholecystectomy
 ?Z90.49 - Acquired absence of other specified parts of digestive tract (ICD-10)


Family History (Updated 08/19/24 @ 11:42 by Khang Bahena)
Sister
 Family history of diabetes mellitus
Father
 Family history of diabetes mellitus
Other
 Family history of CHF (congestive heart failure)
 Family history of hypertension
 Family history of myocardial infarction



Social History (Updated 08/20/24 @ 10:55 by Khang Bahena)
Within the past year, how often did you have a drink containing alcohol:  never 
Score interpretation:  A score less than 4 is consistent with normal alcohol consumption. 
Smoking status:  Never smoker 
Non-prescribed substance use:  denies use 



Meds
Home Medications and Allergies
Home Medications

?Medication ?Instructions ?Recorded ?Confirmed ?Type
Lactobacillus acidophilus 10 100 mmu cells PO DAILY 08/19/24 08/19/24 History
billion cell capsule    
acetaminophen 500 mg capsule 500 mg PO Q6H PRN pain 08/19/24 08/19/24 History
carvedilol 6.25 mg tablet 6.25 mg PO BID 08/19/24 08/19/24 History
empagliflozin 10 mg-linagliptin 5 1 tab PO DAILY 08/19/24 08/19/24 History
mg tablet    
furosemide 40 mg tablet 40 mg PO BID 08/19/24 08/19/24 History
rivaroxaban 10 mg tablet 10 mg PO DAILY 08/19/24 08/19/24 History
sacubitril 24 mg-valsartan 26 mg 1 tab PO BID 08/19/24 08/19/24 History
tablet    
semaglutide 0.25 mg or 0.5 mg (2 0.25 mg subcut QWEEK 08/19/24 08/19/24 History
mg/3 mL) subcutaneous pen injector    
(Ozempic)    
spironolactone 25 mg tablet 25 mg PO DAILY 08/19/24 08/19/24 History
(Aldactone)    


Allergies

Allergy/AdvReac Type Severity Reaction Status Date / Time
cefixime Allergy  Unknown Verified 08/19/24 11:44
diclofenac Allergy  Unknown Verified 08/19/24 11:44



Exam
Narrative
Exam Narrative: 

Alonso TOSCANO MD personally performed the services described in this documentation, as scribed by Maggie Barrios RDMS in my presence and it is both accurate and complete.



Maggie TOSCANO RDMS, am scribing for, and in the presence of, Dr. Alonso Nation and in the presence of the patient.
Constitutional
Documenting provider has reviewed patient's vital signs: yes
Common normals: oriented x3
Nutritional appearance: overweight
Cardio
Peripheral pulses: posterior tibial pulses present and dorsalis pedis pulses present
Extremity
Common normals: normal capillary refill
General: calf tenderness and edema
Right lower extremity: lower leg Right lower leg: inspection and palpation
Left lower extremity: lower leg Left lower leg: inspection and palpation
Neuro
Common normals: oriented x3

Results
Imaging
Venous US: 
      Radiologist's impression: 
Heat induced thrombus in multiple perforators right lower leg.


Alonso TOSCANO MD personally performed the services described in this documentation, as scribed by Maggie Barrios RDMS in my presence and it is both accurate and complete.



Maggie TOSCANO RDMS, am scribing for, and in the presence of, Dr. Alonso Nation and in the presence of the patient.

Assessment and Plan
Assessment and Plan
(1) Phlebitis and thrombophlebitis of superficial vessels of right lower extremity: 

Plan
Plan is for patient to return for Varithena/microfoam of right leg on 12/18/24.


Alonso TOSCANO MD personally performed the services described in this documentation, as scribed by Maggie Barrios RDMS in my presence and it is both accurate and complete.



Maggei TOSCANO RDMS, am scribing for, and in the presence of, Dr. Alonso Nation and in the presence of the patient.

## 2024-12-11 NOTE — VEIN_ITS
Patient Name:      
JOCELIN ARORA 
      
MR#: [id number]      
: 1955 
Accession #: [id number] 
Exam Date: 2024  
Ordering Doctor: DR KAYY JUNIOR M.D. 
  
RADIOLOGY REPORT 
  
PROCEDURE:  FACILITY EST LMTD 
  
[facility] - OFFICE VISIT FOLLOW UP 
  
COMPARISON:  [facility] EST LMTD, 11/15/2024.  [facility] EST LMTD,  
10/30/2024. 
  
PROGRESS NOTES: 
  
The patient reports no significant problems following intravenous laser  
ablation of incompetent right leg perforating veins.  The patient reports  
continued improvement in overall symptoms of his legs  
  
Physical exam demonstrates marked decrease in swelling, skin thickening and  
erythema.  Near complete closure of his venous stasis ulcerations. 
  
Review of the ultrasound performed the same day demonstrates occlusive  
thrombus extending throughout the treated incompetent perforating veins.  No  
deep vein thrombus. 
  
The patient expressed a desire to proceed with treatment of incompetent  
varicose veins. 
  
ORDER #: [id number] VEIN/[facility] EST LMTD  
IMPRESSION:  
1. Successful ablation of incompetent right leg perforating veins  
2. Persistent incompetent varicose veins  
   
PLAN:  
   
Micro foam chemical ablation incompetent varicose veins  
   
   
Nurse notes, history and physical were reviewed and confirmed, see attached   
forms.  
The nurse was present throughout the physical exam and consultation  
   
   
   
   
   
Dictated by: Alonso Nation MD on 2024 at 11:50       
Approved by: Alonso Nation MD on 2024 at 11:52

## 2024-12-11 NOTE — VEIN_ITS
Patient Name:      
JOCELIN ARORA 
      
MR#: [id number]      
: 1955 
Accession #: [id number] 
Exam Date: 2024  
Ordering Doctor: DR KAYY JUNIOR M.D. 
  
RADIOLOGY REPORT 
  
PROCEDURE:     VC EXT VENOUS RT LMTD 
  
COMPARISON:     VC EXT VENOUS RT LMTD, 11/15/2024.  VC EXT VENOUS RT LMTD,  
10/30/2024. 
  
INDICATIONS:     I80.01 - Phlebitis and thrombophlebitis of superficial veins  
right leg 
  
TECHNIQUE:     Lower extremity grey scale and Duplex Doppler evaluation of the  
deep venous system from the inguinal ligament through the calf veins.   
  
FINDINGS:       
REGION:     Right lower extremity.   
THROMBI:     Negative for DVT. 
     Thrombus in multiple perforators in lower right leg. 
COMPRESSIBILITY:     Non-compressible segments corresponding to thrombus 
FLOW:     Areas of no flow corresponding to thrombus 
OTHER:     Multiple patent varicose veins remain. 
  
  
CONCLUSION:     Post ablation occlusion of treated incompetent perforating  
veins.  No deep vein thrombus. 
  
  
Dictated by: Alonso Nation MD on 2024 at 11:25      
Approved by: Alonso Nation MD on 2024 at 11:26

## 2024-12-11 NOTE — XMS_ITS
Comprehensive CCD (C-CDA v2.1)  
  
                          Created on: 2024  
  
  
Jocelin Pacheco  
External Reference #: CDR,PersonID:[id number]  
: [id number]  
Sex: Male  
  
Demographics  
  
  
                                        Address             [address]  [address]  
   
                                        Home Phone          [phone]  
   
                                        Work Phone          [phone]  
   
                                        Preferred Language  en  
   
                                        Marital Status        
   
                                        Rastafari Affiliation Unknown  
   
                                        Race                White  
   
                                        Ethnic Group        Not  or Lati  
no  
  
  
Author  
  
  
                                        Organization        [facility]  
  
  
Care Team Providers  
  
  
                                Care Team Member Name Role            Phone  
   
                                Annel Baird      Unavailable     [phone]  
   
                                Alonso Birmingham   Unavailable     [phone]  
   
                                Jacoby Braden Unavailable     [phone]  
   
                                Vera Melvin Unavailable     [phone]  
   
                                Unavailable     Unavailable     [phone]  
   
                                DO Vera Melvin Primary Care Provider [phone]  
   
                                ESVIN Steward Attending Provider [phone]  
   
                                SOWMYA Villarreal Attending Provider [phone]  
   
                                DO Vera Melvin Primary Care Provider [phone]  
   
                                ESVIN Steward Attending Provider [phone]  
   
                                DO JASON Whitley Attending Provider [phone]  
   
                                COMPA WHITLEY Attending       Unavailable  
   
                                OCMPA WHITLEY Referring       Unavailable  
   
                                Ngozi, Dr. Vera Vincent Primary Care    U  
DO Vera Brown Primary Care Provider [phone]  
   
                                ESVIN Steward Attending Provider [phone]  
   
                                MD Jacoby Braden Attending Provider [phone]  
   
                                DO Vera Melvin Primary Care Provider [phone]  
   
                                SOWMYA Espino Attending Provider [phone]  
   
                                Dr. Compa Whitley Referring       Rachna Melvin, Dr. Vera Vincent Primary [facility]    U  
daniel Whitley, Dr. Compa Sr Attending       Rachna Whitley, Dr. Compa Sr Referring       Unava  
ilmariaelena Melvin, Dr. Vera Melisa Primary Care      
Dr. Compa Thomson Attending       Unava  
ilable  
   
                                ESVIN Steward Attending Provider [phone]  
   
                                Matias-Oregon, DO Vera Attending Provider [phone]  
   
                                Matias-Oregon DO, Vera D Unavailable     [phone]  
   
                                Danya An DPM Unavailable     [phone]  
   
                                [facility] CORTES, Elroy CELESTE Unavailable     [phone]  
   
                                Matias-[place] DO, Vera D Primary Care Provider   
[phone]  
   
                                Matias-Oregon, DO Vera Primary Care Provider [phone]  
   
                                SOWMYA Espino Attending Provider [phone]  
   
                                Matias-Oregon DO, Vera Melisa Primary Care Provi  
sherly [phone]  
   
                                SHARLENE AN  Attending       Unavailable  
   
                                MATIAS-EMERY, VERA MELISA Primary Care    Unava  
ilable  
   
                                Matias-Oregon, DO Vera Primary Care Provider [phone]  
   
                                SOWMYA Espino Attending Provider [phone]  
   
                                Matias-Oregon, DO Vera Primary Care Provider [phone]  
   
                                SOWMYA Espino Attending Provider [phone]  
   
                                Matias-Oregon DO, Vera Primary Care Provider [phone]  
[phone]  
   
                                Elaine Falcon Attending Provider [phone]  
   
                                Laila Webb Attending Provider [phone]  
   
                                Shannen Julian DO Attending Provider [phone]   
   
                                MATIAS-EMERY, VERA D Attending       Unavailab  
le  
   
                                MATIAS-EMERY, VERA D Referring       Unavailab  
DANYA Aparicio Attending       Unavailable  
   
                                MATIAS-VERA ANTONY Referring       Unavailab  
le  
   
                                JESSICA LOPEZ   Attending       Unavailable  
   
                                RAY VELA Referring       Unavailable  
   
                                ARLENE HAIDER Attending       Unavailable  
   
                                MIRIANRAY ROSA Referring       Unavailable  
   
                                LOPEZJESSICA   Attending       Unavailable  
   
                                MIRIANJOSE ANTONIO ROSAELE L Referring       Unavailable  
   
                                DEPOYARLENE Attending       Unavailable  
   
                                MIRIANJOSE ANTONIO ROSAELE L Referring       Unavailable  
   
                                LOPEZJESSICA   Attending       Unavailable  
   
                                MIRIANRAY ROSA Referring       Unavailable  
   
                                DANYA AN Attending       Unavailable  
   
                                MATIAS-VERA ANTONY Attending       Unavailab  
marie  
   
                                MATIAS-EMERVERA LANE Referring       Unavailab  
DANYA Aparicio Attending       Unavailable  
   
                                MATIAS-VERA ANTONY Attending       Unavailab  
marie  
   
                                MATIAS-EMERVERA LANE Referring       Unavailab  
DANYA Aparicio Attending       Unavailable  
   
                                ELIEL, SHANNEN H Attending       Unavailable  
   
                                MATIASVERA MONTOYA D Referring       Unavailab  
marie JULIAN, SHANNEN H Attending       Unavailable  
   
                                Copsey, Elaine   Attending       Unavailable  
   
                                Copsey, Elaine   Admitting       Unavailable  
   
                                Matias-Oregon, Vera Primary Care    Unavailable  
   
                                Copsey, Elaine   Attending       Unavailable  
   
                                Copsey, Elaine   Admitting       Unavailable  
   
                                Matias-Oregon, Vera Primary Care    Unavailable  
   
                                ObermeyerLaila L Attending       Unavailable  
   
                                ObermeyerLaila L Admitting       Unavailable  
   
                                Matias-Oregon, Vera Primary Care    Unavailable  
   
                                Itzkowitz, Shannen Admitting       Unavailable  
   
                                Matias-Oregon, Vera Primary Care    Unavailable  
   
                                Itzkozully, Shannen Attending       Unavailable  
  
  
  
Allergies  
  
  
                                                    Allergy   
Classification                          Reported   
Allergen(s)               Allergy Type              Date of   
Onset                     Reaction(s)               Facility  
   
                                                      
(20 sources)        Cefixime            Drug Allergy        20  
22                                      Unknown,   
Unknown   
Reaction                                [facility]  
   
                                                      
(20 sources)                            Diclofenac;   
Translations:   
[Voltaren]                Drug Allergy              20  
22                        [facility]  
   
                                                      
(20 sources)        Nystatin            Drug Allergy        20  
22                                      Unknown,   
Unknown   
Reaction                                [facility]  
   
                                                      
(8 sources)                             Cephalosporins   
(Antibiotic);   
Translations:   
[Cephalosporins]                        Allergy to   
drug (finding)                          20  
24                        [facility]   
3 Repository  
   
                                                      
(7 sources)                             Hmg-Coa Reductase   
Inhibitors   
(Statins);   
Translations:   
[Statins]                               Allergy to   
drug (finding)                          [facility]   
[facility]  
[address] DO  
Work Phone:   
[phone]  
0  
   
                                                      
(20 sources)                            Benzoate;   
Translations:   
[sodium benzoate]         Drug Allergy              20  
22                                      Unknown   
Reaction                                [facility]  
   
                                                      
(8 sources)               Cefixime                  Allergy to   
substance                               20  
23                        Unknown                   [facility]   
[facility]  
Work Phone:   
[phone]  
1  
   
                                                      
(8 sources)         Nystatin            Drug Allergy        20  
23                        Unknown                   [facility]   
[facility]  
   
                                                      
(1 source)                              Cephalosporins   
(Antibiotic)                            Drug   
Intolerance                             20                        [facility]  
   
                                                      
(7 sources)                             Diclofenac;   
Translations:   
[DICLOFENAC   
SODIUM]                   Drug Allergy              20                        [facility]  
Work Phone:   
[phone]  
7  
   
                                                      
(2 sources)                             HMG-CoA reductase   
inhibitor;   
Translations:   
[STATINS-HMG-COA   
REDUCTASE   
INHIBITORS]                             Drug   
Intolerance                             20                        [facility]  
Work Phone:   
[phone]  
7  
   
                                                      
(5 sources)                             Cephalosporins   
(Antibiotic)                            Drug   
Intolerance                             20                        [facility]  
   
                                                      
(1 source)          Cefixime            Drug Allergy        20                                                  [facility]   
Repository  
   
                                                      
(1 source)          Diclofenac          Drug Allergy        20                                                  [facility]   
Repository  
   
                                                      
(1 source)          Nystatin            Drug Allergy        20                                                  [facility]   
Repository  
  
  
  
Medications  
Current Medications  
  
  
  
                      Medication Drug Class(es) Dates      Sig (Normalized) Sig   
(Original)  
   
                                                    3 ML semaglutide   
2.68 MG/ML Pen   
Injector [Ozempic]  
(7 sources)                                         Start:   
2022                              inject 2 mg by   
subcutaneous   
injection every   
week                                    Ozempic (2 MG/DOSE)   
8 MG/3ML 2 mg   
Subcutaneous weekly   
for 30 days 21 Dec,   
2022 Active  
  
  
  
                                                            inject 2 mg by subcu  
taneous injection   
every week                              Ozempic (2 MG/DOSE) 8 MG/3ML 2 mg SQ Onc  
e   
a week. for 28 days E11.8 Active  
   
                                                            inject 2 mg by subcu  
taneous injection   
every week                              Ozempic (2 MG/DOSE) 8 MG/3ML DIAL AND   
INJECT UNDER THE SKIN 2 MG WEEKLY for 28   
Active  
   
                                                            inject 2 mg by subcu  
taneous injection   
every week                              Ozempic (2 MG/DOSE) 8 MG/3ML 2 mg   
Subcutaneous weekly for 30 days Active  
  
  
  
                                                    acetaminophen 500   
mg oral tablet  
(13 sources)                                        Start:   
2020                              take 1   
tablet by   
mouth three   
times daily   
as needed   
for pain                                Acetaminophen   
(Tylenol Extra   
Strength) 500 mg   
Tablet Active 500   
MG PO Three times   
daily as needed for   
Pain 2020 12:00am  
   
                                                    carvedilol 6.25 mg   
oral tablet  
(20 sources)                            alpha-Adrenergic   
Blocker,   
beta-Adrenergic   
Blocker                                 Start:   
2017                              take 1   
tablet by   
mouth twice   
daily                                   carvedilol (Coreg)   
6.25 MG tablet   
Indications:   
Ischemic   
cardiomyopathy   
(CMS/HCC) TAKE 1   
TABLET BY MOUTH   
TWICE A DAY FOR 90   
DAYS 180 tablet 3   
2023 Active  
   
                                                    cephalexin 500 mg   
oral capsule  
(20 sources)                            Cephalosporin   
Antibacterial                           Start:   
2024  
End: 08-                         take 1   
capsule by   
mouth in the   
morning                                 cephalexin (Keflex)   
500 MG capsule Take   
500 mg by mouth in   
the morning and 500   
mg before bedtime.   
2024 Active  
  
  
  
                                                    Start: 2019  
End: 2019                         take 1 capsule by mouth every   
five hours                              Cephalexin 500 mg capsule   
Discontinued 500 MG PO Q5H 2019 11:00pm May 6th, 2019   
12:54pm  
  
  
  
                                                    empagliflozin 10 mg   
oral tablet  
(20 sources)                            Sodium-Glucose   
Cotransporter 2   
Inhibitor                               Start:   
2022                              take 1   
tablet by   
mouth once   
daily                                   Empagliflozin   
(Jardiance) 10 mg   
tablet Active 10   
MG PO Daily 2022 11:00pm  
   
                                                    furosemide 40 mg   
oral tablet  
(20 sources)              Loop Diuretic             Start:   
2017                              take 1   
tablet by   
mouth twice   
daily                                   Furosemide (Lasix)   
40 mg Tablet   
Active 40 MG PO   
Twice daily   
2017 12:00am  
  
  
  
                                                            take 1 tablet by lenin  
th every twelve   
hours                                   furosemide (Lasix) 40 MG tablet Take 40   
mg   
by mouth every 12 (twelve) hours. Active  
   
                                                                Lasix TABS TAKE   
1 TABLET TWICE DAILY.   
Quantity: 0 Refills: 0 Ordered: 2022   
DO Active  
  
  
  
                                                    lactobacillus   
acidophilus   
[id number] unt oral   
capsule  
(7 sources)                                         Start:   
2024                              take 10   
capsules by   
mouth once   
daily                                   Lactobacillus   
Acidophilus   
(Probiotic) 10   
billion cell capsule   
Active 100 MMU CELLS   
PO Daily 2024 12:00am  
   
                                                    methylPREDNISolone  
(1 source)                Corticosteroid            Start:   
2024  
End:   
2024                                          methylPREDNISolone   
(Medrol Dospak) 4 MG   
tablets Indications:   
Lumbosacral strain,   
initial encounter ,   
Acute pain of left   
shoulder Take as   
directed on package.   
21 tablet 0   
2024   
Active  
   
                                                    Ozempic (2 MG/DOSE) 8   
MG/3ML  
(1 source)                                                  inject 2 mg by   
subcutaneous   
injection every   
week                                    Ozempic (2 MG/DOSE) 8   
MG/3ML 2 mg   
Subcutaneous weekly   
for 30 days Active  
   
                                                    Ozempic, 2 MG/DOSE, 8   
MG/3ML solution   
pen-injector  
(8 sources)                                         Start:   
2023                                          Ozempic, 2 MG/DOSE, 8   
MG/3ML solution   
pen-injector   
2023 Active  
  
  
  
                                Start: 2023                 Ozempic, 2 MG/  
DOSE, 8 MG/3ML solution pen-injector  
  
  
  
                                                    Probiotic Product (PROBIOTIC  
   
BLEND PO)  
(8 sources)                                                 take 2 [IU] by mouth  
 in   
the morning                             Probiotic Product (PROBIOTIC   
BLEND PO) Take 2 Units by   
mouth in the morning. Active  
  
  
  
                                                take 2 [IU] by mouth in the morn  
ing Probiotic Product (PROBIOTIC BLEND PO) Take  
 2   
Units by mouth in the morning. 0 Active  
  
  
  
                                                    rivaroxaban 10 mg   
oral tablet  
(20 sources)                            Factor Xa   
Inhibitor                 Start: 2022         take 1 tablet   
by mouth once   
daily                                   rivaroxaban (Xarelto)   
10 MG tablet   
Indications:   
Cardiomyopathy,   
unspecified type   
(CMS/HCC) Take 1   
tablet (10 mg) by   
mouth Daily 90 tablet   
2 2024 Active  
  
  
  
                                                    Start: 2019  
End: 2021                         take 1 tablet by mouth once   
daily                                   Rivaroxaban 10 mg tablet   
Discontinued 10 MG PO Daily 2019 11:00pm 2021   
12:02am  
   
                                                                Xarelto Active  
  
  
  
                                                    rosuvastatin   
calcium 10 mg oral   
tablet  
(3 sources)                             HMG-CoA Reductase   
Inhibitor                               Start:   
2023                              take 1 tablet by   
mouth every   
twenty-four hours                       Rosuvastatin   
Calcium 10 MG 1   
tablet Orally   
Once a day for 30   
day(s)  Active  
   
                                                    sacubitril 24 mg /   
valsartan 26 mg   
oral tablet  
(20 sources)                            Angiotensin 2   
Receptor Blocker                        Start:   
2022                              take 1 tablet by   
mouth twice daily                       Sacubitril-Valsar  
jiménez (Entresto)   
24-26 mg Tablet   
Active 1 TAB PO   
Twice daily 2023   
11:00pm  
  
  
  
                                                take 1 tablet by mouth in the mo  
rning Entresto 24-26 MG tablet Take 1 tablet by  
   
mouth in the morning and 1 tablet before   
bedtime. Active  
   
                                                                ENTRESTO 24 mg/2  
6 mg 1 orally twice a day   
Active  
  
  
  
                                                    0.25 mg, 0.5 mg dose   
1.5 ml semaglutide   
1.34 mg/ml pen   
injector  
(3 sources)                     Start: 2022                 Ozempic (0.25   
or 0.5   
MG/DOSE) 2 MG/1.5ML   
0.25 mg for one month   
and then increase to   
0.5 mg dose   
Subcutaneous weekly   
for 30 days  Active  
   
                                                    Semaglutide  
(5 sources)                             Start: 2024   inject 0.25 mg by   
subcutaneous injection   
every week, then   
inject 0.5 mg by   
subcutaneous injection   
every week                              Semaglutide (Ozempic)   
0.25 mg or 0.5 mg (2   
mg/3 mL) pen injector   
Active 0.25 MG SUBCUT   
every week 3 April   
23rd, 2024 11:00pm for   
4 weeks; then increase   
to 0.5 mg every week  
  
  
  
                                        Start: 2024   inject 0.25 mg by carrion  
bcutaneous   
injection every week, then inject   
0.5 mg by subcutaneous injection   
every week                              Semaglutide (Ozempic) 0.25 mg or   
0.5 mg (2 mg/3 mL) pen injector   
Active 0.25 MG SUBCUT every week   
3 April 24th, 2024 12:00am for 4   
weeks; then increase to 0.5 mg   
every week  
  
  
  
                                                    semaglutide (Ozempic)   
1 mg/dose (4 mg/3 mL)   
pen injector  
(1 source)                                                  inject 1 mg by   
subcutaneous   
injection every   
week                                    semaglutide   
(Ozempic) 1 mg/dose   
(4 mg/3 mL) pen   
injector Inject 1 mg   
under the skin per   
week. Active  
   
                                                    spironolactone 25 mg   
oral tablet  
(20 sources)                            Aldosterone   
Antagonist                              Start:   
                                     take 1 tablet by   
mouth once daily                        Spironolactone 25 mg   
tablet Active 25 MG   
PO Daily 2022 11:00pm  
  
  
  
Completed/Discontinued Medications  
  
  
  
                      Medication Drug Class(es) Dates      Sig (Normalized) Sig   
(Original)  
   
                                                    3 ML semaglutide   
1.34 MG/ML Pen   
Injector [Ozempic]  
(7 sources)                                         Start:   
10-                              inject 1 mg by   
subcutaneous   
injection every   
week                                    Ozempic (1 MG/DOSE)   
4 MG/3ML 1 mg   
Subcutaneous Weekly   
for 28 days 28 Oct,   
2022 Not-Taking  
  
  
  
                                        Start: 10-   inject 1 mg by subcu  
taneous   
injection every week                    Ozempic (1 MG/DOSE) 4 MG/3ML 1 mg   
Subcutaneous Weekly for 28 days   
28 Oct, 2022 Active  
   
                                        Start: 2022   inject 1 mg by subcu  
taneous   
injection every week                    Ozempic (1 MG/DOSE) 4 MG/3ML 1 mg   
Subcutaneous Weekly for 28 days   
 Active  
  
  
  
                                                    acetaminophen 325   
mg / HYDROcodone   
bitartrate 5 mg   
oral tablet  
(13 sources)              Opioid Agonist            Start:   
2021  
End:   
2022                              take 1   
tablet by   
mouth   
every   
eight   
hours as   
needed for   
pain                                    Hydrocodone-Acetaminophen   
5-325 mg tablet Discontinued   
1 TAB PO Q8H as needed for   
severe pain (scale score   
7-10) 6 2  7:11am  
   
                                                    acetaminophen 325   
mg / oxyCODONE   
hydrochloride 5   
mg oral tablet  
(13 sources)              Opioid Agonist            Start:   
2019  
End:   
2020                              take 1   
tablet by   
mouth   
every four   
to six   
hours as   
needed for   
pain                                    Oxycodone-Acetaminophen   
(Percocet) 5-325 mg tablet   
Discontinued 1 TAB PO EVERY   
4-6 HOURS as needed for pain   
10 3 November 29th, 2019   
December 2nd, 2020 12:59pm  
   
                                                    ampicillin 500 mg   
oral capsule  
(13 sources)                            Penicillin-class   
Antibacterial                           Start:   
2019  
End:   
2019                              take 1   
capsule by   
mouth   
every six   
hours                                   Ampicillin 500 mg capsule   
Discontinued 500 MG PO Q6H   
56  11:00pm   
2019 2:39pm  
   
                                                    ascorbic acid 500   
mg oral tablet  
(13 sources)              Vitamin C                 Start:   
2020  
End:   
2022                              take 1   
tablet by   
mouth   
twice   
daily                                   Ascorbic Acid (Vitamin C)   
(Vitamin C) 500 mg Tablet   
Discontinued 500 MG PO Twice   
daily  12:00am 2022 7:11am  
   
                                                    aspirin 81 mg   
delayed release   
oral tablet  
(13 sources)                            Platelet   
Aggregation   
Inhibitor,   
Nonsteroidal   
Anti-inflammatory   
Drug                                    Start:   
2019  
End:   
2019                              take 1   
tablet by   
mouth once   
daily                                   Aspirin (Aspirin Low Dose)   
81 mg Tablet,Delayed Release   
(Dr/Ec) Discontinued 81 MG   
PO Daily 2019   
11:00pm 2019   
10:52am  
   
                                                    ciprofloxacin 500   
mg oral tablet  
(20 sources)                            Quinolone   
Antimicrobial                           Start:   
2021  
End:   
2021                              take 1   
tablet by   
mouth   
twice   
daily                                   Ciprofloxacin Hcl (Cipro)   
500 mg tablet Discontinued   
500 MG PO Twice daily  12:00am   
2021 11:59pm  
  
  
  
                                                    Start: 2020  
End: 2021                         take 1 tablet by mouth every   
twelve hours                            Ciprofloxacin Hcl 500 mg tablet   
Discontinued 500 MG PO Q12H 20 10   
December 4th, 2020 12:00am 2021 2:07pm  
   
                                                    Start: 2020  
End: 2020                         take 1 tablet by mouth twice   
daily                                   Ciprofloxacin Hcl (Cipro) 500 mg   
tablet Discontinued 500 MG PO Twice   
daily  11:00pm   
2020 12:36pm  
   
                                                    Start: 2020  
End: 2020                         take 1 tablet by mouth twice   
daily                                   Ciprofloxacin Hcl (Cipro) 500 mg   
tablet Discontinued 500 MG PO Twice   
daily  11:00pm 2020 9:03am  
  
  
  
                                                    clindamycin 300 mg   
oral capsule  
(20 sources)                            Lincosamide   
Antibacterial                           Start:   
2019  
End: 2020                         take 1   
capsule by   
mouth every   
eight hours                             Clindamycin Hcl   
300 mg capsule   
Discontinued 300   
MG PO Q8H 30 10   
November 29th,   
2019 12:00am   
2020   
2:08pm  
  
  
  
                                                    Start: 2019  
End: 2019                         take 3 capsules by mouth every   
eight hours                             Clindamycin Hcl 150 mg capsule   
Discontinued 450 MG PO Q8H 63  12:00am 2019 1:00pm  
   
                                                    Start: 2019  
End: 2019                         take 450 mg by mouth every eight   
hours                                   Clindamycin Hcl Discontinued 450 MG   
PO Q8H 63    
1:00am 2019 2:00pm  
  
  
  
                                                    clobetasol propionate   
0.0005 mg/mg topical   
ointment  
(20 sources)              Corticosteroid            Start: 2019  
End: 2023                                     Clobetasol 0.05 % ointment   
Discontinued 1 APPLIC   
TOPICAL 3 Times a week as   
needed for Rash 2021 12:32pm 2023 10:11am apply   
thin layer to right leg   
1-2 times daily as needed   
for itching.  
  
  
  
                                                    Start: 2019  
End: 2021                                     Clobetasol 0.05 % ointment D  
iscontinued 1 APPLIC TOPICAL Twice   
daily 60  11:00pm 2021 12:32pm   
apply thin layer to right leg 1-2 times daily as needed for   
itching.  
   
                                                    Start: 05-  
End: 2019                                     Clobetasol 0.05 % ointment D  
iscontinued 1 APPLIC TOPICAL .every  
   
other day 30 14 May 14th, 2019 11:00pm 2019 1:56pm   
apply thin layer to right leg rash with dressing changes  
   
                                                                clobetasol (Jose A  
vate) 0.05 % cream Apply 1 application topically   
every 12 (twelve) hours. Active  
   
                                                                Clobetasol Propi  
maged Not-Taking  
   
                                                                Clobetasol Propi  
maged Active  
  
  
  
                                                    cyclobenzaprine   
hydrochloride 10 mg   
oral tablet  
(20 sources)              Muscle Relaxant           Start:   
2017  
End: 2021                         take 1   
tablet by   
mouth three   
times daily   
as needed   
for muscle   
spasms                                  Cyclobenzaprine 10   
mg Tablet   
Discontinued 10 MG   
PO Three times   
daily as needed for   
Muscle Spasm   
2017   
12:00am 2021 11:59pm  
   
                                                    dexamethasone 6 mg   
oral tablet  
(13 sources)              Corticosteroid            Start:   
2020  
End: 2021                         take 1   
tablet by   
mouth once   
daily                                   Dexamethasone 6 mg   
Tablet Discontinued   
6 MG PO Daily 3 3   
December 22nd, 2020   
12:00am 2021 2:07pm  
   
                                                    doxycycline hyclate   
100 mg oral capsule  
(20 sources)                            Tetracycline-class   
Drug                                    Start:   
2024  
End: 08-                         take 1   
capsule by   
mouth twice   
daily                                   Doxycycline Hyclate   
100 mg capsule   
Discontinued 100 MG   
PO Twice daily 20   
10 July 7th, 2024   
11:00pm 2024 2:20pm  
  
  
  
                                                    Start: 2023  
End: 2024                         take 1 capsule by mouth twice   
daily                                   Doxycycline Hyclate 100 mg capsule   
Discontinued 100 MG PO Twice daily   
 12:00am   
2024 1:30pm  
   
                                                    Start: 2021  
End: 2021                         take 1 capsule by mouth twice   
daily                                   Doxycycline Hyclate 100 mg capsule   
Discontinued 100 MG PO Twice daily   
20 10 April 7th, 2021 11:00pm 2021 12:31pm  
   
                                                    Start: 2021  
End: 2021                         take 1 capsule by mouth twice   
daily                                   Doxycycline Hyclate 100 mg capsule   
Discontinued 100 MG PO Twice daily   
20 10 January 29th, 2021 12:00am   
2021 7:01am  
   
                                                    Start: 10-  
End: 2020                         take 1 capsule by mouth twice   
daily                                   Doxycycline Hyclate 100 mg capsule   
Discontinued 100 MG PO Twice daily   
20 10 October 4th, 2020 11:00pm   
2020 12:59pm  
   
                                                    Start: 2020  
End: 2020                         take 1 capsule by mouth twice   
daily                                   Doxycycline Hyclate 100 mg Capsule   
Discontinued 100 MG PO Twice daily   
May 4th, 2020 11:00pm 2020   
12:37pm  
   
                                                    Start: 2020  
End: 2020                         take 1 capsule by mouth twice   
daily                                   Doxycycline Hyclate 100 mg capsule   
Discontinued 100 MG PO Twice daily   
 11:00pm 2020 12:21pm  
   
                                                    Start: 2019  
End: 2020                         take 1 capsule by mouth twice   
daily                                   Doxycycline Hyclate 100 mg capsule   
Discontinued 100 MG PO Twice daily   
 12:00am   
2020 2:11pm  
   
                                                    Start: 2019  
End: 2019                         take 1 capsule by mouth twice   
daily                                   Doxycycline Hyclate 100 mg capsule   
Discontinued 100 MG PO Twice daily   
 11:00pm 2019 2:39pm  
   
                                                    Start: 2019  
End: 2019                         take 1 capsule by mouth twice   
daily                                   Doxycycline Hyclate 100 mg capsule   
Discontinued 100 MG PO Twice daily   
28 14 May 2nd, 2019 11:00pm May   
28th, 2019 2:36pm  
  
  
  
                                                    Ketorolac  
(15 sources)                            Nonsteroidal   
Anti-inflammatory Drug,   
Cyclooxygenase   
Inhibitor                               Start:   
07-                                          Toradol per 15 mg   
15 2013 2 mL  
   
                                                    levoFLOXacin 750   
mg oral tablet  
(13 sources)              Quinolone Antimicrobial   Start:   
2019  
End: 2019                         take 1   
tablet by   
mouth once   
daily                                   Levofloxacin   
(Levaquin) 750 mg   
tablet   
Discontinued 750   
MG PO Daily 10 10   
Lynn 24th, 2019   
11:00pm 2019 1:24pm  
   
                                                    meloxicam 15 mg   
oral tablet  
(20 sources)                            Nonsteroidal   
Anti-inflammatory Drug                  Start:   
2017  
End: 2021                         take 1   
tablet by   
mouth once   
daily                                   Meloxicam 15 mg   
Tablet   
Discontinued 15   
MG PO Daily   
2017 12:00am   
2021   
12:00am  
   
                                                    metOLazone 2.5 mg   
oral tablet  
(20 sources)              Thiazide-like Diuretic    Start:   
2022  
End: 2023                         take 1   
tablet by   
mouth every   
other day                               Metolazone 2.5 mg   
Tablet   
Discontinued 2.5   
MG PO Q2D 2022   
11:00pm 2023   
10:10am  
  
  
  
                                                    Start: 2019  
End: 2022                         take 1 tablet by mouth three   
times weekly in the morning as   
needed for edema                        Metolazone 2.5 mg Tablet   
Discontinued 2.5 MG PO 3 Times a   
week as needed for Edema 2019 11:00pm 2022   
7:10am take three times a week 30   
minutes prior to am lasix as needed  
   
                                                                metOLazone Activ  
e  
  
  
  
                                                    nabumetone 750   
mg oral tablet  
(20 sources)                            Nonsteroidal   
Anti-inflammatory Drug                  Start:   
2021  
End: 2024                         take 1   
tablet by   
mouth twice   
daily                                   Nabumetone 750 mg   
Tablet   
Discontinued 750   
MG PO Twice daily   
2021   
12:00am 2024 8:17am  
  
  
  
                                                    Start: 2021  
End: 2024                         take 1 tablet by mouth every   
twelve hours                            nabumetone (Relafen) 750 mg tablet   
Take 1 tablet (750 mg) by mouth every   
12 hours. 2021   
Discontinued (Discontinued by another   
clinician)  
   
                                        Start: 2021   take 1 tablet by lenin  
th once   
daily                                   Nabumetone 750 MG Oral Tablet TAKE 1   
TABLET EVERY 12 HOURS DAILY.   
Quantity: 60 Refills: 2 Ordered:   
2021 Compa Soto MD   
Start : 2021 Active  
  
  
  
                                                    Ozempic (1 MG/DOSE) SOPN  
(7 sources)                                                     Ozempic (1 MG/DO  
SE) SOPN   
INJECT 1 UNIT Weekly   
Quantity: 0 Refills: 0   
Ordered: 2022 DO   
Active  
   
                                                    permethrin 50 mg/ml topical   
cream  
(13 sources)              Pyrethroid                Start: 2019  
End: 2019                                     Permethrin 5 % Cream   
Discontinued 1 APPLIC   
TOPICAL Once May 5th, 2019   
11:00pm May 28th, 2019   
2:36pm  
  
  
  
                                                    Start: 2019  
End: 2019                                     Permethrin Discontinued 1 AP  
PLIC TOPICAL Once May 6th, 2019   
12:00am May 28th, 2019 3:36pm  
  
  
  
                                                    predniSONE 20 mg oral   
tablet  
(7 sources)                             Start: 2019   take 2 tablets by   
mouth every   
twenty-four hours                       predniSONE 20 MG 2   
tablet Orally Once a   
day for 5    
Not-Taking  
   
                                                    Semaglutide (Ozempic)   
2 mg/dose (8 mg/3 mL)   
pen injector  
(13 sources)                                        Start: 2022  
End: 2024                         inject 2 mg by   
subcutaneous injection   
every week                              Semaglutide (Ozempic)   
2 mg/dose (8 mg/3 mL)   
pen injector   
Discontinued 2 MG   
SUBCUT Q7D 2022 11:00pm   
2024   
1:31pm  
  
  
  
                                                    Start: 2022  
End: 2024                         inject 2 mg by subcutaneous   
injection every week                    Semaglutide (Ozempic) 2 mg/dose   
(8 mg/3 mL) pen injector   
Discontinued 2 MG SUBCUT Q7D   
2022 12:00am 2024 2:31pm  
   
                                        Start: 2022   inject 2 mg by subcu  
taneous   
injection every week                    Semaglutide (Ozempic) 2 mg/dose   
(8 mg/3 mL) pen injector Active 2   
MG SUBCUT Q7D 2022   
11:00pm  
   
                                        Start: 2022   inject 2 mg by subcu  
taneous   
injection every week                    Semaglutide (Ozempic) 2 mg/dose   
(8 mg/3 mL) pen injector Active 2   
MG SUBCUT Q7D 2022   
12:00am  
  
  
  
                                                    Sulfamethoxazole  
(7 sources)     Sulfonamide Antimicrobial                                 Sulfam  
ethoxazole Not-Taking  
  
  
  
                                                                Sulfamethoxazole  
 Active  
  
  
  
                                                    sulfamethoxazole   
800 mg /   
trimethoprim 160   
mg oral tablet  
(13 sources)                            Dihydrofolate   
Reductase   
Inhibitor   
Antibacterial,   
Sulfonamide   
Antimicrobial                           Start:   
2020  
End:   
2020                              take 1   
tablet by   
mouth   
twice   
daily                                   Sulfamethoxazole-Trimethoprim   
(Bactrim Ds) 800-160 mg tablet   
Discontinued 1 TAB PO Twice   
daily    
12:00am 2020   
2:57pm  
   
                                                    telmisartan 40 mg   
oral tablet  
(20 sources)                            Angiotensin 2   
Receptor Blocker                        Start:   
2017  
End:   
2022                              take 1   
tablet by   
mouth   
once   
daily                                   Telmisartan 40 mg Tablet   
Discontinued 40 MG PO Daily   
2017 12:00am   
2022 7:15am  
   
                                                    traMADol   
hydrochloride 50   
mg oral tablet  
(20 sources)              Opioid Agonist            Start:   
2020  
End:   
2020                              take 1   
tablet by   
mouth   
twice   
daily as   
needed   
for pain                                Tramadol 50 mg Tablet   
Discontinued 50 MG PO Twice   
daily as needed for Pain   
2020 12:00am   
2020 1:10pm  
   
                                                    triamcinolone   
acetonide 5 mg/ml   
topical cream  
(13 sources)              Corticosteroid            Start:   
2019  
End:   
2019                                          Triamcinolone Acetonide 0.5   
%   
cream Discontinued 1 APPLIC   
TOPICAL Twice daily 454  11:00pm 2019 11:00pm 2019 11:02pm apply to back and   
arm rash twice daily as needed   
for itching.  
   
                                                    Triamcinolone &   
Emollient 0.1 %  
(7 sources)                                                     Triamcinolone &   
Emollient 0.1   
% as directed Externally   
Not-Taking  
   
                                                    vancomycin 250 mg   
oral capsule  
(13 sources)                            Glycopeptide   
Antibacterial                           Start:   
2021  
End:   
2021                              take 1   
capsule   
by mouth   
every six   
hours                                   Vancomycin 250 mg capsule   
Discontinued 250 MG PO Q6H  11:00pm   
2021 12:01am  
   
                                                    zinc sulfate 220   
mg oral capsule  
(13 sources)                                        Start:   
2020  
End:   
2022                              take 1   
capsule   
by mouth   
once   
daily                                   Zinc Sulfate (Orazinc) 220   
(50) mg Capsule Discontinued   
220 MG PO Daily  12:00am 2022 7:11am  
  
  
  
Problems  
Active Problems  
  
  
                      Problem Classification Problem    Date       Documented Da  
te Episodic/Chronic  
   
                                                    Acute and unspecified   
renal failure  
(13 sources)                            Injury of kidney;   
Translations: [Acute   
kidney failure,   
unspecified]                            2020          Episodic  
   
                                                    Allergic reactions  
(13 sources)                            Inflammatory   
dermatosis;   
Translations:   
[Dermatitis,   
unspecified]                            10-          Episodic  
   
                                                    Blindness and vision   
defects  
(7 sources)                             Disorder of vision;   
Translations:   
[Problems with   
sight]                                                      Chronic  
   
                                                    Chronic kidney disease  
(8 sources)                             Chronic kidney   
disease stage 2;   
Translations:   
[Chronic kidney   
disease, stage 2   
(mild)]                                 Onset:   
2023                Chronic  
   
                                                    Chronic ulcer of skin  
(20 sources)                            Ulcer; Translations:   
[Ulcerative lesion]                     Onset:   
2024                Chronic  
   
                                                    Coagulation and   
hemorrhagic disorders  
(9 sources)                             Acquired coagulation   
factor inhibitor   
disorder;   
Translations: [Other   
hemorrhagic disorder   
due to intrinsic   
circulating   
anticoagulants,   
antibodies, or   
inhibitors]                             Onset:   
2023                Chronic  
   
                                                    Congestive heart   
failure;   
nonhypertensive  
(20 sources)                            Congestive heart   
failure;   
Translations: [Heart   
failure,   
unspecified]                            Onset:   
2022  
Resolved:   
2022                                          Chronic  
   
                                                    Coronary   
atherosclerosis and   
other heart disease  
(20 sources)                            Coronary   
arteriosclerosis;   
Translations:   
[Atherosclerotic   
heart disease of   
native coronary   
artery without   
angina pectoris]                        Onset:   
2022  
Resolved:   
2022                                          Chronic  
   
                                                    Diabetes mellitus with   
complications  
(20 sources)                            Disorder due to type   
2 diabetes mellitus;   
Translations: [Type   
2 diabetes mellitus   
with unspecified   
complications]                          Onset:   
2022  
Resolved:   
2022                                          Chronic  
   
                                                    Diabetes mellitus   
without complication  
(8 sources)                             Prediabetes;   
Translations: [Other   
abnormal glucose]                       Onset:   
2024                Episodic  
   
                                                    Disorders of lipid   
metabolism  
(20 sources)                            Mixed   
hyperlipidemia;   
Translations: [Mixed   
hyperlipidemia]                         Onset:   
2022  
Resolved:   
2022                                          Chronic  
   
                                                    Essential hypertension  
(15 sources)                            Hypertensive   
disorder;   
Translations:   
[Essential (primary)   
hypertension]                           2019          Chronic  
   
                                                    Fluid and electrolyte   
disorders  
(13 sources)                            Metabolic acidosis;   
Translations:   
[Metabolic acidosis]                     2020          Episodic  
   
                                                    Genitourinary symptoms   
and ill-defined   
conditions  
(5 sources)                             Urinary   
incontinence;   
Translations:   
[Unspecified urinary   
incontinence]                           Onset:   
2024                Chronic  
   
                                                    Immunizations and   
screening for   
infectious disease  
(17 sources)                            Encounter for   
immunization;   
Translations:   
[Culture positive   
for methicillin   
resistant   
Staphylococcus   
aureus]                                 Onset:   
2021  
Resolved:   
2021                                          Episodic  
   
                                                    Mycoses  
(1 source)                              Onychomycosis;   
Translations: [Tinea   
unguium]                                2024          Episodic  
   
                                                    Nonspecific chest pain  
(6 sources)                             Chest pain;   
Translations: [Chest   
pain, unspecified]                      Onset:   
2023                                          Episodic  
   
                                                    Open wounds of   
extremities  
(20 sources)                            Open wound of right   
lower leg;   
Translations:   
[Unspecified open   
wound, right lower   
leg, initial   
encounter]                              Onset:   
2024                Episodic  
   
                                                    Osteoarthritis  
(20 sources)                            Osteoarthritis of   
knee; Translations:   
[Osteoarthritis of   
knee, unspecified]                      Onset:   
2022  
Resolved:   
2022                                          Chronic  
   
                                                    Other and ill-defined   
heart disease  
(16 sources)                            Left ventricular   
hypertrophy;   
Translations:   
[Cardiomegaly]                          Onset:   
2023                Chronic  
   
                                                    Other circulatory   
disease  
(7 sources)                             H/O: heart disorder;   
Translations:   
[Personal history of   
unspecified   
circulatory disease]                                         Episodic  
   
                                                    Other connective   
tissue disease  
(7 sources)                             H/O: arthritis;   
Translations:   
[Personal history of   
arthritis]                                                  Episodic  
   
                                                    Other connective   
tissue disease  
(1 source)                              Pain in both feet;   
Translations: [Pain   
in right foot]                          2024          Episodic  
   
                                                    Other diseases of   
veins and lymphatics  
(17 sources)                            Postthrombotic   
syndrome;   
Translations:   
[Postthrombotic   
syndrome without   
complications of   
unspecified   
extremity]                                                  Chronic  
   
                                                    Other diseases of   
veins and lymphatics  
(2 sources)                             Lymphedema, not   
elsewhere   
classified;   
Translations: [Other   
lymphedema]                             Onset:   
2024                Chronic  
   
                                                    Other diseases of   
veins and lymphatics  
(20 sources)                            Stasis dermatitis;   
Translations:   
[Venous   
insufficiency   
(chronic)   
(peripheral)]                           Onset:   
2023                Episodic  
   
                                                    Other diseases of   
veins and lymphatics  
(13 sources)                            Vascular   
insufficiency;   
Translations:   
[Venous   
insufficiency   
(chronic)   
(peripheral)]                           2021          Episodic  
   
                                                    Other diseases of   
veins and lymphatics  
(2 sources)                             Venous insufficiency   
of leg;   
Translations:   
[Venous   
insufficiency   
(chronic)   
(peripheral)]                           2024          Episodic  
   
                                                    Other diseases of   
veins and lymphatics  
(3 sources)                             Venous insufficiency   
(chronic)   
(peripheral);   
Translations:   
[Venous (peripheral)   
insufficiency,   
unspecified]                            Onset:   
2024                Episodic  
   
                                                    Other gastrointestinal   
disorders  
(13 sources)                            Diarrhea;   
Translations:   
[Diarrhea,   
unspecified]                            2020          Episodic  
   
                                                    Other gastrointestinal   
disorders  
(2 sources)                             Diarrhea,   
unspecified                                                 Episodic  
   
                                                    Other gastrointestinal   
disorders  
(8 sources)                             Stool DNA-based   
colorectal cancer   
screening positive;   
Translations: [Other   
fecal abnormalities]                    Onset:   
10-                10-                Episodic  
   
                                                    Other hematologic   
conditions  
(7 sources)                             H/O: blood disorder;   
Translations:   
[Personal history of   
diseases of blood   
and blood-forming   
organs]                                                     Episodic  
   
                                                    Other infections;   
including parasitic  
(13 sources)                            Disorder due to   
infection;   
Translations:   
[Unspecified   
infectious disease]                     2021          Episodic  
   
                                                    Other lower   
respiratory disease  
(8 sources)                             Dyspnea on exertion;   
Translations:   
[Shortness of   
breath]                                 Onset:   
2024                Episodic  
   
                                                    Other lower   
respiratory disease  
(13 sources)                            Dyspnea;   
Translations:   
[Dyspnea,   
unspecified]                            2020          Episodic  
   
                                                    Other nervous system   
disorders  
(16 sources)                            Sleep-wake schedule   
disorder, delayed   
phase type;   
Translations:   
[Circadian rhythm   
sleep disorder,   
delayed sleep phase   
type]                                                       Chronic  
   
                                                    Other nervous system   
disorders  
(2 sources)                             Circadian rhythm   
sleep disorder,   
delayed sleep phase   
type                                    Onset:   
2022  
Resolved:   
2022                                          Chronic  
   
                                                    Other nervous system   
disorders  
(7 sources)                             Numbness and   
tingling sensation   
of skin;   
Translations:   
[Disturbance of skin   
sensation]                                                  Episodic  
   
                                                    Other non-traumatic   
joint disorders  
(7 sources)                             Pain in unspecified   
knee; Translations:   
[Knee pain]                                                 Episodic  
   
                                                    Other non-traumatic   
joint disorders  
(3 sources)                             Pain in left   
shoulder;   
Translations: [Pain   
in joint, shoulder   
region]                                 Onset:   
2024                Episodic  
   
                                                    Other nutritional;   
endocrine; and   
metabolic disorders  
(20 sources)                            Body mass index 40+   
- severely obese;   
Translations: [Body   
mass index (BMI)   
50.0-59.9, adult]                       Onset:   
2024                Chronic  
   
                                                    Other nutritional;   
endocrine; and   
metabolic disorders  
(20 sources)                            Body mass index   
(BMI) 50.0-59.9,   
adult; Translations:   
[Body Mass Index   
50.0-59.9, adult]                       Onset:   
2022  
Resolved:   
2022                                          Chronic  
   
                                                    Other nutritional;   
endocrine; and   
metabolic disorders  
(20 sources)                            Obesity;   
Translations:   
[Obesity,   
unspecified]                            2019          Chronic  
   
                                                    Other nutritional;   
endocrine; and   
metabolic disorders  
(14 sources)                            Metabolic syndrome   
X; Translations:   
[Metabolic syndrome]                                         Chronic  
   
                                                    Other nutritional;   
endocrine; and   
metabolic disorders  
(4 sources)               Obesity, unspecified      Onset:   
05-  
Resolved:   
2022                                          Chronic  
   
                                                    Other nutritional;   
endocrine; and   
metabolic disorders  
(6 sources)               Metabolic syndrome        Onset:   
2022  
Resolved:   
2022                                          Chronic  
   
                                                    Other nutritional;   
endocrine; and   
metabolic disorders  
(13 sources)                            Morbid obesity;   
Translations:   
[Morbid (severe)   
obesity due to   
excess calories]                        2022          Chronic  
   
                                                    Other nutritional;   
endocrine; and   
metabolic disorders  
(7 sources)                             Morbid (severe)   
obesity due to   
excess calories;   
Translations:   
[Morbid obesity]                        2022          Chronic  
   
                                                    Other nutritional;   
endocrine; and   
metabolic disorders  
(8 sources)                             Obese class III;   
Translations:   
[Morbid (severe)   
obesity due to   
excess calories]                        10-          Chronic  
   
                                                    Other screening for   
suspected conditions   
(not mental disorders   
or infectious disease)  
(9 sources)                             Cardiovascular   
stress test   
abnormal;   
Translations: [Other   
nonspecific abnormal   
results of function   
study of   
cardiovascular   
system]                                 Onset:   
2024                Episodic  
   
                                                    Other skin disorders  
(13 sources)                            Hemosiderin   
pigmentation of   
lower limb due to   
varicose veins of   
lower limb;   
Translations: [Other   
specified disorders   
of pigmentation]                        2020          Episodic  
   
                                                    Other skin disorders  
(1 source)                              Callosity;   
Translations: [Corns   
and callosities]                        2024          Episodic  
   
                                                    Anastasia-; endo-; and   
myocarditis;   
cardiomyopathy (except   
that caused by   
tuberculosis or   
sexually transmitted   
disease)  
(20 sources)                            Cardiomyopathy;   
Translations:   
[Cardiomyopathy,   
unspecified]                            Onset:   
2022  
Resolved:   
2022                                          Chronic  
   
                                                    Peripheral and   
visceral   
atherosclerosis  
(20 sources)                            Peripheral vascular   
disease;   
Translations:   
[Peripheral vascular   
disease,   
unspecified]                            Onset:   
2023                Chronic  
   
                                                    Residual codes;   
unclassified  
(16 sources)                            Sleep apnea;   
Translations: [Sleep   
apnea, unspecified]                                         Chronic  
   
                                                    Residual codes;   
unclassified  
(20 sources)                            Obstructive sleep   
apnea syndrome;   
Translations:   
[Obstructive sleep   
apnea (adult)   
(pediatric)]                            Onset:   
2023                Chronic  
   
                                                    Residual codes;   
unclassified  
(17 sources)                            Obstructive sleep   
apnea (adult)   
(pediatric);   
Translations:   
[Obstructive sleep   
apnea   
(adult)(pediatric)]                     Onset:   
2022  
Resolved:   
2022                                          Chronic  
   
                                                    Residual codes;   
unclassified  
(4 sources)                             Continuous positive   
airway pressure   
ventilation   
treatment;   
Translations:   
[Dependence on other   
enabling machines   
and devices]                                                Chronic  
   
                                                    Residual codes;   
unclassified  
(8 sources)                             Dependence on   
continuous positive   
airway pressure   
ventilation;   
Translations:   
[Dependence on other   
enabling machines   
and devices]                            Onset:   
2023                Chronic  
   
                                                    Residual codes;   
unclassified  
(8 sources)                             Localized edema;   
Translations:   
[Edema]                                 Onset:   
2022  
Resolved:   
2022                                          Episodic  
   
                                                    Residual codes;   
unclassified  
(6 sources)               Edema, unspecified        Onset:   
2022  
Resolved:   
2022                                          Episodic  
   
                                                    Residual codes;   
unclassified  
(3 sources)                             History of chest   
pain; Translations:   
[Personal history of   
other specified   
diseases]                                                   Episodic  
   
                                                    Residual codes;   
unclassified  
(13 sources)                            Noncompliance with   
treatment;   
Translations:   
[Noncompliance]                         2021          Episodic  
   
                                                    Residual codes;   
unclassified  
(13 sources)                            Edema of right lower   
leg; Translations:   
[Localized edema]                       2022          Episodic  
   
                                                    Residual codes;   
unclassified  
(13 sources)                            Edema of lower   
extremity;   
Translations:   
[Localized edema]                       2019          Episodic  
   
                                                    Residual codes;   
unclassified  
(13 sources)                            Other specified   
health status;   
Translations:   
[Failure of   
outpatient   
treatment]                              2019          Episodic  
   
                                                    Skin and subcutaneous   
tissue infections  
(20 sources)                            Cellulitis of lower   
leg; Translations:   
[Cellulitis of right   
lower limb]                             Onset:   
2024                Episodic  
   
                                                    Spondylosis;   
intervertebral disc   
disorders; other back   
problems  
(6 sources)                             Other specified   
dorsopathies, lumbar   
region                                  Onset:   
2022  
Resolved:   
2022                                          Episodic  
   
                                                    Superficial injury;   
contusion  
(11 sources)                            Hematoma of right   
lower leg;   
Translations:   
[Contusion of right   
lower leg, initial   
encounter]                              Onset:   
12-                05-                Episodic  
   
                                                    Unclassified  
(16 sources)                            Inflammatory   
disorder;   
Translations:   
[Inflammation]                          2023            
   
                                                    Varicose veins of   
lower extremity  
(20 sources)                            Varicose ulcer of   
lower extremity;   
Translations:   
[Varicose veins of   
right lower   
extremity with ulcer   
other part of lower   
leg]                                    Onset:   
2024                Episodic  
   
                                                    Viral infection  
(13 sources)                            Disease caused by   
2019-nCoV;   
Translations:   
[COVID-19]                              2020          Episodic  
  
  
Past or Other Problems  
  
  
                      Problem Classification Problem    Date       Documented Da  
te Episodic/Chronic  
   
                                                    Acquired foot   
deformities  
(8 sources)                             Acquired hammer toe   
of right foot;   
Translations:   
[Other hammer   
toe(s) (acquired),   
right foot]                             Onset:   
2023  
Resolved:   
2023                Chronic  
   
                                                    Acquired foot   
deformities  
(8 sources)                             Acquired deformity   
of left foot;   
Translations:   
[Other acquired   
deformities of left   
foot]                                   Onset:   
2023                Episodic  
   
                                                    Open wounds of   
extremities  
(8 sources)                             Disorder of lower   
extremity;   
Translations:   
[Unspecified open   
wound, left lower   
leg, initial   
encounter]                              Onset:   
2024  
Resolved:   
2024                Episodic  
   
                                                    Other congenital   
anomalies  
(8 sources)                             Porokeratosis;   
Translations:   
[Other specified   
congenital   
malformations of   
skin]                                   Onset:   
2023  
Resolved:   
2023                Chronic  
   
                                                    Other diseases of veins   
and lymphatics  
(20 sources)                            Peripheral venous   
insufficiency;   
Translations:   
[Venous   
insufficiency   
(chronic)   
(peripheral)]                           Onset:   
2023                Episodic  
   
                                                    Other diseases of veins   
and lymphatics  
(12 sources)                            Disorder of vein of   
lower extremity;   
Translations:   
[Venous   
insufficiency   
(chronic)   
(peripheral)]                           Onset:   
2023                Episodic  
   
                                                    Other nutritional;   
endocrine; and   
metabolic disorders  
(1 source)                              Abnormal weight   
gain                                    Onset:   
2022  
Resolved:   
2022                                          Episodic  
   
                                                    Phlebitis;   
thrombophlebitis and   
thromboembolism  
(13 sources)                            Deep venous   
thrombosis;   
Translations:   
[Acute venous   
embolism and   
thrombosis of   
unspecified deep   
vessels of lower   
extremity]                              Onset:   
2024                Episodic  
   
                                                    Sprains and strains  
(20 sources)                            Sprain of hip;   
Translations: [Hip   
strain]                                 Onset:   
2024                Episodic  
   
                                                    Unclassified  
(7 sources)                             Never smoked   
tobacco;   
Translations:   
[Never a smoker]                                              
   
                                                    Unclassified  
(1 source)                                          Onset:   
2024                  
  
  
  
Results  
  
  
                          Test Name    Value        Interpretation Reference   
Range                                   Facility  
   
                                                    GLUCOSE POCT GLUCOMETERSon 1  
2024   
   
                      COMMEMT1   Glu2: Cleaned Meter                       [facility]   
[employer]  
   
                      Glucose [Mass/Vol] 97 mg/dL                         [facility]  
   
                                        Comment on above:   Random Glucose Refer  
ence Range is dependent on time and  
content of last meal. Glucose of more than 200 mg/dL in a  
nonstressed, ambulatory subject supports the diagnosis of  
Diabetes Mellitus.  
  
   
   
                                                                  [facility]  
   
                                                    Glucose Glucometer (BldC) [M  
ass/Vol]Ordered By: Shannen Julian on 2024   
   
                                        Glucose [Mass/Vol]  Capillary blood   
glucose measurement   
by glucometer   
(mass/volume)                                               [facility]  
   
                                        Comment on above:   Random Glucose Refer  
ence Range is dependent on time and   
content of last meal. Glucose of more than 200 mg/dL in a   
nonstressed, ambulatory subject supports the diagnosis of   
Diabetes Mellitus.   
   
                                                    Glucose Poct Glucometerson 1  
2024   
   
                      Commemt1   Glu2: Cleaned Meter Normal                [facility]  
   
                                        Comment on above:   Result Comment: PERF  
ORMED BY:  
[facility]  
[address] [place] [address]  
[phone]  
PATHOLOGIST MEDICAL DIRECTOR  
YULY HERRERA M.D.   
   
                                                            Performed By: #### G  
LULS ####  
Point of Care testing  
,   
   
                      Glucose [Mass/Vol] 97 mg/dL   Normal                [facility]  
   
                                        Comment on above:   Result Comment: Rand  
om Glucose Reference Range is dependent on  
   
time and  
content of last meal. Glucose of more than 200 mg/dL in a  
nonstressed, ambulatory subject supports the diagnosis of  
Diabetes Mellitus.   
   
                                                            Performed By: #### G  
LULS ####  
Point of Care testing  
,   
   
                                                    Nba 2024   
   
                                        L                   --------------------  
-  
---------------------  
--------  
Specimen: [id number]   
Received:   
 Status:   
JOLLY German Num:   
[id number]  
Spec Type: Surgical   
Subm Dr: Shannen Julian, DO  
  
Tissues: A Colon   
Biopsy (POLYPS @   
ASCENDING COLON)  
B Colon Biopsy   
(POLYPS @ 80CM)  
Procedures: HE/6,   
Gross/Micro L4/2  
---------------------  
--------  
Age/  
Patient Birth Sex   
Location Account   
Attending Physician  
---------------------  
--------  
Jocelin Pacheco 69/M SC   
[id number] Shannen Julian, DO  
---------------------  
--------  
  
SPEC NUM: [id number]   
RECD:    
STATUS: JOLLY LEANA NUM:   
[id number]  
KIRIT: 24- SUBM   
DR: Shannen Julian DO  
  
ENTERED:   
 CLAUD[facility]   
DR:  
  
SPEC TYPE: Surgical   
DEPT: S  
ENTERED BY: [id number]   
RECV BY: [id number]  
  
ORDERED: HE/6,   
Gross/Micro L4/2  
ORDERED: HE/6,   
Gross/Micro L4/2  
  
Pathological   
Diagnosis  
  
A. Colon, ascending,   
polypectomy:  
- Two tubular   
adenomas.  
  
B. Colon, polyp at 80   
cm, polypectomy:  
- Fragments of   
tubular adenoma.  
  
Clinical Information  
  
Positive Cologuard,   
colon polyps.  
  
Gross Description  
  
Part A is received in   
formalin labeled with   
the patients name,   
date of birth, and   
 polyps at  
ascending colon  are   
two tan-gray, focally   
erythematous,   
friable, 0.6 and 1.1   
cm polypoid  
fragments. The   
specimens are inked   
black at the apparent   
point of attachment.   
The larger  
specimen is serially   
sectioned, and   
entirely submitted in   
cassette A1. The   
smaller specimen  
trisected, and intact   
entirely submitted in   
cassette a 2. (2, ns,   
[id number])J  
  
Part B is received in   
formalin labeled with   
the patients name,   
date of birth, and   
 polyp at  
80 cm  are multiple   
(at least 6),   
tan-pink, delicate   
polypoid fragments,   
1.2 x 0.6 x 0.3 cm  
in aggregate. The   
specimen is filtered   
and entirely   
submitted in a single   
cassette. (1, ns,  
[phone] ) [facility]  
  
  
---------------------  
--------  
Specimen: [id number]   
Received:   
 Status:   
JOLLY German Num:   
[id number]  
Spec Type: Surgical   
Subm Dr: Shannen Julian DO  
  
Tissues: A Colon   
Biopsy (POLYPS @   
ASCENDING COLON)  
B Colon Biopsy   
(POLYPS @ 80CM)  
Procedures: HE/6,   
Gross/Micro L4/2  
---------------------  
--------  
Patient: Jocelin Pacheco [id number]   
(Continued)  
---------------------  
--------  
  
  
Specimen: [id number]   
Received:   
   
(Continued)  
  
  
Signed   
__________(signature   
on file)___________   
Austin Campbell MD 25   
0956  
  
  
---------------------  
--------  
Specimen: [id number]   
Received:   
 Status:   
JOLLY German Num:   
[id number]  
Spec Type: Surgical   
Subm Dr: Shannen Julian DO  
  
Tissues: A Colon   
Biopsy (POLYPS @   
ASCENDING COLON)  
B Colon Biopsy   
(POLYPS @ 80CM)  
Procedures: HE/6,   
Gross/Micro L4/2  
---------------------  
--------  
Patient: Jocelin Pacheco [id number]   
(Continued)  
---------------------  
--------  
  
  
Specimen: [id number]   
Received:   
   
(Continued)  
  
  
Microscopic   
Description  
  
A B: Microscopic   
examination is   
performed.  
  
CPT Codes  
  
88305 x2  
---------------------  
--------  
  
  
  
  
  
  
  
  
  
  
  
  
  
  
  
  
  
  
  
  
  
  
  
  
  
  
  
  
  
  
  
  
  
  
---------------------  
--------  
Specimen: [id number]   
Received:   
 Status:   
JOLLY German Num:   
[id number]  
Spec Type: Surgical   
Subm Dr: Shannen Julian DO  
  
Tissues: A Colon   
Biopsy (POLYPS @   
ASCENDING COLON)  
B Colon Biopsy   
(POLYPS @ 80CM)  
Procedures: HE/6,   
Gross/Micro L4/2  
---------------------  
--------  
Patient: Jocelin Pacheco [id number]   
(Continued)  
---------------------  
--------  
  
  
Signed   
__________(signature   
on file)___________   
Austin Campbell MD 24   
0956                Normal                                  [facility]   
Physician   
Group  
   
                                                    No Panel InformationOrdered   
By: Shannen Julian on 2024   
   
                      Bedside Glucose Comment Glu2: cleaned meter                 
        [facility]  
   
                                                    HbA1c HPLC (Bld) [Mass fract  
ion]on 2024   
   
                                                    HbA1c (Bld) [Mass   
fraction]       5.8 %                                           [facility]  
   
                                                    Aerobic Cultureon 2024  
   
   
                                        Aerobic Culture     Result Tab Codes  
Light Normal Skin   
Tiffany 2 Days  
No Anaerobes Isolated   
3 Days  
Gram Stain Result  
Rare Epithelial Cells  
Rare Gram Positive   
Cocci  
No White Blood Cells   
Seen  
PERFORMED BY:  
[facility]   
[place] [address]  
[phone]  
PATHOLOGIST MEDICAL   
DIRECTOR  
BRIDGETTE SWAIN M.D.    Normal                                  [facility]   
Physician   
Group  
   
                                        Comment on above:   Performed By: #### A  
ERC, GS ####  
[place] [id number] [facility]   
   
                                                    Aerobic cultureOrdered By: ISAAC Espino on 2024   
   
                                                    Bacteria identified Aer   
cx Nom (Unsp spec) Aerobic culture                                 [facility]  
   
                                                    Anaerobic cultureOrdered By:  
 Elaine Espino on 2024   
   
                                                    Bacteria identified   
Anaer cx Nom (Unsp   
spec)           Anaerobic culture                                 [facility]  
   
                                                    Gram Stainon 2024   
   
                                                    Microscopic observation   
Gram stain Nom (Unsp   
spec)                                   Gram Stain Result  
Rare Epithelial Cells  
Rare Gram Positive   
Cocci  
No White Blood Cells   
Seen  
PERFORMED BY:  
[place] [address]  
[phone]  
PATHOLOGIST MEDICAL   
DIRECTOR  
BRIDGETTE SWAIN M.D.    Normal                                  [facility]   
Physician   
Group  
   
                                        Comment on above:   Performed By: #### A  
ERC, GS ####  
[place] [id number] [facility]   
   
                                                    Gram stain for investigation  
 of transfusion reactionOrdered By: Elaine Espino on   
2024   
   
                                                    Microscopic observation   
Gram stain Nom (Unsp   
spec)                                   No Anaerobes Isolated   
3 Days                                                      [facility]  
   
                                                    Gram stain microscopyOrdered  
 By: Elaine Espino on 2024   
   
                                                    Microscopic observation   
Gram stain Nom (Unsp   
spec)           Gram stain microscopy                                 [facility]  
   
                                                    Alanine aminotransferase [En  
zymatic activity/volume] in Serum or PlasmaOrdered   
By:   
Vera Melvin on 2023   
   
                                                    ALT [Catalytic   
activity/Vol]   16 U/L                          7-52            [facility]  
   
                                                    Albumin [Mass/volume] in Ser  
um or Plasma by Bromocresol green (BCG) dye binding   
methoOrdered By: Vera Melvin on 2023   
   
                                                    Albumin BCG dye   
[Mass/Vol]      4.1 g/dL                        3.5-5.7         [facility]  
   
                                                    Alkaline phosphatase [Enzyma  
tic activity/volume] in Serum or PlasmaOrdered By:   
Vera Melvin on 2023   
   
                                                    ALP [Catalytic   
activity/Vol]   24 U/L                                    [facility]  
   
                                                    Aspartate aminotransferase [  
Enzymatic activity/volume] in Serum or PlasmaOrdered  
By:   
Vera Melvin on 2023   
   
                                                    AST [Catalytic   
activity/Vol]   18 U/L                          13-39           [facility]  
   
                                                    Automated erythrocytes count  
 in urine sediment (number/area)Ordered By: Vera Melvin on 2023   
   
                                                    RBC Auto (Urine sed)   
[#/Area]        1-2 [HPF]                       0-4             [facility]  
   
                                                    Automated leukocytes count i  
n urine sediment (number/area)Ordered By: Vera Melvin on 2023   
   
                                                    WBC Auto (Urine sed)   
[#/Area]        None seen [HPF]                 0-4             [facility]  
   
                                                    Bilirubin Test strip Ql (U)O  
rdered By: Vera Melvin on 2023   
   
                      Bilirubin Ql (U) Negative              Negative   [facility]  
   
                                                    Bilirubin.total [Mass/volume  
] in Serum or PlasmaOrdered By: Vera Melvin   
on   
2023   
   
                      Bilirubin [Mass/Vol] 0.7 mg/dL             0.3-1.0    [facility]  
   
                                                    Calcium [Mass/volume] in Ser  
um or PlasmaOrdered By: Vera Melvin on   
2023   
   
                      Calcium [Mass/Vol] 9.3 mg/dL             8.6-10.3   [facility]  
   
                                                    Carbon dioxide, total [Moles  
/volume] in Serum or PlasmaOrdered By: Vera Melvin on 2023   
   
                      CO2 [Moles/Vol] 25.0 mmol/L            21.0-31.0  [facility]  
   
                                                    Chloride [Moles/volume] in S  
santa or PlasmaOrdered By: Vera Melvin on   
2023   
   
                      Chloride [Moles/Vol] 105 mmol/L                 [facility]  
   
                                                    Cholesterol [Mass/volume] in  
 Serum or PlasmaOrdered By: Vera Melvin on   
2023   
   
                      Cholesterol [Mass/Vol] 175 mg/dL             140-200    [facility]  
   
                                        Comment on above:   Chol less than 200 m  
g/dl low riskChol 201-239 mg/dl borderline  
   
riskChol 240 mg/dl and greater high risk   
   
                                                    Cholesterol in LDL Calc [Mas  
s/Vol]Ordered By: Vera Melvin on 2023   
   
                                                    Cholesterol in LDL   
[Mass/Vol]      113 mg/dL                       0-100           [facility]  
   
                                        Comment on above:   LDL ATP III CLASSIFI  
CATIONLDL less than 100 mg/dL OptimalLDL   
100-129 mg/dL Near or above optimalLDL 130-159 mg/dL   
Borderline highLDL 160-189 mg/dL HighLDL greater than 189   
mg/dL Very high   
   
                                                    Cholesterol in VLDL Calc [Ma  
ss/Vol]Ordered By: Vera Melvin on 2023  
   
   
                                                    Cholesterol in VLDL   
[Mass/Vol]      19 mg/dL                                        [facility]  
   
                                                    Color Auto (U)Ordered By: Sa ileana Melvin on 2023   
   
                      Color (U)  Yellow                Yellow     [facility]  
   
                                                    Creatinine [Mass/volume] in   
Serum or PlasmaOrdered By: Vera Melvin on   
2023   
   
                      Creatinine [Mass/Vol] 1.03 mg/dL            0.70-1.30  [facility]  
   
                                                    Creatinine [Mass/volume] in   
UrineOrdered By: Vera Melvin on 2023   
   
                                                    Creatinine (U)   
[Mass/Vol]      107.0 mg/dL                     14.0-26.0       [facility]  
   
                                                    Erythrocyte distribution wid  
th Auto (RBC) [Ratio]Ordered By: Vera Melvin  
 on   
2023   
   
                                                    Erythrocyte   
distribution width   
(RBC) [Ratio]   14.0 %                          12.0-14.8       [facility]  
   
                                                    Globulin Calc (S) [Mass/Vol]  
Ordered By: Vera Melvin on 2023   
   
                      Globulin (S) [Mass/Vol] 3.0 g/dL                         F  
[facility]  
   
                                                    Glucose [Mass/volume] in Ser  
um or PlasmaOrdered By: Vera Melvin on   
2023   
   
                      Glucose [Mass/Vol] 95 mg/dL                   [facility]  
   
                                        Comment on above:   ADA recommended refe  
rence rangeRandom Glucose Reference Range   
is dependent on time and content of last meal. Glucose of more   
than 200 mg/dL in a nonstressed, ambulatory subject supports   
the diagnosis of Diabetes Mellitus.   
   
                                                    Glucose mean value [Mass/vol  
ume] in Blood Estimated from glycated   
hemoglobinOrdered   
By: Vera Melvin on 2023   
   
                                                    Average glucose   
Estimated from glycated   
hemoglobin (Bld)   
[Mass/Vol]      114 mg/dL                                       [facility]  
   
                                                    Hematocrit Auto (Bld) [Volum  
e fraction]Ordered By: Vera Melvin on   
2023   
   
                                                    Hematocrit (Bld)   
[Volume fraction] 44.1 %                          38.8-50.0       [facility]  
   
                                                    Hemoglobin A1c percentageOrd  
ered By: Vera Melvin on 2023   
   
                                                    HbA1c (Bld) [Mass   
fraction]       5.6 %                           4.3-5.6         [facility]  
   
                                        Comment on above:   Increased risk for d  
iabetes: 5.7 - 6.4diabetes: >6.4glycemic   
control for adults with diabetes: <7.0   
   
                                                    Hemoglobin [Mass/volume] in   
BloodOrdered By: Vera Melvin on 2023   
   
                                                    Hemoglobin (Bld)   
[Mass/Vol]      15.0 g/dL                       13.0-17.0       [facility]  
   
                                                    Ketones Auto test strip (U)   
[Mass/Vol]Ordered By: Vera Melvin on   
2023   
   
                      Ketones (U) [Mass/Vol] Negative              Negative   [facility]  
   
                                                    Laboratory - UrinalysisOrder  
ed By: Vera Melvin on 2023   
   
                                                    Hyaline casts LM Ql   
(Urine sed)     None seen [LPF]                 0-8             [facility]  
   
                                                    Leukocytes [#/volume] correc  
radha for nucleated erythrocytes in Blood by Automated  
   
counOrdered By: Vera Melvin on 2023   
   
                                                    WBC corrected for nucl   
RBC Auto (Bld) [#/Vol] 8.9 10*3/uL                     4.1-10.5        [facility]  
   
                                                    MCH Auto (RBC) [Entitic mass  
]Ordered By: Vera Melvin on 2023   
   
                                                    MCH (RBC) [Entitic   
mass]           29.9 pg                         27.5-35.2       [facility]  
   
                                                    MCHC Auto (RBC) [Mass/Vol]Or  
dered By: Vera Melvin on 2023   
   
                      MCHC (RBC) [Mass/Vol] 34.1 g/dL             32.5-35.6  [facility]  
   
                                                    MCV Auto (RBC) [Entitic vol]  
Ordered By: Vera Melvin on 2023   
   
                      MCV (RBC) [Entitic vol] 87.7 fL               83.5-101   F  
[facility]  
   
                                                    Microalbumin [Mass/volume] i  
n UrineOrdered By: Vera Melvin on 2023  
   
   
                                                    Albumin DL <= 20 mg/L   
(U) [Mass/Vol]  mg/dL                           0.0-1.8         [facility]  
   
                                                    Nitrite Test strip Ql (U)Ord  
ered By: Vera Melvin on 2023   
   
                      Nitrite Ql (U) Negative              Negative   [facility]  
   
                                                    No Panel InformationOrdered   
By: Vera Melvin on 2023   
   
                      Estimated GFR (CKD-EPI) > 60.0 mL/Min                       
  [facility]  
   
                                                    Pharmacy Creatinine   
Clearance (Chem N/A                                             [facility]  
   
                                                    Platelet mean volume Auto (B  
ld) [Entitic vol]Ordered By: Vera Melvin on   
2023   
   
                                                    Platelet mean volume   
(Bld) [Entitic vol] 7.5 fL                          6.6-10.1        [facility]  
   
                                                    Platelets Auto (Bld) [#/Vol]  
Ordered By: Vera Melvin on 2023   
   
                      Platelets (Bld) [#/Vol] 303 10*3/uL            150-450      
[facility]  
   
                                                    Potassium [Moles/volume] in   
Serum or PlasmaOrdered By: Vera Melvin on   
2023   
   
                      Potassium [Moles/Vol] 4.4 mmol/L            3.5-5.1    [facility]  
   
                                                    Prostate specific Ag [Mass/v  
olume] in Serum or PlasmaOrdered By: Vera Antony   
on 2023   
   
                                                    Prostate specific Ag   
[Mass/Vol]      2.660 ng/mL                     0.000-4.000     [facility]  
   
                                                    Protein Auto test strip (U)   
[Mass/Vol]Ordered By: Vera Melvin on   
2023   
   
                      Protein (U) [Mass/Vol] Negative              Negative   [facility]  
   
                                                    Protein [Mass/volume] in Ser  
um or PlasmaOrdered By: Vera Melvin on   
2023   
   
                      Protein [Mass/Vol] 7.1 g/dL              6.4-8.9    [facility]  
   
                                                    RBC Auto (Bld) [#/Vol]Ordere  
d By: Vera Melvin on 2023   
   
                      RBC (Bld) [#/Vol] 5.03 10*6/uL            3.90-5.60  [facility]  
   
                                                    Serum or plasma albumin/glob  
ulin mass ratioOrdered By: Vera Melvin on   
2023   
   
                                                    Albumin/Globulin [Mass   
ratio]          1.4 {ratio}                                     [facility]  
   
                                                    Serum or plasma anion gap de  
terminationOrdered By: Vera Melvin on   
2023   
   
                      Anion gap [Moles/Vol] 10.4 mmol/L            6.0-15.0   [facility]  
   
                                                    Serum or plasma high density  
 lipoprotein (HDL) cholesterol measurementOrdered   
By:   
Vera Melvin on 2023   
   
                                                    Cholesterol in HDL   
[Mass/Vol]      42 mg/dL                        23-92           [facility]  
   
                                        Comment on above:   HDL CHOL ATP-III CLA  
SSIFICATION Cardiovascular RiskHDL > or   
equal to 60 mg/dL LOWHDL < 40 mg/dL HIGH   
   
                                                    Serum or plasma total choles  
terol/high density lipoprotein (HDL) cholesterol   
mass   
ratOrdered By: Vera Melvin on 2023   
   
                                                    Cholesterol.total/Ami  
sterol in HDL [Mass   
ratio]          4.2 {ratio}                     <5.0            [facility]  
   
                                                    Sodium [Moles/volume] in Ser  
um or PlasmaOrdered By: Vera Melvin on   
2023   
   
                      Sodium [Moles/Vol] 136 mmol/L            136-145    [facility]  
   
                                                    Specific gravity Auto test s  
trip (U) [Rel density]Ordered By: Vera Antony on   
2023   
   
                                                    Specific gravity (U)   
[Rel density]   1.022                           1.001-1.030     [facility]  
   
                                                    Squamous epithelial cells de  
tection in urine sediment by light microscopyOrdered  
By:   
Vera Melvin on 2023   
   
                                                    Epithelial   
cells.squamous LM Ql   
(Urine sed)     0-1 [HPF]                       0-2             [facility]  
   
                                                    Triglyceride [Mass/volume] i  
n Serum or PlasmaOrdered By: Vera Melvin on   
2023   
   
                      Triglyceride [Mass/Vol] 99 mg/dL              0-149      F  
[facility]  
   
                                        Comment on above:   TRIG ATP III CLASSIF  
ICATIONTRIG less than 150 mg/dL NormalTRIG  
   
150-199 mg/dL Borderline highTRIG 200-500 mg/dL High TRIG   
greater than 500 mg/dL Very highStandard traceable to the   
Center for Disease Conrtrol and Prevention (CDC) test method.   
   
                                                    Urea nitrogen [Mass/volume]   
in Serum or PlasmaOrdered By: Vera Melvin on  
   
2023   
   
                                                    Urea nitrogen   
[Mass/Vol]      19 mg/dL                        7-            [facility]  
   
                                                    Urine bacteria detection by   
automated methodOrdered By: Vera Melvin on   
2023   
   
                      Bacteria Auto Ql (U) None seen             None Seen  [facility]  
   
                                                    Urine clarity by refractomet  
ry automatedOrdered By: Vera Melvin on   
2023   
   
                                                    Clarity Refractometry   
automated (U)   Clear                           Clear           [facility]  
   
                                                    Urine glucose measurement by  
 automated test strip (mass/volume)Ordered By:   
Vera Melvin on 2023   
   
                                                    Glucose Auto test strip   
(U) [Mass/Vol]  >=1000 mg/dL                    Normal          [facility]  
   
                                                    Urine hemoglobin detection b  
y automated test stripOrdered By: Vera Antony on   
2023   
   
                                                    Hemoglobin Auto test   
strip Ql (U)    Negative                        Negative        [facility]  
   
                                                    Urine leukocyte esterase det  
ection by automated test stripOrdered By: Vera Melvin on 2023   
   
                                                    Leukocyte esterase Auto   
test strip Ql (U) Negative                        Negative        [facility]  
   
                                                    Urine microalbumin/creatinin  
e mass ratioOrdered By: Vera Melvin on   
2023   
   
                                                    Albumin/Creatinine DL   
<= 20 mg/L (U) [Mass   
ratio]          TNP                                             [facility]  
   
                                        Comment on above:   Test not performed   
   
                                                    Urobilinogen Auto test strip  
 (U) [Mass/Vol]Ordered By: Vera Melvin on   
2023   
   
                                                    Urobilinogen (U)   
[Mass/Vol]      Normal mg/dL                    Normal          [facility]  
   
                                                    pH Auto test strip (U)Ordere  
d By: Vera Melvin on 2023   
   
                      pH (U)     5.5 [pH]              5.0-9.0    [facility]  
   
                                                    A1C HEMOGLOBINon 2023   
   
                                                    HbA1c (Bld) [Mass   
fraction]       5.6 %                                           [facility]   
[employer]   
[employer]  
Other Phone:   
[phone]  
   
                                                    HbA1c (Bld) [Mass fraction]o  
n 2023   
   
                      A1C HEMOGLOBIN                                  [facility]   
[employer]   
[employer]  
Other Phone:   
[phone]  
   
                                                    Office Visit (Cardiology)on   
2023   
   
                                        Follow-up visit     Diagnoses/Problems  
Assessed  
Chest pain,   
unspecified type   
(786.50) (R07.9)  
CHF (NYHA class II,   
ACC/AHA stage C)   
(428.0) (I50.9)  
LVH (left ventricular   
hypertrophy) (429.3)   
(I51.7)  
Non-ischemic   
cardiomyopathy   
(425.4) (I42.8)  
DVT (deep venous   
thrombosis) (453.40)   
(I82.409)  
Never a smoker  
Prediabetes (790.29)   
(R73.03)  
DIMITRI on CPAP (327.23)   
(G47.33)  
Morbid obesity with   
BMI of 45.0-49.9,   
adult (278.01,V85.42)   
(E66.01,Z68.42)  
Orders  
CHF (NYHA class II,   
ACC/AHA stage C)  
Renew: Spironolactone   
25 MG Oral Tablet;   
TAKE 1 TABLET DAILY  
CHF (NYHA class II,   
ACC/AHA stage C),   
Prediabetes  
Renew: Jardiance 10   
MG Oral Tablet; TAKE   
1 TABLET BY MOUTH   
ONCE DAILY  
Morbid obesity with   
BMI of 45.0-49.9,   
adult  
Healthy Weight Tips;   
Status:Complete -   
Retrospective   
Authorization; Done:   
[id number]  
Some eating tips that   
can help you lose   
weight.;   
Status:Complete -   
Retrospective  
Authorization; Done:   
[id number]  
SocHx: Never a smoker  
Tobacco Use   
Screening;   
Status:Complete;   
Done: [id number]  
Patient Instructions  
Please bring all   
medicines, vitamins,   
and herbal   
supplements with you   
when you come to the   
office.  
Prescriptions will   
not be filled unless   
you are compliant   
with your follow up   
appointments or have   
a follow up   
appointment scheduled   
as per instruction of   
your physician.   
Refills should be   
requested at the time   
of your visit.  
Patient to have labs   
forward to Dr. Compa Whitley DO   
when completed by pcp   
in 2023  
Follow up in 10   
months with Sharlene An NP  
  
  
Chief Complaint  
JOCELIN PACHECO is being   
seen for a 6 month   
follow-up of.  
Patient is a   
58-year-old gentleman   
returns for   
follow-up. He has had   
an over 30 pound   
weight loss since   
  
Ozempic therapy.  
He has a history of   
mild ASHD, mild to   
moderate nonischemic   
cardiomyopathy with   
persistent ejection   
fraction of 42%. He   
has underlying morbid   
obesity, diabetes,   
obstructive sleep   
apnea, history of   
remote DVT, recurrent   
ulcers on the calf   
due to venous stasis.   
His NYHA   
classification is   
class II-III/C. He is   
still working out   
with cardiac rehab   
and activities that   
are amenable to his   
stature and body   
habitus and   
capability.  
He has no   
hospitalizations for   
heart failure, no   
syncope, no falls, no   
angina  
Recommendations,   
continue GDMT as   
reviewed and   
prescribed, follow-up   
in 8 to 10 months   
with nurse   
practitioner  
  
Surgical History   
Problems  
History of Complete   
colonoscopy  
History of Finger   
surgical procedure  
History of   
Gallbladder surgery  
Past Medical History  
Problems  
History of arthritis   
(V13.4) (Z87.39)  
History of bleeding   
disorder (V12.3)   
(Z86.2)  
History of cardiac   
disorder (V12.50)   
(Z86.79)  
History of Numbness   
and tingling (782.0)   
(R20.0,R20.2)  
History of Vision   
problems (V41.0)   
(H54.7)  
Current Meds  
  
Medication   
NameInstruction  
Carvedilol 6.25 MG   
Oral TabletTAKE 1   
TABLET TWICE DAILY   
WITH MEALS.  
Jardiance 10 MG Oral   
TabletTAKE 1 TABLET   
BY MOUTH ONCE DAILY  
Lasix TABSTAKE 1   
TABLET TWICE DAILY.  
Nabumetone 750 MG   
Oral TabletTAKE 1   
TABLET EVERY 12 HOURS   
DAILY.  
Ozempic (1 MG/DOSE)   
SOPNINJECT 1 UNIT   
Weekly  
Spironolactone 25 MG   
Oral TabletTAKE 1   
TABLET DAILY.  
Xarelto 10 MG Oral   
TabletTake 1 tablet   
daily  
Patient did not bring   
medication list or   
bottles. Updated   
verbally with patient  
Allergies  
Medication  
Cephalosporins  
Adverse Reaction;   
Rash; Recorded By:   
Etelvina Bradshaw;   
2022 8:55:43 AM  
Statins  
Adverse Reaction;   
Rash; Recorded By:   
Etelvina Bradshaw;   
2022 8:55:43 AM  
Voltaren  
Adverse Reaction;   
Rash; Recorded By:   
Etelvina Bradshaw;   
2022 8:55:43 AM  
Social History  
Problems  
Daily caffeine   
consumption  
coffee 1-2 pots a day  
Never a smoker  
No alcohol use  
No illicit drug use  
Review of Systems  
Constitutional: not   
feeling tired.  
Cardiovascular: no   
intermittent leg   
claudication and as   
noted in HPI.  
Respiratory: no cough   
and no shortness of   
breath.  
Gastrointestinal: no   
change in bowel   
habits and no blood   
in stools.  
Integumentary: no   
skin rashes.  
Neurological: no   
seizures and no   
frequent falls.  
All other systems   
have been reviewed   
and are negative for   
complaint.  
  
Vitals  
Vital Signs  
Recorded: [id number]   
10:37AM  
Heart Rate74, L   
Radial  
Sumrjqcj045, LUE,   
Sitting  
Mhllsayky92, LUE,   
Sitting  
Height5 ft 11 in  
Cfwonw500 lb  
BMI Yyhhxbtzbe61.37   
kg/m2  
BSA Calculated2.69  
Tobacco Useb) No  
Falls Screening (Age   
18+)a) No falls   
within the last year  
Physical Exam  
Constitutional: alert   
and in no acute   
distress.  
Neck: neck is supple,   
symmetric, trachea   
midline, no masses   
and no thyromegaly .  
Pulmonary: no   
increased work of   
breathing or signs of   
respiratory distress   
and lungs clear to   
auscultation.  
Cardiovascular:   
carotid pulses 2+   
bilaterally with no   
bruit , JVP was   
normal, no thrills ,   
regular rhythm,   
normal S1 and S2, no   
murmurs , pedal   
pulses 2+ bilaterally   
and no edema .  
Abdomen: abdomen   
non-tender, no masses   
and no hepatomegaly .  
Skin: skin warm and   
dry, normal skin   
turgor .  
Psychiatric judgment   
and insight is normal   
and orien (more   
content not   
included)...        Normal                                   [facility]  
   
                                                    [facility] MUGA SCAN INJECTIONon    
   
                                        [facility] MUGA SCAN INJECTION MRN: [id number]  
Patient Name: JOCELIN PACHECO  
  
STUDY:  
MUGA  
  
Performing facility:  
[facility],  
[address] [place] [address], [address]   
[address],  
[place] [address]  
[facility] Provider: KOBI Whitley DO,   
[facility]  
PCP: Dr. OLIVER Melvin  
Supervising provider:   
Ze Flores MD  
  
INDICATION:  
Chest Pain; CHF NICM  
  
HISTORY:  
Gender: M; Age: 67 y/o ; Height: 0 cm;   
Weight: 0 kg.  
  
Chest Pain; CHF NICM  
Denies smoking.  
  
COMPARISON:  
Previous nuclear   
testing completed   
 MPI at [facility].  
  
  
ACCESSION NUMBER(S):  
[id number]; [id number]  
  
ORDERING CLINICIAN:  
COMPA WHITLEY  
  
TECHNIQUE:  
The patient received   
an IV injection of 3   
ml of stannous  
pyrophosphate (PYP)   
using the in-vivo   
method of labeling   
red blood  
cells. After 15   
minutes the patient   
received another IV   
injection of  
27.4 mCi of   
Technetium 99m   
pertechnetate. Planar   
image of the left  
ventricle was   
obtained in the [place]   
45.  
  
FINDINGS:  
The right ventricle   
was normal.  
The left ventricle   
was enlarged in size.  
Regional wall motion   
was abnormal.  
Global resting LVEF   
was abnormal- at 42%.  
  
IMPRESSION:  
Normal resting right   
ventricular function.  
Abnormalresting left   
ventricular function.  
Left ventricular   
ejection fraction is   
42%.  
No previous studies   
are available for   
comparison  
  
Electronically signed   
by: ELROY AGUDELO MD         Normal                                  [facility]  
   
                                                    No Panel Informationon    
   
                                            Normal                [facility]   
250 DO  
Work Phone:   
[phone]  
   
                                                    Creatinine and Glomerular fi  
ltration rate.predicted panel (S/P/Bld)Ordered By: JASON Whitley on 2023   
   
                      Creatinine [Mass/Vol] 1.12 mg/dL            0.64-1.27  [facility]  
   
                                                    Estimated glomerular filtrat  
ion rate (GFR) non- AmericanOrdered By: JASON Whitley   
on 2023   
   
                                                    GFR/1.73 sq M.predicted   
among non-blacks MDRD   
(S/P/Bld) [Vol   
rate/Area]      > 60 mL/Min                                     [facility]  
   
                                                    Laboratory - Chemistry and C  
hemistry - challengeOrdered By: JASON Whitley on   
2023   
   
                                                    Natriuretic peptide B   
(Bld) [Mass/Vol] 13.0 pg/mL                      5-100           [facility]  
   
                                                    No Panel InformationOrdered   
By: JASON Whitley on 2023   
   
                                                    Estimated GFR (   
American)       > 60 mL/Min                                     [facility]  
   
                                        Comment on above:   GFR estimated refere  
nce range: According to KDOQI guidelines,   
<60 ml/min/1.73m2 is sufficient to diagnose a patient with   
chronic kidney disease.   
   
                                                    Pharmacy Creatinine   
Clearance (Chem N/A                                             [facility]  
   
                                                    No Panel Informationon    
   
                                 10.7\S\10.7 Normal     6.0-15.0   [facility]   
250 DO  
Work Phone:   
[phone]  
   
                                 9.0\S\9.0  Normal     8.2-10.2   [facility]   
[facility]   
250 DO  
Work Phone:   
[phone]  
   
                                        Comment on above:   PERFORMED BY:LEONOR BEJARANO [facility] [place], [place] [id number] MEDICAL   
DIRECTORBRIDGETTE SWAIN M.D.   
   
                                 24.1\S\24.1 Normal     22.0-30.0  [facility]   
250 DO  
Work Phone:   
[phone]  
   
                                 103\S\103  Normal          [facility]   
250 DO  
Work Phone:   
[phone]  
   
                                 4.8\S\4.8  Normal     3.5-5.1    [facility]   
250 DO  
Work Phone:   
[phone]  
   
                                                133\S\133       below low   
threshold                 136-146                   [facility]   
250 DO  
Work Phone:   
[phone]  
   
                                 > 60       Normal                [facility]   
250 DO  
Work Phone:   
[phone]  
   
                                        Comment on above:   GFR estimated refere  
nce range: According to KDOQI guidelines,   
<60 ml/min/1.73m2 is sufficient to diagnose a patient with   
chronic kidney disease.   
   
                                 1.12\S\1.12 Normal     0.64-1.27  [facility]   
250 DO  
Work Phone:   
[phone]  
   
                                 17\S\17    Normal     9-23       [facility]   
250 DO  
Work Phone:   
[phone]  
   
                                 95\S\95    Normal          [facility]   
250 DO  
Work Phone:   
[phone]  
   
                                        Comment on above:   Random Glucose Refer  
ence Range is dependent on time and   
content of last meal. Glucose of more than 200 mg/dL in a   
nonstressed, ambulatory subject supports the diagnosis of   
Diabetes Mellitus. ADA recommended reference range   
   
                                 13.0\S\13.0 Normal     5-100      [facility]   
250 DO  
Work Phone:   
[phone]  
   
                                        Comment on above:   PERFORMED BY:[facility]   
[place], [place] [id number] MEDICAL   
DIRECTORBRIDGETTE SWAIN M.D.   
   
                                                    Serum or plasma anion gap de  
terminationOrdered By: JASON Whitley on 2023   
   
                      Anion gap [Moles/Vol] 10.7 mmol/L            6.0-15.0   [facility]  
   
                                                    Serum or plasma calcium cong  
urement (mass/volume)Ordered By: JASON Whitley on   
2023   
   
                      Calcium [Mass/Vol] 9.0 mg/dL             8.2-10.2   [facility]  
   
                                                    Serum or plasma chloride patricia  
surement (moles/volume)Ordered By: JASON Whitley on   
2023   
   
                      Chloride [Moles/Vol] 103 mmol/L                 [facility]  
   
                                                    Serum or plasma glucose cong  
urement (mass/volume)Ordered By: JASON Whitley on   
2023   
   
                      Glucose [Mass/Vol] 95 mg/dL                   [facility]  
   
                                        Comment on above:   ADA recommended refe  
rence rangeRandom Glucose Reference Range   
is dependent on time and content of last meal. Glucose of more   
than 200 mg/dL in a nonstressed, ambulatory subject supports   
the diagnosis of Diabetes Mellitus.   
   
                                                    Serum or plasma potassium me  
asurement (moles/volume)Ordered By: JASON Whitley on   
2023   
   
                      Potassium [Moles/Vol] 4.8 mmol/L            3.5-5.1    [facility]  
   
                                                    Serum or plasma sodium measu  
rement (moles/volume)Ordered By: JASON Whitley on   
2023   
   
                      Sodium [Moles/Vol] 133 mmol/L            136-146    [facility]  
   
                                                    Serum or plasma total carbon  
 dioxide measurement (moles/volume)Ordered By: JASON Whitley   
on 2023   
   
                      CO2 [Moles/Vol] 24.1 mmol/L            22.0-30.0  [facility]  
   
                                                    Serum or plasma urea nitroge  
n measurement (mass/volume)Ordered By: JASON Whitley on   
2023   
   
                                                    Urea nitrogen   
[Mass/Vol]      17 mg/dL                        9-23            [facility]  
   
                                                    Falls Screening (Age 18+)on   
2023   
   
                                                    Adult depression   
screening assessment No                                              [facility]   
[facility] Liao DO  
Work Phone:   
[phone]  
   
                                        Fall risk assessment a) No falls within   
the last year                                               [facility]   
[facility]   
250 DO  
Work Phone:   
[phone]  
   
                      Tobacco use status [facility]PHS b) No                            [facility]   
[facility] Liao DO  
Work Phone:   
[phone]  
   
                                                    Falls Screening (Age   
18+)                                    Patient is not at   
high risk for falls.   
Falls risk guidance   
reviewed today                                              [facility]   
[facility]   
250 DO  
Work Phone:   
[phone]  
   
                                                    Office Visit (Cardiology)on   
2023   
   
                                        Follow-up visit     Diagnoses/Problems  
Assessed  
DIMITRI on CPAP   
(327.23,V46.8)   
(G47.33,Z99.89)  
CHF (NYHA class II,   
ACC/AHA stage C)   
(428.0) (I50.9)  
Non-ischemic   
cardiomyopathy   
(425.4) (I42.8)  
Chest pain,   
unspecified type   
(786.50) (R07.9)  
Morbid obesity with   
BMI of 50.0-59.9,   
adult (278.01,V85.43)   
(E66.01,Z68.43)  
Never a smoker  
DVT (deep venous   
thrombosis) (453.40)   
(I82.409)  
Orders  
Chest pain,   
unspecified type, CHF   
(NYHA class II,   
ACC/AHA stage C),   
Non-ischemic  
cardiomyopathy  
Basic Metabolic   
Panel; Status:Active   
- Retrospective   
Authorization;   
Requested  
for:2023;  
NM [place] (Gated   
Cardiac Blood Pool);   
Status:Hold For -   
Scheduling,Retrospect  
anthony  
Authorization;   
Requested   
for:2023;  
Radiologist to   
Determine Optimal   
Study : Y  
What are the   
patient's signs and   
symptoms? : cp  
Brain Natriuretic   
Peptide BNP;   
Status:Active -   
Retrospective   
Authorization;   
Requested  
for:2023;  
CHF (NYHA class II,   
ACC/AHA stage C),   
Non-ischemic   
cardiomyopathy  
Renew: Carvedilol   
6.25 MG Oral Tablet;   
TAKE 1 TABLET TWICE   
DAILY WITH MEALS  
Morbid obesity with   
BMI of 50.0-59.9,   
adult  
Healthy Weight Tips;   
Status:Complete -   
Retrospective   
Authorization; Done:   
2023  
Some eating tips that   
can help you lose   
weight.;   
Status:Complete -   
Retrospective  
Authorization; Done:   
2023  
SocHx: Never a smoker  
Tobacco Use   
Screening;   
Status:Complete;   
Done: [id number]  
Patient Instructions  
Please bring all   
medicines, vitamins,   
and herbal   
supplements with you   
when you come to the   
office.  
Prescriptions will   
not be filled unless   
you are compliant   
with your follow up   
appointments or have   
a follow up   
appointment scheduled   
as per instruction of   
your physician.   
Refills should be   
requested at the time   
of your visit.  
  
Follow up in 6 months  
  
  
Chief Complaint  
JOCELIN PACHECO is being   
seen for a 6 month   
follow-up of.  
62-year-old gentleman   
who returns for   
annual visit he is   
doing well just   
complains atypical   
chest discomfort   
described as tingling   
as well as sharp and   
somewhat painful   
sensation both at   
rest and with   
activities that are   
sporadic and episodic   
only but not   
reproducible. He has   
a history of moderate   
nonischemic   
cardiomyopathy,   
obstructive sleep   
apnea, morbid   
obesity, history of   
remote DVT (remains   
on low-dose Xarelto).  
Stress perfusion   
imaging from 2022 revealed patchy   
tracer uptake,   
proceed ischemia or   
infarction, normal   
left ventricular   
volume with global   
hypokinesis and   
ejection fraction of   
44% normal transient   
ischemic index.  
Heart catheterization   
from  revealed 30   
to 50% ostial   
circumflex disease,   
20 to 25% mid LAD   
disease normal right   
coronary and at that   
time ejection   
fraction of 40 to   
45%.  
Since  with his   
medical history   
continue therapies.   
We have transitioned   
over to Entresto,   
Jardiance, carvedilol   
and spironolactone  
Unfortunately he has   
had no significant   
weight loss despite   
going to cardiac   
rehab 3 times a week.   
He remains 365   
pounds, similar to   
  
Recommendations,   
obtain a MUGA scan   
current therapies,   
obtain appropriate   
laboratories   
including Chem-6 and   
BNP, we will have him   
come back in 6 months   
continues to have any   
further chest   
discomfort will   
prescribe nitrates   
and/or consider   
invasive assessment.  
  
Active Problems   
Problems  
Abnormal stress test   
(794.39) (R94.39)  
DVT (deep venous   
thrombosis) (453.40)   
(I82.409)  
Knee pain (719.46)   
(M25.569)  
LVH (left ventricular   
hypertrophy) (429.3)   
(I51.7)  
Morbid obesity with   
BMI of 50.0-59.9,   
adult (278.01,V85.43)   
(E66.01,Z68.43)  
Never a smoker  
DIMITRI on CPAP   
(327.23,V46.8)   
(G47.33,Z99.89)  
Prediabetes (790.29)   
(R73.03)  
Primary   
osteoarthritis of   
left knee (715.16)   
(M17.12)  
Primary   
osteoarthritis of   
right knee (715.16)   
(M17.11)  
SOB (shortness of   
breath) on exertion   
(786.05) (R06.02)  
Surgical History  
Problems  
History of Complete   
colonoscopy  
History of Finger   
surgical procedure  
History of   
Gallbladder surgery  
Past Medical History  
Problems  
History of arthritis   
(V13.4) (Z87.39)  
History of bleeding   
disorder (V12.3)   
(Z86.2)  
History of cardiac   
disorder (V12.50)   
(Z86.79)  
History of Numbness   
and tingling (782.0)   
(R20.0,R20.2)  
History of Vision   
problems (V41.0)   
(H54.7)  
Current Meds  
  
Medication   
NameInstruction  
Carvedilol 6.25 MG   
Oral TabletTAKE 1   
TABLET TWICE DAILY   
WITH MEALS.  
Entresto 24-26 MG   
Oral TabletTAKE 1   
TABLET BY MOUTH TWICE   
A DAY  
Jardiance 10 MG Oral   
TabletTAKE 1 TABLET   
BY MOUTH ONCE DAILY  
Lasix TABSTAKE 1   
TABLET TWICE DAILY.  
Nabumetone 750 MG   
Oral TabletTAKE 1   
TABLET EVERY 12 HOURS   
DAILY.  
Ozempic (1 MG/DOSE)   
SOPNINJECT 1 UNIT   
Weekly  
Spironolactone 25 MG   
Oral TabletTAKE 1   
TABLET DAILY.  
Xarelto 10 MG Oral   
TabletTake 1 tablet   
daily  
Allergies  
Medication  
Cephalosporins  
Adverse Reaction;   
Rash; Recorded By:   
Etelvina Bradshaw;   
2022 8:55:43 AM  
Statins  
Adverse Reaction;   
Rash; Recorded By:   
Etelvina Bradshaw;   
2022 8:55:43 AM  
Voltaren  
Adverse Reaction;   
Rash; Recorded By:   
Etelvina Bradshaw;   
2022 8:55:43 AM  
Family History  
Mother  
Family history of   
COPD, moderate (more   
content not   
included)...        Normal                                   [facility]  
   
                                                    A1C HEMOGLOBINon 2022   
   
                                                    HbA1c (Bld) [Mass   
fraction]       5.4 %                                           [facility]   
[employer]  
Other Phone:   
[phone]  
   
                                                    HbA1c (Bld) [Mass fraction]o  
n 2022   
   
                      A1C HEMOGLOBIN                                  [facility]  
t   
[employer]   
[employer]  
Other Phone:   
[phone]  
   
                                                    No Panel Informationon    
   
                                 9.3\S\9.3  Normal     8.2-10.2   [facility]   
[facility]   
250 DO  
Work Phone:   
[phone]  
   
                                        Comment on above:   PERFORMED BY:[facility] [place], [place] [id number] MEDICAL   
DIRECTORBRIDGETTE SWAIN M.D.   
   
                                 25.0\S\25.0 Normal     22.0-30.0  [facility]  
   
[facility]   
250 DO  
Work Phone:   
[phone]  
   
                                 101\S\101  Normal          [facility]   
[facility] Liao DO  
Work Phone:   
[phone]  
   
                                 4.3\S\4.3  Normal     3.5-5.1    [facility]   
[facility]   
250 DO  
Work Phone:   
[phone]  
   
                                                135\S\135       below low   
threshold                 136-146                   [facility]   
[facility]   
250 DO  
Work Phone:   
[phone]  
   
                                 > 60       Normal                [facility]  
Work Phone:   
[phone]  
   
                                        Comment on above:   GFR estimated refere  
nce range: According to KDOQI guidelines,   
<60 ml/min/1.73m2 is sufficient to diagnose a patient with   
chronic kidney disease.   
   
                                 54\S\54    Normal                [facility]   
[facility]   
250 DO  
Work Phone:   
[phone]  
   
                                                1.33\S\1.33     above high   
threshold                 0.64-1.27                 [facility]   
[facility]   
250 DO  
Work Phone:   
[phone]  
   
                                 20\S\20    Normal     9-23       [facility]   
250 DO  
Work Phone:   
[phone]  
   
                                 92\S\92    Normal          [facility]   
250 DO  
Work Phone:   
[phone]  
   
                                        Comment on above:   Random Glucose Refer  
ence Range is dependent on time and   
content of last meal. Glucose of more than 200 mg/dL in a   
nonstressed, ambulatory subject supports the diagnosis of   
Diabetes Mellitus. ADA recommended reference range   
   
                                                    Tobacco Screening.on   
022   
   
                                                    Adult depression   
screening assessment No                                              [facility]   
[facility]   
250 DO  
Work Phone:   
[phone]  
   
                                        Fall risk assessment b) One or more fall  
s   
in the last year                                            [facility]   
[employer]   
250 DO  
Work Phone:   
[phone]  
   
                      Tobacco use status CPHS b) No                            M  
[facility]   
[employer]   
250 DO  
Work Phone:   
[phone]  
   
                                                    Complete Blood Count with Au  
to Diffon 2022   
   
                                                    Erythrocyte   
distribution width   
(RBC) [Ratio]   13.0 %          Normal          11.0-15.0       [facility]   
Medical   
Specialist  
   
                                        Comment on above:   Performed By: #### M  
DIFF, CMP, CBCAD ####  
[facility]  
[address], [place] [id number]   
   
                                                    Hematocrit (Bld)   
[Volume fraction] 42.7 %          Normal          38.5-50.0       [facility]   
Medical   
Specialist  
   
                                        Comment on above:   Performed By: #### M  
DIFF, CMP, CBCAD ####  
[facility]  
[address], [place] [id number]   
   
                                                    Hemoglobin (Bld)   
[Mass/Vol]      14.5 g/dL       Normal          13.0-17.1       [facility]   
Medical   
Specialist  
   
                                        Comment on above:   Performed By: #### M  
DIFF, CMP, CBCAD ####  
[facility]  
[address] [place] [id number]   
   
                                                    MCH (RBC) [Entitic   
mass]           29.4 pg         Normal          27.0-33.0       [facility]   
Specialist  
   
                                        Comment on above:   Performed By: #### M  
DIFF, CMP, CBCAD ####  
[facility]  
[address] [facility] [place] [id number]   
   
                      MCHC (RBC) [Mass/Vol] 34.0 g/dL  Normal     32.0-36.0  [facility]   
Specialist  
   
                                        Comment on above:   Performed By: #### M  
DIFF, CMP, CBCAD ####  
[facility]  
[address] [facility] [place] [id number]   
   
                      MCV (RBC) [Entitic vol] 87 fL      Normal          N  
[facility]   
Specialist  
   
                                        Comment on above:   Performed By: #### M  
DIFF, CMP, CBCAD ####  
[facility]  
[address] [facility] [place] [id number]   
   
                                                    Platelet mean volume   
(Bld) [Entitic vol] 9.40 fL         Normal          7.50-12.50      [place]  
[facility]   
Medical   
Specialist  
   
                                        Comment on above:   Performed By: #### M  
DIFF, CMP, CBCAD ####  
[facility]  
[address], [place] [id number]   
   
                      Platelets (Bld) [#/Vol] 316 10*3/uL Normal     140-400      
[facility]   
Specialist  
   
                                        Comment on above:   Performed By: #### M  
DIFF, CMP, CBCAD ####  
[facility]  
[address], [place] [id number]   
   
                      RBC (Bld) [#/Vol] 4.93 10*6/uL Normal     4.20-5.80  [facility]   
Specialist  
   
                                        Comment on above:   Performed By: #### M  
DIFF, CMP, CBCAD ####  
[facility]  
[address], [place] [id number]   
   
                      RDW-SD     40.3 fL    Normal     37.0-50.0  [facility]   
Specialist  
   
                                        Comment on above:   Performed By: #### M  
DIFF, CMP, CBCAD ####  
[facility]  
[address], [place] [id number]   
   
                      REFLEX     Manual Differential Normal                [facility]  
   
                                        Comment on above:   Performed By: #### M  
DIFF, CMP, CBCAD ####  
[facility]  
[address], [place] [id number]   
   
                      WBC (Bld) [#/Vol] 6.9 10*3/uL Normal     3.8-11.0   [facility]   
Specialist  
   
                                        Comment on above:   Performed By: #### M  
DIFF, CMP, CBCAD ####  
[facility]  
[address], [place] [id number]   
   
                                                    Comprehensive Metabolic Pane  
nba 2022   
   
                      Albumin [Mass/Vol] 4.4 g/dL   Normal     3.6-5.1    [facility]   
Medical   
Specialist  
   
                                        Comment on above:   Performed By: #### M  
DIFF, CMP, CBCAD ####  
[facility]  
[address], [place] [id number]   
   
                                                    Albumin/Globulin [Mass   
ratio]          1.8 {ratio}     Normal          1.0-2.5         [facility]   
Specialist  
   
                                        Comment on above:   Performed By: #### M  
DIFF, CMP, CBCAD ####  
[facility]  
[address] [facility] [place] [id number]   
   
                                                    ALP [Catalytic   
activity/Vol]   34 U/L          Low                       [facility]   
Specialist  
   
                                        Comment on above:   Performed By: #### M  
DIFF, CMP, CBCAD ####  
[facility]  
[address], [place] [id number]   
   
                                                    ALT [Catalytic   
activity/Vol]   19 U/L          Normal          9-46            [facility]   
Specialist  
   
                                        Comment on above:   Result Comment:  Female reference range changed.   
   
                                                            Performed By: #### M  
DIFF, CMP, CBCAD ####  
[facility]  
[address], [place] [id number]   
   
                      Anion gap [Moles/Vol] 21 mmol/L  High       12-20      [facility]  
   
                                        Comment on above:   Result Comment: Effe  
ctive 2020 reference range changed.   
   
                                                            Performed By: #### M  
DIFF, CMP, CBCAD ####  
[facility]  
[address], [place] [id number]   
   
                                                    AST [Catalytic   
activity/Vol]   24 U/L          Normal          10-40           [facility]   
Specialist  
   
                                        Comment on above:   Performed By: #### M  
DIFF, CMP, CBCAD ####  
[facility]  
[address], [place] [id number]   
   
                      Bilirubin [Mass/Vol] 0.56 mg/dL Normal     0.30-1.20  [facility]  
   
                                        Comment on above:   Performed By: #### M  
DIFF, CMP, CBCAD ####  
[facility]  
[address], [place] [id number]   
   
                      BUN/CREA   17 Ratio   Normal     6-22       [facility]  
   
                                        Comment on above:   Performed By: #### M  
DIFF, CMP, CBCAD ####  
[facility]  
[address], [place] [id number]   
   
                      Calcium [Mass/Vol] 9.3 mg/dL  Normal     8.6-10.2   [facility]  
   
                                        Comment on above:   Performed By: #### M  
DIFF, CMP, CBCAD ####  
[facility]  
[address], [place] [id number]   
   
                      Chloride [Moles/Vol] 100 mmol/L Normal          [facility]   
Specialist  
   
                                        Comment on above:   Performed By: #### M  
DIFF, CMP, CBCAD ####  
[facility]  
[address], [place] [id number]   
   
                      CO2 [Moles/Vol] 20 mmol/L  Normal     20-31      [facility]   
Specialist  
   
                                        Comment on above:   Performed By: #### M  
DIFF, CMP, CBCAD ####  
[facility]  
[address], [place] [id number]   
   
                      Creatinine [Mass/Vol] 1.0 mg/dL  Normal     0.7-1.4    Janell  
[facility]   
Specialist  
   
                                        Comment on above:   Performed By: #### M  
DIFF, CMP, CBCAD ####  
[facility]  
[address], [place] [id number]   
   
                      eGFRAA     88 mL/min/1.73m2 Normal     >60        [facility]   
Specialist  
   
                                        Comment on above:   Performed By: #### M  
DIFF, CMP, CBCAD ####  
[facility]  
[address], [place] [id number]   
   
                      eGFRNAA    72 mL/min/1.73m2 Normal     >60        [facility]   
Specialist  
   
                                        Comment on above:   Performed By: #### M  
DIFF, CMP, CBCAD ####  
[facility]  
[address], [place] [id number]   
   
                      Globulin (S) [Mass/Vol] 2.5 g/dL   Normal     1.9-3.7    N  
[facility]   
Specialist  
   
                                        Comment on above:   Performed By: #### M  
DIFF, CMP, CBCAD ####  
[facility]  
[address], [place] [id number]   
   
                      Glucose [Mass/Vol] 111 mg/dL  High       65-99      Juanpablo  
[facility]   
Specialist  
   
                                        Comment on above:   Result Comment: For   
FASTING Glucose --- ADA reference ranges:  
Normal 65-99 mg/dl  
Prediabetes 100-125  
Diabetes >/= 126   
   
                                                            Performed By: #### M  
DIFF, CMP, CBCAD ####  
[facility]  
[address], [place] [id number]   
   
                      Potassium [Moles/Vol] 4.5 mmol/L Normal     3.5-5.5    Janell  
[facility]   
Medical   
Specialist  
   
                                        Comment on above:   Performed By: #### M  
DIFF, CMP, CBCAD ####  
[facility]  
[address], [place] [id number]   
   
                      Protein [Mass/Vol] 6.9 g/dL   Normal     6.1-8.1    [place]  
[facility] [place]   
Medical   
Specialist  
   
                                        Comment on above:   Performed By: #### M  
DIFF, CMP, CBCAD ####  
[facility]  
[address], [place] [id number]   
   
                      Sodium [Moles/Vol] 137 mmol/L Normal     135-146    [facility]  
   
                                        Comment on above:   Performed By: #### M  
DIFF, CMP, CBCAD ####  
[facility]  
[address], [place] [id number]   
   
                                                    Urea nitrogen   
[Mass/Vol]      17 mg/dL        Normal          7-25            [facility]   
Specialist  
   
                                        Comment on above:   Performed By: #### M  
DIFF, CMP, CBCAD ####  
[facility]  
[address], [place] [id number]   
   
                                                    Manual Differentialon 2022   
   
                      BAND       0.0 %      Normal                [facility]   
Specialist  
   
                                        Comment on above:   Performed By: #### M  
DIFF, CMP, CBCAD ####  
[facility]  
[address], [place] [id number]   
   
                      BANDABS    0.0 K/uL   Normal                [facility]  
   
                                        Comment on above:   Performed By: #### M  
DIFF, CMP, CBCAD ####  
[facility]  
[address], [place] [id number]   
   
                      BASO       0.0 %      Normal                [facility]   
Specialist  
   
                                        Comment on above:   Performed By: #### M  
DIFF, CMP, CBCAD ####  
[facility]  
[address], [place] [id number]   
   
                      BASOABS    0.0 K/uL   Normal     0.0-0.2    [facility]   
Specialist  
   
                                        Comment on above:   Performed By: #### M  
DIFF, CMP, CBCAD ####  
[facility]  
[address], [place] [id number]   
   
                      EOS        6.0 %      Normal                [facility]   
Specialist  
   
                                        Comment on above:   Performed By: #### M  
DIFF, CMP, CBCAD ####  
[facility]  
[address], [place] [id number]   
   
                      EOSABS     0.4 K/uL   Normal     0.0-0.5    [facility]   
Specialist  
   
                                        Comment on above:   Performed By: #### M  
DIFF, CMP, CBCAD ####  
[facility]  
[address], [place] [id number]   
   
                      LYMPH      32.0 %     Normal                [facility]   
Specialist  
   
                                        Comment on above:   Performed By: #### M  
DIFF, CMP, CBCAD ####  
[facility]  
[address], [place] [id number]   
   
                      LYMPHABS   2.2 K/uL   Normal     0.9-3.9    [facility]   
Specialist  
   
                                        Comment on above:   Performed By: #### M  
DIFF, CMP, CBCAD ####  
[facility]  
[address], [place] [id number]   
   
                      LYMPHATYP  0.0 %      Low        0.9-3.9    [facility]   
Specialist  
   
                                        Comment on above:   Performed By: #### M  
DIFF, CMP, CBCAD ####  
[facility]  
[address], [place] [id number]   
   
                      MONO       16.0 %     Normal                [facility]   
Specialist  
   
                                        Comment on above:   Performed By: #### M  
DIFF, CMP, CBCAD ####  
[facility]  
[address], [place] [id number]   
   
                      MONOABS    1.1 K/uL   High       0.2-0.9    [facility]   
Specialist  
   
                                        Comment on above:   Performed By: #### M  
DIFF, CMP, CBCAD ####  
[facility]  
[address], [place] [id number]   
   
                      SEG        46.0 %     Normal                [facility]   
Specialist  
   
                                        Comment on above:   Performed By: #### M  
DIFF, CMP, CBCAD ####  
[facility]  
[address], [place] [id number]   
   
                      SEGABS     3.2 K/uL   Normal     1.5-7.8    [facility]   
Medical   
Specialist  
   
                                        Comment on above:   Performed By: #### M  
DIFF, CMP, CBCAD ####  
[facility]  
[address], [place] [id number]   
   
                      WBC        6.9 K/uL   Normal     3.8-11.0   [facility]   
Medical   
Specialist  
   
                                        Comment on above:   Performed By: #### M  
DIFF, CMP, CBCAD ####  
[facility]  
[address], [place] [id number]   
   
                                                    XR Chest 2 Views*on 20   
   
                                        XR Chest 2 Views*   HISTORY: Calm and   
shortness of breath  
  
COMPARISON: None   
available  
  
TECHNIQUE: Frontal   
and lateral views of   
the chest  
  
  
FINDINGS:  
  
The cardiomediastinal   
silhouette is within   
normal limits. Small   
subtle bilateral  
lower lobe opacities.   
No pneumothorax or   
pleural effusion. No   
acute osseous   
abnormality.  
No changes of the   
spine.  
  
  
IMPRESSION:  
  
Small subtle   
bilateral lower lobe   
opacities may   
represent   
atelectasis, or   
pneumonia  
in the appropriate   
clinical setting.  
  
  
  
  
  
  
Report reported and   
signed by Marcus Mckeon on 2022   
1321                Normal                                  [facility]   
Specialist  
  
  
  
Vital Signs  
  
  
                          Date Time    Vital Sign   Value        Performing   
Clinician                               Facility  
   
                                                    2024   
11:                              Diastolic blood   
pressure                  59 mm[Hg]                 Vera   
[place] DO  
Work Phone:   
[phone]                          [facility]  
   
                                                    2024   
11:          Heart rate          83 /min             Vera   
[place] DO  
Work Phone:   
[phone]                          [facility]  
   
                                                    2024   
11:          Respiratory rate    20 /min             Vera   
[place] DO  
Work Phone:   
[phone]                          [facility]  
   
                                                    2024   
11:                              SaO2% (BldA) [Mass   
fraction]                 97 %                      Vera   
[place] DO  
Work Phone:   
[phone]                          [facility]  
   
                                                    2024   
11:                              Systolic blood   
pressure                  124 mm[Hg]                Vera   
[place] DO  
Work Phone:   
[phone]                          [facility]  
   
                                                    2024   
11:          Body temperature    97.8 [degF]         Vera   
[place] DO  
Work Phone:   
[phone]                          [facility]  
   
                                                    2024   
10:          Body height         180.34 cm           Vera   
[place] DO  
Work Phone:   
[phone]                          [facility]  
   
                                                    2024   
10:          Body weight         157.85 kg           Vera   
[place] DO  
Work Phone:   
[phone]                          [facility]  
   
                                                    2024   
11:          Body temperature    98.1 [degF]         Vera   
[place] DO  
Work Phone:   
[phone]                          [facility]  
   
                                                    2024   
11:                              Diastolic blood   
pressure                  67 mm[Hg]                 Vera   
[place] DO  
Work Phone:   
[phone]                          [facility]  
   
                                                    2024   
11:          Heart rate          81 /min             Vera   
[place] DO  
Work Phone:   
[phone]                          [facility]  
   
                                                    2024   
11:          Respiratory rate    18 /min             Vera   
[place] DO  
Work Phone:   
[phone]                          [facility]  
   
                                                    2024   
11:                              Systolic blood   
pressure                  121 mm[Hg]                Vera   
[place] DO  
Work Phone:   
[phone]                          [facility]  
   
                                                    2024   
11:          Body height         180.34 cm           Vera   
[place] DO  
Work Phone:   
[phone]                          [facility]  
   
                                                    2024   
11:                              Body mass index   
(BMI) [Ratio]             51.2 kg/m2                Vera   
[place] DO  
Work Phone:   
[phone]                          [facility]  
   
                                                    2024   
11:          Body weight         166.46 kg           Vera   
[place] DO  
Work Phone:   
[phone]                          [facility]  
   
                                                    10-   
09:          Body height         180.3 cm            Shannen Itzkowitz   
DO  
Work Phone:   
[phone]                          [facility]  
   
                                                    10-   
09:                              Body mass index   
(BMI) [Ratio]             50.35 kg/m2               Shannen Itzkowitz   
DO  
Work Phone:   
[phone]                          [facility]  
   
                                                    10-   
09:          Body weight         163.75 kg           Shannen Itzkowitz   
DO  
Work Phone:   
[phone]                          [facility]  
   
                                                    10-   
09:                              Diastolic blood   
pressure                  85 mm[Hg]                 Shannen Itzkowitz   
DO  
Work Phone:   
[phone]                          [facility]  
   
                                                    10-   
09:                              Systolic blood   
pressure                  135 mm[Hg]                Shannen Itzkowitz   
DO  
Work Phone:   
[phone]                          [facility]  
   
                                                    2024   
11:          Body temperature    97.7 [degF]         DO Vera   
[place]  
Work Phone:   
[phone]                          [facility]  
   
                                                    2024   
11:                              Diastolic blood   
pressure                  70 mm[Hg]                 DO Vrea   
[place]  
Work Phone:   
[phone]                          [facility]  
   
                                                    2024   
11:          Heart rate          76 /min             DO Vera   
[place]  
Work Phone:   
[phone]                          [facility]  
   
                                                    2024   
11:          Respiratory rate    18 /min             DO Vera   
[place]  
Work Phone:   
[phone]                          [facility]  
   
                                                    2024   
11:                              Systolic blood   
pressure                  127 mm[Hg]                DO Vera   
[place]  
Work Phone:   
[phone]                          [facility]  
   
                                                    2024   
15:          Body height         180.34 cm           DO Vera   
[place]  
Work Phone:   
[phone]                          [facility]  
   
                                                    2024   
15:                              Body mass index   
(BMI) [Ratio]             51.2 kg/m2                DO Vera   
[place]  
Work Phone:   
[phone]                          [facility]  
   
                                                    2024   
15:          Body weight         166.46 kg           DO Vera   
[place]  
Work Phone:   
[phone]                          [facility]  
   
                                                    2024   
13:          Body height         180.34 cm           DO Vera   
[place]  
Work Phone:   
[phone]                          [facility]  
   
                                                    2024   
13:                              Body mass index   
(BMI) [Ratio]             51.5 kg/m2                DO Vera   
[place]  
Work Phone:   
[phone]                          [facility]  
   
                                                    2024   
13:          Body weight         167.43 kg           DO Vera   
[place]  
Work Phone:   
[phone]                          [facility]  
   
                                                    2024   
13:                              Diastolic blood   
pressure                  58 mm[Hg]                 DO Vera   
[place]  
Work Phone:   
[phone]                          [facility]  
   
                                                    2024   
13:          Heart rate          86 /min             DO Vera   
[place]  
Work Phone:   
[phone]                          [facility]  
   
                                                    2024   
13:          Respiratory rate    20 /min             DO Vera   
[place]  
Work Phone:   
[phone]                          [facility]  
   
                                                    2024   
13:                              SaO2% (BldA) [Mass   
fraction]                 97 %                      DO Vera   
[place]  
Work Phone:   
[phone]                          [facility]  
   
                                                    2024   
13:                              Systolic blood   
pressure                  110 mm[Hg]                DO Vera   
[place]  
Work Phone:   
[phone]                          [facility]  
   
                                                    2024   
16:          Body height         180.34 cm           DO Vera   
[place]  
Work Phone:   
[phone]                          [facility]  
   
                                                    2024   
16:                              Body mass index   
(BMI) [Ratio]             51.2 kg/m2                DO Vera   
[place]  
Work Phone:   
[phone]                          [facility]  
   
                                                    2024   
16:          Body weight         166.46 kg           DO Vera   
[place]  
Work Phone:   
[phone]                          [facility]  
   
                                                    2024   
15:          Body temperature    98.4 [degF]         DO Vera   
[place]  
Work Phone:   
[phone]                          [facility]  
   
                                                    2024   
15:                              Diastolic blood   
pressure                  70 mm[Hg]                 DO Vera   
[place]  
Work Phone:   
[phone]                          [facility]  
   
                                                    2024   
15:          Heart rate          99 /min             DO Vera   
[place]  
Work Phone:   
[phone]                          [facility]  
   
                                                    2024   
15:          Respiratory rate    18 /min             DO Vera   
[place]  
Work Phone:   
[phone]                          [facility]  
   
                                                    2024   
15:                              Systolic blood   
pressure                  131 mm[Hg]                DO Vera   
[place]  
Work Phone:   
[phone]                          [facility]  
   
                                                    2024   
15:          Body height         180.34 cm           DO Vera   
[place]  
Work Phone:   
[phone]                          [facility]  
   
                                                    2024   
15:                              Body mass index   
(BMI) [Ratio]             52.1 kg/m2                DO Vera   
[place]  
Work Phone:   
[phone]                          [facility]  
   
                                                    2024   
15:          Body weight         169.64 kg           DO Vera   
[place]  
Work Phone:   
[phone]                          [facility]  
   
                                                    2024   
15:          Body height         180.34 cm           DO Vera   
[place]  
Work Phone:   
[phone]                          [facility]  
   
                                                    2024   
15:                              Body mass index   
(BMI) [Ratio]             51.2 kg/m2                DO Vera   
[place]  
Work Phone:   
[phone]                          [facility]  
   
                                                    2024   
15:          Body weight         166.46 kg           DO Vera   
[place]  
Work Phone:   
[phone]                          [facility]  
   
                                                    2024   
15:          Body temperature    98.1 [degF]         DO Vera   
[place]  
Work Phone:   
[phone]                          [facility]  
   
                                                    2024   
15:                              Diastolic blood   
pressure                  75 mm[Hg]                 DO Vera   
[place]  
Work Phone:   
[phone]                          [facility]  
   
                                                    2024   
15:          Heart rate          76 /min             DO Vera   
[place]  
Work Phone:   
[phone]                          [facility]  
   
                                                    2024   
15:          Respiratory rate    20 /min             DO Vera   
[place]  
Work Phone:   
[phone]                          [facility]  
   
                                                    2024   
15:                              Systolic blood   
pressure                  133 mm[Hg]                DO Vera   
[place]  
Work Phone:   
[phone]                          [facility]  
   
                                                    2024   
15:          Body height         180.34 cm           DO Vera   
[place]  
Work Phone:   
[phone]                          [facility]  
   
                                                    2024   
15:                              Body mass index   
(BMI) [Ratio]             52.9 kg/m2                DO Vera   
[place]  
Work Phone:   
[phone]                          [facility]  
   
                                                    2024   
15:          Body weight         172.36 kg           DO Vera   
[place]  
Work Phone:   
[phone]                          [facility]  
   
                                                    2024   
15:                              Diastolic blood   
pressure                  66 mm[Hg]                 DO Vera   
[place]  
Work Phone:   
[phone]                          [facility]  
   
                                                    2024   
15:          Heart rate          71 /min             DO Vera   
[place]  
Work Phone:   
[phone]                          [facility]  
   
                                                    2024   
15:          Respiratory rate    18 /min             DO Vera   
[place]  
Work Phone:   
[phone]                          [facility]  
   
                                                    2024   
15:                              SaO2% (BldA) [Mass   
fraction]                 97 %                      DO Vera   
[place]  
Work Phone:   
[phone]                          [facility]  
   
                                                    2024   
15:                              Systolic blood   
pressure                  105 mm[Hg]                DO Vera   
[place]  
Work Phone:   
[phone]                          [facility]  
   
                                                    2024   
15:          Body temperature    98.8 [degF]         DO Vera   
[place]  
Work Phone:   
[phone]                          [facility]  
   
                                                    2024   
15:                              Diastolic blood   
pressure                  62 mm[Hg]                 DO Vera   
[place]  
Work Phone:   
[phone]                          [facility]  
   
                                                    2024   
15:          Heart rate          83 /min             DO Vera   
[place]  
Work Phone:   
[phone]                          [facility]  
   
                                                    2024   
15:          Respiratory rate    18 /min             DO Vera   
[place]  
Work Phone:   
[phone]                          [facility]  
   
                                                    2024   
15:                              Systolic blood   
pressure                  114 mm[Hg]                DO Vera   
[place]  
Work Phone:   
[phone]                          [facility]  
   
                                                    2024   
14:          Body height         180.34 cm           DO Vera   
[place]  
Work Phone:   
[phone]                          [facility]  
   
                                                    2024   
14:                              Body mass index   
(BMI) [Ratio]             51.2 kg/m2                DO Vera   
[place]  
Work Phone:   
[phone]                          [facility]  
   
                                                    2024   
14:          Body weight         166.46 kg           DO Vera   
[place]  
Work Phone:   
[phone]                          [facility]  
   
                                                    2024   
11:                              Body mass index   
(BMI) [Ratio]             52.72 kg/m2               Sharlene An   
APRN-CNP  
Work Phone:   
[phone]                          [facility]  
   
                                                    2024   
11:          Body weight         171.46 kg           Sharlene An   
APRN-CNP  
Work Phone:   
[phone]                          [facility]  
   
                                                    2024   
11:          Body height         180.3 cm            Sharlene An   
APRN-CNP  
Work Phone:   
[phone]                          [facility]  
   
                                                    2024   
11:                              Diastolic blood   
pressure                  70 mm[Hg]                 Sharlene An   
APRN-CNP  
Work Phone:   
[phone]                          [facility]  
   
                                                    2024   
11:          Heart rate          78 /min             Sharlene An   
APRN-CNP  
Work Phone:   
[phone]                          [facility]  
   
                                                    2024   
11:                              Systolic blood   
pressure                  110 mm[Hg]                Sharlene An   
APRN-CNP  
Work Phone:   
[phone]                          [facility]  
   
                                                    2024   
14:          Body height         180.34 cm           DO Vera   
[place]  
Work Phone:   
[phone]                          [facility]  
   
                                                    2024   
14:                              Body mass index   
(BMI) [Ratio]             53.3 kg/m2                DO Vera   
[place]  
Work Phone:   
[phone]                          [facility]  
   
                                                    2024   
14:          Body weight         173.38 kg           DO Vera   
[place]  
Work Phone:   
[phone]                          [facility]  
   
                                                    2024   
14:                              Diastolic blood   
pressure                  72 mm[Hg]                 DO Vera   
[place]  
Work Phone:   
[phone]                          [facility]  
   
                                                    2024   
14:          Heart rate          80 /min             DO Vera   
[place]  
Work Phone:   
[phone]                          [facility]  
   
                                                    2024   
14:          Respiratory rate    20 /min             DO Vera   
[place]  
Work Phone:   
[phone]                          [facility]  
   
                                                    2024   
14:                              SaO2% (BldA) [Mass   
fraction]                 96 %                      DO Vera   
[place]  
Work Phone:   
[phone]                          [facility]  
   
                                                    2024   
14:                              Systolic blood   
pressure                  133 mm[Hg]                DO Vera   
[place]  
Work Phone:   
[phone]                          [facility]  
   
                                                    2024   
14:          Body temperature    97.6 [degF]         DO Vera   
[place]  
Work Phone:   
[phone]                          [facility]  
   
                                                    2024   
14:                              Diastolic blood   
pressure                  78 mm[Hg]                 DO Vera   
[place]  
Work Phone:   
[phone]                          [facility]  
   
                                                    2024   
14:          Heart rate          89 /min             DO Vera   
[place]  
Work Phone:   
[phone]                          [facility]  
   
                                                    2024   
14:          Respiratory rate    24 /min             DO Vera   
[place]  
Work Phone:   
[phone]                          [facility]  
   
                                                    2024   
14:                              Systolic blood   
pressure                  143 mm[Hg]                DO Vera   
[place]  
Work Phone:   
[phone]                          [facility]  
   
                                                    2024   
14:          Body height         180.34 cm           DO Vera   
[place]  
Work Phone:   
[phone]                          [facility]  
   
                                                    2024   
14:                              Body mass index   
(BMI) [Ratio]             51.5 kg/m2                DO Vera   
[place]  
Work Phone:   
[phone]                          [facility]  
   
                                                    2024   
14:          Body weight         167.82 kg           DO Vera   
[place]  
Work Phone:   
[phone]                          [facility]  
   
                                                    2024   
15:                              Body mass index   
(BMI) [Ratio]             52.44 kg/m2               Ray Vela NP  
Work Phone:   
[phone]                          [facility]  
   
                                                    2024   
15:          Body temperature    97.7 [degF]         Ray Vela NP  
Work Phone:   
[phone]                          [facility]  
   
                                                    2024   
15:          Body weight         170.55 kg           Ray Vela NP  
Work Phone:   
[phone]                          [facility]  
   
                                                    2024   
15:                              Diastolic blood   
pressure                  70 mm[Hg]                 Ray Vela NP  
Work Phone:   
[phone]                          [facility]  
   
                                                    2024   
15:          Heart rate          83 /min             Ray Vela NP  
Work Phone:   
[phone]                          [facility]  
   
                                                    2024   
15:                              SaO2% (BldA) [Mass   
fraction]                 94 %                      Ray Vela NP  
Work Phone:   
[phone]                          [facility]  
   
                                                    2024   
15:                              Systolic blood   
pressure                  112 mm[Hg]                Ray Vela   
Work Phone:   
[phone]                          [facility]  
   
                                                    2023   
11:          Body height         177.8 cm            Jacoby Braden  
Other Phone:   
[phone]                           [facility]   
[employer]  
Other Phone:   
[phone]  
   
                                                    2023   
11:                              Body mass index   
(BMI) [Ratio]             51.86 kg/m2               Jacoby Braden  
Other Phone:   
[phone]                           [facility]   
[employer]  
Other Phone:   
[phone]  
   
                                                    2023   
11:          Body weight         163.98 kg           Jacoby Braden  
Other Phone:   
[phone]                           [facility]   
[employer]  
Other Phone:   
[phone]  
   
                                                    2023   
11:                              Diastolic blood   
pressure                  79 mm[Hg]                 Jacoby Braden  
Other Phone:   
[phone]                           [facility]   
[employer]  
Other Phone:   
[phone]  
   
                                                    2023   
11:          Respiratory rate    18 /min             Jacoby Braden  
Other Phone:   
[phone]                           [facility]   
[employer]  
Other Phone:   
[phone]  
   
                                                    2023   
11:                              SaO2% (BldA) [Mass   
fraction]                 99 %                      Jacoby Braden  
Other Phone:   
[phone]                           [facility]   
[employer]  
Other Phone:   
[phone]  
   
                                                    2023   
11:                              Systolic blood   
pressure                  125 mm[Hg]                Jacoby Braden  
Other Phone:   
[phone]                           [facility]   
[employer]  
Other Phone:   
[phone]  
   
                                                    2023   
13:          Body height         180.34 cm           DO Vera   
[place]  
Work Phone:   
[phone]                          [facility]  
   
                                                    2023   
13:                              Body mass index   
(BMI) [Ratio]             49.2 kg/m2                DO Vera   
[place]  
Work Phone:   
[phone]                          [facility]  
   
                                                    2023   
13:          Body weight         160.11 kg           DO Vera   
[place]  
Work Phone:   
[phone]                          [facility]  
   
                                                    2023   
13:          Body temperature    97.7 [degF]         DO Vera   
[place]  
Work Phone:   
[phone]                          [facility]  
   
                                                    2023   
13:                              Diastolic blood   
pressure                  73 mm[Hg]                 DO Vera   
[place]  
Work Phone:   
[phone]                          [facility]  
   
                                                    2023   
13:          Heart rate          81 /min             DO Vera   
[place]  
Work Phone:   
[phone]                          [facility]  
   
                                                    2023   
13:          Respiratory rate    20 /min             DO Vera   
[place]  
Work Phone:   
[phone]                          [facility]  
   
                                                    2023   
13:                              Systolic blood   
pressure                  123 mm[Hg]                DO Vera   
[place]  
Work Phone:   
[phone]                          [facility]  
   
                                                    2023   
14:                              [address] 1                Vera D   
[place]  
Work Phone:   
[phone]                          [facility]   
[facility]   
[place]  
Work Phone:   
[phone]  
   
                                                    Comment on   
above:                                  [id number]   
   
                                                    2023   
14:          Body height         177.8 cm            Annel Fitt  
Other Phone:   
[phone]                           [facility]   
[employer]  
Other Phone:   
[phone]  
   
                                                    2023   
14:                              Body mass index   
(BMI) [Ratio]             52.41 kg/m2               Annel Fitt  
Other Phone:   
[phone]                           [facility]   
[employer]  
Other Phone:   
[phone]  
   
                                                    2023   
14:          Body weight         165.7 kg            Annel Fitt  
Other Phone:   
[phone]                           [facility]   
[employer]  
Other Phone:   
[phone]  
   
                                                    2023   
09:          Body height         180.34 cm           Vera D   
[place]  
Work Phone:   
[phone]                          [facility]   
[address]   
[place]  
Work Phone:   
[phone]  
   
                                                    2023   
09:                              Body mass index   
(BMI) [Ratio]             50.91 kg/m2               Vera SIMS   
[place]  
Work Phone:   
[phone]                          [facility]   
[address]   
[place]  
Work Phone:   
[phone]  
   
                                                    2023   
09:                              Body surface area   
Derived from   
formula                   2.72 m2                   Vera SIMS   
[place]  
Work Phone:   
[phone]                          [facility]   
[address]   
[place]  
Work Phone:   
[phone]  
   
                                                    2023   
09:          Body weight         165.56 kg           Vera SIMS   
[place]  
Work Phone:   
[phone]                          [facility]   
[facility]   
[place]  
Work Phone:   
[phone]  
   
                                                    2023   
09:                              Diastolic blood   
pressure                  78 mm[Hg]                 Vera SIMS   
[place]  
Work Phone:   
[phone]                          [facility]   
[address]   
[place]  
Work Phone:   
[phone]  
   
                                                    2023   
09:          Heart rate          90 /min             Vera SIMS   
[place]  
Work Phone:   
[phone]                          [facility]   
[facility]   
DO  
Work Phone:   
[phone]  
   
                                                    2023   
09:                              Systolic blood   
pressure                  124 mm[Hg]                Vera SIMS   
[place]  
Work Phone:   
[phone]                          [facility]   
[facility]   
DO  
Work Phone:   
[phone]  
   
                                                    2022   
15:          Body height         177.8 cm            Jacoby Braden  
Other Phone:   
[phone]                           [facility]   
[employer]   
[employer]  
Other Phone:   
[phone]  
   
                                                    2022   
15:                              Body mass index   
(BMI) [Ratio]             53.53 kg/m2               Jacoby Braden  
Other Phone:   
[phone]                           [facility]   
[employer]   
[employer]  
Other Phone:   
[phone]  
   
                                                    2022   
15:          Body weight         169.24 kg           Jacoby Zuñigadiff  
Other Phone:   
[phone]                           [facility]   
[employer]   
[employer]  
Other Phone:   
[phone]  
   
                                                    2022   
15:                              Diastolic blood   
pressure                  72 mm[Hg]                 Jacoby Zuñigadiff  
Other Phone:   
[phone]                           [facility]   
[employer]   
[employer]  
Other Phone:   
[phone]  
   
                                                    2022   
15:          Respiratory rate    20 /min             Jacoby Zuñigadiff  
Other Phone:   
[phone]                           [facility]   
[employer]   
[employer]  
Other Phone:   
[phone]  
   
                                                    2022   
15:                              SaO2% (BldA) [Mass   
fraction]                 98 %                      Jacoby Zuñigadiff  
Other Phone:   
[phone]                           [facility]   
[employer]   
[employer]  
Other Phone:   
[phone]  
   
                                                    2022   
15:                              Systolic blood   
pressure                  132 mm[Hg]                Jacoby Braden  
Other Phone:   
[phone]                           [facility]   
[employer]   
[employer]  
Other Phone:   
[phone]  
   
                                                    2022   
11:          Body height         177.8 cm            Annel Fitt  
Other Phone:   
[phone]                           [facility]   
[employer]   
[employer]  
Other Phone:   
[phone]  
   
                                                    2022   
11:                              Body mass index   
(BMI) [Ratio]             52.17 kg/m2               Annel Fitt  
Other Phone:   
[phone]                           [facility]   
[employer]   
[employer]  
Other Phone:   
[phone]  
   
                                                    2022   
11:          Body weight         164.93 kg           Annel Fitt  
Other Phone:   
[phone]                           [facility]   
[employer]   
[employer]  
Other Phone:   
[phone]  
   
                                                    10-   
15:          Body height         177.8 cm            Jacoby Braden  
Other Phone:   
[phone]                           [facility]   
[employer]   
[employer]  
Other Phone:   
[phone]  
   
                                                    10-   
15:                              Body mass index   
(BMI) [Ratio]             52.52 kg/m2               Jacoby Braden  
Other Phone:   
[phone]                           [facility]   
[employer]   
[employer]  
Other Phone:   
[phone]  
   
                                                    10-   
15:          Body weight         166.06 kg           Jacoby Braden  
Other Phone:   
[phone]                           [facility]   
[employer]   
[employer]  
Other Phone:   
[phone]  
   
                                                    10-   
15:                              Diastolic blood   
pressure                  68 mm[Hg]                 Jacoby Braden  
Other Phone:   
[phone]                           [facility]   
[employer]   
[employer]  
Other Phone:   
[phone]  
   
                                                    10-   
15:          Respiratory rate    20 /min             Jacoby Braden  
Other Phone:   
[phone]                           [facility]   
[employer]   
[employer]  
Other Phone:   
[phone]  
   
                                                    10-   
15:                              SaO2% (BldA) [Mass   
fraction]                 99 %                      Jacoby Braden  
Other Phone:   
[phone]                           [facility]   
[employer]   
[employer]  
Other Phone:   
[phone]  
   
                                                    10-   
15:                              Systolic blood   
pressure                  112 mm[Hg]                Jacoby Braden  
Other Phone:   
[phone]                           [facility]   
[employer]   
[employer]  
Other Phone:   
[phone]  
   
                                                    2022   
13:          Body height         180.34 cm           DO Vera   
[place]  
Work Phone:   
[phone]                          [facility]  
   
                                                    2022   
13:                              Body mass index   
(BMI) [Ratio]             52.4 kg/m2                DO Vera   
[place]  
Work Phone:   
[phone]                          [facility]  
   
                                                    2022   
13:          Body weight         170.55 kg           DO Vera   
[place]  
Work Phone:   
[phone]                          [facility]  
   
                                                    2022   
13:          Body temperature    98.6 [degF]         DO Vera   
[place]  
Work Phone:   
[phone]                          [facility]  
   
                                                    2022   
13:                              Diastolic blood   
pressure                  69 mm[Hg]                 DO Vera   
[place]  
Work Phone:   
[phone]                          [facility]  
   
                                                    2022   
13:          Heart rate          92 /min             DO Vera   
[place]  
Work Phone:   
[phone]                          [facility]  
   
                                                    2022   
13:          Respiratory rate    18 /min             DO Vera   
[place]  
Work Phone:   
[phone]                          [facility]  
   
                                                    2022   
13:                              Systolic blood   
pressure                  119 mm[Hg]                DO Vera   
[place]  
Work Phone:   
[phone]                          [facility]  
   
                                                    2022   
12:          Body height         177.8 cm            Jacoby Braden  
Other Phone:   
[phone]                           [facility]   
[employer]   
[employer]  
Other Phone:   
[phone]  
   
                                                    2022   
12:                              Body mass index   
(BMI) [Ratio]             53.1 kg/m2                Jacoby Braden  
Other Phone:   
[phone]                           [facility]   
[employer]   
[employer]  
Other Phone:   
[phone]  
   
                                                    2022   
12:          Body weight         167.88 kg           Jacoby Braden  
Other Phone:   
[phone]                           [facility]   
[employer]   
[employer]  
Other Phone:   
[phone]  
   
                                                    2022   
12:                              Diastolic blood   
pressure                  64 mm[Hg]                 Jacoby Braden  
Other Phone:   
[phone]                           [facility]   
[employer]   
[employer]  
Other Phone:   
[phone]  
   
                                                    2022   
12:          Respiratory rate    18 /min             Jacoby Braden  
Other Phone:   
[phone]                           [facility]   
[employer]   
[employer]  
Other Phone:   
[phone]  
   
                                                    2022   
12:                              SaO2% (BldA) [Mass   
fraction]                 98 %                      Jacoby Braden  
Other Phone:   
[phone]                           [facility]   
[employer]   
[employer]  
Other Phone:   
[phone]  
   
                                                    2022   
12:                              Systolic blood   
pressure                  117 mm[Hg]                Jacoby Braden  
Other Phone:   
[phone]                           [facility]   
[employer]   
[employer]  
Other Phone:   
[phone]  
   
                                                    2022   
15:          Body height         177.8 cm            Jacoby Braden  
Other Phone:   
[phone]                           [facility]   
[employer]   
[employer]  
Other Phone:   
[phone]  
   
                                                    2022   
15:                              Body mass index   
(BMI) [Ratio]             54.81 kg/m2               Jacoby Braden  
Other Phone:   
[phone]                           [facility]   
[employer]   
[employer]  
Other Phone:   
[phone]  
   
                                                    2022   
15:          Body weight         173.28 kg           Jacoby Zuñigadiff  
Other Phone:   
[phone]                           [facility]   
[employer]   
[employer]  
Other Phone:   
[phone]  
   
                                                    2022   
15:                              Diastolic blood   
pressure                  63 mm[Hg]                 Jacoby Zuñigadiff  
Other Phone:   
[phone]                           [facility]   
[employer]   
[employer]  
Other Phone:   
[phone]  
   
                                                    2022   
15:          Respiratory rate    20 /min             Jacoby Zuñigadiff  
Other Phone:   
[phone]                           [facility]   
[employer]   
[employer]  
Other Phone:   
[phone]  
   
                                                    2022   
15:                              SaO2% (BldA) [Mass   
fraction]                 97 %                      Jacoby Zuñigadiff  
Other Phone:   
[phone]                           [facility]   
[employer]   
[employer]  
Other Phone:   
[phone]  
   
                                                    2022   
15:                              Systolic blood   
pressure                  107 mm[Hg]                Jacoby Zuñigadiff  
Other Phone:   
[phone]                           [facility]   
[employer]   
[employer]  
Other Phone:   
[phone]  
   
                                                    2022   
08:                              Diastolic blood   
pressure                  62 mm[Hg]                 Vera D   
[place]  
Work Phone:   
[phone]                          [facility]   
[employer]   
[place]  
Work Phone:   
[phone]  
   
                                                    2022   
08:                              Systolic blood   
pressure                  128 mm[Hg]                Vera D   
[place]  
Work Phone:   
[phone]                          [facility]   
[facility] [address]   
[place]  
Work Phone:   
[phone]  
   
                                                    2022   
08:          Body height         180.34 cm           Vera D   
[place]  
Work Phone:   
[phone]                          [facility]   
[facility]   
DO  
Work Phone:   
[phone]  
   
                                                    2022   
08:                              Body mass index   
(BMI) [Ratio]             54.12 kg/m2               Vera D   
[place]  
Work Phone:   
[phone]                          [facility]   
[facility]   
DO  
Work Phone:   
[phone]  
   
                                                    2022   
08:                              Body surface area   
Derived from   
formula                   2.79 m2                   Vera D   
[place]  
Work Phone:   
[phone]                          [facility]   
[facility]   
DO  
Work Phone:   
[phone]  
   
                                                    2022   
08:          Body weight         176 kg              Vera D   
[place]  
Work Phone:   
[phone]                          [facility]   
[facility]   
DO  
Work Phone:   
[phone]  
   
                                                    2022   
08:                              Diastolic blood   
pressure                  70 mm[Hg]                 Vera LEVI   
[place]  
Work Phone:   
[phone]                          [facility]   
[facility]   
DO  
Work Phone:   
[phone]  
   
                                                    2022   
08:          Heart rate          78 /min             Vera D   
[place]  
Work Phone:   
[phone]                          [facility]   
[facility]   
DO  
Work Phone:   
[phone]  
   
                                                    2022   
08:                              Systolic blood   
pressure                  132 mm[Hg]                Vera D   
[place]  
Work Phone:   
[phone]                          [facility]   
[facility]   
DO  
Work Phone:   
[phone]  
   
                                                    2022   
15:          Body height         177.8 cm            [facility]  
Other Phone:   
[phone]                           [facility]   
[employer]   
[employer]  
Other Phone:   
[phone]  
   
                                                    2022   
15:                              Body mass index   
(BMI) [Ratio]             55.72 kg/m2               [facility]  
Other Phone:   
[phone]                           [facility]   
[employer]   
[employer]  
Other Phone:   
[phone]  
   
                                                    2022   
15:          Body weight         176.18 kg           Annel Baird  
Other Phone:   
[phone]                           [facility]   
[employer]   
[employer]  
Other Phone:   
[phone]  
   
                                                    2022   
16:          Body height         177.8 cm            Alonso Birmingham  
Other Phone:   
[phone]                           [facility]   
[employer]   
[employer]  
Other Phone:   
[phone]  
   
                                                    2022   
16:                              Body mass index   
(BMI) [Ratio]             55.59 kg/m2               Alonso Birmingham  
Other Phone:   
[phone]                           [facility]   
[employer]  
Other Phone:   
[phone]  
   
                                                    2022   
16:          Body temperature    99.5 [degF]         Alonso Birmingham  
Other Phone:   
[phone]                           [facility]   
[employer]  
Other Phone:   
[phone]  
   
                                                    2022   
16:          Body weight         175.77 kg           Alonso Birmingham  
Other Phone:   
[phone]                           [facility]   
[employer]   
[employer]  
Other Phone:   
[phone]  
   
                                                    2022   
16:                              Diastolic blood   
pressure                  71 mm[Hg]                 Alonso Birmingham  
Other Phone:   
[phone]                           [facility]   
[employer]   
[employer]  
Other Phone:   
[phone]  
   
                                                    2022   
16:                              SaO2% (BldA) [Mass   
fraction]                 96 %                      Alonso Birmingham  
Other Phone:   
[phone]                           [facility]   
[employer]   
[employer]  
Other Phone:   
[phone]  
   
                                                    2022   
16:                              Systolic blood   
pressure                  114 mm[Hg]                Alonso Birmingham  
Other Phone:   
[phone]                           [facility]   
[employer]   
[employer]  
Other Phone:   
[phone]  
   
                                                    2022   
00:                              60 1                Vera [place]  
Work Phone:   
[phone]                          [facility]   
[facility]   
[place]  
Work Phone:   
[phone]  
   
                                                    Comment on   
above:                                  [id number]   
   
                                                    2022   
15:          Body height         177.8 cm            Jacoby Braden  
Other Phone:   
[phone]                           [facility]   
[employer]   
[employer]  
Other Phone:   
[phone]  
   
                                                    2022   
15:                              Body mass index   
(BMI) [Ratio]             59.45 kg/m2               Jacoby Braden  
Other Phone:   
[phone]                           [facility]   
[employer]   
[employer]  
Other Phone:   
[phone]  
   
                                                    2022   
15:          Body weight         187.97 kg           Jacoby Braden  
Other Phone:   
[phone]                           [facility]   
[employer]   
[employer]  
Other Phone:   
[phone]  
   
                                                    2022   
15:                              Diastolic blood   
pressure                  57 mm[Hg]                 Jacoby Braden  
Other Phone:   
[phone]                           [facility]   
[employer]  
Other Phone:   
[phone]  
   
                                                    2022   
15:          Respiratory rate    20 /min             Jacoby Braden  
Other Phone:   
[phone]                           [facility]   
[employer]  
Other Phone:   
[phone]  
   
                                                    2022   
15:                              SaO2% (BldA) [Mass   
fraction]                 96 %                      Jacoby Braden  
Other Phone:   
[phone]                           [facility]   
[employer]  
Other Phone:   
[phone]  
   
                                                    2022   
15:                              Systolic blood   
pressure                  125 mm[Hg]                Jacoby Braden  
Other Phone:   
[phone]                           [facility]  
Other Phone:   
[phone]  
   
                                                    2022   
16:          Body height         177.8 cm            Alonso Birmingham  
Other Phone:   
[phone]                           [facility]   
[employer]  
Other Phone:   
[phone]  
   
                                                    2022   
16:                              Body mass index   
(BMI) [Ratio]             59.54 kg/m2               Alonso Birmingham  
Other Phone:   
[phone]                           [facility]  
Other Phone:   
[phone]  
   
                                                    2022   
16:          Body temperature    97.8 [degF]         Alonso Birmingham  
Other Phone:   
[phone]                           [facility]  
Other Phone:   
[phone]  
   
                                                    2022   
16:          Body weight         188.24 kg           Alonso Vinnie  
Other Phone:   
[phone]                           [facility]  
Other Phone:   
[phone]  
   
                                                    2022   
16:                              Diastolic blood   
pressure                  77 mm[Hg]                 Alonso Birmingham  
Other Phone:   
[phone]                           [facility]   
[employer]   
[employer]  
Other Phone:   
[phone]  
   
                                                    2022   
16:                              SaO2% (BldA) [Mass   
fraction]                 93 %                      Alonso Vinnie  
Other Phone:   
[phone]                           [facility]   
[employer]  
Other Phone:   
[phone]  
   
                                                    2022   
16:                              Systolic blood   
pressure                  130 mm[Hg]                Alonso Birmingham  
Other Phone:   
[phone]                           [facility]  
Other Phone:   
[phone]  
  
  
  
Encounters  
  
  
                          Encounter Date Encounter Type Care Provider Facility  
   
                          Start: 2024 ambulatory   Laila Crenshawi  
ty:[facility]  
   
                                                    Start: 2024  
End: 2024                         Postop follow up visit   
related to original px                  Shannen H Itzkowitz   
DO  
Work Phone:   
[phone]                          [facility]  
   
                                        Comment on above:   Positive colorectal   
cancer screening using Cologuard test   
(Primary   
Dx)   
   
                                                    Start: 2024  
End: 2024           Bamboo flowsheet          Shannen H Itzkowitz   
DO  
Work Phone:   
[phone]                          [facility]  
   
                                                    Start: 2024  
End: 2024           Bamboo flowsheet          Shannen H Itzkowitz   
DO  
Work Phone:   
[phone]                          [facility]  
   
                                                    Start: 2024  
End: 2024     ambulatory          SHANNEN H ITZKOWITZ Not Available  
   
                                                    Start: 2024  
End: 2024           External Result Encounter Shannen H Itzkowitz   
DO  
Work Phone:   
[phone]                          [facility] External   
Department Unsolicited  
   
                                                    Start: 2024  
End: 2024           External Result Encounter Shannen H Itzkowitz   
DO  
Work Phone:   
[phone]                          [facility] External   
Department Unsolicited  
   
                                                    Start: 2024  
End: 2024                         Admission to same day   
surgery center                          Vera [place]   
DO  
Work Phone:   
[phone]                          [facility]   
[facility] [place]  
   
                                                    Start: 2024  
End: 2024           ambulatory                Vera [place]   
DO  
Work Phone:   
[phone]                          [facility]  
Work Phone:   
[phone]  
   
                                Start: 2024 Registered Recurring Vera We  
[place]   
DO  
Work Phone:   
[phone]                          [facility]   
[place]  
Work Phone:   
[phone]  
   
                                                    Start: 10-  
End: 10-                         Office outpatient new 30   
minutes                                 Shannen H Itzkowitz   
DO  
Work Phone:   
[phone]                          [facility]  
   
                                        Comment on above:   Positive colorectal   
cancer screening using Cologuard test   
(Primary   
Dx)   
   
                                                    Start: 10-  
End: 10-     ambulatory          SHANNEN H ITZKOWITZ Not Available  
   
                                                    Start: 2024  
End: 2024     ambulatory          DANYA AN   Not Available  
   
                                                    Start: 2024  
End: 2024           ambulatory                VERA D   
MATIAS-EMERY                            Not Available  
   
                                Start: 2024 Registered Recurring DO Vera  
   
[place]  
Work Phone:   
[phone]                          [facility]   
[place]  
Work Phone:   
[phone]  
   
                                                    Start: 2024  
End: 2024           ambulatory                DO Vera   
[place]  
Work Phone:   
[phone]                          [facility]  
Work Phone:   
[phone]  
   
                                                    Start: 2024  
End: 2024           Discharged Recurring      DO Vera   
[place]  
Work Phone:   
[phone]                          [facility]   
[facility]  
   
                                                    Start: 2024  
End: 2024     ambulatory          DANYA AN   Not Available  
   
                                                    Start: 2024  
End: 2024           ambulatory                DO Vera   
[place]  
Work Phone:   
[phone]                          [facility]  
Work Phone:   
[phone]  
   
                                                    Start: 2024  
End: 2024                         Patient encounter   
procedure                               DO Vera   
[place]  
Work Phone:   
[phone]                          [facility] Physician   
[facility]  
Work Phone:   
[phone]  
   
                                Start: 2024 Registered Recurring DO Vera  
   
[place]  
Work Phone:   
[phone]                          [facility]   
Road [facility]  
   
                                Start: 2024 Registered Recurring DO Vera  
   
[place]  
Work Phone:   
[phone]                          [facility]   
[place]  
Work Phone:   
[phone]  
   
                                                    Start: 2024  
End: 2024           ambulatory                DO Vera   
[place]  
Work Phone:   
[phone]                          [facility]  
Work Phone:   
[phone]  
   
                                                    Start: 2024  
End: 2024                         Patient encounter   
procedure                               DO Vera   
[place]  
Work Phone:   
[phone]                          [facility] Physician   
[facility]  
Work Phone:   
[phone]  
   
                                Start: 2024 Registered Recurring DO Vera  
   
[place]  
Work Phone:   
[phone]                          [facility]   
[place]  
Work Phone:   
[phone]  
   
                                Start: 2024 Registered Recurring DO Vera  
   
[place]  
Work Phone:   
[phone]                          [facility]   
Road [facility]  
   
                                                    Start: 2024  
End: 2024           ambulatory                DO Vera   
[place]  
Work Phone:   
[phone]                          [facility]  
Work Phone:   
[phone]  
   
                                                    Start: 2024  
End: 2024                         Patient encounter   
procedure                               DO Vera   
[place]  
Work Phone:   
[phone]                          [facility] Physician   
[facility]  
Work Phone:   
[phone]  
   
                                Start: 2024 Registered Recurring DO Vera  
   
[place]  
Work Phone:   
[phone]                          [facility]   
[place]  
Work Phone:   
[phone]  
   
                                                    Start: 2024  
End: 2024     ambulatory          SHARLENE AN       [facility]  
   
Ambulatory  
   
                                                    Start: 2024  
End: 2024                         Office outpatient visit   
15 minutes                              Sharlene HOLM [place]   
APRN-CNP  
Work Phone:   
[phone]                          [facility]  
   
                                        Comment on above:   Non-ischemic cardiom  
yopathy (Multi) (Primary Dx);  
DIMITRI on CPAP;  
BMI 50.0-59.9, adult (Multi)   
   
                                                    Start: 2024  
End: 2024           ambulatory                VERA MATIAS-EMERY                            Not Available  
   
                                                    Start: 2024  
End: 2024           ambulatory                DO Vera   
[place]  
Work Phone:   
[phone]                          [facility]  
Work Phone:   
[phone]  
   
                                                    Start: 2024  
End: 2024                         Patient encounter   
procedure                               DO Vera   
[place]  
Work Phone:   
[phone]                          [facility] Physician   
[facility]  
Work Phone:   
[phone]  
   
                                                    Start: 2024  
End: 2024     ambulatory          DANYA AN   Not Available  
   
                                                    Start: 2024  
End: 2024           ambulatory                DO Vera   
[place]  
Work Phone:   
[phone]                          [facility]  
Work Phone:   
[phone]  
   
                                                    Start: 2024  
End: 2024           Discharged Recurring      DO Vera   
[place]  
Work Phone:   
[phone]                          [facility]   
[place]  
Work Phone:   
[phone]  
   
                                                    Start: 2024  
End: 2024     ambulatory          JESSICA LOPEZ        Not Available  
   
                                                    Start: 2024  
End: 2024     ambulatory          ARLENE HAIDER      Not Available  
   
                                                    Start: 2024  
End: 2024     ambulatory          JESSICA L LOPEZ        Not Available  
   
                                                    Start: 2024  
End: 2024     ambulatory          ARLENE HAIDER      Not Available  
   
                                Start: 02- Bamboo flowsheet Jessica L Lopez   
PT  
Work Phone:   
[phone]                          [facility] PT  
   
                                Start: 02- Bamboo flowsheet Jessica L Lopez   
PT  
Work Phone:   
[phone]                          [facility] PT  
   
                                                    Start: 02-  
End: 02-     ambulatory          JESSICA L LOPEZ        Not Available  
   
                                                    Start: 2024  
End: 2024                         Office outpatient visit   
25 minutes                              Ray Vela NP  
Work Phone:   
[phone]                          [facility] IM  
   
                                        Comment on above:   Lumbosacral strain,   
initial encounter (Primary Dx);  
Acute pain of left shoulder;  
Wound of left lower extremity, initial encounter   
   
                                                    Start: 2024  
End: 2024           ambulatory                VERA LEVI ALEXISAVER-EMERY                            Not Available  
   
                                                    Start: 2024  
End: 2024                         Patient encounter   
procedure                               Danya An   
DPM  
Work Phone:   
[phone]                          [facility] PODIATRY  
   
                                        Comment on above:   Onychomycosis (Prima  
ry Dx);  
Pain in both feet;  
Corns and callosities;  
Other specified peripheral vascular diseases (CMS/HCC);  
Circulating anticoagulant disorder (CMS/HCC)   
   
                                                    Start: 2024  
End: 2024     ambulatory          DANYA AN   Not Available  
   
                                                    Start: 2024  
End: 2024           ambulatory                VERA D   
MATIAS-EMERY                            Not Available  
   
                                                    Start: 2023  
End: 2023           ambulatory                DO Vera   
[place]  
Work Phone:   
[phone]                          [facility]  
Work Phone:   
[phone]  
   
                                                    Start: 2023  
End: 2023                         Patient encounter   
procedure                               DO Vera   
[place]  
Work Phone:   
[phone]                          [facility]   
[facility]  
   
                                                    Start: 2023  
End: 2023           ambulatory                Jacoby Braden  
Other Phone:   
[phone]                           [facility]   
Professional   
Corporation  
Other Phone:   
[phone]  
   
                          Start: 2023 Follow-up encounter Jacoby gan Coordinated   
Care Clinic  
   
                                Start: 2023 Registered Recurring DO Vera  
   
[place]  
Work Phone:   
[phone]                          [facility]   
Management  
Work Phone:   
[phone]  
   
                                                    Start: 2023  
End: 2023           ambulatory                DO Vera   
[place]  
Work Phone:   
[phone]                          [facility]  
Work Phone:   
[phone]  
   
                                                    Start: 2023  
End: 2023           Discharged Recurring      DO Vera   
[place]  
Work Phone:   
[phone]                          [facility]   
[place]  
Work Phone:   
[phone]  
   
                                Start: 2023 ambulatory      Dr. Compa wylie   
Gutierrez                                 Facility:  
   
                                                    Start: 2023  
End: 2023           ambulatory                DO Vera   
[place]  
Work Phone:   
[phone]                          [facility]  
Work Phone:   
[phone]  
   
                                                    Start: 2023  
End: 2023                         Patient encounter   
procedure                               DO Vera   
[place]  
Work Phone:   
[phone]                          [facility]   
Exercise Program  
   
                                                    Start: 2023  
End: 2023           ambulatory                Jacoby Braden  
Other Phone:   
[phone]                           [facility]   
Professional   
Corporation  
Other Phone:   
[phone]  
   
                          Start: 2023 Telephone encounter Jacoby gan Coordinated   
Care Clinic  
   
                                Start: 2023 Registered Recurring DO Vera  
   
[place]  
Work Phone:   
[phone]                          [facility]   
Management  
Work Phone:   
[phone]  
   
                                Start: 2023 Chart Update    Vera SIMS   
[place]  
Work Phone:   
[phone]                          [facility]   
[facility] [address] DO  
Work Phone:   
[phone]  
   
                                        Start: 2023   Patient encounter   
procedure                               Vera D   
[place]  
Work Phone:   
[phone]                          [facility]   
[facility] [address] [place]  
Work Phone:   
[phone]  
   
                          Start: 2023 ambulatory   COMPA WHITLEY Toña  
[id number]  
   
                                Start: 2023 Chart Update    Vera SIMS   
[place]  
Work Phone:   
[phone]                          [facility]   
[facility] [address] DO  
Work Phone:   
[phone]  
   
                                                    Start: 2023  
End: 2023           ambulatory                DO Vera   
[place]  
Work Phone:   
[phone]                          [facility]  
Work Phone:   
[phone]  
   
                                                    Start: 2023  
End: 2023                         Patient encounter   
procedure                               DO Vera   
[place]  
Work Phone:   
[phone]                          [facility]   
[facility]  
   
                                Start: 2023 Registered Recurring DO Vera  
   
[place]  
Work Phone:   
[phone]                          [facility]   
Management  
Work Phone:   
[phone]  
   
                                        Start: 2023   ([facility] RD FU) [facility] F/  
U   
[place] Dietician       Annel Baird                 [facility] Coordinated   
Care Clinic  
   
                                                    Start: 2023  
End: 2023           ambulatory                Jacoby Braden  
Other Phone:   
[phone]                           [facility]   
Professional   
Corporation  
Other Phone:   
[phone]  
   
                          Start: 2023 Telephone encounter Jacoby gan Coordinated   
Care Clinic  
   
                                        Start: 2023   Office outpatient vi  
sit   
25 minutes                              Vera LEVI   
[place]  
Work Phone:   
[phone]                          [facility]   
[facility] [address] DO  
Work Phone:   
[phone]  
   
                                Start: 2023 ambulatory      Dr. Compa Whitley                                 Facility:  
   
                                                    Start: 2022  
End: 2022           ambulatory                Jacoby Braden  
Other Phone:   
[phone]                           [facility]   
Professional   
Corporation  
Other Phone:   
[phone]  
   
                          Start: 2022 Follow-up encounter Jacoby ANTHONY  
[place] Coordinated   
Care Clinic  
   
                                        Start: 2022   ([facility] RD FU) [facility] F/  
U   
[place] Dietician       Annel Baird                 [facility] Coordinated   
Care Clinic  
   
                                                    Start: 2022  
End: 2022           ambulatory                Annelsuzette Baird  
Other Phone:   
[phone]                           [facility]   
Professional   
Corporation  
Other Phone:   
[phone]  
   
                                                    Start: 10-  
End: 10-           ambulatory                Jacoby Asiya  
Other Phone:   
[phone]                           [facility]   
Professional   
Corporation  
Other Phone:   
[phone]  
   
                          Start: 10- Telephone encounter Jacoby gan Coordinated   
Care Clinic  
   
                                                    Start: 10-  
End: 10-           ambulatory                Jacoby Braden  
Other Phone:   
[phone]                           [facility]   
Professional   
Corporation  
Other Phone:   
[phone]  
   
                          Start: 10- Follow-up encounter Jacoby gan Coordinated   
Care Clinic  
   
                                                    Start: 2022  
End: 2022           ambulatory                DO Vera   
[place]  
Work Phone:   
[phone]                          [facility]  
Work Phone:   
[phone]  
   
                                                    Start: 2022  
End: 2022           Discharged Recurring      DO Vera   
[place]  
Work Phone:   
[phone]                          [facility]   
[place]  
   
                                Start: 2022 Registered Recurring DO Vera  
   
[place]  
Work Phone:   
[phone]                          [facility]   
Management  
   
                                                    Start: 2022  
End: 2022           ambulatory                Jacoby Braden  
Other Phone:   
[phone]                           [facility]   
Professional   
Corporation  
Other Phone:   
[phone]  
   
                          Start: 2022 Follow-up encounter Jacoby gan Coordinated   
Care Clinic  
   
                                                    Start: 2022  
End: 2022           ambulatory                Jacoby Braden  
Other Phone:   
[phone]                           [facility]   
Professional   
Corporation  
Other Phone:   
[phone]  
   
                          Start: 2022 Follow-up encounter Jacoby greens Coordinated   
Care Clinic  
   
                                Start: 2022 Chart Update    Vera SIMS   
[email or web address]  
Work Phone:   
[phone]                          [facility]   
[facility] [address] DO  
Work Phone:   
[phone]  
   
                                        Start: 2022   Office consultation   
new/estab patient 80 min                Vera SIMS   
[employer]  
Work Phone:   
[phone]                          [facility]   
[facility] [address] DO  
Work Phone:   
[phone]  
   
                                        Start: 2022   ([facility] WMNI) [facility] Init  
ial   
Provider                  Annel Baird                 [facility]  
   
                                                    Start: 2022  
End: 2022           ambulatory                Annel Baird  
Other Phone:   
[phone]                           [facility]   
Professional   
Corporation  
Other Phone:   
[phone]  
   
                                                    Start: 2022  
End: 2022           ambulatory                Alonso Birmingham  
Other Phone:   
[phone]                           [facility]   
Professional   
Corporation  
Other Phone:   
[phone]  
   
                                        Start: 2022   Office outpatient vi  
sit   
25 minutes                Alonso Birmingham              [facility]  
   
                                                    Start: 05-  
End: 05-           ambulatory                Annel Baird  
Other Phone:   
[phone]                           [facility]   
Professional   
Corporation  
Other Phone:   
[phone]  
   
                                        Start: 05-   IBT FOR OBESITY GROU  
P   
2-10 30M                  Annel Baird                 [facility]  
   
                                                    Start: 2022  
End: 2022           ambulatory                Jacoby Braden  
Other Phone:   
[phone]                           [facility]   
Professional   
Corporation  
Other Phone:   
[phone]  
   
                          Start: 2022 Nutrition therapy Jacoby Braden [facility]  
   
                                                    Start: 2022  
End: 2022           ambulatory                Annel Baird  
Other Phone:   
[phone]                           [facility]   
Professional   
Corporation  
Other Phone:   
[phone]  
   
                          Start: 2022 Telephone encounter Annel Baird    [facility]  
   
                                                    Start: 2022  
End: 2022           ambulatory                Alonso Birmingham  
Other Phone:   
[phone]                           [facility]   
Professional   
Corporation  
Other Phone:   
[phone]  
   
                                        Start: 2022   Office outpatient ne  
w 45   
minutes                   Alonso Birmingham              [facility]   
Ctr [facility]  
   
                                        Start: 2021   ([facility] C Vac) [facility] Co  
vid   
Vaccine                   Annel Baird                 [facility] Coordinated   
Care Clinic  
   
                                                    Start: 2021  
End: 2021           ambulatory                Annel Baird  
Other Phone:   
[phone]                           [facility]   
Professional   
Corporation  
Other Phone:   
[phone]  
  
  
  
Procedures  
  
  
                          Date         Procedure    Procedure Detail Performing   
Clinician  
   
                                                    Start: 2024  
End: 2024     Colonoscopy                             Vera [place]   
DO  
Work Phone:   
[phone]  
   
                          Start: 2024 GLUCOSE POCT GLUCOMETERS              
  Shannen Julian DO  
Work Phone:   
[phone]  
   
                          Start: 2024 Aerobic microbial culture             
   Vera [place] DO  
Work Phone:   
[phone]  
   
                          Start: 2024 Anaerobic microbial culture           
     Vera [place] DO  
Work Phone:   
[phone]  
   
                          Start: 2024 Gram stain microscopy              S  
haja [place] DO  
Work Phone:   
[phone]  
   
                                        Start: 2024   Investigation of   
transfusion reaction                                DO Vera [place]  
Work Phone:   
[phone]  
   
                                       Finger operation              Vera SIMS We  
[place]  
Work Phone:   
[phone]  
   
                                       Operation on gallbladder              Garcia  
katrin LEVI [place]  
Work Phone:   
[phone]  
   
                                       Total colonoscopy              Vera SIMS W  
[place]  
Work Phone:   
[phone]  
  
  
  
Plan of Treatment  
  
  
                          Date         Care Activity Detail       Author  
   
                                        Start: 2034   Screening for malign  
ant   
neoplasm of colon                                   [facility]  
   
                                        Start: 2027   Screening for malign  
ant   
neoplasm of colon                                   [facility]  
   
                                                    Start: 2025  
End: 2025                         Patient encounter   
procedure                               2025 3:20 PM EST   
Office Visit [facility] [address]   
[id number] [phone]   
Compa Whitley, DO   
[address]   
[address] [place]   
[address] [phone]   
(Work) [phone]   
(Fax)                                   [facility]  
   
                                                    Start: 2025  
End: 2025                         Patient encounter   
procedure                               2025 2:00 PM EST   
Office Visit [facility]   
IM [address] [place]   
[id number] [phone]   
Vera Melvin,   
 [address]   
[address] [place] [address]   
[phone] (Work)   
[phone] (Fax)                      [facility]  
   
                                        Start: 2025   Medicare Annual   
Wellness (AWV)                          Medicare Annual   
Wellness (AWV)                          [facility]  
   
                                        Start: 2024   Hemoglobin A1c   
measurement                             Diabetes: Hemoglobin   
A1C                                     [facility]  
   
                                                    Start: 2024  
End: 2024                         Patient encounter   
procedure                               2024 2:15 PM EST   
Procedure Visit [facility]   
[facility] [address]   
[place] [id number] [phone] Danya An DPM [address]   
[place] [address]   
[phone] (Work)   
[phone] (Fax)                      [facility]  
   
                                                    Start: 2024  
End: 2024                         Patient encounter   
procedure                                           [facility]  
   
                                        Comment on above:   Arrived   
   
                          Start: 2024                           [facility]  
   
                          Start: 2024                           [facility]  
   
                          Start: 2024                           [facility]  
   
                          Start: 2024 Influenza vaccination Influenza Vacc  
ine (#1) [facility]  
   
                                        Start: 2024   Urine screening for   
protein                                 Diabetes: Urine Protein   
Screening                               [facility]  
   
                                        Start: 2024   Screening for malign  
ant   
neoplasm of colon                                   [facility]  
   
                                                    Start: 2024  
End: 2024                         Patient encounter   
procedure                               2024 2:30 PM EDT   
Office Visit [facility]   
IM [address]   
[address] [place]   
[id number] [phone]   
Vera Melvin,   
DO [address]   
[address] [place] [address]   
[phone] (Work)   
[phone] (Fax)                      [facility]  
   
                                                    Start: 2024  
End: 2024                         Patient encounter   
procedure                               2024 2:30 PM EDT   
Procedure Visit [facility] [address]   
[place] [address] [phone] Danya An DPM [address]   
[place] [address]   
[phone] (Work)   
[phone] (Fax)                      [facility]  
   
                                        Start: 2024   Hemoglobin A1c   
measurement                             Diabetes: Hemoglobin   
A1C                                     [facility]  
   
                                                    Start: 02-  
End: 02-           ambulatory                02/15/2024 1:00 PM EST   
Evaluation [facility]   
[address]   
[place] [address] [phone] Jessica Lopez, PT [address]   
[place] [address] [phone] (Work)   
[phone] (Fax)                      [facility]  
   
                                                    Start: 2024  
End: 2024                         Patient encounter   
procedure                               2024 3:30 PM EST   
Office Visit [facility]   
IM [address]   
[address] [place]   
[id number] [phone]                 [facility]  
   
                                        Start: 2023   COVID-19 Vaccine ( season)                         COVID-19 Vaccine ( season)                         [facility]  
   
                                        Start: 2023   FUV, Provider:   
Compa Whitley,   
Status: Pen, Time:   
11:00 AM                                FUV, Provider:   
Compa Whitley,   
Status: Pen, Time:   
11:00 AM                                [facility] [address] DO  
Work Phone:   
[phone]  
   
                                        Start: 2023   МАРИЯ, Provider:   
[place] [facility]   
[id number], Status:   
Pen, Time: 2:00 PM                      MUGA, Provider:   
[place] [facility] NUCLEAR   
[id number], Status:   
Pen, Time: 2:00 PM                      [facility] [address] DO  
Work Phone:   
[phone]  
   
                                        Start: 2023   FUV, Provider:   
Compa Whitley,   
Status: Pen, Time:   
10:00 AM                                FUV, Provider:   
Compa Whitley,   
Status: Pen, Time:   
10:00 AM                                [facility] [address] DO  
Work Phone:   
[phone]  
   
                                        Start: 2016   RSV Pregnant patient  
s   
and/or patients aged   
60+ years (1 - 1-dose   
60+ series)                             RSV Pregnant patients   
and/or patients aged   
60+ years (1 - 1-dose   
60+ series)                             [facility]  
   
                                        Start: 2006   Zoster Vaccines (1 o  
f   
2)                                      Zoster Vaccines (1 of   
2)                                      [facility]  
   
                                        Start: 1978   DTaP/Tdap/Td Vaccine  
s   
(1 - Tdap)                              DTaP/Tdap/Td Vaccines   
(1 - Tdap)                              [facility]  
   
                                        Start: 1974   Diabetes mellitus   
screening                 Diabetes Screening        [facility]  
   
                          Start: 1974 Hepatitis C screening Hepatitis C [facility]  
   
                                        Start: 1962   Pneumococcal Vaccine  
:   
65+ Years (1 - PCV)                     Pneumococcal Vaccine:   
65+ Years (1 - PCV)                     [facility]  
   
                                        Start: 1962   Pneumococcal Vaccine  
:   
65+ Years (1 of 2 -   
PCV)                                    Pneumococcal Vaccine:   
65+ Years (1 of 2 -   
PCV)                                    [facility]  
   
                                        Start: 1956   Hemoglobin A1c   
measurement                             Diabetes: Hemoglobin   
A1C                                     [facility]  
   
                          Start: 1956 Lipid panel  Lipid Panel  [facility]  
   
                                        Start: 1956   Medicare Annual   
Wellness Visit                          Medicare Annual   
Wellness Visit (AWV)                    [facility]  
   
                                        Start: 1956   Screening for malign  
ant   
neoplasm of colon                                   [facility]  
   
                                       Patient Education Know your Meds [facility]  
Work Phone:   
[phone]  
   
                                       Patient referral              [facility]  
Work Phone:   
[phone]  
  
  
  
Immunizations  
  
  
                      Immunization Date Immunization Notes      Care Provider Jenny llanes  
   
                                        2023          Influenza, Seasonal,  
   
Quadrivalent,   
Adjuvanted                                          Danya An   
DPM  
Work Phone:   
[phone]                          [facility]  
   
                                        2023          influenza virus   
vaccine, unspecified   
formulation                                         Shannen Julian   
DO  
Work Phone:   
[phone]                          [facility]  
   
                                        10-          influenza, high dose  
   
seasonal,   
preservative-free                                   Jacoby Asiya  
Other Phone:   
[phone]                           [facility]   
Professional   
Corporation  
Other Phone:   
[phone]  
   
                                        10-          Fluzone High-Dose   
Quadrivalent 0.7 ML   
Intramuscular   
Suspension Prefilled   
Syringe                                             Vera D   
[place]  
Work Phone:   
[phone]                          [facility]  
Work Phone:   
[phone]  
   
                                        10-          influenza virus   
vaccine, unspecified   
formulation                                         DO Vera   
[place]  
Work Phone:   
[phone]                          [facility]  
   
                                        10-          seasonal influenza,   
intradermal,   
preservative free                                   Sharlene An   
APRN-CNP  
Work Phone:   
[phone]                          [facility]  
Work Phone:   
[phone]  
   
                          2021   COVID-19 Pfizer              Annel Fitt  
Other Phone:   
[phone]                           [facility]  
   
                                        2021          influenza, high dose  
   
seasonal,   
preservative-free                                   Vera D   
[place]  
Work Phone:   
[phone]                          [facility]  
   
                                        2021          Pfizer-BioNTech   
COVID-19 Vacc 30   
MCG/0.3ML   
Intramuscular   
Suspension                                          Vera D   
[place]  
Work Phone:   
[phone]                          [facility]  
   
                                        2021          Pfizer-BioNTech   
COVID-19 Vacc 30   
MCG/0.3ML   
Intramuscular   
Suspension                                          Vera D   
[place]  
Work Phone:   
[phone]                          [facility]  
   
                                        2020          Influenza, injectabl  
e,   
Madin Griselda Canine   
Kidney, preservative   
free, quadrivalent                                  Vera D   
[place]  
Work Phone:   
[phone]                          [facility]  
Work Phone:   
[phone]  
   
                                        10-          Influenza, injectabl  
e,   
Madin Hamburg Canine   
Kidney, preservative   
free, quadrivalent                                  Vera D   
[place]  
Work Phone:   
[phone]                          [facility] [address] DO  
Work Phone:   
[phone]  
   
                                        2018          influenza, injectabl  
e,   
madin griselda canine   
kidney, preservative   
free                                                Vera D   
[place]  
Work Phone:   
[phone]                          [facility] [address] DO  
Work Phone:   
[phone]  
   
                                        2017          influenza, injectabl  
e,   
quadrivalent,   
preservative free                                   Danya An   
DPM  
Work Phone:   
[phone]                          [facility]  
   
                                        10-          seasonal influenza,   
intradermal,   
preservative free                                   Vera D   
[place]  
Work Phone:   
[phone]                          [facility] [address] DO  
Work Phone:   
[phone]  
   
                                        10-          seasonal influenza,   
intradermal,   
preservative free                                   Danya An   
DPM  
Work Phone:   
[phone]                          [facility]  
  
  
  
Payers  
  
  
                          Date         Payer Category Payer        Policy ID  
   
                          2024   Self-pay                  [id number]  
[id number]  
[id number]  
   
                          2023   Private Health Insurance              [id number]  
[id number]  
[id number]  
   
                          2023   Private Health Insurance              [id number]  
[id number]  
   
                          2021   Medicare                  [id number]  
[id number]  
[id number]  
   
                          2021   Private Health Insurance              [id number]  
[id number]   
[id number]  
   
                          2021   Medicare                  [id number]   
[id number]  
   
                          1955   Unknown                   [id number]   
[id number]  
[id number]  
   
                          1956   Unknown                   [id number]   
[id number]  
[id number]  
   
                          1956   Unknown                   [id number]   
[id number]  
[id number]  
   
                          1956   Unknown                   [id number]   
[id number]  
[id number]  
   
                          1956   Unknown                   [id number]   
[id number]  
[id number]  
   
                          1956   Unknown                   [id number]   
[id number]  
[id number]  
   
                          1956   Unknown                   [id number]   
[id number]  
[id number]  
   
                          1956   Unknown                   [id number]   
[id number]  
[id number]  
   
                          1956   Unknown                   [id number]   
[id number]  
[id number]  
   
                          1956   Unknown                   [id number]   
[id number]  
[id number]  
   
                          1956   Unknown                   [id number]   
[id number]  
[id number]  
   
                          1956   Unknown                   [id number]   
[id number]  
[id number]  
   
                          1956   Unknown                   [id number]   
[id number]  
[id number]  
   
                          1956   Unknown                   [id number]   
[id number]  
[id number]  
   
                          1956   Unknown                   [id number]   
[id number]  
[id number]  
   
                          1956   Unknown                   [id number]   
[id number]  
[id number]  
   
                          1956   Unknown                   [id number]   
[id number]  
[id number]  
   
                          1956   Unknown                   [id number]   
[id number]  
[id number]  
   
                          1956   Unknown                   [id number]   
[id number]  
[id number]  
   
                                                Self-pay        Self [employer]  
e   
Program                                 [id number]   
[id number]  
[id number]  
   
                                       Unknown                   [id number]   
[id number]  
   
                                       Unknown                     
   
                                       Unknown                   [id number]   
[id number]  
[id number]  
   
                                       Unknown                   [id number]   
[id number]  
[id number]  
   
                                       Unknown                   [id number]   
[id number]  
[id number]  
   
                                       Unknown                   [id number]   
[id number]  
[id number]  
  
  
  
Social History  
  
  
                          Date         Type         Detail       Facility  
   
                                                    Start: 2023  
End: 2024     Sex Assigned At Birth                     [facility]   
Professional   
Corporation  
Other Phone:   
[phone]  
   
                                                    Start: 2023  
End: 2024                         Daily caffeine   
consumption                             Daily caffeine   
consumption                             [facility]   
[place] [address] DO  
Work Phone:   
[phone]  
   
                                        Comment on above:   coffee 1-2 pots a da  
y;   
   
                                                    Start: 2022  
End: 2023                         Tobacco smoking status   
NHIS                                    Never smoked tobacco   
(finding)                               [facility]  
   
                          Start: 1955 Sex Assigned At Birth Male         F  
[facility]  
   
                                       History of tobacco use [facility] smoker [facility]  
   
                                                    Start: 2023  
End: 2024                         Tobacco use and   
exposure                                Smokeless tobacco   
non-user                                [facility]  
   
                                                    Start: 2024  
End: 2024     Alcohol intake      Ex-drinker (finding) [facility]  
   
                                                            How often to you hav  
e   
a drink containing   
alcohol?                  Never                     [facility]  
   
                                                            How many standard   
drinks containing   
alcohol do you have on   
a typical day?            Patient does not drink    [facility]  
   
                          Start: 1955 Sex Assigned At Birth Not on file  N  
[facility]  
   
                                        Start: 2024   Alcoholic beverage   
intake                                  Lifetime non-drinker   
(finding)                               [facility]  
Work Phone:   
[phone]  
   
                                                    Start: 2024  
End: 2024                         Exposure to SARS-CoV-2   
(event)                   Not sure                  [facility]  
   
                                Start: 2024 Alcohol Comment Caffeine : cof  
fee,   
soda                                    [facility]  
   
                          Start: 2024 Sex          Male (finding) [facility]  
  
  
  
Goals  
  
  
                                Date            Patient Goal    Desired Activity  
/State  
   
                                                                  
  
  
  
Clinical Notes 2021 to 2024  
                Shannen Julian DO - 2024 2:30 PM EST  
  
                                Note Date & Type Note            Facility  
   
                                                    2024 History of Presen  
t   
illness Narrative                       Formatting of this note is   
different from the original.  
Images from the original note were   
not included.  
  
Jocelin Pacheco is a 69 y.o. male   
presents for 1st pow colonoscopy  
  
HPI:  
  
HPI  
Colonoscopy completed 24  
  
OBJECTIVE:  
  
Physical Exam  
  
ASSESSMENT AND PLAN:  
  
Assessment/Plan  
Problem List Items Addressed This   
Visit  
  
Positive colorectal cancer   
screening using Cologuard test -   
Primary  
  
Two polyps were removed, benign   
TA's, he will need a repeat scope   
in 5 years  
  
  
  
  
Electronically signed by Shannen Julian DO at 2024 10:52   
AM EST  
documented in this encounter            [facility]  
  
  
  
                                                    2024 Evaluation note   
  
  
  
                                Diagnosis       Onset Date      Resolution  
   
                                                    Adult BMI 50.0-59.9   
kg/sq m                                         acute              
11:10am  
   
                                                    Hematoma of right lower   
leg                                             acute              
11:10am  
   
                      Leg wound, right                       acute      2024   
11:10am  
   
                                                    Type 2 diabetes mellitus   
with unspecified   
complications                                   acute              
11:10am  
   
                      Urinary incontinence                       acute      Nove  
[facility] 2024   
11:10am  
   
                                                    Venous insufficiency of   
right lower extremity                                 acute              
11:10am  
   
                                                    Lymphedema of both lower   
extremities                                     suspected          
11:10am  
   
                      Cellulitis                       resolved   2024   
11:10am  
   
                      Venous stasis ulcer                       resolved   Novem  
2024   
11:10am  
  
  
[facility]  
Work Phone: [phone]- History of Present illness Narrative* 
  Shannen Goldsmithkowitessa, DO - 10/31/2024 10:00 AM EDTFormatting of this note is 
  different from the original.  
Images from the original note were not included.  
Jocelin Pacheco  
1955  
[phone]  
  
Jocelin Pacheco is a 69 y.o. male presents with chief complaint of Positive 
cologuard  
  
HPI:  
  
HPI  
Jocelin presents to schedule a colonoscopy, he had a positive cologuard test. He 
states a few weeks ago he got constipated and had blood on the toilet tissue 
following a BM. He never had a colonoscopy before  
SUBJECTIVE:  
  
MEDICATIONS: ALLERGIES  
Current Outpatient Medications  
Medication Instructions  
carvedilol (COREG) 6.25 mg, Oral, 2 times daily  
clobetasol (Temovate) 0.05 % cream 1 application , Topical, Every 12 hours  
Entresto 24-26 MG tablet 1 tablet, Oral, 2 times daily  
furosemide (LASIX) 40 mg, Oral, Every 12 hours  
Jardiance 10 mg, Oral, Every 24 hours  
Ozempic, 2 MG/DOSE, 8 MG/3ML solution pen-injector  
Probiotic Product (PROBIOTIC BLEND PO) 2 Units, Oral, Daily  
rivaroxaban (XARELTO) 10 mg, Oral, Daily  
spironolactone (ALDACTONE) 25 mg, Oral, Daily  
Allergies  
Allergen Reactions  
Cefixime Unknown  
Nystatin Unknown  
Sodium Benzoate  
Other Reaction(s): Unknown Reaction  
Diclofenac Rash  
  
  
PAST MEDICAL HISTORY: SOCIAL HISTORY SURGICAL HISTORY:  
Past Medical History:  
Diagnosis Date  
Acquired hammer toe of right foot  
Cardiomyopathy (CMS/HCC)  
d/t morbid obesity  
Chronic anticoagulation  
CKD (chronic kidney disease), stage II  
Class 3 obesity (CMS/[facility])  
CPAP (continuous positive airway pressure) dependence  
Diabetes mellitus type 2 in obese (CMS/[facility])  
History of 2019 novel coronavirus disease (COVID-19) 2020  
Received Remdesivir and dexamethasone as an in-patient  
Hx of being hospitalized 2019  
Hospitalized for Lt. lower extremity cellulitis and chronic open venous stasis 
ulcer  
Hx of carpal tunnel syndrome  
Hx of deep venous thrombosis  
DIMITRI on CPAP  
Peripheral venous insufficiency  
Porokeratosis  
Primary osteoarthritis of both knees  
Stasis dermatitis of both legs  
Social History  
  
Tobacco Use  
Smoking status: Never  
Passive exposure: Past  
Smokeless tobacco: Never  
Substance Use Topics  
Alcohol use: Not Currently  
Comment: Caffeine : coffee, soda  
Drug use: Never  
  
Past Surgical History:  
Procedure Laterality Date  
CARDIAC CATHETERIZATION   
Due to Chest pain  
CARPAL TUNNEL RELEASE Right 2010  
Dr. Estrada  
CHOLECYSTECTOMY 2002  
FOOT NEUROMA SURGERY Bilateral   
Hammertoe repair as well  
HERNIA REPAIR  
IR INJECTION JOINT 2020  
(Knee) Synvisc injection with Dr Lomeli (Ortho)  
IR INJECTION JOINT   
(Knee) Synvisc done by Dr Richard  
SKIN GRAFT 2019  
From right thigh to right foot due to non-healing wound/ulcer. Done by Dr Blakely  
VEIN LIGATION Bilateral 2019  
Procedures in April and Oct done by Dr Lyle Mascorro  
  
  
FAMILY HISTORY  
Family History  
Problem Relation Name Age of Onset  
Hypertension Mother  
Heart disease Mother  
Diabetes Father  
Heart attack Sister  
Diabetes Sister  
Stroke Brother  
Heart attack Brother  
ALS Brother  
  
REVIEW OF SYMPTOMS:  
  
Review of Systems  
Constitutional: Negative for activity change.  
HENT: Negative for hearing loss and voice change.  
Respiratory: Positive for shortness of breath.  
Cardiovascular: Negative for chest pain.  
Gastrointestinal: Negative for abdominal distention, diarrhea, nausea and 
vomiting.  
Neurological: Negative for dizziness, seizures and headaches.  
Psychiatric/Behavioral: Negative.  
All other systems reviewed and are negative.  
  
  
OBJECTIVE:  
  
Visit Vitals  
Smoking Status Never  
  
  
Physical Exam  
Vitals reviewed.  
Constitutional:  
Appearance: He is obese.  
HENT:  
Head: Normocephalic.  
Eyes:  
Pupils: Pupils are equal, round, and reactive to light.  
Cardiovascular:  
Rate and Rhythm: Normal rate and regular rhythm.  
Pulmonary:  
Effort: Pulmonary effort is normal.  
Abdominal:  
General: Bowel sounds are normal.  
Palpations: Abdomen is soft.  
Musculoskeletal:  
General: Normal range of motion.  
Skin:  
General: Skin is warm.  
Neurological:  
General: No focal deficit present.  
Mental Status: He is alert.  
  
ASSESSMENT AND PLAN:  
  
Assessment/Plan  
Problem List Items Addressed This Visit  
  
Positive colorectal cancer screening using Cologuard test - Primary  
  
I explained the colonoscopy procedure in detail to the patient and discussed the
 risks and benefitsincluding but not exclusive of bleeding, and perforation. I 
asked the patient not to drive, operateany machinery or make any important 
decisions on the day of the procedure. The patient is in agreement and 
arrangements for colonoscopy have been made.  
  
  
Electronically signed by Shannen Julian DO at 10/31/2024 10:06 AM EDT  
  
documented in this encounterN[facility] [id number]- Evaluation note*   
  
                                        Author              Jacoby Braden  
[facility]  
   
                                        Authored            2024 2:24  
pm  
   
                                                    Start weight: 414.4 lbs., he  
 is down 45.2 lbs. today with a weight of 369.2lbs.   
he is   
down 10.8 lbs.  
since last visit 2024.  
Starting Date: 04/15/2022.  
Ozempic start date 4/15/2022. Ozempic start weight 414.4lbs. He is down 34.4 
lbs.   
since starting  
Ozempic.  
1. Abnormal weight gain. He notes he is the heaviest in his family. His brother 
has   
some but much  
less significant weight issues. He notes he was able to get his weight down to 
368   
pounds but was  
unable to keep it down.  
2. Obesity-markedly improved since starting our program and with taking Ozempic 
2 mg,   
but had  
significant weight regain of 20.9 pounds off the medication and not following up
 with   
the program  
since 2023. He is currently on Ozempic 1 mg and we will go back to Ozempic 2
 mg   
through patient  
assistance. This is his second visit back since this restart. He had previously   
stopped Ozempic  
due to cost/donut hole. In the future he could reconsider going to the [facility] for  
bariatric surgery evaluation. He needs to get his BMI down to 45 before the 
surgeon   
will replace  
his knee. He is on Jardiance 10 mg which can also help with his diabetes and   
cardiomyopathy/CHF.  
His diabetes is well controlled with his last A1c of 5.8 on 2024. He has 
mild   
CAD along with  
his diabetes and has not been on a statin so I had recommended Crestor, but he 
notes   
his cardio said   
it was not necessary so he did not start the medication. He is eating healthier   
overall. He should  
follow-up with our nutritionist. He feels that his appetite is controlled with   
Ozempic 2 mg. I have  
recommend again he consider bariatric surgery so he could have his knee replaced
 or   
find a surgeon  
who will do the surgery at an elevated BMI. In the past, he mentioned they found
   
weakness in the  
bottom of the heart and evidently a problem with an artery that they could not 
get.   
His cardiologist  
however did not feel he needed the statin/Crestor that I previously prescribed. 
He   
gets very little  
activity due to bone-on-bone arthritis, severe low back pain on tramadol, 
difficulty   
getting out of  
a chair and also his cardiac function he does get NELSON with small amounts of 
activity.   
He sees Dr. Whitley to treat his cardiomyopathy. He did like canned foods and he understands
 that   
he must cut  
back the salt/processed food. He denies adding salt to his foods. He has had a 
A1c   
that was 6.6  
indicative of diabetes. He notes he was never told he was diabetic before 
program. He   
is seeing Dr. Lomeli for his knees and notes he has to get his BMI from 59 down to 45 to be 
able to   
have his knees  
replaced. Before starting the program he did eat a lot of red meat, potatoes and
   
drank a lot of milk   
2% or 4% often buying 3 or 4 gallons at a time. He should not skip meals. He did
 try   
Adipex for 1  
month in the past but notes he was not able to lose but a few pounds so his PCP   
stopped it. He notes  
his sister does the shopping as he can. He does some simple cooking.  
3. Cardiomyopathy/CHF/early CAD-he must continue to follow-up with cardiology 
for   
evaluation of  
cardiomyopathy/CHF despite medications/known cardiomyopathy/previous mild CAD. 
We   
obtained his  
previous cardiac catheterization from 2009. He notes his preoperative 
diagnoses   
were angina  
pectoris, dyspnea on exertion and abnormal Cardiolite perfusion stress test. His
 left   
anterior  
descending artery showed a 20 to 25% tubular stenosis. His circumflex had an 
ostial   
stenosis of 30  
to 50% in several views but in one view showed less than 30% stenosis and 
appeared to   
be mildly  
kinked, his right coronary artery was large dominant and normal. His left   
ventriculogram showed mild  
to moderately reduced systolic dysfunction with an EF in the 40 to 45% range   
globally. He is now  
being treated by Dr. Whitley.  
4. Type 2 diabetes with A1c controlled at 5.8 on 2024-continue Ozempic and   
Jardiance. We could  
consider Metformin but he already has some loose stools. We discussed that 
first-line   
treatment with   
lifestyle changes, decrease simple sweets and starches, will be some increased   
activity in the  
future and weight loss. He needs to consider bariatric surgery.  
5. Obstructive sleep apnea-compliant with his CPAP. Treat also with healthy 
lifestyle   
changes and  
weight loss. He needs to consider bariatric surgery.  
6. Bilateral OA of the knees-bone-on-bone making it very difficult for him to 
get   
around. He notes  
his orthopedic doctor wants him to drop from a BMI of 59 down to 45 before he 
will do   
the surgery.  
We will start weight loss and Ozempic. He needs to consider bariatric surgery.  
7. Chronic low back pain-treat with healthy lifestyle change. He needs to 
maximize   
his pain  
management.  
8. Mixed hyperlipidemia-treat with decreasing the simple sweets, added sugars,   
refined starches, and  
bad/added fats, increasing activity and exercise and continued long-term weight 
loss.   
Monitor with  
healthy lifestyle change. I recommended that he start a statin since he is 
diabetic   
and had mild CAD  
on his cath but he notes his cardiologist told him it was not necessary to 
start.  
9. Metabolic syndrome-treat with long-term healthy lifestyle changes, behavioral
   
changes, healthy  
nutritional changes, increased activity and exercise and achieve long-term 
weight   
loss.  
Follow up with me in 8 weeks.  
New labs needed: Up-to-date. Monitor A1c at least every 6 months/January with 
his PCP   
or in our  
office. He will continue other regular lab follow-up with his PCP.   
  
  
  
[facility]  
Work Phone: [phone]- Evaluation + Plan note* Assessment & Plan 
  Note - CARLITOS Severino - 2024 10:55 AM EDTAssociated Problem(s):
   BMI 50.0-59.9, adult (Multi)  
Formatting of this note might be different from the original.  
Reviewed the merits of healthy lifestyle choices on overall cardiovascular 
health.  
He had lost weight with Ozempic in the past, I believe there was some difficulty
 obtaining the prescription. Just recently resumed.  
Electronically signed by CARLITOS Severino at 2024 10:59 AM T  
  
[facility]  
Work Phone: [phone]- Evaluation + Plan note* Assessment & Plan 
  Note - CARLITOS Severino - 2024 10:55 AM EDTAssociated Problem(s):
   Non-ischemic cardiomyopathy (Multi)  
Formatting of this note might be different from the original.  
NICM HF bordeline EF 42% 2023 MUGA (2009 cath minimal CAD EF 40-45%)  
FC II Stage C  
  
GDMT  
Coreg  
Jardiance  
Entresto  
Aldactone  
Electronically signed by CARLITOS Severino at 2024 10:55 AM EDT  
  
[facility]  
Work Phone: [phone]- Miscellaneous Notes* Assessment & Plan Note
   - CARLITOS Severino - 2024 10:55 AM EDTAssociated Problem(s): BMI
   50.0-59.9, adult (Multi)  
Formatting of this note might be different from the original.  
Reviewed the merits of healthy lifestyle choices on overall cardiovascular 
health.  
He had lost weight with Ozempic in the past, I believe there was some difficulty
 obtaining the prescription. Just recently resumed.  
Electronically signed by CARLITOS Severino at 2024 10:59 AM EDT  
  
* Assessment & Plan Note - CARLITOS Severino - 2024 10:55 AM EDT
  Associated Problem(s): Non-ischemic cardiomyopathy (Multi)  
Formatting of this note might be different from the original.  
NICM HF bordeline EF 42% 2023 MUGA (2009 cath minimal CAD EF 40-45%)  
FC II Stage C  
  
GDMT  
Coreg  
Jardiance  
Entresto  
Aldactone  
Electronically signed by CARLITOS Severino at 2024 10:55 AM EDT  
  
documented in this [facility]UnCh[facility]  
Work Phone: [phone]- History of Present illness Narrative* CARLITOS Severino - 2024 11:00 AM EDTFormatting of this note is 
  different from the original.  
Chief Complaint  
 Doing good   
  
Reason for Visit  
9-month follow-up.  
Patient presents to the office today for outpatient follow-up for nonischemic 
cardiomyopathy.  
Last evaluated in clinic by Dr. Whitley 2023.  
  
Presents today ambulatory with cane and steady gait.  
Accompanied by patient  
Patient denies any hospitalizations or significant changes to interval medical 
history since last office follow-up.  
He follows routinely with PCP, is due to get annual lab work later this year.  
Also follows routinely with the wound clinic.  
  
History of Present Illness  
Patient is a very pleasant 69-year-old gentleman who presents to the office 
today with no voiced cardiovascular complaints. He continues to follow at the 
wound clinic due to right lower extremity ulcers, has noted some slight 
increasing lower extremity edema. From an activity standpoint he is able to go 
to [employer], walks into the [place] with no change in his functional class II 
shortness of breath. He denies any exertional chest pain. No orthopnea or PND. 
Remains compliant with CPAP treatment.  
  
Only concern today is cost of medications. Apparently has now in the donut hole.
 He is on Xarelto due to a prior right lower extremity DVT.  
  
He has some slight increase in his lower extremity edema. Due to office visits 
he has not been taking his Lasix twice daily. Is unable to tolerate compression 
stockings.  
  
Lower extremity wounds, managed by wound clinic. Denies any prior history of 
PAD.  
  
Patient reports that overall has no complaint(s) of chest pain, chest 
pressure/discomfort, claudication, dyspnea, exertional chest 
pressure/discomfort, fatigue, and irregular heart beat  
  
Review of Systems  
Cardiovascular: Positive for leg swelling. Negative for chest pain, dyspnea on 
exertion, irregular heartbeat, near-syncope, orthopnea, palpitations, paroxysmal
 nocturnal dyspnea and syncope.  
  
  
Visit Vitals  
/70 (BP Location: Left arm, Patient Position: Sitting)  
Pulse 78  
Ht 1.803 m (5' 11 )  
Wt (!) 171 kg (378 lb)  
BMI 52.72 kg/m  
Smoking Status Never  
BSA 2.93 m  
  
Physical Exam  
Vitals and nursing note reviewed.  
Constitutional:  
Appearance: Normal appearance.  
Cardiovascular:  
Rate and Rhythm: Normal rate and regular rhythm.  
Heart sounds: Normal heart sounds.  
Pulmonary:  
Effort: Pulmonary effort is normal.  
Breath sounds: Normal breath sounds.  
Musculoskeletal:  
Cervical back: Full passive range of motion without pain.  
Right lower le+ Pitting Edema present.  
Left lower le+ Pitting Edema present.  
Skin:  
General: Skin is cool.  
Neurological:  
Mental Status: He is alert and oriented to person, place, and time.  
Psychiatric:  
Attention and Perception: Attention normal.  
Mood and Affect: Mood normal.  
Behavior: Behavior is cooperative.  
  
  
Allergies  
Allergen Reactions  
Cephalosporins Rash  
Diclofenac Sodium Rash  
Statins-Hmg-Coa Reductase Inhibitors Rash  
  
Current Outpatient Medications  
Medication Instructions  
carvedilol (Coreg) 6.25 mg tablet 1 tablet, oral, 2 times daily with meals  
empagliflozin (Jardiance) 10 mg 1 tablet, oral, Daily  
furosemide (LASIX) 40 mg, oral, 2 times daily  
Ozempic 1 mg, subcutaneous, Weekly  
rivaroxaban (Xarelto) 10 mg tablet 1 tablet, oral, Daily  
sacubitriL-valsartan (Entresto) 24-26 mg tablet 1 tablet, oral, 2 times daily  
spironolactone (Aldactone) 25 mg tablet 1 tablet, oral, Daily  
  
Assessment:  
  
Pleasant 69-year-old gentleman presents for routine follow-up. Nonischemic 
cardiomyopathy LVEF 42% baseline functional class II stage C on highest 
tolerated dose of guideline directed medical treatment. Will provide with 
patient assistance forms due to cost constraints: Discussed that if needed could
 transition back over to Cozaar, price check Farxiga.  
  
Overall patient is pleased with current state of cardiovascular health. At this 
time there are no indications for additional cardiovascular testing or need for 
medication changes.  
  
Non-ischemic cardiomyopathy (Multi)  
NICM HF bordeline EF 42% 2023 MUGA ( cath minimal CAD EF 40-45%)  
FC II Stage C  
  
GDMT  
Coreg  
Jardiance  
Entresto  
Aldactone  
  
BMI 50.0-59.9, adult (Multi)  
Reviewed the merits of healthy lifestyle choices on overall cardiovascular 
health.  
He had lost weight with Ozempic in the past, I believe there was some difficulty
 obtaining the prescription. Just recently resumed.  
  
Plan:  
  
Through informed decision making process incorporating patients unique 
circumstances, the followingtreatment plan will be initiated:  
  
1. Prescription drug management of cardiovascular medication for efficacy, 
adherence to treatment, side effect assessment and polypharmacy. Current 
treatment clinically warranted and to continue without modifications.  
  
2. Please complete patient assistance forms and return to office  
  
3. Return for follow-up; in the interim, contact the office if new symptoms 
arise.  
Dr. Whitley 9 months  
  
Sharlene An MSN, APRN-CNP, PMHNP-[facility]  
  
Please excuse any errors in grammar or translation related to this dictation. 
Voice recognition software was utilized to prepare this document.  
  
Electronically signed by CARLITOS Severino at 2024 11:02 AM EDT  
  
documented in this [facility]  
Work Phone: [phone]- Instructions* Patient Instructions*   
  
CARLITOS Severino - 2024 11:00 AM EDT  
  
Formatting of this note might be different from the original.  
Please bring all medicines, vitamins, and herbal supplements with you when you 
come to the office.  
  
Prescriptions will not be filled unless you are compliant with your follow up 
appointments or have a follow up appointment scheduled as per instruction of 
your physician. Refills should be requested at the time of your visit.  
  
PLAN:  
Through informed decision making process incorporating patients unique 
circumstances, the followingtreatment plan will be initiated:  
  
1. Prescription drug management of cardiovascular medication for efficacy, 
adherence to treatment, side effect assessment and polypharmacy. Current 
treatment clinically warranted and to continue without modifications.  
  
2. Please complete patient assistance forms and return to office  
  
3. Return for follow-up; in the interim, contact the office if new symptoms 
arise.  
Dr. Whitley 9 months  
Electronically signed by CARLITOS Severino at 2024 11:38 AM EDT  
  
  
  
documented in this [facility]  
Work Phone: [phone]- Evaluation note*   
  
                                        Author              Jacoby Braden  
[facility]  
   
                                        Authored            2024 2:5  
6pm  
   
                                                    Start weight: 414.4 lbs., he  
 is down 32.0 lbs. today with a weight of 382.4lbs.   
and   
20.9 lbs. since  
his last visit on 2023.  
Starting Date: 04/15/2022.  
Ozempic start date 4/15/2022. Ozempic start weight 414.4lbs. He is down 32.0 
lbs.   
since starting  
Ozempic.  
1. Abnormal weight gain. He notes he is the heaviest in his family. His brother 
has   
some but much  
less significant weight issues. He notes he was able to get his weight down to 
368   
pounds but was  
unable to keep it down.  
2. Obesity-markedly improved since starting our program and with taking Ozempic 
2 mg,   
but had  
significant weight regain of 20.9 pounds off the medication. Due to cost/donut 
hole   
he stopped  
Ozempic. Hopefully he can restart full dose in the future using patient 
assistance.   
He is also on  
Jardiance 10 mg which can also help with his diabetes and cardiomyopathy/CHF. 
His   
diabetes is well  
controlled with his last A1c of 5.6 despite having issues with obtaining with 
his   
diabetic  
medications. He had mild CAD along with his diabetes and has not been on a 
statin so   
I had  
recommended Crestor, but he notes his cardio said it was not necessary so he did
 not   
start the  
medication. He is eating healthier overall. He should follow-up with our   
nutritionist. He feels that   
his appetite is controlled with Ozempic 2 mg. I have recommend again he consider
   
bariatric surgery  
so he could have his knee replaced or find a surgeon who will do the surgery at 
an   
elevated BMI. In  
the past, he mentioned they found weakness in the bottom of the heart and 
evidently a   
problem with  
an artery that they could not get. His cardiologist however did not feel he 
needed   
the  
statin/Crestor that I previously prescribed. He gets very little activity due to
   
bone-on-bone  
arthritis, severe low back pain on tramadol, difficulty getting out of a chair 
and   
also his cardiac  
function he does get NELSON with small amounts of activity. He sees Dr. Whitley to 
treat   
his  
cardiomyopathy. He did like canned foods and he understands that he must cut 
back the   
salt/processed  
food. He denies adding salt to his foods. He has had a A1c that was 6.6 
indicative of   
diabetes. He  
notes he was never told he was diabetic before program. He is seeing Dr. Lomeli 
for   
his knees and  
notes he has to get his BMI from 59 down to 45 to be able to have his knees 
replaced.   
Before  
starting the program he did eat a lot of red meat, potatoes and drank a lot of 
milk   
2% or 4% often  
buying 3 or 4 gallons at a time. He should not skip meals. He did try Adipex for
 1   
month in the past  
but notes he was not able to lose but a few pounds so his PCP stopped it. He 
notes   
his sister does  
the shopping as he can. He does some simple cooking.  
3. Cardiomyopathy/CHF/early CAD-he must continue to follow-up with cardiology 
for   
evaluation of  
cardiomyopathy/CHF despite medications/known cardiomyopathy/previous mild CAD. 
We   
obtained his  
previous cardiac catheterization from 2009. He notes his preoperative 
diagnoses   
were angina  
pectoris, dyspnea on exertion and abnormal Cardiolite perfusion stress test. His
 left   
anterior  
descending artery showed a 20 to 25% tubular stenosis. His circumflex had an 
ostial   
stenosis of 30  
to 50% in several views but in one view showed less than 30% stenosis and 
appeared to   
be mildly  
kinked, his right coronary artery was large dominant and normal. His left   
ventriculogram showed mild  
to moderately reduced systolic dysfunction with an EF in the 40 to 45% range   
globally. He is now  
being treated by Dr. Whitley.  
4. Type 2 diabetes with A1c controlled/improved at 5.6-will restart Ozempic and   
continue Jardiance.  
We could consider Metformin but he already has some loose stools. We discussed 
that   
first-line  
treatment with lifestyle changes, decrease simple sweets and starches, will be 
some   
increased  
activity in the future and weight loss. He needs to consider bariatric surgery.  
5. Obstructive sleep apnea-he is compliant with his CPAP. Treat also with 
healthy   
lifestyle changes  
and weight loss. He needs to consider bariatric surgery.  
6. Bilateral OA of the knees-bone-on-bone making it very difficult for him to 
get   
around. He notes  
his orthopedic doctor wants him to drop from a BMI of 59 down to 45 before he 
will do   
the surgery.  
We will start weight loss and Ozempic. He needs to consider bariatric surgery.  
7. Chronic low back pain-treat with healthy lifestyle change. He needs to 
maximize   
his pain  
management.  
8. Mixed hyperlipidemia-treat with decreasing the simple sweets, added sugars,   
refined starches, and   
bad/added fats, increasing activity and exercise and continued long-term weight 
loss.   
Monitor with  
healthy lifestyle change. I recommended that he start a statin since he is 
diabetic   
and had mild CAD  
on his cath but he notes his cardiologist told him it was not necessary to 
start.  
9. Metabolic syndrome-treat with long-term healthy lifestyle changes, behavioral
   
changes, healthy  
nutritional changes, increased activity and exercise and achieve long-term 
weight   
loss.  
Follow up with me in 6 weeks.  
New labs needed: Up-to-date. Monitor A1c at least every 6 months with his PCP or
 in   
our office. He  
will continue other regular lab follow-up with his PCP.   
  
  
  
  
                                        Author              Yvette Grijalva  
[facility]  
   
                                        Authored            2024 3:39p  
m  
   
                                                    Start weight: 414.4 lbs., he  
 is down 34.4 lbs. today with a weight of 380.0lbs.   
he is   
down 2.4 lbs.  
since last visit 2024.. since his last visit on 2023.  
Starting Date: 04/15/2022.  
Ozempic start date 4/15/2022. Ozempic start weight 414.4lbs. He is down 34.4 
lbs.   
since starting  
Ozempic.  
1. Abnormal weight gain. He notes he is the heaviest in his family. His brother 
has   
some but much  
less significant weight issues. He notes he was able to get his weight down to 
368   
pounds but was  
unable to keep it down.  
2. Obesity-markedly improved since starting our program and with taking Ozempic 
2 mg,   
but had  
significant weight regain of 20.9 pounds off the medication. Due to cost/donut 
hole   
he stopped  
Ozempic. Hopefully he can restart full dose in the future using patient 
assistance.   
He is also on  
Jardiance 10 mg which can also help with his diabetes and cardiomyopathy/CHF. 
His   
diabetes is well  
controlled with his last A1c of 5.6 despite having issues with obtaining with 
his   
diabetic  
medications. He had mild CAD along with his diabetes and has not been on a 
statin so   
I had  
recommended Crestor, but he notes his cardio said it was not necessary so he did
 not   
start the  
medication. He is eating healthier overall. He should follow-up with our   
nutritionist. He feels that  
his appetite is controlled with Ozempic 2 mg. I have recommend again he consider
   
bariatric surgery  
so he could have his knee replaced or find a surgeon who will do the surgery at 
an   
elevated BMI. In  
the past, he mentioned they found weakness in the bottom of the heart and 
evidently a   
problem with  
an artery that they could not get. His cardiologist however did not feel he 
needed   
the  
statin/Crestor that I previously prescribed. He gets very little activity due to
   
bone-on-bone  
arthritis, severe low back pain on tramadol, difficulty getting out of a chair 
and   
also his cardiac  
function he does get NELSON with small amounts of activity. He sees Dr. Whitley to 
treat   
his  
cardiomyopathy. He did like canned foods and he understands that he must cut 
back the   
salt/processed  
food. He denies adding salt to his foods. He has had a A1c that was 6.6 
indicative of   
diabetes. He  
notes he was never told he was diabetic before program. He is seeing Dr. Lomeli 
for   
his knees and  
notes he has to get his BMI from 59 down to 45 to be able to have his knees 
replaced.   
Before  
starting the program he did eat a lot of red meat, potatoes and drank a lot of 
milk   
2% or 4% often  
buying 3 or 4 gallons at a time. He should not skip meals. He did try Adipex for
 1   
month in the past  
but notes he was not able to lose but a few pounds so his PCP stopped it. He 
notes   
his sister does  
the shopping as he can. He does some simple cooking.  
3. Cardiomyopathy/CHF/early CAD-he must continue to follow-up with cardiology 
for   
evaluation of  
cardiomyopathy/CHF despite medications/known cardiomyopathy/previous mild CAD. 
We   
obtained his  
previous cardiac catheterization from 2009. He notes his preoperative 
diagnoses   
were angina  
pectoris, dyspnea on exertion and abnormal Cardiolite perfusion stress test. His
 left   
anterior  
descending artery showed a 20 to 25% tubular stenosis. His circumflex had an 
ostial   
stenosis of 30  
to 50% in several views but in one view showed less than 30% stenosis and 
appeared to   
be mildly  
kinked, his right coronary artery was large dominant and normal. His left   
ventriculogram showed mild  
to moderately reduced systolic dysfunction with an EF in the 40 to 45% range   
globally. He is now  
being treated by Dr. Whitley.  
4. Type 2 diabetes with A1c controlled/improved at 5.6-will restart Ozempic and   
continue Jardiance.  
We could consider Metformin but he already has some loose stools. We discussed 
that   
first-line  
treatment with lifestyle changes, decrease simple sweets and starches, will be 
some   
increased  
activity in the future and weight loss. He needs to consider bariatric surgery.  
5. Obstructive sleep apnea-he is compliant with his CPAP. Treat also with 
healthy   
lifestyle changes  
and weight loss. He needs to consider bariatric surgery.  
6. Bilateral OA of the knees-bone-on-bone making it very difficult for him to 
get   
around. He notes  
his orthopedic doctor wants him to drop from a BMI of 59 down to 45 before he 
will do   
the surgery.  
We will start weight loss and Ozempic. He needs to consider bariatric surgery.  
7. Chronic low back pain-treat with healthy lifestyle change. He needs to 
maximize   
his pain  
management.  
8. Mixed hyperlipidemia-treat with decreasing the simple sweets, added sugars,   
refined starches, and  
bad/added fats, increasing activity and exercise and continued long-term weight 
loss.   
Monitor with  
healthy lifestyle change. I recommended that he start a statin since he is 
diabetic   
and had mild CAD  
on his cath but he notes his cardiologist told him it was not necessary to 
start.  
9. Metabolic syndrome-treat with long-term healthy lifestyle changes, behavioral
   
changes, healthy  
nutritional changes, increased activity and exercise and achieve long-term 
weight   
loss.  
Follow up with me in 6 weeks.  
New labs needed: Up-to-date. Monitor A1c at least every 6 months with his PCP or
 in   
our office. He  
will continue other regular lab follow-up with his PCP.   
  
  
  
[facility]  
Work Phone: [phone]- Evaluation note*   
  
                                        Author              Jacoby Braden  
[facility]  
   
                                        Authored            2024 2:5  
6pm  
   
                                                    Start weight: 414.4 lbs., he  
 is down 32.0 lbs. today with a weight of 382.4lbs.   
and   
20.9 lbs. since  
his last visit on 2023.  
Starting Date: 04/15/2022.  
Ozempic start date 4/15/2022. Ozempic start weight 414.4lbs. He is down 32.0 
lbs.   
since starting  
Ozempic.  
1. Abnormal weight gain. He notes he is the heaviest in his family. His brother 
has   
some but much  
less significant weight issues. He notes he was able to get his weight down to 
368   
pounds but was  
unable to keep it down.  
2. Obesity-markedly improved since starting our program and with taking Ozempic 
2 mg,   
but had  
significant weight regain of 20.9 pounds off the medication. Due to cost/donut 
hole   
he stopped  
Ozempic. Hopefully he can restart full dose in the future using patient 
assistance.   
He is also on  
Jardiance 10 mg which can also help with his diabetes and cardiomyopathy/CHF. 
His   
diabetes is well  
controlled with his last A1c of 5.6 despite having issues with obtaining with 
his   
diabetic  
medications. He had mild CAD along with his diabetes and has not been on a 
statin so   
I had  
recommended Crestor, but he notes his cardio said it was not necessary so he did
 not   
start the  
medication. He is eating healthier overall. He should follow-up with our   
nutritionist. He feels that   
his appetite is controlled with Ozempic 2 mg. I have recommend again he consider
   
bariatric surgery  
so he could have his knee replaced or find a surgeon who will do the surgery at 
an   
elevated BMI. In  
the past, he mentioned they found weakness in the bottom of the heart and 
evidently a   
problem with  
an artery that they could not get. His cardiologist however did not feel he 
needed   
the  
statin/Crestor that I previously prescribed. He gets very little activity due to
   
bone-on-bone  
arthritis, severe low back pain on tramadol, difficulty getting out of a chair 
and   
also his cardiac  
function he does get NELSON with small amounts of activity. He sees Dr. Whitley to 
treat   
his  
cardiomyopathy. He did like canned foods and he understands that he must cut 
back the   
salt/processed  
food. He denies adding salt to his foods. He has had a A1c that was 6.6 
indicative of   
diabetes. He  
notes he was never told he was diabetic before program. He is seeing Dr. Lomeli 
for   
his knees and  
notes he has to get his BMI from 59 down to 45 to be able to have his knees 
replaced.   
Before  
starting the program he did eat a lot of red meat, potatoes and drank a lot of 
milk   
2% or 4% often  
buying 3 or 4 gallons at a time. He should not skip meals. He did try Adipex for
 1   
month in the past  
but notes he was not able to lose but a few pounds so his PCP stopped it. He 
notes   
his sister does  
the shopping as he can. He does some simple cooking.  
3. Cardiomyopathy/CHF/early CAD-he must continue to follow-up with cardiology 
for   
evaluation of  
cardiomyopathy/CHF despite medications/known cardiomyopathy/previous mild CAD. 
We   
obtained his  
previous cardiac catheterization from 2009. He notes his preoperative 
diagnoses   
were angina  
pectoris, dyspnea on exertion and abnormal Cardiolite perfusion stress test. His
 left   
anterior  
descending artery showed a 20 to 25% tubular stenosis. His circumflex had an 
ostial   
stenosis of 30  
to 50% in several views but in one view showed less than 30% stenosis and 
appeared to   
be mildly  
kinked, his right coronary artery was large dominant and normal. His left   
ventriculogram showed mild  
to moderately reduced systolic dysfunction with an EF in the 40 to 45% range   
globally. He is now  
being treated by Dr. Whitley.  
4. Type 2 diabetes with A1c controlled/improved at 5.6-will restart Ozempic and   
continue Jardiance.  
We could consider Metformin but he already has some loose stools. We discussed 
that   
first-line  
treatment with lifestyle changes, decrease simple sweets and starches, will be 
some   
increased  
activity in the future and weight loss. He needs to consider bariatric surgery.  
5. Obstructive sleep apnea-he is compliant with his CPAP. Treat also with 
healthy   
lifestyle changes  
and weight loss. He needs to consider bariatric surgery.  
6. Bilateral OA of the knees-bone-on-bone making it very difficult for him to 
get   
around. He notes  
his orthopedic doctor wants him to drop from a BMI of 59 down to 45 before he 
will do   
the surgery.  
We will start weight loss and Ozempic. He needs to consider bariatric surgery.  
7. Chronic low back pain-treat with healthy lifestyle change. He needs to 
maximize   
his pain  
management.  
8. Mixed hyperlipidemia-treat with decreasing the simple sweets, added sugars,   
refined starches, and   
bad/added fats, increasing activity and exercise and continued long-term weight 
loss.   
Monitor with  
healthy lifestyle change. I recommended that he start a statin since he is 
diabetic   
and had mild CAD  
on his cath but he notes his cardiologist told him it was not necessary to 
start.  
9. Metabolic syndrome-treat with long-term healthy lifestyle changes, behavioral
   
changes, healthy  
nutritional changes, increased activity and exercise and achieve long-term 
weight   
loss.  
Follow up with me in 6 weeks.  
New labs needed: Up-to-date. Monitor A1c at least every 6 months with his PCP or
 in   
our office. He  
will continue other regular lab follow-up with his PCP.   
  
  
  
  
                                        Author              Jacoby Braden  
[facility]  
   
                                        Authored            2024 4:24p  
m  
   
                                                    Start weight: 414.4 lbs., he  
 is down 34.4 lbs. today with a weight of 380.0lbs.   
he is   
down 2.4 lbs.  
since last visit 2024.. since his last visit on 2023.  
Starting Date: 04/15/2022.  
Ozempic start date 4/15/2022. Ozempic start weight 414.4lbs. He is down 34.4 
lbs.   
since starting  
Ozempic.  
1. Abnormal weight gain. He notes he is the heaviest in his family. His brother 
has   
some but much  
less significant weight issues. He notes he was able to get his weight down to 
368   
pounds but was  
unable to keep it down.  
2. Obesity-markedly improved since starting our program and with taking Ozempic 
2 mg,   
but had  
significant weight regain of 20.9 pounds off the medication and not following up
 with   
the program  
since 2023. This is his first visit back since this restart. He had 
previously   
stopped Ozempic  
due to cost/donut hole. He has filled 1 prescription of Ozempic but he thinks it
 was   
$304.  
Hopefully he can continue Ozempic using patient assistance. We are referring him
 to   
[facility] for bariatric surgery evaluation. He needs to get his BMI down to 45 
before   
the surgeon will  
replace his knee. He is on Jardiance 10 mg which can also help with his diabetes
 and  
cardiomyopathy/CHF. His diabetes is well controlled with his last A1c of 5.5 on   
2024 despite  
having issues with obtaining with his diabetic medications. He had mild CAD 
along   
with his diabetes  
and has not been on a statin so I had recommended Crestor, but he notes his 
cardio   
said it was not  
necessary so he did not start the medication. He is eating healthier overall. He
   
should follow-up  
with our nutritionist. He feels that his appetite is controlled with Ozempic 2 
mg. I   
have recommend  
again he consider bariatric surgery so he could have his knee replaced or find a
   
surgeon who will do  
the surgery at an elevated BMI. In the past, he mentioned they found weakness in
 the   
bottom of the  
heart and evidently a problem with an artery that they could not get. His   
cardiologist however did  
not feel he needed the statin/Crestor that I previously prescribed. He gets very
   
little activity due   
to bone-on-bone arthritis, severe low back pain on tramadol, difficulty getting 
out   
of a chair and  
also his cardiac function he does get NELSON with small amounts of activity. He 
sees Dr. Whitley to  
treat his cardiomyopathy. He did like canned foods and he understands that he 
must   
cut back the  
salt/processed food. He denies adding salt to his foods. He has had a A1c that 
was   
6.6 indicative of  
diabetes. He notes he was never told he was diabetic before program. He is 
seeing Dr. Lomeli for his  
knees and notes he has to get his BMI from 59 down to 45 to be able to have his 
knees   
replaced.  
Before starting the program he did eat a lot of red meat, potatoes and drank a 
lot of   
milk 2% or 4%  
often buying 3 or 4 gallons at a time. He should not skip meals. He did try 
Adipex   
for 1 month in  
the past but notes he was not able to lose but a few pounds so his PCP stopped 
it. He   
notes his  
sister does the shopping as he can. He does some simple cooking.  
3. Cardiomyopathy/CHF/early CAD-he must continue to follow-up with cardiology 
for   
evaluation of  
cardiomyopathy/CHF despite medications/known cardiomyopathy/previous mild CAD. 
We   
obtained his  
previous cardiac catheterization from 2009. He notes his preoperative 
diagnoses   
were angina  
pectoris, dyspnea on exertion and abnormal Cardiolite perfusion stress test. His
 left   
anterior  
descending artery showed a 20 to 25% tubular stenosis. His circumflex had an 
ostial   
stenosis of 30  
to 50% in several views but in one view showed less than 30% stenosis and 
appeared to   
be mildly  
kinked, his right coronary artery was large dominant and normal. His left   
ventriculogram showed mild  
to moderately reduced systolic dysfunction with an EF in the 40 to 45% range   
globally. He is now  
being treated by Dr. Whitley.  
4. Type 2 diabetes with A1c controlled/improved at 5.5 on 2024-continue to   
titrate up Ozempic as   
available and continue Jardiance. We could consider Metformin but he already has
 some   
loose stools.  
We discussed that first-line treatment with lifestyle changes, decrease simple 
sweets   
and starches,  
will be some increased activity in the future and weight loss. He needs to 
consider   
bariatric  
surgery.  
5. Obstructive sleep apnea-he is compliant with his CPAP. Treat also with 
healthy   
lifestyle changes  
and weight loss. He needs to consider bariatric surgery.  
6. Bilateral OA of the knees-bone-on-bone making it very difficult for him to 
get   
around. He notes  
his orthopedic doctor wants him to drop from a BMI of 59 down to 45 before he 
will do   
the surgery.  
We will start weight loss and Ozempic. He needs to consider bariatric surgery.  
7. Chronic low back pain-treat with healthy lifestyle change. He needs to 
maximize   
his pain  
management.  
8. Mixed hyperlipidemia-treat with decreasing the simple sweets, added sugars,   
refined starches, and   
bad/added fats, increasing activity and exercise and continued long-term weight 
loss.   
Monitor with  
healthy lifestyle change. I recommended that he start a statin since he is 
diabetic   
and had mild CAD  
on his cath but he notes his cardiologist told him it was not necessary to 
start.  
9. Metabolic syndrome-treat with long-term healthy lifestyle changes, behavioral
   
changes, healthy  
nutritional changes, increased activity and exercise and achieve long-term 
weight   
loss.  
Follow up with me in 6 weeks.  
New labs needed: Up-to-date. Monitor A1c at least every 6 months with his PCP or
 in   
our office. He  
will continue other regular lab follow-up with his PCP.   
  
  
  
[facility]  
Work Phone: [phone]- Evaluation note*   
  
                                        Author              Jacoby Braden  
[facility]  
   
                                        Authored            2024 2:5  
6pm  
   
                                                    Start weight: 414.4 lbs., he  
 is down 32.0 lbs. today with a weight of 382.4lbs.   
and   
20.9 lbs. since  
his last visit on 2023.  
Starting Date: 04/15/2022.  
Ozempic start date 4/15/2022. Ozempic start weight 414.4lbs. He is down 32.0 
lbs.   
since starting  
Ozempic.  
1. Abnormal weight gain. He notes he is the heaviest in his family. His brother 
has   
some but much  
less significant weight issues. He notes he was able to get his weight down to 
368   
pounds but was  
unable to keep it down.  
2. Obesity-markedly improved since starting our program and with taking Ozempic 
2 mg,   
but had  
significant weight regain of 20.9 pounds off the medication. Due to cost/donut 
hole   
he stopped  
Ozempic. Hopefully he can restart full dose in the future using patient 
assistance.   
He is also on  
Jardiance 10 mg which can also help with his diabetes and cardiomyopathy/CHF. 
His   
diabetes is well  
controlled with his last A1c of 5.6 despite having issues with obtaining with 
his   
diabetic  
medications. He had mild CAD along with his diabetes and has not been on a 
statin so   
I had  
recommended Crestor, but he notes his cardio said it was not necessary so he did
 not   
start the  
medication. He is eating healthier overall. He should follow-up with our   
nutritionist. He feels that  
his appetite is controlled with Ozempic 2 mg. I have recommend again he consider
   
bariatric surgery  
so he could have his knee replaced or find a surgeon who will do the surgery at 
an   
elevated BMI. In  
the past, he mentioned they found weakness in the bottom of the heart and 
evidently a   
problem with  
an artery that they could not get. His cardiologist however did not feel he 
needed   
the  
statin/Crestor that I previously prescribed. He gets very little activity due to
   
bone-on-bone  
arthritis, severe low back pain on tramadol, difficulty getting out of a chair 
and   
also his cardiac  
function he does get NELSON with small amounts of activity. He sees Dr. Whitley to 
treat   
his  
cardiomyopathy. He did like canned foods and he understands that he must cut 
back the   
salt/processed  
food. He denies adding salt to his foods. He has had a A1c that was 6.6 
indicative of   
diabetes. He  
notes he was never told he was diabetic before program. He is seeing Dr. Lomeli 
for   
his knees and  
notes he has to get his BMI from 59 down to 45 to be able to have his knees 
replaced.   
Before  
starting the program he did eat a lot of red meat, potatoes and drank a lot of 
milk   
2% or 4% often  
buying 3 or 4 gallons at a time. He should not skip meals. He did try Adipex for
 1   
month in the past  
but notes he was not able to lose but a few pounds so his PCP stopped it. He 
notes   
his sister does  
the shopping as he can. He does some simple cooking.  
3. Cardiomyopathy/CHF/early CAD-he must continue to follow-up with cardiology 
for   
evaluation of  
cardiomyopathy/CHF despite medications/known cardiomyopathy/previous mild CAD. 
We   
obtained his  
previous cardiac catheterization from 2009. He notes his preoperative 
diagnoses   
were angina  
pectoris, dyspnea on exertion and abnormal Cardiolite perfusion stress test. His
 left   
anterior  
descending artery showed a 20 to 25% tubular stenosis. His circumflex had an 
ostial   
stenosis of 30  
to 50% in several views but in one view showed less than 30% stenosis and 
appeared to   
be mildly  
kinked, his right coronary artery was large dominant and normal. His left   
ventriculogram showed mild  
to moderately reduced systolic dysfunction with an EF in the 40 to 45% range   
globally. He is now  
being treated by Dr. Whitley.  
4. Type 2 diabetes with A1c controlled/improved at 5.6-will restart Ozempic and   
continue Jardiance.  
We could consider Metformin but he already has some loose stools. We discussed 
that   
first-line  
treatment with lifestyle changes, decrease simple sweets and starches, will be 
some   
increased  
activity in the future and weight loss. He needs to consider bariatric surgery.  
5. Obstructive sleep apnea-he is compliant with his CPAP. Treat also with 
healthy   
lifestyle changes  
and weight loss. He needs to consider bariatric surgery.  
6. Bilateral OA of the knees-bone-on-bone making it very difficult for him to 
get   
around. He notes  
his orthopedic doctor wants him to drop from a BMI of 59 down to 45 before he 
will do   
the surgery.  
We will start weight loss and Ozempic. He needs to consider bariatric surgery.  
7. Chronic low back pain-treat with healthy lifestyle change. He needs to 
maximize   
his pain  
management.  
8. Mixed hyperlipidemia-treat with decreasing the simple sweets, added sugars,   
refined starches, and  
bad/added fats, increasing activity and exercise and continued long-term weight 
loss.   
Monitor with  
healthy lifestyle change. I recommended that he start a statin since he is 
diabetic   
and had mild CAD  
on his cath but he notes his cardiologist told him it was not necessary to 
start.  
9. Metabolic syndrome-treat with long-term healthy lifestyle changes, behavioral
   
changes, healthy  
nutritional changes, increased activity and exercise and achieve long-term 
weight   
loss.  
Follow up with me in 6 weeks.  
New labs needed: Up-to-date. Monitor A1c at least every 6 months with his PCP or
 in   
our office. He  
will continue other regular lab follow-up with his PCP.   
  
  
  
  
                                        Author              Jacoby Braden  
[facility]  
   
                                        Authored            2024 4:24p  
m  
   
                                                    Start weight: 414.4 lbs., he  
 is down 34.4 lbs. today with a weight of 380.0lbs.   
he is   
down 2.4 lbs.  
since last visit 2024.. since his last visit on 2023.  
Starting Date: 04/15/2022.  
Ozempic start date 4/15/2022. Ozempic start weight 414.4lbs. He is down 34.4 
lbs.   
since starting  
Ozempic.  
1. Abnormal weight gain. He notes he is the heaviest in his family. His brother 
has   
some but much  
less significant weight issues. He notes he was able to get his weight down to 
368   
pounds but was  
unable to keep it down.  
2. Obesity-markedly improved since starting our program and with taking Ozempic 
2 mg,   
but had  
significant weight regain of 20.9 pounds off the medication and not following up
 with   
the program  
since 2023. This is his first visit back since this restart. He had 
previously   
stopped Ozempic  
due to cost/donut hole. He has filled 1 prescription of Ozempic but he thinks it
 was   
$304.  
Hopefully he can continue Ozempic using patient assistance. We are referring him
 to   
[facility] for bariatric surgery evaluation. He needs to get his BMI down to 45 
before   
the surgeon will  
replace his knee. He is on Jardiance 10 mg which can also help with his diabetes
 and  
cardiomyopathy/CHF. His diabetes is well controlled with his last A1c of 5.5 on   
2024 despite  
having issues with obtaining with his diabetic medications. He had mild CAD 
along   
with his diabetes  
and has not been on a statin so I had recommended Crestor, but he notes his 
cardio   
said it was not  
necessary so he did not start the medication. He is eating healthier overall. He
   
should follow-up  
with our nutritionist. He feels that his appetite is controlled with Ozempic 2 
mg. I   
have recommend  
again he consider bariatric surgery so he could have his knee replaced or find a
   
surgeon who will do  
the surgery at an elevated BMI. In the past, he mentioned they found weakness in
 the   
bottom of the  
heart and evidently a problem with an artery that they could not get. His   
cardiologist however did  
not feel he needed the statin/Crestor that I previously prescribed. He gets very
   
little activity due  
to bone-on-bone arthritis, severe low back pain on tramadol, difficulty getting 
out   
of a chair and  
also his cardiac function he does get NELSON with small amounts of activity. He 
sees Dr. Whitley to  
treat his cardiomyopathy. He did like canned foods and he understands that he 
must   
cut back the  
salt/processed food. He denies adding salt to his foods. He has had a A1c that 
was   
6.6 indicative of  
diabetes. He notes he was never told he was diabetic before program. He is 
seeing Dr. Lomeli for his   
knees and notes he has to get his BMI from 59 down to 45 to be able to have his 
knees   
replaced.  
Before starting the program he did eat a lot of red meat, potatoes and drank a 
lot of   
milk 2% or 4%  
often buying 3 or 4 gallons at a time. He should not skip meals. He did try 
Adipex   
for 1 month in  
the past but notes he was not able to lose but a few pounds so his PCP stopped 
it. He   
notes his  
sister does the shopping as he can. He does some simple cooking.  
3. Cardiomyopathy/CHF/early CAD-he must continue to follow-up with cardiology 
for   
evaluation of  
cardiomyopathy/CHF despite medications/known cardiomyopathy/previous mild CAD. 
We   
obtained his  
previous cardiac catheterization from 2009. He notes his preoperative 
diagnoses   
were angina  
pectoris, dyspnea on exertion and abnormal Cardiolite perfusion stress test. His
 left   
anterior  
descending artery showed a 20 to 25% tubular stenosis. His circumflex had an 
ostial   
stenosis of 30  
to 50% in several views but in one view showed less than 30% stenosis and 
appeared to   
be mildly  
kinked, his right coronary artery was large dominant and normal. His left   
ventriculogram showed mild  
to moderately reduced systolic dysfunction with an EF in the 40 to 45% range   
globally. He is now  
being treated by Dr. Whitley.  
4. Type 2 diabetes with A1c controlled/improved at 5.5 on 2024-continue to   
titrate up Ozempic as  
available and continue Jardiance. We could consider Metformin but he already has
 some   
loose stools.  
We discussed that first-line treatment with lifestyle changes, decrease simple 
sweets   
and starches,  
will be some increased activity in the future and weight loss. He needs to 
consider   
bariatric  
surgery.  
5. Obstructive sleep apnea-he is compliant with his CPAP. Treat also with 
healthy   
lifestyle changes  
and weight loss. He needs to consider bariatric surgery.  
6. Bilateral OA of the knees-bone-on-bone making it very difficult for him to 
get   
around. He notes  
his orthopedic doctor wants him to drop from a BMI of 59 down to 45 before he 
will do   
the surgery.  
We will start weight loss and Ozempic. He needs to consider bariatric surgery.  
7. Chronic low back pain-treat with healthy lifestyle change. He needs to 
maximize   
his pain  
management.  
8. Mixed hyperlipidemia-treat with decreasing the simple sweets, added sugars,   
refined starches, and  
bad/added fats, increasing activity and exercise and continued long-term weight 
loss.   
Monitor with  
healthy lifestyle change. I recommended that he start a statin since he is 
diabetic   
and had mild CAD   
on his cath but he notes his cardiologist told him it was not necessary to 
start.  
9. Metabolic syndrome-treat with long-term healthy lifestyle changes, behavioral
   
changes, healthy  
nutritional changes, increased activity and exercise and achieve long-term 
weight   
loss.  
Follow up with me in 6 weeks.  
New labs needed: Up-to-date. Monitor A1c at least every 6 months with his PCP or
 in   
our office. He  
will continue other regular lab follow-up with his PCP.   
  
  
  
  
                                        Author              Constance Milan  
[facility]  
   
                                        Authored            2024 2:03  
pm  
   
                                                    Start weight: 414.4 lbs., he  
 is down 45.2 lbs. today with a weight of 369.2lbs.   
he is   
down 10.8 lbs.  
since last visit 2024.  
Starting Date: 04/15/2022.  
Ozempic start date 4/15/2022. Ozempic start weight 414.4lbs. He is down 34.4 
lbs.   
since starting  
Ozempic.  
1. Abnormal weight gain. He notes he is the heaviest in his family. His brother 
has   
some but much  
less significant weight issues. He notes he was able to get his weight down to 
368   
pounds but was  
unable to keep it down.  
2. Obesity-markedly improved since starting our program and with taking Ozempic 
2 mg,   
but had  
significant weight regain of 20.9 pounds off the medication and not following up
 with   
the program  
since 2023. This is his first visit back since this restart. He had 
previously   
stopped Ozempic  
due to cost/donut hole. He has filled 1 prescription of Ozempic but he thinks it
 was   
$304.  
Hopefully he can continue Ozempic using patient assistance. We are referring him
 to   
[facility] for bariatric surgery evaluation. He needs to get his BMI down to 45 
before   
the surgeon will  
replace his knee. He is on Jardiance 10 mg which can also help with his diabetes
 and  
cardiomyopathy/CHF. His diabetes is well controlled with his last A1c of 5.5 on   
2024 despite  
having issues with obtaining with his diabetic medications. He had mild CAD 
along   
with his diabetes  
and has not been on a statin so I had recommended Crestor, but he notes his 
cardio   
said it was not  
necessary so he did not start the medication. He is eating healthier overall. He
   
should follow-up  
with our nutritionist. He feels that his appetite is controlled with Ozempic 2 
mg. I   
have recommend  
again he consider bariatric surgery so he could have his knee replaced or find a
   
surgeon who will do  
the surgery at an elevated BMI. In the past, he mentioned they found weakness in
 the   
bottom of the  
heart and evidently a problem with an artery that they could not get. His   
cardiologist however did  
not feel he needed the statin/Crestor that I previously prescribed. He gets very
   
little activity due  
to bone-on-bone arthritis, severe low back pain on tramadol, difficulty getting 
out   
of a chair and  
also his cardiac function he does get NELSON with small amounts of activity. He 
sees Dr. Whitley to  
treat his cardiomyopathy. He did like canned foods and he understands that he 
must   
cut back the  
salt/processed food. He denies adding salt to his foods. He has had a A1c that 
was   
6.6 indicative of  
diabetes. He notes he was never told he was diabetic before program. He is 
seeing Dr. Lomeli for his  
knees and notes he has to get his BMI from 59 down to 45 to be able to have his 
knees   
replaced.  
Before starting the program he did eat a lot of red meat, potatoes and drank a 
lot of   
milk 2% or 4%  
often buying 3 or 4 gallons at a time. He should not skip meals. He did try 
Adipex   
for 1 month in  
the past but notes he was not able to lose but a few pounds so his PCP stopped 
it. He   
notes his  
sister does the shopping as he can. He does some simple cooking.  
3. Cardiomyopathy/CHF/early CAD-he must continue to follow-up with cardiology 
for   
evaluation of  
cardiomyopathy/CHF despite medications/known cardiomyopathy/previous mild CAD. 
We   
obtained his  
previous cardiac catheterization from 2009. He notes his preoperative 
diagnoses   
were angina  
pectoris, dyspnea on exertion and abnormal Cardiolite perfusion stress test. His
 left   
anterior  
descending artery showed a 20 to 25% tubular stenosis. His circumflex had an 
ostial   
stenosis of 30  
to 50% in several views but in one view showed less than 30% stenosis and 
appeared to   
be mildly  
kinked, his right coronary artery was large dominant and normal. His left   
ventriculogram showed mild  
to moderately reduced systolic dysfunction with an EF in the 40 to 45% range   
globally. He is now  
being treated by Dr. Whitley.  
4. Type 2 diabetes with A1c controlled/improved at 5.5 on 2024-continue to   
titrate up Ozempic as  
available and continue Jardiance. We could consider Metformin but he already has
 some   
loose stools.  
We discussed that first-line treatment with lifestyle changes, decrease simple 
sweets   
and starches,  
will be some increased activity in the future and weight loss. He needs to 
consider   
bariatric  
surgery.  
5. Obstructive sleep apnea-he is compliant with his CPAP. Treat also with 
healthy   
lifestyle changes  
and weight loss. He needs to consider bariatric surgery.  
6. Bilateral OA of the knees-bone-on-bone making it very difficult for him to 
get   
around. He notes  
his orthopedic doctor wants him to drop from a BMI of 59 down to 45 before he 
will do   
the surgery.  
We will start weight loss and Ozempic. He needs to consider bariatric surgery.  
7. Chronic low back pain-treat with healthy lifestyle change. He needs to 
maximize   
his pain  
management.  
8. Mixed hyperlipidemia-treat with decreasing the simple sweets, added sugars,   
refined starches, and  
bad/added fats, increasing activity and exercise and continued long-term weight 
loss.   
Monitor with  
healthy lifestyle change. I recommended that he start a statin since he is 
diabetic   
and had mild CAD  
on his cath but he notes his cardiologist told him it was not necessary to 
start.  
9. Metabolic syndrome-treat with long-term healthy lifestyle changes, behavioral
   
changes, healthy  
nutritional changes, increased activity and exercise and achieve long-term 
weight   
loss.  
Follow up with me in 6 weeks.  
New labs needed: Up-to-date. Monitor A1c at least every 6 months with his PCP or
 in   
our office. He  
will continue other regular lab follow-up with his PCP.   
  
  
  
[facility]  
Work Phone: [phone]- Progress note  
  
  
  
  
                                        Author              Elaine Espino  
[facility]  
2024 2:29pm  
   
                                        Note Date/Time      2024 2:2  
9pm  
   
                                                    [facility]  
ENTER  
[address] [place] [address]  
  
Wound Center Provider Note  
Signed  
  
Patient: Jocelin Pacheco MR#:   
[id number]  
: 1955 Acct:[id number]  
  
Age/Sex: 69 / M  
  
Copies to: DO Elaine Arboleda, SOWMYA~  
  
  
HPI  
Date of Visit  
Date of Visit:  
Date of Service: 2024  
Time of Service: 14:28  
  
Narrative  
HPI:  
24 Jocelin is a 69-year-old male presenting to [facility]   
wound care program for an initial visit to the office for a right lower 
extremity   
venous stasis ulcer/lymphedema ulcer.? The patient has been a patient of the 
office   
for quite some time now and has seen other providers.  
He had seen vascular in [place] was told that there is nothing else that can be 
done   
for him.  
He has an extensive history of infections so I imagine this will continue to be 
an   
issue for him.  
He appears to need better edema control.  
He was told by ortho that he needs to lose 50 pounds in order to have surgery- 
this   
was 3 years ago and has not been accomplished- he says that he wasn't taking the
   
ozempic as ordered and he gained back the weight he had lost and hence no 
surgery.  
A past venous duplex indicates deep vein issues which are not surgically 
correctable-   
vascular did confirm that he does not need to see them because there is nothing 
they   
have to offer him.  
He is still retired so hopefully there is more opportunity for elevation and   
compression of the legs to support healing of the ulcer.  
Nutrition and probiotics were discussed and suggestions were given.  
Doxycycline was escribed today for what appears to be cellulitis of the RLE.  
Topical gentamicin and unna wrap was ordered and he will return here for 
dressings.  
3/4/24 better, topical flagyl today with unna wrap, will use a gauze wrap under 
the   
paste since he feels like the unna paste causes itching, no infection seen 
today,   
cellulitis appears resolved  
3/18/24 much improved, same topical orders, could be healed in a week or so, no   
infection noted  
  
Subjective  
Pain  
Right Lower Leg:  
Pain Description: Intermittent  
Pain Intensity: 0  
Pain Management Techniques Other/Comment:  not too bad today   
Wound/Ulcer History  
When did wound start?: 2024 Right lower leg  
Mode of Arrival/ : Personal vehicle  
Assistive Device Used Today: Cane  
Lives with:: Alone  
Appetite Description: Within Normal Limits  
Who helps w/ dressing change?: Wound Care Dept  
Smoking Status: Never smoker  
  
[facility]  
Medical History (Updated 24 @ 08:12 by Maria A Crowe RN)  
  
Ulcer of right leg  
Wound of right lower extremity  
Itching with irritation  
Itching  
PVD (peripheral vascular disease)  
 poor veins  Dr. Cameron did vein closure 2019, needs 5 more viens worked 
on.  
Carpal tunnel syndrome  
right arm surgery  
DIMITRI (obstructive sleep apnea)  
Cardiomyopathy  
 lower part of heart is weak   
Back pain  
DVT (deep venous thrombosis)  
 blood clot from right foot to right thigh  on  blood thinner   
Hypertension  
Arthritis  
Bilateral knees,  right is bone on bone  per  Dr. Richard   
  
Surgical History (Reviewed 24 @ 08:12 by Maria A Crowe, RN)  
  
Hx of cholecystectomy  
  
Family History (Reviewed 24 @ 08:13 by Maria A Crowe, RN)  
Father Diabetes mellitus, type 2  
Sister Diabetes mellitus, type 2  
Brother Heart disease  
Legacy FamHx Relation: Brother(s)  
Father Heart disease  
Diabetes  
Heart failure  
  
Family/Other   
Legacy FamHx Problem: 2 BROTHERS  :2 SISTERS ::NO CHILDREN  
Mother   
Heart disease  
Sister Heart disease  
  
Social History  
Smoking Status: Never smoker  
Substance Use Type: None  
  
Grafts  
History of Graft  
History of Graft?: No  
  
Exam  
Physical Exam  
Vital Signs:  
  
  
  
  
Temp Pulse Resp BP O2 Del Method  
  
97.7 F 87 24 132/74 Room Air  
  
24 14:18 24 14:18 24 14:18 24 14:18 24 14:18  
  
  
  
Const  
General: cooperative, comfortable and no acute distress  
Nutritional Appearance: obese  
Orientation: alert, awake and oriented x3  
  
Lower/Upper Extremity Exam  
Vascular Exam-Edema  
Right Lower Extremity:  
Edema Type: Lymphedema  
Vascular Exam-Pulses  
Right Dorsalis Pedis:  
Pulse Assessment Method: Doppler  
Right Posterior Tibial:  
Pulse Assessment Method: Doppler  
Right Brachial:  
Pulse Assessment Method: NIBP  
  
Objective  
Meds/Allergies  
Home Medications  
  
carvedilol 6.25 mg tablet 6.25 mg PO BID 17 [History Confirmed 24]  
furosemide 40 mg tablet (Lasix) 40 mg PO BID 17 [History Confirmed 
24]  
acetaminophen 500 mg tablet (Tylenol Extra Strength) 500 mg PO TID PRN Pain 
20   
[History Confirmed 24]  
empagliflozin 10 mg tablet (Jardiance) 10 mg PO DAILY 22 [History 
Confirmed   
24]  
rivaroxaban 10 mg tablet (Xarelto) 10 mg PO DAILY 22 [History Confirmed   
24]  
semaglutide 2 mg/dose (8 mg/3 mL) subcutaneous pen injector (Ozempic) 2 mg 
subcut Q7D   
22 [History Confirmed 24]  
spironolactone 25 mg tablet 25 mg PO DAILY 22 [History Confirmed 24]  
sacubitril 24 mg-valsartan 26 mg tablet (Entresto) 1 tab PO BID 23 
[History   
Confirmed 24]  
Lactobacillus acidophilus 10 billion cell capsule (Probiotic) 100 mmu cells PO 
DAILY   
24 [History Confirmed 24]  
doxycycline hyclate 100 mg capsule 100 mg PO BID 14 days #28 caps 24 [Rx   
Confirmed 24]  
  
  
Allergies  
  
cefixime Allergy (Unknown, Verified 24 08:10)  
Unknown Reaction  
diclofenac Allergy (Unknown, Verified 24 08:10)  
Unknown Reaction  
nystatin Allergy (Unknown, Verified 24 08:10)  
Unknown Reaction  
sodium benzoate Allergy (Verified 24 08:10)  
Unknown Reaction  
  
  
Wound/Ulcer  
Right Lower Leg:  
Type: Lymphedema  
Thickness: Skin Breakdown  
Bed Appearance: Dried Exudate, Pink and Yellow  
Percent of Wound Bed Granulated/Red: 90  
Percent of Devitalized: 10  
Length (cm): 0.5  
Width (cm): 0.5  
Depth (cm): 0.1  
CM Sq: 0.250  
Surrounding Tissue Appearance: Hyperpigmented  
Surrounding Tissue Temp: Warm  
Drainage Amount: Moderate  
Drainage Description: Serosanguineous  
Drainage Odor: No Odor  
Results  
Height: 5 ft 11 in  
Weight: 167.829 kg  
Body Mass Index: 51.5  
  
Assessment/Plan  
Assessment/Plan  
(1) Venous stasis ulcer:  
Qualifiers:  
Laterality: right Non-pressure ulcer stage: limited to breakdown of skin 
Varicose   
vein presence: with varicose veins Venous stasis ulcer site: other part of lower
leg   
Qualified Code(s): I83.018 - Varicose veins of right lower extremity with ulcer 
other   
part of lower leg; L97.811 - Non-pressure chronic ulcer of other part of right 
lower   
leg limited to breakdown of skin  
Code(s):  
I83.009 - Varicose veins of unspecified lower extremity with ulcer of 
unspecified   
site; L97.909 - Non-pressure chronic ulcer of unspecified part of unspecified 
lower   
leg with unspecified severity  
Plan:  
w/lymphedema  
(2) Edema of right lower leg:  
Code(s):  
R60.0 - Localized edema  
(3) Morbid obesity due to excess calories:  
Code(s):  
E66.01 - Morbid (severe) obesity due to excess calories  
(4) Lymphedema of both lower extremities:  
Code(s):  
I89.0 - Lymphedema, not elsewhere classified  
(5) PVD (peripheral vascular disease):  
Code(s):  
I73.9 - Peripheral vascular disease, unspecified  
(6) Varicose veins of both legs with edema:  
Code(s):  
I83.893 - Varicose veins of bilateral lower extremities with other complications  
(7) Inflammation:  
  
Plan  
see orders and hpi  
Time spent with patient  
Time Spent With Patient (min): 10  
  
  
  
  
Dictated By: SOWMYA Correa DD/DT: 24  
  
Signed By: <Electronically signed by SOWMYA Espino>  
24 1429  
   
  
[facility]  
Work Phone: [phone]- Progress note  
  
  
  
  
                                        Author              Elaine Espino  
[facility]  
2024 3:29pm  
   
                                        Note Date/Time      2024 3:29  
pm  
   
                                                    [facility]  
ENTER  
[address] [place] [address]  
  
Wound Center Provider Note  
Signed  
  
Patient: Jocelin Pacheco MR#:   
[id number]  
: 1955 Acct:[id number]  
  
Age/Sex: 69 / M  
  
Copies to: DO Elaine Arboleda APRN~  
  
  
HPI  
Date of Visit  
Date of Visit:  
Date of Service: 2024  
Time of Service: 15:26  
  
Narrative  
HPI:  
24 Jocelin is a 69-year-old male presenting to [facility]   
wound care program for an initial visit to the office for a right lower 
extremity   
venous stasis ulcer/lymphedema ulcer.? The patient has been a patient of the 
office   
for quite some time now and has seen other providers.  
He had seen vascular in [place] was told that there is nothing else that can be 
done   
for him.  
He has an extensive history of infections so I imagine this will continue to be 
an   
issue for him.  
He appears to need better edema control.  
He was told by ortho that he needs to lose 50 pounds in order to have surgery- 
this   
was 3 years ago and has not been accomplished- he says that he wasn't taking the
  
ozempic as ordered and he gained back the weight he had lost and hence no 
surgery.  
A past venous duplex indicates deep vein issues which are not surgically 
correctable-   
vascular did confirm that he does not need to see them because there is nothing 
they   
have to offer him.  
He is still retired so hopefully there is more opportunity for elevation and   
compression of the legs to support healing of the ulcer.  
Nutrition and probiotics were discussed and suggestions were given.  
Doxycycline was escribed today for what appears to be cellulitis of the RLE.  
Topical gentamicin and unna wrap was ordered and he will return here for 
dressings.  
3/4/24 better, topical flagyl today with unna wrap, will use a gauze wrap under 
the   
paste since he feels like the unna paste causes itching, no infection seen 
today,   
cellulitis appears resolved  
  
Subjective  
Pain  
Right Lower Leg:  
Pain Description: Intermittent  
Pain Intensity: 8  
Wound/Ulcer History  
When did wound start?: 2024 Right lower leg  
Mode of Arrival/ : Personal vehicle  
Assistive Device Used Today: Cane  
Lives with:: Alone  
Appetite Description: Within Normal Limits  
Who helps w/ dressing change?: Wound Care Dept  
Smoking Status: Never smoker  
  
[facility]  
Medical History (Updated 24 @ 08:12 by Maria A Crowe RN)  
  
Ulcer of right leg  
Wound of right lower extremity  
Itching with irritation  
Itching  
PVD (peripheral vascular disease)  
 poor veins  Dr. Cameron did vein closure 2019, needs 5 more viens worked 
on.  
Carpal tunnel syndrome  
right arm surgery  
DIMITRI (obstructive sleep apnea)  
Cardiomyopathy  
 lower part of heart is weak   
Back pain  
DVT (deep venous thrombosis)  
 blood clot from right foot to right thigh  on  blood thinner   
Hypertension  
Arthritis  
Bilateral knees,  right is bone on bone  per  Dr. Richard   
  
Surgical History (Reviewed 24 @ 08:12 by Maria A Crowe, KIMBERLY)  
  
Hx of cholecystectomy  
  
Family History (Reviewed 24 @ 08:13 by Maria A Crowe RN)  
Father Diabetes mellitus, type 2  
Sister Diabetes mellitus, type 2  
Brother Heart disease  
Legacy FamHx Relation: Brother(s)  
Father Heart disease  
Diabetes  
Heart failure  
  
Family/Other   
Legacy FamHx Problem: 2 BROTHERS  :2 SISTERS ::NO CHILDREN  
Mother   
Heart disease  
Sister Heart disease  
  
Social History  
Smoking Status: Never smoker  
Substance Use Type: None  
  
Grafts  
History of Graft  
History of Graft?: No  
  
Exam  
Physical Exam  
Vital Signs:  
  
  
  
  
Temp Pulse Resp BP O2 Del Method  
  
97.3 F L 85 18 130/60 Room Air  
  
24 15:09 24 15:09 24 15:09 24 15:09 24 15:09  
  
  
  
Const  
General: cooperative, comfortable and no acute distress  
Nutritional Appearance: obese  
Orientation: alert, awake and oriented x3  
  
Lower/Upper Extremity Exam  
Vascular Exam-Edema  
Right Lower Extremity:  
Edema Type: Lymphedema  
Vascular Exam-Pulses  
Right Dorsalis Pedis:  
Pulse Assessment Method: Doppler  
Right Posterior Tibial:  
Pulse Assessment Method: Doppler  
Right Brachial:  
Pulse Assessment Method: NIBP  
  
Objective  
Meds/Allergies  
Home Medications  
  
carvedilol 6.25 mg tablet 6.25 mg PO BID 17 [History Confirmed 24]  
furosemide 40 mg tablet (Lasix) 40 mg PO BID 17 [History Confirmed 
24]  
acetaminophen 500 mg tablet (Tylenol Extra Strength) 500 mg PO TID PRN Pain 
20   
[History Confirmed 24]  
empagliflozin 10 mg tablet (Jardiance) 10 mg PO DAILY 22 [History 
Confirmed   
24]  
rivaroxaban 10 mg tablet (Xarelto) 10 mg PO DAILY 22 [History Confirmed   
24]  
semaglutide 2 mg/dose (8 mg/3 mL) subcutaneous pen injector (Ozempic) 2 mg 
subcut Q7D   
22 [History Confirmed 24]  
spironolactone 25 mg tablet 25 mg PO DAILY 22 [History Confirmed 24]  
sacubitril 24 mg-valsartan 26 mg tablet (Entresto) 1 tab PO BID 23 
[History   
Confirmed 24]  
Lactobacillus acidophilus 10 billion cell capsule (Probiotic) 100 mmu cells PO 
DAILY   
24 [History Confirmed 24]  
doxycycline hyclate 100 mg capsule 100 mg PO BID 14 days #28 caps 24 [Rx   
Confirmed 24]  
  
  
Allergies  
  
cefixime Allergy (Unknown, Verified 24 08:10)  
Unknown Reaction  
diclofenac Allergy (Unknown, Verified 24 08:10)  
Unknown Reaction  
nystatin Allergy (Unknown, Verified 24 08:10)  
Unknown Reaction  
sodium benzoate Allergy (Verified 24 08:10)  
Unknown Reaction  
  
  
Wound/Ulcer  
Right Lower Leg:  
Type: Lymphedema  
Thickness: Skin Breakdown  
Bed Appearance: Dried Exudate, Pink and Yellow  
Percent of Wound Bed Granulated/Red: 80  
Percent of Devitalized: 20  
Length (cm): 1.5  
Width (cm): 1.0  
Depth (cm): 0.1  
CM Sq: 1.500  
Surrounding Tissue Appearance: Hyperpigmented  
Surrounding Tissue Temp: Warm  
Drainage Amount: Moderate  
Drainage Description: Serosanguineous  
Drainage Odor: No Odor  
Procedures  
Time Out: 2 Patient Identifiers, Correct Patient, Correct Side/Site, Correct   
Procedure and Safety Issues Reviewed  
Procedure:  
The right leg ulcer was anesthetized with topical 2% lidocaine gel. A forcep and
  
scissor was used to perform debridement for the removal of 1 cm? of devitalized   
tissue consisting of skin and slough. Debridement was down to healthy bleeding   
tissue. Estimated blood loss was minimal to moderate. Hemostasis was achieved by
  
applying pressure. The ulcer now appears 95% pink and red and the size remains 
the   
same. The patient tolerated well with no pain.  
Results  
Height: 5 ft 11 in  
Weight: 167.829 kg  
Body Mass Index: 51.5  
  
Assessment/Plan  
Assessment/Plan  
(1) Venous stasis ulcer:  
Qualifiers:  
Laterality: right Non-pressure ulcer stage: limited to breakdown of skin 
Varicose   
vein presence: with varicose veins Venous stasis ulcer site: other part of lower
leg   
Qualified Code(s): I83.018 - Varicose veins of right lower extremity with ulcer 
other   
part of lower leg; L97.811 - Non-pressure chronic ulcer of other part of right 
lower   
leg limited to breakdown of skin  
Code(s):  
I83.009 - Varicose veins of unspecified lower extremity with ulcer of 
unspecified   
site; L97.909 - Non-pressure chronic ulcer of unspecified part of unspecified 
lower   
leg with unspecified severity  
Plan:  
w/lymphedema  
(2) Edema of right lower leg:  
Code(s):  
R60.0 - Localized edema  
(3) Morbid obesity due to excess calories:  
Code(s):  
E66.01 - Morbid (severe) obesity due to excess calories  
(4) Lymphedema of both lower extremities:  
Code(s):  
I89.0 - Lymphedema, not elsewhere classified  
(5) PVD (peripheral vascular disease):  
Code(s):  
I73.9 - Peripheral vascular disease, unspecified  
(6) Varicose veins of both legs with edema:  
Code(s):  
I83.893 - Varicose veins of bilateral lower extremities with other complications  
(7) Inflammation:  
  
Plan  
see orders and hpi  
Time spent with patient  
Time Spent With Patient (min): 13  
  
  
  
  
Dictated By: SOWMYA Correa DD/DT: 24 1526  
  
Signed By: <Electronically signed by SOWMYA Espino>  
24 1529  
   
  
[facility]  
Work Phone: [phone]- Progress note  
  
  
  
  
                                        Author              Laila Villarreal  
[facility]  
2024 3:08pm  
   
                                        Note Date/Time      2024   
3:04pm  
   
                                                    [facility]  
ENTER  
[address] [place] [address]  
  
Wound Center Provider Note  
Signed  
  
Patient: Jocelin Pacheco MR#:   
[id number]  
: 1955 Acct:[id number]  
  
Age/Sex: 69 / M  
  
Copies to: SOWMYA Gutierrez DO~  
  
  
HPI  
Date of Visit  
Date of Visit:  
Date of Service: 2024  
Time of Service: 15:03  
  
Narrative  
HPI:  
24 Jocelin is a 69-year-old male presenting to [facility]   
wound care program for an initial visit to the office for a right lower 
extremity   
venous stasis ulcer/lymphedema ulcer.? The patient has been a patient of the 
office   
for quite some time now and has seen other providers.  
He had seen vascular in [place] was told that there is nothing else that can be 
done   
for him.  
He has an extensive history of infections so I imagine this will continue to be 
an   
issue for him.  
He appears to need better edema control.  
He was told by ortho that he needs to lose 50 pounds in order to have surgery- 
this   
was 3 years ago and has not been accomplished- he says that he wasn't taking the
  
ozempic as ordered and he gained back the weight he had lost and hence no 
surgery.  
A past venous duplex indicates deep vein issues which are not surgically 
correctable-   
vascular did confirm that he does not need to see them because there is nothing 
they   
have to offer him.  
He is still retired so hopefully there is more opportunity for elevation and   
compression of the legs to support healing of the ulcer.  
Nutrition and probiotics were discussed and suggestions were given.  
Doxycycline was escribed today for what appears to be cellulitis of the RLE.  
Topical gentamicin and unna wrap was ordered and he will return here for 
dressings.  
Above documentation per SOWMYA Amos - included for continuity of care  
  
24- Jocelin presents for follow-up. He is still taking his antibiotic and   
understands that he should complete the entire course. The area is improving.   
Dressing order changed to collagen silver; sorbact; ERGO wrap. Follow-up in 2-3   
weeks.  
  
Subjective  
Pain  
Right Lower Leg:  
Pain Description: Intermittent and Burning  
Pain Intensity: 9  
Pain Management Techniques Other/Comment:  plus itching   
Wound/Ulcer History  
When did wound start?: 2024 Right lower leg  
Mode of Arrival/ : Personal vehicle  
Assistive Device Used Today: Cane  
Lives with:: Alone  
Appetite Description: Within Normal Limits  
Who helps w/ dressing change?: Wound Care Dept  
Smoking Status: Never smoker  
  
[facility]  
Medical History (Updated 24 @ 08:12 by Maria A Crowe RN)  
  
Ulcer of right leg  
Wound of right lower extremity  
Itching with irritation  
Itching  
PVD (peripheral vascular disease)  
 poor veins  Dr. Cameron did vein closure 2019, needs 5 more viens worked 
on.  
Carpal tunnel syndrome  
right arm surgery  
DIMITRI (obstructive sleep apnea)  
Cardiomyopathy  
 lower part of heart is weak   
Back pain  
DVT (deep venous thrombosis)  
 blood clot from right foot to right thigh  on  blood thinner   
Hypertension  
Arthritis  
Bilateral knees,  right is bone on bone  per  Dr. Richard   
  
Surgical History (Reviewed 24 @ 08:12 by Maria A Crowe, KIMBERLY)  
  
Hx of cholecystectomy  
  
Family History (Reviewed 24 @ 08:13 by Maria A Crowe RN)  
Father Diabetes mellitus, type 2  
Sister Diabetes mellitus, type 2  
Brother Heart disease  
Legacy FamHx Relation: Brother(s)  
Father Heart disease  
Diabetes  
Heart failure  
  
Family/Other   
Legacy FamHx Problem: 2 BROTHERS  :2 SISTERS ::NO CHILDREN  
Mother   
Heart disease  
Sister Heart disease  
  
Social History  
Smoking Status: Never smoker  
Substance Use Type: None  
  
Grafts  
History of Graft  
History of Graft?: No  
  
Exam  
Physical Exam  
Vital Signs:  
  
  
  
  
Temp Pulse Resp BP O2 Del Method  
  
97.5 F L 87 24 175/78 H Room Air  
  
24 14:51 24 14:51 24 14:51 24 14:51 24 14:51  
  
  
  
Const  
General: cooperative, comfortable and no acute distress  
Nutritional Appearance: obese  
Orientation: alert, awake and oriented x3  
  
Lower/Upper Extremity Exam  
Vascular Exam-Edema  
Right Lower Extremity:  
Edema Type: Lymphedema  
Vascular Exam-Pulses  
Right Dorsalis Pedis:  
Pulse Assessment Method: Doppler  
Right Posterior Tibial:  
Pulse Assessment Method: Doppler  
Right Brachial:  
Pulse Assessment Method: NIBP  
Left Brachial:  
Pulse Assessment Method: NIBP  
  
Objective  
Meds/Allergies  
Home Medications  
  
carvedilol 6.25 mg tablet 6.25 mg PO BID 17 [History Confirmed 24]  
furosemide 40 mg tablet (Lasix) 40 mg PO BID 17 [History Confirmed 
24]  
acetaminophen 500 mg tablet (Tylenol Extra Strength) 500 mg PO TID PRN Pain 
20   
[History Confirmed 24]  
empagliflozin 10 mg tablet (Jardiance) 10 mg PO DAILY 22 [History 
Confirmed   
24]  
rivaroxaban 10 mg tablet (Xarelto) 10 mg PO DAILY 22 [History Confirmed   
24]  
semaglutide 2 mg/dose (8 mg/3 mL) subcutaneous pen injector (Ozempic) 2 mg 
subcut Q7D   
22 [History Confirmed 24]  
spironolactone 25 mg tablet 25 mg PO DAILY 22 [History Confirmed 24]  
sacubitril 24 mg-valsartan 26 mg tablet (Entresto) 1 tab PO BID 23 
[History   
Confirmed 24]  
Lactobacillus acidophilus 10 billion cell capsule (Probiotic) 100 mmu cells PO 
DAILY   
24 [History Confirmed 24]  
doxycycline hyclate 100 mg capsule 100 mg PO BID 14 days #28 caps 24 [Rx   
Confirmed 24]  
  
  
Allergies  
  
cefixime Allergy (Unknown, Verified 24 08:10)  
Unknown Reaction  
diclofenac Allergy (Unknown, Verified 24 08:10)  
Unknown Reaction  
nystatin Allergy (Unknown, Verified 24 08:10)  
Unknown Reaction  
sodium benzoate Allergy (Verified 24 08:10)  
Unknown Reaction  
  
  
Wound/Ulcer  
Right Lower Leg:  
Type: Lymphedema  
Thickness: Skin Breakdown  
Bed Appearance: Dried Exudate, Pink and Yellow  
Percent of Wound Bed Granulated/Red: 80  
Percent of Devitalized: 20  
Length (cm): 2.5  
Width (cm): 2  
Depth (cm): 0.1  
CM Sq: 5.000  
Surrounding Tissue Appearance: Hyperpigmented  
Surrounding Tissue Temp: Warm  
Drainage Amount: Moderate  
Drainage Description: Serosanguineous  
Drainage Odor: No Odor  
Results  
Height: 5 ft 11 in  
Weight: 167.829 kg  
Body Mass Index: 51.5  
  
Assessment/Plan  
Assessment/Plan  
(1) Venous stasis ulcer:  
Qualifiers:  
Laterality: right Non-pressure ulcer stage: limited to breakdown of skin 
Varicose   
vein presence: with varicose veins Venous stasis ulcer site: other part of lower
leg   
Qualified Code(s): I83.018 - Varicose veins of right lower extremity with ulcer 
other   
part of lower leg; L97.811 - Non-pressure chronic ulcer of other part of right 
lower   
leg limited to breakdown of skin  
Code(s):  
I83.009 - Varicose veins of unspecified lower extremity with ulcer of 
unspecified   
site; L97.909 - Non-pressure chronic ulcer of unspecified part of unspecified 
lower   
leg with unspecified severity  
Plan:  
w/lymphedema  
(2) Edema of right lower leg:  
Code(s):  
R60.0 - Localized edema  
(3) Morbid obesity due to excess calories:  
Code(s):  
E66.01 - Morbid (severe) obesity due to excess calories  
(4) Lymphedema of both lower extremities:  
Code(s):  
I89.0 - Lymphedema, not elsewhere classified  
(5) PVD (peripheral vascular disease):  
Code(s):  
I73.9 - Peripheral vascular disease, unspecified  
(6) Varicose veins of both legs with edema:  
Code(s):  
I83.893 - Varicose veins of bilateral lower extremities with other complications  
(7) Inflammation:  
  
Plan  
see orders and hpi  
Time spent with patient  
Time Spent With Patient (min): 15  
  
  
  
  
Dictated By: SOWMYA Gutierrez DD/DT: 24 1503  
  
Signed By: <Electronically signed by SOWMYA Villarreal>  
24 1508  
   
  
[facility]  
Work Phone: [phone]- Progress note  
  
  
  
  
                                        Author              Elaine Espino  
[facility]  
2024 8:20am  
   
                                        Note Date/Time      2024   
8:20am  
   
                                                    [facility]  
ENTER  
[address] [place] [address]  
  
Wound Center Provider Note  
Signed  
  
Patient: Jocelin Pacheco MR#:   
[id number]  
: 1955 Acct:[id number]  
  
Age/Sex: 69 / M  
  
Copies to: DO Elaine Arboleda APRN~  
  
  
HPI  
Date of Visit  
Date of Visit:  
Date of Service: 2024  
Time of Service: 08:17  
  
Narrative  
HPI:  
24 Jocelin is a 69-year-old male presenting to [facility]   
wound care program for an initial visit to the office for a right lower 
extremity   
venous stasis ulcer/lymphedema ulcer.? The patient has been a patient of the 
office   
for quite some time now and has seen other providers.  
He had seen vascular in [place] was told that there is nothing else that can be 
done   
for him.  
He has an extensive history of infections so I imagine this will continue to be 
an   
issue for him.  
He appears to need better edema control.  
He was told by ortho that he needs to lose 50 pounds in order to have surgery- 
this   
was 3 years ago and has not been accomplished- he says that he wasn't taking the
  
ozempic as ordered and he gained back the weight he had lost and hence no 
surgery.  
A past venous duplex indicates deep vein issues which are not surgically 
correctable-   
vascular did confirm that he does not need to see them because there is nothing 
they   
have to offer him.  
He is still retired so hopefully there is more opportunity for elevation and   
compression of the legs to support healing of the ulcer.  
Nutrition and probiotics were discussed and suggestions were given.  
Doxycycline was escribed today for what appears to be cellulitis of the RLE.  
Topical gentamicin and unna wrap was ordered and he will return here for 
dressings.  
  
Subjective  
Pain  
Right Lower Leg:  
Pain Description: Burning  
Pain Intensity: 10  
Wound/Ulcer History  
When did wound start?: 2024 Right lower leg  
Mode of Arrival/ : Personal vehicle  
Assistive Device Used Today: Beste  
Lives with:: Alone  
Appetite Description: Within Normal Limits  
Who helps w/ dressing change?: Wound Care Dept  
Smoking Status: Never smoker  
  
[facility]  
Medical History (Updated 24 @ 08:12 by Maria A Crowe, RN)  
  
Ulcer of right leg  
Wound of right lower extremity  
Itching with irritation  
Itching  
PVD (peripheral vascular disease)  
 poor veins  Dr. Cameron did vein closure 2019, needs 5 more viens worked 
on.  
Carpal tunnel syndrome  
right arm surgery  
DIMITRI (obstructive sleep apnea)  
Cardiomyopathy  
 lower part of heart is weak   
Back pain  
DVT (deep venous thrombosis)  
 blood clot from right foot to right thigh  on  blood thinner   
Hypertension  
Arthritis  
Bilateral knees,  right is bone on bone  per  Dr. Richard   
  
Surgical History (Reviewed 24 @ 08:12 by Maria A Crowe, RN)  
  
Hx of cholecystectomy  
  
Family History (Reviewed 24 @ 08:13 by Maria A Crowe RN)  
Father Diabetes mellitus, type 2  
Sister Diabetes mellitus, type 2  
Brother Heart disease  
Legacy FamHx Relation: Brother(s)  
Father Heart disease  
Diabetes  
Heart failure  
  
Family/Other   
Legacy FamHx Problem: 2 BROTHERS  :2 SISTERS ::NO CHILDREN  
Mother   
Heart disease  
Sister Heart disease  
  
Social History  
Smoking Status: Never smoker  
Substance Use Type: None  
  
Grafts  
History of Graft  
History of Graft?: No  
  
Exam  
Physical Exam  
Vital Signs:  
  
  
  
  
Temp Pulse Resp BP O2 Del Method  
  
97.2 F L 81 24 121/74 Room Air  
  
24 08:11 24 08:11 24 08:11 24 08:11 24 08:11  
  
  
  
Const  
General: cooperative, comfortable and no acute distress  
Nutritional Appearance: obese  
Orientation: alert, awake and oriented x3  
  
Lower/Upper Extremity Exam  
Vascular Exam-Edema  
Right Lower Extremity:  
Edema Type: Lymphedema  
Vascular Exam-Pulses  
Right Dorsalis Pedis:  
Pulse Assessment Method: Doppler  
Right Posterior Tibial:  
Pulse Assessment Method: Doppler  
Right Brachial:  
Pulse Assessment Method: NIBP  
  
Objective  
Meds/Allergies  
Home Medications  
  
carvedilol 6.25 mg tablet 6.25 mg PO BID 17 [History Confirmed 24]  
furosemide 40 mg tablet (Lasix) 40 mg PO BID 17 [History Confirmed 
24]  
acetaminophen 500 mg tablet (Tylenol Extra Strength) 500 mg PO TID PRN Pain 
20   
[History Confirmed 24]  
empagliflozin 10 mg tablet (Jardiance) 10 mg PO DAILY 22 [History 
Confirmed   
24]  
rivaroxaban 10 mg tablet (Xarelto) 10 mg PO DAILY 22 [History Confirmed   
24]  
semaglutide 2 mg/dose (8 mg/3 mL) subcutaneous pen injector (Ozempic) 2 mg 
subcut Q7D   
22 [History Confirmed 24]  
spironolactone 25 mg tablet 25 mg PO DAILY 22 [History Confirmed 24]  
sacubitril 24 mg-valsartan 26 mg tablet (Entresto) 1 tab PO BID 23 
[History   
Confirmed 24]  
Lactobacillus acidophilus 10 billion cell capsule (Probiotic) 100 mmu cells PO 
DAILY   
24 [History Confirmed 24]  
doxycycline hyclate 100 mg capsule 100 mg PO BID 14 days #28 caps 24 [Rx   
Confirmed 24]  
  
  
Allergies  
  
cefixime Allergy (Unknown, Verified 24 08:10)  
Unknown Reaction  
diclofenac Allergy (Unknown, Verified 24 08:10)  
Unknown Reaction  
nystatin Allergy (Unknown, Verified 24 08:10)  
Unknown Reaction  
sodium benzoate Allergy (Verified 24 08:10)  
Unknown Reaction  
  
  
Wound/Ulcer  
Right Lower Leg:  
Type: Lymphedema  
Thickness: Skin Breakdown  
Bed Appearance: Dried Exudate, Epithelial Tissue or Bridge, Pink and Yellow  
Percent of Wound Bed Granulated/Red: 80  
Percent of Devitalized: 20  
Length (cm): 4  
Width (cm): 1.8  
Depth (cm): 0.1  
CM Sq: 7.200  
Surrounding Tissue Appearance: Dark Red  
Surrounding Tissue Temp: Warm  
Drainage Amount: Moderate  
Drainage Description: Serosanguineous  
Drainage Odor: No Odor  
Results  
Height: 5 ft 11 in  
Weight: 167.829 kg  
Body Mass Index: 51.5  
  
Assessment/Plan  
Assessment/Plan  
(1) Venous stasis ulcer:  
Qualifiers:  
Laterality: right Non-pressure ulcer stage: limited to breakdown of skin 
Varicose   
vein presence: with varicose veins Venous stasis ulcer site: other part of lower
leg   
Qualified Code(s): I83.018 - Varicose veins of right lower extremity with ulcer 
other   
part of lower leg; L97.811 - Non-pressure chronic ulcer of other part of right 
lower   
leg limited to breakdown of skin  
Code(s):  
I83.009 - Varicose veins of unspecified lower extremity with ulcer of 
unspecified   
site; L97.909 - Non-pressure chronic ulcer of unspecified part of unspecified 
lower   
leg with unspecified severity  
Plan:  
w/lymphedema  
(2) Edema of right lower leg:  
Code(s):  
R60.0 - Localized edema  
(3) Morbid obesity due to excess calories:  
Code(s):  
E66.01 - Morbid (severe) obesity due to excess calories  
(4) Lymphedema of both lower extremities:  
Code(s):  
I89.0 - Lymphedema, not elsewhere classified  
(5) PVD (peripheral vascular disease):  
Code(s):  
I73.9 - Peripheral vascular disease, unspecified  
(6) Varicose veins of both legs with edema:  
Code(s):  
I83.893 - Varicose veins of bilateral lower extremities with other complications  
(7) Inflammation:  
  
Plan  
see orders and hpi  
Time spent with patient  
Time Spent With Patient (min): 14  
  
  
  
  
Dictated By: SOWMYA Correa DD/DT: 24  
  
Signed By: <Electronically signed by SOWMYA Espino>  
24 0820  
   
  
[facility]  
Work Phone: [phone]- History of Present illness Narrative* 
  Ray Vela NP - 2024 3:30 PM ESTFormatting of this note is 
  different from the original.  
Images from the original note were not included.  
  
Jocelin Pacheco is a 69 y.o. male presents with chief complaint of Back Pain (Pt 
presents with lower back pain with onset of 2 weeks with pain increasing. Took 
tylenol with no relief. Has used ice on it a few times. Left shoulder is 
hurting. Denies any trouble urinating. )  
  
HPI:  
  
Back Pain  
  
Here for low back pain, left side hurts more than the right side. Denies any 
injury or reason for increased back pain. Tried ice and tylenol.  
  
HISTORIES:  
  
PAST MEDICAL HISTORY:  
Past Medical History:  
Diagnosis Date  
Acquired hammer toe of right foot  
Cardiomyopathy (CMS/HCC)  
d/t morbid obesity  
Chronic anticoagulation  
CKD (chronic kidney disease), stage II  
Class 3 obesity (CMS/HCC)  
CPAP (continuous positive airway pressure) dependence  
Diabetes mellitus type 2 in obese (CMS/HCC)  
History of 2019 novel coronavirus disease (COVID-19) 2020  
Received Remdesivir and dexamethasone as an in-patient  
Hx of being hospitalized 2019  
Hospitalized for Lt. lower extremity cellulitis and chronic open venous stasis 
ulcer  
Hx of carpal tunnel syndrome  
Hx of deep venous thrombosis  
DIMITRI on CPAP  
Peripheral venous insufficiency  
Porokeratosis  
Primary osteoarthritis of both knees  
Stasis dermatitis of both legs  
  
  
SURGICAL HISTORY:  
Past Surgical History:  
Procedure Laterality Date  
CARDIAC CATHETERIZATION   
Due to Chest pain  
CARPAL TUNNEL RELEASE Right 2010  
Dr. Estrada  
CHOLECYSTECTOMY 2002  
FOOT NEUROMA SURGERY Bilateral   
Hammertoe repair as well  
HERNIA REPAIR  
IR INJECTION JOINT 2020  
(Knee) Synvisc injection with Dr Lomeli (Ortho)  
IR INJECTION JOINT   
(Knee) Synvisc done by Dr Richard  
SKIN GRAFT 2019  
From right thigh to right foot due to non-healing wound/ulcer. Done by Dr Blakely  
VEIN LIGATION Bilateral 2019  
Procedures in April and Oct done by Dr Lyle Mascorro  
  
  
SOCIAL HISTORY:  
Social History  
  
Tobacco Use  
Smoking status: Never  
Passive exposure: Past  
Smokeless tobacco: Never  
Substance Use Topics  
Alcohol use: Not Currently  
Drug use: Never  
  
Depression: Not at risk (2024)  
PHQ-2  
PHQ-2 Score: 0  
  
  
FAMILY HISTORY:  
Family History  
Problem Relation Name Age of Onset  
Hypertension Mother  
Heart disease Mother  
Diabetes Father  
Heart attack Sister  
Diabetes Sister  
Stroke Brother  
Heart attack Brother  
ALS Brother  
  
  
MEDICATIONS:  
Current Outpatient Medications  
Medication Instructions  
carvedilol (COREG) 6.25 mg, Oral, 2 times daily  
clobetasol (Temovate) 0.05 % cream 1 application , Topical, Every 12 hours  
Entresto 24-26 MG tablet 1 tablet, Oral, 2 times daily  
furosemide (LASIX) 40 mg, Oral, Every 12 hours  
Jardiance 10 mg, Oral, Every 24 hours  
Ozempic, 2 MG/DOSE, 8 MG/3ML solution pen-injector  
Probiotic Product (PROBIOTIC BLEND PO) 2 Units, Oral, Daily  
spironolactone (ALDACTONE) 25 mg, Oral, Daily  
Xarelto 10 MG tablet TAKE 1 TABLET BY MOUTH EVERY DAY WITH FOOD  
  
  
ALLERGIES:  
Allergies  
Allergen Reactions  
Cefixime Unknown  
Nystatin Unknown  
Sodium Benzoate  
Other Reaction(s): Unknown Reaction  
Diclofenac Rash  
  
  
REVIEW OF SYMPTOMS:  
  
Review of Systems  
Musculoskeletal: Positive for back pain.  
Shoulder pain, left. Rates shoulder pain as a 5 and the back as a 9 or 10 on a 
0-10 scale.  
Psychiatric/Behavioral: Negative for sleep disturbance.  
  
  
PHYSICAL EXAM:  
  
Visit Vitals  
/70  
Pulse 83  
Temp 97.7 F  
Wt 376 lb  
SpO2 94%  
BMI 52.44 kg/m  
Smoking Status Never  
BSA 2.93 m  
  
  
Physical Exam  
Constitutional:  
Appearance: He is obese. He is not ill-appearing.  
Eyes:  
Extraocular Movements: Extraocular movements intact.  
Musculoskeletal:  
General: Tenderness present.  
Comments: Low back pain  
Skin:  
General: Skin is warm and dry.  
Neurological:  
Mental Status: He is alert and oriented to person, place, and time.  
Gait: Gait abnormal.  
Psychiatric:  
Mood and Affect: Mood normal.  
Thought Content: Thought content normal.  
Judgment: Judgment normal.  
  
ASSESSMENT AND PLAN:  
  
Assessment/Plan  
Diagnoses and all orders for this visit:  
Lumbosacral strain, initial encounter  
- methylPREDNISolone (Medrol Dospak) 4 MG tablets; Take as directed on package.  
- Ambulatory referral to Physical Therapy; Future  
Acute pain of left shoulder  
- methylPREDNISolone (Medrol Dospak) 4 MG tablets; Take as directed on package.  
- Ambulatory referral to Physical Therapy; Future  
Wound of left lower extremity, initial encounter  
--pt states he will call wound center  
Dictated, but not read  
  
Patient presents today for an acute visit. He s been having low back pain for 
approximately two weeks. He also mentioned that his left shoulder is painful. It
s worse with abduction and he can only raise the left shoulder approximately 90 
. he denies any actual injury. He states he s been taking Tylenol and using ice 
to his back.  
  
At this point in time we re not gonna order x-rays. He does have tenderness of 
the lumbosacral spine and point tenderness in the deltoid area. He s advised ice
the left shoulder. He can use Tylenol. For the low back pain is tender across 
the whole lumbosacral spine. He is not having any radicular type symptoms. I 
will give him a low-dose Medrol dose pack since his A1c is good at 5.5. And he 
can use Tylenol as needed. And suds are not indicated since he is on Xarelto. 
Will refer him to physical therapy and evaluate and treat for the back pain in 
the left shoulder. If not improving will consider x-rays. He also mentioned he 
does have the start of a wound on his left posterior lower extremity.Picture was
taken. He states he will call [facility] clinic. I advised him to use Aquaphor or 
Lantisepticointment to the area. He agrees. He ll return here in May for his 
regular scheduled appointment to see Dr. Matias.  
  
  
Electronically signed by Ray Vela NP at 2024 4:20 PM EST  
  
documented in this encounterNortheast Missouri Rural Health [id number]- History of Present illness
Narrative* Danya An, SHANDRA - 2024 3:15 PM ESTFormatting of this 
  note is different from the original.  
HPI:  
Patient presents in office today for routine nailcare and callous care.  
Location: Right great toe.  
Duration: ongoing x1 month.  
Severity of the symptoms: mild , considered 5 out of 10.  
Onset of pain: gradual.  
Status: stable.  
Context: hard to trim , hard to reach.  
Current symptoms include: toe redness , toe swelling ,  
Relieved by: debridement.  
Characteristics of the nails: red, swollen, painful.  
Aggravated by: shoe gear, pressure.  
Risk factors: curvature of nails.  
  
Examination:  
General Examination:  
GENERAL EXAMINATIONalert, well hydrated, in no distress , awake, aware of 
surroundings.  
FOOT EXAM:  
Date of Last Foot Exam 24  
  
Vascular:  
DORSALIS PEDIS PULSE:2/4, bilaterally.  
POSTERIOR TIBIAL PULSE:1/4, bilaterally.  
TEMPERATURE GRADIENT:within normal limits, warm to cool.  
EDEMA:mild, bilateral lower extremity. erythema to the right 2nd digit is 
resolved. Continued erythema noted.  
CAPILLARY FILLING TIME(sec):bilateral, digits 1-5, less than 3 seconds.  
  
Neurologic:  
VIBRATORY:abnormal.  
SEMMES-LUKE 5.07 MONOFILAMENTnormal.  
  
Dermatologic:  
HYPERKERATOSIS:Location:, Submetatarsal Head 4 right, medial IPJ of the hallux 
bilateral, distal right 3rd digit.  
NAIL PATHOLOGY:digits 1-5 bilateral are thickened, discolored, crumbly, 
dystrophic, elongated and with subungual debris. Incurvation to the lateral 
border right hallux nail. Upon debridement there isno underlying opening noted 
in the skin. The surrounding granuloma, erythema has resolved along thenail 
fold.  
SKIN PATHOLOGY:atrophic, dry, decreased hair growth bilateral.  
  
Orthopedic:  
FOOT MORPHOLOGY:Metatarsus adductus bilateral .  
DEFORMITIES:Rigidly contracted second digit right with elevation at the second 
MPJ. There is overriding and rubbing between the distal right second digit and 
lateral right hallux. Semirigid contracted digits 2 through 4 left, 3 and 4 
right .  
PAIN ELICITED WITH PALPATION OFArea of porokeratosis sub-fourth MPJ right. Pain 
with palpation to the MPJ of the right 2nd digit, but pain is improving .  
MUSCLE STRENGTH5/5 for all pedal groups tested  
  
Assessment:  
1. Right foot pain - M79.671  
2. Dermatophytosis of nail - B35.1 (Primary)  
3. Left foot pain - M79.672  
4. 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  
5. Corns and callosities - L84  
6. Porokeratosis - Q82.8  
7. Acquired pes planovalgus of left foot - M21.6X2  
8. Acquired pes planovalgus of right foot - M21.41  
9. Acquired hammer toe of right foot - M20.41  
10. Circulating anticoagulant disorder - D68.318  
11. Venous insufficiency - I87.2  
12. Ingrowing nail - L60.0  
  
Plan:  
Dermatophytosis of nail  
1. Nails 1-5 bilateral were debrided in length and thickness by manual and 
mechanical means.  
2. Advised patient on the importance of continued foot care including daily 
monitoring of their feet for any new complaints or concerns that may arise.  
3. Continue use of good supportive shoe gear to help prevent complications.  
4. RTC: 9-12 weeks or as needed if problems arise.  
  
Corns and callosities  
1. Hyperkeratosis/porokeratosis as above noted was debrided.  
2. Instructed patient on use of aperture pads or Silipos padding/toe spacers to 
prevent rubbing andcontinued development of the hyperkeratosis.  
3. Also discussed use of moisturizing creams for overall increased hydration to 
the skin.  
4. Discussed continued use of proper foot gear to avoid excess pressure over the
callous site.  
Electronically signed by Danya An DPM at 2024 1:53 PM EST  
  
documented in this encounterNortheast Missouri Rural Health [id number]- Evaluation note*   
  
                      Encounter Date Diagnosis  Assessment Notes Treatment Notes  
 Treatment   
Clinical Notes  
   
                                        04 Aug, 2023        Type 2 diabetes   
mellitus with   
unspecified   
complications (ICD-10   
- E11.8)                                                      
   
                                        04 Aug, 2023        BMI 50.0-59.9, adult  
   
(ICD-10 - Z68.43)                                             
   
                                        04 Aug, 2023        Knee osteoarthritis   
(ICD-10 - M17.9)                                              
   
                                        04 Aug, 2023        Mixed hyperlipidemia  
   
(ICD-10 - E78.2)                                              
   
                                        04 Aug, 2023        CAD (coronary artery  
   
disease) (ICD-10 -   
I25.10)                                                       
   
                                        04 Aug, 2023        Obstructive sleep   
apnea (adult)   
(pediatric) (ICD-10 -   
G47.33)                                                       
   
                                        04 Aug, 2023        Cardiomyopathy   
(ICD-10 - I42.9)                                              
   
                                        04 Aug, 2023        CHF (congestive hear  
t   
failure) (ICD-10 -   
I50.9)                                                        
   
                                        04 Aug, 2023        Edema (ICD-10 -   
R60.9)                                                        
   
                                        04 Aug, 2023        Low back derangement  
   
syndrome (ICD-10 -   
M53.86)                                                       
   
                                        04 Aug, 2023        Metabolic syndrome X  
   
(ICD-10 - E88.81)                                             
   
                                        04 Aug, 2023        Encounter for   
immunization (ICD-10   
- Z23)                                                        
  
  
[employer]  
Other Phone: [phone]- Evaluation note*   
  
                                        Author              Jacoby Braden  
[facility]  
   
                                        Authored            2024 2:5  
6pm  
   
                                                    Start weight: 414.4 lbs., he  
 is down 32.0 lbs. today with a weight of 382.4lbs.   
and   
20.9 lbs. since  
his last visit on 2023.  
Starting Date: 04/15/2022.  
Ozempic start date 4/15/2022. Ozempic start weight 414.4lbs. He is down 32.0 
lbs.   
since starting  
Ozempic.  
1. Abnormal weight gain. He notes he is the heaviest in his family. His brother 
has   
some but much  
less significant weight issues. He notes he was able to get his weight down to 
368   
pounds but was  
unable to keep it down.  
2. Obesity-markedly improved since starting our program and with taking Ozempic 
2 mg,   
but had  
significant weight regain of 20.9 pounds off the medication. Due to cost/donut 
hole   
he stopped  
Ozempic. Hopefully he can restart full dose in the future using patient 
assistance.   
He is also on  
Jardiance 10 mg which can also help with his diabetes and cardiomyopathy/CHF. 
His   
diabetes is well  
controlled with his last A1c of 5.6 despite having issues with obtaining with 
his   
diabetic  
medications. He had mild CAD along with his diabetes and has not been on a 
statin so   
I had  
recommended Crestor, but he notes his cardio said it was not necessary so he did
not   
start the  
medication. He is eating healthier overall. He should follow-up with our   
nutritionist. He feels that  
his appetite is controlled with Ozempic 2 mg. I have recommend again he consider
  
bariatric surgery  
so he could have his knee replaced or find a surgeon who will do the surgery at 
an   
elevated BMI. In  
the past, he mentioned they found weakness in the bottom of the heart and 
evidently a   
problem with  
an artery that they could not get. His cardiologist however did not feel he 
needed   
the  
statin/Crestor that I previously prescribed. He gets very little activity due to
  
bone-on-bone  
arthritis, severe low back pain on tramadol, difficulty getting out of a chair 
and   
also his cardiac  
function he does get NELSON with small amounts of activity. He sees Dr. Whitley to 
treat   
his  
cardiomyopathy. He did like canned foods and he understands that he must cut 
back the   
salt/processed  
food. He denies adding salt to his foods. He has had a A1c that was 6.6 
indicative of   
diabetes. He  
notes he was never told he was diabetic before program. He is seeing Dr. Lomeli 
for   
his knees and  
notes he has to get his BMI from 59 down to 45 to be able to have his knees 
replaced.   
Before  
starting the program he did eat a lot of red meat, potatoes and drank a lot of 
milk   
2% or 4% often  
buying 3 or 4 gallons at a time. He should not skip meals. He did try Adipex for
1   
month in the past  
but notes he was not able to lose but a few pounds so his PCP stopped it. He 
notes   
his sister does  
the shopping as he can. He does some simple cooking.  
3. Cardiomyopathy/CHF/early CAD-he must continue to follow-up with cardiology 
for   
evaluation of  
cardiomyopathy/CHF despite medications/known cardiomyopathy/previous mild CAD. 
We   
obtained his  
previous cardiac catheterization from 2009. He notes his preoperative 
diagnoses   
were angina  
pectoris, dyspnea on exertion and abnormal Cardiolite perfusion stress test. His
left   
anterior  
descending artery showed a 20 to 25% tubular stenosis. His circumflex had an 
ostial   
stenosis of 30  
to 50% in several views but in one view showed less than 30% stenosis and 
appeared to   
be mildly  
kinked, his right coronary artery was large dominant and normal. His left   
ventriculogram showed mild  
to moderately reduced systolic dysfunction with an EF in the 40 to 45% range   
globally. He is now  
being treated by Dr. Whitley.  
4. Type 2 diabetes with A1c controlled/improved at 5.6-will restart Ozempic and   
continue Jardiance.  
We could consider Metformin but he already has some loose stools. We discussed 
that   
first-line  
treatment with lifestyle changes, decrease simple sweets and starches, will be 
some   
increased  
activity in the future and weight loss. He needs to consider bariatric surgery.  
5. Obstructive sleep apnea-he is compliant with his CPAP. Treat also with 
healthy   
lifestyle changes  
and weight loss. He needs to consider bariatric surgery.  
6. Bilateral OA of the knees-bone-on-bone making it very difficult for him to 
get   
around. He notes  
his orthopedic doctor wants him to drop from a BMI of 59 down to 45 before he 
will do   
the surgery.  
We will start weight loss and Ozempic. He needs to consider bariatric surgery.  
7. Chronic low back pain-treat with healthy lifestyle change. He needs to 
maximize   
his pain  
management.  
8. Mixed hyperlipidemia-treat with decreasing the simple sweets, added sugars,   
refined starches, and   
bad/added fats, increasing activity and exercise and continued long-term weight 
loss.   
Monitor with  
healthy lifestyle change. I recommended that he start a statin since he is 
diabetic   
and had mild CAD  
on his cath but he notes his cardiologist told him it was not necessary to 
start.  
9. Metabolic syndrome-treat with long-term healthy lifestyle changes, behavioral
  
changes, healthy  
nutritional changes, increased activity and exercise and achieve long-term 
weight   
loss.  
Follow up with me in 6 weeks.  
New labs needed: Up-to-date. Monitor A1c at least every 6 months with his PCP or
in   
our office. He  
will continue other regular lab follow-up with his PCP.   
  
  
  
[facility]  
Work Phone: [phone]- Progress note  
  
  
  
  
                                        Author              Elaine Espino  
[facility]  
2023 11:24am  
   
                                        Note Date/Time      2023 11:24  
am  
   
                                                    [facility]  
ENTER  
[address] [place] [address]  
  
Wound Center Provider Note  
Signed  
  
Patient: Jocelin Pacheco MR#:   
[id number]  
: 1955 Acct:[id number]  
  
Age/Sex: 68 / M  
  
Copies to: DO Elaine Arboleda APRN~  
  
  
HPI  
Date of Visit  
Date of Visit:  
Date of Service: 2023  
Time of Service: 11:20  
  
Narrative  
HPI:  
23 Jocelin is a 68-year-old male presenting to [facility]   
wound care program for an initial visit to the office for a right lower 
extremity   
venous stasis ulcer/lymphedema ulcer.? The patient has been a patient of the 
office   
for quite some time now and has seen other providers.  
He had seen vascular in [place] was told that there is nothing else that can be 
done   
for him.  
He has an extensive history of infections so I imagine this will continue to be 
an   
issue for him.  
He appears to need better edema control.  
He was told by ortho that he needs to lose 50 pounds in order to have surgery- 
this   
was 2 years ago and has not been accomplished.  
A past venous duplex indicates deep vein issues which are not surgically 
correctable-   
vascular did confirm that he does not need to see them because there is nothing 
they   
have to offer him.  
He is still retired so hopefully there is more opportunity for elevation and   
compression of the legs to support healing of the ulcer.  
Nutrition and probiotics were discussed and suggestions were given.  
Doxycycline was escribed today for what appears to be cellulitis of the RLE.  
23 looks better, is still taking the antibiotics, change to collagen silver   
topically and clobetasol to periwound skin that he says is itchy, will return 
for   
dressing changes, will bring his compression socks from home to the next visit 
since   
most of our wraps tend to roll down on his legs, says that he will use the   
compression pumps  
  
Subjective  
Pain  
Right Lower Leg:  
Pain Description: Burning  
Pain Intensity: 0  
Wound/Ulcer History  
When did wound start?: 2023  
Mode of Arrival/ : Personal vehicle  
Assistive Device Used Today: Cane  
Lives with:: Alone  
Appetite Description: Within Normal Limits  
Who helps w/ dressing change?: Wound Care Dept  
Why Do You Need Help?: Can't Reach Ulcer  
Smoking Status: Never smoker  
  
[facility]  
Medical History (Updated 23 @ 11:23 by SOWMYA Correa)  
  
Arthritis  
Bilateral knees,  right is bone on bone  per  Dr. Richard   
Back pain  
Cardiomyopathy  
 lower part of heart is weak   
Carpal tunnel syndrome  
right arm surgery  
DVT (deep venous thrombosis)  
 blood clot from right foot to right thigh  on  blood thinner   
Hypertension  
Itching  
Itching with irritation  
DIMITRI (obstructive sleep apnea)  
PVD (peripheral vascular disease)  
 poor veins  Dr. Cameron did vein closure 2019, needs 5 more viens worked 
on.  
Wound of right lower extremity  
  
Surgical History (Reviewed 23 @ 11:09 by Maren Martinez RN)  
  
Hx of cholecystectomy  
  
Family History (Reviewed 23 @ 11:09 by Maren Martinez RN)  
Father Diabetes mellitus, type 2  
Sister Diabetes mellitus, type 2  
  
Social History  
Smoking Status: Never smoker  
Substance Use Type: None  
  
Grafts  
History of Graft  
History of Graft?: Yes  
  
Exam  
Physical Exam  
Vital Signs:  
  
  
  
  
Temp Pulse Resp BP O2 Del Method  
  
97.7 F 80 18 117/73 Room Air  
  
23 11:05 23 11:05 23 11:05 23 11:05 23 11:05  
  
  
  
Const  
General: cooperative, comfortable and no acute distress  
Nutritional Appearance: obese  
Orientation: alert, awake and oriented x3  
  
Lower/Upper Extremity Exam  
Vascular Exam-Edema  
Right Lower Extremity:  
Edema Type: Lymphedema  
Vascular Exam-Pulses  
Right Brachial:  
Pulse Assessment Method: NIBP  
Right Dorsalis Pedis:  
Pulse Assessment Method: Palpation  
Right Posterior Tibial:  
Pulse Assessment Method: Palpation  
  
Objective  
Meds/Allergies  
Home Medications  
  
carvedilol 6.25 mg tablet 6.25 mg PO BID 17 [History Confirmed 23]  
furosemide 40 mg tablet (Lasix) 40 mg PO BID 17 [History Confirmed 
23]  
acetaminophen 500 mg tablet (Tylenol Extra Strength) 500 mg PO TID PRN Pain 
20   
[History Confirmed 23]  
nabumetone 750 mg tablet 750 mg PO BID 21 [History Confirmed 23]  
empagliflozin 10 mg tablet (Jardiance) 10 mg PO DAILY 22 [History 
Confirmed   
23]  
rivaroxaban 10 mg tablet (Xarelto) 10 mg PO DAILY 22 [History Confirmed   
23]  
semaglutide 2 mg/dose (8 mg/3 mL) subcutaneous pen injector (Ozempic) 2 mg 
subcut Q7D   
22 [History Confirmed 23]  
spironolactone 25 mg tablet 25 mg PO DAILY 22 [History Confirmed 23]  
doxycycline hyclate 100 mg capsule 100 mg PO BID 14 days #28 caps 23 [Rx   
Confirmed 23]  
sacubitril 24 mg-valsartan 26 mg tablet (Entresto) 1 tab PO BID 23 
[History   
Confirmed 23]  
  
  
Allergies  
  
cefixime Allergy (Verified 23 11:09)  
Unknown Reaction  
diclofenac Allergy (Verified 23 11:09)  
Unknown Reaction  
nystatin Allergy (Verified 23 11:09)  
Unknown Reaction  
sodium benzoate Allergy (Verified 23 11:09)  
Unknown Reaction  
  
  
Wound/Ulcer  
Right Lower Leg:  
Type: Venous Stasis Ulcer  
Thickness: Skin Breakdown  
Bed Appearance: Beefy Red, Epithelial Tissue or Bridge, Pink and Yellow  
Percent of Wound Bed Granulated/Red: 75  
Percent of Devitalized: 25  
Length (cm): 4.5  
Width (cm): 17.5  
Depth (cm): 0.2  
CM Sq: 78.750  
Surrounding Tissue Appearance: Hyperpigmented  
Surrounding Tissue Temp: Warm  
Drainage Amount: Moderate  
Drainage Description: Serosanguineous  
Drainage Odor: No Odor  
Procedures  
Time Out: 2 Patient Identifiers, Correct Patient, Correct Side/Site, Correct   
Procedure and Safety Issues Reviewed  
Procedure:  
The right leg ulcer was anesthetized with topical 2% lidocaine gel. A curette 
was   
used to perform debridement for the removal of 10 cm? of devitalized tissue   
consisting of skin and slough. Debridement was down to healthy bleeding tissue.   
Estimated blood loss was minimal. Hemostasis was achieved by applying pressure. 
The   
right leg ulcer now appears 95% pink and red and the size remainsthe same. The   
patient tolerated well with no pain.  
Results  
Height: 5 ft 11 in  
Weight: 160.118 kg  
Body Mass Index: 49.2  
  
Assessment/Plan  
Assessment/Plan  
(1) Venous stasis ulcer:  
Qualifiers:  
Laterality: right Non-pressure ulcer stage: limited to breakdown of skin 
Varicose   
vein presence: with varicose veins Venous stasis ulcer site: other part of lower
leg   
Qualified Code(s): I83.018 - Varicose veins of right lower extremity with ulcer 
other   
part of lower leg; L97.811 - Non-pressure chronic ulcer of other part of right 
lower   
leg limited to breakdown of skin  
Code(s):  
I83.009 - Varicose veins of unspecified lower extremity with ulcer of 
unspecified   
site; L97.909 - Non-pressure chronic ulcer of unspecified part of unspecified 
lower   
leg with unspecified severity  
Status: Chronic  
(2) Edema of right lower leg:  
Code(s):  
R60.0 - Localized edema  
Status: Chronic  
(3) Morbid obesity due to excess calories:  
Code(s):  
E66.01 - Morbid (severe) obesity due to excess calories  
Status: Chronic  
(4) Lymphedema of both lower extremities:  
Code(s):  
I89.0 - Lymphedema, not elsewhere classified  
Status: Suspected  
(5) PVD (peripheral vascular disease):  
Code(s):  
I73.9 - Peripheral vascular disease, unspecified  
Status: Chronic  
(6) Varicose veins of both legs with edema:  
Code(s):  
I83.893 - Varicose veins of bilateral lower extremities with other complications  
Status: Chronic  
(7) Inflammation:  
Status: Chronic  
(8) Cellulitis:  
Assessment/Problem Details:  
rle  
Qualifiers:  
Laterality: right Site of cellulitis: extremity Site of cellulitis of extremity:
  
lower extremity Qualified Code(s): L03.115 - Cellulitis of right lower limb  
Code(s):  
L03.90 - Cellulitis, unspecified  
Status: Resolved  
Time spent with patient  
Time Spent With Patient (min): 15  
  
  
  
  
Dictated By: SOWMYA Correa DD/DT: 230  
  
Signed By: <Electronically signed by SOWMYA Espino>  
23 1124  
   
  
[facility]  
Work Phone: [phone]- Progress note  
  
  
  
  
                                        Author              Elaine Espino  
[facility]  
2023 11:30am  
   
                                        Note Date/Time      2023 11:2  
7am  
   
                                                    [facility]  
ENTER  
[address] [place] [address]  
  
Wound Center Provider Note  
Signed  
  
Patient: Jocelin Pacheco MR#:   
[id number]  
: 1955 Acct:[id number]  
  
Age/Sex: 68 / M  
  
Copies to: DO Elaine Arboleda APRN~  
  
  
HPI  
Date of Visit  
Date of Visit:  
Date of Service: 2023  
Time of Service: 11:21  
  
Narrative  
HPI:  
23 Jocelin is a 68-year-old male presenting to [facility]   
wound care program for an initial visit to the office for a right lower 
extremity   
venous stasis ulcer/lymphedema ulcer.? The patient has been a patient of the 
office   
for quite some time now and has seen other providers.  
He had seen vascular in [place] was told that there is nothing else that can be 
done   
for him.  
He has an extensive history of infections so I imagine this will continue to be 
an   
issue for him.  
He appears to need better edema control.  
He was told by ortho that he needs to lose 50 pounds in order to have surgery- 
this   
was 2 years ago and has not been accomplished.  
A past venous duplex indicates deep vein issues which are not surgically 
correctable-   
vascular did confirm that he does not need to see them because there is nothing 
they   
have to offer him.  
He is still retired so hopefully there is more opportunity for elevation and   
compression of the legs to support healing of the ulcer.  
Nutrition and probiotics were discussed and suggestions were given.  
Doxycycline was escribed today for what appears to be cellulitis of the RLE.  
  
Subjective  
Pain  
Right Lower Leg:  
Pain Intensity: 10  
Wound/Ulcer History  
When did wound start?: 2023  
Mode of Arrival/ : Personal vehicle  
Assistive Device Used Today: Cane  
Lives with:: Alone  
Appetite Description: Within Normal Limits  
Who helps w/ dressing change?: Wound Care Dept  
Why Do You Need Help?: Can't Reach Ulcer  
Smoking Status: Never smoker  
  
[facility]  
Medical History (Updated 23 @ 11:25 by SOWMYA Correa)  
  
Arthritis  
Bilateral knees,  right is bone on bone  per  Dr. Richard   
Back pain  
Cardiomyopathy  
 lower part of heart is weak   
Carpal tunnel syndrome  
right arm surgery  
DVT (deep venous thrombosis)  
 blood clot from right foot to right thigh  on  blood thinner   
Hypertension  
Itching  
Itching with irritation  
DIMITRI (obstructive sleep apnea)  
PVD (peripheral vascular disease)  
 poor veins  Dr. Cameron did vein closure 2019, needs 5 more viens worked 
on.  
Wound of right lower extremity  
  
Surgical History (Reviewed 23 @ 11:09 by Maren Martinez RN)  
  
Hx of cholecystectomy  
  
Family History (Reviewed 23 @ 11:09 by Maren Martinez, KIMBERLY)  
Father Diabetes mellitus, type 2  
Sister Diabetes mellitus, type 2  
  
Social History  
Smoking Status: Never smoker  
Substance Use Type: None  
  
Grafts  
History of Graft  
History of Graft?: Yes  
  
Exam  
Physical Exam  
Vital Signs:  
  
  
  
  
Temp Pulse Resp BP O2 Del Method  
  
97.5 F L 88 18 138/80 Room Air  
  
23 11:04 23 11:04 23 11:04 23 11:04 23 11:04  
  
  
  
Const  
General: cooperative, comfortable and no acute distress  
Nutritional Appearance: obese  
Orientation: alert, awake and oriented x3  
  
Lower/Upper Extremity Exam  
Vascular Exam-Edema  
Right Lower Extremity:  
Edema Type: Lymphedema  
Vascular Exam-Pulses  
Right Brachial:  
Pulse Assessment Method: NIBP  
Right Dorsalis Pedis:  
Pulse Assessment Method: Palpation  
Right Posterior Tibial:  
Pulse Assessment Method: Palpation  
  
Objective  
Meds/Allergies  
Home Medications  
  
carvedilol 6.25 mg tablet 6.25 mg PO BID 17 [History Confirmed 23]  
furosemide 40 mg tablet (Lasix) 40 mg PO BID 17 [History Confirmed 
23]  
acetaminophen 500 mg tablet (Tylenol Extra Strength) 500 mg PO TID PRN Pain 
20   
[History Confirmed 23]  
nabumetone 750 mg tablet 750 mg PO BID 21 [History Confirmed 23]  
empagliflozin 10 mg tablet (Jardiance) 10 mg PO DAILY 22 [History 
Confirmed   
23]  
rivaroxaban 10 mg tablet (Xarelto) 10 mg PO DAILY 22 [History Confirmed   
23]  
semaglutide 2 mg/dose (8 mg/3 mL) subcutaneous pen injector (Ozempic) 2 mg 
subcut Q7D   
22 [History Confirmed 23]  
spironolactone 25 mg tablet 25 mg PO DAILY 22 [History Confirmed 23]  
doxycycline hyclate 100 mg capsule 100 mg PO BID 14 days #28 caps 23 [Rx]  
sacubitril 24 mg-valsartan 26 mg tablet (Entresto) 1 tab PO BID 23 
[History   
Confirmed 23]  
  
  
Allergies  
  
cefixime Allergy (Verified 23 11:09)  
Unknown Reaction  
diclofenac Allergy (Verified 23 11:09)  
Unknown Reaction  
nystatin Allergy (Verified 23 11:09)  
Unknown Reaction  
sodium benzoate Allergy (Verified 23 11:09)  
Unknown Reaction  
  
  
Wound/Ulcer  
Right Lower Leg:  
Type: Venous Stasis Ulcer  
Thickness: Skin Breakdown  
Bed Appearance: Beefy Red, Black Dry, Epithelial Tissue or Bridge and Yellow  
Percent of Wound Bed Granulated/Red: 50  
Percent of Devitalized: 50  
Length (cm): 3.5  
Width (cm): 18.5  
Depth (cm): 0.2  
CM Sq: 64.750  
Surrounding Tissue Appearance: Bright Red  
Surrounding Tissue Temp: Increased Warmth  
Drainage Amount: Moderate  
Drainage Description: Serosanguineous  
Drainage Odor: No Odor  
Results  
Height: 5 ft 11 in  
Weight: 160.118 kg  
Body Mass Index: 49.2  
  
Assessment/Plan  
Assessment/Plan  
(1) Venous stasis ulcer:  
Qualifiers:  
Venous stasis ulcer site: other part of lower leg Varicose vein presence: with   
varicose veins Laterality: right Non-pressure ulcer stage: limited to breakdown 
of   
skin Qualified Code(s): I83.018 - Varicose veins of right lower extremity with 
ulcer   
other part of lower leg; L97.811 - Non-pressurechronic ulcer of other part of 
right   
lower leg limited to breakdown of skin  
Code(s):  
I83.009 - Varicose veins of unspecified lower extremity with ulcer of 
unspecified   
site; L97.909 - Non-pressure chronic ulcer of unspecified part of unspecified 
lower   
leg with unspecified severity  
Status: Chronic  
(2) Cellulitis:  
Assessment/Problem Details:  
rle  
Qualifiers:  
Site of cellulitis: extremity Site of cellulitis of extremity: lower extremity   
Laterality: right Qualified Code(s): L03.115 - Cellulitis of right lower limb  
Code(s):  
L03.90 - Cellulitis, unspecified  
Status: Acute  
(3) Edema of right lower leg:  
Code(s):  
R60.0 - Localized edema  
Status: Chronic  
(4) PVD (peripheral vascular disease):  
Code(s):  
I73.9 - Peripheral vascular disease, unspecified  
Status: Chronic  
(5) Varicose veins of both legs with edema:  
Code(s):  
I83.893 - Varicose veins of bilateral lower extremities with other complications  
Status: Chronic  
(6) Morbid obesity due to excess calories:  
Code(s):  
E66.01 - Morbid (severe) obesity due to excess calories  
Status: Chronic  
(7) Lymphedema of both lower extremities:  
Code(s):  
I89.0 - Lymphedema, not elsewhere classified  
Status: Suspected  
(8) Inflammation:  
Status: Chronic  
Time spent with patient  
Time Spent With Patient (min): 20  
  
  
  
  
Dictated By: SOWMYA Correa DD/DT: 23 1121  
  
Signed By: <Electronically signed by SOWMYA Espino>  
23 1130  
   
  
[facility]  
Work Phone: [phone]- Evaluation note*   
  
                      Encounter Date Diagnosis  Assessment Notes Treatment Notes  
 Treatment   
Clinical Notes  
   
                                                Type 2 diabetes   
mellitus with   
unspecified   
complications (ICD-10   
- E11.8)                                                      
  
  
[facility]  
Other Phone: [phone]- Evaluation note*   
  
                      Encounter Date Diagnosis  Assessment Notes Treatment Notes  
 Treatment   
Clinical Notes  
   
                                                Obesity,   
unspecified   
classification,   
unspecified obesity   
type, unspecified   
whether serious   
comorbidity present   
(ICD-10 - E66.9)                                              
   
                                                BMI 50.0-59.9,   
adult (ICD-10 -   
Z68.43)                                                       
   
                           Other                     Summary of Visi  
t:  
(A) emphasized   
increasing fruits   
and vegetables  
(B) reviewed the   
plate method  
(C) discussed   
foods high in   
sodium  
                                          
  
  
[facility]  
Other Phone: [phone]- Evaluation note*   
  
                      Encounter Date Diagnosis  Assessment Notes Treatment Notes  
 Treatment   
Clinical Notes  
   
                                        21 Dec, 2022        Type 2 diabetes   
mellitus with   
unspecified   
complications (ICD-10   
- E11.8)                                                      
   
                                        21 Dec, 2022        BMI 50.0-59.9, adult  
   
(ICD-10 - Z68.43)                                             
   
                                        21 Dec, 2022        Diarrhea (ICD-10 -   
R19.7)                                                        
   
                                        21 Dec, 2022        Obstructive sleep   
apnea (adult)   
(pediatric) (ICD-10 -   
G47.33)                                                       
   
                                        21 Dec, 2022        Mixed hyperlipidemia  
   
(ICD-10 - E78.2)                                              
   
                                        21 Dec, 2022        CAD (coronary artery  
   
disease) (ICD-10 -   
I25.10)                                                       
   
                                        21 Dec, 2022        Knee osteoarthritis   
(ICD-10 - M17.9)                                              
   
                                        21 Dec, 2022        Cardiomyopathy   
(ICD-10 - I42.9)                                              
   
                                        21 Dec, 2022        CHF (congestive hear  
t   
failure) (ICD-10 -   
I50.9)                                                        
   
                                        21 Dec, 2022        Edema (ICD-10 -   
R60.9)                                                        
   
                                        21 Dec, 2022        Low back derangement  
   
syndrome (ICD-10 -   
M53.86)                                                       
   
                                        21 Dec, 2022        Metabolic syndrome X  
   
(ICD-10 - E88.81)                                             
   
                                        21 Dec, 2022        Encounter for   
immunization (ICD-10   
- Z23)                                                        
  
  
[facility]  
Other Phone: [phone]- Evaluation note*   
  
                      Encounter Date Diagnosis  Assessment Notes Treatment Notes  
 Treatment   
Clinical Notes  
   
                                                Obesity,   
unspecified   
classification,   
unspecified obesity   
type, unspecified   
whether serious   
comorbidity present   
(ICD-10 - E66.9)                                              
   
                                                BMI 50.0-59.9,   
adult (ICD-10 -   
Z68.43)                                                       
   
                           Other                     Summary of Visi  
t:  
(A) encourage   
patient to trial   
different fruits,   
as well as   
temporarily   
reducing/eliminati  
ng milk to improve   
diarrhea  
(B) discussed food   
options for   
traveling on the   
road  
(C) discussed   
options for   
oatmeal  
                                          
  
  
[facility]  
Other Phone: [phone]- Evaluation note*   
  
                      Encounter Date Diagnosis  Assessment Notes Treatment Notes  
 Treatment   
Clinical Notes  
   
                                        20 Oct, 2022        Type 2 diabetes   
mellitus with   
unspecified   
complications (ICD-10   
- E11.8)                                                      
   
                                        20 Oct, 2022        BMI 50.0-59.9, adult  
   
(ICD-10 - Z68.43)                                             
   
                                        20 Oct, 2022        Diarrhea (ICD-10 -   
R19.7)                                                        
   
                                        20 Oct, 2022        Obstructive sleep   
apnea (adult)   
(pediatric) (ICD-10 -   
G47.33)                                                       
   
                                        20 Oct, 2022        Mixed hyperlipidemia  
   
(ICD-10 - E78.2)                                              
   
                                        20 Oct, 2022        CAD (coronary artery  
   
disease) (ICD-10 -   
I25.10)                                                       
   
                                        20 Oct, 2022        Knee osteoarthritis   
(ICD-10 - M17.9)                                              
   
                                        20 Oct, 2022        Cardiomyopathy   
(ICD-10 - I42.9)                                              
   
                                        20 Oct, 2022        CHF (congestive hear  
t   
failure) (ICD-10 -   
I50.9)                                                        
   
                                        20 Oct, 2022        Edema (ICD-10 -   
R60.9)                                                        
   
                                        20 Oct, 2022        Low back derangement  
   
syndrome (ICD-10 -   
M53.86)                                                       
   
                                        20 Oct, 2022        Metabolic syndrome X  
   
(ICD-10 - E88.81)                                             
   
                                        20 Oct, 2022        Encounter for   
immunization (ICD-10   
- Z23)                                              Patient denies   
current illness,   
previous allergic   
reaction to   
influenza   
vaccine, eggs, or   
other vaccines,   
and   
Guillain-Falcon   
Syndrome. Patient   
given current   
editions of   
influenza Vaccine   
Information   
Statement (VIS).  
                                          
  
  
[facility]  
Other Phone: [phone]- Progress note  
  
  
  
  
                                        Author              Laila Villarreal  
[facility]  
2022 1:59pm  
   
                                        Note Date/Time      2022  
 1:59pm  
   
                                                    [facility]  
ENTER  
[address] [place] [address]  
  
Wound Center Provider Note  
Signed  
  
Patient: Jocelin Pacheco MR#:   
[id number]  
: 1955 Acct:[id number]  
  
Age/Sex: 67 / M  
  
Copies to: SOWMYA Gutierrez DO~  
  
  
HPI  
Date of Visit  
Date of Visit:  
Date of Service: 2022  
Time of Service: 13:55  
  
Narrative  
HPI:  
Jocelin is a 67-year-old male presenting to the [facility] for a 
follow-up   
visit for evaluation and treatment of a Right Lower Leg Wound. Jocelin reports 
that he   
bumped his leg on an end table approximately one month ago. He has been a 
patient in   
our office before due to venous stasis ulcers. He has 2+ pitting edema to his 
RLE. He   
does have compression pumps at home and admits to using them approximately once 
a   
week. He has been seen by vascular in the past in [place] where he was told no   
surgical intervention could be performed. The right leg is entirely healed. The 
area   
will be padded and protected today. His right leg will be wrapped in ACE wrap 
for   
compression. He understands that he should use his compression pumps daily, and   
compression stockings are recommended as well. He will reach out to our office 
for   
further wound care needs.  
  
Subjective  
Pain  
Right Lower Leg:  
Pain Description: Throbbing  
Pain Intensity: 0  
Wound/Ulcer History  
When did wound start?: 2022 right medial lower leg  
Mode of Arrival/ : Personal vehicle  
Assistive Device Used Today: Cane  
Lives with:: Alone  
Appetite Description: Within Normal Limits  
Who helps w/ dressing change?: Wound Care Dept  
Smoking Status: Never smoker  
  
[facility]  
Medical History (Updated 22 @ 13:56 by SOWMYA Gutierrez)  
  
Arthritis  
Bilateral knees,  right is bone on bone  per  Dr. Richard   
Back pain  
Cardiomyopathy  
 lower part of heart is weak   
Carpal tunnel syndrome  
right arm surgery  
DVT (deep venous thrombosis)  
 blood clot from right foot to right thigh  on  blood thinner   
Hypertension  
Itching  
Itching with irritation  
DIMITRI (obstructive sleep apnea)  
PVD (peripheral vascular disease)  
 poor veins  Dr. Cameron did vein closure 2019, needs 5 more viens worked 
on.  
Wound of right lower extremity  
  
Surgical History (Reviewed 22 @ 08:05 by Maria A Crowe RN)  
  
Hx of cholecystectomy  
  
Family History (Reviewed 22 @ 08:05 by Maria A Crowe RN)  
Father Diabetes mellitus, type 2  
Sister Diabetes mellitus, type 2  
  
Social History  
Smoking Status: Never smoker  
Substance Use Type: None  
  
Grafts  
History of Graft  
History of Graft?: No  
  
Exam  
Physical Exam  
Vital Signs:  
  
  
  
  
Temp Pulse Resp BP O2 Del Method  
  
98.6 F 92 H 18 119/69 Room Air  
  
22 13:46 22 13:46 22 13:46 22 13:46 22 13:46  
  
  
  
Const  
General: cooperative, comfortable and no acute distress  
Nutritional Appearance: obese  
Orientation: alert, awake and oriented x3  
  
Lower/Upper Extremity Exam  
Vascular Exam-Edema  
Right Lower Extremity:  
Edema Degree: 1+  
Edema Type: Pitting  
Vascular Exam-Pulses  
Right Dorsalis Pedis:  
Pulse Assessment Method: Palpation  
Right Posterior Tibial:  
Pulse Assessment Method: Palpation  
Right Brachial:  
Pulse Assessment Method: NIBP  
  
Objective  
Meds/Allergies  
Home Medications  
  
carvedilol 6.25 mg tablet 6.25 mg PO BID 17 [History Confirmed 22]  
furosemide 40 mg tablet (Lasix) 40 mg PO BID 17 [History Confirmed 
22]  
acetaminophen 500 mg tablet (Tylenol Extra Strength) 500 mg PO TID PRN Pain 
20   
[History Confirmed 22]  
nabumetone 750 mg tablet 750 mg PO BID 21 [History Confirmed 22]  
clobetasol 0.05 % topical ointment 1 applic topical 3XW PRN Rash 21 
[History   
Confirmed 22]  
empagliflozin 10 mg tablet (Jardiance) 10 mg PO DAILY 22 [History 
Confirmed   
22]  
metolazone 2.5 mg tablet 2.5 mg PO Q2D 22 [History Confirmed 22]  
rivaroxaban 10 mg tablet (Xarelto) 10 mg PO DAILY 22 [History Confirmed   
22]  
semaglutide 2 mg/dose (8 mg/3 mL) subcutaneous pen injector (Ozempic) 2 mg 
subcut Q7D   
22 [History Confirmed 22]  
spironolactone 25 mg tablet 25 mg PO DAILY 22 [History Confirmed 22]  
telmisartan 40 mg tablet (Micardis) 40 mg PO DAILY 22 [History Confirmed   
22]  
  
  
Allergies  
  
cefixime Allergy (Verified 22 10:26)  
Unknown Reaction  
diclofenac Allergy (Verified 22 10:26)  
Unknown Reaction  
nystatin Allergy (Verified 22 10:26)  
Unknown Reaction  
sodium benzoate Allergy (Verified 22 10:26)  
Unknown Reaction  
  
  
Wound/Ulcer  
Right Lower Posterior Leg:  
Bed Appearance: Beefy Red and Pink  
Length (cm): 0  
Width (cm): 0  
Depth (cm): 0  
CM Sq: 0.000  
Surrounding Tissue Appearance: Hyperpigmented  
Surrounding Tissue Temp: Warm  
Drainage Odor: No Odor  
Results  
Height: 5 ft 11 in  
Weight: 170.551 kg  
Body Mass Index: 52.4  
  
Assessment/Plan  
Assessment/Plan  
(1) Traumatic open wound of right lower leg:  
Assessment/Problem Details:  
22- Right lower medial leg- healed  
Code(s):  
S81.801A - Unspecified open wound, right lower leg, initial encounter  
Status: Resolved  
(2) Venous stasis ulcer:  
Assessment/Problem Details:  
new 22- healed 22  
Code(s):  
I83.009 - Varicose veins of unspecified lower extremity with ulcer of 
unspecified   
site; L97.909 - Non-pressure chronic ulcer of unspecified part of unspecified 
lower   
leg with unspecified severity  
Status: Resolved  
(3) Edema of right lower leg:  
Code(s):  
R60.0 - Localized edema  
Status: Chronic  
(4) PVD (peripheral vascular disease):  
Code(s):  
I73.9 - Peripheral vascular disease, unspecified  
Status: Chronic  
(5) Varicose veins of both legs with edema:  
Code(s):  
I83.893 - Varicose veins of bilateral lower extremities with other complications  
Status: Chronic  
(6) Morbid obesity due to excess calories:  
Code(s):  
E66.01 - Morbid (severe) obesity due to excess calories  
Status: Chronic  
Time spent with patient  
Time Spent With Patient (min): 20  
  
  
  
  
Dictated By: SOWMYA Gutierrez DD/DT: 22 1355  
  
Signed By: <Electronically signed by SOWMYA Villarreal>  
22 1359  
   
  
[facility]  
Work Phone: [phone]- Progress note  
  
  
  
  
                                        Author              Laila Villarreal  
[facility]  
2022 1:58pm  
   
                                        Note Date/Time      2022  
 1:58pm  
   
                                                    [facility]  
ENTER  
[address] [place] [address]  
  
Wound Center Provider Note  
Signed  
  
Patient: Jocelin Pacheco MR#:   
[id number]  
: 1955 Acct:[id number]  
  
Age/Sex: 67 / M  
  
Copies to: SOWMYA Gutierrez DO~  
  
  
HPI  
Date of Visit  
Date of Visit:  
Date of Service: 2022  
Time of Service: 13:54  
  
Narrative  
HPI:  
Jocelin is a 67-year-old male presenting to the [facility] for a 
follow-up   
visit for evaluation and treatment of a Right Lower Leg Wound. Jocelin reports 
that he   
bumped his leg on an end table approximately one month ago. He has been a 
patient in   
our office before due to venous stasis ulcers. He has 2+ pitting edema to his 
RLE. He   
does have compression pumps at home and admits to using them approximately once 
a   
week. He has been seen by vascular in the past in [place] where he was told no   
surgical intervention could be performed. The initial traumatic wound is healed.
He   
does present with a new ulcer to right lower posterior leg. His wound will be 
topped   
with collagen powder and topped with versatel. His leg will be wrapped in an 
Ergo K2   
for compression to reduce edema. Jocelin does seem to be tolerating the Ergo wrap   
better. Healing of the wound will be dependent on dressing changes being 
performed as   
ordered, a nutritious diet somewhat higher in protein, loss of excess weight, 
control   
of edema with compression and use of compression pumps, and preventing/treating   
infection if it were to arise. There are no signs of acute infection on exam 
today.   
Dressing changes will be done in our office twice a week. Jocelin also reports 
that he   
has been exercising regularly over the past month to help with weight loss.  
  
Subjective  
Pain  
Right Lower Leg:  
Pain Description: Throbbing  
Pain Intensity: 2  
Wound/Ulcer History  
When did wound start?: 2022 right medial lower leg  
Mode of Arrival/ : Personal vehicle  
Assistive Device Used Today: Cane  
Lives with:: Alone  
Appetite Description: Within Normal Limits  
Who helps w/ dressing change?: Wound Care Dept  
Smoking Status: Never smoker  
  
[facility]  
Medical History (Updated 22 @ 13:55 by Laila Villarreal, SOWMYA)  
  
Arthritis  
Bilateral knees,  right is bone on bone  per  Dr. Richard   
Back pain  
Cardiomyopathy  
 lower part of heart is weak   
Carpal tunnel syndrome  
right arm surgery  
DVT (deep venous thrombosis)  
 blood clot from right foot to right thigh  on  blood thinner   
Hypertension  
Itching  
Itching with irritation  
DIMITRI (obstructive sleep apnea)  
PVD (peripheral vascular disease)  
 poor veins  Dr. Cameron did vein closure 2019, needs 5 more viens worked 
on.  
Wound of right lower extremity  
  
Surgical History (Reviewed 22 @ 08:05 by Maria A Crowe RN)  
  
Hx of cholecystectomy  
  
Family History (Reviewed 22 @ 08:05 by Maria A Crowe RN)  
Father Diabetes mellitus, type 2  
Sister Diabetes mellitus, type 2  
  
Social History  
Smoking Status: Never smoker  
Substance Use Type: None  
  
Grafts  
History of Graft  
History of Graft?: No  
  
Exam  
Physical Exam  
Vital Signs:  
  
  
  
  
Temp Pulse Resp BP O2 Del Method  
  
98.1 F 80 20 116/72 Room Air  
  
22 13:48 22 13:48 22 13:48 22 13:48 22 13:48  
  
  
  
Const  
General: cooperative, comfortable and no acute distress  
Nutritional Appearance: obese  
Orientation: alert, awake and oriented x3  
  
Lower/Upper Extremity Exam  
Vascular Exam-Edema  
Right Lower Extremity:  
Edema Degree: 2+  
Edema Type: Pitting  
Vascular Exam-Pulses  
Right Dorsalis Pedis:  
Pulse Assessment Method: Doppler  
Right Posterior Tibial:  
Pulse Assessment Method: Doppler  
Right Brachial:  
Pulse Assessment Method: NIBP  
  
Objective  
Meds/Allergies  
Home Medications  
  
carvedilol 6.25 mg tablet 6.25 mg PO BID 17 [History Confirmed 22]  
furosemide 40 mg tablet (Lasix) 40 mg PO BID 17 [History Confirmed 
22]  
acetaminophen 500 mg tablet (Tylenol Extra Strength) 500 mg PO TID PRN Pain 
20   
[History Confirmed 22]  
nabumetone 750 mg tablet 750 mg PO BID 21 [History Confirmed 22]  
clobetasol 0.05 % topical ointment 1 applic topical 3XW PRN Rash 21 
[History   
Confirmed 22]  
empagliflozin 10 mg tablet (Jardiance) 10 mg PO DAILY 22 [History 
Confirmed   
22]  
metolazone 2.5 mg tablet 2.5 mg PO Q2D 22 [History Confirmed 22]  
rivaroxaban 10 mg tablet (Xarelto) 10 mg PO DAILY 22 [History Confirmed   
22]  
semaglutide 2 mg/dose (8 mg/3 mL) subcutaneous pen injector (Ozempic) 2 mg 
subcut Q7D   
22 [History Confirmed 22]  
spironolactone 25 mg tablet 25 mg PO DAILY 22 [History Confirmed 22]  
telmisartan 40 mg tablet (Micardis) 40 mg PO DAILY 22 [History Confirmed   
22]  
  
  
Allergies  
  
cefixime Allergy (Verified 22 10:26)  
Unknown Reaction  
diclofenac Allergy (Verified 22 10:26)  
Unknown Reaction  
nystatin Allergy (Verified 22 10:26)  
Unknown Reaction  
sodium benzoate Allergy (Verified 22 10:26)  
Unknown Reaction  
  
  
Wound/Ulcer  
Right Lower Medial Leg:  
Type: Traumatic (laceration (without foreign body))  
Thickness: Full  
Bed Appearance: Beefy Red and Pink  
Percent of Wound Bed Granulated/Red: 100  
Percent of Devitalized: 0  
Length (cm): 0  
Width (cm): 0  
Depth (cm): 0  
CM Sq: 0.000  
Surrounding Tissue Appearance: Hyperpigmented  
Surrounding Tissue Temp: Warm  
Drainage Amount: None  
Drainage Description: Serosanguineous  
Drainage Odor: No Odor  
Right Lower Posterior Leg:  
Bed Appearance: Beefy Red and Pink  
Percent of Wound Bed Granulated/Red: 100  
Percent of Devitalized: 0  
Length (cm): 2.4  
Width (cm): 1.9  
Depth (cm): 0.1  
CM Sq: 4.560  
Surrounding Tissue Appearance: Hyperpigmented  
Surrounding Tissue Temp: Warm  
Drainage Amount: Moderate  
Drainage Description: Serosanguineous  
Drainage Odor: No Odor  
Results  
Height: 5 ft 11 in  
Weight: 170.551 kg  
Body Mass Index: 52.4  
  
Assessment/Plan  
Assessment/Plan  
(1) Traumatic open wound of right lower leg:  
Assessment/Problem Details:  
22- Right lower medial leg- healed  
Code(s):  
S81.801A - Unspecified open wound, right lower leg, initial encounter  
Status: Resolved  
(2) Venous stasis ulcer:  
Assessment/Problem Details:  
new 22  
Code(s):  
I83.009 - Varicose veins of unspecified lower extremity with ulcer of 
unspecified   
site; L97.909 - Non-pressure chronic ulcer of unspecified part of unspecified 
lower   
leg with unspecified severity  
Status: Acute  
(3) Edema of right lower leg:  
Code(s):  
R60.0 - Localized edema  
Status: Chronic  
(4) PVD (peripheral vascular disease):  
Code(s):  
I73.9 - Peripheral vascular disease, unspecified  
Status: Chronic  
(5) Varicose veins of both legs with edema:  
Code(s):  
I83.893 - Varicose veins of bilateral lower extremities with other complications  
Status: Chronic  
(6) Morbid obesity due to excess calories:  
Code(s):  
E66.01 - Morbid (severe) obesity due to excess calories  
Status: Chronic  
Time spent with patient  
Time Spent With Patient (min): 20  
  
  
  
  
Dictated By: SOWMYA Gutierrez DD/DT: 22 1354  
  
Signed By: <Electronically signed by SOWMYA Villarreal>  
22 1358  
   
  
[facility]  
Work Phone: [phone]- Evaluation note*   
  
                      Encounter Date Diagnosis  Assessment Notes Treatment Notes  
 Treatment   
Clinical Notes  
   
                                        08 Sep, 2022        Type 2 diabetes   
mellitus with   
unspecified   
complications (ICD-10   
- E11.8)                                                      
   
                                        08 Sep, 2022        BMI 50.0-59.9, adult  
   
(ICD-10 - Z68.43)                                             
   
                                        08 Sep, 2022        Obstructive sleep   
apnea (adult)   
(pediatric) (ICD-10 -   
G47.33)                                                       
   
                                        08 Sep, 2022        Mixed hyperlipidemia  
   
(ICD-10 - E78.2)                                              
   
                                        08 Sep, 2022        CAD (coronary artery  
   
disease) (ICD-10 -   
I25.10)                                                       
   
                                        08 Sep, 2022        Knee osteoarthritis   
(ICD-10 - M17.9)                                              
   
                                        08 Sep, 2022        Cardiomyopathy   
(ICD-10 - I42.9)                                              
   
                                        08 Sep, 2022        CHF (congestive hear  
t   
failure) (ICD-10 -   
I50.9)                                                        
   
                                        08 Sep, 2022        Edema (ICD-10 -   
R60.9)                                                        
   
                                        08 Sep, 2022        Low back derangement  
   
syndrome (ICD-10 -   
M53.86)                                                       
   
                                        08 Sep, 2022        Metabolic syndrome X  
   
(ICD-10 - E88.81)                                             
  
  
[facility]  
Other Phone: [phone]- Progress note  
  
  
  
  
                                        Author              Laila Villarreal  
[facility]  
2022 10:41am  
   
                                        Note Date/Time      2022 10  
:40am  
   
                                                    [facility]  
ENTER  
[address] [place] [address]  
  
Wound Center Provider Note  
Signed  
  
Patient: Jocelin Pacheco MR#:   
[id number]  
: 1955 Acct:[id number]  
  
Age/Sex: 67 / M  
  
Copies to: SOWMYA Gutierrez DO~  
  
  
HPI  
Date of Visit  
Date of Visit:  
Date of Service: 2022  
Time of Service: 10:37  
  
Narrative  
HPI:  
Jocelin is a 67-year-old male presenting to the [facility] for a 
follow-up   
visit for evaluation and treatment of a Right Lower Leg Wound. Jocelin reports 
that he   
bumped his leg on an end table approximately one month ago. He has been a 
patient in   
our office before due to venous stasis ulcers. He has 2+ pitting edema to his 
RLE. He   
does have compression pumps at home and admits to using them approximately once 
a   
week. He has been seen by vascular in the past in [place] where he was told no   
surgical intervention could be performed. The wound did show improvement. The 
wound   
bed is more pink/red. His wound will be topped with collagen powder. His leg 
will be   
wrapped in an Ergo K2 for compression to reduce edema. He did experience 
sensitivity   
(itching) to the standard Unna boot. Clobetasol was applied anastasia wound and we 
will   
monitor for improvement with the Ergo wrap. Healing of the wound will be 
dependent on   
dressing changes being performed as ordered, a nutritious diet somewhat higher 
in   
protein, loss of excess weight, control of edema with compression and use of   
compression pumps, and preventing/treating infection if it were to arise. 
Thereare no   
signs of acute infection on exam today. Dressing changes will be done inour 
office   
twice a week. Jocelin also reports that he has been exercising regularly over the 
past   
month to help with weight loss.  
  
Subjective  
Pain  
Right Lower Leg:  
Pain Description: Soreness  
Pain Intensity: 7  
Wound/Ulcer History  
When did wound start?: 2022 right medial lower leg  
Mode of Arrival/ : Personal vehicle  
Assistive Device Used Today: Cane  
Lives with:: Alone  
Appetite Description: Within Normal Limits  
Who helps w/ dressing change?: Wound Care Dept  
Smoking Status: Never smoker  
  
[facility]  
Medical History (Updated 22 @ 08:17 by Maria A Crowe RN)  
  
Arthritis  
Bilateral knees,  right is bone on bone  per  Dr. Richard   
Back pain  
Cardiomyopathy  
 lower part of heart is weak   
Carpal tunnel syndrome  
right arm surgery  
DVT (deep venous thrombosis)  
 blood clot from right foot to right thigh  on  blood thinner   
Hypertension  
Itching  
Itching with irritation  
DIMITRI (obstructive sleep apnea)  
PVD (peripheral vascular disease)  
 poor veins  Dr. Cameron did vein closure 2019, needs 5 more viens worked 
on.  
Wound of right lower extremity  
  
Surgical History (Reviewed 22 @ 08:05 by Maria A Crowe, KIMBERLY)  
  
Hx of cholecystectomy  
  
Family History (Reviewed 22 @ 08:05 by Maria A Crowe, RN)  
Father Diabetes mellitus, type 2  
Sister Diabetes mellitus, type 2  
  
Social History  
Smoking Status: Never smoker  
Substance Use Type: None  
  
Grafts  
History of Graft  
History of Graft?: No  
  
Exam  
Physical Exam  
Vital Signs:  
  
  
  
  
Temp Pulse Resp BP O2 Del Method  
  
97.3 F L 103 H 18 139/72 Room Air  
  
22 10:24 22 10:24 22 10:24 22 10:24 22 10:24  
  
  
  
Const  
General: cooperative, comfortable and no acute distress  
Nutritional Appearance: obese  
Orientation: alert, awake and oriented x3  
  
Lower/Upper Extremity Exam  
Vascular Exam-Edema  
Right Lower Extremity:  
Edema Degree: 2+  
Edema Type: Pitting  
Vascular Exam-Pulses  
Right Dorsalis Pedis:  
Pulse Assessment Method: Doppler  
Right Posterior Tibial:  
Pulse Assessment Method: Doppler  
Right Brachial:  
Pulse Assessment Method: NIBP  
  
Objective  
Meds/Allergies  
Home Medications  
  
carvedilol 6.25 mg tablet 6.25 mg PO BID 17 [History Confirmed 22]  
furosemide 40 mg tablet (Lasix) 40 mg PO BID 17 [History Confirmed 
22]  
acetaminophen 500 mg tablet (Tylenol Extra Strength) 500 mg PO TID PRN Pain 
20   
[History Confirmed 22]  
nabumetone 750 mg tablet 750 mg PO BID 21 [History Confirmed 22]  
clobetasol 0.05 % topical ointment 1 applic topical 3XW PRN Rash 21 
[History   
Confirmed 22]  
empagliflozin 10 mg tablet (Jardiance) 10 mg PO DAILY 22 [History 
Confirmed   
22]  
metolazone 2.5 mg tablet 2.5 mg PO Q2D 22 [History Confirmed 22]  
rivaroxaban 10 mg tablet (Xarelto) 10 mg PO DAILY 22 [History Confirmed   
22]  
semaglutide 2 mg/dose (8 mg/3 mL) subcutaneous pen injector (Ozempic) 2 mg 
subcut Q7D   
22 [History Confirmed 22]  
spironolactone 25 mg tablet 25 mg PO DAILY 22 [History Confirmed 22]  
telmisartan 40 mg tablet (Micardis) 40 mg PO DAILY 22 [History Confirmed   
22]  
  
  
Allergies  
  
cefixime Allergy (Verified 22 10:26)  
Unknown Reaction  
diclofenac Allergy (Verified 22 10:26)  
Unknown Reaction  
nystatin Allergy (Verified 22 10:26)  
Unknown Reaction  
sodium benzoate Allergy (Verified 22 10:26)  
Unknown Reaction  
  
  
Wound/Ulcer  
Right Lower Medial Leg:  
Type: Traumatic (laceration (without foreign body))  
Thickness: Full  
Bed Appearance: Beefy Red, Pink and Yellow  
Percent of Wound Bed Granulated/Red: 95  
Percent of Devitalized: 5  
Length (cm): 2.1  
Width (cm): 1.4  
Depth (cm): 0.1  
CM Sq: 2.940  
Surrounding Tissue Appearance: Hyperpigmented  
Surrounding Tissue Temp: Warm  
Drainage Amount: Moderate  
Drainage Description: Serosanguineous  
Drainage Odor: No Odor  
Lidocaine Applied Topically: 2% Jelly  
Results  
Height: 5 ft 11 in  
Weight: 170.551 kg  
Body Mass Index: 52.4  
  
Assessment/Plan  
Assessment/Plan  
(1) Traumatic open wound of right lower leg:  
Assessment/Problem Details:  
22- Right lower medial leg  
Code(s):  
S81.801A - Unspecified open wound, right lower leg, initial encounter  
Status: Chronic  
(2) Edema of right lower leg:  
Code(s):  
R60.0 - Localized edema  
Status: Chronic  
(3) PVD (peripheral vascular disease):  
Code(s):  
I73.9 - Peripheral vascular disease, unspecified  
Status: Chronic  
(4) Varicose veins of both legs with edema:  
Code(s):  
I83.893 - Varicose veins of bilateral lower extremities with other complications  
Status: Chronic  
(5) Morbid obesity due to excess calories:  
Code(s):  
E66.01 - Morbid (severe) obesity due to excess calories  
Status: Chronic  
Time spent with patient  
Time Spent With Patient (min): 15  
  
  
  
  
Dictated By: SOWMYA Gutierrez DD/DT: 22 1037  
  
Signed By: <Electronically signed by SOWMYA Villarreal>  
22 1041  
   
  
[facility]  
Work Phone: [phone]- Progress note  
  
  
  
  
                                        Author              Laila Villarreal  
[facility]  
2022 8:03am  
   
                                        Note Date/Time      2022 8:  
03am  
   
                                                    [facility]  
ENTER  
[address] [place] [address]  
  
Wound Center Provider Note  
Signed  
  
Patient: Jocelin Pacheco MR#:   
[id number]  
: 1955 Acct:[id number]  
  
Age/Sex: 67 / M  
  
Copies to: SOWMYA Gutierrez DO~  
  
  
HPI  
Date of Visit  
Date of Visit:  
Date of Service: 2022  
Time of Service: 07:50  
  
Narrative  
HPI:  
Jocelin is a 67-year-old male presenting to the [facility] for an 
initial   
visit for evaluation and treatment of a Right Lower Leg Wound. Jocelin reports 
that he   
bumped his leg on an end table approximately one month ago. He has been a 
patient in   
our office before due to venous stasis ulcers. He presents today with the wound 
open   
to air. He has 2+ pitting edema to his RLE. He does have compression pumps at 
home   
and admits to using them approximately once a week. He has been seen by vascular
in   
the past in [place] where he was told no surgical intervention could be 
performed.   
The wound will be topped witha honey/collagen mixture and an alginate silver. 
His leg   
will be wrapped in an Unna boot for compression to reduce edema. Healing of the 
wound   
will be dependent on dressing changes being performed as ordered, a nutritious 
diet   
somewhathigher in protein, loss of excess weight, control of edema with 
compression   
and use of compression pumps, and preventing/treating infection if it were to 
arise.   
There are no signs of acute infection on exam today. Dressing changes will be 
done in   
our office twice a week. Jocelin also reports that he has been exercising 
regularly   
over the past month to help with weight loss.  
  
Subjective  
Pain  
Right Lower Leg:  
Pain Description: Soreness  
Pain Intensity: 8  
Wound/Ulcer History  
When did wound start?: 2022 right medial lower leg  
Mode of Arrival/ : Personal vehicle  
Assistive Device Used Today: Cane  
Lives with:: Alone  
Appetite Description: Within Normal Limits  
Who helps w/ dressing change?: Wound Care Dept  
Smoking Status: Never smoker  
  
[facility]  
Medical History (Reviewed 21 @ 01:50 by Gumaro Malin DO)  
  
Arthritis  
Bilateral knees,  right is bone on bone  per  Dr. Richard   
Back pain  
Cardiomyopathy  
 lower part of heart is weak   
Carpal tunnel syndrome  
right arm surgery  
DVT (deep venous thrombosis)  
 blood clot from right foot to right thigh  on  blood thinner   
Hypertension  
Itching  
Itching with irritation  
DIMITRI (obstructive sleep apnea)  
PVD (peripheral vascular disease)  
 poor veins  Dr. Cameron did vein closure 2019, needs 5 more viens worked 
on.  
  
Surgical History (Reviewed 21 @ 01:50 by Gumaro Malin DO)  
  
Hx of cholecystectomy  
  
Family History (Reviewed 21 @ 01:50 by Gumaro Malin DO)  
Father Diabetes mellitus, type 2  
Sister Diabetes mellitus, type 2  
  
Social History  
Smoking Status: Never smoker  
Substance Use Type: None  
  
Grafts  
History of Graft  
History of Graft?: No  
  
Exam  
Physical Exam  
Vital Signs:  
  
  
  
  
Temp Pulse Resp BP O2 Del Method  
  
97.7 F 97 H 24 130/70 Room Air  
  
22 07:37 22 07:37 22 07:37 22 07:37 22 07:37  
  
  
  
Const  
General: cooperative, comfortable and no acute distress  
Nutritional Appearance: obese  
Orientation: alert, awake and oriented x3  
  
Lower/Upper Extremity Exam  
Vascular Exam-Edema  
Right Lower Extremity:  
Edema Degree: 2+  
Edema Type: Pitting  
Vascular Exam-Pulses  
Right Dorsalis Pedis:  
Pulse Assessment Method: Palpation  
Right Posterior Tibial:  
Pulse Assessment Method: Doppler  
Right Brachial:  
Pulse Assessment Method: NIBP  
  
Objective  
Meds/Allergies  
Home Medications  
  
carvedilol 6.25 mg tablet 6.25 mg PO BID 17 [History Confirmed 21]  
furosemide 40 mg tablet (Lasix) 40 mg PO BID 17 [History Confirmed 
21]  
telmisartan 40 mg tablet 40 mg PO DAILY 17 [History Confirmed 21]  
metolazone 2.5 mg tablet 2.5 mg PO 3XW PRN Edema 19 [History Confirmed   
21]  
acetaminophen 500 mg tablet (Tylenol Extra Strength) 500 mg PO TID PRN Pain 
20   
[History Confirmed 21]  
ascorbic acid (vitamin C) 500 mg tablet (Vitamin C) 500 mg PO BID 7 days #14 
tabs   
20 [Rx Confirmed 21]  
zinc sulfate 50 mg zinc (220 mg) capsule (Orazinc) 220 mg PO DAILY 7 days #7 
caps   
20 [Rx Confirmed 21]  
nabumetone 750 mg tablet 750 mg PO BID 21 [History Confirmed 21]  
hydrocodone 5 mg-acetaminophen 325 mg tablet 1 tab PO Q8H PRN severe pain 
(scalescore   
7-10) 2 days #6 tabs 21 [Rx Confirmed 21]  
clobetasol 0.05 % topical ointment 1 applic topical 3XW PRN Rash 21 
[History   
Confirmed 21]  
  
  
Allergies  
  
cefixime Allergy (Verified 21 13:49)  
Unknown Reaction  
diclofenac Allergy (Verified 21 13:49)  
Unknown Reaction  
nystatin Allergy (Verified 21 13:49)  
Unknown Reaction  
sodium benzoate Allergy (Verified 21 13:49)  
Unknown Reaction  
  
  
Wound/Ulcer  
Right Lower Medial Leg:  
Type: Traumatic (laceration (without foreign body))  
Thickness: Full  
Bed Appearance: Brown, Devitalized and Dried Exudate  
Percent of Wound Bed Granulated/Red: 0  
Percent of Devitalized: 100  
Length (cm): 2.5  
Width (cm): 1.5  
Depth (cm): 0.1  
CM Sq: 3.750  
Surrounding Tissue Appearance: Hyperpigmented  
Surrounding Tissue Temp: Warm  
Drainage Amount: Scant  
Drainage Description: Serosanguineous  
Drainage Odor: No Odor  
Lidocaine Applied Topically: 2% Jelly  
Procedures  
Time Out: 2 Patient Identifiers, Correct Patient, Correct Side/Site, Correct   
Procedure and Safety Issues Reviewed  
Procedure:  
The right lower medial leg wound was anesthetized with 2% topical lidocaine. A   
curette was used to perform debridement for the removal of 3.7 cm? of 
devitalized   
tissue consisting of skin, slough, and exudate. Debridement was down to more 
healthy,   
bleeding tissue. Estimated blood loss was minimal. Hemostasis was achieved by   
applying direct pressure. The wound now appears 50% more pink and red and 
remains the   
same in size. The patient tolerated the procedure well with minimal pain.  
Results  
Height: 5 ft 11 in  
Weight: 170.551 kg  
Body Mass Index: 52.4  
  
Assessment/Plan  
Assessment/Plan  
(1) Traumatic open wound of right lower leg:  
Assessment/Problem Details:  
22- Right lower medial leg  
Code(s):  
S81.801A - Unspecified open wound, right lower leg, initial encounter  
Status: Chronic  
(2) Edema of right lower leg:  
Code(s):  
R60.0 - Localized edema  
Status: Chronic  
(3) PVD (peripheral vascular disease):  
Code(s):  
I73.9 - Peripheral vascular disease, unspecified  
Status: Chronic  
(4) Varicose veins of both legs with edema:  
Code(s):  
I83.893 - Varicose veins of bilateral lower extremities with other complications  
Status: Chronic  
(5) Morbid obesity due to excess calories:  
Code(s):  
E66.01 - Morbid (severe) obesity due to excess calories  
Status: Chronic  
Time spent with patient  
Time Spent With Patient (min): 20  
  
  
  
  
Dictated By: SOWMYA Gutierrez DD/DT: 22 0750  
  
Signed By: <Electronically signed by SOWMYA Villarreal>  
22 0803  
   
  
[facility]  
Work Phone: [phone]- Evaluation note*   
  
                      Encounter Date Diagnosis  Assessment Notes Treatment Notes  
 Treatment   
Clinical Notes  
   
                                                Type 2 diabetes   
mellitus with   
unspecified   
complications (ICD-10   
- E11.8)                                                      
   
                                                BMI 50.0-59.9, adult  
   
(ICD-10 - Z68.43)                                             
   
                                                Obstructive sleep   
apnea (adult)   
(pediatric) (ICD-10 -   
G47.33)                                                       
   
                                                Mixed hyperlipidemia  
   
(ICD-10 - E78.2)                                              
   
                                                CAD (coronary artery  
   
disease) (ICD-10 -   
I25.10)                                                       
   
                                                Knee osteoarthritis   
(ICD-10 - M17.9)                                              
   
                                                Cardiomyopathy   
(ICD-10 - I42.9)                                              
   
                                                CHF (congestive hear  
t   
failure) (ICD-10 -   
I50.9)                                                        
   
                                                Edema (ICD-10 -   
R60.9)                                                        
   
                                                Low back derangement  
   
syndrome (ICD-10 -   
M53.86)                                                       
   
                                                Metabolic syndrome X  
   
(ICD-10 - E88.81)                                             
  
  
[facility]  
Other Phone: [phone]- Evaluation note*   
  
                      Encounter Date Diagnosis  Assessment Notes Treatment Notes  
 Treatment   
Clinical Notes  
   
                                                Obesity,   
unspecified   
classification,   
unspecified obesity   
type, unspecified   
whether serious   
comorbidity present   
(ICD-10 - E66.9)                                              
   
                                                BMI 50.0-59.9,   
adult (ICD-10 -   
Z68.43)                                                       
   
                           Other                     Summary of Visi  
t:  
(A) reviewed plate   
method  
(B) emphasized   
reducing calorie   
consumption   
through beverages  
(C) continue   
logging all food   
and drink  
                                          
  
  
[facility]  
Other Phone: [phone]- Evaluation note*   
  
                      Encounter Date Diagnosis  Assessment Notes Treatment Notes  
 Treatment   
Clinical Notes  
   
                                                Obstructive sleep   
apnea (adult)   
(pediatric) (ICD-10   
- G47.33)                                           His machine should   
be set for CPAP   
auto minimum 12   
And maximum 18. We   
will try to get   
download 1 way or   
another to confirm   
that he has good   
control. He does   
seem to tolerate   
the pressure   
without difficulty   
and does wear the   
mask when he is in   
bed. I encouraged   
him to ensure   
regular sleep-wake   
cycles with   
adequate sleep   
time  
                                          
   
                                                Sleep phase   
syndrome, delayed   
(ICD-10 - G47.21)                                   His original   
history reported   
substantial sleep   
phase delay, but   
he is now   
reporting almost   
chaotic sleep-wake   
cycles. I   
encouraged him to   
have regular   
sleep-wake times,   
and to avoid being   
awake in the bed   
even if unable to   
sleep.  
                                          
   
                                                BMI 50.0-59.9,   
adult (ICD-10 -   
Z68.43)                                             He asked about   
inspire therapy,   
but his current   
body weight is   
above the cutoff   
to qualify for   
that treatment. He   
is encouraged to   
continue efforts   
at weight   
reduction  
                                          
  
  
[facility]  
Other Phone: [phone]- Evaluation note*   
  
                      Encounter Date Diagnosis  Assessment Notes Treatment Notes  
 Treatment   
Clinical Notes  
   
                                        10 May, 2022        Obesity,   
unspecified   
classification,   
unspecified   
obesity type,   
unspecified   
whether serious   
comorbidity   
present (ICD-10 -   
E66.9)                                                        
   
                                        10 May, 2022        BMI 50.0-59.9,   
adult (ICD-10 -   
Z68.43)                                                       
   
                          10 May, 2022 Other                     Summary of Visi  
t:  
(A) Presentation of   
Plate Method   
discussed  
(B) Sample meal ideas   
reviewed  
(C) exercise   
recommendations   
reviewed  
Patient set the   
following goals:  
- patient set   
personal goal using   
given handout.  
                                          
  
  
[facility]  
Other Phone: [phone]- Evaluation note*   
  
                      Encounter Date Diagnosis  Assessment Notes Treatment Notes  
 Treatment   
Clinical Notes  
   
                                                Abnormal weight gain  
   
(ICD-10 - R63.5)                                              
   
                                        14 2022        Type 2 diabetes   
mellitus with   
unspecified   
complications (ICD-10   
- E11.8)                                                      
   
                                        14 2022        Obstructive sleep   
apnea (adult)   
(pediatric) (ICD-10 -   
G47.33)                                                       
   
                                        14 2022        Mixed hyperlipidemia  
   
(ICD-10 - E78.2)                                              
   
                                        14 2022        Knee osteoarthritis   
(ICD-10 - M17.9)                                              
   
                                        14 2022        Cardiomyopathy   
(ICD-10 - I42.9)                                              
   
                                        14 2022        CHF (congestive hear  
t   
failure) (ICD-10 -   
I50.9)                                                        
   
                                        14 2022        BMI 50.0-59.9, adult  
   
(ICD-10 - Z68.43)                                             
   
                                        14 2022        Edema (ICD-10 -   
R60.9)                                                        
   
                                        14 2022        Low back derangement  
   
syndrome (ICD-10 -   
M53.86)                                                       
   
                                                Metabolic syndrome X  
   
(ICD-10 - E88.81)                                             
   
                                                CAD (coronary artery  
   
disease) (ICD-10 -   
I25.10)                                                       
  
  
[facility]  
Other Phone: [phone]- Evaluation note*   
  
                      Encounter Date Diagnosis  Assessment Notes Treatment Notes  
 Treatment   
Clinical Notes  
   
                                                Obstructive sleep   
apnea (adult)   
(pediatric)   
(ICD-10 - G47.33)                                     
  
  
He had original sleep   
study in  with an   
AHI of 63 and with   
21% of the time spent   
hypoxic. He was   
titrated and treated   
with CPAP, but   
struggled some with   
usage at that time.   
He was lost to   
follow-up not long   
after. However he now   
reports he has been   
using the machine   
regularly. The most   
current download   
available to us is   
from , and at   
that time he did have   
good compliance with   
9 hours of usage and   
with adequate control   
of sleep apnea.   
However I am   
concerned because of   
his very high [place]   
Sleepiness Scale, his   
difficult to sort out   
sleep history, and   
his severe persistent   
hypoxia when   
initially diagnosed   
(which raises the   
stakes and emphasizes   
the importance of   
effective treatment).   
His exam does show   
bilateral lower   
extremity edema which   
can be an indication   
of suboptimally   
treated sleep apnea   
with pulmonary   
hypertension. Given   
the uncertainty of   
the overall situation   
I feel full   
reassessment with   
polysomnography and   
possible split-night   
is advisable. In the   
meantime we will try   
to get him a new auto   
CPAP machine as his   
current device is   
both antiquated and   
subject to recall                         
  
  
   
                                                BMI 50.0-59.9,   
adult (ICD-10 -   
Z68.43)                                               
  
  
His substantial   
morbid obesity does   
pose considerable   
risks for   
cardiopulmonary   
complications and he   
is encouraged to   
bring body weight   
down. Not only will   
this improve quality   
of life but can have   
substantial impact on   
sleep apnea and   
pulmonary   
hypertension with   
hypoventilation,   
should these problems   
be present                                
  
  
   
                                                Lower extremity   
edema (ICD-10 -   
R60.0)                                                
  
  
He mayHis original   
sleep test showed   
severe sleep apnea   
with substantial   
nocturnal hypoxia. I   
am concerned for   
breakthrough hypoxia   
with hypoventilation;   
some patients may   
require control of   
sleep apnea but   
additionally may   
require oxygen   
therapy bled into the   
device                                    
  
  
   
                                                Sleep phase   
syndrome, delayed   
(ICD-10 - G47.21)                                     
  
  
His self-reported   
sleep pattern has not   
no nighttime sleep   
with substantial   
sleep only beginning   
at 6 AM. However it   
is quite difficult to   
sort out what is   
going on and he is   
not certain that he   
goes sleepless   
through the night. We   
will try to schedule   
him for a sleep phase   
delayed sleep study                       
  
  
   
                           Other                     The diagnosis o  
f   
Sleep Apnea was   
reviewed in detail,   
and handout materials   
were provided. The   
patient expresses   
good understanding,   
We also reviewed the   
medical and accident   
risks associated with   
sleep apnea and   
excessive fatigue.   
The patient is   
advised to adhere to   
a proper sleep   
hygiene schedule and   
to assure adequate   
total sleep time; The   
patient was advised   
to continue efforts   
at progressive weight   
loss, including   
decreased overall   
caloric intake   
quantity and better   
food choices.The   
patient was advised   
to call or return any   
difficulties arise,   
including changes in   
symptoms and/or   
problems with   
treatment                                 
  
  
[facility]  
Other Phone: [phone]- Evaluation note*   
  
                      Encounter Date Diagnosis  Assessment Notes Treatment Notes  
 Treatment   
Clinical Notes  
   
                                        22 Dec, 2021        Encounter for   
immunization   
(ICD-10 - Z23)                                        
  
  
Patient presents   
for COVID-19   
vaccination   
BOOSTER.   
Pre-screening form   
answers evaluated   
with patient.   
Patient denies   
current illness or   
allergic reaction   
to component of   
COVID-19 vaccine.   
Patient provided   
with current copy   
of EUA.                                   
  
  
  
  
[facility]  
Other Phone: [phone] complaint Narrative - Reported* JOCELIN PACHECO is 
  being seen for a consultation for an abnormal ECG and abnormal test(s) 
  results.  
* 67-year-old gentleman seen in cardiology consultation at the request of 
  primary care for worsening shortness of breath and exertional dyspnea. Patient
  had abnormal Lexiscan imaging revealing global hypokinesis with ejection 
  fraction of 44% and a normal transient ischemic index but no evidence of isc
  hemia. Prior heart catheterization performed by myself in  revealed 
  minimal coronary disease with reduced left ventricular function  
* Today's ECG is normal. Echocardiogram reveals LVH with normal left ventricular
  function and a septal thickness of 1.8 cm with no outflow tract obstructive 
  physiology.  
* Patient has a history of morbid obesity, obstructive sleep apnea, history of 
  right lower extremity DVT, essential hypertension.  
* The driving symptom currently is shortness of breath and dyspnea with exertion
  now with evidence ofLV dysfunction and morbid obesity  
* We have counseled him on dietary discretion, weight loss and exercise and 
  aerobic training as a means towards improving his overall demeanor/physiology 
  and anatomy and body habitus.  
* I would simply recommend appropriate guideline directed medical therapies with
  Entresto, spironolactone, SGLT2 inhibitor (Jardiance), discontinue 
  telmisartan, with appropriate laboratory and clinicalfollow-up 
  thereafterwards. Extensive education and counseling performed with the patient
  for over 30 minutes this morning, he is agreeing to the above will follow up 
  as described  
-[facility] DO  
Work Phone: [phone] noteNo InformationNort Coast Professional 
[facility]poration  
Other Phone: [phone] note*   
  
                          Diagnosis    Onset Date   Resolution   Status  
   
                          Edema of right lower leg                           chr  
onic  
   
                          Morbid obesity due to excess calories                   
          chronic  
   
                          PVD (peripheral vascular disease)                       
      chronic  
   
                          Varicose veins of both legs with edema                  
           chronic  
   
                          Traumatic open wound of right lower leg                 
            resolved  
   
                          Venous stasis ulcer                           resolved  
  
  
[facility]  
Work Phone: [phone] noteNo assessment information available
[facility]  
Work Phone: [phone] note*   
  
                          Diagnosis    Onset Date   Resolution   Status  
   
                          Edema of right lower leg                           chr  
onic  
   
                          Inflammation                           chronic  
   
                          Morbid obesity due to excess calories                   
          chronic  
   
                          PVD (peripheral vascular disease)                       
      chronic  
   
                          Varicose veins of both legs with edema                  
           chronic  
   
                          Cellulitis                             resolved  
   
                          Venous stasis ulcer                           resolved  
  
  
[facility]  
Work Phone: [phone] note*   
  
                                                    Diagnosis  
   
                                                      
  
  
Onychomycosis- Primary  
  
  
Dermatophytosis of nail  
   
                                                      
  
  
Pain in both feet  
   
                                                      
  
  
Corns and callosities  
   
                                                      
  
  
Other specified peripheral vascular diseases (CMS/HCC)  
   
                                                      
  
  
Circulating anticoagulant disorder (CMS/HCC)  
  
  
Other and unspecified coagulation defects  
  
documented in this encounter  
[facility]   
  
                                                    Diagnosis  
   
                                                      
  
  
Lumbosacral strain, initial encounter- Primary  
   
                                                      
  
  
Acute pain of left shoulder  
   
                                                      
  
  
Wound of left lower extremity, initial encounter  
  
documented in this encounter  
[facility]   
  
                          Diagnosis    Onset Date   Resolution   Status  
   
                          Edema of right lower leg                           chr  
onic  
   
                          Inflammation                           chronic  
   
                          Morbid obesity due to excess calories                   
          chronic  
   
                          PVD (peripheral vascular disease)                       
      chronic  
   
                          Varicose veins of both legs with edema                  
           chronic  
   
                          Venous stasis ulcer                           resolved  
  
  
[facility]  
Work Phone: [phone] note*   
  
                                                    Diagnosis  
   
                                                      
  
  
Non-ischemic cardiomyopathy (Multi)- Primary  
  
  
Other primary cardiomyopathies  
   
                                                      
  
  
DIMITRI on CPAP  
   
                                                      
  
  
BMI 50.0-59.9, adult (Multi)  
  
documented in this encounter  
[facility]  
Work Phone: [phone] note*   
  
                                                    Diagnosis  
   
                                                      
  
  
DIMITRI on CPAP- Primary  
   
                                                      
  
  
Type 2 diabetes mellitus with diabetic peripheral angiopathy without gangrene,   
without long-term current use of insulin (CMS/HCC)  
   
                                                      
  
  
Peripheral vascular disease (CMS/HCC)  
  
  
Unspecified peripheral vascular disease  
   
                                                      
  
  
CKD (chronic kidney disease) stage 2, GFR 60-89 ml/min  
  
  
Chronic kidney disease, Stage II (mild)  
   
                                                      
  
  
Other cardiomyopathy (CMS/HCC)  
   
                                                      
  
  
Class 3 obesity  
   
                                                      
  
  
Stasis dermatitis of both legs  
   
                                                      
  
  
Medicare annual wellness visit, subsequent- Primary  
   
                                                      
  
  
ACP (advance care planning)  
  
  
Other specified counseling  
   
                                                      
  
  
Colon cancer screening  
  
  
Special screening for malignant neoplasms, colon  
   
                                                      
  
  
Class 3 obesity  
   
                                                      
  
  
BMI 50.0-59.9, adult (CMS/HCC)  
   
                                                      
  
  
Type 2 diabetes mellitus with diabetic peripheral angiopathy without gangrene,   
without long-term current use of insulin (CMS/HCC)  
   
                                                      
  
  
Peripheral venous insufficiency  
  
  
Unspecified venous (peripheral) insufficiency  
   
                                                      
  
  
DIMITRI on CPAP  
   
                                                      
  
  
CKD (chronic kidney disease) stage 2, GFR 60-89 ml/min  
  
  
Chronic kidney disease, Stage II (mild)  
   
                                                      
  
  
Other cardiomyopathy (CMS/HCC)  
   
                                                      
  
  
CPAP (continuous positive airway pressure) dependence  
  
  
Dependence on other enabling machine  
   
                                                      
  
  
Left ventricular hypertrophy  
  
  
Cardiomegaly  
   
                                                      
  
  
Stasis dermatitis of both legs  
   
                                                      
  
  
Circulating anticoagulant disorder (CMS/HCC)  
  
  
Other and unspecified coagulation defects  
   
                                                      
  
  
Type 2 diabetes mellitus with diabetic peripheral angiopathy without gangrene,   
without long-term current use of insulin (CMS/HCC)- Primary  
   
                                                      
  
  
Hx of deep venous thrombosis  
   
                                                      
  
  
DIMITRI on CPAP  
   
                                                      
  
  
Other cardiomyopathy (CMS/HCC)  
   
                                                      
  
  
Leg swelling  
  
  
Swelling of limb  
   
                                                      
  
  
CKD (chronic kidney disease) stage 2, GFR 60-89 ml/min  
  
  
Chronic kidney disease, Stage II (mild)  
   
                                                      
  
  
BMI 50.0-59.9, adult (CMS/HCC)  
   
                                                      
  
  
Left ventricular hypertrophy  
  
  
Cardiomegaly  
   
                                                      
  
  
Class 3 obesity  
   
                                                      
  
  
Stasis dermatitis of both legs  
   
                                                      
  
  
CPAP (continuous positive airway pressure) dependence  
  
  
Dependence on other enabling machine  
   
                                                      
  
  
Circulating anticoagulant disorder (CMS/HCC)  
  
  
Other and unspecified coagulation defects  
   
                                                      
  
  
Peripheral vascular disease (CMS/HCC)  
  
  
Unspecified peripheral vascular disease  
   
                                                      
  
  
Peripheral venous insufficiency  
  
  
Unspecified venous (peripheral) insufficiency  
   
                                                      
  
  
Peripheral vascular disease (CMS/HCC)- Primary  
  
  
Unspecified peripheral vascular disease  
   
                                                      
  
  
Type 2 diabetes mellitus with diabetic peripheral angiopathy without gangrene,   
without long-term current use of insulin (CMS/HCC)  
   
                                                      
  
  
Positive colorectal cancer screening using Cologuard test  
   
                                                      
  
  
Venous stasis ulcer of left lower extremity (CMS/HCC)  
   
                                                      
  
  
Peripheral venous insufficiency  
  
  
Unspecified venous (peripheral) insufficiency  
   
                                                      
  
  
Cutaneous candidiasis  
   
                                                      
  
  
Other cardiomyopathy (CMS/HCC)  
   
                                                      
  
  
DIMITRI on CPAP  
   
                                                      
  
  
CKD (chronic kidney disease) stage 2, GFR 60-89 ml/min  
  
  
Chronic kidney disease, Stage II (mild)  
   
                                                      
  
  
Class 3 obesity  
   
                                                      
  
  
CPAP (continuous positive airway pressure) dependence  
  
  
Dependence on other enabling machine  
   
                                                      
  
  
Positive colorectal cancer screening using Cologuard test- Primary  
  
documented in this encounter  
[facility]   
  
                                                    Diagnosis  
   
                                                      
  
  
DIMITRI on CPAP- Primary  
   
                                                      
  
  
Type 2 diabetes mellitus with diabetic peripheral angiopathy without gangrene,   
without long-term current use of insulin (CMS/HCC)  
   
                                                      
  
  
Peripheral vascular disease (CMS/HCC)  
  
  
Unspecified peripheral vascular disease  
   
                                                      
  
  
CKD (chronic kidney disease) stage 2, GFR 60-89 ml/min  
  
  
Chronic kidney disease, Stage II (mild)  
   
                                                      
  
  
Other cardiomyopathy (CMS/HCC)  
   
                                                      
  
  
Class 3 obesity  
   
                                                      
  
  
Stasis dermatitis of both legs  
   
                                                      
  
  
Medicare annual wellness visit, subsequent- Primary  
   
                                                      
  
  
ACP (advance care planning)  
  
  
Other specified counseling  
   
                                                      
  
  
Colon cancer screening  
  
  
Special screening for malignant neoplasms, colon  
   
                                                      
  
  
Class 3 obesity  
   
                                                      
  
  
BMI 50.0-59.9, adult (CMS/HCC)  
   
                                                      
  
  
Type 2 diabetes mellitus with diabetic peripheral angiopathy without gangrene,   
without long-term current use of insulin (CMS/HCC)  
   
                                                      
  
  
Peripheral venous insufficiency  
  
  
Unspecified venous (peripheral) insufficiency  
   
                                                      
  
  
DIMITRI on CPAP  
   
                                                      
  
  
CKD (chronic kidney disease) stage 2, GFR 60-89 ml/min  
  
  
Chronic kidney disease, Stage II (mild)  
   
                                                      
  
  
Other cardiomyopathy (CMS/HCC)  
   
                                                      
  
  
CPAP (continuous positive airway pressure) dependence  
  
  
Dependence on other enabling machine  
   
                                                      
  
  
Left ventricular hypertrophy  
  
  
Cardiomegaly  
   
                                                      
  
  
Stasis dermatitis of both legs  
   
                                                      
  
  
Circulating anticoagulant disorder (CMS/HCC)  
  
  
Other and unspecified coagulation defects  
   
                                                      
  
  
Type 2 diabetes mellitus with diabetic peripheral angiopathy without gangrene,   
without long-term current use of insulin (CMS/HCC)- Primary  
   
                                                      
  
  
Hx of deep venous thrombosis  
   
                                                      
  
  
DIMITRI on CPAP  
   
                                                      
  
  
Other cardiomyopathy (CMS/HCC)  
   
                                                      
  
  
Leg swelling  
  
  
Swelling of limb  
   
                                                      
  
  
CKD (chronic kidney disease) stage 2, GFR 60-89 ml/min  
  
  
Chronic kidney disease, Stage II (mild)  
   
                                                      
  
  
BMI 50.0-59.9, adult (CMS/HCC)  
   
                                                      
  
  
Left ventricular hypertrophy  
  
  
Cardiomegaly  
   
                                                      
  
  
Class 3 obesity  
   
                                                      
  
  
Stasis dermatitis of both legs  
   
                                                      
  
  
CPAP (continuous positive airway pressure) dependence  
  
  
Dependence on other enabling machine  
   
                                                      
  
  
Circulating anticoagulant disorder (CMS/HCC)  
  
  
Other and unspecified coagulation defects  
   
                                                      
  
  
Peripheral vascular disease (CMS/HCC)  
  
  
Unspecified peripheral vascular disease  
   
                                                      
  
  
Peripheral venous insufficiency  
  
  
Unspecified venous (peripheral) insufficiency  
   
                                                      
  
  
Peripheral vascular disease (CMS/HCC)- Primary  
  
  
Unspecified peripheral vascular disease  
   
                                                      
  
  
Type 2 diabetes mellitus with diabetic peripheral angiopathy without gangrene,   
without long-term current use of insulin (CMS/HCC)  
   
                                                      
  
  
Positive colorectal cancer screening using Cologuard test  
   
                                                      
  
  
Venous stasis ulcer of left lower extremity (CMS/HCC)  
   
                                                      
  
  
Peripheral venous insufficiency  
  
  
Unspecified venous (peripheral) insufficiency  
   
                                                      
  
  
Cutaneous candidiasis  
   
                                                      
  
  
Other cardiomyopathy (CMS/HCC)  
   
                                                      
  
  
DIMITRI on CPAP  
   
                                                      
  
  
CKD (chronic kidney disease) stage 2, GFR 60-89 ml/min  
  
  
Chronic kidney disease, Stage II (mild)  
   
                                                      
  
  
Class 3 obesity  
   
                                                      
  
  
CPAP (continuous positive airway pressure) dependence  
  
  
Dependence on other enabling machine  
   
                                                      
  
  
Positive colorectal cancer screening using Cologuard test- Primary  
  
documented in this encounter  
[facility] - [facility]orted*   
  
                                Type            Description     Date  
   
                                Medical History Osteoarthritis Bilateral Knees   
   
                                Medical History Hypertension      
   
                                Medical History Chronic Back Pain   
   
                                Medical History Cardiomyopathy    
   
                                Medical History DIMITRI               
   
                                Medical History vein closure      
   
                                Medical History Venous Ulcer with Pain   
   
                                Surgical History gall bladder      
   
                                Surgical History hand surgery      
   
                                Surgical History heart catheterization   
   
                                Hospitalization History infection         
  
  
[facility]  
Other Phone: [phone] Discharge instructions  
Additional Instructions  
DISCHARGE INSTRUCTIONS FOR COLONOSCOPY  
  
-You may experience a  gassy  or full feeling after your procedure and pass a 
lot of gas. This is  
because the doctor inflated your bowel with air so that he could visualize and 
examine it.  
-Report any persistent pain or vomiting to the doctor.  
-Take it easy and rest today. You do not need to stay in bed, but avoid 
strenuous activities such  
as jogging.  
-You can resume normal activities tomorrow.  
-Watch for rectal bleeding if you had (2) polyps removed. You may have some 
oozing, but notify the  
doctor if you pass clots. The findings will be discussed at your follow up 
visit.  
-Avoid caffeine, alcohol, and roughage for 2 days after the polyp was removed.  
-Please keep your appointments for follow-up examinations as polyps can recur.  
-You will need to call Dr. Julian's office and schedule another colonoscopy 
in 5 year, because of  
the polyps.  
  
FOR THE NEXT 24 HOURS  
-Do NOT drive a car.  
-Do NOT operate machinery such as power tools, lawn mowers, snow blowers, sewing
machines, etc.  
-AVOID alcoholic beverages and medications for sleep.  
-Do NOT stay alone. Do NOT leave your child unattended.  
-Do NOT make important personal or business decisions or sign any legal 
documents.  
-Eat solid foods and drink liquids in smaller amounts than usual until normal 
appetite returns. If  
you should experience an upset stomach, liquids high in sugar content (soda, 
Bony-aid, non-acid  
juices) are recommended.[facility]  
Work Phone: [phone] note  
  
  
  
  
                                        Author              Elaine Espino  
[facility]  
2023 1:19pm  
   
                                        Note Date/Time      2023 1:19  
pm  
   
                                                    [facility]  
ENTER  
[address] [place] [address]  
  
Wound Center Provider Note  
Signed  
  
Patient: Jocelin Pacheco MR#:   
[id number]  
: 1955 Acct:[id number]  
  
Age/Sex: 68 / M  
  
Copies to: DO Elaine Arboleda, SOWMYA~  
  
  
HPI  
Date of Visit  
Date of Visit:  
Date of Service: 2023  
Time of Service: 13:17  
  
Narrative  
HPI:  
23 Jocelin is a 68-year-old male presenting to [facility]   
wound care program for an initial visit to the office for a right lower 
extremity   
venous stasis ulcer/lymphedema ulcer.? The patient has been a patient of the 
office   
for quite some time now and has seen other providers.  
He had seen vascular in [place] was told that there is nothing else that can be 
done   
for him.  
He has an extensive history of infections so I imagine this will continue to be 
an   
issue for him.  
He appears to need better edema control.  
He was told by ortho that he needs to lose 50 pounds in order to have surgery- 
this   
was 2 years ago and has not been accomplished.  
A past venous duplex indicates deep vein issues which are not surgically 
correctable-   
vascular did confirm that he does not need to see them because there is nothing 
they   
have to offer him.  
He is still retired so hopefully there is more opportunity for elevation and   
compression of the legs to support healing of the ulcer.  
Nutrition and probiotics were discussed and suggestions were given.  
Doxycycline was escribed today for what appears to be cellulitis of the RLE.  
23 looks better, is still taking the antibiotics, change to collagen silver   
topically and clobetasol to periwound skin that he says is itchy, will return 
for   
dressing changes, will bring his compression socks from home to the next visit 
since   
most of our wraps tend to roll down on his legs, says that he will use the   
compression pumps  
23 healed, steroid and compression sock applied, aware to call sooner than 
later   
if something reopens  
  
Subjective  
Pain  
Right Lower Leg:  
Pain Intensity: 0  
Wound/Ulcer History  
When did wound start?: 2023  
Mode of Arrival/ : Personal vehicle  
Assistive Device Used Today: Cane  
Lives with:: Alone  
Appetite Description: Within Normal Limits  
Who helps w/ dressing change?: Wound Care Dept  
Why Do You Need Help?: Can't Reach Ulcer  
Smoking Status: Never smoker  
  
[facility]  
Medical History (Updated 23 @ 13:19 by SOWMYA Correa)  
  
Arthritis  
Bilateral knees,  right is bone on bone  per  Dr. Richard   
Back pain  
Cardiomyopathy  
 lower part of heart is weak   
Carpal tunnel syndrome  
right arm surgery  
DVT (deep venous thrombosis)  
 blood clot from right foot to right thigh  on  blood thinner   
Hypertension  
Itching  
Itching with irritation  
DIMITRI (obstructive sleep apnea)  
PVD (peripheral vascular disease)  
 poor veins  Dr. Cameron did vein closure 2019, needs 5 more viens worked 
on.  
Wound of right lower extremity  
  
Surgical History (Reviewed 23 @ 11:09 by Maren Martinez RN)  
  
Hx of cholecystectomy  
  
Family History (Reviewed 23 @ 11:09 by Maren Martinez RN)  
Father Diabetes mellitus, type 2  
Sister Diabetes mellitus, type 2  
  
Social History  
Smoking Status: Never smoker  
Substance Use Type: None  
  
Grafts  
History of Graft  
History of Graft?: Yes  
  
Exam  
Physical Exam  
Vital Signs:  
  
  
  
  
Temp Pulse Resp BP O2 Del Method  
  
97.7 F 81 20 123/73 Room Air  
  
23 13:12 23 13:12 23 13:12 23 13:12 23 13:12  
  
  
  
Const  
General: cooperative, comfortable and no acute distress  
Nutritional Appearance: obese  
Orientation: alert, awake and oriented x3  
  
Lower/Upper Extremity Exam  
Vascular Exam-Edema  
Right Lower Extremity:  
Edema Type: Lymphedema  
Vascular Exam-Pulses  
Right Brachial:  
Pulse Assessment Method: NIBP  
  
Objective  
Meds/Allergies  
Home Medications  
  
carvedilol 6.25 mg tablet 6.25 mg PO BID 17 [History Confirmed 23]  
furosemide 40 mg tablet (Lasix) 40 mg PO BID 17 [History Confirmed 
23]  
acetaminophen 500 mg tablet (Tylenol Extra Strength) 500 mg PO TID PRN Pain 
20   
[History Confirmed 23]  
nabumetone 750 mg tablet 750 mg PO BID 21 [History Confirmed 23]  
empagliflozin 10 mg tablet (Jardiance) 10 mg PO DAILY 22 [History 
Confirmed   
23]  
rivaroxaban 10 mg tablet (Xarelto) 10 mg PO DAILY 22 [History Confirmed   
23]  
semaglutide 2 mg/dose (8 mg/3 mL) subcutaneous pen injector (Ozempic) 2 mg 
subcut Q7D   
22 [History Confirmed 23]  
spironolactone 25 mg tablet 25 mg PO DAILY 22 [History Confirmed 23]  
doxycycline hyclate 100 mg capsule 100 mg PO BID 14 days #28 caps 23 [Rx   
Confirmed 23]  
sacubitril 24 mg-valsartan 26 mg tablet (Entresto) 1 tab PO BID 23 
[History   
Confirmed 23]  
  
  
Allergies  
  
cefixime Allergy (Verified 23 11:09)  
Unknown Reaction  
diclofenac Allergy (Verified 23 11:09)  
Unknown Reaction  
nystatin Allergy (Verified 23 11:09)  
Unknown Reaction  
sodium benzoate Allergy (Verified 23 11:09)  
Unknown Reaction  
  
  
Wound/Ulcer  
Right Lower Leg:  
Type: Venous Stasis Ulcer  
Thickness: Skin Breakdown  
Length (cm): 0  
Width (cm): 0  
Depth (cm): 0  
CM Sq: 0.000  
Surrounding Tissue Appearance: Hyperpigmented  
Surrounding Tissue Temp: Warm  
Drainage Amount: None  
Drainage Odor: No Odor  
Results  
Height: 5 ft 11 in  
Weight: 160.118 kg  
Body Mass Index: 49.2  
  
Assessment/Plan  
Assessment/Plan  
(1) Venous stasis ulcer:  
Qualifiers:  
Laterality: right Non-pressure ulcer stage: limited to breakdown of skin 
Varicose   
vein presence: with varicose veins Venous stasis ulcer site: other part of lower
leg   
Qualified Code(s): I83.018 - Varicose veins of right lower extremity with ulcer 
other   
part of lower leg; L97.811 - Non-pressure chronic ulcer of other part of right 
lower   
leg limited to breakdown of skin  
Code(s):  
I83.009 - Varicose veins of unspecified lower extremity with ulcer of 
unspecified   
site; L97.909 - Non-pressure chronic ulcer of unspecified part of unspecified 
lower   
leg with unspecified severity  
Status: Resolved  
(2) Edema of right lower leg:  
Code(s):  
R60.0 - Localized edema  
Status: Chronic  
(3) Morbid obesity due to excess calories:  
Code(s):  
E66.01 - Morbid (severe) obesity due to excess calories  
Status: Chronic  
(4) Lymphedema of both lower extremities:  
Code(s):  
I89.0 - Lymphedema, not elsewhere classified  
Status: Suspected  
(5) PVD (peripheral vascular disease):  
Code(s):  
I73.9 - Peripheral vascular disease, unspecified  
Status: Chronic  
(6) Varicose veins of both legs with edema:  
Code(s):  
I83.893 - Varicose veins of bilateral lower extremities with other complications  
Status: Chronic  
(7) Inflammation:  
Status: Chronic  
Time spent with patient  
Time Spent With Patient (min): 10  
  
  
  
  
Dictated By: SOWMYA Correa DD/DT: 23  
  
Signed By: <Electronically signed by SOWMYA Espino>  
23 1319  
   
  
[facility]  
Work Phone: [phone] for referral (narrative)* Consultation 
  (Routine) - Authorized  
  
                          Specialty    Diagnoses / Procedures Referred By Contac  
t Referred To Contact  
   
                                        Cardiology            
  
  
Diagnoses  
  
  
Non-ischemic cardiomyopathy   
(Multi)  
  
  
  
Procedures  
  
  
Follow Up In Cardiology                   
  
  
Sharlene An APRN-CNP  
  
  
[address] [facility] [address], [facility] [address]  
  
  
[place] [address]  
  
  
Phone: [phone]  
  
  
Fax: [phone]                         
  
  
Compa Whitley S, DO  
  
  
[address] [facility] [address], [facility] [address]  
  
  
[place] [address]  
  
  
Phone: [phone]  
  
  
Fax: [phone]  
  
  
  
                    Referral ID Status    Reason    Start Date Expiration Date V  
isits   
Requested                               Visits   
Authorized  
   
                [id number] Authorized         2024 1       1  
  
  
  
  
Electronically signed by Sharlene URBINA at 2024 11:39 AM EDT  
  
  
[facility]  
Work Phone: [phone]  
  
Summary Purpose  
  
  
                                                      
  
  
  
Family History  
No Family History Records FoundUnknown Family Member  
  
                                Name            Dates           Details  
   
                                                    Family history of diabetes m  
ellitus: Sister, Mother,   
Father(V18.0, Z83.3)  
                                                    Status:Active  
   
                                                    COPD, moderate: Mother, Sist  
er  
                                                    Status:Active  
  
                              Unknown Family Member  
  
                                Name            Dates           Details  
   
                                                    Family history of diabetes m  
ellitus: Sister, Mother,   
Father(V18.0, Z83.3)  
                                                    Status:Active  
   
                                                    COPD, moderate: Mother, Sist  
er  
                                                    Status:Active  
  
  
  
                          Relationship Condition    Age at Onset Recorded Date/T  
ceasar  
   
                          father       Type 2 diabetes mellitus Unknown        
   
                          sister       Type 2 diabetes mellitus Unknown        
  
                              Unknown Family Member  
  
                                Name            Dates           Details  
   
                                                    Family history of diabetes m  
ellitus: Sister, Mother,   
Father(V18.0, Z83.3)  
                                                    Status:Active  
   
                                                    COPD, moderate: Mother, Sist  
er  
                                                    Status:Active  
  
                              Unknown Family Member  
  
                                Name            Dates           Details  
   
                                                    Family history of diabetes m  
ellitus: Sister, Mother,   
Father(V18.0, Z83.3)  
                                                    Status:Active  
   
                                                    COPD, moderate: Mother, Sist  
er  
                                                    Status:Active  
  
                              Unknown Family Member  
  
                                Name            Dates           Details  
   
                                                    Family history of diabetes m  
ellitus: Sister, Mother,   
Father(V18.0, Z83.3)  
                                                    Status:Active  
   
                                                    COPD, moderate: Mother, Sist  
er  
                                                    Status:Active  
  
                              Unknown Family Member  
  
                                Name            Dates           Details  
   
                                                    Family history of diabetes m  
ellitus: Sister, Mother,   
Father(V18.0, Z83.3)  
                                                    Status:Active  
   
                                                    COPD, moderate: Mother, Sist  
er  
                                                    Status:Active  
  
  
  
                          Relationship Condition    Age at Onset Recorded Date/T  
ceasar  
   
                          father       Type 2 diabetes mellitus Unknown        
   
                          sister       Type 2 diabetes mellitus Unknown        
   
                          brother      Heart disease Unknown        
   
                          father       Heart disease Unknown        
   
                                       Diabetes mellitus Unknown        
   
                                       Heart failure Unknown        
   
                                            Unknown        
   
                          family member      Unknown        
   
                          Not Specified      Unknown        
   
                                       Heart disease Unknown        
   
                          sister       Heart disease Unknown        
  
  
  
                          Relationship Condition    Age at Onset Recorded Date/T  
ceasar  
   
                          father       Type 2 diabetes mellitus Unknown        
   
                          sister       Type 2 diabetes mellitus Unknown        
   
                          brother      Heart disease Unknown        
   
                          father       Heart disease Unknown        
   
                                       Diabetes mellitus Unknown        
   
                                       Heart failure Unknown        
   
                                            Unknown        
   
                          family member      Unknown        
   
                          mother            Unknown        
   
                                       Heart disease Unknown        
   
                          sister       Heart disease Unknown        
  
  
  
Advance Directives  
No Advanced Directives Records Found  
  
                                Advance Directive Response        Recorded Date/  
Time  
   
                                Advance Directives No               9:56pm  
  
  
  
                                Advance Directive Response        Recorded Date/  
Time  
   
                                Advance Directives No               8:56pm  
  
  
  
Chief Complaint and Reason for Visit  
  
  
                                        Chief Complaint     Obesity  
Open Wound  
   
                                        Reason for Visit    Edema of right lower  
 leg  
Morbid obesity due to excess calories  
PVD (peripheral vascular disease)  
Varicose veins of both legs with edema  
Traumatic open wound of right lower leg  
Venous stasis ulcer  
  
  
  
                                        Chief Complaint     Obesity  
R07.9 I50.9 I42.8  
  
  
  
                                        Chief Complaint     Obesity  
exercise  
  
  
  
                                        Chief Complaint     Open Wound  
   
                                        Reason for Visit    Edema of right lower  
 leg  
Inflammation  
Morbid obesity due to excess calories  
PVD (peripheral vascular disease)  
Varicose veins of both legs with edema  
Cellulitis  
Venous stasis ulcer  
  
  
  
                                        Chief Complaint     Open Wound  
Obesity  
D68.318 N18.2 R73.03 E66.01  
   
                                        Reason for Visit    Edema of right lower  
 leg  
Inflammation  
Morbid obesity due to excess calories  
PVD (peripheral vascular disease)  
Varicose veins of both legs with edema  
Cellulitis  
Venous stasis ulcer  
  
  
  
                                        Chief Complaint     Open Wound  
   
                                        Reason for Visit    Edema of right lower  
 leg  
Inflammation  
Morbid obesity due to excess calories  
PVD (peripheral vascular disease)  
Varicose veins of both legs with edema  
Venous stasis ulcer  
  
  
  
                                        Chief Complaint     Open Wound  
WMN f/u restart  
   
                                        Reason for Visit    Edema of right lower  
 leg  
Inflammation  
Morbid obesity due to excess calories  
PVD (peripheral vascular disease)  
Varicose veins of both legs with edema  
Venous stasis ulcer  
Adult BMI 50.0-59.9 kg/sq m  
Mixed hyperlipidemia  
DIMITRI on CPAP  
Osteoarthritis of knee  
Type 2 diabetes mellitus with unspecified complications  
  
  
  
                                        Chief Complaint     Open Wound  
WMN f/u restart  
Open Wound  
   
                                        Reason for Visit    Edema of right lower  
 leg  
Inflammation  
Morbid obesity due to excess calories  
PVD (peripheral vascular disease)  
Varicose veins of both legs with edema  
Venous stasis ulcer  
Adult BMI 50.0-59.9 kg/sq m  
Mixed hyperlipidemia  
DIMITRI on CPAP  
Osteoarthritis of knee  
Type 2 diabetes mellitus with unspecified complications  
Adult BMI 50.0-59.9 kg/sq m  
Hematoma of right lower leg  
Type 2 diabetes mellitus with unspecified complications  
  
  
  
                                        Chief Complaint     Open Wound  
WMN f/u restart  
Bilateral lower extremity edema  
Open Wound  
   
                                        Reason for Visit    Edema of right lower  
 leg  
Inflammation  
Morbid obesity due to excess calories  
PVD (peripheral vascular disease)  
Varicose veins of both legs with edema  
Venous stasis ulcer  
Adult BMI 50.0-59.9 kg/sq m  
Mixed hyperlipidemia  
DIMITRI on CPAP  
Osteoarthritis of knee  
Type 2 diabetes mellitus with unspecified complications  
Adult BMI 50.0-59.9 kg/sq m  
CAD (coronary artery disease)  
Mixed hyperlipidemia  
DIMITRI on CPAP  
Osteoarthritis of knee  
Type 2 diabetes mellitus with unspecified complications  
HTN (hypertension)  
Adult BMI 50.0-59.9 kg/sq m  
Hematoma of right lower leg  
Leg wound, right  
Type 2 diabetes mellitus with unspecified complications  
Cellulitis  
  
  
  
                                        Chief Complaint     WMN f/u restart  
Open Wound  
Bilateral lower extremity edema  
   
                                        Reason for Visit    Adult BMI 50.0-59.9   
kg/sq m  
Mixed hyperlipidemia  
DIMITRI on CPAP  
Osteoarthritis of knee  
Type 2 diabetes mellitus with unspecified complications  
Adult BMI 50.0-59.9 kg/sq m  
CAD (coronary artery disease)  
Mixed hyperlipidemia  
DIMITRI on CPAP  
Osteoarthritis of knee  
Type 2 diabetes mellitus with unspecified complications  
HTN (hypertension)  
Adult BMI 50.0-59.9 kg/sq m  
Hematoma of right lower leg  
Leg wound, right  
Type 2 diabetes mellitus with unspecified complications  
Cellulitis  
  
  
  
                                        Chief Complaint     Bilateral lower extr  
emity edema  
Open Wound  
   
                                        Reason for Visit    Adult BMI 50.0-59.9   
kg/sq m  
Mixed hyperlipidemia  
DIMITRI on CPAP  
Osteoarthritis of knee  
Type 2 diabetes mellitus with unspecified complications  
Adult BMI 50.0-59.9 kg/sq m  
Hematoma of right lower leg  
Leg wound, right  
Type 2 diabetes mellitus with unspecified complications  
Urinary incontinence  
Venous insufficiency of right lower extremity  
Cellulitis  
  
  
  
                                        Chief Complaint     Admit Date  
   
                                        Bilateral lower extremity edema  11:00am  
   
                                        Open Wound          2024   
11:10am  
   
                                        Positive Cologuard  2024   
9:24am  
  
  
  
                                        Reason for Visit    Admit Date  
   
                                        Adult BMI 50.0-59.9 kg/sq m  11:10am  
   
                                        Hematoma of right lower leg  11:10am  
   
                                        Leg wound, right    2024   
11:10am  
   
                                        Type 2 diabetes mellitus with unspecifie  
d complications 2024   
11:10am  
   
                                        Urinary incontinence 2024  
 11:10am  
   
                                        Venous insufficiency of right lower extr  
emity 2024 11:10am  
   
                                        Lymphedema of both lower extremities Nov  
ember 2024 11:10am  
   
                                        Cellulitis          2024   
11:10am  
   
                                        Venous stasis ulcer 2024   
11:10am  
  
  
  
Chief Complaint  
* JOCELIN PACHECO is being seen for a 6 month follow-up of.  
* 62-year-old gentleman who returns for annual visit he is doing well just 
  complains atypical chest discomfort described as tingling as well as sharp and
  somewhat painful sensation both at rest and with activities that are sporadic 
  and episodic only but not reproducible. He has a history of moderate n
  onischemic cardiomyopathy, obstructive sleep apnea, morbid obesity, history of
  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  
* Stress perfusion imaging from 2022 revealed patchy tracer uptake, proceed
  ischemia or infarction, normal left ventricular volume with global hypokinesis
  and ejection fraction of 44% normal transient ischemic index.  
* Heart catheterization from  revealed 30 to 50% ostial circumflex disease, 
  20 to 25% mid LAD disease normal right coronary and at that time ejection 
  fraction of 40 to 45%.  
* Since  with his medical history continue therapies. We have transitioned 
  over to Entresto, Jardiance, carvedilol and spironolactone  
* Unfortunately he has had no significant weight loss despite going to cardiac 
  rehab 3 times a week. He remains 365 pounds, similar to   
* Recommendations, obtain a MUGA scan current therapies, obtain appropriate 
  laboratories including Chem-6 and BNP, we will have him come back in 6 months 
  continues to have any further chest discomfort will prescribe nitrates and/or 
  consider invasive assessment.  
  
  
Reason for Referral  
  
  
                          Specialty    Diagnoses / Procedures Referred By Contac  
t Referred To Contact  
   
                                        Physical Therapy      
  
  
Diagnoses  
  
  
Lumbosacral strain,   
initial encounter  
  
  
Acute pain of left   
shoulder  
  
  
  
Procedures  
  
  
[place] OFFICE[facility]IENT [facility]   
HIGH [facility] 60 MINUTES                       
  
  
Ray Vela, NP  
  
  
[address]  
  
  
[place] [address]  
  
  
Phone: [phone]  
  
  
Fax: [phone]                         
  
  
Jessica Lopez, PT  
  
  
[address]  
  
  
[address]  
  
  
[place]   
[address]  
  
  
Phone: [phone]  
  
  
Fax: [phone]  
  
  
  
                      Referral ID Status     Reason     Start Date Expiration   
Date                                    Visits   
Requested                               Visits   
Authorized  
   
                                [id number]          Authorized        
  
  
Consult and   
Treat           2024        10              10  
  
  
  
Additional Source Comments  
  
  
  
                                                    PREGNANCY (unrecognized sect  
ion and content)  
   
                                                    No Pregnancy Status Records   
FoundNo Pregnancy Status Records FoundNo Pregnancy   
Status   
Records FoundNo Pregnancy Status Records FoundNo Pregnancy Status Records 
FoundNo   
Pregnancy Status Records FoundNo Pregnancy Status Records Found  
  
  
  
  
                                                    INFORMATION SOURCE (unrecogn  
ized section and content)  
   
                                          
  
  
  
                                        DATE CREATED        AUTHOR  
   
                                2022                      [facility]  
dical Specialist  
  
  
  
                                DATE CREATED    AUTHOR          AUTHOR'S ORGANIZ  
ATION  
   
                                2023                       [facility]  
[facility]  
  
  
  
                                DATE CREATED    AUTHOR          AUTHOR'S ORGANIZ  
ATION  
   
                                2023                       [facility]  
  
  
  
                                DATE CREATED    AUTHOR          AUTHOR'S ORGANIZ  
ATION  
   
                                08/10/2023                      [facility]  
ical [facility]  
  
  
  
                                DATE CREATED    AUTHOR          AUTHOR'S ORGANIZ  
ATION  
   
                                05/10/2024                      [facility]  
tals Ambulatory  
  
  
  
                                DATE CREATED    AUTHOR          AUTHOR'S ORGANIZ  
ATION  
   
                                2024                      [facility]  
dical Specialists [facility]  
  
  
  
                                DATE CREATED    AUTHOR          AUTHOR'S ORGANIZ  
ATION  
   
                                2024                      [facility]  
ysician Group  
  
  
  
  
  
                                                    REASON FOR VISIT (unrecogniz  
ed section and content)  
   
                                          
  
  
  
                                        Reason              Comments  
   
                                        Back Pain           Pt presents with low  
er back pain with onset of 2 weeks with pain   
increasing. Took tylenol with no relief. Has used ice on it a few times.   
Left shoulder is hurting. Denies any trouble urinating.  
  
  
  
                                        Reason              Comments  
   
                                        Follow-up           10m per wss  
  
  
  
                                        Reason              Comments  
   
                                        Positive cologuard    
  
  
  
                          Specialty    Diagnoses / Procedures Referred By Contac  
t Referred To Contact  
   
                                        General Surgery       
  
  
Diagnoses  
  
  
Positive colorectal   
cancer screening using   
Cologuard test  
  
  
  
Procedures  
  
  
[place] OFF[facility]   
HIGH [facility] 60 MINUTES                       
  
  
Vera Melvin DO  
  
  
[address]  
  
  
[place] [address]  
  
  
Phone:   
tel:[phone]  
  
  
fax:[phone]                       
  
  
Marciano Pop DO  
  
  
[address]  
  
  
[place] [address]  
  
  
Phone:   
tel:[phone]  
  
  
fax:[phone]  
  
  
  
                    Referral ID Status    Reason    Start Date Expiration Date V  
isits   
Requested                               Visits   
Authorized  
   
                                [id number]          Closed            
  
  
Consult and   
Treat           2024       1               1  
  
  
  
                                        Reason              Comments  
   
                                         pow colonoscopy   
  
  
  
  
  
                                                    Care Teams (unrecognized sec  
tion and content)  
   
                                          
  
  
  
                                                    Team Status: Active   
   
                          Member       Role         Status       Dates  
   
                          Vera Melvin DO Primary Care Provider Active   
        
  
  
  
                                                    Team Status: Inactive   
   
                          Member       Role         Status       Dates  
   
                          Vera Melvin DO Primary Care Provider Active   
      Start: 2024  
End: 2024  
   
                          SOWMYA Correa Attending Provider Active       St  
art: 2024  
End: 2024  
  
  
  
                                                    Team Status: Active   
   
                          Member       Role         Status       Dates  
   
                          Vera MatiasMonica , DO Primary Care Provider Active   
        
   
                          ESVIN Haro Attending Provider Active         
  
  
  
                                                    Team Status: Inactive   
   
                          Member       Role         Status       Dates  
   
                          Verajanie Melvin , DO Primary Care Provider Active   
        
   
                          SOWMYA Correa Attending Provider Active         
  
  
  
                                                    Team Status: Inactive   
   
                          Member       Role         Status       Dates  
   
                          Vera Melvin , DO Primary Care Provider, Attend  
ing Provider Active         
  
  
  
                                                    Team Status: Inactive   
   
                          Member       Role         Status       Dates  
   
                          Vera Melvin , DO Primary Care Provider Active   
        
   
                          Jacoby Braden MD Attending Provider Active         
  
  
  
                                                    Team Status: Inactive   
   
                          Member       Role         Status       Dates  
   
                          Vera MatiasMonica , DO Primary Care Provider Active   
        
   
                          SOWMYA Gutierrez Attending Provider Active       
    
  
  
  
                                                    Team Status: Inactive   
   
                          Member       Role         Status       Dates  
   
                          Vera MatiasGiuliana , DO Primary Care Provider Active   
        
   
                          JASON Whitley ,  Attending Provider Active         
  
  
  
                      Team Member Relationship Specialty  Start Date End Date  
   
                                                      
  
  
Vera Melvin DO  
  
  
NPI: [id number]  
  
  
[address]  
  
  
[place] [address]  
  
  
[phone] (Work)  
  
  
[phone] (Fax) PCP - ACO Reach                 23           
   
                                                      
  
  
Vera Melvin DO  
  
  
NPI: [id number]  
  
  
[address]  
  
  
[place] [address]  
  
  
[phone] (Work)  
  
  
[phone] (Fax) PCP - General   Internal Medicine 24            
   
                                                      
  
  
Danya An DPM  
  
  
NPI: [id number]  
  
  
[address]  
  
  
[place] [address]  
  
  
[phone] (Work)  
  
  
[phone] (Fax) Referring Physician Podiatry        23          
   
                                                      
  
  
Elroy Agudelo MD  
  
  
NPI: [id number]  
  
  
[address]  
  
  
[place] [address]  
  
  
[phone] (Work)  
  
  
[phone] (Fax) Referring Physician Cardiology      23          
   
                                                      
  
  
Steff Espino    Nurse Practitioner Wound Care      23          
  
  
  
                      Team Member Relationship Specialty  Start Date End Date  
   
                                                      
  
  
Vera Melvin DO  
  
  
NPI: [id number]  
  
  
[address]  
  
  
[place] [address]  
  
  
[phone] (Work)  
  
  
[phone] (Fax) PCP - ACO Reach                 23           
   
                                                      
  
  
Vera Melvin DO  
  
  
NPI: [id number]  
  
  
[address]  
  
  
[place] [address]  
  
  
[phone] (Work)  
  
  
[phone] (Fax) PCP - General   Internal Medicine 24            
   
                                                      
  
  
Danya An DPM  
  
  
NPI: [id number]  
  
  
[address]  
  
  
[place] [address]  
  
  
[phone] (Work)  
  
  
[phone] (Fax) Referring Physician Podiatry        23          
   
                                                      
  
  
Elroy Agudelo MD  
  
  
NPI: [id number]  
  
  
[address]  
  
  
[place] [address]  
  
  
[phone] (Work)  
  
  
[phone] (Fax) Referring Physician Cardiology      23          
   
                                                      
  
  
Steff Espino    Nurse Practitioner Wound Care      23          
  
  
  
                      Team Member Relationship Specialty  Start Date End Date  
   
                                                      
  
  
Vera Melvin DO  
  
  
NPI: [id number]  
  
  
[address]  
  
  
[place] [address]  
  
  
[phone] (Work)  
  
  
[phone] (Fax) PCP - ACO Reach                 23           
   
                                                      
  
  
Vera Melvin DO  
  
  
NPI: [id number]  
  
  
[address]  
  
  
[place] [address]  
  
  
[phone] (Work)  
  
  
[phone] (Fax) PCP - General   Internal Medicine 24            
   
                                                      
  
  
Danya An DPM  
  
  
NPI: [id number]  
  
  
[address]  
  
  
[place] [address]  
  
  
[phone] (Work)  
  
  
[phone] (Fax) Referring Physician Podiatry        23          
   
                                                      
  
  
Elroy Agudelo MD  
  
  
NPI: [id number]  
  
  
[address]  
  
  
[place] [address]  
  
  
[phone] (Work)  
  
  
[phone] (Fax) Referring Physician Cardiology      23          
   
                                                      
  
  
Steff Espino    Nurse Practitioner Wound Care      23          
  
  
  
                                                    Team Status: Inactive   
   
                          Member       Role         Status       Dates  
   
                          Vera Melvin DO Primary Care Provider Active   
      Start: 2024  
End: 2024  
   
                          Jacoby Braden MD Attending Provider Active         
Start: 2024  
End: 2024  
  
  
  
                      Team Member Relationship Specialty  Start Date End Date  
   
                                                      
  
  
Kasia Melvinjanie PottsDO ambreen  
  
  
NPI: [id number]  
  
  
[address] [address]  
  
  
[phone] (Work)  
  
  
[phone] (Fax) PCP - General                   00            
  
  
  
                                                    Team Status: Active   
   
                          Member       Role         Status       Keyanna Melvin DO Primary Care Provider Active   
      Start: 2024  
  
   
                          SOWMYA Correa Attending Provider Active       St  
art: 2024  
  
  
  
  
                                                    Team Status: Inactive   
   
                          Member       Role         Status       Keyanna Melvin DO Primary Care Provider Active   
      Start: 2024  
End: 2024  
   
                          Jacoby Braden MD Attending Provider Active         
Start: 2024  
End: 2024  
  
  
  
                                                    Team Status: Active   
   
                          Member       Role         Status       Keyanna Melvin DO Primary Care Provider Active   
      Start: 2024  
  
   
                          SOWMYA Correa Attending Provider Active       St  
art: 2024  
  
  
  
  
                                                    Team Status: Active   
   
                          Member       Role         Status       Keyanna Melvin DO Primary Care Provider Active   
      Start: 2024  
  
   
                          SOWMYA Correa Attending Provider Active       St  
art: 2024  
  
  
  
  
                                                    Team Status: Inactive   
   
                          Member       Role         Status       Keyanna Melvin DO Primary Care Provider Active   
      Start: 2024  
End: 2024  
   
                          ROM Peng Attending Provider Active        
 Start: 2024  
End: 2024  
  
  
  
                                                    Team Status: Active   
   
                          Member       Role         Status       Keyanna Melvin DO Primary Care Provider Active   
      Start: 2024  
  
   
                          SOWMYA Correa Attending Provider Active       St  
art: 2024  
  
  
  
  
                                                    Team Status: Active   
   
                          Member       Role         Status       Keyanna Melvin DO Primary Care Provider Active   
      Start: 2024  
  
   
                          SOWMYA Correa Attending Provider Active       St  
art: 2024  
  
  
  
  
                                                    Team Status: Inactive   
   
                          Member       Role         Status       Keyanna Melvin , DO Primary Care Provider Active   
      Start: 2024  
End: 2024  
   
                          Jacoby Braden MD Attending Provider Active         
Start: 2024  
End: 2024  
  
  
  
                                                    Team Status: Inactive   
   
                          Member       Role         Status       Keyanna Melvin DO Primary Care Provider Active   
      Start: 2024  
End: 2024  
   
                          SOWMYA Correa Attending Provider Active       St  
art: 2024  
End: 2024  
  
  
  
                                                    Team Status: Active   
   
                          Member       Role         Status       Keyanna Melvin DO Primary Care Provider Active   
      Start: 2024  
  
   
                          SOWMYA Correa Attending Provider Active       St  
art: 2024  
  
  
  
  
                      Team Member Relationship Specialty  Start Date End Date  
   
                                                      
  
  
Vera Melvin DO  
  
  
NPI: [id number]  
  
  
[address]  
  
  
[place] [address]  
  
  
[phone] (Work)  
  
  
[phone] (Fax) PCP - ACO Reach                 23           
   
                                                      
  
  
Vera Melvin DO  
  
  
NPI: [id number]  
  
  
[address]  
  
  
[place] [address]  
  
  
[phone] (Work)  
  
  
[phone] (Fax) PCP - General   Internal Medicine 24            
   
                                                      
  
  
Danya An DPM  
  
  
NPI: [id number]  
  
  
[address]  
  
  
[place] [address]  
  
  
[phone] (Work)  
  
  
[phone] (Fax) Referring Physician Podiatry        23          
   
                                                      
  
  
Elroy Agudelo MD  
  
  
NPI: [id number]  
  
  
[address]  
  
  
[place] [address]  
  
  
[phone] (Work)  
  
  
[phone] (Fax) Referring Physician Cardiology      23          
   
                                                      
  
  
Steff Espino    Nurse Practitioner Wound Care      23          
  
  
  
                                                    Team Status: Active   
   
                          Member       Role         Status       Keyanna Melvin DO Primary Care Provider Active   
      Start: 2024  
  
   
                          SOWMYA Correa              Active       Start: N  
ovember 2024  
  
   
                          SOWMYA Gutierrez Attending Provider Active       
  Start: 2024  
  
  
  
  
                                                    Team Status: Inactive   
   
                          Member       Role         Status       Dates  
   
                          Vera Melvin DO Primary Care Provider Active   
      Start: 2024  
End: 2024  
   
                          Shannen Julian DO Attending Provider Active        
 Start: 2024  
End: 2024  
  
  
  
                      Team Member Relationship Specialty  Start Date End Date  
   
                                                      
  
  
Vera Melvin DO  
  
  
NPI: [id number]  
  
  
[address]  
  
  
[place] [address]  
  
  
[phone] (Work)  
  
  
[phone] (Fax) PCP - ACO Reach                 23           
   
                                                      
  
  
Vera Melvin DO  
  
  
NPI: [id number]  
  
  
[address]  
  
  
[place] [address]  
  
  
[phone] (Work)  
  
  
[phone] (Fax) PCP - General   Internal Medicine 24            
   
                                                      
  
  
Danya An DPM  
  
  
NPI: [id number]  
  
  
[address]  
  
  
[place] [address]  
  
  
[phone] (Work)  
  
  
[phone] (Fax) Referring Physician Podiatry        23          
   
                                                      
  
  
Elroy Agudelo MD  
  
  
NPI: [id number]  
  
  
[address]  
  
  
[place] [address]  
  
  
[phone] (Work)  
  
  
[phone] (Fax) Referring Physician Cardiology      23          
   
                                                      
  
  
Steff Espino    Nurse Practitioner Wound Care      23          
  
  
  
  
  
                                                    Goals (unrecognized section   
and content)  
   
                                                    Goals may be documented in a  
n alternate section  
  
FOR RECORDS PERTAINING TO PATIENTS WHO ARE OR HAVE BEEN ENROLLED IN A CHEMICAL 
DEPENDENCY/SUBSTANCEABUSE PROGRAM, SOME INFORMATION MAY BE OMITTED. This 
clinical summary was aggregated from multiple sources. Caution should be 
exercised in using it in the provision of clinical care. This summary normalizes
information from multiple sources, and as a consequence, information in this 
document may materially change the coding, format and clinical context of 
patient data. In addition, data may be omitted in some cases. CLINICAL DECISIONS
SHOULD BE BASED ON THE PRIMARY CLINICAL RECORDS. [facility] [employer] [facility] provides 
no warranty or guarantee of the accuracy or completeness of information in this 
document.

## 2024-12-17 NOTE — W.VEIN
Discharge Plan
Discharge
Disposition: Home, Self-Care

Discharge Medications:
No Action
  carvedilol 6.25 mg tablet 
   6.25 mg PO BID 
   Rx Instructions:
   must administer with a meal/food
  furosemide 40 mg tablet 
   40 mg PO BID 
  acetaminophen 500 mg capsule 
   500 mg PO Q6H PRN (Reason: pain) 
  empagliflozin-linagliptin 10-5 mg tablet 
   1 tab PO DAILY 
  rivaroxaban 10 mg tablet 
   10 mg PO DAILY 
  spironolactone [Aldactone] 25 mg tablet 
   25 mg PO DAILY 
  sacubitril-valsartan 24-26 mg tablet 
   1 tab PO BID 
  Lactobacillus acidophilus 10 billion cell capsule 
   100 mmu cells PO DAILY 
  Ozempic 0.25 mg or 0.5 mg (2 mg/3 mL) pen injector 
   0.25 mg subcut QWEEK 
   Rx Instructions:
   for 4 weeks

Plan of Treatment:
f/u evaluation with physician along with right leg limited u/s

Patient Instructions:  Polidocanol (By injection)

Print Language: English

## 2024-12-17 NOTE — V.VEINS.HP
Vital Signs
 12/18/24
15:54
/77 H
BP Location Right Brachial
BP Position Sitting
BP Cuff Size Large Adult
BP Source Manual Cuff
Respiration 20
Pulse 91 H
Pulse Source Monitor
Pulse Oximetry (%) 97
Oxygen Delivery Method Room Air
Comment The patient's blood pressure is elevated.

Varicose Veins

Patient in today for microfoam chemical ablation right leg



Reinaldo TOSCANO MD personally performed the services described in this documentation, as scribed by Khang Bahena RN in my presence and it is both accurate and complete.


IKhang RN, am scribing for, and in the presence of, Dr. Reinaldo Anders and in the presence of the patient.
thigh: bilateral (right leg > left leg), knee: bilateral, calf: bilateral, ankle: bilateral and shin: bilateral
aching, burning, cramping, dull and tender
10
3 years
Worsened in recent months: Yes (last 5 months)
standing, sitting and walking
analgesics (Tylenol), elevating extremities, compression stockings and wraps
Reports muscle spasms of leg, fatigue, heaviness, limb pain, edema and leg edema
History of lower extremity trauma: Yes (a piece of cpap machine fell and hit right lower leg resulting in hemtoma)
Superficial thrombophlebitis: Yes (DVT right leg)
Family history of varicose veins: no
Has patient had previous lower extremity venous surgery: Yes
Patient has previously received the following treatment(s) for lower extremity varicose veins: Reports foam therapy and laser therapy
Does patient have a history of pregnancy: no
Has patient had lower extremity venous scan with relux testing: Yes
Support hose used: Yes
Problems walking or doing physical activity: Yes
How does it affect you: pain decreases ability to walk
Do you walk much: No
Do you stand much: Yes

Review of Systems
ROS  
 Narrative 

Reinaldo TOSCANO MD personally performed the services described in this documentation, as scribed by Khang Bahena RN in my presence and it is both accurate and complete.


Khang TOSCANO RN, am scribing for, and in the presence of, Dr. Reinaldo Anders and in the presence of the patient.   
 Status of ROS 10 or more systems reviewed and unremarkable except as noted in history and below   
 Cardiovascular Reports: edema   
 Integumentary/Breast Reports: itching, redness, skin pain, skin tenderness, skin swelling, non-healing lesion and changes in skin color   
 Neurological Reports: numbness in extremities and weakness in extremities   
 Hematologic/Lymphatic Reports: easy bruising and easy bleeding   

[facility]
Medical History (Updated 11/27/24 @ 13:44 by Khang Bahena)

Phlebitis and thrombophlebitis of superficial vessels of right lower extremity
 ?I80.01 - Phlebitis and thrombophlebitis of superficial vessels of right lower extremity (ICD-10)
Phlebitis and thrombophlebitis of superficial vessels of right lower extremity
 ?I80.01 - Phlebitis and thrombophlebitis of superficial vessels of right lower extremity (ICD-10)
Varicose veins of bilateral lower extremities with pain
 ?I83.813 - Varicose veins of bilateral lower extremities with pain (ICD-10)
Lymphedema of both lower extremities
 ?I89.0 - Lymphedema, not elsewhere classified (ICD-10)
Arthritis
 ?M19.90 - Unspecified osteoarthritis, unspecified site (ICD-10)
Hypertension
 ?I10 - Essential (primary) hypertension (ICD-10)
Deep vein thrombosis
 ?I82.409 - Acute embolism and thrombosis of unspecified deep veins of unspecified lower extremity (ICD-10)
Cardiomyopathy
 ?I42.9 - Cardiomyopathy, unspecified (ICD-10)
Carpal tunnel syndrome
 ?G56.00 - Carpal tunnel syndrome, unspecified upper limb (ICD-10)
Peripheral vascular disease
 ?I73.9 - Peripheral vascular disease, unspecified (ICD-10)
Ulcer of right leg
 ?L97.919 - Non-pressure chronic ulcer of unspecified part of right lower leg with unspecified severity (ICD-10)
Coronary artery disease
 ?I25.10 - Atherosclerotic heart disease of native coronary artery without angina pectoris (ICD-10)
CHF (congestive heart failure)
 ?I50.9 - Heart failure, unspecified (ICD-10)
Obstructive sleep apnea
 ?G47.33 - Obstructive sleep apnea (adult) (pediatric) (ICD-10)
Chronic back pain
 ?M54.9 - Dorsalgia, unspecified (ICD-10)
 ?G89.29 - Other chronic pain (ICD-10)
Postphlebetic syndrome with inflammation
 ?I87.029 - Postthrombotic syndrome with inflammation of unspecified lower extremity (ICD-10)
Type 2 diabetes mellitus
 ?E11.9 - Type 2 diabetes mellitus without complications (ICD-10)


Surgical History (Updated 12/18/24 @ 15:56 by Khang Bahena)

S/P sclerotherapy of varicose veins
 ?Z98.890 - Other specified postprocedural states (ICD-10)
 ?Z86.79 - Personal history of other diseases of the circulatory system (ICD-10)
Status post laser ablation of incompetent vein
 ?Z98.890 - Other specified postprocedural states (ICD-10)
S/P sclerotherapy of varicose veins
 ?Z98.890 - Other specified postprocedural states (ICD-10)
 ?Z86.79 - Personal history of other diseases of the circulatory system (ICD-10)
Status post laser ablation of incompetent vein
 ?Z98.890 - Other specified postprocedural states (ICD-10)
Status post laser ablation of incompetent vein
 ?Z98.890 - Other specified postprocedural states (ICD-10)
History of carpal tunnel surgery of right wrist
 ?Z98.890 - Other specified postprocedural states (ICD-10)
History of cholecystectomy
 ?Z90.49 - Acquired absence of other specified parts of digestive tract (ICD-10)


Family History (Updated 08/19/24 @ 11:42 by Khang Bahena)
Sister
 Family history of diabetes mellitus
Father
 Family history of diabetes mellitus
Other
 Family history of CHF (congestive heart failure)
 Family history of hypertension
 Family history of myocardial infarction



Social History (Updated 08/20/24 @ 10:55 by Khang Bahena)
Within the past year, how often did you have a drink containing alcohol:  never 
Score interpretation:  A score less than 4 is consistent with normal alcohol consumption. 
Smoking status:  Never smoker 
Non-prescribed substance use:  denies use 



Meds
Home Medications and Allergies
Home Medications

?Medication ?Instructions ?Recorded ?Confirmed ?Type
Lactobacillus acidophilus 10 100 mmu cells PO DAILY 08/19/24 08/19/24 History
billion cell capsule    
acetaminophen 500 mg capsule 500 mg PO Q6H PRN pain 08/19/24 08/19/24 History
carvedilol 6.25 mg tablet 6.25 mg PO BID 08/19/24 08/19/24 History
empagliflozin 10 mg-linagliptin 5 1 tab PO DAILY 08/19/24 08/19/24 History
mg tablet    
furosemide 40 mg tablet 40 mg PO BID 08/19/24 08/19/24 History
rivaroxaban 10 mg tablet 10 mg PO DAILY 08/19/24 08/19/24 History
sacubitril 24 mg-valsartan 26 mg 1 tab PO BID 08/19/24 08/19/24 History
tablet    
semaglutide 0.25 mg or 0.5 mg (2 0.25 mg subcut QWEEK 08/19/24 08/19/24 History
mg/3 mL) subcutaneous pen injector    
(Ozempic)    
spironolactone 25 mg tablet 25 mg PO DAILY 08/19/24 08/19/24 History
(Aldactone)    


Allergies

Allergy/AdvReac Type Severity Reaction Status Date / Time
cefixime Allergy  Unknown Verified 08/19/24 11:44
diclofenac Allergy  Unknown Verified 08/19/24 11:44



Exam
Narrative
Exam Narrative: 


Reinaldo TOSCANO MD personally performed the services described in this documentation, as scribed by Khang Bahena RN in my presence and it is both accurate and complete.


Khang TOSCANO RN, am scribing for, and in the presence of, Dr. Reinaldo Anders and in the presence of the patient.
Constitutional
Documenting provider has reviewed patient's vital signs: yes
Common normals: oriented x3
Nutritional appearance: overweight
Cardio
Peripheral pulses: posterior tibial pulses present and dorsalis pedis pulses present
Extremity
Common normals: normal capillary refill
General: calf tenderness and edema
Right lower extremity: lower leg Right lower leg: inspection and palpation
Left lower extremity: lower leg Left lower leg: inspection and palpation
Neuro
Common normals: oriented x3

Assessment and Plan
Assessment and Plan
(1) Varicose veins of bilateral lower extremities with pain: 

Plan
f/u evaluation with physician along with right leg limited u/s


Reinaldo TOSCANO MD personally performed the services described in this documentation, as scribed by Khang Bahena RN in my presence and it is both accurate and complete.


Khang TOSCANO RN, am scribing for, and in the presence of, Dr. Reinaldo Anders and in the presence of the patient.

Procedures
Procedure Instructions
Procedures

leg microfoam chemical ablation/Varithena:



Risks and benefits of the procedure were discussed at length and informed written consent was obtained.? Time-out procedure was performed and the correct patient and procedure were confirmed.? Staff present during time-out: Khang Bahena RN and Reinaldo Anders MD.? Patient prepped and procedure performed in usual sterile fashion.? Patient was placed in Trendelenburg prior to Polidocanol/Varithena injections.



Sclerosing Agent:??13 cc 1% Polidocanol/Varithena



Site Injected: 5cc varithena administered in to a 4mm varicose vein distal medial/posterior lower leg
                    5cc varithena administered in to a 3mm variocose vein anterior proximal lower leg/knee
                    3cc varithena administered in to 3mm varicose vein mid lateral lower leg



Number of Injections:? 3



The patient tolerated the procedure well without complication.? Hemostasis was obtained and thigh-high compression stocking was applied with foam pads.? Instructed patient to wear stocking for at least 96 hours and sleep with it and only remove for 
showering.? The patient was instructed to? wear stocking for 2 weeks.? Patient verbalizes understanding and states they will comply.? Patient was given post-procedure instructions. 



Patient was discharged in good condition.? Scheduled to undergo limited venous ultrasound and? exam on/12/26/2024.


IReinaldo MD personally performed the services described in this documentation, as scribed by Khang Bahena RN in my presence and it is both accurate and complete.


IKhang RN, am scribing for, and in the presence of, Dr. Reinaldo Anders and in the presence of the patient.

## 2024-12-17 NOTE — VEINCLINIC_ITS
Vital Signs    
    
    
                                    12/18/24    
                                      15:54    
     
BP                                  154/77 H    
     
BP Location                      Right Brachial    
     
BP Position                          Sitting    
     
BP Cuff Size                       Large Adult    
     
BP Source                          Manual Cuff    
     
Respiration                            20    
     
Pulse                                 91 H    
     
Pulse Source                         Monitor    
     
Pulse Oximetry (%)                     97    
     
Oxygen Delivery Method              Room Air    
     
Comment             The patient's blood pressure is elevated.    
    
    
Varicose Veins    
    
Patient in today for microfoam chemical ablation right leg    
    
    
    
Reinaldo TOSCANO MD personally performed the services described in this   
documentation, as scribed by Khang Bahena RN in my presence and it is both accurate  
and complete.    
    
    
IKhang RN, am scribing for, and in the presence of, Dr. Reinaldo Anders and   
in the presence of the patient.    
thigh: bilateral (right leg > left leg), knee: bilateral, calf: bilateral,   
ankle: bilateral and shin: bilateral    
aching, burning, cramping, dull and tender    
10    
3 years    
Worsened in recent months: Yes (last 5 months)    
standing, sitting and walking    
analgesics (Tylenol), elevating extremities, compression stockings and wraps    
Reports muscle spasms of leg, fatigue, heaviness, limb pain, edema and leg edema    
History of lower extremity trauma: Yes (a piece of cpap machine fell and hit   
right lower leg resulting in hemtoma)    
Superficial thrombophlebitis: Yes (DVT right leg)    
Family history of varicose veins: no    
Has patient had previous lower extremity venous surgery: Yes    
Patient has previously received the following treatment(s) for lower extremity   
varicose veins: Reports foam therapy and laser therapy    
Does patient have a history of pregnancy: no    
Has patient had lower extremity venous scan with relux testing: Yes    
Support hose used: Yes    
Problems walking or doing physical activity: Yes    
How does it affect you: pain decreases ability to walk    
Do you walk much: No    
Do you stand much: Yes    
    
Review of Systems    
    
    
ROS      
    
 Narrative     
    
Reinaldo TOSCANO MD personally performed the services described in this   
documentation, as scribed by Khang Bahena RN in my presence and it is both accurate  
and complete.    
    
    
Khang TOSCANO RN, am scribing for, and in the presence of, Dr. Reinaldo Anders and   
in the presence of the patient.       
    
 Status of ROS                          10 or more systems reviewed and unremark    
able except as noted in     
history and below       
    
 Cardiovascular Reports: edema       
    
 Integumentary/Breast Reports: itching, redness, skin pain, skin tenderness,   
skin swelling, non-healing lesion and changes in skin color       
    
 Neurological Reports: numbness in extremities and weakness in extremities       
    
 Hematologic/Lymphatic Reports: easy bruising and easy bleeding       
    
    
[facility]    
Formerly [facility], [facility]    
Medical History (Updated 11/27/24 @ 13:44 by Khang Bahena)    
    
Phlebitis and thrombophlebitis of superficial vessels of right lower extremity    
   ?I80.01 - Phlebitis and thrombophlebitis of superficial vessels of right   
   lower extremity (ICD-10)    
Phlebitis and thrombophlebitis of superficial vessels of right lower extremity    
   ?I80.01 - Phlebitis and thrombophlebitis of superficial vessels of right   
   lower extremity (ICD-10)    
Varicose veins of bilateral lower extremities with pain    
   ?I83.813 - Varicose veins of bilateral lower extremities with pain (ICD-10)    
Lymphedema of both lower extremities    
   ?I89.0 - Lymphedema, not elsewhere classified (ICD-10)    
Arthritis    
   ?M19.90 - Unspecified osteoarthritis, unspecified site (ICD-10)    
Hypertension    
   ?I10 - Essential (primary) hypertension (ICD-10)    
Deep vein thrombosis    
   ?I82.409 - Acute embolism and thrombosis of unspecified deep veins of   
   unspecified lower extremity (ICD-10)    
Cardiomyopathy    
   ?I42.9 - Cardiomyopathy, unspecified (ICD-10)    
Carpal tunnel syndrome    
   ?G56.00 - Carpal tunnel syndrome, unspecified upper limb (ICD-10)    
Peripheral vascular disease    
   ?I73.9 - Peripheral vascular disease, unspecified (ICD-10)    
Ulcer of right leg    
   ?L97.919 - Non-pressure chronic ulcer of unspecified part of right lower leg   
   with unspecified severity (ICD-10)    
Coronary artery disease    
   ?I25.10 - Atherosclerotic heart disease of native coronary artery without   
   angina pectoris (ICD-10)    
CHF (congestive heart failure)    
   ?I50.9 - Heart failure, unspecified (ICD-10)    
Obstructive sleep apnea    
   ?G47.33 - Obstructive sleep apnea (adult) (pediatric) (ICD-10)    
Chronic back pain    
   ?M54.9 - Dorsalgia, unspecified (ICD-10)    
   ?G89.29 - Other chronic pain (ICD-10)    
Postphlebetic syndrome with inflammation    
   ?I87.029 - Postthrombotic syndrome with inflammation of unspecified lower   
   extremity (ICD-10)    
Type 2 diabetes mellitus    
   ?E11.9 - Type 2 diabetes mellitus without complications (ICD-10)    
    
    
Surgical History (Updated 12/18/24 @ 15:56 by Khang Bahena)    
    
S/P sclerotherapy of varicose veins    
   ?Z98.890 - Other specified postprocedural states (ICD-10)    
   ?Z86.79 - Personal history of other diseases of the circulatory system (ICD-  
   10)    
Status post laser ablation of incompetent vein    
   ?Z98.890 - Other specified postprocedural states (ICD-10)    
S/P sclerotherapy of varicose veins    
   ?Z98.890 - Other specified postprocedural states (ICD-10)    
   ?Z86.79 - Personal history of other diseases of the circulatory system (ICD-  
   10)    
Status post laser ablation of incompetent vein    
   ?Z98.890 - Other specified postprocedural states (ICD-10)    
Status post laser ablation of incompetent vein    
   ?Z98.890 - Other specified postprocedural states (ICD-10)    
History of carpal tunnel surgery of right wrist    
   ?Z98.890 - Other specified postprocedural states (ICD-10)    
History of cholecystectomy    
   ?Z90.49 - Acquired absence of other specified parts of digestive tract (ICD-  
   10)    
    
    
Family History (Updated 08/19/24 @ 11:42 by Khang Bahena)    
Sister   Family history of diabetes mellitus    
Father   Family history of diabetes mellitus    
Other   Family history of CHF (congestive heart failure)    
   Family history of hypertension    
   Family history of myocardial infarction    
    
    
    
Social History (Updated 08/20/24 @ 10:55 by Khang Bahena)    
Within the past year, how often did you have a drink containing alcohol:  never     
Score interpretation:  A score less than 4 is consistent with normal alcohol co  
nsumption.     
Smoking status:  Never smoker     
Non-prescribed substance use:  denies use     
    
    
    
Meds    
Home Medications and Allergies    
                                Home Medications    
    
    
    
?Medication ?Instructions ?Recorded ?Confirmed ?Type    
     
Lactobacillus acidophilus 10 100 mmu cells PO DAILY 08/19/24 08/19/24 History    
    
billion cell capsule        
     
acetaminophen 500 mg capsule 500 mg PO Q6H PRN pain 08/19/24 08/19/24 History    
     
carvedilol 6.25 mg tablet 6.25 mg PO BID 08/19/24 08/19/24 History    
     
empagliflozin 10 mg-linagliptin 5 1 tab PO DAILY 08/19/24 08/19/24 History    
    
mg tablet        
     
furosemide 40 mg tablet 40 mg PO BID 08/19/24 08/19/24 History    
     
rivaroxaban 10 mg tablet 10 mg PO DAILY 08/19/24 08/19/24 History    
     
sacubitril 24 mg-valsartan 26 mg 1 tab PO BID 08/19/24 08/19/24 History    
    
tablet        
     
semaglutide 0.25 mg or 0.5 mg (2 0.25 mg subcut QWEEK 08/19/24 08/19/24 History    
    
mg/3 mL) subcutaneous pen injector        
    
(Ozempic)        
     
spironolactone 25 mg tablet 25 mg PO DAILY 08/19/24 08/19/24 History    
    
(Aldactone)        
    
    
    
                                    Allergies    
    
    
    
Allergy/AdvReac Type Severity Reaction Status Date / Time    
     
cefixime Allergy  Unknown Verified 08/19/24 11:44    
     
diclofenac Allergy  Unknown Verified 08/19/24 11:44    
    
    
    
    
Exam    
Narrative    
Exam Narrative:     
    
    
Reinaldo TOSCANO MD personally performed the services described in this   
documentation, as scribed by Khang Bahena RN in my presence and it is both accurate  
and complete.    
    
    
IKhang RN, am scribing for, and in the presence of, Dr. Reinaldo Anders and   
in the presence of the patient.    
Constitutional    
Documenting provider has reviewed patient's vital signs: yes    
Common normals: oriented x3    
Nutritional appearance: overweight    
Cardio    
Peripheral pulses: posterior tibial pulses present and dorsalis pedis pulses   
present    
Extremity    
Common normals: normal capillary refill    
General: calf tenderness and edema    
Right lower extremity: lower leg Right lower leg: inspection and palpation    
Left lower extremity: lower leg Left lower leg: inspection and palpation    
Neuro    
Common normals: oriented x3    
    
Assessment and Plan    
Assessment and Plan    
(1) Varicose veins of bilateral lower extremities with pain:     
    
Plan    
f/u evaluation with physician along with right leg limited u/s    
    
    
Reinaldo TOSCANO MD personally performed the services described in this   
documentation, as scribed by Khang Bahena RN in my presence and it is both accurate  
and complete.    
    
    
Khang TOSCANO RN, am scribing for, and in the presence of, Dr. Reinaldo Anders and   
in the presence of the patient.    
    
Procedures    
Procedure Instructions    
Procedures    
    
leg microfoam chemical ablation/Varithena:    
    
    
    
Risks and benefits of the procedure were discussed at length and informed   
written consent was obtained.? Time-out procedure was performed and the correct   
patient and procedure were confirmed.? Staff present during time-out: Khang Bahena RN and Reinaldo Anders MD.? Patient prepped and procedure performed in usual   
sterile fashion.? Patient was placed in Trendelenburg prior to Polidocanol/  
Varithena injections.    
    
    
    
Sclerosing Agent:??13 cc 1% Polidocanol/Varithena    
    
    
    
Site Injected: 5cc varithena administered in to a 4mm varicose vein distal med  
ial/posterior lower leg    
                    5cc varithena administered in to a 3mm variocose vein   
anterior proximal lower leg/knee    
                    3cc varithena administered in to 3mm varicose vein mid   
lateral lower leg    
    
    
    
Number of Injections:? 3    
    
    
    
The patient tolerated the procedure well without complication.? Hemostasis was   
obtained and thigh-high compression stocking was applied with foam pads.?   
Instructed patient to wear stocking for at least 96 hours and sleep with it and   
only remove for showering.? The patient was instructed to? wear stocking for 2   
weeks.? Patient verbalizes understanding and states they will comply.? Patient   
was given post-procedure instructions.     
    
    
    
Patient was discharged in good condition.? Scheduled to undergo limited venous   
ultrasound and? exam on/12/26/2024.    
    
    
IReinaldo MD personally performed the services described in this   
documentation, as scribed by Khang Bahena RN in my presence and it is both accurate  
and complete.    
    
    
IKhang RN, am scribing for, and in the presence of, Dr. Reinaldo Anders and   
in the presence of the patient.

## 2024-12-18 ENCOUNTER — HOSPITAL ENCOUNTER
Dept: HOSPITAL 101 - VC | Age: 69
Discharge: HOME | End: 2024-12-18
Payer: MEDICARE

## 2024-12-18 VITALS — SYSTOLIC BLOOD PRESSURE: 154 MMHG | DIASTOLIC BLOOD PRESSURE: 77 MMHG | OXYGEN SATURATION: 97 % | HEART RATE: 91 BPM

## 2024-12-18 DIAGNOSIS — I83.813: Primary | ICD-10-CM

## 2024-12-18 PROCEDURE — 36466 NJX NONCMPND SCLRSNT MLT VN: CPT

## 2024-12-18 NOTE — W.VEIN
Discharge Plan
Discharge
Disposition: Home, Self-Care

Outpatient Diagnostics:
[facility] Facility EST LMTD  (Routine)  Timeframe:  2 Weeks
   Facility: [facility] - Location: [facility]
   Ordered By:  Alonso Nation
[facility] EXT Venous LT Limited  (Routine)  Timeframe:  2 Weeks
   Facility: [facility] - Location: [facility] Center
   Ordered By:  Alonso Nation

Follow Up Appointments: 12/26/2024 


Plan of Treatment:
f/u evaluation with physician along with right leg limited u/s

Patient Instructions:  Polidocanol (By injection)

Print Language: English

Discharge Date/Time: 12/18/24 16:03

## 2024-12-18 NOTE — P.DS_ITS
Discharge Plan    
Discharge    
Disposition: Home, Self-Care    
    
Outpatient Diagnostics:    
VC Facility EST LMTD  (Routine)  Timeframe:  2 Weeks    
   Facility: [facility] - Location: [facility]    
   Ordered By:  Alonso Nation    
VC EXT Venous LT Limited  (Routine)  Timeframe:  2 Weeks    
   Facility: [facility] - Location: [facility]    
   Ordered By:  Alonso Nation    
    
Follow Up Appointments: 12/26/2024     
    
    
Plan of Treatment:    
f/u evaluation with physician along with right leg limited u/s    
    
Patient Instructions:  Polidocanol (By injection)    
    
Print Language: English    
    
Discharge Date/Time: 12/18/24 16:03

## 2024-12-18 NOTE — VEIN_ITS
[address] [address] 
     [phone] 
  
  
Patient Name: 
JOCELIN ARORA 
  
MRN: [id number]    YOB: 1955    Sex: M 
Assigned Patient Location:  
Current Patient Location:  
Accession/Order Number: [id number] 
Exam Date: 12/18/2024  14:33    Report Date: 12/18/2024  15:55 
  
At the request of: 
NICK FERNANDEZ   
  
Procedure:  VC INJ Foam Sclerosant WUS MLT 
  
PROCEDURE: VC INJ Foam Sclerosant WUS MLT 
  
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 
  
Pre-operative Diagnosis: CEAP class C6 venous insufficiency with pain,  
tenderness, edema and incompetent branch saphenous vein(s), chronic venous  
insufficiency right leg secondary to venous incompetence 
Post-operative Diagnosis: CEAP class C6 venous insufficiency with pain,  
tenderness, edema and incompetent branch saphenous vein(s), chronic venous  
insufficiency right leg secondary to venous incompetence 
Procedure Performed: 1. Ultrasound-guided microfoam chemical ablation with  
Varithenaregistered  
 2. Intraoperative ultrasound guidance 
  
Physician: Reinaldo Anders M.D. 
  
Anesthesia: None 
  
Indications for Procedure: 69 year old male. Symptoms including lower  
extremity  
pain, swelling, dilated bulging veins for many years despite conservative  
medical therapy including medical compression stockings, exercise and  
analgesics. Prior procedures include endovenous laser ablation and microfoam  
chemical ablation. 
  
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 Duplex scan showed reflux  
and enlarged diameters up to 4 mm. 
  
The patient underwent informed consent including management options where the  
complications of infection, bleeding, pain, and skin injury were discussed.  
Particular attention was spent discussing thrombus extension and deep vein  
thrombosis as well as the possibility of pulmonary embolus and treatment with  
oral or injectable blood thinners. 
  
Procedure: The patient walked to the procedure room. All applicable staff  
donned appropriate apparel. A procedure timeout was performed to confirm  
correct patient, correct extremity, correct procedure, and correct room set-up  
  
including presence of all applicable supplies, devices, and drugs. 
  
A duplex ultrasound, performed by myself confirmed the location and  
incompetence of branch saphenous varicosities and their course was marked on  
the skin together with the dilated tributaries. The extent of treatment of the  
  
vein and the associated varicosities was determined through ultrasound  
mapping. 
  
The skin was prepped and then punctured with a butterfly needle and advanced  
under ultrasound guidance.  
  
The Varithenaregistered canister was activated and the canister was primed and  
  
purged as required in the instructions for use. Varithenaregistered was drawn  
into a sterile syringe.  
  
Varithenaregistered was slowly administered at 0.5-1.0 cc/second with close  
observation by ultrasound of its course in the vessels. Total volume utilized  
was: 13 mL (5 mL into a 4 mm varicosity distal medial posterior lower leg; 5  
mL  
into a 3 mm varicosity anterior proximal lower leg adjacent the knee; 3 mL  
into  
a 3 mm varicosity mid lateral lower leg). 
  
Following administration of Varithenaregistered the leg was elevated and the  
patient was asked to repeatedly dorsiflex the ankle to limit flow of  
Varithenaregistered into perforating veins. Once appropriate spasm had been  
confirmed in the treated veins, the vascular catheter was removed from the leg  
  
and light pressure was applied over the puncture site for hemostasis. 
  
The common femoral and deep superficial veins were then evaluated for flow and  
  
compressibility prior to dressing placement. The lower extremity was kept  
elevated at 45 degrees above the horizontal and cording material was applied  
over the saphenous segments and tributaries to allow for eccentric compression  
  
over the target vessels including the targeted saphenous vein(s). A multilayer  
  
dressing was applied consisting of foam pads, coban and thigh-high 20-30 mm Hg  
  
compression elastic support hose were placed on the patient. The leg was  
lowered only after compression had been applied and the patient was  
immediately  
ambulatory. The patient ambulated 10 minutes under supervision and was without  
  
apparent concerns at time of release. 
  
Post-care instructions include advising patient to keep post-treatment  
bandages  
in place and dry for 48 hours, avoid extended periods of inactivity, avoid  
heavy exercise for one week, wear compression stockings on the treated leg  
continuously for two weeks, to walk daily for 10 minutes over the next month.  
The patient was instructed to take an anti-inflammatory medicine as needed and  
  
to follow up for color duplex scan of the Saphenous veins, the treated branch  
saphenous varicosities, the adjacent deep veins, and additional treatment  
within 7 days.  
  
PERSONNEL: Khang Bahena RN 
  
  
Electronically authenticated by: REINALDO ANDERS   Date: 12/18/2024  15:55

## 2024-12-27 ENCOUNTER — HOSPITAL ENCOUNTER
Dept: HOSPITAL 101 - VC | Age: 69
Discharge: HOME | End: 2024-12-27
Payer: MEDICARE

## 2024-12-27 DIAGNOSIS — I80.01: Primary | ICD-10-CM

## 2024-12-27 PROCEDURE — G0463 HOSPITAL OUTPT CLINIC VISIT: HCPCS

## 2024-12-27 PROCEDURE — 93971 EXTREMITY STUDY: CPT

## 2024-12-27 NOTE — W.VEIN
Discharge Plan
Discharge
Disposition: Home, Self-Care

Outpatient Diagnostics:
VC INJ Foam Sclerosant WUS MLT  (Routine)  Timeframe:  2 Weeks
   Facility: [facility] - Location: [facility]
   Ordered By:  Reinaldo Anders

Follow Up Appointments: 1/7/25 


Print Language: English

Discharge Date/Time: 12/27/24 12:05

## 2024-12-27 NOTE — VEIN_ITS
Patient Name:      
JOCELIN ARORA 
      
MR#: [id number]      
: 1955 
Accession #: [id number] 
Exam Date: 2024  
Ordering Doctor: DR NICK FERNANDEZ M.D. 
  
RADIOLOGY REPORT 
  
PROCEDURE:     VC EXT VENOUS RT LMTD 
  
COMPARISON:     VC EXT VENOUS RT LMTD, 2024. 
  
INDICATIONS:     I80.01 - Phlebitis and thrombophlebitis of superficial ve... 
  
TECHNIQUE:     Lower extremity grey scale and Duplex Doppler evaluation of the  
deep venous system from the inguinal ligament through the calf veins.   
  
FINDINGS:       
REGION:     Right lower extremity.   
THROMBI:     Negative for DVT. 
     Varithena induced thrombus visualized at prox/med calf, mid/med thigh,  
and mid/ant calf.  
COMPRESSIBILITY:     Non-compressible segments corresponding to thrombus 
FLOW:     Areas of no flow corresponding to thrombus 
OTHER:     No patent varicose veins remain.  
  
  
CONCLUSION:      
1. Successful post ablation occlusion of right leg treated branch saphenous  
varicosities. 
  
  
Dictated by: Reinaldo Anders M.D. on 2024 at 13:58      
Approved by: Reinaldo Anders M.D. on 2024 at 13:58

## 2024-12-27 NOTE — V.VEINS.HP
Varicose Veins

Patient in today for follow up ultrasound post microfoam chemical ablation right leg



Reinaldo TOSCANO MD personally performed the services described in this documentation, as scribed by Gail Jimenez RVT, RDMS in my presence and it is both accurate and complete.



Gail TOSCANO RVT, RDMS, am scribing for, and in the presence of, Dr. Reinaldo Anders and in the presence of the patient.
thigh: bilateral (right leg > left leg), knee: bilateral, calf: bilateral, ankle: bilateral and shin: bilateral
aching, burning, cramping, dull and tender
10
3 years
Worsened in recent months: Yes (last 5 months)
standing, sitting and walking
analgesics (Tylenol), elevating extremities, compression stockings and wraps
Reports muscle spasms of leg, fatigue, heaviness, limb pain, edema and leg edema
History of lower extremity trauma: Yes (a piece of cpap machine fell and hit right lower leg resulting in hemtoma)
Superficial thrombophlebitis: Yes (DVT right leg)
Family history of varicose veins: no
Has patient had previous lower extremity venous surgery: Yes
Patient has previously received the following treatment(s) for lower extremity varicose veins: Reports foam therapy and laser therapy
Does patient have a history of pregnancy: no
Has patient had lower extremity venous scan with relux testing: Yes
Support hose used: Yes
Problems walking or doing physical activity: Yes
How does it affect you: pain decreases ability to walk
Do you walk much: No
Do you stand much: Yes

Review of Systems
ROS  
 Narrative 

Reinaldo TOSCANO MD personally performed the services described in this documentation, as scribed by Gail Jimenez RVT, RDMS in my presence and it is both accurate and complete.



Gail TOSCANO RVT, RDMS, am scribing for, and in the presence of, Dr. Reinaldo Anders and in the presence of the patient.   
 Status of ROS 10 or more systems reviewed and unremarkable except as noted in history and below   
 Cardiovascular Reports: edema   
 Integumentary/Breast Reports: itching, redness, skin pain, skin tenderness, skin swelling, non-healing lesion and changes in skin color   
 Neurological Reports: numbness in extremities and weakness in extremities   
 Hematologic/Lymphatic Reports: easy bruising and easy bleeding   

[facility]
Medical History (Updated 11/27/24 @ 13:44 by Khang Bahena)

Phlebitis and thrombophlebitis of superficial vessels of right lower extremity
 ?I80.01 - Phlebitis and thrombophlebitis of superficial vessels of right lower extremity (ICD-10)
Phlebitis and thrombophlebitis of superficial vessels of right lower extremity
 ?I80.01 - Phlebitis and thrombophlebitis of superficial vessels of right lower extremity (ICD-10)
Varicose veins of bilateral lower extremities with pain
 ?I83.813 - Varicose veins of bilateral lower extremities with pain (ICD-10)
Lymphedema of both lower extremities
 ?I89.0 - Lymphedema, not elsewhere classified (ICD-10)
Arthritis
 ?M19.90 - Unspecified osteoarthritis, unspecified site (ICD-10)
Hypertension
 ?I10 - Essential (primary) hypertension (ICD-10)
Deep vein thrombosis
 ?I82.409 - Acute embolism and thrombosis of unspecified deep veins of unspecified lower extremity (ICD-10)
Cardiomyopathy
 ?I42.9 - Cardiomyopathy, unspecified (ICD-10)
Carpal tunnel syndrome
 ?G56.00 - Carpal tunnel syndrome, unspecified upper limb (ICD-10)
Peripheral vascular disease
 ?I73.9 - Peripheral vascular disease, unspecified (ICD-10)
Ulcer of right leg
 ?L97.919 - Non-pressure chronic ulcer of unspecified part of right lower leg with unspecified severity (ICD-10)
Coronary artery disease
 ?I25.10 - Atherosclerotic heart disease of native coronary artery without angina pectoris (ICD-10)
CHF (congestive heart failure)
 ?I50.9 - Heart failure, unspecified (ICD-10)
Obstructive sleep apnea
 ?G47.33 - Obstructive sleep apnea (adult) (pediatric) (ICD-10)
Chronic back pain
 ?M54.9 - Dorsalgia, unspecified (ICD-10)
 ?G89.29 - Other chronic pain (ICD-10)
Postphlebetic syndrome with inflammation
 ?I87.029 - Postthrombotic syndrome with inflammation of unspecified lower extremity (ICD-10)
Type 2 diabetes mellitus
 ?E11.9 - Type 2 diabetes mellitus without complications (ICD-10)


Surgical History (Updated 12/18/24 @ 15:56 by Khang Bahena)

S/P sclerotherapy of varicose veins
 ?Z98.890 - Other specified postprocedural states (ICD-10)
 ?Z86.79 - Personal history of other diseases of the circulatory system (ICD-10)
Status post laser ablation of incompetent vein
 ?Z98.890 - Other specified postprocedural states (ICD-10)
S/P sclerotherapy of varicose veins
 ?Z98.890 - Other specified postprocedural states (ICD-10)
 ?Z86.79 - Personal history of other diseases of the circulatory system (ICD-10)
Status post laser ablation of incompetent vein
 ?Z98.890 - Other specified postprocedural states (ICD-10)
Status post laser ablation of incompetent vein
 ?Z98.890 - Other specified postprocedural states (ICD-10)
History of carpal tunnel surgery of right wrist
 ?Z98.890 - Other specified postprocedural states (ICD-10)
History of cholecystectomy
 ?Z90.49 - Acquired absence of other specified parts of digestive tract (ICD-10)


Family History (Updated 08/19/24 @ 11:42 by Khang Bahena)
Sister
 Family history of diabetes mellitus
Father
 Family history of diabetes mellitus
Other
 Family history of CHF (congestive heart failure)
 Family history of hypertension
 Family history of myocardial infarction



Social History (Updated 08/20/24 @ 10:55 by Khang Bahena)
Within the past year, how often did you have a drink containing alcohol:  never 
Score interpretation:  A score less than 4 is consistent with normal alcohol consumption. 
Smoking status:  Never smoker 
Non-prescribed substance use:  denies use 



Meds
Home Medications and Allergies
Home Medications

?Medication ?Instructions ?Recorded ?Confirmed ?Type
Lactobacillus acidophilus 10 100 mmu cells PO DAILY 08/19/24 08/19/24 History
billion cell capsule    
acetaminophen 500 mg capsule 500 mg PO Q6H PRN pain 08/19/24 08/19/24 History
carvedilol 6.25 mg tablet 6.25 mg PO BID 08/19/24 08/19/24 History
empagliflozin 10 mg-linagliptin 5 1 tab PO DAILY 08/19/24 08/19/24 History
mg tablet    
furosemide 40 mg tablet 40 mg PO BID 08/19/24 08/19/24 History
rivaroxaban 10 mg tablet 10 mg PO DAILY 08/19/24 08/19/24 History
sacubitril 24 mg-valsartan 26 mg 1 tab PO BID 08/19/24 08/19/24 History
tablet    
semaglutide 0.25 mg or 0.5 mg (2 0.25 mg subcut QWEEK 08/19/24 08/19/24 History
mg/3 mL) subcutaneous pen injector    
(Ozempic)    
spironolactone 25 mg tablet 25 mg PO DAILY 08/19/24 08/19/24 History
(Aldactone)    


Allergies

Allergy/AdvReac Type Severity Reaction Status Date / Time
cefixime Allergy  Unknown Verified 08/19/24 11:44
diclofenac Allergy  Unknown Verified 08/19/24 11:44



Exam
Narrative
Exam Narrative: 


Reinaldo TOSCANO MD personally performed the services described in this documentation, as scribed by Gail Jimenez RVT, RDMS in my presence and it is both accurate and complete.



Gail TOSCANO RVT, RDMS, am scribing for, and in the presence of, Dr. Reinaldo Anders and in the presence of the patient.
Constitutional
Documenting provider has reviewed patient's vital signs: yes
Common normals: oriented x3
Nutritional appearance: overweight
Cardio
Peripheral pulses: posterior tibial pulses present and dorsalis pedis pulses present
Extremity
Common normals: normal capillary refill
General: calf tenderness and edema
Right lower extremity: lower leg Right lower leg: inspection and palpation
Left lower extremity: lower leg Left lower leg: inspection and palpation
Neuro
Common normals: oriented x3

Results
Imaging
Venous US: 
      Radiologist's impression: 
The ultrasound demonstrates Varithena induced thrombus visualized at prox/med calf, mid/med thigh, and mid/ant calf. 

Assessment and Plan
Assessment and Plan
(1) Varicose veins of bilateral lower extremities with pain: 

Plan

Patient in today for follow up ultrasound of lower extremity following treatment of Varithena/microfoam completed on 12/19/24.


Reinaldo TOSCANO MD personally performed the services described in this documentation, as scribed by Gail Jimenez RVT, RDMS in my presence and it is both accurate and complete.



Gail TOSCANO RVT, RDMS, am scribing for, and in the presence of, Dr. Reinaldo Anders and in the presence of the patient.

## 2024-12-27 NOTE — P.DS_ITS
Discharge Plan    
Discharge    
Disposition: Home, Self-Care    
    
Outpatient Diagnostics:    
VC INJ Foam Sclerosant WUS MLT  (Routine)  Timeframe:  2 Weeks    
   Facility: [facility] - Location: [facility]    
   Ordered By:  Reinaldo Anders    
    
Follow Up Appointments: 1/7/25     
    
    
Print Language: English    
    
Discharge Date/Time: 12/27/24 12:05

## 2024-12-27 NOTE — XMS_ITS
Comprehensive CCD (C-CDA v2.1)  
  
                          Created on: 2024  
  
  
Jocelin Pacheco  
External Reference #: CDR,PersonID:[id number]  
: 1955  
Sex: Male  
  
Demographics  
  
  
                                        Address             [address]  [address]  
   
                                        Home Phone          [phone]  
   
                                        Mobile Phone        [phone]  
   
                                        Preferred Language  en  
   
                                        Marital Status        
   
                                        [employer] Affiliation Unknown  
   
                                        Race                White  
   
                                        Ethnic Group        Not  or Lati  
no  
  
  
Author  
  
  
                                        Organization        [facility]  
  
  
Care Team Providers  
  
  
                                Care Team Member Name Role            Phone  
   
                                Annel Baird      Unavailable     [phone]  
   
                                Alonso Birmingham   Unavailable     [phone]  
   
                                Jacoby Braden Unavailable     [phone]  
   
                                Vera Lea Unavailable     [phone]  
   
                                Unavailable     Unavailable     [phone]  
   
                                DO Vera Lea Primary Care Provider [phone]  
   
                                ESVIN Steward Attending Provider [phone]  
   
                                SOWMYA Villarreal Attending Provider [phone]  
   
                                DO Vera Lea Primary Care Provider [phone]  
   
                                ESVIN Steward Attending Provider [phone]  
   
                                DO JASON Whitley Attending Provider [phone]  
   
                                COMPA WHITLEY Attending       Unavailable  
   
                                COMPA WHITLEY Referring       Unavailable  
   
                                Dr. Vera Lea Primary Care    U  
DO Vera Brown Primary Care Provider [phone]  
   
                                ESVIN Steward Attending Provider [phone]  
   
                                MD Jacoby Braden Attending Provider [phone]  
   
                                DO Vera Lea Primary Care Provider [phone]  
   
                                SOWMYA Espino Attending Provider [phone]  
   
                                Dr. Compa Whitley Referring       Rachna Lea, Dr. Vera Vincent Primary Care    U  
daniel Whitley, Dr. Compa Sr Attending       Rachna Whitley, Dr. Compa Sr Referring       Unava  
ilmariaelena Lea, Dr. Vera Melisa Primary Care    U  
Dr. Compa Thomson Attending       Unava  
ilable  
   
                                Nichelle, ESVIN Corley Attending Provider [phone]  
   
                                Matias-Ada, DO Vera Attending Provider [phone]  
   
                                Matias-Ada DO, Vera D Unavailable     [phone]  
   
                                Danya An DPM Unavailable     [phone]  
   
                                Duke CORTES, Elroy CELESTE Unavailable     [phone]  
   
                                Matias-Ada DO, Vera D Primary Care Provider   
[phone]  
   
                                Matias-Ada, DO Vera Primary Care Provider [phone]  
   
                                SOWMYA Espino Attending Provider [phone]  
   
                                Matias-Ada DO, Vera Melisa Primary Care Provi  
sherly [phone]  
   
                                SHARLENE AN  Attending       Unavailable  
   
                                MATIAS-EMERY, VERA MELISA Primary Care    Unava  
ilable  
   
                                Matias-Ada, DO Vera Primary Care Provider [phone]  
   
                                SOWMYA Espino Attending Provider [phone]  
   
                                Matias-Ada, DO Vera Primary Care Provider [phone]  
   
                                SOWMYA Espino Attending Provider [phone]  
   
                                Matias-Ada DO, Vera Primary Care Provider [phone]  
   
                                Elaine Falcon Attending Provider [phone]  
   
                                Laila Webb Attending Provider [phone]  
   
                                Shannen Ellis DO Attending Provider [phone]   
   
                                MATIAS-EMERY, VERA D Attending       Unavailab  
le  
   
                                MATIAS-EMERY, VERA D Referring       Unavailab  
DANYA Aparicio Attending       Unavailable  
   
                                MATIAS-ARPAN VERA D Referring       Unavailab  
le  
   
                                JESSICA LOPEZ   Attending       Unavailable  
   
                                RAY VELA Referring       Unavailable  
   
                                DEPARLENE MCMAHAN Attending       Unavailable  
   
                                MIRIANRAY ROSA Referring       Unavailable  
   
                                LOPEZJESSICA   Attending       Unavailable  
   
                                MIRIANJOSE ANTONIORAY L Referring       Unavailable  
   
                                DEPOYARLENE Attending       Unavailable  
   
                                MIRIANJOSE ANTONIO ROSAELE L Referring       Unavailable  
   
                                LOPEZJESSICA L   Attending       Unavailable  
   
                                MIRIAN, RAY L Referring       Unavailable  
   
                                DANYA AN Attending       Unavailable  
   
                                VERA LEA Attending       Unavailab  
marie ALEXISAVER-VERA ANTONY Referring       Unavailab  
DANYA Aparicio Attending       Unavailable  
   
                                VERA LEA Attending       Unavailab  
marie  
   
                                MATIAS-VERA ANTONY Referring       Unavailab  
DANYA Aparicio Attending       Unavailable  
   
                                ELIEL, SHANNEN H Attending       Unavailable  
   
                                VERA LEA Referring       Unavailab  
le  
   
                                ELIEL, SHANNEN H Attending       Unavailable  
   
                                Copsey, Elaine   Admitting       Unavailable  
   
                                Copsey, Elaine   Attending       Unavailable  
   
                                Matias-Ada, Vera Primary Care    Unavailable  
   
                                Copsey, Elaine   Attending       Unavailable  
   
                                Matias-Ada, Vera Primary Care    Unavailable  
   
                                Copsey, Elaine   Admitting       Unavailable  
   
                                Matais-Ada, Vera Primary Care    Unavailable  
   
                                Sonia Danielson Admitting       Unavailable  
   
                                Sonia Danielson Attending       Unavailable  
   
                                Itzkozully, Shannen Admitting       Unavailable  
   
                                Lexy Ellisic Attending       Unavailable  
   
                                Matias-Ada, Vera Primary Care    Unavailable  
  
  
  
Allergies  
  
  
                                                    Allergy   
Classification                          Reported   
Allergen(s)               Allergy Type              Date of   
Onset                     Reaction(s)               Facility  
   
                                                      
(20 sources)        Cefixime            Drug Allergy        20  
22                                      Unknown,   
Unknown   
Reaction                                [facility]  
   
                                                      
(20 sources)                            Diclofenac;   
Translations:   
[Voltaren]                Drug Allergy              20  
22                        [facility]  
   
                                                      
(20 sources)        Nystatin            Drug Allergy        20  
22                                      Unknown,   
Unknown   
Reaction                                [facility]  
   
                                                      
(8 sources)                             Cephalosporins   
(Antibiotic);   
Translations:   
[Cephalosporins]                        Allergy to   
drug (finding)                          20  
24                        [facility]   
3 Repository  
   
                                                      
(7 sources)                             Hmg-Coa Reductase   
Inhibitors   
(Statins);   
Translations:   
[Statins]                               Allergy to   
drug (finding)                          [facility] [place]   
[facility]  
[address] DO  
Work Phone:   
[phone]  
0  
   
                                                      
(20 sources)                            Benzoate;   
Translations:   
[sodium benzoate]         Drug Allergy              20  
22                                      Unknown   
Reaction                                [facility]  
   
                                                      
(14 sources)              Cefixime                  Allergy to   
substance                               20  
23                        Unknown                   [facility]   
[facility]  
Work Phone:   
[phone]  
1  
   
                                                      
(14 sources)        Nystatin            Drug Allergy        20  
23                        Unknown                   [facility]   
[facility]  
   
                                                      
(1 source)                              Cephalosporins   
(Antibiotic)                            Drug   
Intolerance                             20                        [facility]  
   
                                                      
(9 sources)                             Diclofenac;   
Translations:   
[DICLOFENAC   
SODIUM]                   Drug Allergy              20                        [facility]  
Work Phone:   
[phone]  
7  
   
                                                      
(2 sources)                             HMG-CoA reductase   
inhibitor;   
Translations:   
[STATINS-HMG-COA   
REDUCTASE   
INHIBITORS]                             Drug   
Intolerance                             20                        [facility]  
Work Phone:   
[phone]  
7  
   
                                                      
(7 sources)                             Cephalosporins   
(Antibiotic)                            Drug   
Intolerance                             20                        [facility]  
   
                                                      
(1 source)          Cefixime            Drug Allergy        20                                                  [facility]   
Repository  
   
                                                      
(1 source)          Diclofenac          Drug Allergy        20                                                  [facility]   
Repository  
   
                                                      
(1 source)          Nystatin            Drug Allergy        20                                                  [facility]   
Repository  
  
  
  
Medications  
Current Medications  
  
  
  
                      Medication Drug Class(es) Dates      Sig (Normalized) Sig   
(Original)  
   
                                                    3 ML semaglutide   
2.68 MG/ML Pen   
Injector [Ozempic]  
(7 sources)                                         Start:   
2022                              inject 2 mg by   
subcutaneous   
injection every   
week                                    Ozempic (2 MG/DOSE)   
8 MG/3ML 2 mg   
Subcutaneous weekly   
for 30 days 21 Dec,   
2022 Active  
  
  
  
                                                            inject 2 mg by subcu  
taneous injection   
every week                              Ozempic (2 MG/DOSE) 8 MG/3ML 2 mg SQ Onc  
e   
a week. for 28 days E11.8 Active  
   
                                                            inject 2 mg by subcu  
taneous injection   
every week                              Ozempic (2 MG/DOSE) 8 MG/3ML DIAL AND   
INJECT UNDER THE SKIN 2 MG WEEKLY for 28   
Active  
   
                                                            inject 2 mg by subcu  
taneous injection   
every week                              Ozempic (2 MG/DOSE) 8 MG/3ML 2 mg   
Subcutaneous weekly for 30 days Active  
  
  
  
                                                    acetaminophen 500   
mg oral tablet  
(13 sources)                                        Start:   
2020                              take 1   
tablet by   
mouth three   
times daily   
as needed   
for pain                                Acetaminophen   
(Tylenol Extra   
Strength) 500 mg   
Tablet Active 500   
MG PO Three times   
daily as needed for   
Pain 2020 12:00am  
   
                                                    carvedilol 6.25 mg   
oral tablet  
(20 sources)                            alpha-Adrenergic   
Blocker,   
beta-Adrenergic   
Blocker                                 Start:   
2017                              take 1   
tablet by   
mouth twice   
daily                                   carvedilol (Coreg)   
6.25 MG tablet   
Indications:   
Ischemic   
cardiomyopathy   
(CMS/HCC) TAKE 1   
TABLET BY MOUTH   
TWICE A DAY FOR 90   
DAYS 180 tablet 3   
2023 Active  
   
                                                    cephalexin 500 mg   
oral capsule  
(20 sources)                            Cephalosporin   
Antibacterial                           Start:   
2024  
End: 08-                         take 1   
capsule by   
mouth in the   
morning                                 cephalexin (Keflex)   
500 MG capsule Take   
500 mg by mouth in   
the morning and 500   
mg before bedtime.   
2024 Active  
  
  
  
                                                    Start: 2019  
End: 2019                         take 1 capsule by mouth every   
five hours                              Cephalexin 500 mg capsule   
Discontinued 500 MG PO Q5H 2019 11:00pm May 6th, 2019   
12:54pm  
  
  
  
                                                    empagliflozin 10 mg   
oral tablet  
(20 sources)                            Sodium-Glucose   
Cotransporter 2   
Inhibitor                               Start:   
2022                              take 1   
tablet by   
mouth once   
daily                                   Empagliflozin   
(Jardiance) 10 mg   
tablet Active 10   
MG PO Daily 2022 11:00pm  
   
                                                    fluconazole 100 mg   
oral tablet  
(2 sources)               Azole Antifungal          Start:   
2024  
End: 2024                         take 1   
tablet by   
mouth once   
daily                                   fluconazole   
(Diflucan) 100 MG   
tablet   
Indications:   
Cutaneous   
candidiasis Take 1   
tablet (100 mg) by   
mouth Daily for 3   
days 3 tablet   
2024 Active  
   
                                                    furosemide 40 mg   
oral tablet  
(20 sources)              Loop Diuretic             Start:   
2017                              take 1   
tablet by   
mouth twice   
daily                                   Furosemide (Lasix)   
40 mg Tablet   
Active 40 MG PO   
Twice daily   
2017 12:00am  
  
  
  
                                                            take 1 tablet by lenin  
th every twelve   
hours                                   furosemide (Lasix) 40 MG tablet Take 40   
mg   
by mouth every 12 (twelve) hours. Active  
   
                                                                Lasix TABS TAKE   
1 TABLET TWICE DAILY.   
Quantity: 0 Refills: 0 Ordered: 2022   
DO Active  
  
  
  
                                                    lactobacillus   
acidophilus   
[id number] unt oral   
capsule  
(7 sources)                                         Start:   
2024                              take 10   
capsules by   
mouth once   
daily                                   Lactobacillus   
Acidophilus   
(Probiotic) 10   
billion cell capsule   
Active 100 MMU CELLS   
PO Daily 2024 12:00am  
   
                                                    methylPREDNISolone  
(1 source)                Corticosteroid            Start:   
2024  
End:   
2024                                          methylPREDNISolone   
(Medrol Dospak) 4 MG   
tablets Indications:   
Lumbosacral strain,   
initial encounter ,   
Acute pain of left   
shoulder Take as   
directed on package.   
21 tablet 0   
2024   
Active  
   
                                                    Ozempic (2 MG/DOSE) 8   
MG/3ML  
(1 source)                                                  inject 2 mg by   
subcutaneous   
injection every   
week                                    Ozempic (2 MG/DOSE) 8   
MG/3ML 2 mg   
Subcutaneous weekly   
for 30 days Active  
   
                                                    Ozempic, 2 MG/DOSE, 8   
MG/3ML solution   
pen-injector  
(14 sources)                                        Start:   
2023                                          Ozempic, 2 MG/DOSE, 8   
MG/3ML solution   
pen-injector   
2023 Active  
  
  
  
                                Start: 2023                 Ozempic, 2 MG/  
DOSE, 8 MG/3ML solution pen-injector  
  
  
  
                                                    Probiotic Product (PROBIOTIC  
   
BLEND PO)  
(14 sources)                                                take 2 [IU] by mouth  
 in   
the morning                             Probiotic Product (PROBIOTIC   
BLEND PO) Take 2 Units by   
mouth in the morning. Active  
  
  
  
                                                take 2 [IU] by mouth in the morn  
ing Probiotic Product (PROBIOTIC BLEND PO) Take  
 2   
Units by mouth in the morning. 0 Active  
  
  
  
                                                    rivaroxaban 10 mg   
oral tablet  
(20 sources)                            Factor Xa   
Inhibitor                 Start: 2022         take 1 tablet   
by mouth once   
daily                                   rivaroxaban (Xarelto)   
10 MG tablet   
Indications:   
Cardiomyopathy,   
unspecified type   
(CMS/HCC) Take 1   
tablet (10 mg) by   
mouth Daily 90 tablet   
2 2024 Active  
  
  
  
                                                    Start: 2019  
End: 2021                         take 1 tablet by mouth once   
daily                                   Rivaroxaban 10 mg tablet   
Discontinued 10 MG PO Daily 2019 11:00pm 2021   
12:02am  
   
                                                                Xarelto Active  
  
  
  
                                                    rosuvastatin   
calcium 10 mg oral   
tablet  
(3 sources)                             HMG-CoA Reductase   
Inhibitor                               Start:   
2023                              take 1 tablet by   
mouth every   
twenty-four hours                       Rosuvastatin   
Calcium 10 MG 1   
tablet Orally   
Once a day for 30   
day(s)  Active  
   
                                                    sacubitril 24 mg /   
valsartan 26 mg   
oral tablet  
(20 sources)                            Angiotensin 2   
Receptor Blocker                        Start:   
2022                              take 1 tablet by   
mouth twice daily                       Sacubitril-Valsar  
jiménez (Entresto)   
24-26 mg Tablet   
Active 1 TAB PO   
Twice daily 2023   
11:00pm  
  
  
  
                                                take 1 tablet by mouth in the mo  
rning Entresto 24-26 MG tablet Take 1 tablet by  
   
mouth in the morning and 1 tablet before   
bedtime. Active  
   
                                                                ENTRESTO 24 mg/2  
6 mg 1 orally twice a day   
Active  
  
  
  
                                                    0.25 mg, 0.5 mg dose   
1.5 ml semaglutide   
1.34 mg/ml pen   
injector  
(3 sources)                     Start: 2022                 Ozempic (0.25   
or 0.5   
MG/DOSE) 2 MG/1.5ML   
0.25 mg for one month   
and then increase to   
0.5 mg dose   
Subcutaneous weekly   
for 30 days  Active  
   
                                                    Semaglutide  
(5 sources)                             Start: 2024   inject 0.25 mg by   
subcutaneous injection   
every week, then   
inject 0.5 mg by   
subcutaneous injection   
every week                              Semaglutide (Ozempic)   
0.25 mg or 0.5 mg (2   
mg/3 mL) pen injector   
Active 0.25 MG SUBCUT   
every week 3 April   
23rd, 2024 11:00pm for   
4 weeks; then increase   
to 0.5 mg every week  
  
  
  
                                        Start: 2024   inject 0.25 mg by carrion  
bcutaneous   
injection every week, then inject   
0.5 mg by subcutaneous injection   
every week                              Semaglutide (Ozempic) 0.25 mg or   
0.5 mg (2 mg/3 mL) pen injector   
Active 0.25 MG SUBCUT every week   
3 April 24th, 2024 12:00am for 4   
weeks; then increase to 0.5 mg   
every week  
  
  
  
                                                    semaglutide (Ozempic)   
1 mg/dose (4 mg/3 mL)   
pen injector  
(1 source)                                                  inject 1 mg by   
subcutaneous   
injection every   
week                                    semaglutide   
(Ozempic) 1 mg/dose   
(4 mg/3 mL) pen   
injector Inject 1 mg   
under the skin per   
week. Active  
   
                                                    spironolactone 25 mg   
oral tablet  
(20 sources)                            Aldosterone   
Antagonist                              Start:   
  
022                                     take 1 tablet by   
mouth once daily                        Spironolactone 25 mg   
tablet Active 25 MG   
PO Daily 2022 11:00pm  
  
  
  
Completed/Discontinued Medications  
  
  
  
                      Medication Drug Class(es) Dates      Sig (Normalized) Sig   
(Original)  
   
                                                    3 ML semaglutide   
1.34 MG/ML Pen   
Injector [Ozempic]  
(7 sources)                                         Start:   
10-                              inject 1 mg by   
subcutaneous   
injection every   
week                                    Ozempic (1 MG/DOSE)   
4 MG/3ML 1 mg   
Subcutaneous Weekly   
for 28 days 28 Oct,   
2022 Not-Taking  
  
  
  
                                        Start: 10-   inject 1 mg by subcu  
taneous   
injection every week                    Ozempic (1 MG/DOSE) 4 MG/3ML 1 mg   
Subcutaneous Weekly for 28 days   
28 Oct, 2022 Active  
   
                                        Start: 2022   inject 1 mg by subcu  
taneous   
injection every week                    Ozempic (1 MG/DOSE) 4 MG/3ML 1 mg   
Subcutaneous Weekly for 28 days   
 Active  
  
  
  
                                                    acetaminophen 325   
mg / HYDROcodone   
bitartrate 5 mg   
oral tablet  
(13 sources)              Opioid Agonist            Start:   
2021  
End:   
2022                              take 1   
tablet by   
mouth   
every   
eight   
hours as   
needed for   
pain                                    Hydrocodone-Acetaminophen   
5-325 mg tablet Discontinued   
1 TAB PO Q8H as needed for   
severe pain (scale score   
7-10) 6 2  7:11am  
   
                                                    acetaminophen 325   
mg / oxyCODONE   
hydrochloride 5   
mg oral tablet  
(13 sources)              Opioid Agonist            Start:   
2019  
End:   
2020                              take 1   
tablet by   
mouth   
every four   
to six   
hours as   
needed for   
pain                                    Oxycodone-Acetaminophen   
(Percocet) 5-325 mg tablet   
Discontinued 1 TAB PO EVERY   
4-6 HOURS as needed for pain   
10 3  12:59pm  
   
                                                    ampicillin 500 mg   
oral capsule  
(13 sources)                            Penicillin-class   
Antibacterial                           Start:   
2019  
End:   
2019                              take 1   
capsule by   
mouth   
every six   
hours                                   Ampicillin 500 mg capsule   
Discontinued 500 MG PO Q6H   
56 14 2019 11:00pm   
2019 2:39pm  
   
                                                    ascorbic acid 500   
mg oral tablet  
(13 sources)              Vitamin C                 Start:   
2020  
End:   
2022                              take 1   
tablet by   
mouth   
twice   
daily                                   Ascorbic Acid (Vitamin C)   
(Vitamin C) 500 mg Tablet   
Discontinued 500 MG PO Twice   
daily 14  12:00am 2022 7:11am  
   
                                                    aspirin 81 mg   
delayed release   
oral tablet  
(13 sources)                            Platelet   
Aggregation   
Inhibitor,   
Nonsteroidal   
Anti-inflammatory   
Drug                                    Start:   
2019  
End:   
2019                              take 1   
tablet by   
mouth once   
daily                                   Aspirin (Aspirin Low Dose)   
81 mg Tablet,Delayed Release   
(Dr/Ec) Discontinued 81 MG   
PO Daily 2019   
11:00pm 2019   
10:52am  
   
                                                    ciprofloxacin 500   
mg oral tablet  
(20 sources)                            Quinolone   
Antimicrobial                           Start:   
2021  
End:   
2021                              take 1   
tablet by   
mouth   
twice   
daily                                   Ciprofloxacin Hcl (Cipro)   
500 mg tablet Discontinued   
500 MG PO Twice daily  12:00am   
2021 11:59pm  
  
  
  
                                                    Start: 2020  
End: 2021                         take 1 tablet by mouth every   
twelve hours                            Ciprofloxacin Hcl 500 mg tablet   
Discontinued 500 MG PO Q12H 20 10   
December 4th, 2020 12:00am 2021 2:07pm  
   
                                                    Start: 2020  
End: 2020                         take 1 tablet by mouth twice   
daily                                   Ciprofloxacin Hcl (Cipro) 500 mg   
tablet Discontinued 500 MG PO Twice   
daily  11:00pm   
2020 12:36pm  
   
                                                    Start: 2020  
End: 2020                         take 1 tablet by mouth twice   
daily                                   Ciprofloxacin Hcl (Cipro) 500 mg   
tablet Discontinued 500 MG PO Twice   
daily  11:00pm 2020 9:03am  
  
  
  
                                                    clindamycin 300 mg   
oral capsule  
(20 sources)                            Lincosamide   
Antibacterial                           Start:   
2019  
End: 2020                         take 1   
capsule by   
mouth every   
eight hours                             Clindamycin Hcl   
300 mg capsule   
Discontinued 300   
MG PO Q8H 30 10   
November 29th,   
2019 12:00am   
2020   
2:08pm  
  
  
  
                                                    Start: 2019  
End: 2019                         take 3 capsules by mouth every   
eight hours                             Clindamycin Hcl 150 mg capsule   
Discontinued 450 MG PO Q8H 63  12:00am 2019 1:00pm  
   
                                                    Start: 2019  
End: 2019                         take 450 mg by mouth every eight   
hours                                   Clindamycin Hcl Discontinued 450 MG   
PO Q8H 63    
1:00am 2019 2:00pm  
  
  
  
                                                    clobetasol propionate   
0.0005 mg/mg topical   
ointment  
(20 sources)              Corticosteroid            Start: 2019  
End: 2023                                     Clobetasol 0.05 % ointment   
Discontinued 1 APPLIC   
TOPICAL 3 Times a week as   
needed for Rash 2021 12:32pm 2023 10:11am apply   
thin layer to right leg   
1-2 times daily as needed   
for itching.  
  
  
  
                                                    Start: 2019  
End: 2021                                     Clobetasol 0.05 % ointment D  
iscontinued 1 APPLIC TOPICAL Twice   
daily 60  11:00pm 2021 12:32pm   
apply thin layer to right leg 1-2 times daily as needed for   
itching.  
   
                                                    Start: 05-  
End: 2019                                     Clobetasol 0.05 % ointment D  
iscontinued 1 APPLIC TOPICAL .every  
   
other day 30 14 May 14th, 2019 11:00pm 2019 1:56pm   
apply thin layer to right leg rash with dressing changes  
   
                                                                clobetasol (Jose A  
vate) 0.05 % cream Apply 1 application topically   
every 12 (twelve) hours. Active  
   
                                                                Clobetasol Propi  
maged Not-Taking  
   
                                                                Clobetasol Propi  
maged Active  
  
  
  
                                                    cyclobenzaprine   
hydrochloride 10 mg   
oral tablet  
(20 sources)              Muscle Relaxant           Start:   
2017  
End: 2021                         take 1   
tablet by   
mouth three   
times daily   
as needed   
for muscle   
spasms                                  Cyclobenzaprine 10   
mg Tablet   
Discontinued 10 MG   
PO Three times   
daily as needed for   
Muscle Spasm   
2017   
12:00am 2021 11:59pm  
   
                                                    dexamethasone 6 mg   
oral tablet  
(13 sources)              Corticosteroid            Start:   
2020  
End: 2021                         take 1   
tablet by   
mouth once   
daily                                   Dexamethasone 6 mg   
Tablet Discontinued   
6 MG PO Daily 3 3   
December 22nd, 2020   
12:00am 2021 2:07pm  
   
                                                    doxycycline hyclate   
100 mg oral capsule  
(20 sources)                            Tetracycline-class   
Drug                                    Start:   
2024  
End: 08-                         take 1   
capsule by   
mouth twice   
daily                                   Doxycycline Hyclate   
100 mg capsule   
Discontinued 100 MG   
PO Twice daily 20   
10 July 7th, 2024   
11:00pm 2024 2:20pm  
  
  
  
                                                    Start: 2023  
End: 2024                         take 1 capsule by mouth twice   
daily                                   Doxycycline Hyclate 100 mg capsule   
Discontinued 100 MG PO Twice daily   
 12:00am   
2024 1:30pm  
   
                                                    Start: 2021  
End: 2021                         take 1 capsule by mouth twice   
daily                                   Doxycycline Hyclate 100 mg capsule   
Discontinued 100 MG PO Twice daily   
20 10 April 7th, 2021 11:00pm 2021 12:31pm  
   
                                                    Start: 2021  
End: 2021                         take 1 capsule by mouth twice   
daily                                   Doxycycline Hyclate 100 mg capsule   
Discontinued 100 MG PO Twice daily   
20 10 January 29th, 2021 12:00am   
2021 7:01am  
   
                                                    Start: 10-  
End: 2020                         take 1 capsule by mouth twice   
daily                                   Doxycycline Hyclate 100 mg capsule   
Discontinued 100 MG PO Twice daily   
20 10 October 4th, 2020 11:00pm   
2020 12:59pm  
   
                                                    Start: 2020  
End: 2020                         take 1 capsule by mouth twice   
daily                                   Doxycycline Hyclate 100 mg Capsule   
Discontinued 100 MG PO Twice daily   
May 4th, 2020 11:00pm 2020   
12:37pm  
   
                                                    Start: 2020  
End: 2020                         take 1 capsule by mouth twice   
daily                                   Doxycycline Hyclate 100 mg capsule   
Discontinued 100 MG PO Twice daily   
 11:00pm 2020 12:21pm  
   
                                                    Start: 2019  
End: 2020                         take 1 capsule by mouth twice   
daily                                   Doxycycline Hyclate 100 mg capsule   
Discontinued 100 MG PO Twice daily   
 12:00am   
2020 2:11pm  
   
                                                    Start: 2019  
End: 2019                         take 1 capsule by mouth twice   
daily                                   Doxycycline Hyclate 100 mg capsule   
Discontinued 100 MG PO Twice daily   
 11:00pm 2019 2:39pm  
   
                                                    Start: 2019  
End: 2019                         take 1 capsule by mouth twice   
daily                                   Doxycycline Hyclate 100 mg capsule   
Discontinued 100 MG PO Twice daily   
28 14 May 2nd, 2019 11:00pm May   
28th, 2019 2:36pm  
  
  
  
                                                    Ketorolac  
(15 sources)                            Nonsteroidal   
Anti-inflammatory Drug,   
Cyclooxygenase   
Inhibitor                               Start:   
07-                                          Toradol per 15 mg   
15 2013 2 mL  
   
                                                    levoFLOXacin 750   
mg oral tablet  
(13 sources)              Quinolone Antimicrobial   Start:   
2019  
End: 2019                         take 1   
tablet by   
mouth once   
daily                                   Levofloxacin   
(Levaquin) 750 mg   
tablet   
Discontinued 750   
MG PO Daily 10 10   
Lynn 24th, 2019   
11:00pm 2019 1:24pm  
   
                                                    meloxicam 15 mg   
oral tablet  
(20 sources)                            Nonsteroidal   
Anti-inflammatory Drug                  Start:   
2017  
End: 2021                         take 1   
tablet by   
mouth once   
daily                                   Meloxicam 15 mg   
Tablet   
Discontinued 15   
MG PO Daily   
2017 12:00am   
2021   
12:00am  
   
                                                    metOLazone 2.5 mg   
oral tablet  
(20 sources)              Thiazide-like Diuretic    Start:   
2022  
End: 2023                         take 1   
tablet by   
mouth every   
other day                               Metolazone 2.5 mg   
Tablet   
Discontinued 2.5   
MG PO Q2D 2022   
11:00pm 2023   
10:10am  
  
  
  
                                                    Start: 2019  
End: 2022                         take 1 tablet by mouth three   
times weekly in the morning as   
needed for edema                        Metolazone 2.5 mg Tablet   
Discontinued 2.5 MG PO 3 Times a   
week as needed for Edema 2019 11:00pm 2022   
7:10am take three times a week 30   
minutes prior to am lasix as needed  
   
                                                                metOLazone Activ  
e  
  
  
  
                                                    nabumetone 750   
mg oral tablet  
(20 sources)                            Nonsteroidal   
Anti-inflammatory Drug                  Start:   
2021  
End: 2024                         take 1   
tablet by   
mouth twice   
daily                                   Nabumetone 750 mg   
Tablet   
Discontinued 750   
MG PO Twice daily   
2021   
12:00am 2024 8:17am  
  
  
  
                                                    Start: 2021  
End: 2024                         take 1 tablet by mouth every   
twelve hours                            nabumetone (Relafen) 750 mg tablet   
Take 1 tablet (750 mg) by mouth every   
12 hours. 2021   
Discontinued (Discontinued by another   
clinician)  
   
                                        Start: 2021   take 1 tablet by lenin  
 once   
daily                                   Nabumetone 750 MG Oral Tablet TAKE 1   
TABLET EVERY 12 HOURS DAILY.   
Quantity: 60 Refills: 2 Ordered:   
2021 Compa Soto MD   
Start : 2021 Active  
  
  
  
                                                    Ozempic (1 MG/DOSE) SOPN  
(7 sources)                                                     Ozempic (1 MG/DO  
SE) SOPN   
INJECT 1 UNIT Weekly   
Quantity: 0 Refills: 0   
Ordered: 2022 DO   
Active  
   
                                                    permethrin 50 mg/ml topical   
cream  
(13 sources)              Pyrethroid                Start: 2019  
End: 2019                                     Permethrin 5 % Cream   
Discontinued 1 APPLIC   
TOPICAL Once May 5th, 2019   
11:00pm May 28th, 2019   
2:36pm  
  
  
  
                                                    Start: 2019  
End: 2019                                     Permethrin Discontinued 1 AP  
PLIC TOPICAL Once May 6th, 2019   
12:00am May 28th, 2019 3:36pm  
  
  
  
                                                    predniSONE 20 mg oral   
tablet  
(7 sources)                             Start: 2019   take 2 tablets by   
mouth every   
twenty-four hours                       predniSONE 20 MG 2   
tablet Orally Once a   
day for 5    
Not-Taking  
   
                                                    Semaglutide (Ozempic)   
2 mg/dose (8 mg/3 mL)   
pen injector  
(13 sources)                                        Start: 2022  
End: 2024                         inject 2 mg by   
subcutaneous injection   
every week                              Semaglutide (Ozempic)   
2 mg/dose (8 mg/3 mL)   
pen injector   
Discontinued 2 MG   
SUBCUT Q7D 2022 11:00pm   
2024   
1:31pm  
  
  
  
                                                    Start: 2022  
End: 2024                         inject 2 mg by subcutaneous   
injection every week                    Semaglutide (Ozempic) 2 mg/dose   
(8 mg/3 mL) pen injector   
Discontinued 2 MG SUBCUT Q7D   
2022 12:00am 2024 2:31pm  
   
                                        Start: 2022   inject 2 mg by subcu  
taneous   
injection every week                    Semaglutide (Ozempic) 2 mg/dose   
(8 mg/3 mL) pen injector Active 2   
MG SUBCUT Q7D 2022   
11:00pm  
   
                                        Start: 2022   inject 2 mg by subcu  
taneous   
injection every week                    Semaglutide (Ozempic) 2 mg/dose   
(8 mg/3 mL) pen injector Active 2   
MG SUBCUT Q7D 2022   
12:00am  
  
  
  
                                                    Sulfamethoxazole  
(7 sources)     Sulfonamide Antimicrobial                                 Sulfam  
ethoxazole Not-Taking  
  
  
  
                                                                Sulfamethoxazole  
 Active  
  
  
  
                                                    sulfamethoxazole   
800 mg /   
trimethoprim 160   
mg oral tablet  
(13 sources)                            Dihydrofolate   
Reductase   
Inhibitor   
Antibacterial,   
Sulfonamide   
Antimicrobial                           Start:   
2020  
End:   
2020                              take 1   
tablet by   
mouth   
twice   
daily                                   Sulfamethoxazole-Trimethoprim   
(Bactrim Ds) 800-160 mg tablet   
Discontinued 1 TAB PO Twice   
daily 28    
12:00am 2020   
2:57pm  
   
                                                    telmisartan 40 mg   
oral tablet  
(20 sources)                            Angiotensin 2   
Receptor Blocker                        Start:   
2017  
End:   
2022                              take 1   
tablet by   
mouth   
once   
daily                                   Telmisartan 40 mg Tablet   
Discontinued 40 MG PO Daily   
2017 12:00am   
2022 7:15am  
   
                                                    traMADol   
hydrochloride 50   
mg oral tablet  
(20 sources)              Opioid Agonist            Start:   
2020  
End:   
2020                              take 1   
tablet by   
mouth   
twice   
daily as   
needed   
for pain                                Tramadol 50 mg Tablet   
Discontinued 50 MG PO Twice   
daily as needed for Pain   
2020 12:00am   
2020 1:10pm  
   
                                                    triamcinolone   
acetonide 5 mg/ml   
topical cream  
(13 sources)              Corticosteroid            Start:   
2019  
End:   
2019                                          Triamcinolone Acetonide 0.5   
%   
cream Discontinued 1 APPLIC   
TOPICAL Twice daily 454  11:00pm 2019 11:00pm 2019 11:02pm apply to back and   
arm rash twice daily as needed   
for itching.  
   
                                                    Triamcinolone &   
Emollient 0.1 %  
(7 sources)                                                     Triamcinolone &   
Emollient 0.1   
% as directed Externally   
Not-Taking  
   
                                                    vancomycin 250 mg   
oral capsule  
(13 sources)                            Glycopeptide   
Antibacterial                           Start:   
2021  
End:   
2021                              take 1   
capsule   
by mouth   
every six   
hours                                   Vancomycin 250 mg capsule   
Discontinued 250 MG PO Q6H 28   
 11:00pm   
2021 12:01am  
   
                                                    zinc sulfate 220   
mg oral capsule  
(13 sources)                                        Start:   
2020  
End:   
2022                              take 1   
capsule   
by mouth   
once   
daily                                   Zinc Sulfate (Orazinc) 220   
(50) mg Capsule Discontinued   
220 MG PO Daily  12:00am 2022 7:11am  
  
  
  
Problems  
Active Problems  
  
  
                      Problem Classification Problem    Date       Documented Da  
te Episodic/Chronic  
   
                                                    Acute and unspecified   
renal failure  
(13 sources)                            Injury of kidney;   
Translations: [Acute   
kidney failure,   
unspecified]                            2020          Episodic  
   
                                                    Allergic reactions  
(13 sources)                            Inflammatory   
dermatosis;   
Translations:   
[Dermatitis,   
unspecified]                            10-          Episodic  
   
                                                    Blindness and vision   
defects  
(7 sources)                             Disorder of vision;   
Translations:   
[Problems with   
sight]                                                      Chronic  
   
                                                    Chronic kidney disease  
(16 sources)                            Chronic kidney   
disease stage 2;   
Translations:   
[Chronic kidney   
disease, stage 2   
(mild)]                                 Onset:   
2023                Chronic  
   
                                                    Chronic ulcer of skin  
(20 sources)                            Ulcer; Translations:   
[Ulcerative lesion]                     Onset:   
2024                Chronic  
   
                                                    Coagulation and   
hemorrhagic disorders  
(15 sources)                            Acquired coagulation   
factor inhibitor   
disorder;   
Translations: [Other   
hemorrhagic disorder   
due to intrinsic   
circulating   
anticoagulants,   
antibodies, or   
inhibitors]                             Onset:   
2023                Chronic  
   
                                                    Congestive heart   
failure;   
nonhypertensive  
(20 sources)                            Congestive heart   
failure;   
Translations: [Heart   
failure,   
unspecified]                            Onset:   
2022  
Resolved:   
2022                                          Chronic  
   
                                                    Coronary   
atherosclerosis and   
other heart disease  
(20 sources)                            Coronary   
arteriosclerosis;   
Translations:   
[Atherosclerotic   
heart disease of   
native coronary   
artery without   
angina pectoris]                        Onset:   
2022  
Resolved:   
2022                                          Chronic  
   
                                                    Diabetes mellitus with   
complications  
(20 sources)                            Disorder due to type   
2 diabetes mellitus;   
Translations: [Type   
2 diabetes mellitus   
with unspecified   
complications]                          Onset:   
2022  
Resolved:   
2022                                          Chronic  
   
                                                    Diabetes mellitus   
without complication  
(8 sources)                             Prediabetes;   
Translations: [Other   
abnormal glucose]                       Onset:   
2024                Episodic  
   
                                                    Disorders of lipid   
metabolism  
(20 sources)                            Mixed   
hyperlipidemia;   
Translations: [Mixed   
hyperlipidemia]                         Onset:   
2022  
Resolved:   
2022                                          Chronic  
   
                                                    Essential hypertension  
(15 sources)                            Hypertensive   
disorder;   
Translations:   
[Essential (primary)   
hypertension]                           2019          Chronic  
   
                                                    Fluid and electrolyte   
disorders  
(13 sources)                            Metabolic acidosis;   
Translations:   
[Metabolic acidosis]                     2020          Episodic  
   
                                                    Genitourinary symptoms   
and ill-defined   
conditions  
(5 sources)                             Urinary   
incontinence;   
Translations:   
[Unspecified urinary   
incontinence]                           Onset:   
2024                Chronic  
   
                                                    Immunizations and   
screening for   
infectious disease  
(17 sources)                            Encounter for   
immunization;   
Translations:   
[Culture positive   
for methicillin   
resistant   
Staphylococcus   
aureus]                                 Onset:   
2021  
Resolved:   
2021                                          Episodic  
   
                                                    Mycoses  
(5 sources)                             Onychomycosis;   
Translations: [Tinea   
unguium]                                2024          Episodic  
   
                                                    Nonspecific chest pain  
(6 sources)                             Chest pain;   
Translations: [Chest   
pain, unspecified]                      Onset:   
2023                                          Episodic  
   
                                                    Open wounds of   
extremities  
(20 sources)                            Open wound of right   
lower leg;   
Translations:   
[Unspecified open   
wound, right lower   
leg, initial   
encounter]                              Onset:   
2024                Episodic  
   
                                                    Osteoarthritis  
(20 sources)                            Osteoarthritis of   
knee; Translations:   
[Osteoarthritis of   
knee, unspecified]                      Onset:   
2022  
Resolved:   
2022                                          Chronic  
   
                                                    Other and ill-defined   
heart disease  
(20 sources)                            Left ventricular   
hypertrophy;   
Translations:   
[Cardiomegaly]                          Onset:   
2023                Chronic  
   
                                                    Other circulatory   
disease  
(7 sources)                             H/O: heart disorder;   
Translations:   
[Personal history of   
unspecified   
circulatory disease]                                         Episodic  
   
                                                    Other connective   
tissue disease  
(7 sources)                             H/O: arthritis;   
Translations:   
[Personal history of   
arthritis]                                                  Episodic  
   
                                                    Other connective   
tissue disease  
(3 sources)                             Pain in both feet;   
Translations: [Pain   
in right foot]                          2024          Episodic  
   
                                                    Other diseases of   
veins and lymphatics  
(17 sources)                            Postthrombotic   
syndrome;   
Translations:   
[Postthrombotic   
syndrome without   
complications of   
unspecified   
extremity]                                                  Chronic  
   
                                                    Other diseases of   
veins and lymphatics  
(2 sources)                             Lymphedema, not   
elsewhere   
classified;   
Translations: [Other   
lymphedema]                             Onset:   
2024                Chronic  
   
                                                    Other diseases of   
veins and lymphatics  
(20 sources)                            Stasis dermatitis;   
Translations:   
[Venous   
insufficiency   
(chronic)   
(peripheral)]                           Onset:   
2023                Episodic  
   
                                                    Other diseases of   
veins and lymphatics  
(13 sources)                            Vascular   
insufficiency;   
Translations:   
[Venous   
insufficiency   
(chronic)   
(peripheral)]                           2021          Episodic  
   
                                                    Other diseases of   
veins and lymphatics  
(2 sources)                             Venous insufficiency   
of leg;   
Translations:   
[Venous   
insufficiency   
(chronic)   
(peripheral)]                           2024          Episodic  
   
                                                    Other diseases of   
veins and lymphatics  
(3 sources)                             Venous insufficiency   
(chronic)   
(peripheral);   
Translations:   
[Venous (peripheral)   
insufficiency,   
unspecified]                            Onset:   
2024                Episodic  
   
                                                    Other gastrointestinal   
disorders  
(13 sources)                            Diarrhea;   
Translations:   
[Diarrhea,   
unspecified]                            2020          Episodic  
   
                                                    Other gastrointestinal   
disorders  
(2 sources)                             Diarrhea,   
unspecified                                                 Episodic  
   
                                                    Other gastrointestinal   
disorders  
(12 sources)                            Stool DNA-based   
colorectal cancer   
screening positive;   
Translations: [Other   
fecal abnormalities]                    Onset:   
10-                10-                Episodic  
   
                                                    Other hematologic   
conditions  
(7 sources)                             H/O: blood disorder;   
Translations:   
[Personal history of   
diseases of blood   
and blood-forming   
organs]                                                     Episodic  
   
                                                    Other infections;   
including parasitic  
(13 sources)                            Disorder due to   
infection;   
Translations:   
[Unspecified   
infectious disease]                     2021          Episodic  
   
                                                    Other lower   
respiratory disease  
(8 sources)                             Dyspnea on exertion;   
Translations:   
[Shortness of   
breath]                                 Onset:   
2024                Episodic  
   
                                                    Other lower   
respiratory disease  
(13 sources)                            Dyspnea;   
Translations:   
[Dyspnea,   
unspecified]                            2020          Episodic  
   
                                                    Other nervous system   
disorders  
(16 sources)                            Sleep-wake schedule   
disorder, delayed   
phase type;   
Translations:   
[Circadian rhythm   
sleep disorder,   
delayed sleep phase   
type]                                                       Chronic  
   
                                                    Other nervous system   
disorders  
(2 sources)                             Circadian rhythm   
sleep disorder,   
delayed sleep phase   
type                                    Onset:   
2022  
Resolved:   
2022                                          Chronic  
   
                                                    Other nervous system   
disorders  
(7 sources)                             Numbness and   
tingling sensation   
of skin;   
Translations:   
[Disturbance of skin   
sensation]                                                  Episodic  
   
                                                    Other non-traumatic   
joint disorders  
(7 sources)                             Pain in unspecified   
knee; Translations:   
[Knee pain]                                                 Episodic  
   
                                                    Other non-traumatic   
joint disorders  
(3 sources)                             Pain in left   
shoulder;   
Translations: [Pain   
in joint, shoulder   
region]                                 Onset:   
2024                Episodic  
   
                                                    Other nutritional;   
endocrine; and   
metabolic disorders  
(20 sources)                            Body mass index 40+   
- severely obese;   
Translations: [Body   
mass index (BMI)   
50.0-59.9, adult]                       Onset:   
2024                Chronic  
   
                                                    Other nutritional;   
endocrine; and   
metabolic disorders  
(20 sources)                            Body mass index   
(BMI) 50.0-59.9,   
adult; Translations:   
[Body Mass Index   
50.0-59.9, adult]                       Onset:   
2022  
Resolved:   
2022                                          Chronic  
   
                                                    Other nutritional;   
endocrine; and   
metabolic disorders  
(20 sources)                            Obesity;   
Translations:   
[Obesity,   
unspecified]                            2019          Chronic  
   
                                                    Other nutritional;   
endocrine; and   
metabolic disorders  
(14 sources)                            Metabolic syndrome   
X; Translations:   
[Metabolic syndrome]                                         Chronic  
   
                                                    Other nutritional;   
endocrine; and   
metabolic disorders  
(4 sources)               Obesity, unspecified      Onset:   
05-  
Resolved:   
2022                                          Chronic  
   
                                                    Other nutritional;   
endocrine; and   
metabolic disorders  
(6 sources)               Metabolic syndrome        Onset:   
2022  
Resolved:   
2022                                          Chronic  
   
                                                    Other nutritional;   
endocrine; and   
metabolic disorders  
(13 sources)                            Morbid obesity;   
Translations:   
[Morbid (severe)   
obesity due to   
excess calories]                        2022          Chronic  
   
                                                    Other nutritional;   
endocrine; and   
metabolic disorders  
(7 sources)                             Morbid (severe)   
obesity due to   
excess calories;   
Translations:   
[Morbid obesity]                        2022          Chronic  
   
                                                    Other nutritional;   
endocrine; and   
metabolic disorders  
(16 sources)                            Obese class III;   
Translations:   
[Morbid (severe)   
obesity due to   
excess calories]                        10-          Chronic  
   
                                                    Other screening for   
suspected conditions   
(not mental disorders   
or infectious disease)  
(9 sources)                             Cardiovascular   
stress test   
abnormal;   
Translations: [Other   
nonspecific abnormal   
results of function   
study of   
cardiovascular   
system]                                 Onset:   
2024                Episodic  
   
                                                    Other skin disorders  
(13 sources)                            Hemosiderin   
pigmentation of   
lower limb due to   
varicose veins of   
lower limb;   
Translations: [Other   
specified disorders   
of pigmentation]                        2020          Episodic  
   
                                                    Other skin disorders  
(3 sources)                             Callosity;   
Translations: [Corns   
and callosities]                        2024          Episodic  
   
                                                    Anastasia-; endo-; and   
myocarditis;   
cardiomyopathy (except   
that caused by   
tuberculosis or   
sexually transmitted   
disease)  
(20 sources)                            Cardiomyopathy;   
Translations:   
[Cardiomyopathy,   
unspecified]                            Onset:   
2022  
Resolved:   
2022                                          Chronic  
   
                                                    Peripheral and   
visceral   
atherosclerosis  
(20 sources)                            Peripheral vascular   
disease;   
Translations:   
[Peripheral vascular   
disease,   
unspecified]                            Onset:   
2023                Chronic  
   
                                                    Residual codes;   
unclassified  
(16 sources)                            Sleep apnea;   
Translations: [Sleep   
apnea, unspecified]                                         Chronic  
   
                                                    Residual codes;   
unclassified  
(20 sources)                            Obstructive sleep   
apnea syndrome;   
Translations:   
[Obstructive sleep   
apnea (adult)   
(pediatric)]                            Onset:   
2023                Chronic  
   
                                                    Residual codes;   
unclassified  
(17 sources)                            Obstructive sleep   
apnea (adult)   
(pediatric);   
Translations:   
[Obstructive sleep   
apnea   
(adult)(pediatric)]                     Onset:   
2022  
Resolved:   
2022                                          Chronic  
   
                                                    Residual codes;   
unclassified  
(4 sources)                             Continuous positive   
airway pressure   
ventilation   
treatment;   
Translations:   
[Dependence on other   
enabling machines   
and devices]                                                Chronic  
   
                                                    Residual codes;   
unclassified  
(16 sources)                            Dependence on   
continuous positive   
airway pressure   
ventilation;   
Translations:   
[Dependence on other   
enabling machines   
and devices]                            Onset:   
2023                Chronic  
   
                                                    Residual codes;   
unclassified  
(8 sources)                             Localized edema;   
Translations:   
[Edema]                                 Onset:   
2022  
Resolved:   
2022                                          Episodic  
   
                                                    Residual codes;   
unclassified  
(6 sources)               Edema, unspecified        Onset:   
2022  
Resolved:   
2022                                          Episodic  
   
                                                    Residual codes;   
unclassified  
(3 sources)                             History of chest   
pain; Translations:   
[Personal history of   
other specified   
diseases]                                                   Episodic  
   
                                                    Residual codes;   
unclassified  
(13 sources)                            Noncompliance with   
treatment;   
Translations:   
[Noncompliance]                         2021          Episodic  
   
                                                    Residual codes;   
unclassified  
(13 sources)                            Edema of right lower   
leg; Translations:   
[Localized edema]                       2022          Episodic  
   
                                                    Residual codes;   
unclassified  
(13 sources)                            Edema of lower   
extremity;   
Translations:   
[Localized edema]                       2019          Episodic  
   
                                                    Residual codes;   
unclassified  
(13 sources)                            Other specified   
health status;   
Translations:   
[Failure of   
outpatient   
treatment]                              2019          Episodic  
   
                                                    Skin and subcutaneous   
tissue infections  
(20 sources)                            Cellulitis of lower   
leg; Translations:   
[Cellulitis of right   
lower limb]                             Onset:   
2024                Episodic  
   
                                                    Spondylosis;   
intervertebral disc   
disorders; other back   
problems  
(6 sources)                             Other specified   
dorsopathies, lumbar   
region                                  Onset:   
2022  
Resolved:   
2022                                          Episodic  
   
                                                    Superficial injury;   
contusion  
(11 sources)                            Hematoma of right   
lower leg;   
Translations:   
[Contusion of right   
lower leg, initial   
encounter]                              Onset:   
12-                05-                Episodic  
   
                                                    Unclassified  
(16 sources)                            Inflammatory   
disorder;   
Translations:   
[Inflammation]                          2023            
   
                                                    Viral infection  
(13 sources)                            Disease caused by   
2019-nCoV;   
Translations:   
[COVID-19]                              2020          Episodic  
  
  
Past or Other Problems  
  
  
                      Problem Classification Problem    Date       Documented Da  
te Episodic/Chronic  
   
                                                    Acquired foot   
deformities  
(14 sources)                            Acquired hammer toe   
of right foot;   
Translations:   
[Other hammer   
toe(s) (acquired),   
right foot]                             Onset:   
2023  
Resolved:   
2023                Chronic  
   
                                                    Acquired foot   
deformities  
(14 sources)                            Acquired deformity   
of left foot;   
Translations:   
[Other acquired   
deformities of left   
foot]                                   Onset:   
2023                Episodic  
   
                                                    Open wounds of   
extremities  
(14 sources)                            Disorder of lower   
extremity;   
Translations:   
[Unspecified open   
wound, left lower   
leg, initial   
encounter]                              Onset:   
2024  
Resolved:   
2024                Episodic  
   
                                                    Other congenital   
anomalies  
(14 sources)                            Porokeratosis;   
Translations:   
[Other specified   
congenital   
malformations of   
skin]                                   Onset:   
2023  
Resolved:   
2023                Chronic  
   
                                                    Other diseases of veins   
and lymphatics  
(20 sources)                            Peripheral venous   
insufficiency;   
Translations:   
[Venous   
insufficiency   
(chronic)   
(peripheral)]                           Onset:   
2023                Episodic  
   
                                                    Other diseases of veins   
and lymphatics  
(18 sources)                            Disorder of vein of   
lower extremity;   
Translations:   
[Venous   
insufficiency   
(chronic)   
(peripheral)]                           Onset:   
2023                Episodic  
   
                                                    Other nutritional;   
endocrine; and   
metabolic disorders  
(1 source)                              Abnormal weight   
gain                                    Onset:   
2022  
Resolved:   
2022                                          Episodic  
   
                                                    Phlebitis;   
thrombophlebitis and   
thromboembolism  
(19 sources)                            Deep venous   
thrombosis;   
Translations:   
[Acute venous   
embolism and   
thrombosis of   
unspecified deep   
vessels of lower   
extremity]                              Onset:   
2024                Episodic  
   
                                                    Sprains and strains  
(20 sources)                            Sprain of hip;   
Translations: [Hip   
strain]                                 Onset:   
2024                Episodic  
   
                                                    Unclassified  
(7 sources)                             Never smoked   
tobacco;   
Translations:   
[Never a smoker]                                              
   
                                                    Unclassified  
(1 source)                                          Onset:   
2024                  
   
                                                    Varicose veins of lower   
extremity  
(20 sources)                            Varicose ulcer of   
lower extremity;   
Translations:   
[Varicose veins of   
right lower   
extremity with   
ulcer other part of   
lower leg]                              Onset:   
2024                Episodic  
  
  
  
Results  
  
  
                          Test Name    Value        Interpretation Reference   
Range                                   Facility  
   
                                                    GLUCOSE POCT GLUCOMETERSon 1  
2024   
   
                      COMMEMT1   Glu2: Cleaned Meter                       [facility]  
   
                      Glucose [Mass/Vol] 97 mg/dL                         [facility]  
   
                                        Comment on above:   Random Glucose Refer  
ence Range is dependent on time and  
content of last meal. Glucose of more than 200 mg/dL in a  
nonstressed, ambulatory subject supports the diagnosis of  
Diabetes Mellitus.  
  
   
   
                                                                  [facility]  
   
                                                    Glucose Glucometer (BldC) [M  
ass/Vol]Ordered By: Shannen Ellis on 2024   
   
                                        Glucose [Mass/Vol]  Capillary blood   
glucose measurement   
by glucometer   
(mass/volume)                                               [facility]  
   
                                        Comment on above:   Random Glucose Refer  
ence Range is dependent on time and   
content of last meal. Glucose of more than 200 mg/dL in a   
nonstressed, ambulatory subject supports the diagnosis of   
Diabetes Mellitus.   
   
                                                    Glucose Poct Glucometerson 1  
2024   
   
                      Commemt1   Glu2: Cleaned Meter Normal                [facility]   
Physician   
Group  
   
                                        Comment on above:   Result Comment: PERF  
ORMED BY:  
[facility]  
[place] [address]  
[phone]  
PATHOLOGIST MEDICAL DIRECTOR  
YULY HERRERA M.D.   
   
                                                            Performed By: #### G  
LUMAEGAN ####  
Point of Care testing  
,   
   
                      Glucose [Mass/Vol] 97 mg/dL   Normal                [facility]   
Physician   
Group  
   
                                        Comment on above:   Result Comment: Rand  
om Glucose Reference Range is dependent on  
   
time and  
content of last meal. Glucose of more than 200 mg/dL in a  
nonstressed, ambulatory subject supports the diagnosis of  
Diabetes Mellitus.   
   
                                                            Performed By: #### G  
LULS ####  
Point of Care testing  
,   
   
                                                    Nba 2024   
   
                                        L                   --------------------  
-  
---------------------  
--------  
Specimen: [id number]   
Received:   
 Status:   
JOLLY German Num:   
[id number]  
Spec Type: Surgical   
Subm Dr: Shannen Ellis, DO  
  
Tissues: A Colon   
Biopsy (POLYPS @   
ASCENDING COLON)  
B Colon Biopsy   
(POLYPS @ 80CM)  
Procedures: HE/6,   
Gross/Micro L4/2  
---------------------  
--------  
Age/  
Patient Birth Sex   
Location Account   
Attending Physician  
---------------------  
--------  
Jocelin Pacheco 69/M SC   
[id number] Shannen Ellis DO  
---------------------  
--------  
  
SPEC NUM: [id number]   
RECD:    
STATUS: JOLLY LEANA NUM:   
[id number]  
KIRIT: [birth year]- SUBM   
DR: Shannen Ellis DO  
  
ENTERED:   
 [facility]   
DR:  
  
SPEC TYPE: Surgical   
DEPT: S  
ENTERED BY: [id number]   
RECV BY: [id number]  
  
ORDERED: HE/6,   
Gross/Micro L4/2  
ORDERED: HE/6,   
Gross/Micro L4/2  
  
Pathological   
Diagnosis  
  
A. Colon, ascending,   
polypectomy:  
- Two tubular   
adenomas.  
  
B. Colon, polyp at 80   
cm, polypectomy:  
- Fragments of   
tubular adenoma.  
  
Clinical Information  
  
Positive Cologuard,   
colon polyps.  
  
Gross Description  
  
Part A is received in   
formalin labeled with   
the patients name,   
date of birth, and   
 polyps at  
ascending colon  are   
two tan-gray, focally   
erythematous,   
friable, 0.6 and 1.1   
cm polypoid  
fragments. The   
specimens are inked   
black at the apparent   
point of attachment.   
The larger  
specimen is serially   
sectioned, and   
entirely submitted in   
cassette A1. The   
smaller specimen  
trisected, and intact   
entirely submitted in   
cassette a 2. (2, ns,   
[id number])J  
  
Part B is received in   
formalin labeled with   
the patients name,   
date of birth, and   
 polyp at  
80 cm  are multiple   
(at least 6),   
tan-pink, delicate   
polypoid fragments,   
1.2 x 0.6 x 0.3 cm  
in aggregate. The   
specimen is filtered   
and entirely   
submitted in a single   
cassette. (1, ns,  
[id number] ) JG  
  
  
---------------------  
--------  
Specimen: [id number]   
Received:   
 Status:   
JOLLY Leana Num:   
[id number]  
Spec Type: Surgical   
Subm Dr: Shannen Ellis DO  
  
Tissues: A Colon   
Biopsy (POLYPS @   
ASCENDING COLON)  
B Colon Biopsy   
(POLYPS @ 80CM)  
Procedures: KIMBERLY/6,   
Gross/Micro L4/2  
---------------------  
--------  
Patient: Jocelin Pacheco [id number]   
(Continued)  
---------------------  
--------  
  
  
Specimen: [id number]   
Received:   
   
(Continued)  
  
  
Signed   
__________(signature   
on file)___________   
Austin Campbell MD 24   
0956  
  
  
---------------------  
--------  
Specimen: [id number]   
Received:   
 Status:   
JOLLY German Num:   
[id number]  
Spec Type: Surgical   
Subm Dr: Shannen Ellis DO  
  
Tissues: A Colon   
Biopsy (POLYPS @   
ASCENDING COLON)  
B Colon Biopsy   
(POLYPS @ 80CM)  
Procedures: CABRERA   
Gross/Micro L4/2  
---------------------  
--------  
Patient: Jocelin Pacheco [id number]   
(Continued)  
---------------------  
--------  
  
  
Specimen: [id number]   
Received:   
   
(Continued)  
  
  
Microscopic   
Description  
  
A B: Microscopic   
examination is   
performed.  
  
CPT Codes  
  
88305 x2  
---------------------  
--------  
  
  
  
  
  
  
  
  
  
  
  
  
  
  
  
  
  
  
  
  
  
  
  
  
  
  
  
  
  
  
  
  
  
  
---------------------  
--------  
Specimen: [id number]   
Received:   
 Status:   
JOLLY German Num:   
[id number]  
Spec Type: Surgical   
Subm Dr: Shannen Ellis DO  
  
Tissues: A Colon   
Biopsy (POLYPS @   
ASCENDING COLON)  
B Colon Biopsy   
(POLYPS @ 80CM)  
Procedures: HE/6,   
Gross/Micro L4/2  
---------------------  
--------  
Patient: Jocelin Pacheco [id number]   
(Continued)  
---------------------  
--------  
  
  
Signed   
__________(signature   
on file)___________   
Austin Campbell MD 24   
0956                Normal                                  [facility]   
[facility]   
[facility]  
   
                                                    No Panel InformationOrdered   
By: Shannen Ellis on 2024   
   
                      Bedside Glucose Comment Glu2: cleaned meter                 
        [facility]  
   
                                                    Laboratory - Hematology and   
Cell countson 2024   
   
                                                    HbA1c (Bld) [Mass   
fraction]       5.4 %                                           [facility]  
   
                                                    No Panel Informationon    
   
                                                                  [facility]  
   
                                                    HbA1c HPLC (Bld) [Mass fract  
ion]on 2024   
   
                                                    HbA1c (Bld) [Mass   
fraction]       5.8 %                                           [facility]  
   
                                                    Aerobic Cultureon 2024  
   
   
                                        Aerobic Culture     Result Tab Codes  
Light Normal Skin   
Tiffany 2 Days  
No Anaerobes Isolated   
3 Days  
Gram Stain Result  
Rare Epithelial Cells  
Rare Gram Positive   
Cocci  
No White Blood Cells   
Seen  
PERFORMED BY:  
[place] [address]  
[phone]  
PATHOLOGIST MEDICAL   
DIRECTOR  
BRIDGETTE SWAIN M.D.    Normal                                  [facility]   
[facility]   
[facility]  
   
                                        Comment on above:   Performed By: #### A  
[facility], GS ####  
[address]   
   
                                                    Aerobic cultureOrdered By: ISAAC Espino on 2024   
   
                                                    Bacteria identified Aer   
cx Nom (Unsp spec) Aerobic culture                                 [facility]  
   
                                                    Anaerobic cultureOrdered By:  
 Elaine Espino on 2024   
   
                                                    Bacteria identified   
Anaer cx Nom (Unsp   
spec)           Anaerobic culture                                 [facility]  
   
                                                    Gram Stainon 2024   
   
                                                    Microscopic observation   
Gram stain Nom (Unsp   
spec)                                   Gram Stain Result  
Rare Epithelial Cells  
Rare Gram Positive   
Cocci  
No White Blood Cells   
Seen  
PERFORMED BY:  
[place] [address]  
[phone]  
PATHOLOGIST MEDICAL   
DIRECTOR  
BRIDGETTE SWAIN M.D.    Normal                                  [facility]   
[facility]   
[facility]  
   
                                        Comment on above:   Performed By: #### A  
[facility], GS ####  
[address]   
   
                                                    Gram stain for investigation  
 of transfusion reactionOrdered By: Elaine Espino on   
2024   
   
                                                    Microscopic observation   
Gram stain Nom (Unsp   
spec)                                   No Anaerobes Isolated   
3 Days                                                      [facility]  
   
                                                    Gram stain microscopyOrdered  
 By: Elaine Espino on 2024   
   
                                                    Microscopic observation   
Gram stain Nom (Unsp   
spec)           Gram stain microscopy                                 [facility]  
   
                                                    Alanine aminotransferase [En  
zymatic activity/volume] in Serum or PlasmaOrdered   
By:   
Vera Lea on 2023   
   
                                                    ALT [Catalytic   
activity/Vol]   16 U/L                                      [facility]  
   
                                                    Albumin [Mass/volume] in Ser  
um or Plasma by Bromocresol green (BCG) dye binding   
methoOrdered By: Vera Lea on 2023   
   
                                                    Albumin BCG dye   
[Mass/Vol]      4.1 g/dL                        3.5-5.7         [facility]  
   
                                                    Alkaline phosphatase [Enzyma  
tic activity/volume] in Serum or PlasmaOrdered By:   
Vera Lea on 2023   
   
                                                    ALP [Catalytic   
activity/Vol]   24 U/L                                    [facility]  
   
                                                    Aspartate aminotransferase [  
Enzymatic activity/volume] in Serum or PlasmaOrdered  
By:   
Vera Lea on 2023   
   
                                                    AST [Catalytic   
activity/Vol]   18 U/L                          13-39           [facility]  
   
                                                    Automated erythrocytes count  
 in urine sediment (number/area)Ordered By: Vera Lea on 2023   
   
                                                    RBC Auto (Urine sed)   
[#/Area]        1-2 [HPF]                       0-4             [facility]  
   
                                                    Automated leukocytes count i  
n urine sediment (number/area)Ordered By: Vera Lea on 2023   
   
                                                    WBC Auto (Urine sed)   
[#/Area]        None seen [HPF]                 0-4             [facility]  
   
                                                    Bilirubin Test strip Ql (U)O  
rdered By: Vera Lea on 2023   
   
                      Bilirubin Ql (U) Negative              Negative   [facility]  
   
                                                    Bilirubin.total [Mass/volume  
] in Serum or PlasmaOrdered By: Vera Lea   
on   
2023   
   
                      Bilirubin [Mass/Vol] 0.7 mg/dL             0.3-1.0    [facility]  
   
                                                    Calcium [Mass/volume] in Ser  
um or PlasmaOrdered By: Vera Lea on   
2023   
   
                      Calcium [Mass/Vol] 9.3 mg/dL             8.6-10.3   [facility]  
   
                                                    Carbon dioxide, total [Moles  
/volume] in Serum or PlasmaOrdered By: Vera Lea on 2023   
   
                      CO2 [Moles/Vol] 25.0 mmol/L            21.0-31.0  [facility]  
   
                                                    Chloride [Moles/volume] in S  
santa or PlasmaOrdered By: Vera Lea on   
2023   
   
                      Chloride [Moles/Vol] 105 mmol/L                 [facility]  
   
                                                    Cholesterol [Mass/volume] in  
 Serum or PlasmaOrdered By: Vera Lea on   
2023   
   
                      Cholesterol [Mass/Vol] 175 mg/dL             140-200    [facility]  
   
                                        Comment on above:   Chol less than 200 m  
g/dl low riskChol 201-239 mg/dl borderline  
   
riskChol 240 mg/dl and greater high risk   
   
                                                    Cholesterol in LDL Calc [Mas  
s/Vol]Ordered By: Vera Lea on 2023   
   
                                                    Cholesterol in LDL   
[Mass/Vol]      113 mg/dL                       0-100           [facility]  
   
                                        Comment on above:   LDL ATP III CLASSIFI  
CATIONLDL less than 100 mg/dL OptimalLDL   
100-129 mg/dL Near or above optimalLDL 130-159 mg/dL   
Borderline highLDL 160-189 mg/dL HighLDL greater than 189   
mg/dL Very high   
   
                                                    Cholesterol in VLDL Calc [Ma  
ss/Vol]Ordered By: Vera Lea on 2023  
   
   
                                                    Cholesterol in VLDL   
[Mass/Vol]      19 mg/dL                                        [facility]  
   
                                                    Color Auto (U)Ordered By: Sa ileana Lea on 2023   
   
                      Color (U)  Yellow                Yellow     [facility]  
   
                                                    Creatinine [Mass/volume] in   
Serum or PlasmaOrdered By: Vera Lea on   
2023   
   
                      Creatinine [Mass/Vol] 1.03 mg/dL            0.70-1.30  [facility]  
   
                                                    Creatinine [Mass/volume] in   
UrineOrdered By: Vera Lea on 2023   
   
                                                    Creatinine (U)   
[Mass/Vol]      107.0 mg/dL                     14.0-26.0       [facility]  
   
                                                    Erythrocyte distribution wid  
th Auto (RBC) [Ratio]Ordered By: Vera Lea  
 on   
2023   
   
                                                    Erythrocyte   
distribution width   
(RBC) [Ratio]   14.0 %                          12.0-14.8       [facility]  
   
                                                    Globulin Calc (S) [Mass/Vol]  
Ordered By: Vera Lea on 2023   
   
                      Globulin (S) [Mass/Vol] 3.0 g/dL                         [facility]  
   
                                                    Glucose [Mass/volume] in Ser  
um or PlasmaOrdered By: Vera Lea on   
2023   
   
                      Glucose [Mass/Vol] 95 mg/dL                   [facility]  
   
                                        Comment on above:   ADA recommended refe  
rence rangeRandom Glucose Reference Range   
is dependent on time and content of last meal. Glucose of more   
than 200 mg/dL in a nonstressed, ambulatory subject supports   
the diagnosis of Diabetes Mellitus.   
   
                                                    Glucose mean value [Mass/vol  
ume] in Blood Estimated from glycated   
hemoglobinOrdered   
By: Vera Lea on 2023   
   
                                                    Average glucose   
Estimated from glycated   
hemoglobin (Bld)   
[Mass/Vol]      114 mg/dL                                       [facility]  
   
                                                    Hematocrit Auto (Bld) [Volum  
e fraction]Ordered By: Vera Lea on   
2023   
   
                                                    Hematocrit (Bld)   
[Volume fraction] 44.1 %                          38.8-50.0       [facility]  
   
                                                    Hemoglobin A1c percentageOrd  
ered By: Vera Lea on 2023   
   
                                                    HbA1c (Bld) [Mass   
fraction]       5.6 %                           4.3-5.6         [facility]  
   
                                        Comment on above:   Increased risk for d  
iabetes: 5.7 - 6.4diabetes: >6.4glycemic   
control for adults with diabetes: <7.0   
   
                                                    Hemoglobin [Mass/volume] in   
BloodOrdered By: Vera Lea on 2023   
   
                                                    Hemoglobin (Bld)   
[Mass/Vol]      15.0 g/dL                       13.0-17.0       [facility]  
   
                                                    Ketones Auto test strip (U)   
[Mass/Vol]Ordered By: Vera Lea on   
2023   
   
                      Ketones (U) [Mass/Vol] Negative              Negative   [facility]  
   
                                                    Laboratory - UrinalysisOrder  
ed By: Vera Lea on 2023   
   
                                                    Hyaline casts LM Ql   
(Urine sed)     None seen [LPF]                 0-8             [facility]  
   
                                                    Leukocytes [#/volume] correc  
radha for nucleated erythrocytes in Blood by Automated  
   
counOrdered By: Vera Lea on 2023   
   
                                                    WBC corrected for nucl   
RBC Auto (Bld) [#/Vol] 8.9 10*3/uL                     4.1-10.5        [facility]  
   
                                                    MCH Auto (RBC) [Entitic mass  
]Ordered By: Vera Lea on 2023   
   
                                                    MCH (RBC) [Entitic   
mass]           29.9 pg                         27.5-35.2       [facility]  
   
                                                    MCHC Auto (RBC) [Mass/Vol]Or  
dered By: Vera Lea on 2023   
   
                      MCHC (RBC) [Mass/Vol] 34.1 g/dL             32.5-35.6  [facility]  
   
                                                    MCV Auto (RBC) [Entitic vol]  
Ordered By: Vera Lea on 2023   
   
                      MCV (RBC) [Entitic vol] 87.7 fL               83.5-101   F  
[facility]  
   
                                                    Microalbumin [Mass/volume] i  
n UrineOrdered By: Vera Lea on 2023  
   
   
                                                    Albumin DL <= 20 mg/L   
(U) [Mass/Vol]  mg/dL                           0.0-1.8         [facility]  
   
                                                    Nitrite Test strip Ql (U)Ord  
ered By: Vera Lea on 2023   
   
                      Nitrite Ql (U) Negative              Negative   [facility]  
   
                                                    No Panel InformationOrdered   
By: Vera Lea on 2023   
   
                      Estimated GFR (CKD-EPI) > 60.0 mL/Min                       
  [facility]  
   
                                                    Pharmacy Creatinine   
Clearance (Chem N/A                                             [facility]  
   
                                                    Platelet mean volume Auto (B  
ld) [Entitic vol]Ordered By: Vera Lea on   
2023   
   
                                                    Platelet mean volume   
(Bld) [Entitic vol] 7.5 fL                          6.6-10.1        [facility]  
   
                                                    Platelets Auto (Bld) [#/Vol]  
Ordered By: Vera Lea on 2023   
   
                      Platelets (Bld) [#/Vol] 303 10*3/uL            150-450      
[facility]  
   
                                                    Potassium [Moles/volume] in   
Serum or PlasmaOrdered By: Vera Lea on   
2023   
   
                      Potassium [Moles/Vol] 4.4 mmol/L            3.5-5.1    [facility]  
   
                                                    Prostate specific Ag [Mass/v  
olume] in Serum or PlasmaOrdered By: Vera Antony   
on 2023   
   
                                                    Prostate specific Ag   
[Mass/Vol]      2.660 ng/mL                     0.000-4.000     [facility]  
   
                                                    Protein Auto test strip (U)   
[Mass/Vol]Ordered By: Vera Lea on   
2023   
   
                      Protein (U) [Mass/Vol] Negative              Negative   [facility]  
   
                                                    Protein [Mass/volume] in Ser  
um or PlasmaOrdered By: Vera Lea on   
2023   
   
                      Protein [Mass/Vol] 7.1 g/dL              6.4-8.9    [facility]  
   
                                                    RBC Auto (Bld) [#/Vol]Ordere  
d By: Vera Lea on 2023   
   
                      RBC (Bld) [#/Vol] 5.03 10*6/uL            3.90-5.60  [facility]  
   
                                                    Serum or plasma albumin/glob  
ulin mass ratioOrdered By: Vera Lea on   
2023   
   
                                                    Albumin/Globulin [Mass   
ratio]          1.4 {ratio}                                     [facility]  
   
                                                    Serum or plasma anion gap de  
terminationOrdered By: Vera Lea on   
2023   
   
                      Anion gap [Moles/Vol] 10.4 mmol/L            6.0-15.0   [facility]  
   
                                                    Serum or plasma high density  
 lipoprotein (HDL) cholesterol measurementOrdered   
By:   
Vera Lea on 2023   
   
                                                    Cholesterol in HDL   
[Mass/Vol]      42 mg/dL                        23-92           [facility]  
   
                                        Comment on above:   HDL CHOL ATP-III CLA  
SSIFICATION Cardiovascular RiskHDL > or   
equal to 60 mg/dL LOWHDL < 40 mg/dL HIGH   
   
                                                    Serum or plasma total choles  
terol/high density lipoprotein (HDL) cholesterol   
mass   
ratOrdered By: Vera Lea on 2023   
   
                                                    Cholesterol.total/Ami  
sterol in HDL [Mass   
ratio]          4.2 {ratio}                     <5.0            [facility]  
   
                                                    Sodium [Moles/volume] in Ser  
um or PlasmaOrdered By: Vera Lea on   
2023   
   
                      Sodium [Moles/Vol] 136 mmol/L            136-145    [facility]  
   
                                                    Specific gravity Auto test s  
trip (U) [Rel density]Ordered By: Vera Antony on   
2023   
   
                                                    Specific gravity (U)   
[Rel density]   1.022                           1.001-1.030     [facility]  
   
                                                    Squamous epithelial cells de  
tection in urine sediment by light microscopyOrdered  
By:   
Vera Lea on 2023   
   
                                                    Epithelial   
cells.squamous LM Ql   
(Urine sed)     0-1 [HPF]                       0-2             [facility]  
   
                                                    Triglyceride [Mass/volume] i  
n Serum or PlasmaOrdered By: Vera Lea on   
2023   
   
                      Triglyceride [Mass/Vol] 99 mg/dL              0-149      F  
[facility]  
   
                                        Comment on above:   TRIG ATP III CLASSIF  
ICATIONTRIG less than 150 mg/dL NormalTRIG  
   
150-199 mg/dL Borderline highTRIG 200-500 mg/dL High TRIG   
greater than 500 mg/dL Very highStandard traceable to the   
Center for Disease Conrtrol and Prevention (CDC) test method.   
   
                                                    Urea nitrogen [Mass/volume]   
in Serum or PlasmaOrdered By: Vera Lea on  
   
2023   
   
                                                    Urea nitrogen   
[Mass/Vol]      19 mg/dL                        7-25            [facility]  
   
                                                    Urine bacteria detection by   
automated methodOrdered By: Vera Lea on   
2023   
   
                      Bacteria Auto Ql (U) None seen             None Seen  [facility]  
   
                                                    Urine clarity by refractomet  
ry automatedOrdered By: Vera Lea on   
2023   
   
                                                    Clarity Refractometry   
automated (U)   Clear                           Clear           [facility]  
   
                                                    Urine glucose measurement by  
 automated test strip (mass/volume)Ordered By:   
Vera Lea on 2023   
   
                                                    Glucose Auto test strip   
(U) [Mass/Vol]  >=1000 mg/dL                    Normal          [facility]  
   
                                                    Urine hemoglobin detection b  
y automated test stripOrdered By: Vera Antony on   
2023   
   
                                                    Hemoglobin Auto test   
strip Ql (U)    Negative                        Negative        [facility]  
   
                                                    Urine leukocyte esterase det  
ection by automated test stripOrdered By: Vera Lea on 2023   
   
                                                    Leukocyte esterase Auto   
test strip Ql (U) Negative                        Negative        [facility]  
   
                                                    Urine microalbumin/creatinin  
e mass ratioOrdered By: Vera Lea on   
2023   
   
                                                    Albumin/Creatinine DL   
<= 20 mg/L (U) [Mass   
ratio]          TNP                                             [facility]  
   
                                        Comment on above:   Test not performed   
   
                                                    Urobilinogen Auto test strip  
 (U) [Mass/Vol]Ordered By: Vera Lea on   
2023   
   
                                                    Urobilinogen (U)   
[Mass/Vol]      Normal mg/dL                    Normal          [facility]  
   
                                                    pH Auto test strip (U)Ordere  
d By: Vera Lea on 2023   
   
                      pH (U)     5.5 [pH]              5.0-9.0    [facility]  
   
                                                    A1C HEMOGLOBINon 2023   
   
                                                    HbA1c (Bld) [Mass   
fraction]       5.6 %                                           [facility]   
[employer]   
[employer]  
Other Phone:   
[phone]  
   
                                                    HbA1c (Bld) [Mass fraction]o  
n 2023   
   
                      A1C HEMOGLOBIN                                  [place]  
[facility]   
[employer]   
[employer]  
Other Phone:   
[phone]  
   
                                                    Office Visit (Cardiology)on   
2023   
   
                                        Follow-up visit     Diagnoses/Problems  
Assessed  
Chest pain,   
unspecified type   
(786.50) (R07.9)  
CHF (NYHA class II,   
ACC/AHA stage C)   
(428.0) (I50.9)  
LVH (left ventricular   
hypertrophy) (429.3)   
(I51.7)  
Non-ischemic   
cardiomyopathy   
(425.4) (I42.8)  
DVT (deep venous   
thrombosis) (453.40)   
(I82.409)  
Never a smoker  
Prediabetes (790.29)   
(R73.03)  
DIMITRI on CPAP (327.23)   
(G47.33)  
Morbid obesity with   
BMI of 45.0-49.9,   
adult (278.01,V85.42)   
(E66.01,Z68.42)  
Orders  
CHF (NYHA class II,   
ACC/AHA stage C)  
Renew: Spironolactone   
25 MG Oral Tablet;   
TAKE 1 TABLET DAILY  
CHF (NYHA class II,   
ACC/AHA stage C),   
Prediabetes  
Renew: Jardiance 10   
MG Oral Tablet; TAKE   
1 TABLET BY MOUTH   
ONCE DAILY  
Morbid obesity with   
BMI of 45.0-49.9,   
adult  
Healthy Weight Tips;   
Status:Complete -   
Retrospective   
Authorization; Done:   
[id number]  
Some eating tips that   
can help you lose   
weight.;   
Status:Complete -   
Retrospective  
Authorization; Done:   
[id number]  
SocHx: Never a smoker  
Tobacco Use   
Screening;   
Status:Complete;   
Done: [id number]  
Patient Instructions  
Please bring all   
medicines, vitamins,   
and herbal   
supplements with you   
when you come to the   
office.  
Prescriptions will   
not be filled unless   
you are compliant   
with your follow up   
appointments or have   
a follow up   
appointment scheduled   
as per instruction of   
your physician.   
Refills should be   
requested at the time   
of your visit.  
Patient to have labs   
forward to Dr. Compa Whitley DO   
when completed by pcp   
in 2023  
Follow up in 10   
months with Sharlene An NP  
  
  
Chief Complaint  
JOCELIN PACHECO is being   
seen for a 6 month   
follow-up of.  
Patient is a   
58-year-old gentleman   
returns for   
follow-up. He has had   
an over 30 pound   
weight loss since   
  
Ozempic therapy.  
He has a history of   
mild ASHD, mild to   
moderate nonischemic   
cardiomyopathy with   
persistent ejection   
fraction of 42%. He   
has underlying morbid   
obesity, diabetes,   
obstructive sleep   
apnea, history of   
remote DVT, recurrent   
ulcers on the calf   
due to venous stasis.   
His NYHA   
classification is   
class II-III/C. He is   
still working out   
with cardiac rehab   
and activities that   
are amenable to his   
stature and body   
habitus and   
capability.  
He has no   
hospitalizations for   
heart failure, no   
syncope, no falls, no   
angina  
Recommendations,   
continue GDMT as   
reviewed and   
prescribed, follow-up   
in 8 to 10 months   
with nurse   
practitioner  
  
Surgical History   
Problems  
History of Complete   
colonoscopy  
History of Finger   
surgical procedure  
History of   
Gallbladder surgery  
Past Medical History  
Problems  
History of arthritis   
(V13.4) (Z87.39)  
History of bleeding   
disorder (V12.3)   
(Z86.2)  
History of cardiac   
disorder (V12.50)   
(Z86.79)  
History of Numbness   
and tingling (782.0)   
(R20.0,R20.2)  
History of Vision   
problems (V41.0)   
(H54.7)  
Current Meds  
  
Medication   
NameInstruction  
Carvedilol 6.25 MG   
Oral TabletTAKE 1   
TABLET TWICE DAILY   
WITH MEALS.  
Jardiance 10 MG Oral   
TabletTAKE 1 TABLET   
BY MOUTH ONCE DAILY  
Lasix TABSTAKE 1   
TABLET TWICE DAILY.  
Nabumetone 750 MG   
Oral TabletTAKE 1   
TABLET EVERY 12 HOURS   
DAILY.  
Ozempic (1 MG/DOSE)   
SOPNINJECT 1 UNIT   
Weekly  
Spironolactone 25 MG   
Oral TabletTAKE 1   
TABLET DAILY.  
Xarelto 10 MG Oral   
TabletTake 1 tablet   
daily  
Patient did not bring   
medication list or   
bottles. Updated   
verbally with patient  
Allergies  
Medication  
Cephalosporins  
Adverse Reaction;   
Rash; Recorded By:   
Etelvnia Bradshaw;   
2022 8:55:43 AM  
Statins  
Adverse Reaction;   
Rash; Recorded By:   
Etelvina Bradshaw;   
2022 8:55:43 AM  
Voltaren  
Adverse Reaction;   
Rash; Recorded By:   
Etelvina Bradshaw;   
2022 8:55:43 AM  
Social History  
Problems  
Daily caffeine   
consumption  
coffee 1-2 pots a day  
Never a smoker  
No alcohol use  
No illicit drug use  
Review of Systems  
Constitutional: not   
feeling tired.  
Cardiovascular: no   
intermittent leg   
claudication and as   
noted in HPI.  
Respiratory: no cough   
and no shortness of   
breath.  
Gastrointestinal: no   
change in bowel   
habits and no blood   
in stools.  
Integumentary: no   
skin rashes.  
Neurological: no   
seizures and no   
frequent falls.  
All other systems   
have been reviewed   
and are negative for   
complaint.  
  
Vitals  
Vital Signs  
Recorded: [id number]   
10:37AM  
Heart Rate74, L   
Radial  
[id number], LUE,   
Sitting  
Bbfuoyjob33, LUE,   
Sitting  
Height5 ft 11 in  
Xczzpb413 lb  
BMI Rjptpftzft23.37   
kg/m2  
BSA Calculated2.69  
Tobacco Useb) No  
Falls Screening (Age   
18+)a) No falls   
within the last year  
Physical Exam  
Constitutional: alert   
and in no acute   
distress.  
Neck: neck is supple,   
symmetric, trachea   
midline, no masses   
and no thyromegaly .  
Pulmonary: no   
increased work of   
breathing or signs of   
respiratory distress   
and lungs clear to   
auscultation.  
Cardiovascular:   
carotid pulses 2+   
bilaterally with no   
bruit , JVP was   
normal, no thrills ,   
regular rhythm,   
normal S1 and S2, no   
murmurs , pedal   
pulses 2+ bilaterally   
and no edema .  
Abdomen: abdomen   
non-tender, no masses   
and no hepatomegaly .  
Skin: skin warm and   
dry, normal skin   
turgor .  
Psychiatric judgment   
and insight is normal   
and orien (more   
content not   
included)...        Normal                                   [facility]  
   
                                                    [facility] MUGA SCAN INJECTIONon    
   
                                        [facility] MUGA SCAN INJECTION MRN: [id number]  
Patient Name: JOCELIN PACHECO  
  
STUDY:  
MUGA  
  
Performing facility:  
[facility],  
[address], [address]   
[address],  
[place] [address]  
[facility] Provider: KOBI Whitley DO,   
[facility]  
PCP: Dr. OLIVER Lea  
Supervising provider:   
Ze Flores MD  
  
INDICATION:  
Chest Pain; CHF NICM  
  
HISTORY:  
Gender: M; Age: 69 y/o ; Height: 0 cm;   
Weight: 0 kg.  
  
Chest Pain; CHF NICM  
Denies smoking.  
  
COMPARISON:  
Previous nuclear   
testing completed   
[id number] MPI at [facility].  
  
  
ACCESSION NUMBER(S):  
[id number]; [id number]  
  
ORDERING CLINICIAN:  
COMPA WHITLEY  
  
TECHNIQUE:  
The patient received   
an IV injection of 3   
ml of stannous  
pyrophosphate (PYP)   
using the in-vivo   
method of labeling   
red blood  
cells. After 15   
minutes the patient   
received another IV   
injection of  
27.4 mCi of   
Technetium 99m   
pertechnetate. Planar   
image of the left  
ventricle was   
obtained in the [place]   
45.  
  
FINDINGS:  
The right ventricle   
was normal.  
The left ventricle   
was enlarged in size.  
Regional wall motion   
was abnormal.  
Global resting LVEF   
was abnormal- at 42%.  
  
IMPRESSION:  
Normal resting right   
ventricular function.  
Abnormalresting left   
ventricular function.  
Left ventricular   
ejection fraction is   
42%.  
No previous studies   
are available for   
comparison  
  
Electronically signed   
by: ELROY AGUDELO MD         Normal                                  [facility]  
   
                                                    No Panel Informationon    
   
                                            Normal                -[facility]   
[place] [address] DO  
Work Phone:   
[phone]  
   
                                                    Creatinine and Glomerular fi  
ltration rate.predicted panel (S/P/Bld)Ordered By: JASON Whitley on 2023   
   
                      Creatinine [Mass/Vol] 1.12 mg/dL            0.64-1.27  [facility]  
   
                                                    Estimated glomerular filtrat  
ion rate (GFR) non- AmericanOrdered By: JASON Whitley   
on 2023   
   
                                                    GFR/1.73 sq M.predicted   
among non-blacks MDRD   
(S/P/Bld) [Vol   
rate/Area]      > 60 mL/Min                                     [facility]  
   
                                                    Laboratory - Chemistry and C  
hemistry - challengeOrdered By: JASON Whitley on   
2023   
   
                                                    Natriuretic peptide B   
(Bld) [Mass/Vol] 13.0 pg/mL                      5-100           [facility]  
   
                                                    No Panel InformationOrdered   
By: JASON Whitley on 2023   
   
                                                    Estimated GFR (   
American)       > 60 mL/Min                                     [facility]  
   
                                        Comment on above:   GFR estimated refere  
nce range: According to KDOQI guidelines,   
<60 ml/min/1.73m2 is sufficient to diagnose a patient with   
chronic kidney disease.   
   
                                                    Pharmacy Creatinine   
Clearance (Chem N/A                                             [facility]  
   
                                                    No Panel Informationon    
   
                                 10.7\S\10.7 Normal     6.0-15.0   [facility]  
   
[place]   
[address] DO  
Work Phone:   
[phone]  
   
                                 9.0\S\9.0  Normal     8.2-10.2   [facility]   
[place]   
[address] DO  
Work Phone:   
[phone]  
   
                                        Comment on above:   PERFORMED BY:[facility]   
[address], [place] [id number] MEDICAL   
DIRECTORBRIDGETTE SWAIN M.D.   
   
                                 24.1\S\24.1 Normal     22.0-30.0  [facility]  
   
[place]   
[address] DO  
Work Phone:   
[phone]  
   
                                 103\S\103  Normal          [facility]   
[place]   
[address] DO  
Work Phone:   
[phone]  
   
                                 4.8\S\4.8  Normal     3.5-5.1    [facility]   
[place]   
[address] DO  
Work Phone:   
[phone]  
   
                                                133\S\133       below low   
threshold                 136-146                   [facility]   
[place]   
250 DO  
Work Phone:   
[phone]  
   
                                 > 60       Normal                [facility]   
[place]   
[address] DO  
Work Phone:   
[phone]  
   
                                        Comment on above:   GFR estimated refere  
nce range: According to KDOQI guidelines,   
<60 ml/min/1.73m2 is sufficient to diagnose a patient with   
chronic kidney disease.   
   
                                 1.12\S\1.12 Normal     0.64-1.27  [facility]  
   
[place]   
[address] DO  
Work Phone:   
[phone]  
   
                                 17\S\17    Normal     9-23       [facility]   
[place]   
[address] DO  
Work Phone:   
[phone]  
   
                                 95\S\95    Normal          [facility]   
[address] DO  
Work Phone:   
[phone]  
   
                                        Comment on above:   Random Glucose Refer  
ence Range is dependent on time and   
content of last meal. Glucose of more than 200 mg/dL in a   
nonstressed, ambulatory subject supports the diagnosis of   
Diabetes Mellitus. ADA recommended reference range   
   
                                 13.0\S\13.0 Normal     5-100      [facility]   
[address] DO  
Work Phone:   
[phone]  
   
                                        Comment on above:   PERFORMED BY:[facility]   
[address], [place] [id number] MEDICAL   
DIRECTORBRIDGETTE SWAIN M.D.   
   
                                                    Serum or plasma anion gap de  
terminationOrdered By: JASON Whitley on 2023   
   
                      Anion gap [Moles/Vol] 10.7 mmol/L            6.0-15.0   [facility]  
   
                                                    Serum or plasma calcium cong  
urement (mass/volume)Ordered By: JASON Whitley on   
2023   
   
                      Calcium [Mass/Vol] 9.0 mg/dL             8.2-10.2   [facility]  
   
                                                    Serum or plasma chloride patricia  
surement (moles/volume)Ordered By: JASON Whitley on   
2023   
   
                      Chloride [Moles/Vol] 103 mmol/L                 [facility]  
   
                                                    Serum or plasma glucose cong  
urement (mass/volume)Ordered By: JASON Whitley on   
2023   
   
                      Glucose [Mass/Vol] 95 mg/dL                   [facility]  
   
                                        Comment on above:   ADA recommended refe  
rence rangeRandom Glucose Reference Range   
is dependent on time and content of last meal. Glucose of more   
than 200 mg/dL in a nonstressed, ambulatory subject supports   
the diagnosis of Diabetes Mellitus.   
   
                                                    Serum or plasma potassium me  
asurement (moles/volume)Ordered By: JASON Whitley on   
2023   
   
                      Potassium [Moles/Vol] 4.8 mmol/L            3.5-5.1    [facility]  
   
                                                    Serum or plasma sodium measu  
rement (moles/volume)Ordered By: JASON Whitley on   
2023   
   
                      Sodium [Moles/Vol] 133 mmol/L            136-146    [facility]  
   
                                                    Serum or plasma total carbon  
 dioxide measurement (moles/volume)Ordered By: JASON Whitley   
on 2023   
   
                      CO2 [Moles/Vol] 24.1 mmol/L            22.0-30.0  [facility]  
   
                                                    Serum or plasma urea nitroge  
n measurement (mass/volume)Ordered By: JASON Whitley on   
2023   
   
                                                    Urea nitrogen   
[Mass/Vol]      17 mg/dL                        9-23            [facility]  
   
                                                    Falls Screening (Age 18+)on   
2023   
   
                                                    Adult depression   
screening assessment No                                              [facility]  
[place]   
[place]   
[address] DO  
Work Phone:   
[phone]  
   
                                        Fall risk assessment a) No falls within   
the last year                                               [facility]   
[place]   
[address] DO  
Work Phone:   
[phone]  
   
                      Tobacco use status CPHS b) No                            M  
[facility]   
[place]   
[address] DO  
Work Phone:   
[phone]  
   
                                                    Falls Screening (Age   
18+)                                    Patient is not at   
high risk for falls.   
Falls risk guidance   
reviewed today                                              [facility]   
[place]   
[address] DO  
Work Phone:   
[phone]  
   
                                                    Office Visit (Cardiology)on   
2023   
   
                                        Follow-up visit     Diagnoses/Problems  
Assessed  
DIMITRI on CPAP   
(327.23,V46.8)   
(G47.33,Z99.89)  
CHF (NYHA class II,   
ACC/AHA stage C)   
(428.0) (I50.9)  
Non-ischemic   
cardiomyopathy   
(425.4) (I42.8)  
Chest pain,   
unspecified type   
(786.50) (R07.9)  
Morbid obesity with   
BMI of 50.0-59.9,   
adult (278.01,V85.43)   
(E66.01,Z68.43)  
Never a smoker  
DVT (deep venous   
thrombosis) (453.40)   
(I82.409)  
Orders  
Chest pain,   
unspecified type, CHF   
(NYHA class II,   
ACC/AHA stage C),   
Non-ischemic  
cardiomyopathy  
Basic Metabolic   
Panel; Status:Active   
- Retrospective   
Authorization;   
Requested  
for:2023;  
NM [place] ([place]   
Cardiac Blood Pool);   
Status:Hold For -   
Scheduling,Retrospect  
anthony  
Authorization;   
Requested   
for:2023;  
Radiologist to   
Determine Optimal   
Study : Y  
What are the   
patient's signs and   
symptoms? : cp  
Brain Natriuretic   
Peptide BNP;   
Status:Active -   
Retrospective   
Authorization;   
Requested  
for:2023;  
CHF (NYHA class II,   
ACC/AHA stage C),   
Non-ischemic   
cardiomyopathy  
Renew: Carvedilol   
6.25 MG Oral Tablet;   
TAKE 1 TABLET TWICE   
DAILY WITH MEALS  
Morbid obesity with   
BMI of 50.0-59.9,   
adult  
Healthy Weight Tips;   
Status:Complete -   
Retrospective   
Authorization; Done:   
2023  
Some eating tips that   
can help you lose   
weight.;   
Status:Complete -   
Retrospective  
Authorization; Done:   
2023  
SocHx: Never a smoker  
Tobacco Use   
Screening;   
Status:Complete;   
Done: 2023  
Patient Instructions  
Please bring all   
medicines, vitamins,   
and herbal   
supplements with you   
when you come to the   
office.  
Prescriptions will   
not be filled unless   
you are compliant   
with your follow up   
appointments or have   
a follow up   
appointment scheduled   
as per instruction of   
your physician.   
Refills should be   
requested at the time   
of your visit.  
  
Follow up in 6 months  
  
  
Chief Complaint  
JOCELIN PACHECO is being   
seen for a 6 month   
follow-up of.  
62-year-old gentleman   
who returns for   
annual visit he is   
doing well just   
complains atypical   
chest discomfort   
described as tingling   
as well as sharp and   
somewhat painful   
sensation both at   
rest and with   
activities that are   
sporadic and episodic   
only but not   
reproducible. He has   
a history of moderate   
nonischemic   
cardiomyopathy,   
obstructive sleep   
apnea, morbid   
obesity, history of   
remote DVT (remains   
on low-dose Xarelto).  
Stress perfusion   
imaging from 2022 revealed patchy   
tracer uptake,   
proceed ischemia or   
infarction, normal   
left ventricular   
volume with global   
hypokinesis and   
ejection fraction of   
44% normal transient   
ischemic index.  
Heart catheterization   
from  revealed 30   
to 50% ostial   
circumflex disease,   
20 to 25% mid LAD   
disease normal right   
coronary and at that   
time ejection   
fraction of 40 to   
45%.  
Since  with his   
medical history   
continue therapies.   
We have transitioned   
over to Entresto,   
Jardiance, carvedilol   
and spironolactone  
Unfortunately he has   
had no significant   
weight loss despite   
going to cardiac   
rehab 3 times a week.   
He remains 365   
pounds, similar to   
  
Recommendations,   
obtain a MUGA scan   
current therapies,   
obtain appropriate   
laboratories   
including Chem-6 and   
BNP, we will have him   
come back in 6 months   
continues to have any   
further chest   
discomfort will   
prescribe nitrates   
and/or consider   
invasive assessment.  
  
Active Problems   
Problems  
Abnormal stress test   
(794.39) (R94.39)  
DVT (deep venous   
thrombosis) (453.40)   
(I82.409)  
Knee pain (719.46)   
(M25.569)  
LVH (left ventricular   
hypertrophy) (429.3)   
(I51.7)  
Morbid obesity with   
BMI of 50.0-59.9,   
adult (278.01,V85.43)   
(E66.01,Z68.43)  
Never a smoker  
DIMITRI on CPAP   
(327.23,V46.8)   
(G47.33,Z99.89)  
Prediabetes (790.29)   
(R73.03)  
Primary   
osteoarthritis of   
left knee (715.16)   
(M17.12)  
Primary   
osteoarthritis of   
right knee (715.16)   
(M17.11)  
SOB (shortness of   
breath) on exertion   
(786.05) (R06.02)  
Surgical History  
Problems  
History of Complete   
colonoscopy  
History of Finger   
surgical procedure  
History of   
Gallbladder surgery  
Past Medical History  
Problems  
History of arthritis   
(V13.4) (Z87.39)  
History of bleeding   
disorder (V12.3)   
(Z86.2)  
History of cardiac   
disorder (V12.50)   
(Z86.79)  
History of Numbness   
and tingling (782.0)   
(R20.0,R20.2)  
History of Vision   
problems (V41.0)   
(H54.7)  
Current Meds  
  
Medication   
NameInstruction  
Carvedilol 6.25 MG   
Oral TabletTAKE 1   
TABLET TWICE DAILY   
WITH MEALS.  
Entresto 24-26 MG   
Oral TabletTAKE 1   
TABLET BY MOUTH TWICE   
A DAY  
Jardiance 10 MG Oral   
TabletTAKE 1 TABLET   
BY MOUTH ONCE DAILY  
Lasix TABSTAKE 1   
TABLET TWICE DAILY.  
Nabumetone 750 MG   
Oral TabletTAKE 1   
TABLET EVERY 12 HOURS   
DAILY.  
Ozempic (1 MG/DOSE)   
SOPNINJECT 1 UNIT   
Weekly  
Spironolactone 25 MG   
Oral TabletTAKE 1   
TABLET DAILY.  
Xarelto 10 MG Oral   
TabletTake 1 tablet   
daily  
Allergies  
Medication  
Cephalosporins  
Adverse Reaction;   
Rash; Recorded By:   
Etelvina Bradshaw;   
2022 8:55:43 AM  
Statins  
Adverse Reaction;   
Rash; Recorded By:   
Etelvina Bradshaw;   
2022 8:55:43 AM  
Voltaren  
Adverse Reaction;   
Rash; Recorded By:   
Etelvina Bradshaw;   
2022 8:55:43 AM  
Family History  
Mother  
Family history of   
COPD, moderate (more   
content not   
included)...        Normal                                  [facility]  
   
                                                    A1C HEMOGLOBINon 2022   
   
                                                    HbA1c (Bld) [Mass   
fraction]       5.4 %                                           [facility]   
Professional   
Corporation  
Other Phone:   
[phone]  
   
                                                    HbA1c (Bld) [Mass fraction]o  
n 2022   
   
                      A1C HEMOGLOBIN                                  [facility]   
Professional   
Corporation  
Other Phone:   
[phone]  
   
                                                    No Panel Informationon    
   
                                 9.3\S\9.3  Normal     8.2-10.2   [facility]   
[place]   
[address] DO  
Work Phone:   
[phone]  
   
                                        Comment on above:   PERFORMED BY:[place] [address] [address] [place], [place] [id number] MEDICAL   
DIRECTORBRIDGETTE SWAIN M.D.   
   
                                 25.0\S\25.0 Normal     22.0-30.0  [facility]  
   
[place]   
[address] DO  
Work Phone:   
[phone]  
   
                                 101\S\101  Normal          [facility]   
[place]   
[address] DO  
Work Phone:   
[phone]  
   
                                 4.3\S\4.3  Normal     3.5-5.1    [facility]   
[place]   
250 DO  
Work Phone:   
[phone]  
   
                                                135\S\135       below low   
threshold                 136-146                   [facility]   
[place]   
250 DO  
Work Phone:   
[phone]  
   
                                 > 60       Normal                [facility]   
[place]   
250 DO  
Work Phone:   
[phone]  
   
                                        Comment on above:   GFR estimated refere  
nce range: According to KDOQI guidelines,   
<60 ml/min/1.73m2 is sufficient to diagnose a patient with   
chronic kidney disease.   
   
                                 54\S\54    Normal                [facility]   
[place]   
[address] DO  
Work Phone:   
[phone]  
   
                                                1.33\S\1.33     above high   
threshold                 0.64-1.27                 [facility]   
[place]   
[address] DO  
Work Phone:   
[phone]  
   
                                 20\S\20    Normal     9-23       [facility]   
[place]   
[address] DO  
Work Phone:   
[phone]  
   
                                 92\S\92    Normal          [facility]   
"Acronym [employer]   
250 DO  
Work Phone:   
[phone]  
   
                                        Comment on above:   Random Glucose Refer  
ence Range is dependent on time and   
content of last meal. Glucose of more than 200 mg/dL in a   
nonstressed, ambulatory subject supports the diagnosis of   
Diabetes Mellitus. ADA recommended reference range   
   
                                                    Tobacco Screening.on   
022   
   
                                                    Adult depression   
screening assessment No                                              [facility]  
[employer] DO  
Work Phone:   
[phone]  
   
                                        Fall risk assessment b) One or more fall  
s   
in the last year                                            [facility]   
[facility]   
250 DO  
Work Phone:   
[phone]  
   
                      Tobacco use status CPHS b) No                            M  
[facility]   
[employer] DO  
Work Phone:   
[phone]  
   
                                                    Complete Blood Count with Au  
to Diffon 2022   
   
                                                    Erythrocyte   
distribution width   
(RBC) [Ratio]   13.0 %          Normal          11.0-15.0       [facility]   
Medical   
Specialist  
   
                                        Comment on above:   Performed By: #### M  
DIFF, CMP, CBCAD ####  
[facility]  
[address], [place] [id number]   
   
                                                    Hematocrit (Bld)   
[Volume fraction] 42.7 %          Normal          38.5-50.0       [facility]   
Medical   
Specialist  
   
                                        Comment on above:   Performed By: #### M  
DIFF, CMP, CBCAD ####  
[facility]  
[address], [place] [id number]   
   
                                                    Hemoglobin (Bld)   
[Mass/Vol]      14.5 g/dL       Normal          13.0-17.1       [facility]   
Medical   
Specialist  
   
                                        Comment on above:   Performed By: #### M  
DIFF, CMP, CBCAD ####  
[facility]  
[address], [place] [id number]   
   
                                                    MCH (RBC) [Entitic   
mass]           29.4 pg         Normal          27.0-33.0       [facility]   
Medical   
Specialist  
   
                                        Comment on above:   Performed By: #### M  
DIFF, CMP, CBCAD ####  
[facility]  
[address], [place] [id number]   
   
                      MCHC (RBC) [Mass/Vol] 34.0 g/dL  Normal     32.0-36.0  [facility]  
   
                                        Comment on above:   Performed By: #### M  
DIFF, CMP, CBCAD ####  
[facility]  
[address], [place] [id number]   
   
                      MCV (RBC) [Entitic vol] 87 fL      Normal          N  
[facility]  
   
                                        Comment on above:   Performed By: #### M  
DIFF, CMP, CBCAD ####  
[facility]  
[address], [place] [id number]   
   
                                                    Platelet mean volume   
(Bld) [Entitic vol] 9.40 fL         Normal          7.50-12.50      [facility]  
   
                                        Comment on above:   Performed By: #### M  
DIFF, CMP, CBCAD ####  
[facility]  
[address], [place] [id number]   
   
                      Platelets (Bld) [#/Vol] 316 10*3/uL Normal     140-400      
[facility]  
   
                                        Comment on above:   Performed By: #### M  
DIFF, CMP, CBCAD ####  
[facility]  
[address], [place] [id number]   
   
                      RBC (Bld) [#/Vol] 4.93 10*6/uL Normal     4.20-5.80  [facility]  
   
                                        Comment on above:   Performed By: #### M  
DIFF, CMP, CBCAD ####  
[facility]  
[address], [place] [id number]   
   
                      RDW-SD     40.3 fL    Normal     37.0-50.0  [facility]   
Specialist  
   
                                        Comment on above:   Performed By: #### M  
DIFF, CMP, CBCAD ####  
[facility]  
[address], [place] [id number]   
   
                      REFLEX     Manual Differential Normal                [facility]  
   
                                        Comment on above:   Performed By: #### M  
DIFF, CMP, CBCAD ####  
[facility]  
[address], [place] [id number]   
   
                      WBC (Bld) [#/Vol] 6.9 10*3/uL Normal     3.8-11.0   [facility]  
[facility]   
Medical   
Specialist  
   
                                        Comment on above:   Performed By: #### M  
DIFF, CMP, CBCAD ####  
[facility]  
[address], [place] [id number]   
   
                                                    Comprehensive Metabolic Pane  
nba 2022   
   
                      Albumin [Mass/Vol] 4.4 g/dL   Normal     3.6-5.1    [facility]  
[facility] [place]   
Medical   
Specialist  
   
                                        Comment on above:   Performed By: #### M  
DIFF, CMP, CBCAD ####  
[facility]  
[address] [facility] [place] [id number]   
   
                                                    Albumin/Globulin [Mass   
ratio]          1.8 {ratio}     Normal          1.0-2.5         [facility]   
Specialist  
   
                                        Comment on above:   Performed By: #### M  
DIFF, CMP, CBCAD ####  
[facility]  
[address] [place] [id number]   
   
                                                    ALP [Catalytic   
activity/Vol]   34 U/L          Low                       [facility]   
Specialist  
   
                                        Comment on above:   Performed By: #### M  
DIFF, CMP, CBCAD ####  
[facility]  
[address] [facility] [place] [id number]   
   
                                                    ALT [Catalytic   
activity/Vol]   19 U/L          Normal          9-46            [facility]   
Specialist  
   
                                        Comment on above:   Result Comment:  Female reference range changed.   
   
                                                            Performed By: #### M  
DIFF, CMP, CBCAD ####  
[facility]  
[address], [place] [id number]   
   
                      Anion gap [Moles/Vol] 21 mmol/L  High       12-20      [facility]  
   
                                        Comment on above:   Result Comment: Effe  
ctive 2020 reference range changed.   
   
                                                            Performed By: #### M  
DIFF, CMP, CBCAD ####  
[facility]  
[address], [place] [id number]   
   
                                                    AST [Catalytic   
activity/Vol]   24 U/L          Normal          10-40           [facility]  
   
                                        Comment on above:   Performed By: #### M  
DIFF, CMP, CBCAD ####  
[facility]  
[address] [facility] [place] [id number]   
   
                      Bilirubin [Mass/Vol] 0.56 mg/dL Normal     0.30-1.20  [facility]  
   
                                        Comment on above:   Performed By: #### M  
DIFF, CMP, CBCAD ####  
[facility]  
[address], [place] [id number]   
   
                      BUN/CREA   17 Ratio   Normal     6-22       [facility]   
Specialist  
   
                                        Comment on above:   Performed By: #### M  
DIFF, CMP, CBCAD ####  
[facility]  
[address] [facility] [id number]   
   
                      Calcium [Mass/Vol] 9.3 mg/dL  Normal     8.6-10.2   [facility]  
[facility] [place]   
Medical   
Specialist  
   
                                        Comment on above:   Performed By: #### M  
DIFF, CMP, CBCAD ####  
[facility]  
[address], [place] [id number]   
   
                      Chloride [Moles/Vol] 100 mmol/L Normal          [facility]  
   
                                        Comment on above:   Performed By: #### M  
DIFF, CMP, CBCAD ####  
[facility]  
[address], [place] [id number]   
   
                      CO2 [Moles/Vol] 20 mmol/L  Normal     20-31      [facility]   
Specialist  
   
                                        Comment on above:   Performed By: #### M  
DIFF, CMP, CBCAD ####  
[facility]  
[address], [place] [id number]   
   
                      Creatinine [Mass/Vol] 1.0 mg/dL  Normal     0.7-1.4    [facility]  
   
                                        Comment on above:   Performed By: #### M  
DIFF, CMP, CBCAD ####  
[facility]  
[address], [place] [id number]   
   
                      eGFRAA     88 mL/min/1.73m2 Normal     >60        [facility]  
   
                                        Comment on above:   Performed By: #### M  
DIFF, CMP, CBCAD ####  
[facility]  
[address], [place] [id number]   
   
                      eGFRNAA    72 mL/min/1.73m2 Normal     >60        [facility]  
   
                                        Comment on above:   Performed By: #### M  
DIFF, CMP, CBCAD ####  
[facility]  
[address], [place] [id number]   
   
                      Globulin (S) [Mass/Vol] 2.5 g/dL   Normal     1.9-3.7    [facility]  
   
                                        Comment on above:   Performed By: #### M  
DIFF, CMP, CBCAD ####  
[facility]  
[address], [place] [id number]   
   
                      Glucose [Mass/Vol] 111 mg/dL  High       65-99      [facility]  
[facility] [place]   
Medical   
Specialist  
   
                                        Comment on above:   Result Comment: For   
FASTING Glucose --- ADA reference ranges:  
Normal 65-99 mg/dl  
Prediabetes 100-125  
Diabetes >/= 126   
   
                                                            Performed By: #### M  
DIFF, CMP, CBCAD ####  
[facility]  
[address], [place] [id number]   
   
                      Potassium [Moles/Vol] 4.5 mmol/L Normal     3.5-5.5    [facility]  
   
                                        Comment on above:   Performed By: #### M  
DIFF, CMP, CBCAD ####  
[facility]  
[address], [place] [id number]   
   
                      Protein [Mass/Vol] 6.9 g/dL   Normal     6.1-8.1    [facility]  
[facility] [place]   
Medical   
Specialist  
   
                                        Comment on above:   Performed By: #### M  
DIFF, CMP, CBCAD ####  
[facility]  
[address], [place] [id number]   
   
                      Sodium [Moles/Vol] 137 mmol/L Normal     135-146    [facility]  
[facility] [place]   
Medical   
Specialist  
   
                                        Comment on above:   Performed By: #### M  
DIFF, CMP, CBCAD ####  
[facility]  
[address], [place] [id number]   
   
                                                    Urea nitrogen   
[Mass/Vol]      17 mg/dL        Normal          7-25            [facility]   
Specialist  
   
                                        Comment on above:   Performed By: #### M  
DIFF, CMP, CBCAD ####  
[facility]  
[address], [place] [id number]   
   
                                                    Manual Differentialon 2022   
   
                      BAND       0.0 %      Normal                [facility]   
Specialist  
   
                                        Comment on above:   Performed By: #### M  
DIFF, CMP, CBCAD ####  
[facility]  
[address], [place] [id number]   
   
                      BANDABS    0.0 K/uL   Normal                [facility]   
Specialist  
   
                                        Comment on above:   Performed By: #### M  
DIFF, CMP, CBCAD ####  
[facility]  
[address], [place] [id number]   
   
                      BASO       0.0 %      Normal                [facility]   
Specialist  
   
                                        Comment on above:   Performed By: #### M  
DIFF, CMP, CBCAD ####  
[facility]  
[address], [place] [id number]   
   
                      BASOABS    0.0 K/uL   Normal     0.0-0.2    [facility]   
Specialist  
   
                                        Comment on above:   Performed By: #### M  
DIFF, CMP, CBCAD ####  
[facility]  
[address], [place] [id number]   
   
                      EOS        6.0 %      Normal                [facility]   
Specialist  
   
                                        Comment on above:   Performed By: #### M  
DIFF, CMP, CBCAD ####  
[facility]  
[address], [place] [id number]   
   
                      EOSABS     0.4 K/uL   Normal     0.0-0.5    [facility]   
Specialist  
   
                                        Comment on above:   Performed By: #### M  
DIFF, CMP, CBCAD ####  
[facility]  
[address], [place] [id number]   
   
                      LYMPH      32.0 %     Normal                [facility]   
Specialist  
   
                                        Comment on above:   Performed By: #### M  
DIFF, CMP, CBCAD ####  
[facility]  
[address], [place] [id number]   
   
                      LYMPHABS   2.2 K/uL   Normal     0.9-3.9    [facility]   
Medical   
Specialist  
   
                                        Comment on above:   Performed By: #### M  
DIFF, CMP, CBCAD ####  
[facility]  
[address], [place] [id number]   
   
                      LYMPHATYP  0.0 %      Low        0.9-3.9    [facility]   
Medical   
Specialist  
   
                                        Comment on above:   Performed By: #### M  
DIFF, CMP, CBCAD ####  
[facility]  
[address], [place] [id number]   
   
                      MONO       16.0 %     Normal                [facility]   
Specialist  
   
                                        Comment on above:   Performed By: #### M  
DIFF, CMP, CBCAD ####  
[facility]  
[address], [place] [id number]   
   
                      MONOABS    1.1 K/uL   High       0.2-0.9    [facility]   
Medical   
Specialist  
   
                                        Comment on above:   Performed By: #### M  
DIFF, CMP, CBCAD ####  
[facility]  
[address], [place] [id number]   
   
                      SEG        46.0 %     Normal                [facility]   
Medical   
Specialist  
   
                                        Comment on above:   Performed By: #### M  
DIFF, CMP, CBCAD ####  
[facility]  
[address], [place] [id number]   
   
                      SEGABS     3.2 K/uL   Normal     1.5-7.8    [facility]   
Medical   
Specialist  
   
                                        Comment on above:   Performed By: #### M  
DIFF, CMP, CBCAD ####  
[facility]  
[address], [place] [id number]   
   
                      WBC        6.9 K/uL   Normal     3.8-11.0   [facility]   
Medical   
Specialist  
   
                                        Comment on above:   Performed By: #### M  
DIFF, CMP, CBCAD ####  
[facility]  
[address], [place] [id number]   
   
                                                    XR Chest 2 Views*on 20   
   
                                        XR Chest 2 Views*   HISTORY: Calm and   
shortness of breath  
  
COMPARISON: None   
available  
  
TECHNIQUE: Frontal   
and lateral views of   
the chest  
  
  
FINDINGS:  
  
The cardiomediastinal   
silhouette is within   
normal limits. Small   
subtle bilateral  
lower lobe opacities.   
No pneumothorax or   
pleural effusion. No   
acute osseous   
abnormality.  
No changes of the   
spine.  
  
  
IMPRESSION:  
  
Small subtle   
bilateral lower lobe   
opacities may   
represent   
atelectasis, or   
pneumonia  
in the appropriate   
clinical setting.  
  
  
  
  
  
  
Report reported and   
signed by Marcus Mckeon on 2022   
1321                Normal                                  [facility]   
Specialist  
  
  
  
Vital Signs  
  
  
                          Date Time    Vital Sign   Value        Performing   
Clinician                               Facility  
   
                                                    2024   
11:                              Diastolic blood   
pressure                  59 mm[Hg]                 Vera   
Matias-Ada DO  
Work Phone:   
[phone]                          [facility]  
   
                                                    2024   
11:          Heart rate          83 /min             [facility]era   
Matias-Ada DO  
Work Phone:   
[phone]                          [facility]  
   
                                                    2024   
11:          Respiratory rate    20 /min             [facility]era   
Matias-Ada DO  
Work Phone:   
[phone]                          [facility]  
   
                                                    2024   
11:                              SaO2% (BldA) [Mass   
fraction]                 97 %                      [facility]   
Matias-Ada DO  
Work Phone:   
[phone]                          [facility]  
   
                                                    2024   
11:                              Systolic blood   
pressure                  124 mm[Hg]                [facility]   
Matias-Ada DO  
Work Phone:   
[phone]                          [facility]  
   
                                                    2024   
11:          Body temperature    97.8 [degF]         [facility]era   
Matias-Ada DO  
Work Phone:   
[phone]                          [facility]  
   
                                                    2024   
10:          Body height         180.34 cm           Vera   
Matias-Ada DO  
Work Phone:   
[phone]                          [facility]  
   
                                                    2024   
10:          Body weight         157.85 kg           [facility]   
Matias-Ada DO  
Work Phone:   
[phone]                          [facility]  
   
                                                    2024   
11:          Body temperature    98.1 [degF]         [facility]era   
Matias-Ada DO  
Work Phone:   
[phone]                          [facility]  
   
                                                    2024   
11:                              Diastolic blood   
pressure                  67 mm[Hg]                 [facility]era   
Matias-Ada DO  
Work Phone:   
[phone]                          [facility]  
   
                                                    2024   
11:          Heart rate          81 /min             Vera   
Matias-Ada DO  
Work Phone:   
[phone]                          [facility]  
   
                                                    2024   
11:          Respiratory rate    18 /min             Vera   
Matias-Ada DO  
Work Phone:   
[phone]                          [facility]  
   
                                                    2024   
11:                              Systolic blood   
pressure                  121 mm[Hg]                Vera   
Matias-Ada DO  
Work Phone:   
[phone]                          [facility]  
   
                                                    2024   
11:          Body height         180.34 cm           Vera   
Matias-Ada DO  
Work Phone:   
[phone]                          [facility]  
   
                                                    2024   
11:                              Body mass index   
(BMI) [Ratio]             51.2 kg/m2                [facility]   
Matias-Ada DO  
Work Phone:   
[phone]                          [facility]  
   
                                                    2024   
11:          Body weight         166.46 kg           Vera   
Matias-Ada DO  
Work Phone:   
[phone]                          [facility]  
   
                                                    10-   
09:          Body height         180.3 cm            Shannen Itzkowitz   
DO  
Work Phone:   
[phone]                          [facility]  
   
                                                    10-   
09:                              Body mass index   
(BMI) [Ratio]             50.35 kg/m2               Shannen Itzkowitz   
DO  
Work Phone:   
[phone]                          [facility]  
   
                                                    10-   
09:          Body weight         163.75 kg           Shannen Itzkowitz   
DO  
Work Phone:   
[phone]                          [facility]  
   
                                                    10-   
09:                              Diastolic blood   
pressure                  85 mm[Hg]                 Shannen Itzkowitz   
DO  
Work Phone:   
[phone]                          [facility]  
   
                                                    10-   
09:                              Systolic blood   
pressure                  135 mm[Hg]                Shannen Itzkowitz   
DO  
Work Phone:   
[phone]                          [facility]  
   
                                                    2024   
15:                              Body mass index   
(BMI) [Ratio]             49.99 kg/m2               [facility]   
Matias-Ada DO  
Work Phone:   
[phone]                          [facility]  
   
                                                    2024   
15:          Body weight         162.57 kg           Vera   
Matias-Ada DO  
Work Phone:   
[phone]                          [facility]  
   
                                                    2024   
15:                              Diastolic blood   
pressure                  66 mm[Hg]                 Vera   
Matias-Ada DO  
Work Phone:   
[phone]                          [facility]  
   
                                                    2024   
15:          Heart rate          86 /min             Vera   
Matias-Ada DO  
Work Phone:   
[phone]                          [facility]  
   
                                                    2024   
15:                              SaO2% (BldA) [Mass   
fraction]                 97 %                      [facility]era   
Matias-Ada DO  
Work Phone:   
[phone]                          [facility]  
   
                                                    2024   
15:                              Systolic blood   
pressure                  112 mm[Hg]                Vera   
Matias-Ada DO  
Work Phone:   
[phone]                          [facility]  
   
                                                    2024   
11:          Body temperature    97.7 [degF]         DO Vera   
Matias-Ada  
Work Phone:   
[phone]                          [facility]  
   
                                                    2024   
11:                              Diastolic blood   
pressure                  70 mm[Hg]                 DO Vera   
Matias-Ada  
Work Phone:   
[phone]                          [facility]  
   
                                                    2024   
11:          Heart rate          76 /min             DO Vera   
Matias-Ada  
Work Phone:   
[phone]                          [facility]  
   
                                                    2024   
11:          Respiratory rate    18 /min             DO Vera   
Matias-Ada  
Work Phone:   
[phone]                          [facility]  
   
                                                    2024   
11:                              Systolic blood   
pressure                  127 mm[Hg]                DO Vera   
Matias-Ada  
Work Phone:   
[phone]                          [facility]  
   
                                                    2024   
15:          Body height         180.34 cm           DO Vera   
[facility]  
Work Phone:   
[phone]                          [facility]  
   
                                                    2024   
15:                              Body mass index   
(BMI) [Ratio]             51.2 kg/m2                DO Vera   
[facility]  
Work Phone:   
[phone]                          [facility]  
   
                                                    2024   
15:          Body weight         166.46 kg           DO Vera   
[facility]  
Work Phone:   
[phone]                          [facility]  
   
                                                    2024   
13:          Body height         180.34 cm           DO Vera   
[facility]  
Work Phone:   
[phone]                          [facility]  
   
                                                    2024   
13:                              Body mass index   
(BMI) [Ratio]             51.5 kg/m2                DO Vera   
[facility]  
Work Phone:   
[phone]                          [facility]  
   
                                                    2024   
13:          Body weight         167.43 kg           DO Vera   
[facility]  
Work Phone:   
[phone]                          [facility]  
   
                                                    2024   
13:                              Diastolic blood   
pressure                  58 mm[Hg]                 DO Vera   
[facility]  
Work Phone:   
[phone]                          [facility]  
   
                                                    2024   
13:          Heart rate          86 /min             DO Vera   
[facility]  
Work Phone:   
[phone]                          [facility]  
   
                                                    2024   
13:          Respiratory rate    20 /min             DO Vera   
[facility]  
Work Phone:   
[phone]                          [facility]  
   
                                                    2024   
13:                              SaO2% (BldA) [Mass   
fraction]                 97 %                      DO Vera   
[facility]  
Work Phone:   
[phone]                          [facility]  
   
                                                    2024   
13:                              Systolic blood   
pressure                  110 mm[Hg]                DO Vera   
[facility]  
Work Phone:   
[phone]                          [facility]  
   
                                                    2024   
16:          Body height         180.34 cm           DO Vera   
[facility]  
Work Phone:   
[phone]                          [facility]  
   
                                                    2024   
16:                              Body mass index   
(BMI) [Ratio]             51.2 kg/m2                DO Vera   
[facility]  
Work Phone:   
[phone]                          [facility]  
   
                                                    2024   
16:          Body weight         166.46 kg           DO Vera   
[facility]  
Work Phone:   
[phone]                          [facility]  
   
                                                    2024   
15:          Body temperature    98.4 [degF]         DO Vera   
[facility]  
Work Phone:   
[phone]                          [facility]  
   
                                                    2024   
15:                              Diastolic blood   
pressure                  70 mm[Hg]                 DO Vera   
[facility]  
Work Phone:   
[phone]                          [facility]  
   
                                                    2024   
15:          Heart rate          99 /min             DO Vera   
[facility]  
Work Phone:   
[phone]                          [facility]  
   
                                                    2024   
15:          Respiratory rate    18 /min             DO Vera   
[facility]  
Work Phone:   
[phone]                          [facility]  
   
                                                    2024   
15:                              Systolic blood   
pressure                  131 mm[Hg]                DO Vera   
[facility]  
Work Phone:   
[phone]                          [facility]  
   
                                                    2024   
15:          Body height         180.34 cm           DO Vera   
[facility]  
Work Phone:   
[phone]                          [facility]  
   
                                                    2024   
15:                              Body mass index   
(BMI) [Ratio]             52.1 kg/m2                DO Vera   
[facility]  
Work Phone:   
[phone]                          [facility]  
   
                                                    2024   
15:          Body weight         169.64 kg           DO Vera   
[facility]  
Work Phone:   
[phone]                          [facility]  
   
                                                    2024   
15:          Body height         180.34 cm           DO Vera   
[facility]  
Work Phone:   
[phone]                          [facility]  
   
                                                    2024   
15:                              Body mass index   
(BMI) [Ratio]             51.2 kg/m2                DO Vera   
[facility]  
Work Phone:   
[phone]                          [facility]  
   
                                                    2024   
15:          Body weight         166.46 kg           DO Vera   
[facility]  
Work Phone:   
[phone]                          [facility]  
   
                                                    2024   
15:          Body temperature    98.1 [degF]         DO Vera   
[facility]  
Work Phone:   
[phone]                          [facility]  
   
                                                    2024   
15:                              Diastolic blood   
pressure                  75 mm[Hg]                 DO Vera   
[facility]  
Work Phone:   
[phone]                          [facility]  
   
                                                    2024   
15:          Heart rate          76 /min             DO Vera   
[facility]  
Work Phone:   
[phone]                          [facility]  
   
                                                    2024   
15:          Respiratory rate    20 /min             DO Vera   
[facility]  
Work Phone:   
[phone]                          [facility]  
   
                                                    2024   
15:                              Systolic blood   
pressure                  133 mm[Hg]                DO Vear   
[facility]  
Work Phone:   
[phone]                          [facility]  
   
                                                    2024   
15:          Body height         180.34 cm           DO Vera   
[facility]  
Work Phone:   
[phone]                          [facility]  
   
                                                    2024   
15:                              Body mass index   
(BMI) [Ratio]             52.9 kg/m2                DO Vera   
[facility]  
Work Phone:   
[phone]                          [facility]  
   
                                                    2024   
15:          Body weight         172.36 kg           DO Vera   
[facility]  
Work Phone:   
[phone]                          [facility]  
   
                                                    2024   
15:                              Diastolic blood   
pressure                  66 mm[Hg]                 DO Vera   
[facility]  
Work Phone:   
[phone]                          [facility]  
   
                                                    2024   
15:          Heart rate          71 /min             DO Vera   
[facility]  
Work Phone:   
[phone]                          [facility]  
   
                                                    2024   
15:          Respiratory rate    18 /min             DO Vera   
[facility]  
Work Phone:   
[phone]                          [facility]  
   
                                                    2024   
15:                              SaO2% (BldA) [Mass   
fraction]                 97 %                      DO Vera   
[facility]  
Work Phone:   
[phone]                          [facility]  
   
                                                    2024   
15:                              Systolic blood   
pressure                  105 mm[Hg]                DO Vera   
[facility]  
Work Phone:   
[phone]                          [facility]  
   
                                                    2024   
15:          Body temperature    98.8 [degF]         DO Vera   
[facility]  
Work Phone:   
[phone]                          [facility]  
   
                                                    2024   
15:                              Diastolic blood   
pressure                  62 mm[Hg]                 DO Vera   
[facility]  
Work Phone:   
[phone]                          [facility]  
   
                                                    2024   
15:          Heart rate          83 /min             DO Vera   
[facility]  
Work Phone:   
[phone]                          [facility]  
   
                                                    2024   
15:          Respiratory rate    18 /min             DO Vera   
[facility]  
Work Phone:   
[phone]                          [facility]  
   
                                                    2024   
15:                              Systolic blood   
pressure                  114 mm[Hg]                DO Vera   
[facility]  
Work Phone:   
[phone]                          [facility]  
   
                                                    2024   
14:          Body height         180.34 cm           DO Vera   
[facility]  
Work Phone:   
[phone]                          [facility]  
   
                                                    2024   
14:                              Body mass index   
(BMI) [Ratio]             51.2 kg/m2                DO Vera   
[facility]  
Work Phone:   
[phone]                          [facility]  
   
                                                    2024   
14:          Body weight         166.46 kg           DO Vera   
[facility]  
Work Phone:   
[phone]                          [facility]  
   
                                                    2024   
11:                              Body mass index   
(BMI) [Ratio]             52.72 kg/m2               Sharlene An   
APRN-CNP  
Work Phone:   
[phone]                          [facility]  
   
                                                    2024   
11:          Body weight         171.46 kg           Sharlene An   
APRN-CNP  
Work Phone:   
[phone]                          [facility]  
   
                                                    2024   
11:          Body height         180.3 cm            Sharlene An   
APRN-CNP  
Work Phone:   
[phone]                          [facility]  
   
                                                    2024   
11:                              Diastolic blood   
pressure                  70 mm[Hg]                 Sharlene An   
APRN-CNP  
Work Phone:   
[phone]                          [facility]  
   
                                                    2024   
11:          Heart rate          78 /min             Sharlene An   
APRN-CNP  
Work Phone:   
[phone]                          [facility]  
   
                                                    2024   
11:                              Systolic blood   
pressure                  110 mm[Hg]                Sharlene An   
APRN-CNP  
Work Phone:   
[phone]                          [facility]  
   
                                                    2024   
14:          Body height         180.34 cm           DO Vera   
[facility]  
Work Phone:   
[phone]                          [facility]  
   
                                                    2024   
14:                              Body mass index   
(BMI) [Ratio]             53.3 kg/m2                DO Vera   
[facility]  
Work Phone:   
[phone]                          [facility]  
   
                                                    2024   
14:          Body weight         173.38 kg           DO Vera   
[facility]  
Work Phone:   
[phone]                          [facility]  
   
                                                    2024   
14:                              Diastolic blood   
pressure                  72 mm[Hg]                 DO Vera   
[facility]  
Work Phone:   
[phone]                          [facility]  
   
                                                    2024   
14:          Heart rate          80 /min             DO Vera   
[facility]  
Work Phone:   
[phone]                          [facility]  
   
                                                    2024   
14:          Respiratory rate    20 /min             DO Vera   
[facility]  
Work Phone:   
[phone]                          [facility]  
   
                                                    2024   
14:                              SaO2% (BldA) [Mass   
fraction]                 96 %                      DO Vera   
[facility]  
Work Phone:   
[phone]                          [facility]  
   
                                                    2024   
14:                              Systolic blood   
pressure                  133 mm[Hg]                DO Vera   
[facility]  
Work Phone:   
[phone]                          [facility]  
   
                                                    2024   
14:          Body temperature    97.6 [degF]         DO Vera   
[facility]  
Work Phone:   
[phone]                          [facility]  
   
                                                    2024   
14:                              Diastolic blood   
pressure                  78 mm[Hg]                 DO Vera   
[facility]  
Work Phone:   
[phone]                          [facility]  
   
                                                    2024   
14:          Heart rate          89 /min             DO Vera   
[facility]  
Work Phone:   
[phone]                          [facility]  
   
                                                    2024   
14:          Respiratory rate    24 /min             DO Vera   
[facility]  
Work Phone:   
[phone]                          [facility]  
   
                                                    2024   
14:                              Systolic blood   
pressure                  143 mm[Hg]                DO Vera   
[facility]  
Work Phone:   
[phone]                          [facility]  
   
                                                    2024   
14:          Body height         180.34 cm           DO Vera [facility]  
Work Phone:   
[phone]                          [facility]  
   
                                                    2024   
14:                              Body mass index   
(BMI) [Ratio]             51.5 kg/m2                DO Vera   
[facility]  
Work Phone:   
[phone]                          [facility]  
   
                                                    2024   
14:          Body weight         167.82 kg           DO Vera Alexisaver-Ada  
Work Phone:   
[phone]                          [facility]  
   
                                                    2024   
15:                              Body mass index   
(BMI) [Ratio]             52.44 kg/m2               Ray Vela NP  
Work Phone:   
[phone]                          [facility]  
   
                                                    2024   
15:          Body temperature    97.7 [degF]         Ray Vela NP  
Work Phone:   
[phone]                          [facility]  
   
                                                    2024   
15:          Body weight         170.55 kg           Ray Vela NP  
Work Phone:   
[phone]                          [facility]  
   
                                                    2024   
15:                              Diastolic blood   
pressure                  70 mm[Hg]                 Ray Vela NP  
Work Phone:   
[phone]                          [facility]  
   
                                                    2024   
15:          Heart rate          83 /min             Ray Mirian NP  
Work Phone:   
[phone]                          [facility]  
   
                                                    2024   
15:                              SaO2% (BldA) [Mass   
fraction]                 94 %                      Ray Vela NP  
Work Phone:   
[phone]                          [facility]  
   
                                                    2024   
15:                              Systolic blood   
pressure                  112 mm[Hg]                Ray Vela NP  
Work Phone:   
[phone]                          [facility]  
   
                                                    2023   
11:          Body height         177.8 cm            Jacoby Braden  
Other Phone:   
[phone]                           [facility]   
[employer]  
Other Phone:   
[phone]  
   
                                                    2023   
11:                              Body mass index   
(BMI) [Ratio]             51.86 kg/m2               Jacoby Braden  
Other Phone:   
[phone]                           [facility]   
[employer]  
Other Phone:   
[phone]  
   
                                                    2023   
11:          Body weight         163.98 kg           Jacoby Braden  
Other Phone:   
[phone]                           [facility]   
[employer]  
Other Phone:   
[phone]  
   
                                                    2023   
11:                              Diastolic blood   
pressure                  79 mm[Hg]                 Jacoby Braden  
Other Phone:   
[phone]                           [facility]   
[employer]  
Other Phone:   
[phone]  
   
                                                    2023   
11:          Respiratory rate    18 /min             Jacoby Braden  
Other Phone:   
[phone]                           [facility]   
[employer]  
Other Phone:   
[phone]  
   
                                                    2023   
11:                              SaO2% (BldA) [Mass   
fraction]                 99 %                      Jacoby Braden  
Other Phone:   
[phone]                           [facility]   
[employer]  
Other Phone:   
[phone]  
   
                                                    2023   
11:                              Systolic blood   
pressure                  125 mm[Hg]                Jacoby Zuñigadiff  
Other Phone:   
[phone]                           [facility]   
[employer]  
Other Phone:   
[phone]  
   
                                                    2023   
13:          Body height         180.34 cm           DO Vera   
[facility]  
Work Phone:   
[phone]                          [facility]  
   
                                                    2023   
13:                              Body mass index   
(BMI) [Ratio]             49.2 kg/m2                DO Vera   
[facility]  
Work Phone:   
[phone]                          [facility]  
   
                                                    2023   
13:          Body weight         160.11 kg           DO Vera   
[facility]  
Work Phone:   
[phone]                          [facility]  
   
                                                    2023   
13:          Body temperature    97.7 [degF]         DO Vera   
[facility]  
Work Phone:   
[phone]                          [facility]  
   
                                                    2023   
13:                              Diastolic blood   
pressure                  73 mm[Hg]                 DO Vera   
[facility]  
Work Phone:   
[phone]                          [facility]  
   
                                                    2023   
13:          Heart rate          81 /min             DO Vera   
[facility]  
Work Phone:   
[phone]                          [facility]  
   
                                                    2023   
13:          Respiratory rate    20 /min             DO Vera   
[facility]  
Work Phone:   
[phone]                          [facility]  
   
                                                    2023   
13:                              Systolic blood   
pressure                  123 mm[Hg]                DO Vera   
[facility]  
Work Phone:   
[phone]                          [facility]  
   
                                                    2023   
14:                              [address]                Vera D   
[facility]  
Work Phone:   
[phone]                          [facility]   
[facility] [address]   
[place]  
Work Phone:   
[phone]  
   
                                                    Comment on   
above:                                  [id number]   
   
                                                    2023   
14:          Body height         177.8 cm            Annel Fitt  
Other Phone:   
[phone]                           [facility]   
[employer]  
Other Phone:   
[phone]  
   
                                                    2023   
14:                              Body mass index   
(BMI) [Ratio]             52.41 kg/m2               Annel Fitt  
Other Phone:   
[phone]                           [facility]   
[employer]  
Other Phone:   
[phone]  
   
                                                    2023   
14:          Body weight         165.7 kg            Annel Fitt  
Other Phone:   
[phone]                           [facility]   
[employer]  
Other Phone:   
[phone]  
   
                                                    2023   
09:          Body height         180.34 cm           Vera D   
[facility]  
Work Phone:   
[phone]                          [facility]   
[facility] [place] [address]   
[place]  
Work Phone:   
[phone]  
   
                                                    2023   
09:                              Body mass index   
(BMI) [Ratio]             50.91 kg/m2               Vera D   
[facility]  
Work Phone:   
[phone]                          [facility]   
[address]   
DO  
Work Phone:   
[phone]  
   
                                                    2023   
09:                              Body surface area   
Derived from   
formula                   2.72 m2                   Vera D   
[place]  
Work Phone:   
[phone]                          [facility]   
[address] [place] [address]   
DO  
Work Phone:   
[phone]  
   
                                                    2023   
09:          Body weight         165.56 kg           Vera SIMS   
[place]  
Work Phone:   
[phone]                          [facility]   
[address]   
DO  
Work Phone:   
[phone]  
   
                                                    2023   
09:                              Diastolic blood   
pressure                  78 mm[Hg]                 Vera LEVI   
[place]  
Work Phone:   
[phone]                          [facility]   
[address]   
DO  
Work Phone:   
[phone]  
   
                                                    2023   
09:          Heart rate          90 /min             Vera LEVI   
[place]  
Work Phone:   
[phone]                          [facility]   
[facility] [place] [address]   
DO  
Work Phone:   
[phone]  
   
                                                    2023   
09:                              Systolic blood   
pressure                  124 mm[Hg]                Vera LEVI   
[place]  
Work Phone:   
[phone]                          [facility]   
[address] [place] [address]   
DO  
Work Phone:   
[phone]  
   
                                                    2022   
15:          Body height         177.8 cm            Jacoby Braden  
Other Phone:   
[phone]                           [facility]   
Professional   
Corporation  
Other Phone:   
[phone]  
   
                                                    2022   
15:                              Body mass index   
(BMI) [Ratio]             53.53 kg/m2               Jacoby Braden  
Other Phone:   
[phone]                           [facility]   
Professional   
Corporation  
Other Phone:   
[phone]  
   
                                                    2022   
15:          Body weight         169.24 kg           Jacoby Braden  
Other Phone:   
[phone]                           [facility]   
Professional   
Corporation  
Other Phone:   
[phone]  
   
                                                    2022   
15:                              Diastolic blood   
pressure                  72 mm[Hg]                 Jacoby Braden  
Other Phone:   
[phone]                           [facility]   
Professional   
[employer]poration  
Other Phone:   
[phone]  
   
                                                    2022   
15:          Respiratory rate    20 /min             Jacoby Braden  
Other Phone:   
[phone]                           [facility]   
Professional   
Corporation  
Other Phone:   
[phone]  
   
                                                    2022   
15:                              SaO2% (BldA) [Mass   
fraction]                 98 %                      Jacoby Braden  
Other Phone:   
[phone]                           [facility]   
Professional   
Corporation  
Other Phone:   
[phone]  
   
                                                    2022   
15:                              Systolic blood   
pressure                  132 mm[Hg]                Jacoby Braden  
Other Phone:   
[phone]                           [facility]   
Professional   
[employer]poration  
Other Phone:   
[phone]  
   
                                                    2022   
11:          Body height         177.8 cm            Annel Fitt  
Other Phone:   
[phone]                           [facility]   
Professional   
Corporation  
Other Phone:   
[phone]  
   
                                                    2022   
11:                              Body mass index   
(BMI) [Ratio]             52.17 kg/m2               Annel Fitt  
Other Phone:   
[phone]                           [facility]   
Professional   
[employer]poration  
Other Phone:   
[phone]  
   
                                                    2022   
11:          Body weight         164.93 kg           Annel Tarunt  
Other Phone:   
[phone]                           [facility]   
Professional   
Corporation  
Other Phone:   
[phone]  
   
                                                    10-   
15:          Body height         177.8 cm            Jacoby Braden  
Other Phone:   
[phone]                           [facility]   
Professional   
Corporation  
Other Phone:   
[phone]  
   
                                                    10-   
15:                              Body mass index   
(BMI) [Ratio]             52.52 kg/m2               Jacoby Braden  
Other Phone:   
[phone]                           [facility]   
Professional   
Corporation  
Other Phone:   
[phone]  
   
                                                    10-   
15:          Body weight         166.06 kg           Jacoby Braden  
Other Phone:   
[phone]                           [facility]   
Professional   
Corporation  
Other Phone:   
[phone]  
   
                                                    10-   
15:                              Diastolic blood   
pressure                  68 mm[Hg]                 Jacoby Braden  
Other Phone:   
[phone]                           [facility]   
Professional   
Corporation  
Other Phone:   
[phone]  
   
                                                    10-   
15:          Respiratory rate    20 /min             Jacoby Braden  
Other Phone:   
[phone]                           [facility]   
Professional   
Corporation  
Other Phone:   
[phone]  
   
                                                    10-   
15:                              SaO2% (BldA) [Mass   
fraction]                 99 %                      Jacoby Braden  
Other Phone:   
[phone]                           [facility]   
Professional   
Corporation  
Other Phone:   
[phone]  
   
                                                    10-   
15:                              Systolic blood   
pressure                  112 mm[Hg]                Jacoby Braden  
Other Phone:   
[phone]                           [facility]   
Professional   
Corporation  
Other Phone:   
[phone]  
   
                                                    2022   
13:          Body height         180.34 cm           DO Vera   
[place]  
Work Phone:   
[phone]                          [facility]  
   
                                                    2022   
13:                              Body mass index   
(BMI) [Ratio]             52.4 kg/m2                DO Vera   
[place]  
Work Phone:   
[phone]                          [facility]  
   
                                                    2022   
13:          Body weight         170.55 kg           DO Vera   
[place]  
Work Phone:   
[phone]                          [facility]  
   
                                                    2022   
13:          Body temperature    98.6 [degF]         DO Vera   
[place]  
Work Phone:   
[phone]                          [facility]  
   
                                                    2022   
13:                              Diastolic blood   
pressure                  69 mm[Hg]                 DO Vera   
[place]  
Work Phone:   
[phone]                          [facility]  
   
                                                    2022   
13:          Heart rate          92 /min             DO Vera   
[place]  
Work Phone:   
[phone]                          [facility]  
   
                                                    2022   
13:          Respiratory rate    18 /min             DO Vera   
[place]  
Work Phone:   
[phone]                          [facility]  
   
                                                    2022   
13:                              Systolic blood   
pressure                  119 mm[Hg]                DO Vera   
[place]  
Work Phone:   
[phone]                          [facility]  
   
                                                    2022   
12:          Body height         177.8 cm            Jacoby Zuñigadiff  
Other Phone:   
[phone]                           [facility]   
Professional   
Corporation  
Other Phone:   
[phone]  
   
                                                    2022   
12:                              Body mass index   
(BMI) [Ratio]             53.1 kg/m2                Jacoby Zuñigadiff  
Other Phone:   
[phone]                           [facility]   
Professional   
Corporation  
Other Phone:   
[phone]  
   
                                                    2022   
12:          Body weight         167.88 kg           Jacoby Zuñigadiff  
Other Phone:   
[phone]                           [facility]   
Professional   
[employer]poration  
Other Phone:   
[phone]  
   
                                                    2022   
12:                              Diastolic blood   
pressure                  64 mm[Hg]                 Jacobygeorgia Zuñigadiff  
Other Phone:   
[phone]                           [facility]   
Professional   
[employer]poration  
Other Phone:   
[phone]  
   
                                                    2022   
12:          Respiratory rate    18 /min             Jacoby Zuñigadiff  
Other Phone:   
[phone]                           [facility]   
Professional   
[employer]poration  
Other Phone:   
[phone]  
   
                                                    2022   
12:                              SaO2% (BldA) [Mass   
fraction]                 98 %                      Jacoby Zuñigadiff  
Other Phone:   
[phone]                           [facility]   
Professional   
Corporation  
Other Phone:   
[phone]  
   
                                                    2022   
12:                              Systolic blood   
pressure                  117 mm[Hg]                Jacoby Braden  
Other Phone:   
[phone]                           [facility]   
Professional   
[employer]poration  
Other Phone:   
[phone]  
   
                                                    2022   
15:          Body height         177.8 cm            Jacoby Zuñigadiff  
Other Phone:   
[phone]                           [facility]   
Professional   
[employer]poration  
Other Phone:   
[phone]  
   
                                                    2022   
15:                              Body mass index   
(BMI) [Ratio]             54.81 kg/m2               Jacoby Asiya  
Other Phone:   
[phone]                           [facility]   
Professional   
[employer]poration  
Other Phone:   
[phone]  
   
                                                    2022   
15:          Body weight         173.28 kg           Jacoby Braden  
Other Phone:   
[phone]                           [facility]   
Professional   
[employer]poration  
Other Phone:   
[phone]  
   
                                                    2022   
15:                              Diastolic blood   
pressure                  63 mm[Hg]                 Jacoby Braden  
Other Phone:   
[phone]                           [facility]   
Professional   
[employer]poration  
Other Phone:   
[phone]  
   
                                                    2022   
15:          Respiratory rate    20 /min             Jacoby Zuñigadiff  
Other Phone:   
[phone]                           [facility]   
Professional   
[facility]poration  
Other Phone:   
[phone]  
   
                                                    2022   
15:                              SaO2% (BldA) [Mass   
fraction]                 97 %                      Jacoyb Wolfff  
Other Phone:   
[phone]                           [facility]   
Professional   
Corporation  
Other Phone:   
[phone]  
   
                                                    2022   
15:                              Systolic blood   
pressure                  107 mm[Hg]                Jacoby Zuñigadiff  
Other Phone:   
[phone]                           [facility]   
Professional   
Corporation  
Other Phone:   
[phone]  
   
                                                    2022   
08:                              Diastolic blood   
pressure                  62 mm[Hg]                 Vera SIMS   
[place]  
Work Phone:   
[phone]                          [facility]   
[address]   
DO  
Work Phone:   
[phone]  
   
                                                    2022   
08:                              Systolic blood   
pressure                  128 mm[Hg]                Vera D   
[place]  
Work Phone:   
[phone]                          [facility]   
[address]   
DO  
Work Phone:   
[phone]  
   
                                                    2022   
08:          Body height         180.34 cm           Vera D   
[place]  
Work Phone:   
[phone]                          [facility]   
[address] [place] [address]   
DO  
Work Phone:   
[phone]  
   
                                                    2022   
08:                              Body mass index   
(BMI) [Ratio]             54.12 kg/m2               Vera D   
[place]  
Work Phone:   
[phone]                          [facility]   
[address]   
DO  
Work Phone:   
[phone]  
   
                                                    2022   
08:                              Body surface area   
Derived from   
formula                   2.79 m2                   Vera LEVI   
[place]  
Work Phone:   
[phone]                          [facility]   
[address] [place] [address]   
DO  
Work Phone:   
[phone]  
   
                                                    2022   
08:          Body weight         176 kg              Vera D   
[place]  
Work Phone:   
[phone]                          [facility]   
[address] [place] [address]   
DO  
Work Phone:   
[phone]  
   
                                                    2022   
08:                              Diastolic blood   
pressure                  70 mm[Hg]                 Vera SIMS   
[place]  
Work Phone:   
[phone]                          [facility]   
[address] [place] [address]   
DO  
Work Phone:   
[phone]  
   
                                                    2022   
08:          Heart rate          78 /min             Vera SIMS   
[place]  
Work Phone:   
[phone]                          [facility]   
[address] [place] [address]   
DO  
Work Phone:   
[phone]  
   
                                                    2022   
08:                              Systolic blood   
pressure                  132 mm[Hg]                Vera SIMS   
[place]  
Work Phone:   
[phone]                          [facility]   
[address]   
DO  
Work Phone:   
[phone]  
   
                                                    2022   
15:          Body height         177.8 cm            Annel Fitt  
Other Phone:   
[phone]                           [facility]   
Professional   
Corporation  
Other Phone:   
[phone]  
   
                                                    2022   
15:                              Body mass index   
(BMI) [Ratio]             55.72 kg/m2               Annel Fitt  
Other Phone:   
[phone]                           [facility]   
Professional   
Corporation  
Other Phone:   
[phone]  
   
                                                    2022   
15:          Body weight         176.18 kg           Annel Fitt  
Other Phone:   
[phone]                           [facility]   
Professional   
[employer]poration  
Other Phone:   
[phone]  
   
                                                    2022   
16:          Body height         177.8 cm            lAonso Birmingham  
Other Phone:   
[phone]                           [facility]   
Professional   
[employer]poration  
Other Phone:   
[phone]  
   
                                                    2022   
16:                              Body mass index   
(BMI) [Ratio]             55.59 kg/m2               Alonso Vinnie  
Other Phone:   
[phone]                           [facility]   
Professional   
[facility]poration  
Other Phone:   
[phone]  
   
                                                    2022   
16:          Body temperature    99.5 [degF]         Alonso Vinnie  
Other Phone:   
[phone]                           [facility]   
Professional   
[facility]poration  
Other Phone:   
[phone]  
   
                                                    2022   
16:          Body weight         175.77 kg           Alonso Vinnie  
Other Phone:   
[phone]                           [facility]   
Professional   
[employer]poration  
Other Phone:   
[phone]  
   
                                                    2022   
16:                              Diastolic blood   
pressure                  71 mm[Hg]                 Alonso Vinnie  
Other Phone:   
[phone]                           [facility]   
Professional   
[facility]poration  
Other Phone:   
[phone]  
   
                                                    2022   
16:                              SaO2% (BldA) [Mass   
fraction]                 96 %                      Alonso Vinnie  
Other Phone:   
[phone]                           [facility]   
Professional   
[facility]poration  
Other Phone:   
[phone]  
   
                                                    2022   
16:                              Systolic blood   
pressure                  114 mm[Hg]                Alonso Vinnie  
Other Phone:   
[phone]                           [facility]   
Professional   
[facility]poration  
Other Phone:   
[phone]  
   
                                                    2022   
00:                              60 1                Vera D   
[place]  
Work Phone:   
[phone]                          [facility]   
[address]   
DO  
Work Phone:   
[phone]  
   
                                                    Comment on   
above:                                  [id number]   
   
                                                    2022   
15:          Body height         177.8 cm            Jacoby Braden  
Other Phone:   
[phone]                           [facility]   
Professional   
[facility]poration  
Other Phone:   
[phone]  
   
                                                    2022   
15:                              Body mass index   
(BMI) [Ratio]             59.45 kg/m2               Jacoby Braden  
Other Phone:   
[phone]                           [facility]   
Professional   
[facility]poration  
Other Phone:   
[phone]  
   
                                                    2022   
15:          Body weight         187.97 kg           Jacoby Braden  
Other Phone:   
[phone]                           [facility]   
[facility]   
[facility]poration  
Other Phone:   
[phone]  
   
                                                    2022   
15:                              Diastolic blood   
pressure                  57 mm[Hg]                 Jacoby Braden  
Other Phone:   
[phone]                           [facility]   
[facility]   
[facility]poration  
Other Phone:   
[phone]  
   
                                                    2022   
15:          Respiratory rate    20 /min             Jacoby Braden  
Other Phone:   
[phone]                           [facility]   
Professional   
[facility]poration  
Other Phone:   
[phone]  
   
                                                    2022   
15:                              SaO2% (BldA) [Mass   
fraction]                 96 %                      Jacoby Braden  
Other Phone:   
[phone]                           [facility]   
[facility]   
[facility]poration  
Other Phone:   
[phone]  
   
                                                    2022   
15:                              Systolic blood   
pressure                  125 mm[Hg]                Jacoby Braden  
Other Phone:   
[phone]                           [facility]   
[facility]   
[facility]poration  
Other Phone:   
[phone]  
   
                                                    2022   
16:          Body height         177.8 cm            Alonso Birmingham  
Other Phone:   
[phone]                           [facility]   
Professional   
[facility]poration  
Other Phone:   
[phone]  
   
                                                    2022   
16:                              Body mass index   
(BMI) [Ratio]             59.54 kg/m2               Alonso Birmingham  
Other Phone:   
[phone]                           [facility]   
Professional   
[facility]poration  
Other Phone:   
[phone]  
   
                                                    2022   
16:          Body temperature    97.8 [degF]         Alonso Birmingham  
Other Phone:   
[phone]                           [facility]   
[facility]   
[facility]poration  
Other Phone:   
[phone]  
   
                                                    2022   
16:          Body weight         188.24 kg           Alonso Birmingham  
Other Phone:   
[phone]                           [facility]   
[facility]   
[facility]poration  
Other Phone:   
[phone]  
   
                                                    2022   
16:                              Diastolic blood   
pressure                  77 mm[Hg]                 Alonso Birmingham  
Other Phone:   
[phone]                           [facility]   
[facility]   
[facility]poration  
Other Phone:   
[phone]  
   
                                                    2022   
16:                              SaO2% (BldA) [Mass   
fraction]                 93 %                      Alonso Birmingham  
Other Phone:   
[phone]                           [facility]   
Professional   
[facility]poration  
Other Phone:   
[phone]  
   
                                                    2022   
16:                              Systolic blood   
pressure                  130 mm[Hg]                Alonso Birmingham  
Other Phone:   
[phone]                           [facility]   
Professional   
Corporation  
Other Phone:   
[phone]  
  
  
  
Encounters  
  
  
                          Encounter Date Encounter Type Care Provider Facility  
   
                                                    Start: 2024  
End: 2024           Bamboo flowsheet          Danya An   
DPM  
Work Phone:   
[phone]                          [facility]  
   
                                                    Start: 2024  
End: 2024           Bamboo flowsheet          Danya An   
DPM  
Work Phone:   
[phone]                          [facility]  
   
                                                    Start: 2024  
End: 2024                         Patient encounter   
procedure                               Danya An   
DPM  
Work Phone:   
[phone]                          [facility]  
   
                                        Comment on above:   Onychomycosis (Prima  
ry Dx);  
Pain in both feet;  
Corns and callosities;  
Other specified peripheral vascular diseases (CMS/HCC)   
   
                          Start: 2024 ambulatory   Vera Lea Fac  
ility:[facility]  
   
                                                    Start: 2024  
End: 2024                         Postop follow up visit   
related to original px                  Shannen H Itzkowitz   
DO  
Work Phone:   
[phone]                          [facility]  
   
                                        Comment on above:   Positive colorectal   
cancer screening using Cologuard test   
(Primary   
Dx)   
   
                                                    Start: 2024  
End: 2024           Bamboo flowsheet          Shannen H Itzkowitz   
DO  
Work Phone:   
[phone]                          [facility]  
   
                                                    Start: 2024  
End: 2024           Bamboo flowsheet          Shannen H Itzkowitz   
DO  
Work Phone:   
[phone]                          [facility]  
   
                                                    Start: 2024  
End: 2024     ambulatory          SHANNEN H ITZKOWITZ Not Available  
   
                                                    Start: 2024  
End: 2024           External Result Encounter Shannen H Itzkowitz   
DO  
Work Phone:   
[phone]                          [facility] External   
Department Unsolicited  
   
                                                    Start: 2024  
End: 2024           External Result Encounter Shannen Ellis   
DO  
Work Phone:   
[phone]                          [facility] External   
Department Unsolicited  
   
                                                    Start: 2024  
End: 2024                         Admission to same day   
surgery center                          Vera Lea   
DO  
Work Phone:   
[phone]                          [facility]   
Center [facility] [place]  
   
                                                    Start: 2024  
End: 2024           ambulatory                Vera Lea   
DO  
Work Phone:   
[phone]                          [facility]  
Work Phone:   
[phone]  
   
                                Start: 2024 Registered Recurring Vera Anderson   
DO  
Work Phone:   
[phone]                          [facility]   
[place]  
Work Phone:   
[phone]  
   
                                                    Start: 10-  
End: 10-                         Office outpatient new 30   
minutes                                 Shannen Ledezmatz   
DO  
Work Phone:   
[phone]                          [facility]  
   
                                        Comment on above:   Positive colorectal   
cancer screening using Cologuard test   
(Primary   
Dx)   
   
                                                    Start: 10-  
End: 10-     ambulatory          SHANNEN LEDEZMAMOE Not Available  
   
                                                    Start: 2024  
End: 2024                         Patient encounter   
procedure                               Danya An   
DPM  
Work Phone:   
[phone]                          [facility]  
   
                                        Comment on above:   Onychomycosis (Prima  
ry Dx);  
Pain in both feet;  
Corns and callosities;  
Other specified peripheral vascular diseases (CMS/HCC)   
   
                                                    Start: 2024  
End: 2024     ambulatory          DANYA AN   Not Available  
   
                                                    Start: 2024  
End: 2024           Bamboo flowsheet          Danya An   
DPM  
Work Phone:   
[phone]                          [facility]  
   
                                                    Start: 2024  
End: 2024           Bamboo flowsheet          Danya An   
DPM  
Work Phone:   
[phone]                          [facility]  
   
                                                    Start: 2024  
End: 2024                         Office outpatient visit   
25 minutes                              Vera Lea DO  
Work Phone:   
[phone]                          [facility]  
   
                                        Comment on above:   Peripheral vascular   
disease (CMS/HCC) (Primary Dx);  
Type 2 diabetes mellitus with diabetic peripheral angiopathy   
without gangrene, without long-term current use of insulin   
(CMS/HCC);  
Positive colorectal cancer screening using Cologuard test;  
Venous stasis ulcer of left lower extremity (CMS/HCC);  
Peripheral venous insufficiency;  
Cutaneous candidiasis;  
Other cardiomyopathy (CMS/HCC);  
DIMITRI on CPAP;  
CKD (chronic kidney disease) stage 2, GFR 60-89 ml/min;  
Class 3 obesity (CMS/HCC);  
CPAP (continuous positive airway pressure) dependence   
   
                                                    Start: 2024  
End: 2024           ambulatory                VERA LEA                            Not Available  
   
                                Start: 2024 Registered Recurring DO Vera  
   
[place]  
Work Phone:   
[phone]                          [facility]   
[place]  
Work Phone:   
[phone]  
   
                                                    Start: 2024  
End: 2024           ambulatory                DO Vera   
[place]  
Work Phone:   
[phone]                          [facility]  
Work Phone:   
[phone]  
   
                                                    Start: 2024  
End: 2024           Discharged Recurring      DO Vera [place]  
Work Phone:   
[phone]                          [facility]   
[facility]  
   
                                                    Start: 2024  
End: 2024     ambulatory          DANYA AN   Not Available  
   
                                                    Start: 2024  
End: 2024           ambulatory                DO Vera   
[place]  
Work Phone:   
[phone]                          [facility]  
Work Phone:   
[phone]  
   
                                                    Start: 2024  
End: 2024                         Patient encounter   
procedure                               DO Vera   
[place]  
Work Phone:   
[phone]                          [facility]   
[facility]  
Work Phone:   
[phone]  
   
                                Start: 2024 Registered Recurring DO Vera  
   
[place]  
Work Phone:   
[phone]                          [facility]   
Road [facility]  
   
                                Start: 2024 Registered Recurring DO Vera  
   
[place]  
Work Phone:   
[phone]                          [facility]   
[place]  
Work Phone:   
[phone]  
   
                                                    Start: 2024  
End: 2024           ambulatory                DO Vera   
[place]  
Work Phone:   
[phone]                          [facility]  
Work Phone:   
[phone]  
   
                                                    Start: 2024  
End: 2024                         Patient encounter   
procedure                               DO Vera   
[place]  
Work Phone:   
[phone]                          [facility]   
[facility]  
Work Phone:   
[phone]  
   
                                Start: 2024 Registered Recurring DO Vera  
   
[place]  
Work Phone:   
[phone]                          [facility]   
[place]  
Work Phone:   
[phone]  
   
                                Start: 2024 Registered Recurring DO Vera  
   
[place]  
Work Phone:   
[phone]                          [facility]  
   
                                                    Start: 2024  
End: 2024           ambulatory                DO Vera   
[place]  
Work Phone:   
[phone]                          [facility]  
Work Phone:   
[phone]  
   
                                                    Start: 2024  
End: 2024                         Patient encounter   
procedure                               DO Vera   
[place]  
Work Phone:   
[phone]                          [facility]   
[facility]  
Work Phone:   
[phone]  
   
                                Start: 2024 Registered Recurring DO Vera  
   
[place]  
Work Phone:   
[phone]                          [facility]   
[place]  
Work Phone:   
[phone]  
   
                                                    Start: 2024  
End: 2024     ambulatory          SHARLENE HOLM [facility]  
   
Ambulatory  
   
                                                    Start: 2024  
End: 2024                         Office outpatient visit   
15 minutes                              Sharlene An   
APRN-CNP  
Work Phone:   
[phone]                          [facility]  
   
                                        Comment on above:   Non-ischemic cardiom  
yopathy (Multi) (Primary Dx);  
DIMITRI on CPAP;  
BMI 50.0-59.9, adult (Multi)   
   
                                                    Start: 2024  
End: 2024           ambulatory                VERA SIMS   
[place]                            Not Available  
   
                                                    Start: 2024  
End: 2024           ambulatory                DO Vera   
[place]  
Work Phone:   
[phone]                          [facility]   
Center  
Work Phone:   
[phone]  
   
                                                    Start: 2024  
End: 2024                         Patient encounter   
procedure                               DO Vera Alexisa[place]-Ada  
Work Phone:   
[phone]                          [facility]   
[facility]  
Work Phone:   
[phone]  
   
                                                    Start: 2024  
End: 2024     ambulatory          DANYA AN   Not Available  
   
                                                    Start: 2024  
End: 2024           ambulatory                DO Vera   
[place]  
Work Phone:   
[phone]                          [facility]   
Medical Ctr  
Work Phone:   
[phone]  
   
                                                    Start: 2024  
End: 2024           Discharged Recurring      DO Vera   
[place]  
Work Phone:   
[phone]                          [facility]   
[place]  
Work Phone:   
[phone]  
   
                                                    Start: 2024  
End: 2024     ambulatory          JESSICA L LOPEZ        Not Available  
   
                                                    Start: 2024  
End: 2024     ambulatory          ARLENE DEPOY      Not Available  
   
                                                    Start: 2024  
End: 2024     ambulatory          JESSICA L LOPEZ        Not Available  
   
                                                    Start: 2024  
End: 2024     ambulatory          ARLENE DEPOY      Not Available  
   
                                Start: 02- Bamboo flowsheet Jessica L Lopez   
PT  
Work Phone:   
[phone]                          [facility]  
   
                                Start: 02- Bamboo flowsheet Jessica L Lopez   
PT  
Work Phone:   
[phone]                          [facility]  
   
                                                    Start: 02-  
End: 02-     ambulatory          JESSICA LOPEZ        Not Available  
   
                                                    Start: 2024  
End: 2024                         Office outpatient visit   
25 minutes                              Ray Vela NP  
Work Phone:   
[phone]                          [facility]  
   
                                        Comment on above:   Lumbosacral strain,   
initial encounter (Primary Dx);  
Acute pain of left shoulder;  
Wound of left lower extremity, initial encounter   
   
                                                    Start: 2024  
End: 2024           ambulatory                VERA SIMS   
[place]                            Not Available  
   
                                                    Start: 2024  
End: 2024                         Patient encounter   
procedure                               Danya An   
DPM  
Work Phone:   
[phone]                          [facility]  
   
                                        Comment on above:   Onychomycosis (Prima  
ry Dx);  
Pain in both feet;  
Corns and callosities;  
Other specified peripheral vascular diseases (CMS/HCC);  
Circulating anticoagulant disorder (CMS/HCC)   
   
                                                    Start: 2024  
End: 2024     ambulatory          DANYA AN   Not Available  
   
                                                    Start: 2024  
End: 2024           ambulatory                VERA D   
MATIAS-EMERY                            Not Available  
   
                                                    Start: 2023  
End: 2023           ambulatory                DO Vera   
[place]  
Work Phone:   
[phone]                          [facility]  
Work Phone:   
[phone]  
   
                                                    Start: 2023  
End: 2023                         Patient encounter   
procedure                               DO Vera Matias-Arpan  
Work Phone:   
[phone]                          [facility]   
[facility]  
   
                                                    Start: 2023  
End: 2023           ambulatory                Jacoby Braden  
Other Phone:   
[phone]                           [facility]   
Professional   
Corporation  
Other Phone:   
[phone]  
   
                          Start: 2023 Follow-up encounter Jacoby gan Coordinated   
Care Clinic  
   
                                Start: 2023 Registered Recurring DO Vera  
   
[place]  
Work Phone:   
[phone]                          [facility]   
Management  
Work Phone:   
[phone]  
   
                                                    Start: 2023  
End: 2023           ambulatory                DO Vera   
[place]  
Work Phone:   
[phone]                          [facility]  
Work Phone:   
[phone]  
   
                                                    Start: 2023  
End: 2023           Discharged Recurring      DO Vera   
[place]  
Work Phone:   
[phone]                          [facility]   
[place]  
Work Phone:   
[phone]  
   
                                Start: 2023 ambulatory      Dr. Compa Whitley                                 Facility:  
   
                                                    Start: 2023  
End: 2023           ambulatory                DO Vera   
[place]  
Work Phone:   
[phone]                          [facility]  
Work Phone:   
[phone]  
   
                                                    Start: 2023  
End: 2023                         Patient encounter   
procedure                               DO Vera [place]  
Work Phone:   
[phone]                          [facility]   
Exercise Program  
   
                                                    Start: 2023  
End: 2023           ambulatory                Jacoby Braden  
Other Phone:   
[phone]                           [facility]   
Professional   
Corporation  
Other Phone:   
[phone]  
   
                          Start: 2023 Telephone encounter Jacoby Braden [facility]   
Care Clinic  
   
                                Start: 2023 Registered Recurring DO Vera  
   
[place]  
Work Phone:   
[phone]                          [facility]   
Management  
Work Phone:   
[phone]  
   
                                Start: 2023 Chart Update    Vera SIMS   
[place]  
Work Phone:   
[phone]                          [facility]   
Heart-[place] [address] DO  
Work Phone:   
[phone]  
   
                                        Start: 2023   Patient encounter   
procedure                               Vera LEVI   
[place]  
Work Phone:   
[phone]                          [facility]   
Heart-[place] [address] [place]  
Work Phone:   
[phone]  
   
                          Start: 2023 ambulatory   COMPA WHITLEY Facilit  
[id number]  
   
                                Start: 2023 Chart Update    Vera SIMS   
[place]  
Work Phone:   
[phone]                          [facility]   
[facility] [address] DO  
Work Phone:   
[phone]  
   
                                                    Start: 2023  
End: 2023           ambulatory                DO Vera [place]ias[place]Ada  
Work Phone:   
[phone]                          [facility]  
Work Phone:   
[phone]  
   
                                                    Start: 2023  
End: 2023                         Patient encounter   
procedure                               DO Vera Matias-Arpan  
Work Phone:   
[phone]                          [facility]   
[facility]  
   
                                Start: 2023 Registered Recurring DO Vera [place]ias[place]  
Work Phone:   
[phone]                          [facility]   
Management  
Work Phone:   
[phone]  
   
                                        Start: 2023   ([facility] RD FU) [facility] F/  
U   
[place] Dietician       Annel Baird                 [facility]   
Care Clinic  
   
                                                    Start: 2023  
End: 2023           ambulatory                Jacoby Braden  
Other Phone:   
[phone]                           [facility]   
Professional   
Corporation  
Other Phone:   
[phone]  
   
                          Start: 2023 Telephone encounter Jacoby gan Coordinated   
Care Clinic  
   
                                        Start: 2023   Office outpatient vi  
sit   
25 minutes                              Vera Matias-Arpan  
Work Phone:   
[phone]                          [facility]   
Heart-[place] [address] DO  
Work Phone:   
[phone]  
   
                                Start: 2023 ambulatory      Dr. Compa Whitley                                 Facility:  
   
                                                    Start: 2022  
End: 2022           ambulatory                Jacoby Braden  
Other Phone:   
[phone]                           [facility]   
Professional   
Corporation  
Other Phone:   
[phone]  
   
                          Start: 2022 Follow-up encounter Jacoby gan Coordinated   
Care Clinic  
   
                                        Start: 2022   ([facility] RD FU) [facility] F/  
U   
[place] Dietician       Annel Baird                 [facility]   
Care Clinic  
   
                                                    Start: 2022  
End: 2022           ambulatory                Annel Baird  
Other Phone:   
[phone]                           [facility]   
Professional   
Corporation  
Other Phone:   
[phone]  
   
                                                    Start: 10-  
End: 10-           ambulatory                Jacoby Braden  
Other Phone:   
[phone]                           [facility]   
Professional   
Corporation  
Other Phone:   
[phone]  
   
                          Start: 10- Telephone encounter Jacoby gan Coordinated   
Care Clinic  
   
                                                    Start: 10-  
End: 10-           ambulatory                Jacoby Braden  
Other Phone:   
[phone]                           [facility]   
Professional   
Corporation  
Other Phone:   
[phone]  
   
                          Start: 10- Follow-up encounter Jacoby gan Coordinated   
Care Clinic  
   
                                                    Start: 2022  
End: 2022           ambulatory                DO Vera Matias-Ada  
Work Phone:   
[phone]                          [facility]  
Work Phone:   
[phone]  
   
                                                    Start: 2022  
End: 2022           Discharged Recurring      DO Vera Matias-Ada  
Work Phone:   
[phone]                          [facility]   
[place]  
   
                                Start: 2022 Registered Recurring DO Vera Alexisaver-Ada  
Work Phone:   
[phone]                          [facility]   
Management  
   
                                                    Start: 2022  
End: 2022           ambulatory                Jacoby Braden  
Other Phone:   
[phone]                           [facility]   
Professional   
Corporation  
Other Phone:   
[phone]  
   
                          Start: 2022 Follow-up encounter Jacoby gan [facility]inated   
Care Clinic  
   
                                                    Start: 2022  
End: 2022           ambulatory                Jacoby Braden  
Other Phone:   
[phone]                           [facility]   
Professional   
Corporation  
Other Phone:   
[phone]  
   
                          Start: 2022 Follow-up encounter Jacoby gan Coordinated   
Care Clinic  
   
                                Start: 2022 Chart Update    Vera Lea  
Work Phone:   
[phone]                          [facility]   
[facility] [address] DO  
Work Phone:   
[phone]  
   
                                        Start: 2022   Office consultation   
new/estab patient 80 min                Vera Lea  
Work Phone:   
[phone]                          [facility]   
[facility] [address] DO  
Work Phone:   
[phone]  
   
                                        Start: 2022   ([facility] NI) GAMAL longo   
Provider                  Annel Baird                 [facility]   
Care Clinic  
   
                                                    Start: 2022  
End: 2022           ambulatory                Annel Mcneilt  
Other Phone:   
[phone]                           [facility]   
Professional   
Corporation  
Other Phone:   
[phone]  
   
                                                    Start: 2022  
End: 2022           ambulatory                Alonso Birmingham  
Other Phone:   
[phone]                           [facility]   
Professional   
Corporation  
Other Phone:   
[phone]  
   
                                        Start: 2022   Office outpatient vi  
sit   
25 minutes                [facility]  
   
                                                    Start: 05-  
End: 05-           ambulatory                Annel Baird  
Other Phone:   
[phone]                           [facility]   
[employer]   
[employer]  
Other Phone:   
[phone]  
   
                                        Start: 05-   IBT FOR OBESITY GROU  
P   
2-10 30M                  Annel Baird                 [facility]   
Care Clinic  
   
                                                    Start: 2022  
End: 2022           ambulatory                Jacoby Braden  
Other Phone:   
[phone]                           [facility]   
[employer]   
[employer]  
Other Phone:   
[phone]  
   
                          Start: 2022 Nutrition therapy Jacoby Braden [facility]   
Care Clinic  
   
                                                    Start: 2022  
End: 2022           ambulatory                Annel Mcneilt  
Other Phone:   
[phone]                           [facility]   
[employer]   
[employer]  
Other Phone:   
[phone]  
   
                          Start: 2022 Telephone encounter Annel Louis  
[facility]   
[facility]  
   
                                                    Start: 2022  
End: 2022           ambulatory                Alonso Birmingham  
Other Phone:   
[phone]                           [facility]   
[employer]   
[employer]  
Other Phone:   
[phone]  
   
                                        Start: 2022   Office outpatient ne  
w 45   
minutes                   [facility]  
   
                                        Start: 2021   ([facility]) [facility]  
vid   
Vaccine                   Annel Baird                 [facility]   
Care Clinic  
   
                                                    Start: 2021  
End: 2021           ambulatory                Annel Fitt  
Other Phone:   
[phone]                           [facility]   
Professional   
Corporation  
Other Phone:   
[phone]  
  
  
  
Procedures  
  
  
                          Date         Procedure    Procedure Detail Performing   
Clinician  
   
                                                    Start: 2024  
End: 2024     Colonoscopy                             Vera Matias-Ada   
DO  
Work Phone:   
[phone]  
   
                          Start: 2024 GLUCOSE POCT GLUCOMETERS              
  Shannen Cruzgautamfranck DO  
Work Phone:   
[phone]  
   
                          Start: 2024 Hemoglobin glycosylated a1c           
     Vera LEVI Matias-Ada   
DO  
Work Phone:   
[phone]  
   
                          Start: 2024 Aerobic microbial culture             
   Vera Matias-Ada DO  
Work Phone:   
[phone]  
   
                          Start: 2024 Anaerobic microbial culture           
     Vera Matias-Ada DO  
Work Phone:   
[phone]  
   
                          Start: 2024 Gram stain microscopy              S  
haja Matias-Ada DO  
Work Phone:   
[phone]  
   
                                        Start: 2024   Investigation of   
transfusion reaction                                DO Vera Matias-Ada  
Work Phone:   
[phone]  
   
                                       Finger operation              Vera D We  
aver-Ada  
Work Phone:   
[phone]  
   
                                       Operation on gallbladder              Garcia  
katrin LEVI Matias-Ada  
Work Phone:   
[phone]  
   
                                       Total colonoscopy              Vera GOMEZ eaver-Ada  
Work Phone:   
[phone]  
  
  
  
Plan of Treatment  
  
  
                          Date         Care Activity Detail       Author  
   
                                        Start: 2034   Screening for malign  
ant   
neoplasm of colon                                   [facility]  
   
                                        Start: 2027   Screening for malign  
ant   
neoplasm of colon                                   [facility]  
   
                                                    Start: 2025  
End: 2025                         Patient encounter   
procedure                               2025 3:20 PM EST   
Office Visit [facility] [address]   
[address] [place]   
[id number] [phone]   
Compa Whitley DO   
[address]   
[address] [place]   
[address] [phone]   
(Work) [phone]   
(Fax)                                   [facility]  
   
                                                    Start: 2025  
End: 2025                         Patient encounter   
procedure                               2025 2:00 PM EST   
Office Visit [facility]   
IM [address] [place]   
[id number] [phone]   
Vera Lea,   
DO [address]   
[address] [place] [address]   
[phone] (Work)   
[phone] (Fax)                      [facility]  
   
                                        Start: 2025   Medicare Annual   
Wellness (AWV)                          Medicare Annual   
Wellness (AWV)                          [facility]  
   
                                        Start: 2024   Hemoglobin A1c   
measurement                             Diabetes: Hemoglobin   
A1C                                     [facility]  
   
                                                    Start: 2024  
End: 2024                         Patient encounter   
procedure                               2024 2:15 PM EST   
[facility] [address]   
[place] [address] [phone] Danya An DPM [address]   
[place] [address]   
[phone] (Work)   
[phone] (Fax)                      [facility]  
   
                                                    Start: 2024  
End: 2024                         Patient encounter   
procedure                                           [facility]  
   
                                        Comment on above:   Arrived   
   
                          Start: 2024                           [facility]  
   
                          Start: 2024                           [facility]  
   
                          Start: 2024                           [facility]  
   
                                                    Start: 10-  
End: 10-                         Patient encounter   
procedure                               10/10/2024 2:30 PM EDT   
Consult [facility]   
[address]   
[place] [id number] [phone] Shannen Ellis,  [address] [place]   
[address] [phone]   
(Work) [phone]   
(Fax)                                   [facility]  
   
                                                    Start: 2024  
End: 2024                         Patient encounter   
procedure                                           [facility]  
   
                                        Comment on above:   Arrived   
   
                          Start: 2024 Influenza vaccination Influenza Vacc  
ine (#1) [facility]  
   
                                        Start: 2024   Urine screening for   
protein                                 Diabetes: Urine Protein   
Screening                               [facility]  
   
                                        Start: 2024   Screening for malign  
ant   
neoplasm of colon                                   [facility]  
   
                                                    Start: 2024  
End: 2024                         Patient encounter   
procedure                               2024 2:30 PM EDT   
Office Visit [facility]   
IM [address] [place]   
[phone]   
Vera Lea,   
DO [address] [place] [address]   
[phone] (Work)   
[phone] (Fax)                      [facility]  
   
                                                    Start: 2024  
End: 2024                         Patient encounter   
procedure                               2024 2:30 PM EDT   
Procedure Visit [facility] [address]   
[place] [address] [phone] Danya An DPM [address]   
[place] [address]   
[phone] (Work)   
[phone] (Fax)                      [facility]  
   
                                        Start: 2024   Hemoglobin A1c   
measurement                             Diabetes: Hemoglobin   
A1C                                     [facility]  
   
                                                    Start: 02-  
End: 02-           ambulatory                02/15/2024 1:00 PM EST   
Evaluation [facility]   
[address]   
[place] [address] [phone] Jessica Lopez, PT [address]   
[place] [id number] [phone] (Work)   
[phone] (Fax)                      [facility]  
   
                                                    Start: 2024  
End: 2024                         Patient encounter   
procedure                               2024 3:30 PM EST   
Office Visit [facility]   
IM [address] [place]   
[address] [phone]                 [facility]  
   
                                        Start: 2023   COVID-19 Vaccine ( season)                         COVID-19 Vaccine ( season)                         [facility]  
   
                                        Start: 2023   FUV, Provider:   
Compa Whitley,   
Status: Pen, Time:   
11:00 AM                                FUV, Provider:   
Compa Whitley,   
Status: Pen, Time:   
11:00 AM                                [facility]   
[facility] [address] DO  
Work Phone:   
[phone]  
   
                                        Start: 2023   MUGA, Provider:   
[place] FRYEI NUCLEAR   
[id number], Status:   
Pen, Time: 2:00 PM                      MUGA, Provider:   
[place] HHVI NUCLEAR   
01,[id number], Status:   
Pen, Time: 2:00 PM                      [facility]   
[facility] [address] DO  
Work Phone:   
[phone]  
   
                                        Start: 2023   FUV, Provider:   
Compa Whitley,   
Status: Pen, Time:   
10:00 AM                                FUV, Provider:   
Compa Whitley,   
Status: Pen, Time:   
10:00 AM                                [facility] [address] DO  
Work Phone:   
[phone]  
   
                                        Start: 2016   RSV Pregnant patient  
s   
and/or patients aged   
60+ years (1 - 1-dose   
60+ series)                             RSV Pregnant patients   
and/or patients aged   
60+ years (1 - 1-dose   
60+ series)                             [facility]  
   
                                        Start: 2006   Zoster Vaccines (1 o  
f   
2)                                      Zoster Vaccines (1 of   
2)                                      [facility]  
   
                                        Start: 1978   DTaP/Tdap/Td Vaccine  
s   
(1 - Tdap)                              DTaP/Tdap/Td Vaccines   
(1 - Tdap)                              [facility]  
   
                                        Start: 1974   Diabetes mellitus   
screening                 Diabetes Screening        [facility]  
   
                          Start: 1974 Hepatitis C screening Hepatitis C Sc  
[facility]  
   
                                        Start: 1962   Pneumococcal Vaccine  
:   
65+ Years (1 - PCV)                     Pneumococcal Vaccine:   
65+ Years (1 - PCV)                     [facility]  
   
                                        Start: 1962   Pneumococcal Vaccine  
:   
65+ Years (1 of 2 -   
PCV)                                    Pneumococcal Vaccine:   
65+ Years (1 of 2 -   
PCV)                                    [facility]  
   
                                        Start: 1956   Hemoglobin A1c   
measurement                             Diabetes: Hemoglobin   
A1C                                     [facility]  
   
                          Start: 1956 Lipid panel  Lipid Panel  [facility]  
   
                                        Start: 1956   Medicare Annual   
Wellness Visit                          Medicare Annual   
Wellness Visit (AWV)                    [facility]  
   
                                        Start: 1956   Screening for malign  
ant   
neoplasm of colon                                   [facility]  
   
                                       Patient Education Know your Meds [facility]  
Work Phone:   
[phone]  
   
                                       Patient referral              [facility]  
Work Phone:   
[phone]  
  
  
  
Immunizations  
  
  
                      Immunization Date Immunization Notes      Care Provider Jenny llanes  
   
                                        2023          Influenza, Seasonal,  
   
Quadrivalent,   
Adjuvanted                                          Danya An   
DPM  
Work Phone:   
[phone]                          [facility]  
   
                                        2023          influenza virus   
vaccine, unspecified   
formulation                                         Vera Lea DO  
Work Phone:   
[phone]                          [facility]  
   
                                        10-          influenza, high dose  
   
seasonal,   
preservative-free                                   Jacoby Braden  
Other Phone:   
[phone]                           [facility]   
Professional   
Corporation  
Other Phone:   
[phone]  
   
                                        10-          Fluzone High-Dose   
Quadrivalent 0.7 ML   
Intramuscular   
Suspension Prefilled   
Syringe                                             Vera Lea  
Work Phone:   
[phone]                          [facility] [address] DO  
Work Phone:   
[phone]  
   
                                        10-          influenza virus   
vaccine, unspecified   
formulation                                         DO Vera   
Ngozi  
Work Phone:   
[phone]                          [facility]  
   
                                        10-          seasonal influenza,   
intradermal,   
preservative free                                   Sharlene An   
APRN-CNP  
Work Phone:   
[phone]                          [facility]  
Work Phone:   
[phone]  
   
                          2021   COVID-19 Pfizer              Annel Fitt  
Other Phone:   
[phone]                           [facility]  
   
                                        2021          influenza, high dose  
   
seasonal,   
preservative-free                                   Vera Lea  
Work Phone:   
[phone]                          [facility]  
   
                                        2021          Pfizer-BioNTech   
COVID-19 Vacc 30   
MCG/0.3ML   
Intramuscular   
Suspension                                          Vera Lea  
Work Phone:   
[phone]                          [facility]  
   
                                        2021          Pfizer-BioNTech   
COVID-19 Vacc 30   
MCG/0.3ML   
Intramuscular   
Suspension                                          Vera Matias-Ada  
Work Phone:   
[phone]                          [facility]  
   
                                        2020          Influenza, injectabl  
e,   
Madin Ceresco Canine   
Kidney, preservative   
free, quadrivalent                                  Vera D   
Matias-Ada  
Work Phone:   
[phone]                          [facility] [address] DO  
Work Phone:   
[phone]  
   
                                        10-          Influenza, injectabl  
e,   
Madin Ceresco Canine   
Kidney, preservative   
free, quadrivalent                                  Vera D   
Matias-Ada  
Work Phone:   
[phone]                          [facility] [address]  
Work Phone:   
[phone]  
   
                                        2018          influenza, injectabl  
e,   
madin keara canine   
kidney, preservative   
free                                                Vera D   
Matias-Ada  
Work Phone:   
[phone]                          [facility] [address]  
Work Phone:   
[phone]  
   
                                        2017          influenza, injectabl  
e,   
quadrivalent,   
preservative free                                   Danya An   
DPM  
Work Phone:   
[phone]                          [facility]  
   
                                        10-          seasonal influenza,   
intradermal,   
preservative free                                   Vera D   
Matias-Ada  
Work Phone:   
[phone]                          [facility] [address] DO  
Work Phone:   
[phone]  
   
                                        10-          seasonal influenza,   
intradermal,   
preservative free                                   Danya An   
DPM  
Work Phone:   
[phone]                          [facility]  
  
  
  
Payers  
  
  
                          Date         Payer Category Payer        Policy ID  
   
                          2024   Self-pay                  [id number]  
[id number]  
[id number]  
   
                          2023   Private Health Insurance              [id number]  
[id number]  
[id number]  
   
                          2023   Private Health Insurance              [id number]  
[id number]  
   
                          2021   Medicare                  [id number]  
[id number]  
[id number]  
   
                          2021   Private Health Insurance              [id number]  
[id number]   
[id number]  
   
                          2021   Medicare                  [id number]   
[id number]  
   
                          1955   Unknown                   [id number]   
[id number]  
[id number]  
   
                          1956   Unknown                   [id number]   
[id number]  
[id number]  
   
                          1956   Unknown                   [id number]   
[id number]  
[id number]  
   
                          1956   Unknown                   [id number]   
[id number]  
[id number]  
   
                          1956   Unknown                   [id number]   
[id number]  
[id number]  
   
                          1956   Unknown                   [id number]   
[id number]  
[id number]  
   
                          1956   Unknown                   [id number]   
[id number]  
[id number]  
   
                          1956   Unknown                   [id number]   
[id number]  
[id number]  
   
                          1956   Unknown                   [id number]   
[id number]  
[id number]  
   
                          1956   Unknown                   [id number]   
[id number]  
[id number]  
   
                          1956   Unknown                   [id number]   
[id number]  
[id number]  
   
                          1956   Unknown                   [id number]   
[id number]  
[id number]  
   
                          1956   Unknown                   [id number]   
[id number]  
[id number]  
   
                          1956   Unknown                   [id number]   
[id number]  
[id number]  
   
                          1956   Unknown                   [id number]   
[id number]  
[id number]  
   
                          1956   Unknown                   [id number]   
[id number]  
[id number]  
   
                          1956   Unknown                   [id number]   
[id number]  
[id number]  
   
                          1956   Unknown                   [id number]   
[id number]  
[id number]  
   
                          1956   Unknown                   [id number]   
[id number]  
[id number]  
   
                                                Self-pay        Self Pay [employer]  
e   
Program                                 [id number]   
[id number]  
[id number]  
   
                                       Unknown                   [id number]   
[id number]  
   
                                       Unknown                     
   
                                       Unknown                   [id number]   
[id number]  
[id number]  
   
                                       Unknown                   [id number]   
[id number]  
[id number]  
   
                                       Unknown                   [id number]   
[id number]  
[id number]  
   
                                       Unknown                   [id number]   
[id number]  
31  
  
  
  
Social History  
  
  
                          Date         Type         Detail       Facility  
   
                                                    Start: 2023  
End: 2024     Sex Assigned At Birth                     [facility]   
[employer]   
[employer]  
Other Phone:   
[phone]  
   
                                                    Start: 2023  
End: 2024                         Daily caffeine   
consumption                             Daily caffeine   
consumption                             [facility]   
[employer] DO  
Work Phone:   
[phone]  
   
                                        Comment on above:   coffee 1-2 pots a da  
y;   
   
                                                    Start: 2022  
End: 2023                         Tobacco smoking status   
NHIS                                    Never smoked tobacco   
(finding)                               [facility]  
   
                          Start: 1955 Sex Assigned At Birth Male         F  
[facility]  
   
                                       History of tobacco use [facility] smoker [facility]  
   
                                                    Start: 2023  
End: 2024                         Tobacco use and   
exposure                                Smokeless tobacco   
non-user                                [facility]  
   
                                                    Start: 2024  
End: 2024     Alcohol intake      Ex-drinker (finding) [facility]  
   
                                                            How often to you hav  
e   
a drink containing   
alcohol?                  Never                     [facility]  
   
                                                            How many standard   
drinks containing   
alcohol do you have on   
a typical day?            Patient does not drink    [facility]  
   
                          Start: 1955 Sex Assigned At Birth Not on file  N  
[facility]  
   
                                        Start: 2024   Alcoholic beverage   
intake                                  Lifetime non-drinker   
(finding)                               [facility] [facility]  
Work Phone:   
[phone]  
   
                                                    Start: 2024  
End: 2024                         Exposure to SARS-CoV-2   
(event)                   Not sure                  [facility]  
   
                                Start: 2024 Alcohol Comment Caffeine : cof  
fee,   
soda                                    [facility]  
   
                          Start: 2024 Sex          Male (finding) [facility]  
  
  
  
Goals  
  
  
                                Date            Patient Goal    Desired Activity  
/State  
   
                                                                  
  
  
  
Clinical Notes 2021 to 2024  
    Danya An, ARMINM - 2024 1:00 PM Quinn Ellis DO -   
2024 2:30 PM EST  
  
                                Note Date & Type Note            Facility  
   
                                                    2024 History of Presen  
t   
illness Narrative                       Formatting of this note is different   
from the original.  
Images from the original note were   
not included.  
HPI:  
Patient presents in office today for   
routine nailcare and callous care.  
Location: Right great toe.  
Duration: ongoing x1 month.  
Severity of the symptoms: mild ,   
considered 5 out of 10.  
Onset of pain: gradual.  
Status: stable.  
Context: hard to trim , hard to   
reach.  
Current symptoms include: toe redness   
, toe swelling ,  
Relieved by: debridement.  
Characteristics of the nails: red,   
swollen, painful.  
Aggravated by: shoe gear, pressure.  
Risk factors: curvature of nails.  
  
Examination:  
General Examination:  
GENERAL EXAMINATIONalert, well   
hydrated, in no distress , awake,   
aware of surroundings.  
FOOT EXAM:  
Date of Last Foot Exam 24  
  
Vascular:  
DORSALIS PEDIS PULSE:2/4,   
bilaterally.  
POSTERIOR TIBIAL PULSE:1/4,   
bilaterally.  
TEMPERATURE GRADIENT:within normal   
limits, warm to cool.  
EDEMA:mild, bilateral lower   
extremity. erythema to the right 2nd   
digit is resolved. Continued erythema   
noted.  
CAPILLARY FILLING   
TIME(sec):bilateral, digits 1-5, less   
than 3 seconds.  
  
Neurologic:  
VIBRATORY:abnormal.  
SEMMES-LUKE 5.07   
MONOFILAMENTnormal.  
  
Dermatologic:  
HYPERKERATOSIS:Location:,   
Submetatarsal Head 4 right, medial   
IPJ of the hallux bilateral, distal   
right 3rd digit.  
NAIL PATHOLOGY:digits 1-5 bilateral   
are thickened, discolored, crumbly,   
dystrophic, elongated and with   
subungual debris. Incurvation to the   
lateral border right hallux nail.   
Upon debridement there is no   
underlying opening noted in the skin.   
The surrounding granuloma, erythema   
has resolved along the nail fold.  
SKIN PATHOLOGY:atrophic, dry,   
decreased hair growth bilateral.  
  
Orthopedic:  
FOOT MORPHOLOGY:Metatarsus adductus   
bilateral .  
DEFORMITIES:Rigidly contracted second   
digit right with elevation at the   
second MPJ. There is overriding and   
rubbing between the distal right   
second digit and lateral right   
hallux. Semirigid contracted digits 2   
through 4 left, 3 and 4 right .  
PAIN ELICITED WITH PALPATION OFArea   
of porokeratosis sub-fourth MPJ   
right. Pain with palpation to the MPJ   
of the right 2nd digit, but pain is   
improving .  
MUSCLE STRENGTH5/5 for all pedal   
groups tested  
  
Assessment:  
1. Right foot pain - M79.671  
2. Dermatophytosis of nail - B35.1   
(Primary)  
3. Left foot pain - M79.672  
4. Neuroma digital nerve, left -   
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  
5. Corns and callosities - L84  
6. Porokeratosis - Q82.8  
7. Acquired pes planovalgus of left   
foot - M21.6X2  
8. Acquired pes planovalgus of right   
foot - M21.41  
9. Acquired hammer toe of right foot   
- M20.41  
10. Circulating anticoagulant   
disorder - D68.318  
11. Venous insufficiency - I87.2  
12. Ingrowing nail - L60.0  
  
Plan:  
Dermatophytosis of nail  
1. Nails 1-5 bilateral were debrided   
in length and thickness by manual and   
mechanical means.  
2. Advised patient on the importance   
of continued foot care including   
daily monitoring of their feet for   
any new complaints or concerns that   
may arise.  
3. Continue use of good supportive   
shoe gear to help prevent   
complications.  
4. RTC: 9-12 weeks or as needed if   
problems arise.  
  
Corns and callosities  
1. Hyperkeratosis/porokeratosis as   
above noted was debrided.  
2. Instructed patient on use of   
aperture pads or Silipos padding/toe   
spacers to prevent rubbing and   
continued development of the   
hyperkeratosis.  
3. Also discussed use of moisturizing   
creams for overall increased   
hydration to the skin.  
4. Discussed continued use of proper   
foot gear to avoid excess pressure   
over the callous site.  
  
CPT 91020, 35059  
  
  
Electronically signed by Danya An DPM at 2024 1:18 PM EST  
documented in this encounter            [facility]  
   
                                                    2024 History of Presen  
t   
illness Narrative                       Formatting of this note is different   
from the original.  
Images from the original note were   
not included.  
  
Jocelin Pacheco is a 69 y.o. male   
presents for 1st pow colonoscopy  
  
HPI:  
  
HPI  
Colonoscopy completed 24  
  
OBJECTIVE:  
  
Physical Exam  
  
ASSESSMENT AND PLAN:  
  
Assessment/Plan  
Problem List Items Addressed This   
Visit  
  
Positive colorectal cancer screening   
using Cologuard test - Primary  
  
Two polyps were removed, benign TA's,   
he will need a repeat scope in 5   
years  
  
  
  
  
Electronically signed by Shannen Ellis DO at 2024 10:52 AM   
EST  
documented in this encounter            [facility]  
  
  
  
                                                    2024 Evaluation note   
  
  
  
                                Diagnosis       Onset Date      Resolution  
   
                                                    Adult BMI 50.0-59.9   
kg/sq m                                         acute              
11:10am  
   
                                                    Hematoma of right lower   
leg                                             acute              
11:10am  
   
                      Leg wound, right                       acute      2024   
11:10am  
   
                                                    Type 2 diabetes mellitus   
with unspecified   
complications                                   acute              
11:10am  
   
                      Urinary incontinence                       acute      Nove  
mber 2024   
11:10am  
   
                                                    Venous insufficiency of   
right lower extremity                                 acute              
11:10am  
   
                                                    Lymphedema of both lower   
extremities                                     suspected          
11:10am  
   
                      Cellulitis                       resolved   2024   
11:10am  
   
                      Venous stasis ulcer                       resolved   Novem  
2024   
11:10am  
  
  
[facility]  
Work Phone: [phone]- History of Present illness Narrative* 
  Shannen Ellis DO - 10/31/2024 10:00 AM EDTFormatting of this note is 
  different from the original.  
Images from the original note were not included.  
Jocelin ACUNA Pacheco  
1955  
[phone]  
  
Jocelin Pacheco is a 69 y.o. male presents with chief complaint of Positive 
cologuard  
  
HPI:  
  
HPI  
Jocelin presents to schedule a colonoscopy, he had a positive cologuard test. He 
states a few weeks ago he got constipated and had blood on the toilet tissue 
following a BM. He never had a colonoscopy before  
SUBJECTIVE:  
  
MEDICATIONS: ALLERGIES  
Current Outpatient Medications  
Medication Instructions  
carvedilol (COREG) 6.25 mg, Oral, 2 times daily  
clobetasol (Temovate) 0.05 % cream 1 application , Topical, Every 12 hours  
Entresto 24-26 MG tablet 1 tablet, Oral, 2 times daily  
furosemide (LASIX) 40 mg, Oral, Every 12 hours  
Jardiance 10 mg, Oral, Every 24 hours  
Ozempic, 2 MG/DOSE, 8 MG/3ML solution pen-injector  
Probiotic Product (PROBIOTIC BLEND PO) 2 Units, Oral, Daily  
rivaroxaban (XARELTO) 10 mg, Oral, Daily  
spironolactone (ALDACTONE) 25 mg, Oral, Daily  
Allergies  
Allergen Reactions  
Cefixime Unknown  
Nystatin Unknown  
Sodium Benzoate  
Other Reaction(s): Unknown Reaction  
Diclofenac Rash  
  
  
PAST MEDICAL HISTORY: SOCIAL HISTORY SURGICAL HISTORY:  
Past Medical History:  
Diagnosis Date  
Acquired hammer toe of right foot  
Cardiomyopathy (CMS/HCC)  
d/t morbid obesity  
Chronic anticoagulation  
CKD (chronic kidney disease), stage II  
Class 3 obesity (CMS/[facility])  
CPAP (continuous positive airway pressure) dependence  
Diabetes mellitus type 2 in obese (CMS/[facility])  
History of 2019 novel coronavirus disease (COVID-19) 2020  
Received Remdesivir and dexamethasone as an in-patient  
Hx of being hospitalized 2019  
Hospitalized for Lt. lower extremity cellulitis and chronic open venous stasis 
ulcer  
Hx of carpal tunnel syndrome  
Hx of deep venous thrombosis  
DIMITRI on CPAP  
Peripheral venous insufficiency  
Porokeratosis  
Primary osteoarthritis of both knees  
Stasis dermatitis of both legs  
Social History  
  
Tobacco Use  
Smoking status: Never  
Passive exposure: Past  
Smokeless tobacco: Never  
Substance Use Topics  
Alcohol use: Not Currently  
Comment: Caffeine : coffee, soda  
Drug use: Never  
  
Past Surgical History:  
Procedure Laterality Date  
CARDIAC CATHETERIZATION 2009  
Due to Chest pain  
CARPAL TUNNEL RELEASE Right 2010  
Dr. Estrada  
CHOLECYSTECTOMY 2002  
FOOT NEUROMA SURGERY Bilateral   
Hammertoe repair as well  
HERNIA REPAIR  
IR INJECTION JOINT 2020  
(Knee) Synvisc injection with Dr Lomeli (Ortho)  
IR INJECTION JOINT   
(Knee) Synvisc done by Dr Richard  
SKIN GRAFT 2019  
From right thigh to right foot due to non-healing wound/ulcer. Done by Dr Blakely  
VEIN LIGATION Bilateral 2019  
Procedures in April and Oct done by Dr Lyle Mascorro  
  
  
FAMILY HISTORY  
Family History  
Problem Relation Name Age of Onset  
Hypertension Mother  
Heart disease Mother  
Diabetes Father  
Heart attack Sister  
Diabetes Sister  
Stroke Brother  
Heart attack Brother  
ALS Brother  
  
REVIEW OF SYMPTOMS:  
  
Review of Systems  
Constitutional: Negative for activity change.  
HENT: Negative for hearing loss and voice change.  
Respiratory: Positive for shortness of breath.  
Cardiovascular: Negative for chest pain.  
Gastrointestinal: Negative for abdominal distention, diarrhea, nausea and 
vomiting.  
Neurological: Negative for dizziness, seizures and headaches.  
Psychiatric/Behavioral: Negative.  
All other systems reviewed and are negative.  
  
  
OBJECTIVE:  
  
Visit Vitals  
Smoking Status Never  
  
  
Physical Exam  
Vitals reviewed.  
Constitutional:  
Appearance: He is obese.  
HENT:  
Head: Normocephalic.  
Eyes:  
Pupils: Pupils are equal, round, and reactive to light.  
Cardiovascular:  
Rate and Rhythm: Normal rate and regular rhythm.  
Pulmonary:  
Effort: Pulmonary effort is normal.  
Abdominal:  
General: Bowel sounds are normal.  
Palpations: Abdomen is soft.  
Musculoskeletal:  
General: Normal range of motion.  
Skin:  
General: Skin is warm.  
Neurological:  
General: No focal deficit present.  
Mental Status: He is alert.  
  
ASSESSMENT AND PLAN:  
  
Assessment/Plan  
Problem List Items Addressed This Visit  
  
Positive colorectal cancer screening using Cologuard test - Primary  
  
I explained the colonoscopy procedure in detail to the patient and discussed the
 risks and benefitsincluding but not exclusive of bleeding, and perforation. I 
asked the patient not to drive, operateany machinery or make any important 
decisions on the day of the procedure. The patient is in agreement and 
arrangements for colonoscopy have been made.  
  
  
Electronically signed by Shannen Ellis DO at 10/31/2024 10:06 AM EDT  
  
documented in this encounterSt. [facility]'s [id number]- History of Present illness
 Narrative* Danya An DPM - 2024 3:30 PM EDTFormatting of this 
  note is different from the original.  
Images from the original note were not included.  
HPI:  
Patient presents in office today for routine nailcare and callous care.  
Location: Right great toe.  
Duration: ongoing x1 month.  
Severity of the symptoms: mild , considered 5 out of 10.  
Onset of pain: gradual.  
Status: stable.  
Context: hard to trim , hard to reach.  
Current symptoms include: toe redness , toe swelling ,  
Relieved by: debridement.  
Characteristics of the nails: red, swollen, painful.  
Aggravated by: shoe gear, pressure.  
Risk factors: curvature of nails.  
  
Examination:  
General Examination:  
GENERAL EXAMINATIONalert, well hydrated, in no distress , awake, aware of 
surroundings.  
FOOT EXAM:  
Date of Last Foot Exam 24  
  
Vascular:  
DORSALIS PEDIS PULSE:2/4, bilaterally.  
POSTERIOR TIBIAL PULSE:1/4, bilaterally.  
TEMPERATURE GRADIENT:within normal limits, warm to cool.  
EDEMA:mild, bilateral lower extremity. erythema to the right 2nd digit is 
resolved. Continued erythema noted.  
CAPILLARY FILLING TIME(sec):bilateral, digits 1-5, less than 3 seconds.  
  
Neurologic:  
VIBRATORY:abnormal.  
SEMMES-LUKE 5.07 MONOFILAMENTnormal.  
  
Dermatologic:  
HYPERKERATOSIS:Location:, Submetatarsal Head 4 right, medial IPJ of the hallux 
bilateral, distal right 3rd digit.  
NAIL PATHOLOGY:digits 1-5 bilateral are thickened, discolored, crumbly, 
dystrophic, elongated and with subungual debris. Incurvation to the lateral 
border right hallux nail. Upon debridement there isno underlying opening noted 
in the skin. The surrounding granuloma, erythema has resolved along thenail 
fold.  
SKIN PATHOLOGY:atrophic, dry, decreased hair growth bilateral.  
  
Orthopedic:  
FOOT MORPHOLOGY:Metatarsus adductus bilateral .  
DEFORMITIES:Rigidly contracted second digit right with elevation at the second 
MPJ. There is overriding and rubbing between the distal right second digit and 
lateral right hallux. Semirigid contracted digits 2 through 4 left, 3 and 4 
right .  
PAIN ELICITED WITH PALPATION OFArea of porokeratosis sub-fourth MPJ right. Pain 
with palpation to the MPJ of the right 2nd digit, but pain is improving .  
MUSCLE STRENGTH5/5 for all pedal groups tested  
  
Assessment:  
1. Right foot pain - M79.671  
2. Dermatophytosis of nail - B35.1 (Primary)  
3. Left foot pain - M79.672  
4. 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  
5. Corns and callosities - L84  
6. Porokeratosis - Q82.8  
7. Acquired pes planovalgus of left foot - M21.6X2  
8. Acquired pes planovalgus of right foot - M21.41  
9. Acquired hammer toe of right foot - M20.41  
10. Circulating anticoagulant disorder - D68.318  
11. Venous insufficiency - I87.2  
12. Ingrowing nail - L60.0  
  
Plan:  
Dermatophytosis of nail  
1. Nails 1-5 bilateral were debrided in length and thickness by manual and 
mechanical means.  
2. Advised patient on the importance of continued foot care including daily 
monitoring of their feet for any new complaints or concerns that may arise.  
3. Continue use of good supportive shoe gear to help prevent complications.  
4. RTC: 9-12 weeks or as needed if problems arise.  
  
Corns and callosities  
1. Hyperkeratosis/porokeratosis as above noted was debrided.  
2. Instructed patient on use of aperture pads or Silipos padding/toe spacers to 
prevent rubbing andcontinued development of the hyperkeratosis.  
3. Also discussed use of moisturizing creams for overall increased hydration to 
the skin.  
4. Discussed continued use of proper foot gear to avoid excess pressure over the
 callous site.  
  
CPT 94769, 73517  
  
  
Electronically signed by Danya An DPM at 2024 3:44 PM EDT  
  
documented in this encounterSt. Luke's [id number]- History of Present illness
 Narrative* Vera Lea DO - 2024 4:23 PM EDTAssociated 
  Problem(s): CPAP (continuous positive airway pressure) dependence  
Formatting of this note might be different from the original.  
-In order to improve/maintain bone density, I recommended daily weight bearing 
activity; such as walking, dancing, low impact aerobics, elliptical machine, 
stair climbing or gardening. -Also, exercises to improve flexibililty and core 
strengthening are beneficial. -Recommend environmental modifications in the home
 (ie no rugs and clear clutter) to reduce risk of falls. -I encourage adequate 
calcium and Vit D intake- I recommend 500-1000 mg of calcium daily as dietary 
intake or supplement and 1000-2000IU of Vit D (this may vary depending on lab 
results).  
  
Electronically signed by Vera Lea DO at 2024 4:23 PM EDT  
  
* Vera Lea DO - 2024 4:23 PM EDTAssociated Problem(s): Class
   3 obesity (CMS/HCC)  
Formatting of this note might be different from the original.  
Encouraged to continue working on lifestyle (particularly diet modifications) to
 continue to promote weight loss  
Electronically signed by Vera Lea DO at 2024 4:23 PM EDT  
  
* Vera Lea DO - 2024 4:22 PM EDTAssociated Problem(s): CKD 
  (chronic kidney disease) stage 2, GFR 60-89 ml/min  
Formatting of this note might be different from the original.  
-We will continue to monitor your renal function for any significant decline in 
eGFR (which is a measure of how well your kidneys are working). We will continue
 to work to optimally control any underlying conditions that are associated with
 worsening effects on kidney function and I will adjust medications as needed 
due to any changes in your kidney function.  
Electronically signed by Vera Lea DO at 2024 4:22 PM EDT  
  
* Vera Lea DO - 2024 4:21 PM EDTAssociated Problem(s): Type 
  2 diabetes mellitus with diabetic peripheral angiopathy without gangrene, 
  without long-term current use of insulin (CMS/HCC)  
Formatting of this note might be different from the original.  
-At this appt, I reinforced the importance of dietary modification, routine 
exercise and weight control for longterm DM management and reduction in risk for
 development and progression of complications (like vision loss, kidney failure,
 neuropathy, and increased risk of heart attack and stroke). Chart reviewed to 
make sure patient is up to date on screenings for DM related comorbidities (ie 
annual dilated eye exam, annual KEISHA and other labs and reminded to do daily foot
 exams). Specific goals for A1c and BP were reviewed and ways to achieve this 
goals discussed.  
Electronically signed by Vera Lea DO at 2024 4:21 PM EDT  
  
* Vera Lea DO - 2024 4:18 PM EDTAssociated Problem(s): 
  Cardiomyopathy (CMS/HCC)  
Formatting of this note might be different from the original.  
Sx stable  
Electronically signed by Vera Lea DO at 2024 4:18 PM EDT  
  
* Vera Lea DO - 2024 4:09 PM EDTAssociated Problem(s): 
  Peripheral venous insufficiency  
Formatting of this note might be different from the original.  
Chronic venous insufficiency (CVI) happens when leg veins become damaged and 
can't work like they should. Normally valves in the leg veins keep blood flowing
 back up to the heart. But CVI damages thevalves and this causes blood to pool 
in the legs. The increases the pressure in the leg veins and this causes 
symptoms like swelling, skin changes (stasis dermatitis) and potentially ulcers.  
  
This is a common condition. For example, varicose veins affect ~1 in 3 adults. 
Each year, ~1 in 50 adults with varicose veins will develop CVI.  
-Risk of developing CVI increases with age  
-Affects ~1 in 20 adults  
-up to 50% of people who have had a DVT develop this within 1-2 years.  
  
Risk Factors  
Hx DVT; personal or family hx of varicose veins, obesity, pregnancy, not getting
 enough physical activity, smoking/tobacco use, sitting or standing for long 
periods of time, sleeping in a chair or recliner, being female, age over 50  
  
Symptoms  
-Achy/tired legs -cramping in legs at night -edema (swelling) in 
feets/ankles/lower legs  
-discolored skin that look reddish-brown -Flaking or itching sin of the 
feet/legs  
-burning, tingling,  pins & needles  in legs -Full or heavy feeling in the legs 
-varicose veins  
-leathery-looking skin of the legs -Ulcers (open sores), usually near the ankles  
  
Approaches to treatment of CVI include several strategies:  
1-conservative therapies: compression, elevation (ie, 30 minutes 3 x a day), 
daily exercise (walking) and weight management  
2- lifestyle modifications: *Avoid smoking/tobacco use,  
*Avoid wearing restrictive clothing (like tight girdles or belts),  
*Don't sit or stand for too long at a time. Get up and move around often  
*Eat a heart-healthy diet, which includes low sodium (salt) intake  
*Exercise regularly  
*Keep a healthy weight  
3-invasive procedures: sclerotherapy, endovenous laser or radiofrequency 
ablation or surgical ligation  
  
Electronically signed by Vera Lea DO at 2024 4:09 PM EDT  
  
* Vera Lea DO - 2024 3:00 PM EDTFormatting of this note is 
  different from the original.  
Images from the original note were not included.  
Subjective  
Patient ID: Jocelin Pacheco (:[id number] )is a 69 y.o. male who presents for 
Routine 4 Mo Follow Up.  
  
HPI: Jocelin is being seen today for a routine 4 month follow up.  
----  
Pt.'s lab results from 2023 are documented in EHR. Pt is due for routine lab 
work at this time. Staff will obtain an HbA1c in office today.  
----  
Care Gaps indicate recommendation for a prevnar 20 vaccine, a KEISHA and a seasonal
 flu vaccine.  
----  
He is scheduled to see Dr Ellis in Oct due to recent + Cologuard.  
----  
Diabetes Management  
-DM was diagnosed several years ago  
-Associated complications: CKD 2, PVD, Peripheral Angiopathy w/o gangrene, CAD  
-Current Medications: Jardiance, Ozempic  
-Pt reports he doesn't check his bs at this time  
-Previous A1c completed: in 2024 = 5.7%  
-Most recent A1c: in 2024 =5.4%  
-Last KEISHA: 2023  
-Last lipids: 2023 ()  
-Last DM eye exam completed: None documented in EHR, will discuss with pt if he 
has had a recent eye exam.- he has not been in >3 year-- Educated him that he 
needs a YEARLY dilated eye exam and encouraged him to set up appt in the near 
future.  
-Findings on eye exam: Unknown  
  
Current Outpatient Medications  
Medication Instructions  
carvedilol (COREG) 6.25 mg, Oral, 2 times daily  
clobetasol (Temovate) 0.05 % cream 1 application , Topical, Every 12 hours  
Entresto 24-26 MG tablet 1 tablet, Oral, 2 times daily  
fluconazole (DIFLUCAN) 100 mg, Oral, Daily  
furosemide (LASIX) 40 mg, Oral, Every 12 hours  
Jardiance 10 mg, Oral, Every 24 hours  
Ozempic, 2 MG/DOSE, 8 MG/3ML solution pen-injector  
Probiotic Product (PROBIOTIC BLEND PO) 2 Units, Oral, Daily  
rivaroxaban (XARELTO) 10 mg, Oral, Daily  
spironolactone (ALDACTONE) 25 mg, Oral, Daily  
  
  
Allergies  
Allergen Reactions  
Cefixime Unknown  
Nystatin Unknown  
Sodium Benzoate  
Other Reaction(s): Unknown Reaction  
Diclofenac Rash  
  
Review of Systems  
Constitutional: Negative for appetite change, chills, fatigue and fever.  
HENT: Negative for ear pain, hearing loss, nosebleeds, trouble swallowing and 
voice change.  
Cardiovascular: Positive for leg swelling.  
He has had chronic issues with swelling.  
The left has been more than the right  
Gastrointestinal: Negative for abdominal pain, blood in stool, constipation, 
diarrhea, nausea and vomiting.  
Endocrine: Negative for polydipsia, polyphagia and polyuria.  
Genitourinary: Negative for difficulty urinating, dysuria and hematuria.  
Musculoskeletal: Positive for arthralgias and gait problem. Negative for back 
pain.  
ARTHRITIS: knee pain, hip pain. Has had synvisc in knee in the past. 2022- he
 is following with Dr Lomeli for his knees. He is hoping to lose the weight 
needed so he can have TKR. He has not seen Dr Lomeli for awhile because he 
needed to lose weight to be considered for TKR.  
Skin: Positive for wound.  
2024: He has been going to [facility] due to ulcerations of the lower leg. They have 
not been healing; so he was sent to see Dr Nation in [place]. He is scheduled for
 intervention 2024  
Neurological: Negative for dizziness, tremors, seizures, speech difficulty, 
weakness, light-headedness, numbness and headaches.  
Psychiatric/Behavioral: Negative for confusion, hallucinations and sleep 
disturbance.  
  
Objective  
Vital signs: /66   Pulse 86   Wt 358 lb 6.4 oz   SpO2 97%   BMI 49.99 kg/m  
Previous vitals: Vital signs: /62   Pulse 81   Wt 381 lb 12.8 oz   SpO2 
98%   BMI 53.25 kg/m  
  
Recent Results (from the past 2016 hour(s))  
Cologuard colon cancer screening  
Collection Time: 24 6:00 PM  
Result Value Ref Range  
NONINV COLON CA DNA+OCC BLD SCRN STL-IMP Positive (A) Negative  
POCT Glycated hemoglobin, total  
Collection Time: 24 3:49 PM  
Result Value Ref Range  
Hemoglobin A1C 5.4  
  
Physical Exam  
Constitutional:  
General: He is not in acute distress.  
Appearance: Normal appearance.  
HENT:  
Head: Normocephalic and atraumatic.  
Nose: Nose normal.  
Eyes:  
General: No scleral icterus.  
Extraocular Movements: Extraocular movements intact.  
Conjunctiva/sclera: Conjunctivae normal.  
Pupils: Pupils are equal, round, and reactive to light.  
Neck:  
Vascular: No carotid bruit.  
Cardiovascular:  
Rate and Rhythm: Normal rate and regular rhythm.  
Heart sounds: No murmur heard.  
Pulmonary:  
Effort: Pulmonary effort is normal. No respiratory distress.  
Breath sounds: Normal breath sounds. No wheezing or rhonchi.  
Musculoskeletal:  
General: Swelling present.  
Cervical back: Normal range of motion.  
Comments: He is using a cane  
Skin:  
General: Skin is warm and dry.  
Coloration: Skin is not jaundiced.  
Findings: No rash.  
Neurological:  
General: No focal deficit present.  
Mental Status: He is alert and oriented to person, place, and time.  
Motor: No weakness.  
Gait: Gait normal.  
Psychiatric:  
Mood and Affect: Mood normal.  
Thought Content: Thought content normal.  
Judgment: Judgment normal.  
  
Health Maintenance  
Topic Date Due  
Pneumococcal Vaccine: 65+ Years (1 of 2 - PCV) Never done  
Diabetes: Urine Protein Screening 2024  
Influenza Vaccine (1) 2024  
Diabetes: Hemoglobin A1C 2024  
Colorectal Cancer Screening 2027  
Diabetes: Retinopathy Screening Discontinued  
  
Immunization History  
Administered Date(s) Administered  
Influenza, High Dose Seasonal, Preservative Free 2021  
Influenza, High-dose Seasonal, Quadrivalent, Preservative Free 10/20/2022  
Influenza, Injectable, MDCK, preservative free 2018  
Influenza, Seasonal, Quadrivalent, Adjuvanted 2023  
Influenza, injectable, MDCK, preservative free, quadrivalent 10/01/2019, 
2020  
Influenza, injectable, quadrivalent, preservative free 2017  
Influenza, seasonal, intradermal, preservative free 10/12/2015, 10/10/2016  
Pfizer Purple Cap SARS-CoV-2 Vaccination 2021, 2021, 2021  
  
Assessment/Plan  
  
Problem List Items Addressed This Visit  
  
Cardiomyopathy (CMS/[facility])  
Overview  
Prescribed Entresto, jardiance, carvedilol and spironolactone  
He follows with Cardiology. [place] this is most likely due to his severe obesity.  
Last ECHO   
  
  
Current Assessment & Plan  
Sx stable  
  
  
CKD (chronic kidney disease) stage 2, GFR 60-89 ml/min  
Current Assessment & Plan  
-We will continue to monitor your renal function for any significant decline in 
eGFR (which is a measure of how well your kidneys are working). We will continue
 to work to optimally control any underlying conditions that are associated with
 worsening effects on kidney function and I will adjust medications as needed 
due to any changes in your kidney function.  
  
  
DIMITRI on CPAP  
Overview  
He reports nighty compliance with CPAP  
  
  
Peripheral vascular disease (CMS/[facility]) - Primary  
Overview  
2024- he has had LE wound(s) and going to [facility] for several months with no real 
improvement so he was referred to Vascular/Dr Nation at [facility], He is 
scheduled for procedure 2024  
  
  
Peripheral venous insufficiency  
Overview  
Advised to use compression and elevation.  
He does have CM, so discussed using the 2nd dose of lasix as needed  
  
  
Current Assessment & Plan  
Chronic venous insufficiency (CVI) happens when leg veins become damaged and 
can't work like they should. Normally valves in the leg veins keep blood flowing
 back up to the heart. But CVI damages thevalves and this causes blood to pool 
in the legs. The increases the pressure in the leg veins and this causes 
symptoms like swelling, skin changes (stasis dermatitis) and potentially ulcers.  
  
This is a common condition. For example, varicose veins affect ~1 in 3 adults. 
Each year, ~1 in 50 adults with varicose veins will develop CVI.  
-Risk of developing CVI increases with age  
-Affects ~1 in 20 adults  
-up to 50% of people who have had a DVT develop this within 1-2 years.  
  
Risk Factors  
Hx DVT; personal or family hx of varicose veins, obesity, pregnancy, not getting
 enough physical activity, smoking/tobacco use, sitting or standing for long 
periods of time, sleeping in a chair or recliner, being female, age over 50  
  
Symptoms  
-Achy/tired legs -cramping in legs at night -edema (swelling) in 
feets/ankles/lower legs  
-discolored skin that look reddish-brown -Flaking or itching sin of the 
feet/legs  
-burning, tingling,  pins & needles  in legs -Full or heavy feeling in the legs 
-varicose veins  
-leathery-looking skin of the legs -Ulcers (open sores), usually near the ankles  
  
Approaches to treatment of CVI include several strategies:  
1-conservative therapies: compression, elevation (ie, 30 minutes 3 x a day), 
daily exercise (walking) and weight management  
2- lifestyle modifications: *Avoid smoking/tobacco use,  
*Avoid wearing restrictive clothing (like tight girdles or belts),  
*Don't sit or stand for too long at a time. Get up and move around often  
*Eat a heart-healthy diet, which includes low sodium (salt) intake  
*Exercise regularly  
*Keep a healthy weight  
3-invasive procedures: sclerotherapy, endovenous laser or radiofrequency 
ablation or surgical ligation  
  
  
  
Type 2 diabetes mellitus with diabetic peripheral angiopathy without gangrene, 
without long-term current use of insulin (CMS/[facility])  
Overview  
Prescribed Jardiance and Ozempic (also for his HF and obesity)  
  
  
Current Assessment & Plan  
-At this appt, I reinforced the importance of dietary modification, routine 
exercise and weight control for longterm DM management and reduction in risk for
 development and progression of complications (like vision loss, kidney failure,
 neuropathy, and increased risk of heart attack and stroke). Chart reviewed to 
make sure patient is up to date on screenings for DM related comorbidities (ie 
annual dilated eye exam, annual KEISHA and other labs and reminded to do daily foot
 exams). Specific goals for A1c and BP were reviewed and ways to achieve this 
goals discussed.  
  
  
Relevant Orders  
POCT Glycated hemoglobin, total (Completed)  
CPAP (continuous positive airway pressure) dependence  
Current Assessment & Plan  
-In order to improve/maintain bone density, I recommended daily weight bearing 
activity; such as walking, dancing, low impact aerobics, elliptical machine, 
stair climbing or gardening. -Also, exercises to improve flexibililty and core 
strengthening are beneficial. -Recommend environmental modifications in the home
 (ie no rugs and clear clutter) to reduce risk of falls. -I encourage adequate 
calcium and Vit D intake- I recommend 500-1000 mg of calcium daily as dietary 
intake or supplement and 1000-2000IU of Vit D (this may vary depending on lab 
results).  
  
  
  
Class 3 obesity (CMS/HCC)  
Overview  
He is following with [facility].  
He is taking semaglutide  
2024- he reports dose recently increased to 2 mg/week (but he is finishing up 
his 1 mg pens before changing).  
  
  
Current Assessment & Plan  
Encouraged to continue working on lifestyle (particularly diet modifications) to
 continue to promote weight loss  
  
  
Venous stasis ulcer of left lower extremity (CMS/HCC)  
Overview  
He has been going to WCC at [facility]  
Seeing Vascular. Scheduled for initial procedure with Dr Nation 2024 at 
[facility]  
  
  
  
Other Visit Diagnoses  
  
Positive colorectal cancer screening using Cologuard test  
He is scheduled to see Dr Clark in Oct for colonoscopy.  
I advised he may need to hold the ozempic the week prior to doing the bowel 
prep.  
Cutaneous candidiasis  
Relevant Medications  
fluconazole (Diflucan) 100 MG tablet  
  
  
  
-Patient's chronic conditions have been reviewed in preparation for this 
appointment. Protocols reviewed and updated. A collaborative plan of care has 
been created for pt regarding specific health concerns. Any barriers to care 
have been identified and addressed. Any part of this document that has been 
added/copied from other documents has been reviewed for accuracy and updated as 
appropriate at the time of the patient encounter.  
  
-Follow up in about 4 months (around 2025) for MW with NP and routine 4 
month appt with me.  
  
Vera Lea D.O.  
Board Certified Internist  
Electronically signed by Vera Lea DO at 2024 4:25 PM EDT  
  
documented in this [facility]09- Instructions* Patient 
  Instructions*   
  
Vera Lea DO - 2024 3:00 PM EDT  
  
Formatting of this note might be different from the original.  
2025 at 3:30-- scheduled to see Dr Whitley  
Electronically signed by Vera Lea DO at 2024 4:13 PM EDT  
  
  
  
documented in this [facility]07- Evaluation note*   
  
                                        Author              Jacoby Braden  
[facility]  
   
                                        Authored            2024 2:24  
pm  
   
                                                    Start weight: 414.4 lbs., he  
 is down 45.2 lbs. today with a weight of 369.2lbs.   
he is   
down 10.8 lbs.  
since last visit 2024.  
Starting Date: 04/15/2022.  
Ozempic start date 4/15/2022. Ozempic start weight 414.4lbs. He is down 34.4 
lbs.   
since starting  
Ozempic.  
1. Abnormal weight gain. He notes he is the heaviest in his family. His brother 
has   
some but much  
less significant weight issues. He notes he was able to get his weight down to 
368   
pounds but was  
unable to keep it down.  
2. Obesity-markedly improved since starting our program and with taking Ozempic 
2 mg,   
but had  
significant weight regain of 20.9 pounds off the medication and not following up
 with   
the program  
since 2023. He is currently on Ozempic 1 mg and we will go back to Ozempic 2
 mg   
through patient  
assistance. This is his second visit back since this restart. He had previously   
stopped Ozempic  
due to cost/donut hole. In the future he could reconsider going to the [facility] for  
bariatric surgery evaluation. He needs to get his BMI down to 45 before the 
surgeon   
will replace  
his knee. He is on Jardiance 10 mg which can also help with his diabetes and   
cardiomyopathy/CHF.  
His diabetes is well controlled with his last A1c of 5.8 on 2024. He has 
mild   
CAD along with  
his diabetes and has not been on a statin so I had recommended Crestor, but he 
notes   
his cardio said  
it was not necessary so he did not start the medication. He is eating healthier   
overall. He should  
follow-up with our nutritionist. He feels that his appetite is controlled with   
Ozempic 2 mg. I have  
recommend again he consider bariatric surgery so he could have his knee replaced
 or   
find a surgeon  
who will do the surgery at an elevated BMI. In the past, he mentioned they found
   
weakness in the  
bottom of the heart and evidently a problem with an artery that they could not 
get.   
His cardiologist  
however did not feel he needed the statin/Crestor that I previously prescribed. 
He   
gets very little  
activity due to bone-on-bone arthritis, severe low back pain on tramadol, 
difficulty   
getting out of  
a chair and also his cardiac function he does get NELSON with small amounts of 
activity.   
He sees Dr. Whitley to treat his cardiomyopathy. He did like canned foods and he understands
 that   
he must cut  
back the salt/processed food. He denies adding salt to his foods. He has had a 
A1c   
that was 6.6  
indicative of diabetes. He notes he was never told he was diabetic before 
program. He   
is seeing Dr. Lomeli for his knees and notes he has to get his BMI from 59 down to 45 to be 
able to   
have his knees  
replaced. Before starting the program he did eat a lot of red meat, potatoes and
   
drank a lot of milk  
2% or 4% often buying 3 or 4 gallons at a time. He should not skip meals. He did
 try   
Adipex for 1  
month in the past but notes he was not able to lose but a few pounds so his PCP   
stopped it. He notes  
his sister does the shopping as he can. He does some simple cooking.  
3. Cardiomyopathy/CHF/early CAD-he must continue to follow-up with cardiology 
for   
evaluation of  
cardiomyopathy/CHF despite medications/known cardiomyopathy/previous mild CAD. 
We   
obtained his  
previous cardiac catheterization from 2009. He notes his preoperative 
diagnoses   
were angina  
pectoris, dyspnea on exertion and abnormal Cardiolite perfusion stress test. His
 left   
anterior  
descending artery showed a 20 to 25% tubular stenosis. His circumflex had an 
ostial   
stenosis of 30  
to 50% in several views but in one view showed less than 30% stenosis and 
appeared to   
be mildly  
kinked, his right coronary artery was large dominant and normal. His left   
ventriculogram showed mild   
to moderately reduced systolic dysfunction with an EF in the 40 to 45% range   
globally. He is now  
being treated by Dr. Whitley.  
4. Type 2 diabetes with A1c controlled at 5.8 on 2024-continue Ozempic and   
Jardiance. We could  
consider Metformin but he already has some loose stools. We discussed that 
first-line   
treatment with  
lifestyle changes, decrease simple sweets and starches, will be some increased   
activity in the  
future and weight loss. He needs to consider bariatric surgery.  
5. Obstructive sleep apnea-compliant with his CPAP. Treat also with healthy 
lifestyle   
changes and  
weight loss. He needs to consider bariatric surgery.  
6. Bilateral OA of the knees-bone-on-bone making it very difficult for him to 
get   
around. He notes  
his orthopedic doctor wants him to drop from a BMI of 59 down to 45 before he 
will do   
the surgery.  
We will start weight loss and Ozempic. He needs to consider bariatric surgery.  
7. Chronic low back pain-treat with healthy lifestyle change. He needs to 
maximize   
his pain  
management.  
8. Mixed hyperlipidemia-treat with decreasing the simple sweets, added sugars,   
refined starches, and  
bad/added fats, increasing activity and exercise and continued long-term weight 
loss.   
Monitor with  
healthy lifestyle change. I recommended that he start a statin since he is 
diabetic   
and had mild CAD  
on his cath but he notes his cardiologist told him it was not necessary to 
start.  
9. Metabolic syndrome-treat with long-term healthy lifestyle changes, behavioral
   
changes, healthy  
nutritional changes, increased activity and exercise and achieve long-term 
weight   
loss.  
Follow up with me in 8 weeks.  
New labs needed: Up-to-date. Monitor A1c at least every 6 months/January with 
his PCP   
or in our  
office. He will continue other regular lab follow-up with his PCP.   
  
  
  
[facility]  
Work Phone: [phone]- Evaluation + Plan note* Assessment & Plan 
  Note - CARLITOS Severino - 2024 10:55 AM EDTAssociated Problem(s):
   BMI 50.0-59.9, adult (Multi)  
Formatting of this note might be different from the original.  
Reviewed the merits of healthy lifestyle choices on overall cardiovascular 
health.  
He had lost weight with Ozempic in the past, I believe there was some difficulty
 obtaining the prescription. Just recently resumed.  
Electronically signed by CARLITOS Severino at 2024 10:59 AM [facility]  
Work Phone: [phone]- Evaluation + Plan note* Assessment & Plan 
  Note - CARLITOS Severino - 2024 10:55 AM EDTAssociated Problem(s):
   Non-ischemic cardiomyopathy (Multi)  
Formatting of this note might be different from the original.  
NICM HF bordeline EF 42% 2023 MUGA (2009 cath minimal CAD EF 40-45%)  
FC II Stage C  
  
GDMT  
Coreg  
Jardiance  
Entresto  
Aldactone  
Electronically signed by CARLITOS Severino at 2024 10:55 AM [facility]  
Work Phone: [phone]- Miscellaneous Notes* Assessment & Plan Note
   - CARLITOS Severino - 2024 10:55 AM EDTAssociated Problem(s): BMI
   50.0-59.9, adult (Multi)  
Formatting of this note might be different from the original.  
Reviewed the merits of healthy lifestyle choices on overall cardiovascular 
health.  
He had lost weight with Ozempic in the past, I believe there was some difficulty
 obtaining the prescription. Just recently resumed.  
Electronically signed by CARLITOS Severino at 2024 10:59 AM EDT  
  
* Assessment & Plan Note - CARLITOS Severino - 2024 10:55 AM EDT
  Associated Problem(s): Non-ischemic cardiomyopathy (Multi)  
Formatting of this note might be different from the original.  
NICM HF bordeline EF 42% 2023 MUGA (2009 cath minimal CAD EF 40-45%)  
FC II Stage C  
  
GDMT  
Coreg  
Jardiance  
Entresto  
Aldactone  
Electronically signed by CARLITOS Severino at 2024 10:55 AM EDT  
  
documented in this [facility]  
Work Phone: [phone]- History of Present illness Narrative* CARLITOS Severino - 2024 11:00 AM EDTFormatting of this note is 
  different from the original.  
Chief Complaint  
 Doing good   
  
Reason for Visit  
9-month follow-up.  
Patient presents to the office today for outpatient follow-up for nonischemic 
cardiomyopathy.  
Last evaluated in clinic by Dr. Whitley 2023.  
  
Presents today ambulatory with cane and steady gait.  
Accompanied by patient  
Patient denies any hospitalizations or significant changes to interval medical 
history since last office follow-up.  
He follows routinely with PCP, is due to get annual lab work later this year.  
Also follows routinely with the wound clinic.  
  
History of Present Illness  
Patient is a very pleasant 69-year-old gentleman who presents to the office 
today with no voiced cardiovascular complaints. He continues to follow at the 
wound clinic due to right lower extremity ulcers, has noted some slight 
increasing lower extremity edema. From an activity standpoint he is able to go 
to [place], walks into the [place] with no change in his functional class II 
shortness of breath. He denies any exertional chest pain. No orthopnea or PND. 
Remains compliant with CPAP treatment.  
  
Only concern today is cost of medications. Apparently has now in the donut hole.
 He is on Xarelto due to a prior right lower extremity DVT.  
  
He has some slight increase in his lower extremity edema. Due to office visits 
he has not been taking his Lasix twice daily. Is unable to tolerate compression 
stockings.  
  
Lower extremity wounds, managed by wound clinic. Denies any prior history of 
PAD.  
  
Patient reports that overall has no complaint(s) of chest pain, chest 
pressure/discomfort, claudication, dyspnea, exertional chest 
pressure/discomfort, fatigue, and irregular heart beat  
  
Review of Systems  
Cardiovascular: Positive for leg swelling. Negative for chest pain, dyspnea on 
exertion, irregular heartbeat, near-syncope, orthopnea, palpitations, paroxysmal
 nocturnal dyspnea and syncope.  
  
  
Visit Vitals  
/70 (BP Location: Left arm, Patient Position: Sitting)  
Pulse 78  
Ht 1.803 m (5' 11 )  
Wt (!) 171 kg (378 lb)  
BMI 52.72 kg/m  
Smoking Status Never  
BSA 2.93 m  
  
Physical Exam  
Vitals and nursing note reviewed.  
Constitutional:  
Appearance: Normal appearance.  
Cardiovascular:  
Rate and Rhythm: Normal rate and regular rhythm.  
Heart sounds: Normal heart sounds.  
Pulmonary:  
Effort: Pulmonary effort is normal.  
Breath sounds: Normal breath sounds.  
Musculoskeletal:  
Cervical back: Full passive range of motion without pain.  
Right lower le+ Pitting Edema present.  
Left lower le+ Pitting Edema present.  
Skin:  
General: Skin is cool.  
Neurological:  
Mental Status: He is alert and oriented to person, place, and time.  
Psychiatric:  
Attention and Perception: Attention normal.  
Mood and Affect: Mood normal.  
Behavior: Behavior is cooperative.  
  
  
Allergies  
Allergen Reactions  
Cephalosporins Rash  
Diclofenac Sodium Rash  
Statins-Hmg-Coa Reductase Inhibitors Rash  
  
Current Outpatient Medications  
Medication Instructions  
carvedilol (Coreg) 6.25 mg tablet 1 tablet, oral, 2 times daily with meals  
empagliflozin (Jardiance) 10 mg 1 tablet, oral, Daily  
furosemide (LASIX) 40 mg, oral, 2 times daily  
Ozempic 1 mg, subcutaneous, Weekly  
rivaroxaban (Xarelto) 10 mg tablet 1 tablet, oral, Daily  
sacubitriL-valsartan (Entresto) 24-26 mg tablet 1 tablet, oral, 2 times daily  
spironolactone (Aldactone) 25 mg tablet 1 tablet, oral, Daily  
  
Assessment:  
  
Pleasant 69-year-old gentleman presents for routine follow-up. Nonischemic 
cardiomyopathy LVEF 42% baseline functional class II stage C on highest 
tolerated dose of guideline directed medical treatment. Will provide with 
patient assistance forms due to cost constraints: Discussed that if needed could
 transition back over to Cozaar, price check Farxiga.  
  
Overall patient is pleased with current state of cardiovascular health. At this 
time there are no indications for additional cardiovascular testing or need for 
medication changes.  
  
Non-ischemic cardiomyopathy (Multi)  
NICM HF bordeline EF 42% 2023 MUGA (2009 cath minimal CAD EF 40-45%)  
FC II Stage C  
  
GDMT  
Coreg  
Jardiance  
Entresto  
Aldactone  
  
BMI 50.0-59.9, adult (Multi)  
Reviewed the merits of healthy lifestyle choices on overall cardiovascular 
health.  
He had lost weight with Ozempic in the past, I believe there was some difficulty
 obtaining the prescription. Just recently resumed.  
  
Plan:  
  
Through informed decision making process incorporating patients unique 
circumstances, the followingtreatment plan will be initiated:  
  
1. Prescription drug management of cardiovascular medication for efficacy, 
adherence to treatment, side effect assessment and polypharmacy. Current 
treatment clinically warranted and to continue without modifications.  
  
2. Please complete patient assistance forms and return to office  
  
3. Return for follow-up; in the interim, contact the office if new symptoms 
arise.  
Dr. Whitley 9 months  
  
Sharlene An MSN, APRDRAKE-CNP, [facility]  
  
Please excuse any errors in grammar or translation related to this dictation. 
Voice recognition software was utilized to prepare this document.  
  
Electronically signed by CARLITOS Severino at 2024 11:02 AM EDT  
  
documented in this [facility]  
Work Phone: [phone]- Instructions* Patient Instructions*   
  
CARLITOS Severino - 2024 11:00 AM EDT  
  
Formatting of this note might be different from the original.  
Please bring all medicines, vitamins, and herbal supplements with you when you 
come to the office.  
  
Prescriptions will not be filled unless you are compliant with your follow up 
appointments or have a follow up appointment scheduled as per instruction of 
your physician. Refills should be requested at the time of your visit.  
  
PLAN:  
Through informed decision making process incorporating patients unique 
circumstances, the followingtreatment plan will be initiated:  
  
1. Prescription drug management of cardiovascular medication for efficacy, 
adherence to treatment, side effect assessment and polypharmacy. Current 
treatment clinically warranted and to continue without modifications.  
  
2. Please complete patient assistance forms and return to office  
  
3. Return for follow-up; in the interim, contact the office if new symptoms 
arise.  
Dr. Whitley 9 months  
Electronically signed by CARLITOS Severino at 2024 11:38 AM EDT  
  
  
  
documented in this [facility]  
Work Phone: [phone]- Evaluation note*   
  
                                        Author              Jacoby Braden  
[facility]  
   
                                        Authored            2024 2:5  
6pm  
   
                                                    Start weight: 414.4 lbs., he  
 is down 32.0 lbs. today with a weight of 382.4lbs.   
and   
20.9 lbs. since  
his last visit on 2023.  
Starting Date: 04/15/2022.  
Ozempic start date 4/15/2022. Ozempic start weight 414.4lbs. He is down 32.0 
lbs.   
since starting  
Ozempic.  
1. Abnormal weight gain. He notes he is the heaviest in his family. His brother 
has   
some but much  
less significant weight issues. He notes he was able to get his weight down to 
368   
pounds but was  
unable to keep it down.  
2. Obesity-markedly improved since starting our program and with taking Ozempic 
2 mg,   
but had  
significant weight regain of 20.9 pounds off the medication. Due to cost/donut 
hole   
he stopped  
Ozempic. Hopefully he can restart full dose in the future using patient 
assistance.   
He is also on  
Jardiance 10 mg which can also help with his diabetes and cardiomyopathy/CHF. 
His   
diabetes is well  
controlled with his last A1c of 5.6 despite having issues with obtaining with 
his   
diabetic  
medications. He had mild CAD along with his diabetes and has not been on a 
statin so   
I had  
recommended Crestor, but he notes his cardio said it was not necessary so he did
 not   
start the  
medication. He is eating healthier overall. He should follow-up with our   
nutritionist. He feels that  
his appetite is controlled with Ozempic 2 mg. I have recommend again he consider
   
bariatric surgery  
so he could have his knee replaced or find a surgeon who will do the surgery at 
an   
elevated BMI. In  
the past, he mentioned they found weakness in the bottom of the heart and 
evidently a   
problem with  
an artery that they could not get. His cardiologist however did not feel he 
needed   
the  
statin/Crestor that I previously prescribed. He gets very little activity due to
   
bone-on-bone  
arthritis, severe low back pain on tramadol, difficulty getting out of a chair 
and   
also his cardiac  
function he does get NELSON with small amounts of activity. He sees Dr. Whitley to 
treat   
his  
cardiomyopathy. He did like canned foods and he understands that he must cut 
back the   
salt/processed  
food. He denies adding salt to his foods. He has had a A1c that was 6.6 
indicative of   
diabetes. He  
notes he was never told he was diabetic before program. He is seeing Dr. Lomeli 
for   
his knees and  
notes he has to get his BMI from 59 down to 45 to be able to have his knees 
replaced.   
Before  
starting the program he did eat a lot of red meat, potatoes and drank a lot of 
milk   
2% or 4% often  
buying 3 or 4 gallons at a time. He should not skip meals. He did try Adipex for
 1   
month in the past  
but notes he was not able to lose but a few pounds so his PCP stopped it. He 
notes   
his sister does  
the shopping as he can. He does some simple cooking.  
3. Cardiomyopathy/CHF/early CAD-he must continue to follow-up with cardiology 
for   
evaluation of  
cardiomyopathy/CHF despite medications/known cardiomyopathy/previous mild CAD. 
We   
obtained his  
previous cardiac catheterization from 2009. He notes his preoperative 
diagnoses   
were angina  
pectoris, dyspnea on exertion and abnormal Cardiolite perfusion stress test. His
 left   
anterior  
descending artery showed a 20 to 25% tubular stenosis. His circumflex had an 
ostial   
stenosis of 30  
to 50% in several views but in one view showed less than 30% stenosis and 
appeared to   
be mildly  
kinked, his right coronary artery was large dominant and normal. His left   
ventriculogram showed mild   
to moderately reduced systolic dysfunction with an EF in the 40 to 45% range   
globally. He is now  
being treated by Dr. Whitley.  
4. Type 2 diabetes with A1c controlled/improved at 5.6-will restart Ozempic and   
continue Jardiance.  
We could consider Metformin but he already has some loose stools. We discussed 
that   
first-line  
treatment with lifestyle changes, decrease simple sweets and starches, will be 
some   
increased  
activity in the future and weight loss. He needs to consider bariatric surgery.  
5. Obstructive sleep apnea-he is compliant with his CPAP. Treat also with 
healthy   
lifestyle changes  
and weight loss. He needs to consider bariatric surgery.  
6. Bilateral OA of the knees-bone-on-bone making it very difficult for him to 
get   
around. He notes  
his orthopedic doctor wants him to drop from a BMI of 59 down to 45 before he 
will do   
the surgery.  
We will start weight loss and Ozempic. He needs to consider bariatric surgery.  
7. Chronic low back pain-treat with healthy lifestyle change. He needs to 
maximize   
his pain  
management.  
8. Mixed hyperlipidemia-treat with decreasing the simple sweets, added sugars,   
refined starches, and  
bad/added fats, increasing activity and exercise and continued long-term weight 
loss.   
Monitor with  
healthy lifestyle change. I recommended that he start a statin since he is 
diabetic   
and had mild CAD  
on his cath but he notes his cardiologist told him it was not necessary to 
start.  
9. Metabolic syndrome-treat with long-term healthy lifestyle changes, behavioral
   
changes, healthy  
nutritional changes, increased activity and exercise and achieve long-term 
weight   
loss.  
Follow up with me in 6 weeks.  
New labs needed: Up-to-date. Monitor A1c at least every 6 months with his PCP or
 in   
our office. He  
will continue other regular lab follow-up with his PCP.   
  
  
  
  
                                        Author              Yvette Grijalva  
[facility]  
   
                                        Authored            2024 3:39p  
m  
   
                                                    Start weight: 414.4 lbs., he  
 is down 34.4 lbs. today with a weight of 380.0lbs.   
he is   
down 2.4 lbs.  
since last visit 2024.. since his last visit on 2023.  
Starting Date: 04/15/2022.  
Ozempic start date 4/15/2022. Ozempic start weight 414.4lbs. He is down 34.4 
lbs.   
since starting  
Ozempic.  
1. Abnormal weight gain. He notes he is the heaviest in his family. His brother 
has   
some but much  
less significant weight issues. He notes he was able to get his weight down to 
368   
pounds but was  
unable to keep it down.  
2. Obesity-markedly improved since starting our program and with taking Ozempic 
2 mg,   
but had  
significant weight regain of 20.9 pounds off the medication. Due to cost/donut 
hole   
he stopped  
Ozempic. Hopefully he can restart full dose in the future using patient 
assistance.   
He is also on  
Jardiance 10 mg which can also help with his diabetes and cardiomyopathy/CHF. 
His   
diabetes is well  
controlled with his last A1c of 5.6 despite having issues with obtaining with 
his   
diabetic  
medications. He had mild CAD along with his diabetes and has not been on a 
statin so   
I had  
recommended Crestor, but he notes his cardio said it was not necessary so he did
 not   
start the  
medication. He is eating healthier overall. He should follow-up with our   
nutritionist. He feels that  
his appetite is controlled with Ozempic 2 mg. I have recommend again he consider
   
bariatric surgery  
so he could have his knee replaced or find a surgeon who will do the surgery at 
an   
elevated BMI. In  
the past, he mentioned they found weakness in the bottom of the heart and 
evidently a   
problem with  
an artery that they could not get. His cardiologist however did not feel he 
needed   
the  
statin/Crestor that I previously prescribed. He gets very little activity due to
   
bone-on-bone  
arthritis, severe low back pain on tramadol, difficulty getting out of a chair 
and   
also his cardiac  
function he does get NELSON with small amounts of activity. He sees Dr. Whitley to 
treat   
his  
cardiomyopathy. He did like canned foods and he understands that he must cut 
back the   
salt/processed  
food. He denies adding salt to his foods. He has had a A1c that was 6.6 
indicative of   
diabetes. He  
notes he was never told he was diabetic before program. He is seeing Dr. Lomeli 
for   
his knees and  
notes he has to get his BMI from 59 down to 45 to be able to have his knees 
replaced.   
Before  
starting the program he did eat a lot of red meat, potatoes and drank a lot of 
milk   
2% or 4% often  
buying 3 or 4 gallons at a time. He should not skip meals. He did try Adipex for
 1   
month in the past  
but notes he was not able to lose but a few pounds so his PCP stopped it. He 
notes   
his sister does  
the shopping as he can. He does some simple cooking.  
3. Cardiomyopathy/CHF/early CAD-he must continue to follow-up with cardiology 
for   
evaluation of  
cardiomyopathy/CHF despite medications/known cardiomyopathy/previous mild CAD. 
We   
obtained his  
previous cardiac catheterization from 2009. He notes his preoperative 
diagnoses   
were angina  
pectoris, dyspnea on exertion and abnormal Cardiolite perfusion stress test. His
 left   
anterior  
descending artery showed a 20 to 25% tubular stenosis. His circumflex had an 
ostial   
stenosis of 30  
to 50% in several views but in one view showed less than 30% stenosis and 
appeared to   
be mildly  
kinked, his right coronary artery was large dominant and normal. His left   
ventriculogram showed mild  
to moderately reduced systolic dysfunction with an EF in the 40 to 45% range   
globally. He is now  
being treated by Dr. Whitley.  
4. Type 2 diabetes with A1c controlled/improved at 5.6-will restart Ozempic and   
continue Jardiance.  
We could consider Metformin but he already has some loose stools. We discussed 
that   
first-line  
treatment with lifestyle changes, decrease simple sweets and starches, will be 
some   
increased  
activity in the future and weight loss. He needs to consider bariatric surgery.  
5. Obstructive sleep apnea-he is compliant with his CPAP. Treat also with 
healthy   
lifestyle changes  
and weight loss. He needs to consider bariatric surgery.  
6. Bilateral OA of the knees-bone-on-bone making it very difficult for him to 
get   
around. He notes  
his orthopedic doctor wants him to drop from a BMI of 59 down to 45 before he 
will do   
the surgery.  
We will start weight loss and Ozempic. He needs to consider bariatric surgery.  
7. Chronic low back pain-treat with healthy lifestyle change. He needs to 
maximize   
his pain  
management.  
8. Mixed hyperlipidemia-treat with decreasing the simple sweets, added sugars,   
refined starches, and  
bad/added fats, increasing activity and exercise and continued long-term weight 
loss.   
Monitor with  
healthy lifestyle change. I recommended that he start a statin since he is 
diabetic   
and had mild CAD  
on his cath but he notes his cardiologist told him it was not necessary to 
start.  
9. Metabolic syndrome-treat with long-term healthy lifestyle changes, behavioral
   
changes, healthy  
nutritional changes, increased activity and exercise and achieve long-term 
weight   
loss.  
Follow up with me in 6 weeks.  
New labs needed: Up-to-date. Monitor A1c at least every 6 months with his PCP or
 in   
our office. He  
will continue other regular lab follow-up with his PCP.   
  
  
  
[facility]  
Work Phone: [phone]- Evaluation note*   
  
                                        Author              Jacoby Braden  
[facility]  
   
                                        Authored            2024 2:5  
6pm  
   
                                                    Start weight: 414.4 lbs., he  
 is down 32.0 lbs. today with a weight of 382.4lbs.   
and   
20.9 lbs. since  
his last visit on 2023.  
Starting Date: 04/15/2022.  
Ozempic start date 4/15/2022. Ozempic start weight 414.4lbs. He is down 32.0 
lbs.   
since starting  
Ozempic.  
1. Abnormal weight gain. He notes he is the heaviest in his family. His brother 
has   
some but much  
less significant weight issues. He notes he was able to get his weight down to 
368   
pounds but was  
unable to keep it down.  
2. Obesity-markedly improved since starting our program and with taking Ozempic 
2 mg,   
but had  
significant weight regain of 20.9 pounds off the medication. Due to cost/donut 
hole   
he stopped  
Ozempic. Hopefully he can restart full dose in the future using patient 
assistance.   
He is also on  
Jardiance 10 mg which can also help with his diabetes and cardiomyopathy/CHF. 
His   
diabetes is well  
controlled with his last A1c of 5.6 despite having issues with obtaining with 
his   
diabetic  
medications. He had mild CAD along with his diabetes and has not been on a 
statin so   
I had  
recommended Crestor, but he notes his cardio said it was not necessary so he did
 not   
start the  
medication. He is eating healthier overall. He should follow-up with our   
nutritionist. He feels that  
his appetite is controlled with Ozempic 2 mg. I have recommend again he consider
   
bariatric surgery  
so he could have his knee replaced or find a surgeon who will do the surgery at 
an   
elevated BMI. In  
the past, he mentioned they found weakness in the bottom of the heart and 
evidently a   
problem with  
an artery that they could not get. His cardiologist however did not feel he 
needed   
the  
statin/Crestor that I previously prescribed. He gets very little activity due to
   
bone-on-bone  
arthritis, severe low back pain on tramadol, difficulty getting out of a chair 
and   
also his cardiac  
function he does get NELSON with small amounts of activity. He sees Dr. Whitley to 
treat   
his  
cardiomyopathy. He did like canned foods and he understands that he must cut 
back the   
salt/processed  
food. He denies adding salt to his foods. He has had a A1c that was 6.6 
indicative of   
diabetes. He  
notes he was never told he was diabetic before program. He is seeing Dr. Lomeli 
for   
his knees and  
notes he has to get his BMI from 59 down to 45 to be able to have his knees 
replaced.   
Before  
starting the program he did eat a lot of red meat, potatoes and drank a lot of 
milk   
2% or 4% often  
buying 3 or 4 gallons at a time. He should not skip meals. He did try Adipex for
 1   
month in the past  
but notes he was not able to lose but a few pounds so his PCP stopped it. He 
notes   
his sister does  
the shopping as he can. He does some simple cooking.  
3. Cardiomyopathy/CHF/early CAD-he must continue to follow-up with cardiology 
for   
evaluation of  
cardiomyopathy/CHF despite medications/known cardiomyopathy/previous mild CAD. 
We   
obtained his  
previous cardiac catheterization from 2009. He notes his preoperative 
diagnoses   
were angina  
pectoris, dyspnea on exertion and abnormal Cardiolite perfusion stress test. His
 left   
anterior  
descending artery showed a 20 to 25% tubular stenosis. His circumflex had an 
ostial   
stenosis of 30  
to 50% in several views but in one view showed less than 30% stenosis and 
appeared to   
be mildly  
kinked, his right coronary artery was large dominant and normal. His left   
ventriculogram showed mild   
to moderately reduced systolic dysfunction with an EF in the 40 to 45% range   
globally. He is now  
being treated by Dr. Whitley.  
4. Type 2 diabetes with A1c controlled/improved at 5.6-will restart Ozempic and   
continue Jardiance.  
We could consider Metformin but he already has some loose stools. We discussed 
that   
first-line  
treatment with lifestyle changes, decrease simple sweets and starches, will be 
some   
increased  
activity in the future and weight loss. He needs to consider bariatric surgery.  
5. Obstructive sleep apnea-he is compliant with his CPAP. Treat also with 
healthy   
lifestyle changes  
and weight loss. He needs to consider bariatric surgery.  
6. Bilateral OA of the knees-bone-on-bone making it very difficult for him to 
get   
around. He notes  
his orthopedic doctor wants him to drop from a BMI of 59 down to 45 before he 
will do   
the surgery.  
We will start weight loss and Ozempic. He needs to consider bariatric surgery.  
7. Chronic low back pain-treat with healthy lifestyle change. He needs to 
maximize   
his pain  
management.  
8. Mixed hyperlipidemia-treat with decreasing the simple sweets, added sugars,   
refined starches, and  
bad/added fats, increasing activity and exercise and continued long-term weight 
loss.   
Monitor with  
healthy lifestyle change. I recommended that he start a statin since he is 
diabetic   
and had mild CAD  
on his cath but he notes his cardiologist told him it was not necessary to 
start.  
9. Metabolic syndrome-treat with long-term healthy lifestyle changes, behavioral
   
changes, healthy  
nutritional changes, increased activity and exercise and achieve long-term 
weight   
loss.  
Follow up with me in 6 weeks.  
New labs needed: Up-to-date. Monitor A1c at least every 6 months with his PCP or
 in   
our office. He  
will continue other regular lab follow-up with his PCP.   
  
  
  
  
                                        Author              Jacoby Braden  
[facility]  
   
                                        Authored            2024 4:24p  
m  
   
                                                    Start weight: 414.4 lbs., he  
 is down 34.4 lbs. today with a weight of 380.0lbs.   
he is   
down 2.4 lbs.  
since last visit 2024.. since his last visit on 2023.  
Starting Date: 04/15/2022.  
Ozempic start date 4/15/2022. Ozempic start weight 414.4lbs. He is down 34.4 
lbs.   
since starting  
Ozempic.  
1. Abnormal weight gain. He notes he is the heaviest in his family. His brother 
has   
some but much  
less significant weight issues. He notes he was able to get his weight down to 
368   
pounds but was  
unable to keep it down.  
2. Obesity-markedly improved since starting our program and with taking Ozempic 
2 mg,   
but had  
significant weight regain of 20.9 pounds off the medication and not following up
 with   
the program  
since 2023. This is his first visit back since this restart. He had 
previously   
stopped Ozempic  
due to cost/donut hole. He has filled 1 prescription of Ozempic but he thinks it
 was   
$304.  
Hopefully he can continue Ozempic using patient assistance. We are referring him
 to   
[facility] for bariatric surgery evaluation. He needs to get his BMI down to 45 
before   
the surgeon will  
replace his knee. He is on Jardiance 10 mg which can also help with his diabetes
 and  
cardiomyopathy/CHF. His diabetes is well controlled with his last A1c of 5.5 on   
2024 despite  
having issues with obtaining with his diabetic medications. He had mild CAD 
along   
with his diabetes  
and has not been on a statin so I had recommended Crestor, but he notes his 
cardio   
said it was not  
necessary so he did not start the medication. He is eating healthier overall. He
   
should follow-up  
with our nutritionist. He feels that his appetite is controlled with Ozempic 2 
mg. I   
have recommend  
again he consider bariatric surgery so he could have his knee replaced or find a
   
surgeon who will do  
the surgery at an elevated BMI. In the past, he mentioned they found weakness in
 the   
bottom of the  
heart and evidently a problem with an artery that they could not get. His   
cardiologist however did  
not feel he needed the statin/Crestor that I previously prescribed. He gets very
   
little activity due  
to bone-on-bone arthritis, severe low back pain on tramadol, difficulty getting 
out   
of a chair and  
also his cardiac function he does get NELSON with small amounts of activity. He 
sees Dr. Whitley to  
treat his cardiomyopathy. He did like canned foods and he understands that he 
must   
cut back the  
salt/processed food. He denies adding salt to his foods. He has had a A1c that 
was   
6.6 indicative of  
diabetes. He notes he was never told he was diabetic before program. He is 
seeing Dr. Lomeli for his  
knees and notes he has to get his BMI from 59 down to 45 to be able to have his 
knees   
replaced.  
Before starting the program he did eat a lot of red meat, potatoes and drank a 
lot of   
milk 2% or 4%  
often buying 3 or 4 gallons at a time. He should not skip meals. He did try 
Adipex   
for 1 month in  
the past but notes he was not able to lose but a few pounds so his PCP stopped 
it. He   
notes his  
sister does the shopping as he can. He does some simple cooking.  
3. Cardiomyopathy/CHF/early CAD-he must continue to follow-up with cardiology 
for   
evaluation of  
cardiomyopathy/CHF despite medications/known cardiomyopathy/previous mild CAD. 
We   
obtained his  
previous cardiac catheterization from 2009. He notes his preoperative 
diagnoses   
were angina  
pectoris, dyspnea on exertion and abnormal Cardiolite perfusion stress test. His
 left   
anterior  
descending artery showed a 20 to 25% tubular stenosis. His circumflex had an 
ostial   
stenosis of 30  
to 50% in several views but in one view showed less than 30% stenosis and 
appeared to   
be mildly  
kinked, his right coronary artery was large dominant and normal. His left   
ventriculogram showed mild  
to moderately reduced systolic dysfunction with an EF in the 40 to 45% range   
globally. He is now  
being treated by Dr. Whitley.  
4. Type 2 diabetes with A1c controlled/improved at 5.5 on 2024-continue to   
titrate up Ozempic as  
available and continue Jardiance. We could consider Metformin but he already has
 some   
loose stools.  
We discussed that first-line treatment with lifestyle changes, decrease simple 
sweets   
and starches,  
will be some increased activity in the future and weight loss. He needs to 
consider   
bariatric  
surgery.  
5. Obstructive sleep apnea-he is compliant with his CPAP. Treat also with 
healthy   
lifestyle changes  
and weight loss. He needs to consider bariatric surgery.  
6. Bilateral OA of the knees-bone-on-bone making it very difficult for him to 
get   
around. He notes  
his orthopedic doctor wants him to drop from a BMI of 59 down to 45 before he 
will do   
the surgery.  
We will start weight loss and Ozempic. He needs to consider bariatric surgery.  
7. Chronic low back pain-treat with healthy lifestyle change. He needs to 
maximize   
his pain  
management.  
8. Mixed hyperlipidemia-treat with decreasing the simple sweets, added sugars,   
refined starches, and  
bad/added fats, increasing activity and exercise and continued long-term weight 
loss.   
Monitor with  
healthy lifestyle change. I recommended that he start a statin since he is 
diabetic   
and had mild CAD  
on his cath but he notes his cardiologist told him it was not necessary to 
start.  
9. Metabolic syndrome-treat with long-term healthy lifestyle changes, behavioral
   
changes, healthy  
nutritional changes, increased activity and exercise and achieve long-term 
weight   
loss.  
Follow up with me in 6 weeks.  
New labs needed: Up-to-date. Monitor A1c at least every 6 months with his PCP or
 in   
our office. He  
will continue other regular lab follow-up with his PCP.   
  
  
  
[facility]  
Work Phone: [phone]- Evaluation note*   
  
                                        Author              Jacoby Braden  
[facility]  
   
                                        Authored            2024 2:5  
6pm  
   
                                                    Start weight: 414.4 lbs., he  
 is down 32.0 lbs. today with a weight of 382.4lbs.   
and   
20.9 lbs. since  
his last visit on 2023.  
Starting Date: 04/15/2022.  
Ozempic start date 4/15/2022. Ozempic start weight 414.4lbs. He is down 32.0 
lbs.   
since starting  
Ozempic.  
1. Abnormal weight gain. He notes he is the heaviest in his family. His brother 
has   
some but much  
less significant weight issues. He notes he was able to get his weight down to 
368   
pounds but was  
unable to keep it down.  
2. Obesity-markedly improved since starting our program and with taking Ozempic 
2 mg,   
but had  
significant weight regain of 20.9 pounds off the medication. Due to cost/donut 
hole   
he stopped  
Ozempic. Hopefully he can restart full dose in the future using patient 
assistance.   
He is also on  
Jardiance 10 mg which can also help with his diabetes and cardiomyopathy/CHF. 
His   
diabetes is well  
controlled with his last A1c of 5.6 despite having issues with obtaining with 
his   
diabetic  
medications. He had mild CAD along with his diabetes and has not been on a 
statin so   
I had  
recommended Crestor, but he notes his cardio said it was not necessary so he did
 not   
start the  
medication. He is eating healthier overall. He should follow-up with our   
nutritionist. He feels that   
his appetite is controlled with Ozempic 2 mg. I have recommend again he consider
   
bariatric surgery  
so he could have his knee replaced or find a surgeon who will do the surgery at 
an   
elevated BMI. In  
the past, he mentioned they found weakness in the bottom of the heart and 
evidently a   
problem with  
an artery that they could not get. His cardiologist however did not feel he 
needed   
the  
statin/Crestor that I previously prescribed. He gets very little activity due to
   
bone-on-bone  
arthritis, severe low back pain on tramadol, difficulty getting out of a chair 
and   
also his cardiac  
function he does get NELSON with small amounts of activity. He sees Dr. Whitley to 
treat   
his  
cardiomyopathy. He did like canned foods and he understands that he must cut 
back the   
salt/processed  
food. He denies adding salt to his foods. He has had a A1c that was 6.6 
indicative of   
diabetes. He  
notes he was never told he was diabetic before program. He is seeing Dr. Lomeli 
for   
his knees and  
notes he has to get his BMI from 59 down to 45 to be able to have his knees 
replaced.   
Before  
starting the program he did eat a lot of red meat, potatoes and drank a lot of 
milk   
2% or 4% often  
buying 3 or 4 gallons at a time. He should not skip meals. He did try Adipex for
 1   
month in the past  
but notes he was not able to lose but a few pounds so his PCP stopped it. He 
notes   
his sister does  
the shopping as he can. He does some simple cooking.  
3. Cardiomyopathy/CHF/early CAD-he must continue to follow-up with cardiology 
for   
evaluation of  
cardiomyopathy/CHF despite medications/known cardiomyopathy/previous mild CAD. 
We   
obtained his  
previous cardiac catheterization from 2009. He notes his preoperative 
diagnoses   
were angina  
pectoris, dyspnea on exertion and abnormal Cardiolite perfusion stress test. His
 left   
anterior  
descending artery showed a 20 to 25% tubular stenosis. His circumflex had an 
ostial   
stenosis of 30  
to 50% in several views but in one view showed less than 30% stenosis and 
appeared to   
be mildly  
kinked, his right coronary artery was large dominant and normal. His left   
ventriculogram showed mild  
to moderately reduced systolic dysfunction with an EF in the 40 to 45% range   
globally. He is now  
being treated by Dr. Whitley.  
4. Type 2 diabetes with A1c controlled/improved at 5.6-will restart Ozempic and   
continue Jardiance.  
We could consider Metformin but he already has some loose stools. We discussed 
that   
first-line  
treatment with lifestyle changes, decrease simple sweets and starches, will be 
some   
increased  
activity in the future and weight loss. He needs to consider bariatric surgery.  
5. Obstructive sleep apnea-he is compliant with his CPAP. Treat also with 
healthy   
lifestyle changes  
and weight loss. He needs to consider bariatric surgery.  
6. Bilateral OA of the knees-bone-on-bone making it very difficult for him to 
get   
around. He notes  
his orthopedic doctor wants him to drop from a BMI of 59 down to 45 before he 
will do   
the surgery.  
We will start weight loss and Ozempic. He needs to consider bariatric surgery.  
7. Chronic low back pain-treat with healthy lifestyle change. He needs to 
maximize   
his pain  
management.  
8. Mixed hyperlipidemia-treat with decreasing the simple sweets, added sugars,   
refined starches, and   
bad/added fats, increasing activity and exercise and continued long-term weight 
loss.   
Monitor with  
healthy lifestyle change. I recommended that he start a statin since he is 
diabetic   
and had mild CAD  
on his cath but he notes his cardiologist told him it was not necessary to 
start.  
9. Metabolic syndrome-treat with long-term healthy lifestyle changes, behavioral
   
changes, healthy  
nutritional changes, increased activity and exercise and achieve long-term 
weight   
loss.  
Follow up with me in 6 weeks.  
New labs needed: Up-to-date. Monitor A1c at least every 6 months with his PCP or
 in   
our office. He  
will continue other regular lab follow-up with his PCP.   
  
  
  
  
                                        Author              Jacoby Braden  
[facility]  
   
                                        Authored            2024 4:24p  
m  
   
                                                    Start weight: 414.4 lbs., he  
 is down 34.4 lbs. today with a weight of 380.0lbs.   
he is   
down 2.4 lbs.  
since last visit 2024.. since his last visit on 2023.  
Starting Date: 04/15/2022.  
Ozempic start date 4/15/2022. Ozempic start weight 414.4lbs. He is down 34.4 
lbs.   
since starting  
Ozempic.  
1. Abnormal weight gain. He notes he is the heaviest in his family. His brother 
has   
some but much  
less significant weight issues. He notes he was able to get his weight down to 
368   
pounds but was  
unable to keep it down.  
2. Obesity-markedly improved since starting our program and with taking Ozempic 
2 mg,   
but had  
significant weight regain of 20.9 pounds off the medication and not following up
 with   
the program  
since 2023. This is his first visit back since this restart. He had 
previously   
stopped Ozempic  
due to cost/donut hole. He has filled 1 prescription of Ozempic but he thinks it
 was   
$304.  
Hopefully he can continue Ozempic using patient assistance. We are referring him
 to   
[facility] for bariatric surgery evaluation. He needs to get his BMI down to 45 
before   
the surgeon will  
replace his knee. He is on Jardiance 10 mg which can also help with his diabetes
 and  
cardiomyopathy/CHF. His diabetes is well controlled with his last A1c of 5.5 on   
2024 despite  
having issues with obtaining with his diabetic medications. He had mild CAD 
along   
with his diabetes  
and has not been on a statin so I had recommended Crestor, but he notes his 
cardio   
said it was not  
necessary so he did not start the medication. He is eating healthier overall. He
   
should follow-up  
with our nutritionist. He feels that his appetite is controlled with Ozempic 2 
mg. I   
have recommend  
again he consider bariatric surgery so he could have his knee replaced or find a
   
surgeon who will do  
the surgery at an elevated BMI. In the past, he mentioned they found weakness in
 the   
bottom of the  
heart and evidently a problem with an artery that they could not get. His   
cardiologist however did  
not feel he needed the statin/Crestor that I previously prescribed. He gets very
   
little activity due   
to bone-on-bone arthritis, severe low back pain on tramadol, difficulty getting 
out   
of a chair and  
also his cardiac function he does get NELSON with small amounts of activity. He 
sees Dr. Whitley to  
treat his cardiomyopathy. He did like canned foods and he understands that he 
must   
cut back the  
salt/processed food. He denies adding salt to his foods. He has had a A1c that 
was   
6.6 indicative of  
diabetes. He notes he was never told he was diabetic before program. He is 
seeing Dr. Lomeli for his  
knees and notes he has to get his BMI from 59 down to 45 to be able to have his 
knees   
replaced.  
Before starting the program he did eat a lot of red meat, potatoes and drank a 
lot of   
milk 2% or 4%  
often buying 3 or 4 gallons at a time. He should not skip meals. He did try 
Adipex   
for 1 month in  
the past but notes he was not able to lose but a few pounds so his PCP stopped 
it. He   
notes his  
sister does the shopping as he can. He does some simple cooking.  
3. Cardiomyopathy/CHF/early CAD-he must continue to follow-up with cardiology 
for   
evaluation of  
cardiomyopathy/CHF despite medications/known cardiomyopathy/previous mild CAD. 
We   
obtained his  
previous cardiac catheterization from 2009. He notes his preoperative 
diagnoses   
were angina  
pectoris, dyspnea on exertion and abnormal Cardiolite perfusion stress test. His
 left   
anterior  
descending artery showed a 20 to 25% tubular stenosis. His circumflex had an 
ostial   
stenosis of 30  
to 50% in several views but in one view showed less than 30% stenosis and 
appeared to   
be mildly  
kinked, his right coronary artery was large dominant and normal. His left   
ventriculogram showed mild  
to moderately reduced systolic dysfunction with an EF in the 40 to 45% range   
globally. He is now  
being treated by Dr. Whitley.  
4. Type 2 diabetes with A1c controlled/improved at 5.5 on 2024-continue to   
titrate up Ozempic as   
available and continue Jardiance. We could consider Metformin but he already has
 some   
loose stools.  
We discussed that first-line treatment with lifestyle changes, decrease simple 
sweets   
and starches,  
will be some increased activity in the future and weight loss. He needs to 
consider   
bariatric  
surgery.  
5. Obstructive sleep apnea-he is compliant with his CPAP. Treat also with 
healthy   
lifestyle changes  
and weight loss. He needs to consider bariatric surgery.  
6. Bilateral OA of the knees-bone-on-bone making it very difficult for him to 
get   
around. He notes  
his orthopedic doctor wants him to drop from a BMI of 59 down to 45 before he 
will do   
the surgery.  
We will start weight loss and Ozempic. He needs to consider bariatric surgery.  
7. Chronic low back pain-treat with healthy lifestyle change. He needs to 
maximize   
his pain  
management.  
8. Mixed hyperlipidemia-treat with decreasing the simple sweets, added sugars,   
refined starches, and   
bad/added fats, increasing activity and exercise and continued long-term weight 
loss.   
Monitor with  
healthy lifestyle change. I recommended that he start a statin since he is 
diabetic   
and had mild CAD  
on his cath but he notes his cardiologist told him it was not necessary to 
start.  
9. Metabolic syndrome-treat with long-term healthy lifestyle changes, behavioral
   
changes, healthy  
nutritional changes, increased activity and exercise and achieve long-term 
weight   
loss.  
Follow up with me in 6 weeks.  
New labs needed: Up-to-date. Monitor A1c at least every 6 months with his PCP or
 in   
our office. He  
will continue other regular lab follow-up with his PCP.   
  
  
  
  
                                        Author              Constance Yates  
[facility]  
   
                                        Authored            2024 2:03  
pm  
   
                                                    Start weight: 414.4 lbs., he  
 is down 45.2 lbs. today with a weight of 369.2lbs.   
he is   
down 10.8 lbs.  
since last visit 2024.  
Starting Date: 04/15/2022.  
Ozempic start date 4/15/2022. Ozempic start weight 414.4lbs. He is down 34.4 
lbs.   
since starting  
Ozempic.  
1. Abnormal weight gain. He notes he is the heaviest in his family. His brother 
has   
some but much  
less significant weight issues. He notes he was able to get his weight down to 
368   
pounds but was  
unable to keep it down.  
2. Obesity-markedly improved since starting our program and with taking Ozempic 
2 mg,   
but had  
significant weight regain of 20.9 pounds off the medication and not following up
 with   
the program  
since 2023. This is his first visit back since this restart. He had 
previously   
stopped Ozempic  
due to cost/donut hole. He has filled 1 prescription of Ozempic but he thinks it
 was   
$304.  
Hopefully he can continue Ozempic using patient assistance. We are referring him
 to   
[facility] for bariatric surgery evaluation. He needs to get his BMI down to 45 
before   
the surgeon will  
replace his knee. He is on Jardiance 10 mg which can also help with his diabetes
 and  
cardiomyopathy/CHF. His diabetes is well controlled with his last A1c of 5.5 on   
2024 despite  
having issues with obtaining with his diabetic medications. He had mild CAD 
along   
with his diabetes  
and has not been on a statin so I had recommended Crestor, but he notes his 
cardio   
said it was not  
necessary so he did not start the medication. He is eating healthier overall. He
   
should follow-up  
with our nutritionist. He feels that his appetite is controlled with Ozempic 2 
mg. I   
have recommend  
again he consider bariatric surgery so he could have his knee replaced or find a
   
surgeon who will do  
the surgery at an elevated BMI. In the past, he mentioned they found weakness in
 the   
bottom of the  
heart and evidently a problem with an artery that they could not get. His   
cardiologist however did  
not feel he needed the statin/Crestor that I previously prescribed. He gets very
   
little activity due  
to bone-on-bone arthritis, severe low back pain on tramadol, difficulty getting 
out   
of a chair and  
also his cardiac function he does get NELSON with small amounts of activity. He 
sees Dr. Whitley to  
treat his cardiomyopathy. He did like canned foods and he understands that he 
must   
cut back the  
salt/processed food. He denies adding salt to his foods. He has had a A1c that 
was   
6.6 indicative of  
diabetes. He notes he was never told he was diabetic before program. He is 
seeing Dr. Lomeli for his  
knees and notes he has to get his BMI from 59 down to 45 to be able to have his 
knees   
replaced.  
Before starting the program he did eat a lot of red meat, potatoes and drank a 
lot of   
milk 2% or 4%  
often buying 3 or 4 gallons at a time. He should not skip meals. He did try 
Adipex   
for 1 month in  
the past but notes he was not able to lose but a few pounds so his PCP stopped 
it. He   
notes his  
sister does the shopping as he can. He does some simple cooking.  
3. Cardiomyopathy/CHF/early CAD-he must continue to follow-up with cardiology 
for   
evaluation of  
cardiomyopathy/CHF despite medications/known cardiomyopathy/previous mild CAD. 
We   
obtained his  
previous cardiac catheterization from 2009. He notes his preoperative 
diagnoses   
were angina  
pectoris, dyspnea on exertion and abnormal Cardiolite perfusion stress test. His
 left   
anterior  
descending artery showed a 20 to 25% tubular stenosis. His circumflex had an 
ostial   
stenosis of 30  
to 50% in several views but in one view showed less than 30% stenosis and 
appeared to   
be mildly  
kinked, his right coronary artery was large dominant and normal. His left   
ventriculogram showed mild  
to moderately reduced systolic dysfunction with an EF in the 40 to 45% range   
globally. He is now  
being treated by Dr. Whitley.  
4. Type 2 diabetes with A1c controlled/improved at 5.5 on 2024-continue to   
titrate up Ozempic as   
available and continue Jardiance. We could consider Metformin but he already has
 some   
loose stools.  
We discussed that first-line treatment with lifestyle changes, decrease simple 
sweets   
and starches,  
will be some increased activity in the future and weight loss. He needs to 
consider   
bariatric  
surgery.  
5. Obstructive sleep apnea-he is compliant with his CPAP. Treat also with 
healthy   
lifestyle changes  
and weight loss. He needs to consider bariatric surgery.  
6. Bilateral OA of the knees-bone-on-bone making it very difficult for him to 
get   
around. He notes  
his orthopedic doctor wants him to drop from a BMI of 59 down to 45 before he 
will do   
the surgery.  
We will start weight loss and Ozempic. He needs to consider bariatric surgery.  
7. Chronic low back pain-treat with healthy lifestyle change. He needs to 
maximize   
his pain  
management.  
8. Mixed hyperlipidemia-treat with decreasing the simple sweets, added sugars,   
refined starches, and   
bad/added fats, increasing activity and exercise and continued long-term weight 
loss.   
Monitor with  
healthy lifestyle change. I recommended that he start a statin since he is 
diabetic   
and had mild CAD  
on his cath but he notes his cardiologist told him it was not necessary to 
start.  
9. Metabolic syndrome-treat with long-term healthy lifestyle changes, behavioral
   
changes, healthy  
nutritional changes, increased activity and exercise and achieve long-term 
weight   
loss.  
Follow up with me in 6 weeks.  
New labs needed: Up-to-date. Monitor A1c at least every 6 months with his PCP or
 in   
our office. He  
will continue other regular lab follow-up with his PCP.   
  
  
  
[facility]  
Work Phone: [phone]- Progress note  
  
  
  
  
                                        Author              Elaine Espino  
[facility]  
2024 2:29pm  
   
                                        Note Date/Time      2024 2:2  
9pm  
   
                                                    [facility]  
ENTER  
[address] [place] [address]  
  
Wound Center Provider Note  
Signed  
  
Patient: Jocelin Pacheco MR#:   
[id number]  
: 1955 Acct:[id number]  
  
Age/Sex: 69 / M  
  
Copies to: DO Elaine Arboleda, APRDRAKE~  
  
  
HPI  
Date of Visit  
Date of Visit:  
Date of Service: 2024  
Time of Service: 14:28  
  
Narrative  
HPI:  
24 Jocelin is a 69-year-old male presenting to [facility]   
wound care program for an initial visit to the office for a right lower 
extremity   
venous stasis ulcer/lymphedema ulcer.? The patient has been a patient of the 
office   
for quite some time now and has seen other providers.  
He had seen vascular in [place] was told that there is nothing else that can be 
done   
for him.  
He has an extensive history of infections so I imagine this will continue to be 
an   
issue for him.  
He appears to need better edema control.  
He was told by ortho that he needs to lose 50 pounds in order to have surgery- 
this   
was 3 years ago and has not been accomplished- he says that he wasn't taking the
   
ozempic as ordered and he gained back the weight he had lost and hence no 
surgery.  
A past venous duplex indicates deep vein issues which are not surgically 
correctable-   
vascular did confirm that he does not need to see them because there is nothing 
they   
have to offer him.  
He is still retired so hopefully there is more opportunity for elevation and   
compression of the legs to support healing of the ulcer.  
Nutrition and probiotics were discussed and suggestions were given.  
Doxycycline was escribed today for what appears to be cellulitis of the RLE.  
Topical gentamicin and unna wrap was ordered and he will return here for 
dressings.  
3/4/24 better, topical flagyl today with unna wrap, will use a gauze wrap under 
the   
paste since he feels like the unna paste causes itching, no infection seen 
today,   
cellulitis appears resolved  
3/18/24 much improved, same topical orders, could be healed in a week or so, no   
infection noted  
  
Subjective  
Pain  
Right Lower Leg:  
Pain Description: Intermittent  
Pain Intensity: 0  
Pain Management Techniques Other/Comment:  not too bad today   
Wound/Ulcer History  
When did wound start?: 2024 Right lower leg  
Mode of Arrival/ : Personal vehicle  
Assistive Device Used Today: Cane  
Lives with:: Alone  
Appetite Description: Within Normal Limits  
Who helps w/ dressing change?: Wound Care Dept  
Smoking Status: Never smoker  
  
[facility]  
Medical History (Updated 24 @ 08:12 by Maria A Crowe RN)  
  
Ulcer of right leg  
Wound of right lower extremity  
Itching with irritation  
Itching  
PVD (peripheral vascular disease)  
 poor veins  Dr. Cameron did vein closure 2019, needs 5 more viens worked 
on.  
Carpal tunnel syndrome  
right arm surgery  
DIMITRI (obstructive sleep apnea)  
Cardiomyopathy  
 lower part of heart is weak   
Back pain  
DVT (deep venous thrombosis)  
 blood clot from right foot to right thigh  on  blood thinner   
Hypertension  
Arthritis  
Bilateral knees,  right is bone on bone  per  Dr. Shine   
  
Surgical History (Reviewed 24 @ 08:12 by Maria A Crowe RN)  
  
Hx of cholecystectomy  
  
Family History (Reviewed 24 @ 08:13 by Maria A Crowe RN)  
Father Diabetes mellitus, type 2  
Sister Diabetes mellitus, type 2  
Brother Heart disease  
[facility]am[facility] Relation: Brother(s)  
Father Heart disease  
Diabetes  
Heart failure  
  
Family/Other   
Leg[facility] FamHx Problem: 2 BROTHERS  :2 SISTERS ::NO CHILDREN  
Mother   
Heart disease  
Sister Heart disease  
  
Social History  
Smoking Status: Never smoker  
Substance Use Type: None  
  
Grafts  
History of Graft  
History of Graft?: No  
  
Exam  
Physical Exam  
Vital Signs:  
  
  
  
  
Temp Pulse Resp BP O2 Del Method  
  
97.7 F 87 24 132/74 Room Air  
  
24 14:18 24 14:18 24 14:18 24 14:18 24 14:18  
  
  
  
Const  
General: cooperative, comfortable and no acute distress  
Nutritional Appearance: obese  
Orientation: alert, awake and oriented x3  
  
Lower/Upper Extremity Exam  
Vascular Exam-Edema  
Right Lower Extremity:  
Edema Type: Lymphedema  
Vascular Exam-Pulses  
Right Dorsalis Pedis:  
Pulse Assessment Method: Doppler  
Right Posterior Tibial:  
Pulse Assessment Method: Doppler  
Right Brachial:  
Pulse Assessment Method: NIBP  
  
Objective  
Meds/Allergies  
Home Medications  
  
carvedilol 6.25 mg tablet 6.25 mg PO BID 17 [History Confirmed 24]  
furosemide 40 mg tablet (Lasix) 40 mg PO BID 17 [History Confirmed 
24]  
acetaminophen 500 mg tablet (Tylenol Extra Strength) 500 mg PO TID PRN Pain 
20   
[History Confirmed 24]  
empagliflozin 10 mg tablet (Jardiance) 10 mg PO DAILY 22 [History 
Confirmed   
24]  
rivaroxaban 10 mg tablet (Xarelto) 10 mg PO DAILY 22 [History Confirmed   
24]  
semaglutide 2 mg/dose (8 mg/3 mL) subcutaneous pen injector (Ozempic) 2 mg 
subcut Q7D   
22 [History Confirmed 24]  
spironolactone 25 mg tablet 25 mg PO DAILY 22 [History Confirmed 24]  
sacubitril 24 mg-valsartan 26 mg tablet (Entresto) 1 tab PO BID 23 
[History   
Confirmed 24]  
Lactobacillus acidophilus 10 billion cell capsule (Probiotic) 100 mmu cells PO 
DAILY   
24 [History Confirmed 24]  
doxycycline hyclate 100 mg capsule 100 mg PO BID 14 days #28 caps 24 [Rx   
Confirmed 24]  
  
  
Allergies  
  
cefixime Allergy (Unknown, Verified 24 08:10)  
Unknown Reaction  
diclofenac Allergy (Unknown, Verified 24 08:10)  
Unknown Reaction  
nystatin Allergy (Unknown, Verified 24 08:10)  
Unknown Reaction  
sodium benzoate Allergy (Verified 24 08:10)  
Unknown Reaction  
  
  
Wound/Ulcer  
Right Lower Leg:  
Type: Lymphedema  
Thickness: Skin Breakdown  
Bed Appearance: Dried Exudate, Pink and Yellow  
Percent of Wound Bed Granulated/Red: 90  
Percent of Devitalized: 10  
Length (cm): 0.5  
Width (cm): 0.5  
Depth (cm): 0.1  
CM Sq: 0.250  
Surrounding Tissue Appearance: Hyperpigmented  
Surrounding Tissue Temp: Warm  
Drainage Amount: Moderate  
Drainage Description: Serosanguineous  
Drainage Odor: No Odor  
Results  
Height: 5 ft 11 in  
Weight: 167.829 kg  
Body Mass Index: 51.5  
  
Assessment/Plan  
Assessment/Plan  
(1) Venous stasis ulcer:  
Qualifiers:  
Laterality: right Non-pressure ulcer stage: limited to breakdown of skin 
Varicose   
vein presence: with varicose veins Venous stasis ulcer site: other part of lower
leg   
Qualified Code(s): I83.018 - Varicose veins of right lower extremity with ulcer 
other   
part of lower leg; L97.811 - Non-pressure chronic ulcer of other part of right 
lower   
leg limited to breakdown of skin  
Code(s):  
I83.009 - Varicose veins of unspecified lower extremity with ulcer of 
unspecified   
site; L97.909 - Non-pressure chronic ulcer of unspecified part of unspecified 
lower   
leg with unspecified severity  
Plan:  
w/lymphedema  
(2) Edema of right lower leg:  
Code(s):  
R60.0 - Localized edema  
(3) Morbid obesity due to excess calories:  
Code(s):  
E66.01 - Morbid (severe) obesity due to excess calories  
(4) Lymphedema of both lower extremities:  
Code(s):  
I89.0 - Lymphedema, not elsewhere classified  
(5) PVD (peripheral vascular disease):  
Code(s):  
I73.9 - Peripheral vascular disease, unspecified  
(6) Varicose veins of both legs with edema:  
Code(s):  
I83.893 - Varicose veins of bilateral lower extremities with other complications  
(7) Inflammation:  
  
Plan  
see orders and hpi  
Time spent with patient  
Time Spent With Patient (min): 10  
  
  
  
  
Dictated By: SOWMYA Correa DD/DT: 24  
  
Signed By: <Electronically signed by SOWMYA Espino>  
24  
   
  
[facility]  
Work Phone: [phone]- Progress note  
  
  
  
  
                                        Author              Elaine Espino  
[facility]  
2024 3:29pm  
   
                                        Note Date/Time      2024 3:29  
pm  
   
                                                    [facility]  
ENTER  
[address] [place] [address]  
  
Wound Center Provider Note  
Signed  
  
Patient: Jocelin Pacheco MR#:   
[id number]  
: 1955 Acct:[id number]  
  
Age/Sex: 69 / M  
  
Copies to: Vera Lea,SOWMYA Mayer~  
  
  
HPI  
Date of Visit  
Date of Visit:  
Date of Service: 2024  
Time of Service: 15:26  
  
Narrative  
HPI:  
24 Jocelin is a 69-year-old male presenting to [facility]   
wound care program for an initial visit to the office for a right lower 
extremity   
venous stasis ulcer/lymphedema ulcer.? The patient has been a patient of the 
office   
for quite some time now and has seen other providers.  
He had seen vascular in [place] was told that there is nothing else that can be 
done   
for him.  
He has an extensive history of infections so I imagine this will continue to be 
an   
issue for him.  
He appears to need better edema control.  
He was told by ortho that he needs to lose 50 pounds in order to have surgery- 
this   
was 3 years ago and has not been accomplished- he says that he wasn't taking the
  
ozempic as ordered and he gained back the weight he had lost and hence no 
surgery.  
A past venous duplex indicates deep vein issues which are not surgically 
correctable-   
vascular did confirm that he does not need to see them because there is nothing 
they   
have to offer him.  
He is still retired so hopefully there is more opportunity for elevation and   
compression of the legs to support healing of the ulcer.  
Nutrition and probiotics were discussed and suggestions were given.  
Doxycycline was escribed today for what appears to be cellulitis of the RLE.  
Topical gentamicin and unna wrap was ordered and he will return here for 
dressings.  
3/4/24 better, topical flagyl today with unna wrap, will use a gauze wrap under 
the   
paste since he feels like the unna paste causes itching, no infection seen 
today,   
cellulitis appears resolved  
  
Subjective  
Pain  
Right Lower Leg:  
Pain Description: Intermittent  
Pain Intensity: 8  
Wound/Ulcer History  
When did wound start?: 2024 Right lower leg  
Mode of Arrival/ : Personal vehicle  
Assistive Device Used Today: Cane  
Lives with:: Alone  
Appetite Description: Within Normal Limits  
Who helps w/ dressing change?: Wound Care Dept  
Smoking Status: Never smoker  
  
[facility]  
Medical History (Updated 24 @ 08:12 by Mraia A Crowe RN)  
  
Ulcer of right leg  
Wound of right lower extremity  
Itching with irritation  
Itching  
PVD (peripheral vascular disease)  
 poor veins  Dr. Cameron did vein closure 2019, needs 5 more viens worked 
on.  
Carpal tunnel syndrome  
right arm surgery  
DIMITRI (obstructive sleep apnea)  
Cardiomyopathy  
 lower part of heart is weak   
Back pain  
DVT (deep venous thrombosis)  
 blood clot from right foot to right thigh  on  blood thinner   
Hypertension  
Arthritis  
Bilateral knees,  right is bone on bone  per  Dr. Richard   
  
Surgical History (Reviewed 24 @ 08:12 by Maria A Crowe RN)  
  
Hx of cholecystectomy  
  
Family History (Reviewed 24 @ 08:13 by Maria A Crowe RN)  
Father Diabetes mellitus, type 2  
Sister Diabetes mellitus, type 2  
Brother Heart disease  
Legacy FamHx Relation: Brother(s)  
Father Heart disease  
Diabetes  
Heart failure  
  
Family/Other   
Legacy FamHx Problem: 2 BROTHERS  :2 SISTERS ::NO CHILDREN  
Mother   
Heart disease  
Sister Heart disease  
  
Social History  
Smoking Status: Never smoker  
Substance Use Type: None  
  
Grafts  
History of Graft  
History of Graft?: No  
  
Exam  
Physical Exam  
Vital Signs:  
  
  
  
  
Temp Pulse Resp BP O2 Del Method  
  
97.3 F L 85 18 130/60 Room Air  
  
24 15:09 24 15:09 24 15:09 24 15:09 24 15:09  
  
  
  
Const  
General: cooperative, comfortable and no acute distress  
Nutritional Appearance: obese  
Orientation: alert, awake and oriented x3  
  
Lower/Upper Extremity Exam  
Vascular Exam-Edema  
Right Lower Extremity:  
Edema Type: Lymphedema  
Vascular Exam-Pulses  
Right Dorsalis Pedis:  
Pulse Assessment Method: Doppler  
Right Posterior Tibial:  
Pulse Assessment Method: Doppler  
Right Brachial:  
Pulse Assessment Method: NIBP  
  
Objective  
Meds/Allergies  
Home Medications  
  
carvedilol 6.25 mg tablet 6.25 mg PO BID 17 [History Confirmed 24]  
furosemide 40 mg tablet (Lasix) 40 mg PO BID 17 [History Confirmed 
24]  
acetaminophen 500 mg tablet (Tylenol Extra Strength) 500 mg PO TID PRN Pain 
20   
[History Confirmed 24]  
empagliflozin 10 mg tablet (Jardiance) 10 mg PO DAILY 22 [History 
Confirmed   
24]  
rivaroxaban 10 mg tablet (Xarelto) 10 mg PO DAILY 22 [History Confirmed   
24]  
semaglutide 2 mg/dose (8 mg/3 mL) subcutaneous pen injector (Ozempic) 2 mg 
subcut Q7D   
22 [History Confirmed 24]  
spironolactone 25 mg tablet 25 mg PO DAILY 22 [History Confirmed 24]  
sacubitril 24 mg-valsartan 26 mg tablet (Entresto) 1 tab PO BID 23 
[History   
Confirmed 24]  
Lactobacillus acidophilus 10 billion cell capsule (Probiotic) 100 mmu cells PO 
DAILY   
24 [History Confirmed 24]  
doxycycline hyclate 100 mg capsule 100 mg PO BID 14 days #28 caps 24 [Rx   
Confirmed 24]  
  
  
Allergies  
  
cefixime Allergy (Unknown, Verified 24 08:10)  
Unknown Reaction  
diclofenac Allergy (Unknown, Verified 24 08:10)  
Unknown Reaction  
nystatin Allergy (Unknown, Verified 24 08:10)  
Unknown Reaction  
sodium benzoate Allergy (Verified 24 08:10)  
Unknown Reaction  
  
  
Wound/Ulcer  
Right Lower Leg:  
Type: Lymphedema  
Thickness: Skin Breakdown  
Bed Appearance: Dried Exudate, Pink and Yellow  
Percent of Wound Bed Granulated/Red: 80  
Percent of Devitalized: 20  
Length (cm): 1.5  
Width (cm): 1.0  
Depth (cm): 0.1  
CM Sq: 1.500  
Surrounding Tissue Appearance: Hyperpigmented  
Surrounding Tissue Temp: Warm  
Drainage Amount: Moderate  
Drainage Description: Serosanguineous  
Drainage Odor: No Odor  
Procedures  
Time Out: 2 Patient Identifiers, Correct Patient, Correct Side/Site, Correct   
Procedure and Safety Issues Reviewed  
Procedure:  
The right leg ulcer was anesthetized with topical 2% lidocaine gel. A forcep and
  
scissor was used to perform debridement for the removal of 1 cm? of devitalized   
tissue consisting of skin and slough. Debridement was down to healthy bleeding   
tissue. Estimated blood loss was minimal to moderate. Hemostasis was achieved by
  
applying pressure. The ulcer now appears 95% pink and red and the size remains 
the   
same. The patient tolerated well with no pain.  
Results  
Height: 5 ft 11 in  
Weight: 167.829 kg  
Body Mass Index: 51.5  
  
Assessment/Plan  
Assessment/Plan  
(1) Venous stasis ulcer:  
Qualifiers:  
Laterality: right Non-pressure ulcer stage: limited to breakdown of skin 
Varicose   
vein presence: with varicose veins Venous stasis ulcer site: other part of lower
leg   
Qualified Code(s): I83.018 - Varicose veins of right lower extremity with ulcer 
other   
part of lower leg; L97.811 - Non-pressure chronic ulcer of other part of right 
lower   
leg limited to breakdown of skin  
Code(s):  
I83.009 - Varicose veins of unspecified lower extremity with ulcer of 
unspecified   
site; L97.909 - Non-pressure chronic ulcer of unspecified part of unspecified 
lower   
leg with unspecified severity  
Plan:  
w/lymphedema  
(2) Edema of right lower leg:  
Code(s):  
R60.0 - Localized edema  
(3) Morbid obesity due to excess calories:  
Code(s):  
E66.01 - Morbid (severe) obesity due to excess calories  
(4) Lymphedema of both lower extremities:  
Code(s):  
I89.0 - Lymphedema, not elsewhere classified  
(5) PVD (peripheral vascular disease):  
Code(s):  
I73.9 - Peripheral vascular disease, unspecified  
(6) Varicose veins of both legs with edema:  
Code(s):  
I83.893 - Varicose veins of bilateral lower extremities with other complications  
(7) Inflammation:  
  
Plan  
see orders and hpi  
Time spent with patient  
Time Spent With Patient (min): 13  
  
  
  
  
Dictated By: SOWMYA Correa DD/DT: 24 152  
  
Signed By: <Electronically signed by SOWMYA Espino>  
24 1529  
   
  
[facility]  
Work Phone: [phone]- Progress note  
  
  
  
  
                                        Author              Laila Villarreal  
[facility]  
2024 3:08pm  
   
                                        Note Date/Time      2024   
3:04pm  
   
                                                    [facility]  
ENTER  
[address] [place] [address]  
  
Wound Center Provider Note  
Signed  
  
Patient: Jocelin Pacheco MR#:   
[id number]  
: 1955 Acct:[id number]  
  
Age/Sex: 69 / M  
  
Copies to: SOWMYA Gutierrez DO~  
  
  
HPI  
Date of Visit  
Date of Visit:  
Date of Service: 2024  
Time of Service: 15:03  
  
Narrative  
HPI:  
24 Jocelin is a 69-year-old male presenting to [facility]   
wound care program for an initial visit to the office for a right lower 
extremity   
venous stasis ulcer/lymphedema ulcer.? The patient has been a patient of the 
office   
for quite some time now and has seen other providers.  
He had seen vascular in [place] was told that there is nothing else that can be 
done   
for him.  
He has an extensive history of infections so I imagine this will continue to be 
an   
issue for him.  
He appears to need better edema control.  
He was told by ortho that he needs to lose 50 pounds in order to have surgery- 
this   
was 3 years ago and has not been accomplished- he says that he wasn't taking the
  
ozempic as ordered and he gained back the weight he had lost and hence no 
surgery.  
A past venous duplex indicates deep vein issues which are not surgically 
correctable-   
vascular did confirm that he does not need to see them because there is nothing 
they   
have to offer him.  
He is still retired so hopefully there is more opportunity for elevation and   
compression of the legs to support healing of the ulcer.  
Nutrition and probiotics were discussed and suggestions were given.  
Doxycycline was escribed today for what appears to be cellulitis of the RLE.  
Topical gentamicin and unna wrap was ordered and he will return here for 
dressings.  
Above documentation per SOWMYA Amos - included for continuity of care  
  
24- Jocelin presents for follow-up. He is still taking his antibiotic and   
understands that he should complete the entire course. The area is improving.   
Dressing order changed to collagen silver; sorbact; ERGO wrap. Follow-up in 2-3   
weeks.  
  
Subjective  
Pain  
Right Lower Leg:  
Pain Description: Intermittent and Burning  
Pain Intensity: 9  
Pain Management Techniques Other/Comment:  plus itching   
Wound/Ulcer History  
When did wound start?: 2024 Right lower leg  
Mode of Arrival/ : Personal vehicle  
Assistive Device Used Today: Cane  
Lives with:: Alone  
Appetite Description: Within Normal Limits  
Who helps w/ dressing change?: Wound Care Dept  
Smoking Status: Never smoker  
  
[facility]  
Medical History (Updated 24 @ 08:12 by Maria A Crowe RN)  
  
Ulcer of right leg  
Wound of right lower extremity  
Itching with irritation  
Itching  
PVD (peripheral vascular disease)  
 poor veins  Dr. Cameron did vein closure 2019, needs 5 more viens worked 
on.  
Carpal tunnel syndrome  
right arm surgery  
DIMITRI (obstructive sleep apnea)  
Cardiomyopathy  
 lower part of heart is weak   
Back pain  
DVT (deep venous thrombosis)  
 blood clot from right foot to right thigh  on  blood thinner   
Hypertension  
Arthritis  
Bilateral knees,  right is bone on bone  per  Dr. Richard   
  
Surgical History (Reviewed 24 @ 08:12 by Maria A Crowe RN)  
  
Hx of cholecystectomy  
  
Family History (Reviewed 24 @ 08:13 by Maria A Crowe RN)  
Father Diabetes mellitus, type 2  
Sister Diabetes mellitus, type 2  
Brother Heart disease  
Legacy FamHx Relation: Brother(s)  
Father Heart disease  
Diabetes  
Heart failure  
  
Family/Other   
Legacy FamHx Problem: 2 BROTHERS  :2 SISTERS ::NO CHILDREN  
Mother   
Heart disease  
Sister Heart disease  
  
Social History  
Smoking Status: Never smoker  
Substance Use Type: None  
  
Grafts  
History of Graft  
History of Graft?: No  
  
Exam  
Physical Exam  
Vital Signs:  
  
  
  
  
Temp Pulse Resp BP O2 Del Method  
  
97.5 F L 87 24 175/78 H Room Air  
  
24 14:51 24 14:51 24 14:51 24 14:51 24 14:51  
  
  
  
Const  
General: cooperative, comfortable and no acute distress  
Nutritional Appearance: obese  
Orientation: alert, awake and oriented x3  
  
Lower/Upper Extremity Exam  
Vascular Exam-Edema  
Right Lower Extremity:  
Edema Type: Lymphedema  
Vascular Exam-Pulses  
Right Dorsalis Pedis:  
Pulse Assessment Method: Doppler  
Right Posterior Tibial:  
Pulse Assessment Method: Doppler  
Right Brachial:  
Pulse Assessment Method: NIBP  
Left Brachial:  
Pulse Assessment Method: NIBP  
  
Objective  
Meds/Allergies  
Home Medications  
  
carvedilol 6.25 mg tablet 6.25 mg PO BID 17 [History Confirmed 24]  
furosemide 40 mg tablet (Lasix) 40 mg PO BID 17 [History Confirmed 
24]  
acetaminophen 500 mg tablet (Tylenol Extra Strength) 500 mg PO TID PRN Pain 
20   
[History Confirmed 24]  
empagliflozin 10 mg tablet (Jardiance) 10 mg PO DAILY 22 [History 
Confirmed   
24]  
rivaroxaban 10 mg tablet (Xarelto) 10 mg PO DAILY 22 [History Confirmed   
24]  
semaglutide 2 mg/dose (8 mg/3 mL) subcutaneous pen injector (Ozempic) 2 mg 
subcut Q7D   
22 [History Confirmed 24]  
spironolactone 25 mg tablet 25 mg PO DAILY 22 [History Confirmed 24]  
sacubitril 24 mg-valsartan 26 mg tablet (Entresto) 1 tab PO BID 23 
[History   
Confirmed 24]  
Lactobacillus acidophilus 10 billion cell capsule (Probiotic) 100 mmu cells PO 
DAILY   
24 [History Confirmed 24]  
doxycycline hyclate 100 mg capsule 100 mg PO BID 14 days #28 caps 24 [Rx   
Confirmed 24]  
  
  
Allergies  
  
cefixime Allergy (Unknown, Verified 24 08:10)  
Unknown Reaction  
diclofenac Allergy (Unknown, Verified 24 08:10)  
Unknown Reaction  
nystatin Allergy (Unknown, Verified 24 08:10)  
Unknown Reaction  
sodium benzoate Allergy (Verified 24 08:10)  
Unknown Reaction  
  
  
Wound/Ulcer  
Right Lower Leg:  
Type: Lymphedema  
Thickness: Skin Breakdown  
Bed Appearance: Dried Exudate, Pink and Yellow  
Percent of Wound Bed Granulated/Red: 80  
Percent of Devitalized: 20  
Length (cm): 2.5  
Width (cm): 2  
Depth (cm): 0.1  
CM Sq: 5.000  
Surrounding Tissue Appearance: Hyperpigmented  
Surrounding Tissue Temp: Warm  
Drainage Amount: Moderate  
Drainage Description: Serosanguineous  
Drainage Odor: No Odor  
Results  
Height: 5 ft 11 in  
Weight: 167.829 kg  
Body Mass Index: 51.5  
  
Assessment/Plan  
Assessment/Plan  
(1) Venous stasis ulcer:  
Qualifiers:  
Laterality: right Non-pressure ulcer stage: limited to breakdown of skin 
Varicose   
vein presence: with varicose veins Venous stasis ulcer site: other part of lower
leg   
Qualified Code(s): I83.018 - Varicose veins of right lower extremity with ulcer 
other   
part of lower leg; L97.811 - Non-pressure chronic ulcer of other part of right 
lower   
leg limited to breakdown of skin  
Code(s):  
I83.009 - Varicose veins of unspecified lower extremity with ulcer of 
unspecified   
site; L97.909 - Non-pressure chronic ulcer of unspecified part of unspecified 
lower   
leg with unspecified severity  
Plan:  
w/lymphedema  
(2) Edema of right lower leg:  
Code(s):  
R60.0 - Localized edema  
(3) Morbid obesity due to excess calories:  
Code(s):  
E66.01 - Morbid (severe) obesity due to excess calories  
(4) Lymphedema of both lower extremities:  
Code(s):  
I89.0 - Lymphedema, not elsewhere classified  
(5) PVD (peripheral vascular disease):  
Code(s):  
I73.9 - Peripheral vascular disease, unspecified  
(6) Varicose veins of both legs with edema:  
Code(s):  
I83.893 - Varicose veins of bilateral lower extremities with other complications  
(7) Inflammation:  
  
Plan  
see orders and hpi  
Time spent with patient  
Time Spent With Patient (min): 15  
  
  
  
  
Dictated By: SOWMYA Gutierrez DD/DT: 24 1503  
  
Signed By: <Electronically signed by SOWMYA Villarreal>  
24 1508  
   
  
[facility]  
Work Phone: [phone]- Progress note  
  
  
  
  
                                        Author              Elaine Espino  
[facility]  
2024 8:20am  
   
                                        Note Date/Time      2024   
8:20am  
   
                                                    [facility]  
ENTER  
[address] [place] [address]  
  
Wound Center Provider Note  
Signed  
  
Patient: Jocelin Pacheco MR#:   
[id number]  
: 1955 Acct:[id number]  
  
Age/Sex: 69 / M  
  
Copies to: Vera Lea,DO Elaine Espino, APRN~  
  
  
HPI  
Date of Visit  
Date of Visit:  
Date of Service: 2024  
Time of Service: 08:17  
  
Narrative  
HPI:  
24 Jocelin is a 69-year-old male presenting to [facility]   
wound care program for an initial visit to the office for a right lower 
extremity   
venous stasis ulcer/lymphedema ulcer.? The patient has been a patient of the 
office   
for quite some time now and has seen other providers.  
He had seen vascular in [place] was told that there is nothing else that can be 
done   
for him.  
He has an extensive history of infections so I imagine this will continue to be 
an   
issue for him.  
He appears to need better edema control.  
He was told by ortho that he needs to lose 50 pounds in order to have surgery- 
this   
was 3 years ago and has not been accomplished- he says that he wasn't taking the
  
ozempic as ordered and he gained back the weight he had lost and hence no 
surgery.  
A past venous duplex indicates deep vein issues which are not surgically 
correctable-   
vascular did confirm that he does not need to see them because there is nothing 
they   
have to offer him.  
He is still retired so hopefully there is more opportunity for elevation and   
compression of the legs to support healing of the ulcer.  
Nutrition and probiotics were discussed and suggestions were given.  
Doxycycline was escribed today for what appears to be cellulitis of the RLE.  
Topical gentamicin and unna wrap was ordered and he will return here for 
dressings.  
  
Subjective  
Pain  
Right Lower Leg:  
Pain Description: Burning  
Pain Intensity: 10  
Wound/Ulcer History  
When did wound start?: 2024 Right lower leg  
Mode of Arrival/ : Personal vehicle  
Assistive Device Used Today: Cane  
Lives with:: Alone  
Appetite Description: Within Normal Limits  
Who helps w/ dressing change?: Wound Care Dept  
Smoking Status: Never smoker  
  
[facility]  
Medical History (Updated 24 @ 08:12 by Maria A Crowe, KIMBERLY)  
  
Ulcer of right leg  
Wound of right lower extremity  
Itching with irritation  
Itching  
PVD (peripheral vascular disease)  
 poor veins  Dr. Cameron did vein closure 2019, needs 5 more viens worked 
on.  
Carpal tunnel syndrome  
right arm surgery  
DIMITRI (obstructive sleep apnea)  
Cardiomyopathy  
 lower part of heart is weak   
Back pain  
DVT (deep venous thrombosis)  
 blood clot from right foot to right thigh  on  blood thinner   
Hypertension  
Arthritis  
Bilateral knees,  right is bone on bone  per  Dr. Richard   
  
Surgical History (Reviewed 24 @ 08:12 by Maria A Crowe, RN)  
  
Hx of cholecystectomy  
  
Family History (Reviewed 24 @ 08:13 by Maria A Crowe RN)  
Father Diabetes mellitus, type 2  
Sister Diabetes mellitus, type 2  
Brother Heart disease  
Legacy FamHx Relation: Brother(s)  
Father Heart disease  
Diabetes  
Heart failure  
  
Family/Other   
Legacy FamHx Problem: 2 BROTHERS  :2 SISTERS ::NO CHILDREN  
Mother   
Heart disease  
Sister Heart disease  
  
Social History  
Smoking Status: Never smoker  
Substance Use Type: None  
  
Grafts  
History of Graft  
History of Graft?: No  
  
Exam  
Physical Exam  
Vital Signs:  
  
  
  
  
Temp Pulse Resp BP O2 Del Method  
  
97.2 F L 81 24 121/74 Room Air  
  
24 08:11 24 08:11 24 08:11 24 08:11 24 08:11  
  
  
  
Const  
General: cooperative, comfortable and no acute distress  
Nutritional Appearance: obese  
Orientation: alert, awake and oriented x3  
  
Lower/Upper Extremity Exam  
Vascular Exam-Edema  
Right Lower Extremity:  
Edema Type: Lymphedema  
Vascular Exam-Pulses  
Right Dorsalis Pedis:  
Pulse Assessment Method: Doppler  
Right Posterior Tibial:  
Pulse Assessment Method: Doppler  
Right Brachial:  
Pulse Assessment Method: NIBP  
  
Objective  
Meds/Allergies  
Home Medications  
  
carvedilol 6.25 mg tablet 6.25 mg PO BID 17 [History Confirmed 24]  
furosemide 40 mg tablet (Lasix) 40 mg PO BID 17 [History Confirmed 
24]  
acetaminophen 500 mg tablet (Tylenol Extra Strength) 500 mg PO TID PRN Pain 
20   
[History Confirmed 24]  
empagliflozin 10 mg tablet (Jardiance) 10 mg PO DAILY 22 [History 
Confirmed   
24]  
rivaroxaban 10 mg tablet (Xarelto) 10 mg PO DAILY 22 [History Confirmed   
24]  
semaglutide 2 mg/dose (8 mg/3 mL) subcutaneous pen injector (Ozempic) 2 mg 
subcut Q7D   
22 [History Confirmed 24]  
spironolactone 25 mg tablet 25 mg PO DAILY 22 [History Confirmed 24]  
sacubitril 24 mg-valsartan 26 mg tablet (Entresto) 1 tab PO BID 23 
[History   
Confirmed 24]  
Lactobacillus acidophilus 10 billion cell capsule (Probiotic) 100 mmu cells PO 
DAILY   
24 [History Confirmed 24]  
doxycycline hyclate 100 mg capsule 100 mg PO BID 14 days #28 caps 24 [Rx   
Confirmed 24]  
  
  
Allergies  
  
cefixime Allergy (Unknown, Verified 24 08:10)  
Unknown Reaction  
diclofenac Allergy (Unknown, Verified 24 08:10)  
Unknown Reaction  
nystatin Allergy (Unknown, Verified 24 08:10)  
Unknown Reaction  
sodium benzoate Allergy (Verified 24 08:10)  
Unknown Reaction  
  
  
Wound/Ulcer  
Right Lower Leg:  
Type: Lymphedema  
Thickness: Skin Breakdown  
Bed Appearance: Dried Exudate, Epithelial Tissue or Bridge, Pink and Yellow  
Percent of Wound Bed Granulated/Red: 80  
Percent of Devitalized: 20  
Length (cm): 4  
Width (cm): 1.8  
Depth (cm): 0.1  
CM Sq: 7.200  
Surrounding Tissue Appearance: Dark Red  
Surrounding Tissue Temp: Warm  
Drainage Amount: Moderate  
Drainage Description: Serosanguineous  
Drainage Odor: No Odor  
Results  
Height: 5 ft 11 in  
Weight: 167.829 kg  
Body Mass Index: 51.5  
  
Assessment/Plan  
Assessment/Plan  
(1) Venous stasis ulcer:  
Qualifiers:  
Laterality: right Non-pressure ulcer stage: limited to breakdown of skin 
Varicose   
vein presence: with varicose veins Venous stasis ulcer site: other part of lower
leg   
Qualified Code(s): I83.018 - Varicose veins of right lower extremity with ulcer 
other   
part of lower leg; L97.811 - Non-pressure chronic ulcer of other part of right 
lower   
leg limited to breakdown of skin  
Code(s):  
I83.009 - Varicose veins of unspecified lower extremity with ulcer of 
unspecified   
site; L97.909 - Non-pressure chronic ulcer of unspecified part of unspecified 
lower   
leg with unspecified severity  
Plan:  
w/lymphedema  
(2) Edema of right lower leg:  
Code(s):  
R60.0 - Localized edema  
(3) Morbid obesity due to excess calories:  
Code(s):  
E66.01 - Morbid (severe) obesity due to excess calories  
(4) Lymphedema of both lower extremities:  
Code(s):  
I89.0 - Lymphedema, not elsewhere classified  
(5) PVD (peripheral vascular disease):  
Code(s):  
I73.9 - Peripheral vascular disease, unspecified  
(6) Varicose veins of both legs with edema:  
Code(s):  
I83.893 - Varicose veins of bilateral lower extremities with other complications  
(7) Inflammation:  
  
Plan  
see orders and hpi  
Time spent with patient  
Time Spent With Patient (min): 14  
  
  
  
  
Dictated By: SOWMYA Correa DD/DT: 24  
  
Signed By: <Electronically signed by SOWMYA Espino>  
24  
   
  
[facility]  
Work Phone: [phone]- History of Present illness Narrative* 
  Ray Vela, NP - 2024 3:30 PM ESTFormatting of this note is 
  different from the original.  
Images from the original note were not included.  
  
Jocelin Pacheco is a 69 y.o. male presents with chief complaint of Back Pain (Pt 
presents with lower back pain with onset of 2 weeks with pain increasing. Took 
tylenol with no relief. Has used ice on it a few times. Left shoulder is 
hurting. Denies any trouble urinating. )  
  
HPI:  
  
Back Pain  
  
Here for low back pain, left side hurts more than the right side. Denies any 
injury or reason for increased back pain. Tried ice and tylenol.  
  
HISTORIES:  
  
PAST MEDICAL HISTORY:  
Past Medical History:  
Diagnosis Date  
Acquired hammer toe of right foot  
Cardiomyopathy (CMS/HCC)  
d/t morbid obesity  
Chronic anticoagulation  
CKD (chronic kidney disease), stage II  
Class 3 obesity (CMS/HCC)  
CPAP (continuous positive airway pressure) dependence  
Diabetes mellitus type 2 in obese (CMS/HCC)  
History of 2019 novel coronavirus disease (COVID-19) 2020  
Received Remdesivir and dexamethasone as an in-patient  
Hx of being hospitalized 2019  
Hospitalized for Lt. lower extremity cellulitis and chronic open venous stasis 
ulcer  
Hx of carpal tunnel syndrome  
Hx of deep venous thrombosis  
DIMITRI on CPAP  
Peripheral venous insufficiency  
Porokeratosis  
Primary osteoarthritis of both knees  
Stasis dermatitis of both legs  
  
  
SURGICAL HISTORY:  
Past Surgical History:  
Procedure Laterality Date  
CARDIAC CATHETERIZATION 2009  
Due to Chest pain  
CARPAL TUNNEL RELEASE Right 2010  
Dr. Estrada  
CHOLECYSTECTOMY 2002  
FOOT NEUROMA SURGERY Bilateral   
Hammertoe repair as well  
HERNIA REPAIR  
IR INJECTION JOINT 2020  
(Knee) Synvisc injection with Dr Lomeli (Ortho)  
IR INJECTION JOINT   
(Knee) Synvisc done by Dr Richard  
SKIN GRAFT 2019  
From right thigh to right foot due to non-healing wound/ulcer. Done by Dr Blakely  
VEIN LIGATION Bilateral 2019  
Procedures in April and Oct done by Dr Lyle Mascorro  
  
  
SOCIAL HISTORY:  
Social History  
  
Tobacco Use  
Smoking status: Never  
Passive exposure: Past  
Smokeless tobacco: Never  
Substance Use Topics  
Alcohol use: Not Currently  
Drug use: Never  
  
Depression: Not at risk (2024)  
PHQ-2  
PHQ-2 Score: 0  
  
  
FAMILY HISTORY:  
Family History  
Problem Relation Name Age of Onset  
Hypertension Mother  
Heart disease Mother  
Diabetes Father  
Heart attack Sister  
Diabetes Sister  
Stroke Brother  
Heart attack Brother  
ALS Brother  
  
  
MEDICATIONS:  
Current Outpatient Medications  
Medication Instructions  
carvedilol (COREG) 6.25 mg, Oral, 2 times daily  
clobetasol (Temovate) 0.05 % cream 1 application , Topical, Every 12 hours  
Entresto 24-26 MG tablet 1 tablet, Oral, 2 times daily  
furosemide (LASIX) 40 mg, Oral, Every 12 hours  
Jardiance 10 mg, Oral, Every 24 hours  
Ozempic, 2 MG/DOSE, 8 MG/3ML solution pen-injector  
Probiotic Product (PROBIOTIC BLEND PO) 2 Units, Oral, Daily  
spironolactone (ALDACTONE) 25 mg, Oral, Daily  
Xarelto 10 MG tablet TAKE 1 TABLET BY MOUTH EVERY DAY WITH FOOD  
  
  
ALLERGIES:  
Allergies  
Allergen Reactions  
Cefixime Unknown  
Nystatin Unknown  
Sodium Benzoate  
Other Reaction(s): Unknown Reaction  
Diclofenac Rash  
  
  
REVIEW OF SYMPTOMS:  
  
Review of Systems  
Musculoskeletal: Positive for back pain.  
Shoulder pain, left. Rates shoulder pain as a 5 and the back as a 9 or 10 on a 
0-10 scale.  
Psychiatric/Behavioral: Negative for sleep disturbance.  
  
  
PHYSICAL EXAM:  
  
Visit Vitals  
/70  
Pulse 83  
Temp 97.7 F  
Wt 376 lb  
SpO2 94%  
BMI 52.44 kg/m  
Smoking Status Never  
BSA 2.93 m  
  
  
Physical Exam  
Constitutional:  
Appearance: He is obese. He is not ill-appearing.  
Eyes:  
Extraocular Movements: Extraocular movements intact.  
Musculoskeletal:  
General: Tenderness present.  
Comments: Low back pain  
Skin:  
General: Skin is warm and dry.  
Neurological:  
Mental Status: He is alert and oriented to person, place, and time.  
Gait: Gait abnormal.  
Psychiatric:  
Mood and Affect: Mood normal.  
Thought Content: Thought content normal.  
Judgment: Judgment normal.  
  
ASSESSMENT AND PLAN:  
  
Assessment/Plan  
Diagnoses and all orders for this visit:  
Lumbosacral strain, initial encounter  
- methylPREDNISolone (Medrol Dospak) 4 MG tablets; Take as directed on package.  
- Ambulatory referral to Physical Therapy; Future  
Acute pain of left shoulder  
- methylPREDNISolone (Medrol Dospak) 4 MG tablets; Take as directed on package.  
- Ambulatory referral to Physical Therapy; Future  
Wound of left lower extremity, initial encounter  
--pt states he will call wound center  
Dictated, but not read  
  
Patient presents today for an acute visit. He s been having low back pain for 
approximately two weeks. He also mentioned that his left shoulder is painful. It
s worse with abduction and he can only raise the left shoulder approximately 90 
. he denies any actual injury. He states he s been taking Tylenol and using ice 
to his back.  
  
At this point in time we re not gonna order x-rays. He does have tenderness of 
the lumbosacral spine and point tenderness in the deltoid area. He s advised ice
the left shoulder. He can use Tylenol. For the low back pain is tender across 
the whole lumbosacral spine. He is not having any radicular type symptoms. I 
will give him a low-dose Medrol dose pack since his A1c is good at 5.5. And he 
can use Tylenol as needed. And suds are not indicated since he is on Xarelto. 
Will refer him to physical therapy and evaluate and treat for the back pain in 
the left shoulder. If not improving will consider x-rays. He also mentioned he 
does have the start of a wound on his left posterior lower extremity.Picture was
taken. He states he will call [facility] clinic. I advised him to use Aquaphor or 
Lantisepticointment to the area. He agrees. He ll return here in May for his 
regular scheduled appointment to see Dr. Matias.  
  
  
Electronically signed by Ray Vela NP at 2024 4:20 PM EST  
  
documented in this encounterSt. Luke's [id number]- History of Present illness
Narrative* Danya An DPM - 2024 3:15 PM ESTFormatting of this 
  note is different from the original.  
HPI:  
Patient presents in office today for routine nailcare and callous care.  
Location: Right great toe.  
Duration: ongoing x1 month.  
Severity of the symptoms: mild , considered 5 out of 10.  
Onset of pain: gradual.  
Status: stable.  
Context: hard to trim , hard to reach.  
Current symptoms include: toe redness , toe swelling ,  
Relieved by: debridement.  
Characteristics of the nails: red, swollen, painful.  
Aggravated by: shoe gear, pressure.  
Risk factors: curvature of nails.  
  
Examination:  
General Examination:  
GENERAL EXAMINATIONalert, well hydrated, in no distress , awake, aware of 
surroundings.  
FOOT EXAM:  
Date of Last Foot Exam 24  
  
Vascular:  
DORSALIS PEDIS PULSE:2/4, bilaterally.  
POSTERIOR TIBIAL PULSE:1/4, bilaterally.  
TEMPERATURE GRADIENT:within normal limits, warm to cool.  
EDEMA:mild, bilateral lower extremity. erythema to the right 2nd digit is 
resolved. Continued erythema noted.  
CAPILLARY FILLING TIME(sec):bilateral, digits 1-5, less than 3 seconds.  
  
Neurologic:  
VIBRATORY:abnormal.  
SEMMES-LUKE 5.07 MONOFILAMENTnormal.  
  
Dermatologic:  
HYPERKERATOSIS:Location:, Submetatarsal Head 4 right, medial IPJ of the hallux 
bilateral, distal right 3rd digit.  
NAIL PATHOLOGY:digits 1-5 bilateral are thickened, discolored, crumbly, 
dystrophic, elongated and with subungual debris. Incurvation to the lateral 
border right hallux nail. Upon debridement there isno underlying opening noted 
in the skin. The surrounding granuloma, erythema has resolved along thenail 
fold.  
SKIN PATHOLOGY:atrophic, dry, decreased hair growth bilateral.  
  
Orthopedic:  
FOOT MORPHOLOGY:Metatarsus adductus bilateral .  
DEFORMITIES:Rigidly contracted second digit right with elevation at the second 
MPJ. There is overriding and rubbing between the distal right second digit and 
lateral right hallux. Semirigid contracted digits 2 through 4 left, 3 and 4 
right .  
PAIN ELICITED WITH PALPATION OFArea of porokeratosis sub-fourth MPJ right. Pain 
with palpation to the MPJ of the right 2nd digit, but pain is improving .  
MUSCLE STRENGTH5/5 for all pedal groups tested  
  
Assessment:  
1. Right foot pain - M79.671  
2. Dermatophytosis of nail - B35.1 (Primary)  
3. Left foot pain - M79.672  
4. 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  
5. Corns and callosities - L84  
6. Porokeratosis - Q82.8  
7. Acquired pes planovalgus of left foot - M21.6X2  
8. Acquired pes planovalgus of right foot - M21.41  
9. Acquired hammer toe of right foot - M20.41  
10. Circulating anticoagulant disorder - D68.318  
11. Venous insufficiency - I87.2  
12. Ingrowing nail - L60.0  
  
Plan:  
Dermatophytosis of nail  
1. Nails 1-5 bilateral were debrided in length and thickness by manual and 
mechanical means.  
2. Advised patient on the importance of continued foot care including daily 
monitoring of their feet for any new complaints or concerns that may arise.  
3. Continue use of good supportive shoe gear to help prevent complications.  
4. RTC: 9-12 weeks or as needed if problems arise.  
  
Corns and callosities  
1. Hyperkeratosis/porokeratosis as above noted was debrided.  
2. Instructed patient on use of aperture pads or Silipos padding/toe spacers to 
prevent rubbing andcontinued development of the hyperkeratosis.  
3. Also discussed use of moisturizing creams for overall increased hydration to 
the skin.  
4. Discussed continued use of proper foot gear to avoid excess pressure over the
callous site.  
Electronically signed by Danya An DPM at 2024 1:53 PM EST  
  
documented in this encounterSt. Luke's [id number]- Evaluation note*   
  
                      Encounter Date Diagnosis  Assessment Notes Treatment Notes  
 Treatment   
Clinical Notes  
   
                                        04 Aug, 2023        Type 2 diabetes   
mellitus with   
unspecified   
complications (ICD-10   
- E11.8)                                                      
   
                                        04 Aug, 2023        BMI 50.0-59.9, adult  
   
(ICD-10 - Z68.43)                                             
   
                                        04 Aug, 2023        Knee osteoarthritis   
(ICD-10 - M17.9)                                              
   
                                        04 Aug, 2023        Mixed hyperlipidemia  
   
(ICD-10 - E78.2)                                              
   
                                        04 Aug, 2023        CAD (coronary artery  
   
disease) (ICD-10 -   
I25.10)                                                       
   
                                        04 Aug, 2023        Obstructive sleep   
apnea (adult)   
(pediatric) (ICD-10 -   
G47.33)                                                       
   
                                        04 Aug, 2023        Cardiomyopathy   
(ICD-10 - I42.9)                                              
   
                                        04 Aug, 2023        CHF (congestive hear  
t   
failure) (ICD-10 -   
I50.9)                                                        
   
                                        04 Aug, 2023        Edema (ICD-10 -   
R60.9)                                                        
   
                                        04 Aug, 2023        Low back derangement  
   
syndrome (ICD-10 -   
M53.86)                                                       
   
                                        04 Aug, 2023        Metabolic syndrome X  
   
(ICD-10 - E88.81)                                             
   
                                        04 Aug, 2023        Encounter for   
immunization (ICD-10   
- Z23)                                                        
  
  
[facility]  
Other Phone: [phone]- Evaluation note*   
  
                                        Author              Jacoby Braden  
[facility]  
   
                                        Authored            2024 2:5  
6pm  
   
                                                    Start weight: 414.4 lbs., he  
 is down 32.0 lbs. today with a weight of 382.4lbs.   
and   
20.9 lbs. since  
his last visit on 2023.  
Starting Date: 04/15/2022.  
Ozempic start date 4/15/2022. Ozempic start weight 414.4lbs. He is down 32.0 
lbs.   
since starting  
Ozempic.  
1. Abnormal weight gain. He notes he is the heaviest in his family. His brother 
has   
some but much  
less significant weight issues. He notes he was able to get his weight down to 
368   
pounds but was  
unable to keep it down.  
2. Obesity-markedly improved since starting our program and with taking Ozempic 
2 mg,   
but had  
significant weight regain of 20.9 pounds off the medication. Due to cost/donut 
hole   
he stopped  
Ozempic. Hopefully he can restart full dose in the future using patient 
assistance.   
He is also on  
Jardiance 10 mg which can also help with his diabetes and cardiomyopathy/CHF. 
His   
diabetes is well  
controlled with his last A1c of 5.6 despite having issues with obtaining with 
his   
diabetic  
medications. He had mild CAD along with his diabetes and has not been on a 
statin so   
I had  
recommended Crestor, but he notes his cardio said it was not necessary so he did
not   
start the  
medication. He is eating healthier overall. He should follow-up with our   
nutritionist. He feels that  
his appetite is controlled with Ozempic 2 mg. I have recommend again he consider
  
bariatric surgery  
so he could have his knee replaced or find a surgeon who will do the surgery at 
an   
elevated BMI. In  
the past, he mentioned they found weakness in the bottom of the heart and 
evidently a   
problem with  
an artery that they could not get. His cardiologist however did not feel he 
needed   
the  
statin/Crestor that I previously prescribed. He gets very little activity due to
  
bone-on-bone  
arthritis, severe low back pain on tramadol, difficulty getting out of a chair 
and   
also his cardiac  
function he does get NELSON with small amounts of activity. He sees Dr. Whitley to 
treat   
his  
cardiomyopathy. He did like canned foods and he understands that he must cut 
back the   
salt/processed  
food. He denies adding salt to his foods. He has had a A1c that was 6.6 
indicative of   
diabetes. He  
notes he was never told he was diabetic before program. He is seeing Dr. Lomeli 
for   
his knees and  
notes he has to get his BMI from 59 down to 45 to be able to have his knees 
replaced.   
Before  
starting the program he did eat a lot of red meat, potatoes and drank a lot of 
milk   
2% or 4% often  
buying 3 or 4 gallons at a time. He should not skip meals. He did try Adipex for
1   
month in the past  
but notes he was not able to lose but a few pounds so his PCP stopped it. He 
notes   
his sister does  
the shopping as he can. He does some simple cooking.  
3. Cardiomyopathy/CHF/early CAD-he must continue to follow-up with cardiology 
for   
evaluation of  
cardiomyopathy/CHF despite medications/known cardiomyopathy/previous mild CAD. 
We   
obtained his  
previous cardiac catheterization from 2009. He notes his preoperative 
diagnoses   
were angina  
pectoris, dyspnea on exertion and abnormal Cardiolite perfusion stress test. His
left   
anterior  
descending artery showed a 20 to 25% tubular stenosis. His circumflex had an 
ostial   
stenosis of 30  
to 50% in several views but in one view showed less than 30% stenosis and 
appeared to   
be mildly  
kinked, his right coronary artery was large dominant and normal. His left   
ventriculogram showed mild  
to moderately reduced systolic dysfunction with an EF in the 40 to 45% range   
globally. He is now  
being treated by Dr. Whitley.  
4. Type 2 diabetes with A1c controlled/improved at 5.6-will restart Ozempic and   
continue Jardiance.  
We could consider Metformin but he already has some loose stools. We discussed 
that   
first-line  
treatment with lifestyle changes, decrease simple sweets and starches, will be 
some   
increased  
activity in the future and weight loss. He needs to consider bariatric surgery.  
5. Obstructive sleep apnea-he is compliant with his CPAP. Treat also with 
healthy   
lifestyle changes  
and weight loss. He needs to consider bariatric surgery.  
6. Bilateral OA of the knees-bone-on-bone making it very difficult for him to 
get   
around. He notes  
his orthopedic doctor wants him to drop from a BMI of 59 down to 45 before he 
will do   
the surgery.  
We will start weight loss and Ozempic. He needs to consider bariatric surgery.  
7. Chronic low back pain-treat with healthy lifestyle change. He needs to 
maximize   
his pain  
management.  
8. Mixed hyperlipidemia-treat with decreasing the simple sweets, added sugars,   
refined starches, and  
bad/added fats, increasing activity and exercise and continued long-term weight 
loss.   
Monitor with  
healthy lifestyle change. I recommended that he start a statin since he is 
diabetic   
and had mild CAD  
on his cath but he notes his cardiologist told him it was not necessary to 
start.  
9. Metabolic syndrome-treat with long-term healthy lifestyle changes, behavioral
  
changes, healthy  
nutritional changes, increased activity and exercise and achieve long-term 
weight   
loss.  
Follow up with me in 6 weeks.  
New labs needed: Up-to-date. Monitor A1c at least every 6 months with his PCP or
in   
our office. He  
will continue other regular lab follow-up with his PCP.   
  
  
  
[facility]  
Work Phone: [phone]- Progress note  
  
  
  
  
                                        Author              Elaine Espino  
[facility]  
2023 11:24am  
   
                                        Note Date/Time      2023 11:24  
am  
   
                                                    [facility]  
ENTER  
[address] [place] [address]  
  
Wound Center Provider Note  
Signed  
  
Patient: Jocelin Pacheco MR#:   
[id number]  
: 1955 Acct:[id number]  
  
Age/Sex: 68 / M  
  
Copies to: DO Elaine Arboleda APRN~  
  
  
HPI  
Date of Visit  
Date of Visit:  
Date of Service: 2023  
Time of Service: 11:20  
  
Narrative  
HPI:  
23 Jocelin is a 68-year-old male presenting to [facility]   
wound care program for an initial visit to the office for a right lower 
extremity   
venous stasis ulcer/lymphedema ulcer.? The patient has been a patient of the 
office   
for quite some time now and has seen other providers.  
He had seen vascular in [place] was told that there is nothing else that can be 
done   
for him.  
He has an extensive history of infections so I imagine this will continue to be 
an   
issue for him.  
He appears to need better edema control.  
He was told by ortho that he needs to lose 50 pounds in order to have surgery- 
this   
was 2 years ago and has not been accomplished.  
A past venous duplex indicates deep vein issues which are not surgically 
correctable-   
vascular did confirm that he does not need to see them because there is nothing 
they   
have to offer him.  
He is still retired so hopefully there is more opportunity for elevation and   
compression of the legs to support healing of the ulcer.  
Nutrition and probiotics were discussed and suggestions were given.  
Doxycycline was escribed today for what appears to be cellulitis of the RLE.  
23 looks better, is still taking the antibiotics, change to collagen silver   
topically and clobetasol to periwound skin that he says is itchy, will return 
for   
dressing changes, will bring his compression socks from home to the next visit 
since   
most of our wraps tend to roll down on his legs, says that he will use the   
compression pumps  
  
Subjective  
Pain  
Right Lower Leg:  
Pain Description: Burning  
Pain Intensity: 0  
Wound/Ulcer History  
When did wound start?: 2023  
Mode of Arrival/ : Personal vehicle  
Assistive Device Used Today: Cane  
Lives with:: Alone  
Appetite Description: Within Normal Limits  
Who helps w/ dressing change?: Wound Care Dept  
Why Do You Need Help?: Can't Reach Ulcer  
Smoking Status: Never smoker  
  
[facility]  
Medical History (Updated 23 @ 11:23 by SOWMYA Correa)  
  
Arthritis  
Bilateral knees,  right is bone on bone  per  Dr. Richard   
Back pain  
Cardiomyopathy  
 lower part of heart is weak   
Carpal tunnel syndrome  
right arm surgery  
DVT (deep venous thrombosis)  
 blood clot from right foot to right thigh  on  blood thinner   
Hypertension  
Itching  
Itching with irritation  
DIMITRI (obstructive sleep apnea)  
PVD (peripheral vascular disease)  
 poor veins  Dr. Cameron did vein closure 2019, needs 5 more viens worked 
on.  
Wound of right lower extremity  
  
Surgical History (Reviewed 23 @ 11:09 by Maren Martinez, RN)  
  
Hx of cholecystectomy  
  
Family History (Reviewed 23 @ 11:09 by Mraen Martinez, KIMBERLY)  
Father Diabetes mellitus, type 2  
Sister Diabetes mellitus, type 2  
  
Social History  
Smoking Status: Never smoker  
Substance Use Type: None  
  
Grafts  
History of Graft  
History of Graft?: Yes  
  
Exam  
Physical Exam  
Vital Signs:  
  
  
  
  
Temp Pulse Resp BP O2 Del Method  
  
97.7 F 80 18 117/73 Room Air  
  
23 11:05 23 11:05 23 11:05 23 11:05 23 11:05  
  
  
  
Const  
General: cooperative, comfortable and no acute distress  
Nutritional Appearance: obese  
Orientation: alert, awake and oriented x3  
  
Lower/Upper Extremity Exam  
Vascular Exam-Edema  
Right Lower Extremity:  
Edema Type: Lymphedema  
Vascular Exam-Pulses  
Right Brachial:  
Pulse Assessment Method: NIBP  
Right Dorsalis Pedis:  
Pulse Assessment Method: Palpation  
Right Posterior Tibial:  
Pulse Assessment Method: Palpation  
  
Objective  
Meds/Allergies  
Home Medications  
  
carvedilol 6.25 mg tablet 6.25 mg PO BID 17 [History Confirmed 23]  
furosemide 40 mg tablet (Lasix) 40 mg PO BID 17 [History Confirmed 
23]  
acetaminophen 500 mg tablet (Tylenol Extra Strength) 500 mg PO TID PRN Pain 
20   
[History Confirmed 23]  
nabumetone 750 mg tablet 750 mg PO BID 21 [History Confirmed 23]  
empagliflozin 10 mg tablet (Jardiance) 10 mg PO DAILY 22 [History 
Confirmed   
23]  
rivaroxaban 10 mg tablet (Xarelto) 10 mg PO DAILY 22 [History Confirmed   
23]  
semaglutide 2 mg/dose (8 mg/3 mL) subcutaneous pen injector (Ozempic) 2 mg 
subcut Q7D   
22 [History Confirmed 23]  
spironolactone 25 mg tablet 25 mg PO DAILY 22 [History Confirmed 23]  
doxycycline hyclate 100 mg capsule 100 mg PO BID 14 days #28 caps 23 [Rx   
Confirmed 23]  
sacubitril 24 mg-valsartan 26 mg tablet (Entresto) 1 tab PO BID 23 
[History   
Confirmed 23]  
  
  
Allergies  
  
cefixime Allergy (Verified 23 11:09)  
Unknown Reaction  
diclofenac Allergy (Verified 23 11:09)  
Unknown Reaction  
nystatin Allergy (Verified 23 11:09)  
Unknown Reaction  
sodium benzoate Allergy (Verified 23 11:09)  
Unknown Reaction  
  
  
Wound/Ulcer  
Right Lower Leg:  
Type: Venous Stasis Ulcer  
Thickness: Skin Breakdown  
Bed Appearance: Beefy Red, Epithelial Tissue or Bridge, Pink and Yellow  
Percent of Wound Bed Granulated/Red: 75  
Percent of Devitalized: 25  
Length (cm): 4.5  
Width (cm): 17.5  
Depth (cm): 0.2  
CM Sq: 78.750  
Surrounding Tissue Appearance: Hyperpigmented  
Surrounding Tissue Temp: Warm  
Drainage Amount: Moderate  
Drainage Description: Serosanguineous  
Drainage Odor: No Odor  
Procedures  
Time Out: 2 Patient Identifiers, Correct Patient, Correct Side/Site, Correct   
Procedure and Safety Issues Reviewed  
Procedure:  
The right leg ulcer was anesthetized with topical 2% lidocaine gel. A curette 
was   
used to perform debridement for the removal of 10 cm? of devitalized tissue   
consisting of skin and slough. Debridement was down to healthy bleeding tissue.   
Estimated blood loss was minimal. Hemostasis was achieved by applying pressure. 
The   
right leg ulcer now appears 95% pink and red and the size remainsthe same. The   
patient tolerated well with no pain.  
Results  
Height: 5 ft 11 in  
Weight: 160.118 kg  
Body Mass Index: 49.2  
  
Assessment/Plan  
Assessment/Plan  
(1) Venous stasis ulcer:  
Qualifiers:  
Laterality: right Non-pressure ulcer stage: limited to breakdown of skin 
Varicose   
vein presence: with varicose veins Venous stasis ulcer site: other part of lower
leg   
Qualified Code(s): I83.018 - Varicose veins of right lower extremity with ulcer 
other   
part of lower leg; L97.811 - Non-pressure chronic ulcer of other part of right 
lower   
leg limited to breakdown of skin  
Code(s):  
I83.009 - Varicose veins of unspecified lower extremity with ulcer of 
unspecified   
site; L97.909 - Non-pressure chronic ulcer of unspecified part of unspecified 
lower   
leg with unspecified severity  
Status: Chronic  
(2) Edema of right lower leg:  
Code(s):  
R60.0 - Localized edema  
Status: Chronic  
(3) Morbid obesity due to excess calories:  
Code(s):  
E66.01 - Morbid (severe) obesity due to excess calories  
Status: Chronic  
(4) Lymphedema of both lower extremities:  
Code(s):  
I89.0 - Lymphedema, not elsewhere classified  
Status: Suspected  
(5) PVD (peripheral vascular disease):  
Code(s):  
I73.9 - Peripheral vascular disease, unspecified  
Status: Chronic  
(6) Varicose veins of both legs with edema:  
Code(s):  
I83.893 - Varicose veins of bilateral lower extremities with other complications  
Status: Chronic  
(7) Inflammation:  
Status: Chronic  
(8) Cellulitis:  
Assessment/Problem Details:  
rle  
Qualifiers:  
Laterality: right Site of cellulitis: extremity Site of cellulitis of extremity:
  
lower extremity Qualified Code(s): L03.115 - Cellulitis of right lower limb  
Code(s):  
L03.90 - Cellulitis, unspecified  
Status: Resolved  
Time spent with patient  
Time Spent With Patient (min): 15  
  
  
  
  
Dictated By: SOWMYA Correa DD/DT: 23  
  
Signed By: <Electronically signed by SOWMYA Espino>  
23 [facility]4  
   
  
[facility]  
Work Phone: [phone]- Progress note  
  
  
  
  
                                        Author              Elaine Espino  
[facility]  
2023 11:30am  
   
                                        Note Date/Time      2023 11:2  
7am  
   
                                                    [facility]  
ENTER  
[address] [place] [address]  
  
Wound Center Provider Note  
Signed  
  
Patient: Jocelin Pacheco MR#:   
[id number]  
: 1955 Acct:[id number]  
  
Age/Sex: 68 / M  
  
Copies to: DO Elaine Arboleda APRN~  
  
  
HPI  
Date of Visit  
Date of Visit:  
Date of Service: 2023  
Time of Service: 11:21  
  
Narrative  
HPI:  
23 Jocelin is a 68-year-old male presenting to [facility]   
wound care program for an initial visit to the office for a right lower 
extremity   
venous stasis ulcer/lymphedema ulcer.? The patient has been a patient of the 
office   
for quite some time now and has seen other providers.  
He had seen vascular in [place] was told that there is nothing else that can be 
done   
for him.  
He has an extensive history of infections so I imagine this will continue to be 
an   
issue for him.  
He appears to need better edema control.  
He was told by ortho that he needs to lose 50 pounds in order to have surgery- 
this   
was 2 years ago and has not been accomplished.  
A past venous duplex indicates deep vein issues which are not surgically 
correctable-   
vascular did confirm that he does not need to see them because there is nothing 
they   
have to offer him.  
He is still retired so hopefully there is more opportunity for elevation and   
compression of the legs to support healing of the ulcer.  
Nutrition and probiotics were discussed and suggestions were given.  
Doxycycline was escribed today for what appears to be cellulitis of the RLE.  
  
Subjective  
Pain  
Right Lower Leg:  
Pain Intensity: 10  
Wound/Ulcer History  
When did wound start?: 2023  
Mode of Arrival/ : Personal vehicle  
Assistive Device Used Today: Cane  
Lives with:: Alone  
Appetite Description: Within Normal Limits  
Who helps w/ dressing change?: Wound Care Dept  
Why Do You Need Help?: Can't Reach Ulcer  
Smoking Status: Never smoker  
  
[facility]  
Medical History (Updated 23 @ 11:25 by SOWMYA Correa)  
  
Arthritis  
Bilateral knees,  right is bone on bone  per  Dr. Richard   
Back pain  
Cardiomyopathy  
 lower part of heart is weak   
Carpal tunnel syndrome  
right arm surgery  
DVT (deep venous thrombosis)  
 blood clot from right foot to right thigh  on  blood thinner   
Hypertension  
Itching  
Itching with irritation  
DIMITRI (obstructive sleep apnea)  
PVD (peripheral vascular disease)  
 poor veins  Dr. Cameron did vein closure 2019, needs 5 more viens worked 
on.  
Wound of right lower extremity  
  
Surgical History (Reviewed 23 @ 11:09 by Maren Martinez RN)  
  
Hx of cholecystectomy  
  
Family History (Reviewed 23 @ 11:09 by Maren Martinez RN)  
Father Diabetes mellitus, type 2  
Sister Diabetes mellitus, type 2  
  
Social History  
Smoking Status: Never smoker  
Substance Use Type: None  
  
Grafts  
History of Graft  
History of Graft?: Yes  
  
Exam  
Physical Exam  
Vital Signs:  
  
  
  
  
Temp Pulse Resp BP O2 Del Method  
  
97.5 F L 88 18 138/80 Room Air  
  
23 11:04 23 11:04 23 11:04 23 11:04 23 11:04  
  
  
  
Const  
General: cooperative, comfortable and no acute distress  
Nutritional Appearance: obese  
Orientation: alert, awake and oriented x3  
  
Lower/Upper Extremity Exam  
Vascular Exam-Edema  
Right Lower Extremity:  
Edema Type: Lymphedema  
Vascular Exam-Pulses  
Right Brachial:  
Pulse Assessment Method: NIBP  
Right Dorsalis Pedis:  
Pulse Assessment Method: Palpation  
Right Posterior Tibial:  
Pulse Assessment Method: Palpation  
  
Objective  
Meds/Allergies  
Home Medications  
  
carvedilol 6.25 mg tablet 6.25 mg PO BID 17 [History Confirmed 23]  
furosemide 40 mg tablet (Lasix) 40 mg PO BID 17 [History Confirmed 
23]  
acetaminophen 500 mg tablet (Tylenol Extra Strength) 500 mg PO TID PRN Pain 
20   
[History Confirmed 23]  
nabumetone 750 mg tablet 750 mg PO BID 21 [History Confirmed 23]  
empagliflozin 10 mg tablet (Jardiance) 10 mg PO DAILY 22 [History 
Confirmed   
23]  
rivaroxaban 10 mg tablet (Xarelto) 10 mg PO DAILY 22 [History Confirmed   
23]  
semaglutide 2 mg/dose (8 mg/3 mL) subcutaneous pen injector (Ozempic) 2 mg 
subcut Q7D   
22 [History Confirmed 23]  
spironolactone 25 mg tablet 25 mg PO DAILY 22 [History Confirmed 23]  
doxycycline hyclate 100 mg capsule 100 mg PO BID 14 days #28 caps 23 [Rx]  
sacubitril 24 mg-valsartan 26 mg tablet (Entresto) 1 tab PO BID 23 
[History   
Confirmed 23]  
  
  
Allergies  
  
cefixime Allergy (Verified 23 11:09)  
Unknown Reaction  
diclofenac Allergy (Verified 23 11:09)  
Unknown Reaction  
nystatin Allergy (Verified 23 11:09)  
Unknown Reaction  
sodium benzoate Allergy (Verified 23 11:09)  
Unknown Reaction  
  
  
Wound/Ulcer  
Right Lower Leg:  
Type: Venous Stasis Ulcer  
Thickness: Skin Breakdown  
Bed Appearance: Beefy Red, Black Dry, Epithelial Tissue or Bridge and Yellow  
Percent of Wound Bed Granulated/Red: 50  
Percent of Devitalized: 50  
Length (cm): 3.5  
Width (cm): 18.5  
Depth (cm): 0.2  
CM Sq: 64.750  
Surrounding Tissue Appearance: Bright Red  
Surrounding Tissue Temp: Increased Warmth  
Drainage Amount: Moderate  
Drainage Description: Serosanguineous  
Drainage Odor: No Odor  
Results  
Height: 5 ft 11 in  
Weight: 160.118 kg  
Body Mass Index: 49.2  
  
Assessment/Plan  
Assessment/Plan  
(1) Venous stasis ulcer:  
Qualifiers:  
Venous stasis ulcer site: other part of lower leg Varicose vein presence: with   
varicose veins Laterality: right Non-pressure ulcer stage: limited to breakdown 
of   
skin Qualified Code(s): I83.018 - Varicose veins of right lower extremity with 
ulcer   
other part of lower leg; L97.811 - Non-pressurechronic ulcer of other part of 
right   
lower leg limited to breakdown of skin  
Code(s):  
I83.009 - Varicose veins of unspecified lower extremity with ulcer of 
unspecified   
site; L97.909 - Non-pressure chronic ulcer of unspecified part of unspecified 
lower   
leg with unspecified severity  
Status: Chronic  
(2) Cellulitis:  
Assessment/Problem Details:  
rle  
Qualifiers:  
Site of cellulitis: extremity Site of cellulitis of extremity: lower extremity   
Laterality: right Qualified Code(s): L03.115 - Cellulitis of right lower limb  
Code(s):  
L03.90 - Cellulitis, unspecified  
Status: Acute  
(3) Edema of right lower leg:  
Code(s):  
R60.0 - Localized edema  
Status: Chronic  
(4) PVD (peripheral vascular disease):  
Code(s):  
I73.9 - Peripheral vascular disease, unspecified  
Status: Chronic  
(5) Varicose veins of both legs with edema:  
Code(s):  
I83.893 - Varicose veins of bilateral lower extremities with other complications  
Status: Chronic  
(6) Morbid obesity due to excess calories:  
Code(s):  
E66.01 - Morbid (severe) obesity due to excess calories  
Status: Chronic  
(7) Lymphedema of both lower extremities:  
Code(s):  
I89.0 - Lymphedema, not elsewhere classified  
Status: Suspected  
(8) Inflammation:  
Status: Chronic  
Time spent with patient  
Time Spent With Patient (min): 20  
  
  
  
  
Dictated By: SOWMYA Correa DD/DT: 23 1121  
  
Signed By: <Electronically signed by SOWMYA Espino>  
23 1130  
   
  
[facility]  
Work Phone: [phone]- Evaluation note*   
  
                      Encounter Date Diagnosis  Assessment Notes Treatment Notes  
 Treatment   
Clinical Notes  
   
                                                Type 2 diabetes   
mellitus with   
unspecified   
complications (ICD-10   
- E11.8)                                                      
  
  
[facility]  
Other Phone: [phone]- Evaluation note*   
  
                      Encounter Date Diagnosis  Assessment Notes Treatment Notes  
 Treatment   
Clinical Notes  
   
                                                Obesity,   
unspecified   
classification,   
unspecified obesity   
type, unspecified   
whether serious   
comorbidity present   
(ICD-10 - E66.9)                                              
   
                                                BMI 50.0-59.9,   
adult (ICD-10 -   
Z68.43)                                                       
   
                           Other                     Summary of Visi  
t:  
(A) emphasized   
increasing fruits   
and vegetables  
(B) reviewed the   
plate method  
(C) discussed   
foods high in   
sodium  
                                          
  
  
[facility]  
Other Phone: [phone]- Evaluation note*   
  
                      Encounter Date Diagnosis  Assessment Notes Treatment Notes  
 Treatment   
Clinical Notes  
   
                                        21 Dec, 2022        Type 2 diabetes   
mellitus with   
unspecified   
complications (ICD-10   
- E11.8)                                                      
   
                                        21 Dec, 2022        BMI 50.0-59.9, adult  
   
(ICD-10 - Z68.43)                                             
   
                                        21 Dec, 2022        Diarrhea (ICD-10 -   
R19.7)                                                        
   
                                        21 Dec, 2022        Obstructive sleep   
apnea (adult)   
(pediatric) (ICD-10 -   
G47.33)                                                       
   
                                        21 Dec, 2022        Mixed hyperlipidemia  
   
(ICD-10 - E78.2)                                              
   
                                        21 Dec, 2022        CAD (coronary artery  
   
disease) (ICD-10 -   
I25.10)                                                       
   
                                        21 Dec, 2022        Knee osteoarthritis   
(ICD-10 - M17.9)                                              
   
                                        21 Dec, 2022        Cardiomyopathy   
(ICD-10 - I42.9)                                              
   
                                        21 Dec, 2022        CHF (congestive hear  
t   
failure) (ICD-10 -   
I50.9)                                                        
   
                                        21 Dec, 2022        Edema (ICD-10 -   
R60.9)                                                        
   
                                        21 Dec, 2022        Low back derangement  
   
syndrome (ICD-10 -   
M53.86)                                                       
   
                                        21 Dec, 2022        Metabolic syndrome X  
   
(ICD-10 - E88.81)                                             
   
                                        21 Dec, 2022        Encounter for   
immunization (ICD-10   
- Z23)                                                        
  
  
[facility]  
Other Phone: [phone]- Evaluation note*   
  
                      Encounter Date Diagnosis  Assessment Notes Treatment Notes  
 Treatment   
Clinical Notes  
   
                                                Obesity,   
unspecified   
classification,   
unspecified obesity   
type, unspecified   
whether serious   
comorbidity present   
(ICD-10 - E66.9)                                              
   
                                                BMI 50.0-59.9,   
adult (ICD-10 -   
Z68.43)                                                       
   
                           Other                     Summary of Visi  
t:  
(A) encourage   
patient to trial   
different fruits,   
as well as   
temporarily   
reducing/eliminati  
ng milk to improve   
diarrhea  
(B) discussed food   
options for   
traveling on the   
road  
(C) discussed   
options for   
oatmeal  
                                          
  
  
[facility]  
Other Phone: [phone]- Evaluation note*   
  
                      Encounter Date Diagnosis  Assessment Notes Treatment Notes  
 Treatment   
Clinical Notes  
   
                                        20 Oct, 2022        Type 2 diabetes   
mellitus with   
unspecified   
complications (ICD-10   
- E11.8)                                                      
   
                                        20 Oct, 2022        BMI 50.0-59.9, adult  
   
(ICD-10 - Z68.43)                                             
   
                                        20 Oct, 2022        Diarrhea (ICD-10 -   
R19.7)                                                        
   
                                        20 Oct, 2022        Obstructive sleep   
apnea (adult)   
(pediatric) (ICD-10 -   
G47.33)                                                       
   
                                        20 Oct, 2022        Mixed hyperlipidemia  
   
(ICD-10 - E78.2)                                              
   
                                        20 Oct, 2022        CAD (coronary artery  
   
disease) (ICD-10 -   
I25.10)                                                       
   
                                        20 Oct, 2022        Knee osteoarthritis   
(ICD-10 - M17.9)                                              
   
                                        20 Oct, 2022        Cardiomyopathy   
(ICD-10 - I42.9)                                              
   
                                        20 Oct, 2022        CHF (congestive hear  
t   
failure) (ICD-10 -   
I50.9)                                                        
   
                                        20 Oct, 2022        Edema (ICD-10 -   
R60.9)                                                        
   
                                        20 Oct, 2022        Low back derangement  
   
syndrome (ICD-10 -   
M53.86)                                                       
   
                                        20 Oct, 2022        Metabolic syndrome X  
   
(ICD-10 - E88.81)                                             
   
                                        20 Oct, 2022        Encounter for   
immunization (ICD-10   
- Z23)                                              Patient denies   
current illness,   
previous allergic   
reaction to   
influenza   
vaccine, eggs, or   
other vaccines,   
and   
Guillain-[place]   
Syndrome. Patient   
given current   
editions of   
influenza Vaccine   
Information   
Statement (VIS).  
                                          
  
  
[facility]  
Other Phone: [phone]- Progress note  
  
  
  
  
                                        Author              Laila Villarreal  
[facility]  
2022 1:59pm  
   
                                        Note Date/Time      2022  
 1:59pm  
   
                                                    [facility]  
ENTER  
[address] [place] [address]  
  
Wound Center Provider Note  
Signed  
  
Patient: Jocelin Pacheco MR#:   
[id number]  
: 1955 Acct:[id number]  
  
Age/Sex: 67 / M  
  
Copies to: SOWMYA Gutierrez,DO~  
  
  
HPI  
Date of Visit  
Date of Visit:  
Date of Service: 2022  
Time of Service: 13:55  
  
Narrative  
HPI:  
Jocelin is a 67-year-old male presenting to the [facility] for a 
follow-up   
visit for evaluation and treatment of a Right Lower Leg Wound. Jocelin reports 
that he   
bumped his leg on an end table approximately one month ago. He has been a 
patient in   
our office before due to venous stasis ulcers. He has 2+ pitting edema to his 
RLE. He   
does have compression pumps at home and admits to using them approximately once 
a   
week. He has been seen by vascular in the past in [place] where he was told no   
surgical intervention could be performed. The right leg is entirely healed. The 
area   
will be padded and protected today. His right leg will be wrapped in ACE wrap 
for   
compression. He understands that he should use his compression pumps daily, and   
compression stockings are recommended as well. He will reach out to our office 
for   
further wound care needs.  
  
Subjective  
Pain  
Right Lower Leg:  
Pain Description: Throbbing  
Pain Intensity: 0  
Wound/Ulcer History  
When did wound start?: 2022 right medial lower leg  
Mode of Arrival/ : Personal vehicle  
Assistive Device Used Today: Cane  
Lives with:: Alone  
Appetite Description: Within Normal Limits  
Who helps w/ dressing change?: Wound Care Dept  
Smoking Status: Never smoker  
  
[facility]  
Medical History (Updated 22 @ 13:56 by SOWMYA Gutierrez)  
  
Arthritis  
Bilateral knees,  right is bone on bone  per  Dr. Richard   
Back pain  
Cardiomyopathy  
 lower part of heart is weak   
Carpal tunnel syndrome  
right arm surgery  
DVT (deep venous thrombosis)  
 blood clot from right foot to right thigh  on  blood thinner   
Hypertension  
Itching  
Itching with irritation  
DIMITRI (obstructive sleep apnea)  
PVD (peripheral vascular disease)  
 poor veins  Dr. Cameron did vein closure 2019, needs 5 more viens worked 
on.  
Wound of right lower extremity  
  
Surgical History (Reviewed 22 @ 08:05 by Maria A Crowe, KIMBERLY)  
  
Hx of cholecystectomy  
  
Family History (Reviewed 22 @ 08:05 by Maria A Crowe, KIMBERLY)  
Father Diabetes mellitus, type 2  
Sister Diabetes mellitus, type 2  
  
Social History  
Smoking Status: Never smoker  
Substance Use Type: None  
  
Grafts  
History of Graft  
History of Graft?: No  
  
Exam  
Physical Exam  
Vital Signs:  
  
  
  
  
Temp Pulse Resp BP O2 Del Method  
  
98.6 F 92 H 18 119/69 Room Air  
  
22 13:46 22 13:46 22 13:46 22 13:46 22 13:46  
  
  
  
Const  
General: cooperative, comfortable and no acute distress  
Nutritional Appearance: obese  
Orientation: alert, awake and oriented x3  
  
Lower/Upper Extremity Exam  
Vascular Exam-Edema  
Right Lower Extremity:  
Edema Degree: 1+  
Edema Type: Pitting  
Vascular Exam-Pulses  
Right Dorsalis Pedis:  
Pulse Assessment Method: Palpation  
Right Posterior Tibial:  
Pulse Assessment Method: Palpation  
Right Brachial:  
Pulse Assessment Method: NIBP  
  
Objective  
Meds/Allergies  
Home Medications  
  
carvedilol 6.25 mg tablet 6.25 mg PO BID 17 [History Confirmed 22]  
furosemide 40 mg tablet (Lasix) 40 mg PO BID 17 [History Confirmed 
22]  
acetaminophen 500 mg tablet (Tylenol Extra Strength) 500 mg PO TID PRN Pain 
20   
[History Confirmed 22]  
nabumetone 750 mg tablet 750 mg PO BID 21 [History Confirmed 22]  
clobetasol 0.05 % topical ointment 1 applic topical 3XW PRN Rash 21 
[History   
Confirmed 22]  
empagliflozin 10 mg tablet (Jardiance) 10 mg PO DAILY 22 [History 
Confirmed   
22]  
metolazone 2.5 mg tablet 2.5 mg PO Q2D 22 [History Confirmed 22]  
rivaroxaban 10 mg tablet (Xarelto) 10 mg PO DAILY 22 [History Confirmed   
22]  
semaglutide 2 mg/dose (8 mg/3 mL) subcutaneous pen injector (Ozempic) 2 mg 
subcut Q7D   
22 [History Confirmed 22]  
spironolactone 25 mg tablet 25 mg PO DAILY 22 [History Confirmed 22]  
telmisartan 40 mg tablet (Micardis) 40 mg PO DAILY 22 [History Confirmed   
22]  
  
  
Allergies  
  
cefixime Allergy (Verified 22 10:26)  
Unknown Reaction  
diclofenac Allergy (Verified 22 10:26)  
Unknown Reaction  
nystatin Allergy (Verified 22 10:26)  
Unknown Reaction  
sodium benzoate Allergy (Verified 22 10:26)  
Unknown Reaction  
  
  
Wound/Ulcer  
Right Lower Posterior Leg:  
Bed Appearance: Beefy Red and Pink  
Length (cm): 0  
Width (cm): 0  
Depth (cm): 0  
CM Sq: 0.000  
Surrounding Tissue Appearance: Hyperpigmented  
Surrounding Tissue Temp: Warm  
Drainage Odor: No Odor  
Results  
Height: 5 ft 11 in  
Weight: 170.551 kg  
Body Mass Index: 52.4  
  
Assessment/Plan  
Assessment/Plan  
(1) Traumatic open wound of right lower leg:  
Assessment/Problem Details:  
22- Right lower medial leg- healed  
Code(s):  
S81.801A - Unspecified open wound, right lower leg, initial encounter  
Status: Resolved  
(2) Venous stasis ulcer:  
Assessment/Problem Details:  
new 22- healed 22  
Code(s):  
I83.009 - Varicose veins of unspecified lower extremity with ulcer of 
unspecified   
site; L97.909 - Non-pressure chronic ulcer of unspecified part of unspecified 
lower   
leg with unspecified severity  
Status: Resolved  
(3) Edema of right lower leg:  
Code(s):  
R60.0 - Localized edema  
Status: Chronic  
(4) PVD (peripheral vascular disease):  
Code(s):  
I73.9 - Peripheral vascular disease, unspecified  
Status: Chronic  
(5) Varicose veins of both legs with edema:  
Code(s):  
I83.893 - Varicose veins of bilateral lower extremities with other complications  
Status: Chronic  
(6) Morbid obesity due to excess calories:  
Code(s):  
E66.01 - Morbid (severe) obesity due to excess calories  
Status: Chronic  
Time spent with patient  
Time Spent With Patient (min): 20  
  
  
  
  
Dictated By: SOWMYA Gutierrez DD/DT: 22 1355  
  
Signed By: <Electronically signed by SOWMYA Villarreal>  
22 1359  
   
  
[facility]  
Work Phone: [phone]- Progress note  
  
  
  
  
                                        Author              Laila Villarreal  
[facility]  
2022 1:58pm  
   
                                        Note Date/Time      2022  
 1:58pm  
   
                                                    [facility]  
ENTER  
[address] [place] [address]  
  
Wound Center Provider Note  
Signed  
  
Patient: Jocelin Pacheco MR#:   
[id number]  
: 1955 Acct:[id number]  
  
Age/Sex: 67 / M  
  
Copies to: SOWMYA Gutierrez DO~  
  
  
HPI  
Date of Visit  
Date of Visit:  
Date of Service: 2022  
Time of Service: 13:54  
  
Narrative  
HPI:  
Jocelin is a 67-year-old male presenting to the [facility] for a 
follow-up   
visit for evaluation and treatment of a Right Lower Leg Wound. Jocelin reports 
that he   
bumped his leg on an end table approximately one month ago. He has been a 
patient in   
our office before due to venous stasis ulcers. He has 2+ pitting edema to his 
RLE. He   
does have compression pumps at home and admits to using them approximately once 
a   
week. He has been seen by vascular in the past in [place] where he was told no   
surgical intervention could be performed. The initial traumatic wound is healed.
He   
does present with a new ulcer to right lower posterior leg. His wound will be 
topped   
with collagen powder and topped with versatel. His leg will be wrapped in an 
Ergo K2   
for compression to reduce edema. Jocelin does seem to be tolerating the Ergo wrap   
better. Healing of the wound will be dependent on dressing changes being 
performed as   
ordered, a nutritious diet somewhat higher in protein, loss of excess weight, 
control   
of edema with compression and use of compression pumps, and preventing/treating   
infection if it were to arise. There are no signs of acute infection on exam 
today.   
Dressing changes will be done in our office twice a week. Jocelin also reports 
that he   
has been exercising regularly over the past month to help with weight loss.  
  
Subjective  
Pain  
Right Lower Leg:  
Pain Description: Throbbing  
Pain Intensity: 2  
Wound/Ulcer History  
When did wound start?: 2022 right medial lower leg  
Mode of Arrival/ : Personal vehicle  
Assistive Device Used Today: Cane  
Lives with:: Alone  
Appetite Description: Within Normal Limits  
Who helps w/ dressing change?: Wound Care Dept  
Smoking Status: Never smoker  
  
[facility]  
Medical History (Updated 22 @ 13:55 by SOWMYA Gutierrez)  
  
Arthritis  
Bilateral knees,  right is bone on bone  per  Dr. Richard   
Back pain  
Cardiomyopathy  
 lower part of heart is weak   
Carpal tunnel syndrome  
right arm surgery  
DVT (deep venous thrombosis)  
 blood clot from right foot to right thigh  on  blood thinner   
Hypertension  
Itching  
Itching with irritation  
DIMITRI (obstructive sleep apnea)  
PVD (peripheral vascular disease)  
 poor veins  Dr. Cameron did vein closure 2019, needs 5 more viens worked 
on.  
Wound of right lower extremity  
  
Surgical History (Reviewed 22 @ 08:05 by Maria A Crowe RN)  
  
Hx of cholecystectomy  
  
Family History (Reviewed 22 @ 08:05 by Maria A Crowe RN)  
Father Diabetes mellitus, type 2  
Sister Diabetes mellitus, type 2  
  
Social History  
Smoking Status: Never smoker  
Substance Use Type: None  
  
Grafts  
History of Graft  
History of Graft?: No  
  
Exam  
Physical Exam  
Vital Signs:  
  
  
  
  
Temp Pulse Resp BP O2 Del Method  
  
98.1 F 80 20 116/72 Room Air  
  
22 13:48 22 13:48 22 13:48 22 13:48 22 13:48  
  
  
  
Const  
General: cooperative, comfortable and no acute distress  
Nutritional Appearance: obese  
Orientation: alert, awake and oriented x3  
  
Lower/Upper Extremity Exam  
Vascular Exam-Edema  
Right Lower Extremity:  
Edema Degree: 2+  
Edema Type: Pitting  
Vascular Exam-Pulses  
Right Dorsalis Pedis:  
Pulse Assessment Method: Doppler  
Right Posterior Tibial:  
Pulse Assessment Method: Doppler  
Right Brachial:  
Pulse Assessment Method: NIBP  
  
Objective  
Meds/Allergies  
Home Medications  
  
carvedilol 6.25 mg tablet 6.25 mg PO BID 17 [History Confirmed 22]  
furosemide 40 mg tablet (Lasix) 40 mg PO BID 17 [History Confirmed 
22]  
acetaminophen 500 mg tablet (Tylenol Extra Strength) 500 mg PO TID PRN Pain 
20   
[History Confirmed 22]  
nabumetone 750 mg tablet 750 mg PO BID 21 [History Confirmed 22]  
clobetasol 0.05 % topical ointment 1 applic topical 3XW PRN Rash 21 
[History   
Confirmed 22]  
empagliflozin 10 mg tablet (Jardiance) 10 mg PO DAILY 22 [History 
Confirmed   
22]  
metolazone 2.5 mg tablet 2.5 mg PO Q2D 22 [History Confirmed 22]  
rivaroxaban 10 mg tablet (Xarelto) 10 mg PO DAILY 22 [History Confirmed   
22]  
semaglutide 2 mg/dose (8 mg/3 mL) subcutaneous pen injector (Ozempic) 2 mg 
subcut Q7D   
22 [History Confirmed 22]  
spironolactone 25 mg tablet 25 mg PO DAILY 22 [History Confirmed 22]  
telmisartan 40 mg tablet (Micardis) 40 mg PO DAILY 22 [History Confirmed   
22]  
  
  
Allergies  
  
cefixime Allergy (Verified 22 10:26)  
Unknown Reaction  
diclofenac Allergy (Verified 22 10:26)  
Unknown Reaction  
nystatin Allergy (Verified 22 10:26)  
Unknown Reaction  
sodium benzoate Allergy (Verified 22 10:26)  
Unknown Reaction  
  
  
Wound/Ulcer  
Right Lower Medial Leg:  
Type: Traumatic (laceration (without foreign body))  
Thickness: Full  
Bed Appearance: Beefy Red and Pink  
Percent of Wound Bed Granulated/Red: 100  
Percent of Devitalized: 0  
Length (cm): 0  
Width (cm): 0  
Depth (cm): 0  
CM Sq: 0.000  
Surrounding Tissue Appearance: Hyperpigmented  
Surrounding Tissue Temp: Warm  
Drainage Amount: None  
Drainage Description: Serosanguineous  
Drainage Odor: No Odor  
Right Lower Posterior Leg:  
Bed Appearance: Beefy Red and Pink  
Percent of Wound Bed Granulated/Red: 100  
Percent of Devitalized: 0  
Length (cm): 2.4  
Width (cm): 1.9  
Depth (cm): 0.1  
CM Sq: 4.560  
Surrounding Tissue Appearance: Hyperpigmented  
Surrounding Tissue Temp: Warm  
Drainage Amount: Moderate  
Drainage Description: Serosanguineous  
Drainage Odor: No Odor  
Results  
Height: 5 ft 11 in  
Weight: 170.551 kg  
Body Mass Index: 52.4  
  
Assessment/Plan  
Assessment/Plan  
(1) Traumatic open wound of right lower leg:  
Assessment/Problem Details:  
22- Right lower medial leg- healed  
Code(s):  
S81.801A - Unspecified open wound, right lower leg, initial encounter  
Status: Resolved  
(2) Venous stasis ulcer:  
Assessment/Problem Details:  
new 22  
Code(s):  
I83.009 - Varicose veins of unspecified lower extremity with ulcer of 
unspecified   
site; L97.909 - Non-pressure chronic ulcer of unspecified part of unspecified 
lower   
leg with unspecified severity  
Status: Acute  
(3) Edema of right lower leg:  
Code(s):  
R60.0 - Localized edema  
Status: Chronic  
(4) PVD (peripheral vascular disease):  
Code(s):  
I73.9 - Peripheral vascular disease, unspecified  
Status: Chronic  
(5) Varicose veins of both legs with edema:  
Code(s):  
I83.893 - Varicose veins of bilateral lower extremities with other complications  
Status: Chronic  
(6) Morbid obesity due to excess calories:  
Code(s):  
E66.01 - Morbid (severe) obesity due to excess calories  
Status: Chronic  
Time spent with patient  
Time Spent With Patient (min): 20  
  
  
  
  
Dictated By: SOWMYA Gutierrez DD/DT: 22  
  
Signed By: <Electronically signed by SOWMYA Villarreal>  
22 1358  
   
  
[facility]  
Work Phone: [phone]- Evaluation note*   
  
                      Encounter Date Diagnosis  Assessment Notes Treatment Notes  
 Treatment   
Clinical Notes  
   
                                        08 Sep, 2022        Type 2 diabetes   
mellitus with   
unspecified   
complications (ICD-10   
- E11.8)                                                      
   
                                        08 Sep, 2022        BMI 50.0-59.9, adult  
   
(ICD-10 - Z68.43)                                             
   
                                        08 Sep, 2022        Obstructive sleep   
apnea (adult)   
(pediatric) (ICD-10 -   
G47.33)                                                       
   
                                        08 Sep, 2022        Mixed hyperlipidemia  
   
(ICD-10 - E78.2)                                              
   
                                        08 Sep, 2022        CAD (coronary artery  
   
disease) (ICD-10 -   
I25.10)                                                       
   
                                        08 Sep, 2022        Knee osteoarthritis   
(ICD-10 - M17.9)                                              
   
                                        08 Sep, 2022        Cardiomyopathy   
(ICD-10 - I42.9)                                              
   
                                        08 Sep, 2022        CHF (congestive hear  
t   
failure) (ICD-10 -   
I50.9)                                                        
   
                                        08 Sep, 2022        Edema (ICD-10 -   
R60.9)                                                        
   
                                        08 Sep, 2022        Low back derangement  
   
syndrome (ICD-10 -   
M53.86)                                                       
   
                                        08 Sep, 2022        Metabolic syndrome X  
   
(ICD-10 - E88.81)                                             
  
  
[facility]  
Other Phone: [phone]- Progress note  
  
  
  
  
                                        Author              Laila Villarreal  
[facility]  
2022 10:41am  
   
                                        Note Date/Time      2022 10  
:40am  
   
                                                    [facility]  
ENTER  
[address] [place] [address]  
  
Wound Center Provider Note  
Signed  
  
Patient: Jocelin Pacheco MR#:   
[id number]  
: 1955 Acct:[id number]  
  
Age/Sex: 67 / M  
  
Copies to: SOWMYA Gutierrez DO~  
  
  
HPI  
Date of Visit  
Date of Visit:  
Date of Service: 2022  
Time of Service: 10:37  
  
Narrative  
HPI:  
Jocelin is a 67-year-old male presenting to the [facility] for a 
follow-up   
visit for evaluation and treatment of a Right Lower Leg Wound. Jocelin reports 
that he   
bumped his leg on an end table approximately one month ago. He has been a 
patient in   
our office before due to venous stasis ulcers. He has 2+ pitting edema to his 
RLE. He   
does have compression pumps at home and admits to using them approximately once 
a   
week. He has been seen by vascular in the past in [place] where he was told no   
surgical intervention could be performed. The wound did show improvement. The 
wound   
bed is more pink/red. His wound will be topped with collagen powder. His leg 
will be   
wrapped in an Ergo K2 for compression to reduce edema. He did experience 
sensitivity   
(itching) to the standard Unna boot. Clobetasol was applied anastasia wound and we 
will   
monitor for improvement with the Ergo wrap. Healing of the wound will be 
dependent on   
dressing changes being performed as ordered, a nutritious diet somewhat higher 
in   
protein, loss of excess weight, control of edema with compression and use of   
compression pumps, and preventing/treating infection if it were to arise. 
Thereare no   
signs of acute infection on exam today. Dressing changes will be done inour 
office   
twice a week. Jocelin also reports that he has been exercising regularly over the 
past   
month to help with weight loss.  
  
Subjective  
Pain  
Right Lower Leg:  
Pain Description: Soreness  
Pain Intensity: 7  
Wound/Ulcer History  
When did wound start?: 2022 right medial lower leg  
Mode of Arrival/ : Personal vehicle  
Assistive Device Used Today: Cane  
Lives with:: Alone  
Appetite Description: Within Normal Limits  
Who helps w/ dressing change?: Wound Care Dept  
Smoking Status: Never smoker  
  
[facility]  
Medical History (Updated 22 @ 08:17 by Maria A Crowe RN)  
  
Arthritis  
Bilateral knees,  right is bone on bone  per  Dr. Richard   
Back pain  
Cardiomyopathy  
 lower part of heart is weak   
Carpal tunnel syndrome  
right arm surgery  
DVT (deep venous thrombosis)  
 blood clot from right foot to right thigh  on  blood thinner   
Hypertension  
Itching  
Itching with irritation  
DIMITRI (obstructive sleep apnea)  
PVD (peripheral vascular disease)  
 poor veins  Dr. Cameron did vein closure 2019, needs 5 more viens worked 
on.  
Wound of right lower extremity  
  
Surgical History (Reviewed 22 @ 08:05 by Maria A Crowe RN)  
  
Hx of cholecystectomy  
  
Family History (Reviewed 22 @ 08:05 by Maria A Crowe RN)  
Father Diabetes mellitus, type 2  
Sister Diabetes mellitus, type 2  
  
Social History  
Smoking Status: Never smoker  
Substance Use Type: None  
  
Grafts  
History of Graft  
History of Graft?: No  
  
Exam  
Physical Exam  
Vital Signs:  
  
  
  
  
Temp Pulse Resp BP O2 Del Method  
  
97.3 F L 103 H 18 139/72 Room Air  
  
22 10:24 22 10:24 22 10:24 22 10:24 22 10:24  
  
  
  
Const  
General: cooperative, comfortable and no acute distress  
Nutritional Appearance: obese  
Orientation: alert, awake and oriented x3  
  
Lower/Upper Extremity Exam  
Vascular Exam-Edema  
Right Lower Extremity:  
Edema Degree: 2+  
Edema Type: Pitting  
Vascular Exam-Pulses  
Right Dorsalis Pedis:  
Pulse Assessment Method: Doppler  
Right Posterior Tibial:  
Pulse Assessment Method: Doppler  
Right Brachial:  
Pulse Assessment Method: NIBP  
  
Objective  
Meds/Allergies  
Home Medications  
  
carvedilol 6.25 mg tablet 6.25 mg PO BID 17 [History Confirmed 22]  
furosemide 40 mg tablet (Lasix) 40 mg PO BID 17 [History Confirmed 
22]  
acetaminophen 500 mg tablet (Tylenol Extra Strength) 500 mg PO TID PRN Pain 
20   
[History Confirmed 22]  
nabumetone 750 mg tablet 750 mg PO BID 21 [History Confirmed 22]  
clobetasol 0.05 % topical ointment 1 applic topical 3XW PRN Rash 21 
[History   
Confirmed 22]  
empagliflozin 10 mg tablet (Jardiance) 10 mg PO DAILY 22 [History 
Confirmed   
22]  
metolazone 2.5 mg tablet 2.5 mg PO Q2D 22 [History Confirmed 22]  
rivaroxaban 10 mg tablet (Xarelto) 10 mg PO DAILY 22 [History Confirmed   
22]  
semaglutide 2 mg/dose (8 mg/3 mL) subcutaneous pen injector (Ozempic) 2 mg 
subcut Q7D   
22 [History Confirmed 22]  
spironolactone 25 mg tablet 25 mg PO DAILY 22 [History Confirmed 22]  
telmisartan 40 mg tablet (Micardis) 40 mg PO DAILY 22 [History Confirmed   
22]  
  
  
Allergies  
  
cefixime Allergy (Verified 22 10:26)  
Unknown Reaction  
diclofenac Allergy (Verified 22 10:26)  
Unknown Reaction  
nystatin Allergy (Verified 22 10:26)  
Unknown Reaction  
sodium benzoate Allergy (Verified 22 10:26)  
Unknown Reaction  
  
  
Wound/Ulcer  
Right Lower Medial Leg:  
Type: Traumatic (laceration (without foreign body))  
Thickness: Full  
Bed Appearance: Beefy Red, Pink and Yellow  
Percent of Wound Bed Granulated/Red: 95  
Percent of Devitalized: 5  
Length (cm): 2.1  
Width (cm): 1.4  
Depth (cm): 0.1  
CM Sq: 2.940  
Surrounding Tissue Appearance: Hyperpigmented  
Surrounding Tissue Temp: Warm  
Drainage Amount: Moderate  
Drainage Description: Serosanguineous  
Drainage Odor: No Odor  
Lidocaine Applied Topically: 2% Jelly  
Results  
Height: 5 ft 11 in  
Weight: 170.551 kg  
Body Mass Index: 52.4  
  
Assessment/Plan  
Assessment/Plan  
(1) Traumatic open wound of right lower leg:  
Assessment/Problem Details:  
22- Right lower medial leg  
Code(s):  
S81.801A - Unspecified open wound, right lower leg, initial encounter  
Status: Chronic  
(2) Edema of right lower leg:  
Code(s):  
R60.0 - Localized edema  
Status: Chronic  
(3) PVD (peripheral vascular disease):  
Code(s):  
I73.9 - Peripheral vascular disease, unspecified  
Status: Chronic  
(4) Varicose veins of both legs with edema:  
Code(s):  
I83.893 - Varicose veins of bilateral lower extremities with other complications  
Status: Chronic  
(5) Morbid obesity due to excess calories:  
Code(s):  
E66.01 - Morbid (severe) obesity due to excess calories  
Status: Chronic  
Time spent with patient  
Time Spent With Patient (min): 15  
  
  
  
  
Dictated By: SOWMYA Gutierrez DD/DT: 22 1037  
  
Signed By: <Electronically signed by SOWMYA Villarreal>  
22 1041  
   
  
[facility]  
Work Phone: [phone]- Progress note  
  
  
  
  
                                        Author              Laila Villarreal  
[facility]  
2022 8:03am  
   
                                        Note Date/Time      2022 8:  
03am  
   
                                                    [facility]  
ENTER  
[address] [place] [address]  
  
Wound Center Provider Note  
Signed  
  
Patient: Jocelin Pacheco MR#:   
[id number]  
: 1955 Acct:[id number]  
  
Age/Sex: 67 / M  
  
Copies to: SOWMYA Gutierrez DO~  
  
  
HPI  
Date of Visit  
Date of Visit:  
Date of Service: 2022  
Time of Service: 07:50  
  
Narrative  
HPI:  
Jocelin is a 67-year-old male presenting to the [facility] for an 
initial   
visit for evaluation and treatment of a Right Lower Leg Wound. Jocelin reports 
that he   
bumped his leg on an end table approximately one month ago. He has been a 
patient in   
our office before due to venous stasis ulcers. He presents today with the wound 
open   
to air. He has 2+ pitting edema to his RLE. He does have compression pumps at 
home   
and admits to using them approximately once a week. He has been seen by vascular
in   
the past in [place] where he was told no surgical intervention could be 
performed.   
The wound will be topped witha honey/collagen mixture and an alginate silver. 
His leg   
will be wrapped in an Unna boot for compression to reduce edema. Healing of the 
wound   
will be dependent on dressing changes being performed as ordered, a nutritious 
diet   
somewhathigher in protein, loss of excess weight, control of edema with 
compression   
and use of compression pumps, and preventing/treating infection if it were to 
arise.   
There are no signs of acute infection on exam today. Dressing changes will be 
done in   
our office twice a week. Jocelin also reports that he has been exercising 
regularly   
over the past month to help with weight loss.  
  
Subjective  
Pain  
Right Lower Leg:  
Pain Description: Soreness  
Pain Intensity: 8  
Wound/Ulcer History  
When did wound start?: 2022 right medial lower leg  
Mode of Arrival/ : Personal vehicle  
Assistive Device Used Today: Cane  
Lives with:: Alone  
Appetite Description: Within Normal Limits  
Who helps w/ dressing change?: Wound Care Dept  
Smoking Status: Never smoker  
  
[facility]  
Medical History (Reviewed 21 @ 01:50 by Gumaro Malin DO)  
  
Arthritis  
Bilateral knees,  right is bone on bone  per  Dr. Richard   
Back pain  
Cardiomyopathy  
 lower part of heart is weak   
Carpal tunnel syndrome  
right arm surgery  
DVT (deep venous thrombosis)  
 blood clot from right foot to right thigh  on  blood thinner   
Hypertension  
Itching  
Itching with irritation  
DIMITRI (obstructive sleep apnea)  
PVD (peripheral vascular disease)  
 poor veins  Dr. Cameron did vein closure 2019, needs 5 more viens worked 
on.  
  
Surgical History (Reviewed 21 @ 01:50 by Gumaro Malin DO)  
  
Hx of cholecystectomy  
  
Family History (Reviewed 21 @ 01:50 by Gumaro Malin DO)  
Father Diabetes mellitus, type 2  
Sister Diabetes mellitus, type 2  
  
Social History  
Smoking Status: Never smoker  
Substance Use Type: None  
  
Grafts  
History of Graft  
History of Graft?: No  
  
Exam  
Physical Exam  
Vital Signs:  
  
  
  
  
Temp Pulse Resp BP O2 Del Method  
  
97.7 F 97 H 24 130/70 Room Air  
  
22 07:37 22 07:37 22 07:37 22 07:37 22 07:37  
  
  
  
Const  
General: cooperative, comfortable and no acute distress  
Nutritional Appearance: obese  
Orientation: alert, awake and oriented x3  
  
Lower/Upper Extremity Exam  
Vascular Exam-Edema  
Right Lower Extremity:  
Edema Degree: 2+  
Edema Type: Pitting  
Vascular Exam-Pulses  
Right Dorsalis Pedis:  
Pulse Assessment Method: Palpation  
Right Posterior Tibial:  
Pulse Assessment Method: Doppler  
Right Brachial:  
Pulse Assessment Method: NIBP  
  
Objective  
Meds/Allergies  
Home Medications  
  
carvedilol 6.25 mg tablet 6.25 mg PO BID 17 [History Confirmed 21]  
furosemide 40 mg tablet (Lasix) 40 mg PO BID 17 [History Confirmed 
21]  
telmisartan 40 mg tablet 40 mg PO DAILY 17 [History Confirmed 21]  
metolazone 2.5 mg tablet 2.5 mg PO 3XW PRN Edema 19 [History Confirmed   
21]  
acetaminophen 500 mg tablet (Tylenol Extra Strength) 500 mg PO TID PRN Pain 
20   
[History Confirmed 21]  
ascorbic acid (vitamin C) 500 mg tablet (Vitamin C) 500 mg PO BID 7 days #14 
tabs   
20 [Rx Confirmed 21]  
zinc sulfate 50 mg zinc (220 mg) capsule (Orazinc) 220 mg PO DAILY 7 days #7 
caps   
20 [Rx Confirmed 21]  
nabumetone 750 mg tablet 750 mg PO BID 21 [History Confirmed 21]  
hydrocodone 5 mg-acetaminophen 325 mg tablet 1 tab PO Q8H PRN severe pain 
(scalescore   
7-10) 2 days #6 tabs 21 [Rx Confirmed 21]  
clobetasol 0.05 % topical ointment 1 applic topical 3XW PRN Rash 21 
[History   
Confirmed 21]  
  
  
Allergies  
  
cefixime Allergy (Verified 21 13:49)  
Unknown Reaction  
diclofenac Allergy (Verified 21 13:49)  
Unknown Reaction  
nystatin Allergy (Verified 21 13:49)  
Unknown Reaction  
sodium benzoate Allergy (Verified 21 13:49)  
Unknown Reaction  
  
  
Wound/Ulcer  
Right Lower Medial Leg:  
Type: Traumatic (laceration (without foreign body))  
Thickness: Full  
Bed Appearance: Brown, Devitalized and Dried Exudate  
Percent of Wound Bed Granulated/Red: 0  
Percent of Devitalized: 100  
Length (cm): 2.5  
Width (cm): 1.5  
Depth (cm): 0.1  
CM Sq: 3.750  
Surrounding Tissue Appearance: Hyperpigmented  
Surrounding Tissue Temp: Warm  
Drainage Amount: Scant  
Drainage Description: Serosanguineous  
Drainage Odor: No Odor  
Lidocaine Applied Topically: 2% Jelly  
Procedures  
Time Out: 2 Patient Identifiers, Correct Patient, Correct Side/Site, Correct   
Procedure and Safety Issues Reviewed  
Procedure:  
The right lower medial leg wound was anesthetized with 2% topical lidocaine. A   
curette was used to perform debridement for the removal of 3.7 cm? of 
devitalized   
tissue consisting of skin, slough, and exudate. Debridement was down to more 
healthy,   
bleeding tissue. Estimated blood loss was minimal. Hemostasis was achieved by   
applying direct pressure. The wound now appears 50% more pink and red and 
remains the   
same in size. The patient tolerated the procedure well with minimal pain.  
Results  
Height: 5 ft 11 in  
Weight: 170.551 kg  
Body Mass Index: 52.4  
  
Assessment/Plan  
Assessment/Plan  
(1) Traumatic open wound of right lower leg:  
Assessment/Problem Details:  
22- Right lower medial leg  
Code(s):  
S81.801A - Unspecified open wound, right lower leg, initial encounter  
Status: Chronic  
(2) Edema of right lower leg:  
Code(s):  
R60.0 - Localized edema  
Status: Chronic  
(3) PVD (peripheral vascular disease):  
Code(s):  
I73.9 - Peripheral vascular disease, unspecified  
Status: Chronic  
(4) Varicose veins of both legs with edema:  
Code(s):  
I83.893 - Varicose veins of bilateral lower extremities with other complications  
Status: Chronic  
(5) Morbid obesity due to excess calories:  
Code(s):  
E66.01 - Morbid (severe) obesity due to excess calories  
Status: Chronic  
Time spent with patient  
Time Spent With Patient (min): 20  
  
  
  
  
Dictated By: SOWMYA Gutierrez DD/DT: 22 0750  
  
Signed By: <Electronically signed by SOWMYA Villarreal>  
22 0803  
   
  
[facility]  
Work Phone: [phone]- Evaluation note*   
  
                      Encounter Date Diagnosis  Assessment Notes Treatment Notes  
 Treatment   
Clinical Notes  
   
                                                Type 2 diabetes   
mellitus with   
unspecified   
complications (ICD-10   
- E11.8)                                                      
   
                                                BMI 50.0-59.9, adult  
   
(ICD-10 - Z68.43)                                             
   
                                                Obstructive sleep   
apnea (adult)   
(pediatric) (ICD-10 -   
G47.33)                                                       
   
                                                Mixed hyperlipidemia  
   
(ICD-10 - E78.2)                                              
   
                                                CAD (coronary artery  
   
disease) (ICD-10 -   
I25.10)                                                       
   
                                                Knee osteoarthritis   
(ICD-10 - M17.9)                                              
   
                                                Cardiomyopathy   
(ICD-10 - I42.9)                                              
   
                                                CHF (congestive hear  
t   
failure) (ICD-10 -   
I50.9)                                                        
   
                                                Edema (ICD-10 -   
R60.9)                                                        
   
                                                Low back derangement  
   
syndrome (ICD-10 -   
M53.86)                                                       
   
                                                Metabolic syndrome X  
   
(ICD-10 - E88.81)                                             
  
  
[facility]  
Other Phone: [phone]- Evaluation note*   
  
                      Encounter Date Diagnosis  Assessment Notes Treatment Notes  
 Treatment   
Clinical Notes  
   
                                                Obesity,   
unspecified   
classification,   
unspecified obesity   
type, unspecified   
whether serious   
comorbidity present   
(ICD-10 - E66.9)                                              
   
                                                BMI 50.0-59.9,   
adult (ICD-10 -   
Z68.43)                                                       
   
                           Other                     Summary of Visi  
t:  
(A) reviewed plate   
method  
(B) emphasized   
reducing calorie   
consumption   
through beverages  
(C) continue   
logging all food   
and drink  
                                          
  
  
[facility]  
Other Phone: [phone]- Evaluation note*   
  
                      Encounter Date Diagnosis  Assessment Notes Treatment Notes  
 Treatment   
Clinical Notes  
   
                                                Obstructive sleep   
apnea (adult)   
(pediatric) (ICD-10   
- G47.33)                                           His machine should   
be set for CPAP   
auto minimum 12   
And maximum 18. We   
will try to get   
download 1 way or   
another to confirm   
that he has good   
control. He does   
seem to tolerate   
the pressure   
without difficulty   
and does wear the   
mask when he is in   
bed. I encouraged   
him to ensure   
regular sleep-wake   
cycles with   
adequate sleep   
time  
                                          
   
                                                Sleep phase   
syndrome, delayed   
(ICD-10 - G47.21)                                   His original   
history reported   
substantial sleep   
phase delay, but   
he is now   
reporting almost   
chaotic sleep-wake   
cycles. I   
encouraged him to   
have regular   
sleep-wake times,   
and to avoid being   
awake in the bed   
even if unable to   
sleep.  
                                          
   
                                                BMI 50.0-59.9,   
adult (ICD-10 -   
Z68.43)                                             He asked about   
inspire therapy,   
but his current   
body weight is   
above the cutoff   
to qualify for   
that treatment. He   
is encouraged to   
continue efforts   
at weight   
reduction  
                                          
  
  
[facility]  
Other Phone: [phone]- Evaluation note*   
  
                      Encounter Date Diagnosis  Assessment Notes Treatment Notes  
 Treatment   
Clinical Notes  
   
                                        10 May, 2022        Obesity,   
unspecified   
classification,   
unspecified   
obesity type,   
unspecified   
whether serious   
comorbidity   
present (ICD-10 -   
E66.9)                                                        
   
                                        10 May, 2022        BMI 50.0-59.9,   
adult (ICD-10 -   
Z68.43)                                                       
   
                          10 May, 2022 Other                     Summary of Visi  
t:  
(A) Presentation of   
Plate Method   
discussed  
(B) Sample meal ideas   
reviewed  
(C) exercise   
recommendations   
reviewed  
Patient set the   
following goals:  
- patient set   
personal goal using   
given handout.  
                                          
  
  
[facility]  
Other Phone: [phone]- Evaluation note*   
  
                      Encounter Date Diagnosis  Assessment Notes Treatment Notes  
 Treatment   
Clinical Notes  
   
                                                Abnormal weight gain  
   
(ICD-10 - R63.5)                                              
   
                                                Type 2 diabetes   
mellitus with   
unspecified   
complications (ICD-10   
- E11.8)                                                      
   
                                                Obstructive sleep   
apnea (adult)   
(pediatric) (ICD-10 -   
G47.33)                                                       
   
                                        14 2022        Mixed hyperlipidemia  
   
(ICD-10 - E78.2)                                              
   
                                                Knee osteoarthritis   
(ICD-10 - M17.9)                                              
   
                                                Cardiomyopathy   
(ICD-10 - I42.9)                                              
   
                                                CHF (congestive hear  
t   
failure) (ICD-10 -   
I50.9)                                                        
   
                                        14 2022        BMI 50.0-59.9, adult  
   
(ICD-10 - Z68.43)                                             
   
                                        14 2022        Edema (ICD-10 -   
R60.9)                                                        
   
                                        14 2022        Low back derangement  
   
syndrome (ICD-10 -   
M53.86)                                                       
   
                                        14 2022        Metabolic syndrome X  
   
(ICD-10 - E88.81)                                             
   
                                                CAD (coronary artery  
   
disease) (ICD-10 -   
I25.10)                                                       
  
  
[facility]  
Other Phone: [phone]- Evaluation note*   
  
                      Encounter Date Diagnosis  Assessment Notes Treatment Notes  
 Treatment   
Clinical Notes  
   
                                                Obstructive sleep   
apnea (adult)   
(pediatric)   
(ICD-10 - G47.33)                                     
  
  
He had original sleep   
study in  with an   
AHI of 63 and with   
21% of the time spent   
hypoxic. He was   
titrated and treated   
with CPAP, but   
struggled some with   
usage at that time.   
He was lost to   
follow-up not long   
after. However he now   
reports he has been   
using the machine   
regularly. The most   
current download   
available to us is   
from , and at   
that time he did have   
good compliance with   
9 hours of usage and   
with adequate control   
of sleep apnea.   
However I am   
concerned because of   
his very high Sparkill   
Sleepiness Scale, his   
difficult to sort out   
sleep history, and   
his severe persistent   
hypoxia when   
initially diagnosed   
(which raises the   
stakes and emphasizes   
the importance of   
effective treatment).   
His exam does show   
bilateral lower   
extremity edema which   
can be an indication   
of suboptimally   
treated sleep apnea   
with pulmonary   
hypertension. Given   
the uncertainty of   
the overall situation   
I feel full   
reassessment with   
polysomnography and   
possible split-night   
is advisable. In the   
meantime we will try   
to get him a new auto   
CPAP machine as his   
current device is   
both antiquated and   
subject to recall                         
  
  
   
                                                BMI 50.0-59.9,   
adult (ICD-10 -   
Z68.43)                                               
  
  
His substantial   
morbid obesity does   
pose considerable   
risks for   
cardiopulmonary   
complications and he   
is encouraged to   
bring body weight   
down. Not only will   
this improve quality   
of life but can have   
substantial impact on   
sleep apnea and   
pulmonary   
hypertension with   
hypoventilation,   
should these problems   
be present                                
  
  
   
                                                Lower extremity   
edema (ICD-10 -   
R60.0)                                                
  
  
He mayHis original   
sleep test showed   
severe sleep apnea   
with substantial   
nocturnal hypoxia. I   
am concerned for   
breakthrough hypoxia   
with hypoventilation;   
some patients may   
require control of   
sleep apnea but   
additionally may   
require oxygen   
therapy bled into the   
device                                    
  
  
   
                                                Sleep phase   
syndrome, delayed   
(ICD-10 - G47.21)                                     
  
  
His self-reported   
sleep pattern has not   
no nighttime sleep   
with substantial   
sleep only beginning   
at 6 AM. However it   
is quite difficult to   
sort out what is   
going on and he is   
not certain that he   
goes sleepless   
through the night. We   
will try to schedule   
him for a sleep phase   
delayed sleep study                       
  
  
   
                           Other                     The diagnosis o  
f   
Sleep Apnea was   
reviewed in detail,   
and handout materials   
were provided. The   
patient expresses   
good understanding,   
We also reviewed the   
medical and accident   
risks associated with   
sleep apnea and   
excessive fatigue.   
The patient is   
advised to adhere to   
a proper sleep   
hygiene schedule and   
to assure adequate   
total sleep time; The   
patient was advised   
to continue efforts   
at progressive weight   
loss, including   
decreased overall   
caloric intake   
quantity and better   
food choices.The   
patient was advised   
to call or return any   
difficulties arise,   
including changes in   
symptoms and/or   
problems with   
treatment                                 
  
  
[facility]  
Other Phone: [phone]- Evaluation note*   
  
                      Encounter Date Diagnosis  Assessment Notes Treatment Notes  
 Treatment   
Clinical Notes  
   
                                        22 Dec, 2021        Encounter for   
immunization   
(ICD-10 - Z23)                                        
  
  
Patient presents   
for COVID-19   
vaccination   
BOOSTER.   
Pre-screening form   
answers evaluated   
with patient.   
Patient denies   
current illness or   
allergic reaction   
to component of   
COVID-19 vaccine.   
Patient provided   
with current copy   
of EUA.                                   
  
  
  
  
[facility]  
Other Phone: [phone] complaint Narrative - Reported* JOCELIN PACHECO is 
  being seen for a consultation for an abnormal ECG and abnormal test(s) 
  results.  
* 67-year-old gentleman seen in cardiology consultation at the request of 
  primary care for worsening shortness of breath and exertional dyspnea. Patient
  had abnormal Lexiscan imaging revealing global hypokinesis with ejection 
  fraction of 44% and a normal transient ischemic index but no evidence of isc
  hemia. Prior heart catheterization performed by myself in  revealed 
  minimal coronary disease with reduced left ventricular function  
* Today's ECG is normal. Echocardiogram reveals LVH with normal left ventricular
  function and a septal thickness of 1.8 cm with no outflow tract obstructive 
  physiology.  
* Patient has a history of morbid obesity, obstructive sleep apnea, history of 
  right lower extremity DVT, essential hypertension.  
* The driving symptom currently is shortness of breath and dyspnea with exertion
  now with evidence ofLV dysfunction and morbid obesity  
* We have counseled him on dietary discretion, weight loss and exercise and 
  aerobic training as a means towards improving his overall demeanor/physiology 
  and anatomy and body habitus.  
* I would simply recommend appropriate guideline directed medical therapies with
  Entresto, spironolactone, SGLT2 inhibitor (Jardiance), discontinue 
  telmisartan, with appropriate laboratory and clinicalfollow-up 
  thereafterwards. Extensive education and counseling performed with the patient
  for over 30 minutes this morning, he is agreeing to the above will follow up 
  as described  
-[facility] [address] DO  
Work Phone: [phone] noteNo InformationNoThree [facility]  
Other Phone: [phone] note*   
  
                          Diagnosis    Onset Date   Resolution   Status  
   
                          Edema of right lower leg                           chr  
onic  
   
                          Morbid obesity due to excess calories                   
          chronic  
   
                          PVD (peripheral vascular disease)                       
      chronic  
   
                          Varicose veins of both legs with edema                  
           chronic  
   
                          Traumatic open wound of right lower leg                 
            resolved  
   
                          Venous stasis ulcer                           resolved  
  
  
[facility]  
Work Phone: [phone] noteNo assessment information available
[facility]  
Work Phone: [phone] note*   
  
                          Diagnosis    Onset Date   Resolution   Status  
   
                          Edema of right lower leg                           chr  
onic  
   
                          Inflammation                           chronic  
   
                          Morbid obesity due to excess calories                   
          chronic  
   
                          PVD (peripheral vascular disease)                       
      chronic  
   
                          Varicose veins of both legs with edema                  
           chronic  
   
                          Cellulitis                             resolved  
   
                          Venous stasis ulcer                           resolved  
  
  
[facility]  
Work Phone: [phone] note*   
  
                                                    Diagnosis  
   
                                                      
  
  
Onychomycosis- Primary  
  
  
Dermatophytosis of nail  
   
                                                      
  
  
Pain in both feet  
   
                                                      
  
  
Corns and callosities  
   
                                                      
  
  
Other specified peripheral vascular diseases (CMS/HCC)  
   
                                                      
  
  
Circulating anticoagulant disorder (CMS/HCC)  
  
  
Other and unspecified coagulation defects  
  
documented in this encounter  
[facility]   
  
                                                    Diagnosis  
   
                                                      
  
  
Lumbosacral strain, initial encounter- Primary  
   
                                                      
  
  
Acute pain of left shoulder  
   
                                                      
  
  
Wound of left lower extremity, initial encounter  
  
documented in this encounter  
[facility]   
  
                          Diagnosis    Onset Date   Resolution   Status  
   
                          Edema of right lower leg                           chr  
onic  
   
                          Inflammation                           chronic  
   
                          Morbid obesity due to excess calories                   
          chronic  
   
                          PVD (peripheral vascular disease)                       
      chronic  
   
                          Varicose veins of both legs with edema                  
           chronic  
   
                          Venous stasis ulcer                           resolved  
  
  
[facility]  
Work Phone: [phone] note*   
  
                                                    Diagnosis  
   
                                                      
  
  
Non-ischemic cardiomyopathy (Multi)- Primary  
  
  
Other primary cardiomyopathies  
   
                                                      
  
  
DIMITRI on CPAP  
   
                                                      
  
  
BMI 50.0-59.9, adult (Multi)  
  
documented in this encounter  
[facility]  
Work Phone: [phone] note*   
  
                                                    Diagnosis  
   
                                                      
  
  
DIMITRI on CPAP- Primary  
   
                                                      
  
  
Type 2 diabetes mellitus with diabetic peripheral angiopathy without gangrene,   
without long-term current use of insulin (CMS/HCC)  
   
                                                      
  
  
Peripheral vascular disease (CMS/HCC)  
  
  
Unspecified peripheral vascular disease  
   
                                                      
  
  
CKD (chronic kidney disease) stage 2, GFR 60-89 ml/min  
  
  
Chronic kidney disease, Stage II (mild)  
   
                                                      
  
  
Other cardiomyopathy (CMS/HCC)  
   
                                                      
  
  
Class 3 obesity  
   
                                                      
  
  
Stasis dermatitis of both legs  
   
                                                      
  
  
Medicare annual wellness visit, subsequent- Primary  
   
                                                      
  
  
ACP (advance care planning)  
  
  
Other specified counseling  
   
                                                      
  
  
Colon cancer screening  
  
  
Special screening for malignant neoplasms, colon  
   
                                                      
  
  
Class 3 obesity  
   
                                                      
  
  
BMI 50.0-59.9, adult (CMS/HCC)  
   
                                                      
  
  
Type 2 diabetes mellitus with diabetic peripheral angiopathy without gangrene,   
without long-term current use of insulin (CMS/HCC)  
   
                                                      
  
  
Peripheral venous insufficiency  
  
  
Unspecified venous (peripheral) insufficiency  
   
                                                      
  
  
DIMITRI on CPAP  
   
                                                      
  
  
CKD (chronic kidney disease) stage 2, GFR 60-89 ml/min  
  
  
Chronic kidney disease, Stage II (mild)  
   
                                                      
  
  
Other cardiomyopathy (CMS/HCC)  
   
                                                      
  
  
CPAP (continuous positive airway pressure) dependence  
  
  
Dependence on other enabling machine  
   
                                                      
  
  
Left ventricular hypertrophy  
  
  
Cardiomegaly  
   
                                                      
  
  
Stasis dermatitis of both legs  
   
                                                      
  
  
Circulating anticoagulant disorder (CMS/HCC)  
  
  
Other and unspecified coagulation defects  
   
                                                      
  
  
Type 2 diabetes mellitus with diabetic peripheral angiopathy without gangrene,   
without long-term current use of insulin (CMS/HCC)- Primary  
   
                                                      
  
  
Hx of deep venous thrombosis  
   
                                                      
  
  
DIMITRI on CPAP  
   
                                                      
  
  
Other cardiomyopathy (CMS/HCC)  
   
                                                      
  
  
Leg swelling  
  
  
Swelling of limb  
   
                                                      
  
  
CKD (chronic kidney disease) stage 2, GFR 60-89 ml/min  
  
  
Chronic kidney disease, Stage II (mild)  
   
                                                      
  
  
BMI 50.0-59.9, adult (CMS/HCC)  
   
                                                      
  
  
Left ventricular hypertrophy  
  
  
Cardiomegaly  
   
                                                      
  
  
Class 3 obesity  
   
                                                      
  
  
Stasis dermatitis of both legs  
   
                                                      
  
  
CPAP (continuous positive airway pressure) dependence  
  
  
Dependence on other enabling machine  
   
                                                      
  
  
Circulating anticoagulant disorder (CMS/HCC)  
  
  
Other and unspecified coagulation defects  
   
                                                      
  
  
Peripheral vascular disease (CMS/HCC)  
  
  
Unspecified peripheral vascular disease  
   
                                                      
  
  
Peripheral venous insufficiency  
  
  
Unspecified venous (peripheral) insufficiency  
   
                                                      
  
  
Peripheral vascular disease (CMS/HCC)- Primary  
  
  
Unspecified peripheral vascular disease  
   
                                                      
  
  
Type 2 diabetes mellitus with diabetic peripheral angiopathy without gangrene,   
without long-term current use of insulin (CMS/HCC)  
   
                                                      
  
  
Positive colorectal cancer screening using Cologuard test  
   
                                                      
  
  
Venous stasis ulcer of left lower extremity (CMS/HCC)  
   
                                                      
  
  
Peripheral venous insufficiency  
  
  
Unspecified venous (peripheral) insufficiency  
   
                                                      
  
  
Cutaneous candidiasis  
   
                                                      
  
  
Other cardiomyopathy (CMS/HCC)  
   
                                                      
  
  
DIMITRI on CPAP  
   
                                                      
  
  
CKD (chronic kidney disease) stage 2, GFR 60-89 ml/min  
  
  
Chronic kidney disease, Stage II (mild)  
   
                                                      
  
  
Class 3 obesity  
   
                                                      
  
  
CPAP (continuous positive airway pressure) dependence  
  
  
Dependence on other enabling machine  
   
                                                      
  
  
Positive colorectal cancer screening using Cologuard test- Primary  
  
documented in this encounter  
[facility]   
  
                                                    Diagnosis  
   
                                                      
  
  
DIMITRI on CPAP- Primary  
   
                                                      
  
  
Type 2 diabetes mellitus with diabetic peripheral angiopathy without gangrene,   
without long-term current use of insulin (CMS/HCC)  
   
                                                      
  
  
Peripheral vascular disease (CMS/HCC)  
  
  
Unspecified peripheral vascular disease  
   
                                                      
  
  
CKD (chronic kidney disease) stage 2, GFR 60-89 ml/min  
  
  
Chronic kidney disease, Stage II (mild)  
   
                                                      
  
  
Other cardiomyopathy (CMS/HCC)  
   
                                                      
  
  
Class 3 obesity  
   
                                                      
  
  
Stasis dermatitis of both legs  
   
                                                      
  
  
Medicare annual wellness visit, subsequent- Primary  
   
                                                      
  
  
ACP (advance care planning)  
  
  
Other specified counseling  
   
                                                      
  
  
Colon cancer screening  
  
  
Special screening for malignant neoplasms, colon  
   
                                                      
  
  
Class 3 obesity  
   
                                                      
  
  
BMI 50.0-59.9, adult (CMS/HCC)  
   
                                                      
  
  
Type 2 diabetes mellitus with diabetic peripheral angiopathy without gangrene,   
without long-term current use of insulin (CMS/HCC)  
   
                                                      
  
  
Peripheral venous insufficiency  
  
  
Unspecified venous (peripheral) insufficiency  
   
                                                      
  
  
DIMITRI on CPAP  
   
                                                      
  
  
CKD (chronic kidney disease) stage 2, GFR 60-89 ml/min  
  
  
Chronic kidney disease, Stage II (mild)  
   
                                                      
  
  
Other cardiomyopathy (CMS/HCC)  
   
                                                      
  
  
CPAP (continuous positive airway pressure) dependence  
  
  
Dependence on other enabling machine  
   
                                                      
  
  
Left ventricular hypertrophy  
  
  
Cardiomegaly  
   
                                                      
  
  
Stasis dermatitis of both legs  
   
                                                      
  
  
Circulating anticoagulant disorder (CMS/HCC)  
  
  
Other and unspecified coagulation defects  
   
                                                      
  
  
Type 2 diabetes mellitus with diabetic peripheral angiopathy without gangrene,   
without long-term current use of insulin (CMS/HCC)- Primary  
   
                                                      
  
  
Hx of deep venous thrombosis  
   
                                                      
  
  
DIMITRI on CPAP  
   
                                                      
  
  
Other cardiomyopathy (CMS/HCC)  
   
                                                      
  
  
Leg swelling  
  
  
Swelling of limb  
   
                                                      
  
  
CKD (chronic kidney disease) stage 2, GFR 60-89 ml/min  
  
  
Chronic kidney disease, Stage II (mild)  
   
                                                      
  
  
BMI 50.0-59.9, adult (CMS/HCC)  
   
                                                      
  
  
Left ventricular hypertrophy  
  
  
Cardiomegaly  
   
                                                      
  
  
Class 3 obesity  
   
                                                      
  
  
Stasis dermatitis of both legs  
   
                                                      
  
  
CPAP (continuous positive airway pressure) dependence  
  
  
Dependence on other enabling machine  
   
                                                      
  
  
Circulating anticoagulant disorder (CMS/HCC)  
  
  
Other and unspecified coagulation defects  
   
                                                      
  
  
Peripheral vascular disease (CMS/HCC)  
  
  
Unspecified peripheral vascular disease  
   
                                                      
  
  
Peripheral venous insufficiency  
  
  
Unspecified venous (peripheral) insufficiency  
   
                                                      
  
  
Peripheral vascular disease (CMS/HCC)- Primary  
  
  
Unspecified peripheral vascular disease  
   
                                                      
  
  
Type 2 diabetes mellitus with diabetic peripheral angiopathy without gangrene,   
without long-term current use of insulin (CMS/HCC)  
   
                                                      
  
  
Positive colorectal cancer screening using Cologuard test  
   
                                                      
  
  
Venous stasis ulcer of left lower extremity (CMS/HCC)  
   
                                                      
  
  
Peripheral venous insufficiency  
  
  
Unspecified venous (peripheral) insufficiency  
   
                                                      
  
  
Cutaneous candidiasis  
   
                                                      
  
  
Other cardiomyopathy (CMS/HCC)  
   
                                                      
  
  
DIMITRI on CPAP  
   
                                                      
  
  
CKD (chronic kidney disease) stage 2, GFR 60-89 ml/min  
  
  
Chronic kidney disease, Stage II (mild)  
   
                                                      
  
  
Class 3 obesity  
   
                                                      
  
  
CPAP (continuous positive airway pressure) dependence  
  
  
Dependence on other enabling machine  
   
                                                      
  
  
Positive colorectal cancer screening using Cologuard test- Primary  
  
documented in this encounter  
[facility]   
  
                                                    Diagnosis  
   
                                                      
  
  
Peripheral vascular disease (CMS/HCC)- Primary  
  
  
Unspecified peripheral vascular disease  
   
                                                      
  
  
Type 2 diabetes mellitus with diabetic peripheral angiopathy without gangrene,   
without long-term current use of insulin (CMS/HCC)  
   
                                                      
  
  
Positive colorectal cancer screening using Cologuard test  
   
                                                      
  
  
Venous stasis ulcer of left lower extremity (CMS/HCC)  
   
                                                      
  
  
Peripheral venous insufficiency  
  
  
Unspecified venous (peripheral) insufficiency  
   
                                                      
  
  
Cutaneous candidiasis  
   
                                                      
  
  
Other cardiomyopathy (CMS/HCC)  
   
                                                      
  
  
DIMITRI on CPAP  
   
                                                      
  
  
CKD (chronic kidney disease) stage 2, GFR 60-89 ml/min  
  
  
Chronic kidney disease, Stage II (mild)  
   
                                                      
  
  
Class 3 obesity (CMS/HCC)  
   
                                                      
  
  
CPAP (continuous positive airway pressure) dependence  
  
  
Dependence on other enabling machine  
  
documented in this encounter  
[facility]   
  
                                                    Diagnosis  
   
                                                      
  
  
Onychomycosis- Primary  
  
  
Dermatophytosis of nail  
   
                                                      
  
  
Pain in both feet  
   
                                                      
  
  
Corns and callosities  
   
                                                      
  
  
Other specified peripheral vascular diseases (CMS/HCC)  
  
documented in this encounter  
[facility]   
  
                                                    Diagnosis  
   
                                                      
  
  
DIMITRI on CPAP- Primary  
   
                                                      
  
  
Type 2 diabetes mellitus with diabetic peripheral angiopathy without gangrene,   
without long-term current use of insulin (CMS/HCC)  
   
                                                      
  
  
Peripheral vascular disease (CMS/HCC)  
  
  
Unspecified peripheral vascular disease  
   
                                                      
  
  
CKD (chronic kidney disease) stage 2, GFR 60-89 ml/min  
  
  
Chronic kidney disease, Stage II (mild)  
   
                                                      
  
  
Other cardiomyopathy (CMS/HCC)  
   
                                                      
  
  
Class 3 obesity  
   
                                                      
  
  
Stasis dermatitis of both legs  
   
                                                      
  
  
Medicare annual wellness visit, subsequent- Primary  
   
                                                      
  
  
ACP (advance care planning)  
  
  
Other specified counseling  
   
                                                      
  
  
Colon cancer screening  
  
  
Special screening for malignant neoplasms, colon  
   
                                                      
  
  
Class 3 obesity  
   
                                                      
  
  
BMI 50.0-59.9, adult (CMS/HCC)  
   
                                                      
  
  
Type 2 diabetes mellitus with diabetic peripheral angiopathy without gangrene,   
without long-term current use of insulin (CMS/HCC)  
   
                                                      
  
  
Peripheral venous insufficiency  
  
  
Unspecified venous (peripheral) insufficiency  
   
                                                      
  
  
DIMITRI on CPAP  
   
                                                      
  
  
CKD (chronic kidney disease) stage 2, GFR 60-89 ml/min  
  
  
Chronic kidney disease, Stage II (mild)  
   
                                                      
  
  
Other cardiomyopathy (CMS/HCC)  
   
                                                      
  
  
CPAP (continuous positive airway pressure) dependence  
  
  
Dependence on other enabling machine  
   
                                                      
  
  
Left ventricular hypertrophy  
  
  
Cardiomegaly  
   
                                                      
  
  
Stasis dermatitis of both legs  
   
                                                      
  
  
Circulating anticoagulant disorder (CMS/HCC)  
  
  
Other and unspecified coagulation defects  
   
                                                      
  
  
Type 2 diabetes mellitus with diabetic peripheral angiopathy without gangrene,   
without long-term current use of insulin (CMS/HCC)- Primary  
   
                                                      
  
  
Hx of deep venous thrombosis  
   
                                                      
  
  
DIMITRI on CPAP  
   
                                                      
  
  
Other cardiomyopathy (CMS/HCC)  
   
                                                      
  
  
Leg swelling  
  
  
Swelling of limb  
   
                                                      
  
  
CKD (chronic kidney disease) stage 2, GFR 60-89 ml/min  
  
  
Chronic kidney disease, Stage II (mild)  
   
                                                      
  
  
BMI 50.0-59.9, adult (CMS/HCC)  
   
                                                      
  
  
Left ventricular hypertrophy  
  
  
Cardiomegaly  
   
                                                      
  
  
Class 3 obesity  
   
                                                      
  
  
Stasis dermatitis of both legs  
   
                                                      
  
  
CPAP (continuous positive airway pressure) dependence  
  
  
Dependence on other enabling machine  
   
                                                      
  
  
Circulating anticoagulant disorder (CMS/HCC)  
  
  
Other and unspecified coagulation defects  
   
                                                      
  
  
Peripheral vascular disease (CMS/HCC)  
  
  
Unspecified peripheral vascular disease  
   
                                                      
  
  
Peripheral venous insufficiency  
  
  
Unspecified venous (peripheral) insufficiency  
   
                                                      
  
  
Peripheral vascular disease (CMS/HCC)- Primary  
  
  
Unspecified peripheral vascular disease  
   
                                                      
  
  
Type 2 diabetes mellitus with diabetic peripheral angiopathy without gangrene,   
without long-term current use of insulin (CMS/HCC)  
   
                                                      
  
  
Positive colorectal cancer screening using Cologuard test  
   
                                                      
  
  
Venous stasis ulcer of left lower extremity (CMS/HCC)  
   
                                                      
  
  
Peripheral venous insufficiency  
  
  
Unspecified venous (peripheral) insufficiency  
   
                                                      
  
  
Cutaneous candidiasis  
   
                                                      
  
  
Other cardiomyopathy (CMS/HCC)  
   
                                                      
  
  
DIMITRI on CPAP  
   
                                                      
  
  
CKD (chronic kidney disease) stage 2, GFR 60-89 ml/min  
  
  
Chronic kidney disease, Stage II (mild)  
   
                                                      
  
  
Class 3 obesity  
   
                                                      
  
  
CPAP (continuous positive airway pressure) dependence  
  
  
Dependence on other enabling machine  
   
                                                      
  
  
Onychomycosis- Primary  
  
  
Dermatophytosis of nail  
   
                                                      
  
  
Pain in both feet  
   
                                                      
  
  
Corns and callosities  
   
                                                      
  
  
Other specified peripheral vascular diseases (CMS/HCC)  
  
documented in this encounter  
[facility]   
  
                                Type            Description     Date  
   
                                Medical History Osteoarthritis Bilateral Knees   
   
                                Medical History Hypertension      
   
                                Medical History Chronic Back Pain   
   
                                Medical History Cardiomyopathy    
   
                                Medical History DIMITRI               
   
                                Medical History vein closure      
   
                                Medical History Venous Ulcer with Pain   
   
                                Surgical History gall bladder      
   
                                Surgical History hand surgery      
   
                                Surgical History heart catheterization   
   
                                Hospitalization History infection         
  
  
[facility]  
Other Phone: [phone] Discharge instructions  
Additional Instructions  
DISCHARGE INSTRUCTIONS FOR COLONOSCOPY  
  
-You may experience a  gassy  or full feeling after your procedure and pass a 
lot of gas. This is  
because the doctor inflated your bowel with air so that he could visualize and 
examine it.  
-Report any persistent pain or vomiting to the doctor.  
-Take it easy and rest today. You do not need to stay in bed, but avoid 
strenuous activities such  
as jogging.  
-You can resume normal activities tomorrow.  
-Watch for rectal bleeding if you had (2) polyps removed. You may have some 
oozing, but notify the  
doctor if you pass clots. The findings will be discussed at your follow up 
visit.  
-Avoid caffeine, alcohol, and roughage for 2 days after the polyp was removed.  
-Please keep your appointments for follow-up examinations as polyps can recur.  
-You will need to call Dr. Ellis's office and schedule another colonoscopy 
in 5 year, because of  
the polyps.  
  
FOR THE NEXT 24 HOURS  
-Do NOT drive a car.  
-Do NOT operate machinery such as power tools, lawn mowers, snow blowers, sewing
machines, etc.  
-AVOID alcoholic beverages and medications for sleep.  
-Do NOT stay alone. Do NOT leave your child unattended.  
-Do NOT make important personal or business decisions or sign any legal 
documents.  
-Eat solid foods and drink liquids in smaller amounts than usual until normal 
appetite returns. If  
you should experience an upset stomach, liquids high in sugar content (soda, 
Bony-aid, non-acid  
juices) are recommended.[facility]  
Work Phone: [phone] note  
  
  
  
  
                                        Author              Elaine Espino  
[facility]  
2023 1:19pm  
   
                                        Note Date/Time      2023 1:19  
pm  
   
                                                    [facility]  
ENTER  
[address] [place] [address]  
  
Wound Center Provider Note  
Signed  
  
Patient: Jocelin Pacheco MR#:   
[id number]  
: 1955 Acct:[id number]  
  
Age/Sex: 68 / M  
  
Copies to: DO Elaine Arboleda APRN~  
  
  
HPI  
Date of Visit  
Date of Visit:  
Date of Service: 2023  
Time of Service: 13:17  
  
Narrative  
HPI:  
23 Jocelin is a 68-year-old male presenting to [facility]   
wound care program for an initial visit to the office for a right lower 
extremity   
venous stasis ulcer/lymphedema ulcer.? The patient has been a patient of the 
office   
for quite some time now and has seen other providers.  
He had seen vascular in [place] was told that there is nothing else that can be 
done   
for him.  
He has an extensive history of infections so I imagine this will continue to be 
an   
issue for him.  
He appears to need better edema control.  
He was told by ortho that he needs to lose 50 pounds in order to have surgery- 
this   
was 2 years ago and has not been accomplished.  
A past venous duplex indicates deep vein issues which are not surgically 
correctable-   
vascular did confirm that he does not need to see them because there is nothing 
they   
have to offer him.  
He is still retired so hopefully there is more opportunity for elevation and   
compression of the legs to support healing of the ulcer.  
Nutrition and probiotics were discussed and suggestions were given.  
Doxycycline was escribed today for what appears to be cellulitis of the RLE.  
23 looks better, is still taking the antibiotics, change to collagen silver   
topically and clobetasol to periwound skin that he says is itchy, will return 
for   
dressing changes, will bring his compression socks from home to the next visit 
since   
most of our wraps tend to roll down on his legs, says that he will use the   
compression pumps  
23 healed, steroid and compression sock applied, aware to call sooner than 
later   
if something reopens  
  
Subjective  
Pain  
Right Lower Leg:  
Pain Intensity: 0  
Wound/Ulcer History  
When did wound start?: 2023  
Mode of Arrival/ : Personal vehicle  
Assistive Device Used Today: Cane  
Lives with:: Alone  
Appetite Description: Within Normal Limits  
Who helps w/ dressing change?: Wound Care Dept  
Why Do You Need Help?: Can't Reach Ulcer  
Smoking Status: Never smoker  
  
[facility]  
Medical History (Updated 23 @ 13:19 by SOWMYA Correa)  
  
Arthritis  
Bilateral knees,  right is bone on bone  per  Dr. Richard   
Back pain  
Cardiomyopathy  
 lower part of heart is weak   
Carpal tunnel syndrome  
right arm surgery  
DVT (deep venous thrombosis)  
 blood clot from right foot to right thigh  on  blood thinner   
Hypertension  
Itching  
Itching with irritation  
DIMITRI (obstructive sleep apnea)  
PVD (peripheral vascular disease)  
 poor veins  Dr. Cameron did vein closure 2019, needs 5 more viens worked 
on.  
Wound of right lower extremity  
  
Surgical History (Reviewed 23 @ 11:09 by Maren Martinez, RN)  
  
Hx of cholecystectomy  
  
Family History (Reviewed 23 @ 11:09 by Maren Martinez, RN)  
Father Diabetes mellitus, type 2  
Sister Diabetes mellitus, type 2  
  
Social History  
Smoking Status: Never smoker  
Substance Use Type: None  
  
Grafts  
History of Graft  
History of Graft?: Yes  
  
Exam  
Physical Exam  
Vital Signs:  
  
  
  
  
Temp Pulse Resp BP O2 Del Method  
  
97.7 F 81 20 123/73 Room Air  
  
23 13:12 23 13:12 23 13:12 23 13:12 23 13:12  
  
  
  
Const  
General: cooperative, comfortable and no acute distress  
Nutritional Appearance: obese  
Orientation: alert, awake and oriented x3  
  
Lower/Upper Extremity Exam  
Vascular Exam-Edema  
Right Lower Extremity:  
Edema Type: Lymphedema  
Vascular Exam-Pulses  
Right Brachial:  
Pulse Assessment Method: NIBP  
  
Objective  
Meds/Allergies  
Home Medications  
  
carvedilol 6.25 mg tablet 6.25 mg PO BID 17 [History Confirmed 23]  
furosemide 40 mg tablet (Lasix) 40 mg PO BID 17 [History Confirmed 
23]  
acetaminophen 500 mg tablet (Tylenol Extra Strength) 500 mg PO TID PRN Pain 
20   
[History Confirmed 23]  
nabumetone 750 mg tablet 750 mg PO BID 21 [History Confirmed 23]  
empagliflozin 10 mg tablet (Jardiance) 10 mg PO DAILY 22 [History 
Confirmed   
23]  
rivaroxaban 10 mg tablet (Xarelto) 10 mg PO DAILY 22 [History Confirmed   
23]  
semaglutide 2 mg/dose (8 mg/3 mL) subcutaneous pen injector (Ozempic) 2 mg 
subcut Q7D   
22 [History Confirmed 23]  
spironolactone 25 mg tablet 25 mg PO DAILY 22 [History Confirmed 23]  
doxycycline hyclate 100 mg capsule 100 mg PO BID 14 days #28 caps 23 [Rx   
Confirmed 23]  
sacubitril 24 mg-valsartan 26 mg tablet (Entresto) 1 tab PO BID 23 
[History   
Confirmed 23]  
  
  
Allergies  
  
cefixime Allergy (Verified 23 11:09)  
Unknown Reaction  
diclofenac Allergy (Verified 23 11:09)  
Unknown Reaction  
nystatin Allergy (Verified 23 11:09)  
Unknown Reaction  
sodium benzoate Allergy (Verified 23 11:09)  
Unknown Reaction  
  
  
Wound/Ulcer  
Right Lower Leg:  
Type: Venous Stasis Ulcer  
Thickness: Skin Breakdown  
Length (cm): 0  
Width (cm): 0  
Depth (cm): 0  
CM Sq: 0.000  
Surrounding Tissue Appearance: Hyperpigmented  
Surrounding Tissue Temp: Warm  
Drainage Amount: None  
Drainage Odor: No Odor  
Results  
Height: 5 ft 11 in  
Weight: 160.118 kg  
Body Mass Index: 49.2  
  
Assessment/Plan  
Assessment/Plan  
(1) Venous stasis ulcer:  
Qualifiers:  
Laterality: right Non-pressure ulcer stage: limited to breakdown of skin 
Varicose   
vein presence: with varicose veins Venous stasis ulcer site: other part of lower
leg   
Qualified Code(s): I83.018 - Varicose veins of right lower extremity with ulcer 
other   
part of lower leg; L97.811 - Non-pressure chronic ulcer of other part of right 
lower   
leg limited to breakdown of skin  
Code(s):  
I83.009 - Varicose veins of unspecified lower extremity with ulcer of 
unspecified   
site; L97.909 - Non-pressure chronic ulcer of unspecified part of unspecified 
lower   
leg with unspecified severity  
Status: Resolved  
(2) Edema of right lower leg:  
Code(s):  
R60.0 - Localized edema  
Status: Chronic  
(3) Morbid obesity due to excess calories:  
Code(s):  
E66.01 - Morbid (severe) obesity due to excess calories  
Status: Chronic  
(4) Lymphedema of both lower extremities:  
Code(s):  
I89.0 - Lymphedema, not elsewhere classified  
Status: Suspected  
(5) PVD (peripheral vascular disease):  
Code(s):  
I73.9 - Peripheral vascular disease, unspecified  
Status: Chronic  
(6) Varicose veins of both legs with edema:  
Code(s):  
I83.893 - Varicose veins of bilateral lower extremities with other complications  
Status: Chronic  
(7) Inflammation:  
Status: Chronic  
Time spent with patient  
Time Spent With Patient (min): 10  
  
  
  
  
Dictated By: SOWMYA Correa DD/DT: 23 131  
  
Signed By: <Electronically signed by SOWMYA Espino>  
23 1319  
   
  
[facility]  
Work Phone: [phone] for referral (narrative)* Consultation 
  (Routine) - Authorized  
  
                          Specialty    Diagnoses / Procedures Referred By Contac  
t Referred To Contact  
   
                                        Cardiology            
  
  
Diagnoses  
  
  
Non-ischemic cardiomyopathy   
(Multi)  
  
  
  
Procedures  
  
  
Follow Up In Cardiology                   
  
  
Sharlene An APRN-CNP  
  
  
[address] [facility] [address], [address] [address]  
  
  
Phone: [phone]  
  
  
Fax: [phone]                         
  
  
Compa Whitley DO  
  
  
[address] [facility] [address], [address] [address]  
  
  
Phone: [phone]  
  
  
Fax: [phone]  
  
  
  
                    Referral ID Status    Reason    Start Date Expiration Date V  
isits   
Requested                               Visits   
Authorized  
   
                [id number] Authorized         2024 1       1  
  
  
  
  
Electronically signed by Sharlene DENG-CNP at 2024 11:39 AM [facility]  
Work Phone: [phone]  
  
Summary Purpose  
  
  
                                                      
  
  
  
Family History  
                              Unknown Family Member  
  
                                Name            Dates           Details  
   
                                                    Family history of diabetes m  
ellitus: Sister, Mother,   
Father(V18.0, Z83.3)  
                                                    Status:Active  
   
                                                    COPD, moderate: Mother, Sist  
er  
                                                    Status:Active  
  
                              Unknown Family Member  
  
                                Name            Dates           Details  
   
                                                    Family history of diabetes m  
ellitus: Sister, Mother,   
Father(V18.0, Z83.3)  
                                                    Status:Active  
   
                                                    COPD, moderate: Mother, Sist  
er  
                                                    Status:Active  
  
  
  
                          Relationship Condition    Age at Onset Recorded Date/T  
ceasar  
   
                          father       Type 2 diabetes mellitus Unknown        
   
                          sister       Type 2 diabetes mellitus Unknown        
  
                              Unknown Family Member  
  
                                Name            Dates           Details  
   
                                                    Family history of diabetes m  
ellitus: Sister, Mother,   
Father(V18.0, Z83.3)  
                                                    Status:Active  
   
                                                    COPD, moderate: Mother, Sist  
er  
                                                    Status:Active  
  
                              Unknown Family Member  
  
                                Name            Dates           Details  
   
                                                    Family history of diabetes m  
ellitus: Sister, Mother,   
Father(V18.0, Z83.3)  
                                                    Status:Active  
   
                                                    COPD, moderate: Mother, Sist  
er  
                                                    Status:Active  
  
                              Unknown Family Member  
  
                                Name            Dates           Details  
   
                                                    Family history of diabetes m  
ellitus: Sister, Mother,   
Father(V18.0, Z83.3)  
                                                    Status:Active  
   
                                                    COPD, moderate: Mother, Sist  
er  
                                                    Status:Active  
  
                              Unknown Family Member  
  
                                Name            Dates           Details  
   
                                                    Family history of diabetes m  
ellitus: Sister, Mother,   
Father(V18.0, Z83.3)  
                                                    Status:Active  
   
                                                    COPD, moderate: Mother, Sist  
er  
                                                    Status:Active  
  
  
  
                          Relationship Condition    Age at Onset Recorded Date/T  
ceasar  
   
                          father       Type 2 diabetes mellitus Unknown        
   
                          sister       Type 2 diabetes mellitus Unknown        
   
                          brother      Heart disease Unknown        
   
                          father       Heart disease Unknown        
   
                                       Diabetes mellitus Unknown        
   
                                       Heart failure Unknown        
   
                                            Unknown        
   
                          family member      Unknown        
   
                          Not Specified      Unknown        
   
                                       Heart disease Unknown        
   
                          sister       Heart disease Unknown        
  
  
  
                          Relationship Condition    Age at Onset Recorded Date/T  
ceasar  
   
                          father       Type 2 diabetes mellitus Unknown        
   
                          sister       Type 2 diabetes mellitus Unknown        
   
                          brother      Heart disease Unknown        
   
                          father       Heart disease Unknown        
   
                                       Diabetes mellitus Unknown        
   
                                       Heart failure Unknown        
   
                                            Unknown        
   
                          family member      Unknown        
   
                          mother            Unknown        
   
                                       Heart disease Unknown        
   
                          sister       Heart disease Unknown        
  
  
  
Advance Directives  
  
  
                                Advance Directive Response        Recorded Date/  
Time  
   
                                Advance Directives No               9:56pm  
  
  
  
                                Advance Directive Response        Recorded Date/  
Time  
   
                                Advance Directives No               8:56pm  
  
  
  
Chief Complaint and Reason for Visit  
  
  
                                        Chief Complaint     Obesity  
Open Wound  
   
                                        Reason for Visit    Edema of right lower  
 leg  
Morbid obesity due to excess calories  
PVD (peripheral vascular disease)  
Varicose veins of both legs with edema  
Traumatic open wound of right lower leg  
Venous stasis ulcer  
  
  
  
                                        Chief Complaint     Obesity  
R07.9 I50.9 I42.8  
  
  
  
                                        Chief Complaint     Obesity  
exercise  
  
  
  
                                        Chief Complaint     Open Wound  
   
                                        Reason for Visit    Edema of right lower  
 leg  
Inflammation  
Morbid obesity due to excess calories  
PVD (peripheral vascular disease)  
Varicose veins of both legs with edema  
Cellulitis  
Venous stasis ulcer  
  
  
  
                                        Chief Complaint     Open Wound  
Obesity  
D68.318 N18.2 R73.03 E66.01  
   
                                        Reason for Visit    Edema of right lower  
 leg  
Inflammation  
Morbid obesity due to excess calories  
PVD (peripheral vascular disease)  
Varicose veins of both legs with edema  
Cellulitis  
Venous stasis ulcer  
  
  
  
                                        Chief Complaint     Open Wound  
   
                                        Reason for Visit    Edema of right lower  
 leg  
Inflammation  
Morbid obesity due to excess calories  
PVD (peripheral vascular disease)  
Varicose veins of both legs with edema  
Venous stasis ulcer  
  
  
  
                                        Chief Complaint     Open Wound  
WMN f/u restart  
   
                                        Reason for Visit    Edema of right lower  
 leg  
Inflammation  
Morbid obesity due to excess calories  
PVD (peripheral vascular disease)  
Varicose veins of both legs with edema  
Venous stasis ulcer  
Adult BMI 50.0-59.9 kg/sq m  
Mixed hyperlipidemia  
DIMITRI on CPAP  
Osteoarthritis of knee  
Type 2 diabetes mellitus with unspecified complications  
  
  
  
                                        Chief Complaint     Open Wound  
WMN f/u restart  
Open Wound  
   
                                        Reason for Visit    Edema of right lower  
 leg  
Inflammation  
Morbid obesity due to excess calories  
PVD (peripheral vascular disease)  
Varicose veins of both legs with edema  
Venous stasis ulcer  
Adult BMI 50.0-59.9 kg/sq m  
Mixed hyperlipidemia  
DIMITRI on CPAP  
Osteoarthritis of knee  
Type 2 diabetes mellitus with unspecified complications  
Adult BMI 50.0-59.9 kg/sq m  
Hematoma of right lower leg  
Type 2 diabetes mellitus with unspecified complications  
  
  
  
                                        Chief Complaint     Open Wound  
WMN f/u restart  
Bilateral lower extremity edema  
Open Wound  
   
                                        Reason for Visit    Edema of right lower  
 leg  
Inflammation  
Morbid obesity due to excess calories  
PVD (peripheral vascular disease)  
Varicose veins of both legs with edema  
Venous stasis ulcer  
Adult BMI 50.0-59.9 kg/sq m  
Mixed hyperlipidemia  
DIMITRI on CPAP  
Osteoarthritis of knee  
Type 2 diabetes mellitus with unspecified complications  
Adult BMI 50.0-59.9 kg/sq m  
CAD (coronary artery disease)  
Mixed hyperlipidemia  
DIMITRI on CPAP  
Osteoarthritis of knee  
Type 2 diabetes mellitus with unspecified complications  
HTN (hypertension)  
Adult BMI 50.0-59.9 kg/sq m  
Hematoma of right lower leg  
Leg wound, right  
Type 2 diabetes mellitus with unspecified complications  
Cellulitis  
  
  
  
                                        Chief Complaint     WMN f/u restart  
Open Wound  
Bilateral lower extremity edema  
   
                                        Reason for Visit    Adult BMI 50.0-59.9   
kg/sq m  
Mixed hyperlipidemia  
DIMITRI on CPAP  
Osteoarthritis of knee  
Type 2 diabetes mellitus with unspecified complications  
Adult BMI 50.0-59.9 kg/sq m  
CAD (coronary artery disease)  
Mixed hyperlipidemia  
DIMITRI on CPAP  
Osteoarthritis of knee  
Type 2 diabetes mellitus with unspecified complications  
HTN (hypertension)  
Adult BMI 50.0-59.9 kg/sq m  
Hematoma of right lower leg  
Leg wound, right  
Type 2 diabetes mellitus with unspecified complications  
Cellulitis  
  
  
  
                                        Chief Complaint     Bilateral lower extr  
emity edema  
Open Wound  
   
                                        Reason for Visit    Adult BMI 50.0-59.9   
kg/sq m  
Mixed hyperlipidemia  
DIMITRI on CPAP  
Osteoarthritis of knee  
Type 2 diabetes mellitus with unspecified complications  
Adult BMI 50.0-59.9 kg/sq m  
Hematoma of right lower leg  
Leg wound, right  
Type 2 diabetes mellitus with unspecified complications  
Urinary incontinence  
Venous insufficiency of right lower extremity  
Cellulitis  
  
  
  
                                        Chief Complaint     Admit Date  
   
                                        Bilateral lower extremity edema  11:00am  
   
                                        Open Wound          2024   
11:10am  
   
                                        Positive Cologuard  2024   
9:24am  
  
  
  
                                        Reason for Visit    Admit Date  
   
                                        Adult BMI 50.0-59.9 kg/sq m  11:10am  
   
                                        Hematoma of right lower leg  11:10am  
   
                                        Leg wound, right    2024   
11:10am  
   
                                        Type 2 diabetes mellitus with unspecifie  
d complications 2024   
11:10am  
   
                                        Urinary incontinence 2024  
 11:10am  
   
                                        Venous insufficiency of right lower extr  
emity 2024 11:10am  
   
                                        Lymphedema of both lower extremities Nov  
ember 2024 11:10am  
   
                                        Cellulitis          2024   
11:10am  
   
                                        Venous stasis ulcer 2024   
11:10am  
  
  
  
Chief Complaint  
* JOCELIN PACHECO is being seen for a 6 month follow-up of.  
* 62-year-old gentleman who returns for annual visit he is doing well just 
  complains atypical chest discomfort described as tingling as well as sharp and
  somewhat painful sensation both at rest and with activities that are sporadic 
  and episodic only but not reproducible. He has a history of moderate n
  onischemic cardiomyopathy, obstructive sleep apnea, morbid obesity, history of
  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  
* Stress perfusion imaging from 2022 revealed patchy tracer uptake, proceed
  ischemia or infarction, normal left ventricular volume with global hypokinesis
  and ejection fraction of 44% normal transient ischemic index.  
* Heart catheterization from  revealed 30 to 50% ostial circumflex disease, 
  20 to 25% mid LAD disease normal right coronary and at that time ejection 
  fraction of 40 to 45%.  
* Since  with his medical history continue therapies. We have transitioned 
  over to Entresto, Jardiance, carvedilol and spironolactone  
* Unfortunately he has had no significant weight loss despite going to cardiac 
  rehab 3 times a week. He remains 365 pounds, similar to   
* Recommendations, obtain a MUGA scan current therapies, obtain appropriate 
  laboratories including Chem-6 and BNP, we will have him come back in 6 months 
  continues to have any further chest discomfort will prescribe nitrates and/or 
  consider invasive assessment.  
  
  
Reason for Referral  
  
  
                          Specialty    Diagnoses / Procedures Referred By Contac  
t Referred To Contact  
   
                                        Physical Therapy      
  
  
Diagnoses  
  
  
Lumbosacral strain,   
initial encounter  
  
  
Acute pain of left   
shoulder  
  
  
  
Procedures  
  
  
UT OFFICE/OUTPATIENT NEW   
HIGH MDM 60 MINUTES                       
  
  
Ray Vela, NP  
  
  
[address]  
  
  
[place] [address]  
  
  
Phone: [phone]  
  
  
Fax: [phone]                         
  
  
Jessica Lopez, PT  
  
  
[address]  
  
  
[address]  
  
  
[place]   
[address]  
  
  
Phone: [phone]  
  
  
Fax: [phone]  
  
  
  
                      Referral ID Status     Reason     Start Date Expiration   
Date                                    Visits   
Requested                               Visits   
Authorized  
   
                                [id number]          Authorized        
  
  
Consult and   
Treat           2024        10              10  
  
  
  
Additional Source Comments  
  
  
  
                                                    PREGNANCY (unrecognized sect  
ion and content)  
   
                                                    No Pregnancy Status Records   
FoundNo Pregnancy Status Records FoundNo Pregnancy   
Status   
Records FoundNo Pregnancy Status Records FoundNo Pregnancy Status Records 
FoundNo   
Pregnancy Status Records FoundNo Pregnancy Status Records Found  
  
  
  
  
                                                    INFORMATION SOURCE (unrecogn  
ized section and content)  
   
                                          
  
  
  
                                        DATE CREATED        AUTHOR  
   
                                2022                      [facility]  
dical Specialist  
  
  
  
                                DATE CREATED    AUTHOR          AUTHOR'S ORGANIZ  
ATION  
   
                                2023                       [facility]  
[facility]  
  
  
  
                                DATE CREATED    AUTHOR          AUTHOR'S ORGANIZ  
ATION  
   
                                2023                       [facility]  
  
  
  
                                DATE CREATED    AUTHOR          AUTHOR'S ORGANIZ  
ATION  
   
                                08/10/2023                      [facility]  
  
  
  
                                DATE CREATED    AUTHOR          AUTHOR'S ORGANIZ  
ATION  
   
                                05/10/2024                      [facility]  
  
  
  
                                DATE CREATED    AUTHOR          AUTHOR'S ORGANIZ  
ATION  
   
                                2024                      [facility]  
dical Specialists [facility]  
  
  
  
                                DATE CREATED    AUTHOR          AUTHOR'S ORGANIZ  
ATION  
   
                                2024                      [facility]  
[facility]  
  
  
  
  
  
                                                    REASON FOR VISIT (unrecogniz  
ed section and content)  
   
                                          
  
  
  
                                        Reason              Comments  
   
                                        Back Pain           Pt presents with low  
er back pain with onset of 2 weeks with pain   
increasing. Took tylenol with no relief. Has used ice on it a few times.   
Left shoulder is hurting. Denies any trouble urinating.  
  
  
  
                                        Reason              Comments  
   
                                        Follow-up           10m per wss  
  
  
  
                                        Reason              Comments  
   
                                        Positive cologuard    
  
  
  
                          Specialty    Diagnoses / Procedures Referred By Contac  
t Referred To Contact  
   
                                        General Surgery       
  
  
Diagnoses  
  
  
Positive colorectal   
cancer screening using   
Cologuard test  
  
  
  
Procedures  
  
  
UT OFFICE/OUTPATIENT NEW   
HIGH MDM 60 MINUTES                       
  
  
Vera Lea, DO  
  
  
[address]  
  
  
[place] [address]  
  
  
Phone:   
tel:[phone]  
  
  
fax:[phone]                       
  
  
Marciano Pop, DO  
  
  
[address]  
  
  
[place] [address]  
  
  
Phone:   
tel:[phone]  
  
  
fax:[phone]  
  
  
  
                    Referral ID Status    Reason    Start Date Expiration Date V  
isits   
Requested                               Visits   
Authorized  
   
                                [id number]          Closed            
  
  
Consult and   
Treat           2024       1               1  
  
  
  
                                        Reason              Comments  
   
                                        1st pow colonoscopy   
  
  
  
                                        Reason              Comments  
   
                                        Routine 4 Mo Follow Up   
  
  
  
  
  
                                                    Care Teams (unrecognized sec  
tion and content)  
   
                                          
  
  
  
                                                    Team Status: Active   
   
                          Member       Role         Status       Keyanna Lea DO Primary Care Provider Active   
        
  
  
  
                                                    Team Status: Inactive   
   
                          Member       Role         Status       Keyanna Lea DO Primary Care Provider Active   
      Start: 2024  
End: 2024  
   
                          SOWMYA Correa Attending Provider Active       St  
art: 2024  
End: 2024  
  
  
  
                                                    Team Status: Active   
   
                          Member       Role         Status       Keyanna Lea DO Primary Care Provider Active   
        
   
                          ESVIN Haro Attending Provider Active         
  
  
  
                                                    Team Status: Inactive   
   
                          Member       Role         Status       Keyanna Lea DO Primary Care Provider Active   
        
   
                          SOWMYA Correa Attending Provider Active         
  
  
  
                                                    Team Status: Inactive   
   
                          Member       Role         Status       Keyanna Lea ,  Primary Care Provider, Attend  
ing Provider Active         
  
  
  
                                                    Team Status: Inactive   
   
                          Member       Role         Status       Keyanna Lea DO Primary Care Provider Active   
        
   
                          Jacoby Braden MD Attending Provider Active         
  
  
  
                                                    Team Status: Inactive   
   
                          Member       Role         Status       Keyanna Lea DO Primary Care Provider Active   
        
   
                          SOWMYA Gutierrez Attending Provider Active       
    
  
  
  
                                                    Team Status: Inactive   
   
                          Member       Role         Status       Keyanna Lea DO Primary Care Provider Active   
        
   
                          JASON Whitley DO Attending Provider Active         
  
  
  
                      Team Member Relationship Specialty  Start Date End Date  
   
                                                      
  
  
Vera Lea DO  
  
  
NPI: [id number]  
  
  
[address]  
  
  
[place] [address]  
  
  
[phone] (Work)  
  
  
[phone] (Fax) PCP - ACO Reach                 23           
   
                                                      
  
  
Vera Lea DO  
  
  
NPI: [id number]  
  
  
[address]  
  
  
[place] [address]  
  
  
[phone] (Work)  
  
  
[phone] (Fax) PCP - General   Internal Medicine 24            
   
                                                      
  
  
Danya An DPM  
  
  
NPI: [id number]  
  
  
[address]  
  
  
[place] [address]  
  
  
[phone] (Work)  
  
  
[phone] (Fax) Referring Physician Podiatry        23          
   
                                                      
  
  
Elroy Agudelo MD  
  
  
NPI: [id number]  
  
  
[address]  
  
  
[place] [address]  
  
  
[phone] (Work)  
  
  
[phone] (Fax) Referring Physician Cardiology      23          
   
                                                      
  
  
Steff Espino    Nurse Practitioner Wound Care      23          
  
  
  
                      Team Member Relationship Specialty  Start Date End Date  
   
                                                      
  
  
Vera Lea DO  
  
  
NPI: [id number]  
  
  
[address]  
  
  
[place] [address]  
  
  
[phone] (Work)  
  
  
[phone] (Fax) PCP - ACO Reach                 23           
   
                                                      
  
  
Vera Lea DO  
  
  
NPI: [id number]  
  
  
[address]  
  
  
[place] [address]  
  
  
[phone] (Work)  
  
  
[phone] (Fax) PCP - General   Internal Medicine 24            
   
                                                      
  
  
Danya An DPM  
  
  
NPI: [id number]  
  
  
[address]  
  
  
[place] [address]  
  
  
[phone] (Work)  
  
  
[phone] (Fax) Referring Physician Podiatry        23          
   
                                                      
  
  
Elroy Agudelo MD  
  
  
NPI: [id number]  
  
  
[address]  
  
  
[place] [address]  
  
  
[phone] (Work)  
  
  
[phone] (Fax) Referring Physician Cardiology      23          
   
                                                      
  
  
Steff Espino    Nurse Practitioner Wound Care      23          
  
  
  
                      Team Member Relationship Specialty  Start Date End Date  
   
                                                      
  
  
Vera Lea DO  
  
  
NPI: [id number]  
  
  
[address]  
  
  
[place] [address]  
  
  
[phone] (Work)  
  
  
[phone] (Fax) PCP - ACO Reach                 23           
   
                                                      
  
  
Vera Lea DO  
  
  
NPI: [id number]  
  
  
[address]  
  
  
[place] [address]  
  
  
[phone] (Work)  
  
  
[phone] (Fax) PCP - General   Internal Medicine 24            
   
                                                      
  
  
Danya An DPM  
  
  
NPI: [id number]  
  
  
[address]  
  
  
[place] [address]  
  
  
[phone] (Work)  
  
  
[phone] (Fax) Referring Physician Podiatry        23          
   
                                                      
  
  
Elroy Agudelo MD  
  
  
NPI: [id number]  
  
  
[address] [facility] [address]  
  
  
[place] [address]  
  
  
[phone] (Work)  
  
  
[phone] (Fax) Referring Physician Cardiology      23          
   
                                                      
  
  
Steff Espino    Nurse Practitioner Wound Care      23          
  
  
  
                                                    Team Status: Inactive   
   
                          Member       Role         Status       Dates  
   
                          Vera Lea DO Primary Care Provider Active   
      Start: 2024  
End: 2024  
   
                          Jacoby Braden MD Attending Provider Active         
Start: 2024  
End: 2024  
  
  
  
                      Team Member Relationship Specialty  Start Date End Date  
   
                                                      
  
  
Vera Lea DO  
  
  
NPI: [id number]  
  
  
[address]  
  
  
[place] [address]  
  
  
[phone] (Work)  
  
  
[phone] (Fax) PCP - General                   00            
  
  
  
                                                    Team Status: Active   
   
                          Member       Role         Status       Dates  
   
                          Vera Lea DO Primary Care Provider Active   
      Start: 2024  
  
   
                          SOWMYA Correa Attending Provider Active       St  
art: 2024  
  
  
  
  
                                                    Team Status: Inactive   
   
                          Member       Role         Status       Keyanna Lea DO Primary Care Provider Active   
      Start: 2024  
End: 2024  
   
                          Jacoby Braden MD Attending Provider Active         
Start: 2024  
End: 2024  
  
  
  
                                                    Team Status: Active   
   
                          Member       Role         Status       Dates  
   
                          Vera Lea DO Primary Care Provider Active   
      Start: 2024  
  
   
                          SOWMYA Correa Attending Provider Active       St  
art: 2024  
  
  
  
  
                                                    Team Status: Active   
   
                          Member       Role         Status       Dates  
   
                          Vera Lea DO Primary Care Provider Active   
      Start: 2024  
  
   
                          SOWMYA Correa Attending Provider Active       St  
art: 2024  
  
  
  
  
                                                    Team Status: Inactive   
   
                          Member       Role         Status       Dates  
   
                          Vera Lea DO Primary Care Provider Active   
      Start: 2024  
End: 2024  
   
                          ROM Peng Attending Provider Active        
 Start: 2024  
End: 2024  
  
  
  
                                                    Team Status: Active   
   
                          Member       Role         Status       Dates  
   
                          Vera Lea DO Primary Care Provider Active   
      Start: 2024  
  
   
                          SOWMYA Correa Attending Provider Active       St  
art: 2024  
  
  
  
  
                                                    Team Status: Active   
   
                          Member       Role         Status       Dates  
   
                          Vera Lea DO Primary Care Provider Active   
      Start: 2024  
  
   
                          SOWMYA Correa Attending Provider Active       St  
art: 2024  
  
  
  
  
                                                    Team Status: Inactive   
   
                          Member       Role         Status       Dates  
   
                          Vera Lea DO Primary Care Provider Active   
      Start: 2024  
End: 2024  
   
                          Jacoby Braden MD Attending Provider Active         
Start: 2024  
End: 2024  
  
  
  
                                                    Team Status: Inactive   
   
                          Member       Role         Status       Dates  
   
                          Vera Lea DO Primary Care Provider Active   
      Start: 2024  
End: 2024  
   
                          SOWMYA Correa Attending Provider Active       St  
art: 2024  
End: 2024  
  
  
  
                                                    Team Status: Active   
   
                          Member       Role         Status       Dates  
   
                          Vera Lea DO Primary Care Provider Active   
      Start: 2024  
  
   
                          SOWMYA Correa Attending Provider Active       St  
art: 2024  
  
  
  
  
                      Team Member Relationship Specialty  Start Date End Date  
   
                                                      
  
  
Vera Lea DO  
  
  
NPI: [id number]  
  
  
[address]  
  
  
[place] [address]  
  
  
[phone] (Work)  
  
  
[phone] (Fax) PCP - ACO Reach                 23           
   
                                                      
  
  
Vera Lea DO  
  
  
NPI: [id number]  
  
  
[address]  
  
  
[place] [address]  
  
  
[phone] (Work)  
  
  
[phone] (Fax) PCP - General   Internal Medicine 24            
   
                                                      
  
  
Danya An DPM  
  
  
NPI: [id number]  
  
  
[address]  
  
  
[place] [address]  
  
  
[phone] (Work)  
  
  
[phone] (Fax) Referring Physician Podiatry        23          
   
                                                      
  
  
Elroy Agudelo MD  
  
  
NPI: [id number]  
  
  
[address] [facility] [address]  
  
  
[place] [address]  
  
  
[phone] (Work)  
  
  
[phone] (Fax) Referring Physician Cardiology      23          
   
                                                      
  
  
Steff Espino    Nurse Practitioner Wound Care      23          
  
  
  
                                                    Team Status: Active   
   
                          Member       Role         Status       Dates  
   
                          Vera Lea DO Primary Care Provider Active   
      Start: 2024  
  
   
                          SOWMYA Correa              Active       Start: N  
ov2024  
  
   
                          SOWMYA Gutierrez Attending Provider Active       
  Start: 2024  
  
  
  
  
                                                    Team Status: Inactive   
   
                          Member       Role         Status       Dates  
   
                          Vera Lea DO Primary Care Provider Active   
      Start: 2024  
End: 2024  
   
                          Shannen Ellis DO Attending Provider Active        
 Start: 2024  
End: 2024  
  
  
  
                      Team Member Relationship Specialty  Start Date End Date  
   
                                                      
  
  
Vera Lea DO  
  
  
NPI: [id number]  
  
  
[address]  
  
  
[place] [address]  
  
  
[phone] (Work)  
  
  
[phone] (Fax) PCP - ACO Reach                 23           
   
                                                      
  
  
Vera Lea DO  
  
  
NPI: [id number]  
  
  
[address]  
  
  
[place] [address]  
  
  
[phone] (Work)  
  
  
[phone] (Fax) PCP - General   Internal Medicine 24            
   
                                                      
  
  
Danya An DPM  
  
  
NPI: [id number]  
  
  
[address]  
  
  
[place] [address]  
  
  
[phone] (Work)  
  
  
[phone] (Fax) Referring Physician Podiatry        23          
   
                                                      
  
  
Elroy Agudelo MD  
  
  
NPI: [id number]  
  
  
[address] [facility] [address]  
  
  
[place] [address]  
  
  
[phone] (Work)  
  
  
[phone] (Fax) Referring Physician Cardiology      23          
   
                                                      
  
  
Steff Espino    Nurse Practitioner Wound Care      23          
  
  
  
                      Team Member Relationship Specialty  Start Date End Date  
   
                                                      
  
  
Vera Lea DO  
  
  
NPI: [id number]  
  
  
[address]  
  
  
[place] [address]  
  
  
[phone] (Work)  
  
  
[phone] (Fax) PCP - ACO Reach                 23           
   
                                                      
  
  
Vera Lea DO  
  
  
NPI: [id number]  
  
  
[address]  
  
  
[place] [address]  
  
  
[phone] (Work)  
  
  
[phone] (Fax) PCP - General   Internal Medicine 24            
   
                                                      
  
  
Danya An DPM  
  
  
NPI: [id number]  
  
  
[address]  
  
  
[place] [address]  
  
  
[phone] (Work)  
  
  
[phone] (Fax) Referring Physician Podiatry        23          
   
                                                      
  
  
Elroy Agudelo MD  
  
  
NPI: [id number]  
  
  
[address] [facility] [address]  
  
  
[place] [address]  
  
  
[phone] (Work)  
  
  
[phone] (Fax) Referring Physician Cardiology      23          
   
                                                      
  
  
Steff Espino    Nurse Practitioner Wound Care      23          
  
  
  
                      Team Member Relationship Specialty  Start Date End Date  
   
                                                      
  
  
Vera Lea DO  
  
  
NPI: [id number]  
  
  
[address]  
  
  
[place] [address]  
  
  
[phone] (Work)  
  
  
[phone] (Fax) PCP - ACO Reach                 23           
   
                                                      
  
  
Vera Lea DO  
  
  
NPI: [id number]  
  
  
[address]  
  
  
[place] [address]  
  
  
[phone] (Work)  
  
  
[phone] (Fax) PCP - General   Internal Medicine 24            
   
                                                      
  
  
Danya An DPM  
  
  
NPI: [id number]  
  
  
[address]  
  
  
[place] [address]  
  
  
[phone] (Work)  
  
  
[phone] (Fax) Referring Physician Podiatry        23          
   
                                                      
  
  
Elroy Agudelo MD  
  
  
NPI: [id number]  
  
  
[address] [address]  
  
  
[phone] (Work)  
  
  
[phone] (Fax) Referring Physician Cardiology      23          
   
                                                      
  
  
Steff Espino    Nurse Practitioner Wound Care      23          
  
  
  
                      Team Member Relationship Specialty  Start Date End Date  
   
                                                      
  
  
Vera Lea DO  
  
  
NPI: [id number]  
  
  
[address]  
  
  
[place] [address]  
  
  
[phone] (Work)  
  
  
[phone] (Fax) PCP - ACO Reach                 23           
   
                                                      
  
  
Vera Lea DO  
  
  
NPI: [id number]  
  
  
[address]  
  
  
[place] [address]  
  
  
[phone] (Work)  
  
  
[phone] (Fax) PCP - General   Internal Medicine 24            
   
                                                      
  
  
Danya An DPM  
  
  
NPI: [id number]  
  
  
[address]  
  
  
[place] [address]  
  
  
[phone] (Work)  
  
  
[phone] (Fax) Referring Physician Podiatry        23          
   
                                                      
  
  
Elory Agudelo MD  
  
  
NPI: [id number]  
  
  
[address] [address]  
  
  
[phone] (Work)  
  
  
[phone] (Fax) Referring Physician Cardiology      23          
   
                                                      
  
  
Steff Espino    Nurse Practitioner Wound Care      23          
  
  
  
                      Team Member Relationship Specialty  Start Date End Date  
   
                                                      
  
  
Vera Lea DO  
  
  
NPI: [id number]  
  
  
[address]  
  
  
[place] [address]  
  
  
[phone] (Work)  
  
  
[phone] (Fax) PCP - ACO Reach                 23           
   
                                                      
  
  
Vera Lea DO  
  
  
NPI: [id number]  
  
  
[address]  
  
  
[place] [address]  
  
  
[phone] (Work)  
  
  
[phone] (Fax) PCP - General   Internal Medicine 24            
   
                                                      
  
  
Danya An DPM  
  
  
NPI: [id number]  
  
  
[address]  
  
  
[place] [address]  
  
  
[phone] (Work)  
  
  
[phone] (Fax) Referring Physician Podiatry        23          
   
                                                      
  
  
Elroy Agudelo MD  
  
  
NPI: [id number]  
  
  
[address] [facility] [address]  
  
  
[place] [address]  
  
  
[phone] (Work)  
  
  
[phone] (Fax) Referring Physician Cardiology      23          
   
                                                      
  
  
Steff Espino    Nurse Practitioner Wound Care      23          
  
  
  
  
  
                                                    Goals (unrecognized section   
and content)  
   
                                                    Goals may be documented in a  
n alternate section  
  
FOR RECORDS PERTAINING TO PATIENTS WHO ARE OR HAVE BEEN ENROLLED IN A CHEMICAL 
DEPENDENCY/SUBSTANCEABUSE PROGRAM, SOME INFORMATION MAY BE OMITTED. This 
clinical summary was aggregated from multiple sources. Caution should be 
exercised in using it in the provision of clinical care. This summary normalizes
information from multiple sources, and as a consequence, information in this 
document may materially change the coding, format and clinical context of 
patient data. In addition, data may be omitted in some cases. CLINICAL DECISIONS
SHOULD BE BASED ON THE PRIMARY CLINICAL RECORDS. [employer] provides 
no warranty or guarantee of the accuracy or completeness of information in this 
document.

## 2024-12-27 NOTE — VEINCLINIC_ITS
Varicose Veins    
    
Patient in today for follow up ultrasound post microfoam chemical ablation right  
leg    
    
    
    
Reinaldo TOSCANO MD personally performed the services described in this   
documentation, as scribed by Gail Jimenez RVT, RDMS in my presence and it   
is both accurate and complete.    
    
    
    
Gail TOSCANO RVT, RDMS, am scribing for, and in the presence of, Dr. Reinaldo Anders and in the presence of the patient.    
thigh: bilateral (right leg > left leg), knee: bilateral, calf: bilateral, ankle  
: bilateral and shin: bilateral    
aching, burning, cramping, dull and tender    
10    
3 years    
Worsened in recent months: Yes (last 5 months)    
standing, sitting and walking    
analgesics (Tylenol), elevating extremities, compression stockings and wraps    
Reports muscle spasms of leg, fatigue, heaviness, limb pain, edema and leg edema    
History of lower extremity trauma: Yes (a piece of cpap machine fell and hit   
right lower leg resulting in hemtoma)    
Superficial thrombophlebitis: Yes (DVT right leg)    
Family history of varicose veins: no    
Has patient had previous lower extremity venous surgery: Yes    
Patient has previously received the following treatment(s) for lower extremity   
varicose veins: Reports foam therapy and laser therapy    
Does patient have a history of pregnancy: no    
Has patient had lower extremity venous scan with relux testing: Yes    
Support hose used: Yes    
Problems walking or doing physical activity: Yes    
How does it affect you: pain decreases ability to walk    
Do you walk much: No    
Do you stand much: Yes    
    
Review of Systems    
    
    
ROS      
    
 Narrative     
    
Reinaldo TOSCANO MD personally performed the services described in this   
documentation, as scribed by Gail Jimenez RVT, RDMS in my presence and it   
is both accurate and complete.    
    
    
    
Gail TOSCANO RVT, RDMS, am scribing for, and in the presence of, Dr. Reinaldo Anders and in the presence of the patient.       
    
 Status of ROS                          10 or more systems reviewed and unremark    
able except as noted in     
history and below       
    
 Cardiovascular Reports: edema       
    
 Integumentary/Breast Reports: itching, redness, skin pain, skin tenderness,   
skin swelling, non-healing lesion and changes in skin color       
    
 Neurological Reports: numbness in extremities and weakness in extremities       
    
 Hematologic/Lymphatic Reports: easy bruising and easy bleeding       
    
    
PFSH    
PFS    
Medical History (Updated 11/27/24 @ 13:44 by Khang Bahena)    
    
Phlebitis and thrombophlebitis of superficial vessels of right lower extremity    
   ?I80.01 - Phlebitis and thrombophlebitis of superficial vessels of right   
   lower extremity (ICD-10)    
Phlebitis and thrombophlebitis of superficial vessels of right lower extremity    
   ?I80.01 - Phlebitis and thrombophlebitis of superficial vessels of right   
   lower extremity (ICD-10)    
Varicose veins of bilateral lower extremities with pain    
   ?I83.813 - Varicose veins of bilateral lower extremities with pain (ICD-10)    
Lymphedema of both lower extremities    
   ?I89.0 - Lymphedema, not elsewhere classified (ICD-10)    
Arthritis    
   ?M19.90 - Unspecified osteoarthritis, unspecified site (ICD-10)    
Hypertension    
   ?I10 - Essential (primary) hypertension (ICD-10)    
Deep vein thrombosis    
   ?I82.409 - Acute embolism and thrombosis of unspecified deep veins of   
   unspecified lower extremity (ICD-10)    
Cardiomyopathy    
   ?I42.9 - Cardiomyopathy, unspecified (ICD-10)    
Carpal tunnel syndrome    
   ?G56.00 - Carpal tunnel syndrome, unspecified upper limb (ICD-10)    
Peripheral vascular disease    
   ?I73.9 - Peripheral vascular disease, unspecified (ICD-10)    
Ulcer of right leg    
   ?L97.919 - Non-pressure chronic ulcer of unspecified part of right lower leg   
   with unspecified severity (ICD-10)    
Coronary artery disease    
   ?I25.10 - Atherosclerotic heart disease of native coronary artery without   
   angina pectoris (ICD-10)    
CHF (congestive heart failure)    
   ?I50.9 - Heart failure, unspecified (ICD-10)    
Obstructive sleep apnea    
   ?G47.33 - Obstructive sleep apnea (adult) (pediatric) (ICD-10)    
Chronic back pain    
   ?M54.9 - Dorsalgia, unspecified (ICD-10)    
   ?G89.29 - Other chronic pain (ICD-10)    
Postphlebetic syndrome with inflammation    
   ?I87.029 - Postthrombotic syndrome with inflammation of unspecified lower   
   extremity (ICD-10)    
Type 2 diabetes mellitus    
   ?E11.9 - Type 2 diabetes mellitus without complications (ICD-10)    
    
    
Surgical History (Updated 12/18/24 @ 15:56 by Khang Bahena)    
    
S/P sclerotherapy of varicose veins    
   ?Z98.890 - Other specified postprocedural states (ICD-10)    
   ?Z86.79 - Personal history of other diseases of the circulatory system (ICD-  
   10)    
Status post laser ablation of incompetent vein    
   ?Z98.890 - Other specified postprocedural states (ICD-10)    
S/P sclerotherapy of varicose veins    
   ?Z98.890 - Other specified postprocedural states (ICD-10)    
   ?Z86.79 - Personal history of other diseases of the circulatory system (ICD-  
   10)    
Status post laser ablation of incompetent vein    
   ?Z98.890 - Other specified postprocedural states (ICD-10)    
Status post laser ablation of incompetent vein    
   ?Z98.890 - Other specified postprocedural states (ICD-10)    
History of carpal tunnel surgery of right wrist    
   ?Z98.890 - Other specified postprocedural states (ICD-10)    
History of cholecystectomy    
   ?Z90.49 - Acquired absence of other specified parts of digestive tract (ICD-  
   10)    
    
    
Family History (Updated 08/19/24 @ 11:42 by Khang Bahnea)    
Sister   Family history of diabetes mellitus    
Father   Family history of diabetes mellitus    
Other   Family history of CHF (congestive heart failure)    
   Family history of hypertension    
   Family history of myocardial infarction    
    
    
    
Social History (Updated 08/20/24 @ 10:55 by Khang Bahena)    
Within the past year, how often did you have a drink containing alcohol:  never     
Score interpretation:  A score less than 4 is consistent with normal alcohol   
consumption.     
Smoking status:  Never smoker     
Non-prescribed substance use:  denies use     
    
    
    
Meds    
Home Medications and Allergies    
                                Home Medications    
    
    
    
?Medication ?Instructions ?Recorded ?Confirmed ?Type    
     
Lactobacillus acidophilus 10 100 mmu cells PO DAILY 08/19/24 08/19/24 History    
    
billion cell capsule        
     
acetaminophen 500 mg capsule 500 mg PO Q6H PRN pain 08/19/24 08/19/24 History    
     
carvedilol 6.25 mg tablet 6.25 mg PO BID 08/19/24 08/19/24 History    
     
empagliflozin 10 mg-linagliptin 5 1 tab PO DAILY 08/19/24 08/19/24 History    
    
mg tablet        
     
furosemide 40 mg tablet 40 mg PO BID 08/19/24 08/19/24 History    
     
rivaroxaban 10 mg tablet 10 mg PO DAILY 08/19/24 08/19/24 History    
     
sacubitril 24 mg-valsartan 26 mg 1 tab PO BID 08/19/24 08/19/24 History    
    
tablet        
     
semaglutide 0.25 mg or 0.5 mg (2 0.25 mg subcut QWEEK 08/19/24 08/19/24 History    
    
mg/3 mL) subcutaneous pen injector        
    
(Ozempic)        
     
spironolactone 25 mg tablet 25 mg PO DAILY 08/19/24 08/19/24 History    
    
(Aldactone)        
    
    
    
                                    Allergies    
    
    
    
Allergy/AdvReac Type Severity Reaction Status Date / Time    
     
cefixime Allergy  Unknown Verified 08/19/24 11:44    
     
diclofenac Allergy  Unknown Verified 08/19/24 11:44    
    
    
    
    
Exam    
Narrative    
Exam Narrative:     
    
    
Reinaldo TOSCANO MD personally performed the services described in this   
documentation, as scribed by Gail Jimenez RVT, RDMS in my presence and it   
is both accurate and complete.    
    
    
    
Gail TOSCANO RVT, RDMS, am scribing for, and in the presence of, Dr. Reinaldo Anders and in the presence of the patient.    
Constitutional    
Documenting provider has reviewed patient's vital signs: yes    
Common normals: oriented x3    
Nutritional appearance: overweight    
Cardio    
Peripheral pulses: posterior tibial pulses present and dorsalis pedis pulses   
present    
Extremity    
Common normals: normal capillary refill    
General: calf tenderness and edema    
Right lower extremity: lower leg Right lower leg: inspection and palpation    
Left lower extremity: lower leg Left lower leg: inspection and palpation    
Neuro    
Common normals: oriented x3    
    
Results    
Imaging    
Venous US:     
      Radiologist's impression:     
The ultrasound demonstrates Varithena induced thrombus visualized at prox/med   
calf, mid/med thigh, and mid/ant calf.     
    
Assessment and Plan    
Assessment and Plan    
(1) Varicose veins of bilateral lower extremities with pain:     
    
Plan    
    
Patient in today for follow up ultrasound of lower extremity following treatment  
of Varithena/microfoam completed on 12/19/24.    
    
    
Reinaldo TOSCANO MD personally performed the services described in this   
documentation, as scribed by Gail Jimenez RVT, RDMS in my presence and it   
is both accurate and complete.    
    
    
    
Gail TOSCANO RVT, RDMS, am scribing for, and in the presence of, Dr. Reinaldo Anders and in the presence of the patient.

## 2024-12-27 NOTE — VEIN_ITS
Patient Name:      
JOCELIN ARORA 
      
MR#: [id number]      
: 1955 
Accession #: [id number] 
Exam Date: 2024  
Ordering Doctor: DR NICK FERNANDEZ M.D. 
  
RADIOLOGY REPORT 
  
PROCEDURE: [facility] EST LMTD 
  
VEIN CENTER - OFFICE VISIT FOLLOW UP 
  
COMPARISON:  [facility], 2024. 
  
PROGRESS NOTES: 
  
The patient reports improvement in leg symptoms.  There has been interval  
reduction in varicosities.  The patient has followed our recommendations to  
walk 20-30 minutes once or twice per day since the procedure.  
  
Physical exam demonstrates decrease in varicosities of the leg. Persistent  
varicosities are identified along the left leg. 
  
Review of the ultrasound performed the same day demonstrates occlusive  
thrombus extending throughout the treated vein(s), see separate report,  
consistent with a successful ablation. No thrombus extending into or beyond  
the saphenofemoral junction. 
  
The patient expressed a desire to proceed with treatment of left leg  
incompetent varicosities.   The patient was informed that treatment was a  
process and would require 1-3 procedures/sessions. 
  
[facility] [id number]  
IMPRESSION:  
1. Successful ablation of the right leg treated branch saphenous vein(s).  
2. Persistent left leg varicose veins and lower extremity symptoms.  
   
PLAN:  
   
1. Microfoam chemical ablation of left leg incompetent branch saphenous   
varicosities.  
   
   
Nurse notes, history and physical were reviewed and confirmed, see attached   
forms.  
The nurse was present throughout the physical exam and consultation  
   
   
   
   
   
Dictated by: Reinaldo Anders M.D. on 2024 at 13:59       
Approved by: Reinaldo Anders M.D. on 2024 at 13:59

## 2025-01-07 ENCOUNTER — HOSPITAL ENCOUNTER
Dept: HOSPITAL 101 - VC | Age: 70
Discharge: HOME | End: 2025-01-07
Payer: MEDICARE

## 2025-01-07 VITALS — HEART RATE: 78 BPM | OXYGEN SATURATION: 96 %

## 2025-01-07 DIAGNOSIS — I83.813: Primary | ICD-10-CM

## 2025-01-07 PROCEDURE — 36466 NJX NONCMPND SCLRSNT MLT VN: CPT

## 2025-01-07 NOTE — VEIN_ITS
[address] [address] 
     [phone] 
  
  
Patient Name: 
JOCELIN ARORA 
  
MRN: [id number]    YOB: 1955    Sex: M 
Assigned Patient Location:  
Current Patient Location:   
Accession/Order Number: [id number] 
Exam Date: 1/07/2025  13:43    Report Date: 1/07/2025  14:39 
  
At the request of: 
KAYY JUNIOR   
  
Procedure:  VC INJ Foam Sclerosant WUS MLT 
  
PROCEDURE: VC INJ Foam Sclerosant WUS MLT 
  
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 
  
Pre-operative Diagnosis: CEAP class C6 venous insufficiency with pain,  
tenderness, edema and incompetent branch saphenous vein(s), chronic venous  
insufficiency left leg secondary to venous incompetence 
Post-operative Diagnosis: CEAP class C6 venous insufficiency with pain,  
tenderness, edema and incompetent branch saphenous vein(s), chronic venous  
insufficiency left leg secondary to venous incompetence 
Procedure Performed: 1. Ultrasound-guided microfoam chemical ablation with  
Varithenaregistered  
 2. Intraoperative ultrasound guidance 
  
Physician: Kayy Junior M.D. 
  
Anesthesia: None 
  
Indications for Procedure: 70 year old male. Symptoms including lower  
extremity  
pain, swelling, dilated bulging veins, skin changes for many years despite  
conservative medical therapy including medical compression stockings, exercise  
  
and analgesics. Prior procedures include endovenous laser ablation and  
microfoam chemical ablation. 
  
Multiple incompetent varicosities of the left leg. Duplex scan showed reflux  
and enlarged diameters up to 4 mm. 
  
The patient underwent informed consent including management options where the  
complications of infection, bleeding, pain, and skin injury were discussed.  
Particular attention was spent discussing thrombus extension and deep vein  
thrombosis as well as the possibility of pulmonary embolus and treatment with  
oral or injectable blood thinners. 
  
Procedure: The patient walked to the procedure room. All applicable staff  
donned appropriate apparel. A procedure timeout was performed to confirm  
correct patient, correct extremity, correct procedure, and correct room set-up  
  
including presence of all applicable supplies, devices, and drugs. 
  
A duplex ultrasound, performed by myself confirmed the location and  
incompetence of branch saphenous varicosities and their course was marked on  
the skin together with the dilated tributaries. The extent of treatment of the  
  
vein and the associated varicosities was determined through ultrasound  
mapping. 
  
The skin was prepped and then punctured with a butterfly needle and advanced  
under ultrasound guidance.  
  
The Varithenaregistered canister was activated and the canister was primed and  
  
purged as required in the instructions for use. Varithenaregistered was drawn  
into a sterile syringe.  
  
Varithenaregistered was slowly administered at 0.5-1.0 cc/second with close  
observation by ultrasound of its course in the vessels. Total volume utilized  
was: 5 mL into a 4 mm varicosity distal medial lower left leg. 
  
Following administration of Varithenaregistered the leg was elevated and the  
patient was asked to repeatedly dorsiflex the ankle to limit flow of  
Varithenaregistered into perforating veins. Once appropriate spasm had been  
confirmed in the treated veins, the vascular catheter was removed from the leg  
  
and light pressure was applied over the puncture site for hemostasis. 
  
The common femoral and deep superficial veins were then evaluated for flow and  
  
compressibility prior to dressing placement. The lower extremity was kept  
elevated at 45 degrees above the horizontal and cording material was applied  
over the saphenous segments and tributaries to allow for eccentric compression  
  
over the target vessels including the targeted saphenous vein(s). A multilayer  
  
dressing was applied consisting of foam pads, coban and thigh-high 20-30 mm Hg  
  
compression elastic support hose were placed on the patient. The leg was  
lowered only after compression had been applied and the patient was  
immediately  
ambulatory. The patient ambulated 10 minutes under supervision and was without  
  
apparent concerns at time of release. 
  
Post-care instructions include advising patient to keep post-treatment  
bandages  
in place and dry for 48 hours, avoid extended periods of inactivity, avoid  
heavy exercise for one week, wear compression stockings on the treated leg  
continuously for two weeks, to walk daily for 10 minutes over the next month.  
The patient was instructed to take an anti-inflammatory medicine as needed and  
  
to follow up for color duplex scan of the Saphenous veins, the treated branch  
saphenous varicosities, the adjacent deep veins, and additional treatment  
within 7 days.  
  
PERSONNEL: Leelee Arellano RN 
  
  
Electronically authenticated by: KAYY JUNIOR   Date: 1/07/2025  14:39

## 2025-01-07 NOTE — P.DS_ITS
Discharge Plan    
Discharge    
Disposition: Home, Self-Care    
    
Outpatient Diagnostics:    
VC Facility EST LMTD  (Routine)  Timeframe:  2 Weeks    
   Facility: [facility] - Location: Vein Center    
   Ordered By:  Reinaldo Anders    
VC EXT Venous LT Limited  (Routine)  Timeframe:  2 Weeks    
   Facility: [facility] - Location: Vein Center    
   Ordered By:  Reinaldo Anders    
    
Follow Up Appointments: 1/14/25     
    
    
Plan of Treatment:    
u/s follow up following varithena left leg 1/7/25    
    
Patient Instructions:  Polidocanol (By injection) (Asclera, Varithena)    
    
Print Language: English    
    
Discharge Date/Time: 01/07/25 14:28

## 2025-01-07 NOTE — VEINCLINIC_ITS
Vital Signs    
    
    
                                    01/07/25    
                                      14:28    
     
BP Location                      Right Brachial    
     
BP Position                          Sitting    
     
BP Cuff Size                          Adult    
     
BP Source                          Manual Cuff    
     
Respiration                            20    
     
Pulse                                  78    
     
Pulse Source                         Monitor    
     
Pulse Oximetry (%)                     96    
     
Oxygen Delivery Method              Room Air    
    
    
Varicose Veins    
Patient in this day for Varithena/microfoam chemical ablation left leg.     
    
Reinaldo TOSCANO MD personally performed the services described in this   
documentation, as scribed by Leelee Arellano RN in my presence and it is both   
accurate and complete.    
    
    
    
ILeelee RN, am scribing for, and in the presence of, Dr. Reinaldo Anders   
and in the presence of the patient.    
thigh: bilateral (right leg > left leg), knee: bilateral, calf: bilateral,   
ankle: bilateral and shin: bilateral    
aching, burning, cramping, dull and tender    
10    
3 years    
Worsened in recent months: Yes (last 5 months)    
standing, sitting and walking    
analgesics (Tylenol), elevating extremities, compression stockings and wraps    
Reports muscle spasms of leg, fatigue, heaviness, limb pain, edema and leg edema    
History of lower extremity trauma: Yes (a piece of cpap machine fell and hit   
right lower leg resulting in hemtoma)    
Superficial thrombophlebitis: Yes (DVT right leg)    
Family history of varicose veins: no    
Has patient had previous lower extremity venous surgery: Yes    
Patient has previously received the following treatment(s) for lower extremity   
varicose veins: Reports foam therapy and laser therapy    
Does patient have a history of pregnancy: no    
Has patient had lower extremity venous scan with relux testing: Yes    
Support hose used: Yes    
Problems walking or doing physical activity: Yes    
How does it affect you: pain decreases ability to walk    
Do you walk much: No    
Do you stand much: Yes    
    
Review of Systems    
    
    
ROS      
    
 Narrative     
Reinaldo TOSCANO MD personally performed the services described in this d  
ocumentation, as scribed by Leelee Arellano RN in my presence and it is both   
accurate and complete.    
    
    
    
Leelee TOSCANO RN, am scribing for, and in the presence of, Dr. Reinaldo meyer nd in the presence of the patient.       
    
 Status of ROS                          10 or more systems reviewed and unremark    
able except as noted in     
history and below       
    
 Cardiovascular Reports: edema       
    
 Integumentary/Breast Reports: itching, redness, skin pain, skin tenderness,   
skin swelling, non-healing lesion and changes in skin color       
    
 Neurological Reports: numbness in extremities and weakness in extremities       
    
 Hematologic/Lymphatic Reports: easy bruising and easy bleeding       
    
    
[facility]    
Medical History (Updated 01/07/25 @ 12:46 by Leelee Arellano RN)    
    
Phlebitis and thrombophlebitis of superficial vessels of left lower extremity    
   ?I80.02 - Phlebitis and thrombophlebitis of superficial vessels of left lower  
   extremity (ICD-10)    
Phlebitis and thrombophlebitis of superficial vessels of right lower extremity    
   ?I80.01 - Phlebitis and thrombophlebitis of superficial vessels of right   
   lower extremity (ICD-10)    
Phlebitis and thrombophlebitis of superficial vessels of right lower extremity    
   ?I80.01 - Phlebitis and thrombophlebitis of superficial vessels of right   
   lower extremity (ICD-10)    
Varicose veins of bilateral lower extremities with pain    
   ?I83.813 - Varicose veins of bilateral lower extremities with pain (ICD-10)    
Lymphedema of both lower extremities    
   ?I89.0 - Lymphedema, not elsewhere classified (ICD-10)    
Arthritis    
   ?M19.90 - Unspecified osteoarthritis, unspecified site (ICD-10)    
Hypertension    
   ?I10 - Essential (primary) hypertension (ICD-10)    
Deep vein thrombosis    
   ?I82.409 - Acute embolism and thrombosis of unspecified deep veins of   
   unspecified lower extremity (ICD-10)    
Cardiomyopathy    
   ?I42.9 - Cardiomyopathy, unspecified (ICD-10)    
Carpal tunnel syndrome    
   ?G56.00 - Carpal tunnel syndrome, unspecified upper limb (ICD-10)    
Peripheral vascular disease    
   ?I73.9 - Peripheral vascular disease, unspecified (ICD-10)    
Ulcer of right leg    
   ?L97.919 - Non-pressure chronic ulcer of unspecified part of right lower leg   
   with unspecified severity (ICD-10)    
Coronary artery disease    
   ?I25.10 - Atherosclerotic heart disease of native coronary artery without   
   angina pectoris (ICD-10)    
CHF (congestive heart failure)    
   ?I50.9 - Heart failure, unspecified (ICD-10)    
Obstructive sleep apnea    
   ?G47.33 - Obstructive sleep apnea (adult) (pediatric) (ICD-10)    
Chronic back pain    
   ?M54.9 - Dorsalgia, unspecified (ICD-10)    
   ?G89.29 - Other chronic pain (ICD-10)    
Postphlebetic syndrome with inflammation    
   ?I87.029 - Postthrombotic syndrome with inflammation of unspecified lower   
   extremity (ICD-10)    
Type 2 diabetes mellitus    
   ?E11.9 - Type 2 diabetes mellitus without complications (ICD-10)    
    
    
Surgical History (Updated 12/18/24 @ 15:56 by Khang Bahena)    
    
S/P sclerotherapy of varicose veins    
   ?Z98.890 - Other specified postprocedural states (ICD-10)    
   ?Z86.79 - Personal history of other diseases of the circulatory system (ICD-  
   10)    
Status post laser ablation of incompetent vein    
   ?Z98.890 - Other specified postprocedural states (ICD-10)    
S/P sclerotherapy of varicose veins    
   ?Z98.890 - Other specified postprocedural states (ICD-10)    
   ?Z86.79 - Personal history of other diseases of the circulatory system (ICD-  
   10)    
Status post laser ablation of incompetent vein    
   ?Z98.890 - Other specified postprocedural states (ICD-10)    
Status post laser ablation of incompetent vein    
   ?Z98.890 - Other specified postprocedural states (ICD-10)    
History of carpal tunnel surgery of right wrist    
   ?Z98.890 - Other specified postprocedural states (ICD-10)    
History of cholecystectomy    
   ?Z90.49 - Acquired absence of other specified parts of digestive tract (ICD-  
   10)    
    
    
Family History (Updated 08/19/24 @ 11:42 by Khang Bahena)    
Sister   Family history of diabetes mellitus    
Father   Family history of diabetes mellitus    
Other   Family history of CHF (congestive heart failure)    
   Family history of hypertension    
   Family history of myocardial infarction    
    
    
    
Social History (Updated 08/20/24 @ 10:55 by Khang Bahena)    
Within the past year, how often did you have a drink containing alcohol:  never     
Score interpretation:  A score less than 4 is consistent with normal alcohol   
consumption.     
Smoking status:  Never smoker     
Non-prescribed substance use:  denies use     
    
    
    
Meds    
Home Medications and Allergies    
                                Home Medications    
    
    
    
?Medication ?Instructions ?Recorded ?Confirmed ?Type    
     
Lactobacillus acidophilus 10 100 mmu cells PO DAILY 08/19/24 08/19/24 History    
    
billion cell capsule        
     
acetaminophen 500 mg capsule 500 mg PO Q6H PRN pain 08/19/24 08/19/24 History    
     
carvedilol 6.25 mg tablet 6.25 mg PO BID 08/19/24 08/19/24 History    
     
empagliflozin 10 mg-linagliptin 5 1 tab PO DAILY 08/19/24 08/19/24 History    
    
mg tablet        
     
furosemide 40 mg tablet 40 mg PO BID 08/19/24 08/19/24 History    
     
rivaroxaban 10 mg tablet 10 mg PO DAILY 08/19/24 08/19/24 History    
     
sacubitril 24 mg-valsartan 26 mg 1 tab PO BID 08/19/24 08/19/24 History    
    
tablet        
     
semaglutide 0.25 mg or 0.5 mg (2 0.25 mg subcut QWEEK 08/19/24 08/19/24 History    
    
mg/3 mL) subcutaneous pen injector        
    
(Ozempic)        
     
spironolactone 25 mg tablet 25 mg PO DAILY 08/19/24 08/19/24 History    
    
(Aldactone)        
    
    
    
                                    Allergies    
    
    
    
Allergy/AdvReac Type Severity Reaction Status Date / Time    
     
cefixime Allergy  Unknown Verified 08/19/24 11:44    
     
diclofenac Allergy  Unknown Verified 08/19/24 11:44    
    
    
    
    
Exam    
Narrative    
Exam Narrative:     
    
Reinaldo TOSCANO MD personally performed the services described in this docu  
mentation, as scribed by Leelee Arellano RN in my presence and it is both accurate   
and complete.    
    
    
    
Leelee TOSCANO RN, am scribing for, and in the presence of, Dr. Reinaldo Anders   
and in the presence of the patient.    
Constitutional    
Documenting provider has reviewed patient's vital signs: yes    
Common normals: oriented x3    
Nutritional appearance: overweight    
Cardio    
Peripheral pulses: posterior tibial pulses present and dorsalis pedis pulses   
present    
Extremity    
Common normals: normal capillary refill    
General: calf tenderness and edema    
Right lower extremity: lower leg Right lower leg: inspection and palpation    
Left lower extremity: lower leg Left lower leg: inspection and palpation    
Neuro    
Common normals: oriented x3    
    
Assessment and Plan    
Assessment and Plan    
(1) Varicose veins of bilateral lower extremities with pain:     
    
Plan    
    
The patient tolerated the procedure well without complication.? The patient   
verbalizes understanding and states they will comply.? Patient was given post-  
procedure instructions.     
    
    
    
Patient was discharged in good condition.? Scheduled to undergo follow-up   
evaluation on 1/14/25.    
    
    
Reinaldo TOSCANO MD personally performed the services described in this   
documentation, as scribed by Leelee Arellano RN in my presence and it is both   
accurate and complete.    
    
    
    
Leelee TOSCANO RN, am scribing for, and in the presence of, Dr. Reinaldo Anders   
and in the presence of the patient.    
    
Procedures    
Procedure Instructions    
Procedures    
    
leg microfoam chemical ablation/Varithena:    
    
    
    
Risks and benefits of the procedure were discussed at length and informed writ  
ten consent was obtained.? Time-out procedure was performed and the correct   
patient and procedure were confirmed.? Staff present during time-out: Leelee Arellano RN and Reinaldo Anders MD.? Patient prepped and procedure performed in   
usual sterile fashion.? Patient was placed in Trendelenburg prior to   
Polidocanol/Varithena injections.    
    
    
    
Sclerosing Agent:?? 4cc 1% Polidocanol/Varithena    
    
    
    
Site Injected: left leg    
    
    
    
Number of Injections:? 5cc into 4mm vein left distal medial lower leg    
    
    
    
The patient tolerated the procedure well without complication.? Hemostasis was   
obtained and thigh-high compression stocking was applied with foam pads.?   
Instructed patient to wear stocking for at least 96 hours and sleep with it and   
only remove for showering.? The patient was instructed to? wear stocking for 2   
weeks.? Patient verbalizes understanding and states they will comply.? Patient   
was given post-procedure instructions.     
    
    
    
Patient was discharged in good condition.? Scheduled to undergo limited venous   
ultrasound and? exam on 1/14/25.    
    
    
IReinaldo MD personally performed the services described in this   
documentation, as scribed by Leelee Arellano RN in my presence and it is both   
accurate and complete.    
    
    
    
ILeelee RN, am scribing for, and in the presence of, Dr. Reinaldo Anders   
and in the presence of the patient.

## 2025-01-07 NOTE — W.VEIN
Discharge Plan
Discharge
Disposition: Home, Self-Care

Outpatient Diagnostics:
VC Facility EST LMTD  (Routine)  Timeframe:  2 Weeks
   Facility: [facility] - [facility]
   Ordered By:  Reinaldo Anders
VC EXT Venous LT Limited  (Routine)  Timeframe:  2 Weeks
   Facility: [facility] - Location: [facility]
   Ordered By:  Reinaldo Anders

Follow Up Appointments: 1/14/25 


Plan of Treatment:
u/s follow up following varithena left leg 1/7/25

Patient Instructions:  Polidocanol (By injection) (Asclera, Varithena)

Print Language: English

Discharge Date/Time: 01/07/25 14:28

## 2025-01-07 NOTE — V.VEINS.HP
Vital Signs
 01/07/25
14:28
BP Location Right Brachial
BP Position Sitting
BP Cuff Size Adult
BP Source Manual Cuff
Respiration 20
Pulse 78
Pulse Source Monitor
Pulse Oximetry (%) 96
Oxygen Delivery Method Room Air

Varicose Veins
Patient in this day for Varithena/microfoam chemical ablation left leg. 

Reinaldo TOSCANO MD personally performed the services described in this documentation, as scribed by Leelee Arellano RN in my presence and it is both accurate and complete.



ILeelee RN, am scribing for, and in the presence of, Dr. Reinaldo Anders and in the presence of the patient.
thigh: bilateral (right leg > left leg), knee: bilateral, calf: bilateral, ankle: bilateral and shin: bilateral
aching, burning, cramping, dull and tender
10
3 years
Worsened in recent months: Yes (last 5 months)
standing, sitting and walking
analgesics (Tylenol), elevating extremities, compression stockings and wraps
Reports muscle spasms of leg, fatigue, heaviness, limb pain, edema and leg edema
History of lower extremity trauma: Yes (a piece of cpap machine fell and hit right lower leg resulting in hemtoma)
Superficial thrombophlebitis: Yes (DVT right leg)
Family history of varicose veins: no
Has patient had previous lower extremity venous surgery: Yes
Patient has previously received the following treatment(s) for lower extremity varicose veins: Reports foam therapy and laser therapy
Does patient have a history of pregnancy: no
Has patient had lower extremity venous scan with relux testing: Yes
Support hose used: Yes
Problems walking or doing physical activity: Yes
How does it affect you: pain decreases ability to walk
Do you walk much: No
Do you stand much: Yes

Review of Systems
ROS  
 Narrative 
Reinaldo TOSCANO MD personally performed the services described in this documentation, as scribed by Leelee Arellano RN in my presence and it is both accurate and complete.



ILeelee RN, am scribing for, and in the presence of, Dr. Reinaldo Anders and in the presence of the patient.   
 Status of ROS 10 or more systems reviewed and unremarkable except as noted in history and below   
 Cardiovascular Reports: edema   
 Integumentary/Breast Reports: itching, redness, skin pain, skin tenderness, skin swelling, non-healing lesion and changes in skin color   
 Neurological Reports: numbness in extremities and weakness in extremities   
 Hematologic/Lymphatic Reports: easy bruising and easy bleeding   

[facility] hospital
Medical History (Updated 01/07/25 @ 12:46 by Leelee Arellano RN)

Phlebitis and thrombophlebitis of superficial vessels of left lower extremity
 ?I80.02 - Phlebitis and thrombophlebitis of superficial vessels of left lower extremity (ICD-10)
Phlebitis and thrombophlebitis of superficial vessels of right lower extremity
 ?I80.01 - Phlebitis and thrombophlebitis of superficial vessels of right lower extremity (ICD-10)
Phlebitis and thrombophlebitis of superficial vessels of right lower extremity
 ?I80.01 - Phlebitis and thrombophlebitis of superficial vessels of right lower extremity (ICD-10)
Varicose veins of bilateral lower extremities with pain
 ?I83.813 - Varicose veins of bilateral lower extremities with pain (ICD-10)
Lymphedema of both lower extremities
 ?I89.0 - Lymphedema, not elsewhere classified (ICD-10)
Arthritis
 ?M19.90 - Unspecified osteoarthritis, unspecified site (ICD-10)
Hypertension
 ?I10 - Essential (primary) hypertension (ICD-10)
Deep vein thrombosis
 ?I82.409 - Acute embolism and thrombosis of unspecified deep veins of unspecified lower extremity (ICD-10)
Cardiomyopathy
 ?I42.9 - Cardiomyopathy, unspecified (ICD-10)
Carpal tunnel syndrome
 ?G56.00 - Carpal tunnel syndrome, unspecified upper limb (ICD-10)
Peripheral vascular disease
 ?I73.9 - Peripheral vascular disease, unspecified (ICD-10)
Ulcer of right leg
 ?L97.919 - Non-pressure chronic ulcer of unspecified part of right lower leg with unspecified severity (ICD-10)
Coronary artery disease
 ?I25.10 - Atherosclerotic heart disease of native coronary artery without angina pectoris (ICD-10)
CHF (congestive heart failure)
 ?I50.9 - Heart failure, unspecified (ICD-10)
Obstructive sleep apnea
 ?G47.33 - Obstructive sleep apnea (adult) (pediatric) (ICD-10)
Chronic back pain
 ?M54.9 - Dorsalgia, unspecified (ICD-10)
 ?G89.29 - Other chronic pain (ICD-10)
Postphlebetic syndrome with inflammation
 ?I87.029 - Postthrombotic syndrome with inflammation of unspecified lower extremity (ICD-10)
Type 2 diabetes mellitus
 ?E11.9 - Type 2 diabetes mellitus without complications (ICD-10)


Surgical History (Updated 12/18/24 @ 15:56 by Khang Bahena)

S/P sclerotherapy of varicose veins
 ?Z98.890 - Other specified postprocedural states (ICD-10)
 ?Z86.79 - Personal history of other diseases of the circulatory system (ICD-10)
Status post laser ablation of incompetent vein
 ?Z98.890 - Other specified postprocedural states (ICD-10)
S/P sclerotherapy of varicose veins
 ?Z98.890 - Other specified postprocedural states (ICD-10)
 ?Z86.79 - Personal history of other diseases of the circulatory system (ICD-10)
Status post laser ablation of incompetent vein
 ?Z98.890 - Other specified postprocedural states (ICD-10)
Status post laser ablation of incompetent vein
 ?Z98.890 - Other specified postprocedural states (ICD-10)
History of carpal tunnel surgery of right wrist
 ?Z98.890 - Other specified postprocedural states (ICD-10)
History of cholecystectomy
 ?Z90.49 - Acquired absence of other specified parts of digestive tract (ICD-10)


Family History (Updated 08/19/24 @ 11:42 by Khang Bahena)
Sister
 Family history of diabetes mellitus
Father
 Family history of diabetes mellitus
Other
 Family history of CHF (congestive heart failure)
 Family history of hypertension
 Family history of myocardial infarction



Social History (Updated 08/20/24 @ 10:55 by Khang Bahena)
Within the past year, how often did you have a drink containing alcohol:  never 
Score interpretation:  A score less than 4 is consistent with normal alcohol consumption. 
Smoking status:  Never smoker 
Non-prescribed substance use:  denies use 



Meds
Home Medications and Allergies
Home Medications

?Medication ?Instructions ?Recorded ?Confirmed ?Type
Lactobacillus acidophilus 10 100 mmu cells PO DAILY 08/19/24 08/19/24 History
billion cell capsule    
acetaminophen 500 mg capsule 500 mg PO Q6H PRN pain 08/19/24 08/19/24 History
carvedilol 6.25 mg tablet 6.25 mg PO BID 08/19/24 08/19/24 History
empagliflozin 10 mg-linagliptin 5 1 tab PO DAILY 08/19/24 08/19/24 History
mg tablet    
furosemide 40 mg tablet 40 mg PO BID 08/19/24 08/19/24 History
rivaroxaban 10 mg tablet 10 mg PO DAILY 08/19/24 08/19/24 History
sacubitril 24 mg-valsartan 26 mg 1 tab PO BID 08/19/24 08/19/24 History
tablet    
semaglutide 0.25 mg or 0.5 mg (2 0.25 mg subcut QWEEK 08/19/24 08/19/24 History
mg/3 mL) subcutaneous pen injector    
(Ozempic)    
spironolactone 25 mg tablet 25 mg PO DAILY 08/19/24 08/19/24 History
(Aldactone)    


Allergies

Allergy/AdvReac Type Severity Reaction Status Date / Time
cefixime Allergy  Unknown Verified 08/19/24 11:44
diclofenac Allergy  Unknown Verified 08/19/24 11:44



Exam
Narrative
Exam Narrative: 

Reinaldo TOSCANO MD personally performed the services described in this documentation, as scribed by Leelee Arellano RN in my presence and it is both accurate and complete.



Leelee TOSCANO RN, am scribing for, and in the presence of, Dr. Reinaldo Anders and in the presence of the patient.
Constitutional
Documenting provider has reviewed patient's vital signs: yes
Common normals: oriented x3
Nutritional appearance: overweight
Cardio
Peripheral pulses: posterior tibial pulses present and dorsalis pedis pulses present
Extremity
Common normals: normal capillary refill
General: calf tenderness and edema
Right lower extremity: lower leg Right lower leg: inspection and palpation
Left lower extremity: lower leg Left lower leg: inspection and palpation
Neuro
Common normals: oriented x3

Assessment and Plan
Assessment and Plan
(1) Varicose veins of bilateral lower extremities with pain: 

Plan

The patient tolerated the procedure well without complication.? The patient verbalizes understanding and states they will comply.? Patient was given post-procedure instructions. 



Patient was discharged in good condition.? Scheduled to undergo follow-up evaluation on 1/14/25.


Reinaldo TOSCANO MD personally performed the services described in this documentation, as scribed by Leelee Arellano RN in my presence and it is both accurate and complete.



Leelee TOSCANO RN, am scribing for, and in the presence of, Dr. Reinaldo Anders and in the presence of the patient.

Procedures
Procedure Instructions
Procedures

leg microfoam chemical ablation/Varithena:



Risks and benefits of the procedure were discussed at length and informed written consent was obtained.? Time-out procedure was performed and the correct patient and procedure were confirmed.? Staff present during time-out: Leelee Arellano RN and 
Reinaldo Anders MD.? Patient prepped and procedure performed in usual sterile fashion.? Patient was placed in Trendelenburg prior to Polidocanol/Varithena injections.



Sclerosing Agent:?? 4cc 1% Polidocanol/Varithena



Site Injected: left leg



Number of Injections:? 5cc into 4mm vein left distal medial lower leg



The patient tolerated the procedure well without complication.? Hemostasis was obtained and thigh-high compression stocking was applied with foam pads.? Instructed patient to wear stocking for at least 96 hours and sleep with it and only remove for 
showering.? The patient was instructed to? wear stocking for 2 weeks.? Patient verbalizes understanding and states they will comply.? Patient was given post-procedure instructions. 



Patient was discharged in good condition.? Scheduled to undergo limited venous ultrasound and? exam on 1/14/25.


I,Reinaldo Anders MD personally performed the services described in this documentation, as scribed by Leelee Arellano RN in my presence and it is both accurate and complete.



I, Leelee Arellano RN, am scribing for, and in the presence of, Dr. Reinaldo Anders and in the presence of the patient.

## 2025-01-14 ENCOUNTER — HOSPITAL ENCOUNTER
Age: 70
Discharge: HOME | End: 2025-01-14
Payer: MEDICARE

## 2025-01-14 DIAGNOSIS — I80.02: Primary | ICD-10-CM

## 2025-01-14 PROCEDURE — 93971 EXTREMITY STUDY: CPT

## 2025-01-14 PROCEDURE — G0463 HOSPITAL OUTPT CLINIC VISIT: HCPCS

## 2025-01-14 NOTE — VEIN_ITS
Patient Name:      
JOCELIN ARORA 
      
MR#: [id number]      
: 1955 
Accession #: [id number] 
Exam Date: 2025  
Ordering Doctor: DR KAYY JUNIOR M.D. 
  
RADIOLOGY REPORT 
  
PROCEDURE:     VC EXT VENOUS LT LIMITED 
  
COMPARISON:     None. 
  
INDICATIONS:     I80.02 - Phlebitis and thrombophlebitis of superficial ve... 
  
TECHNIQUE:     Lower extremity grey scale and Duplex Doppler evaluation of the  
deep venous system from the inguinal ligament through the calf veins.   
  
FINDINGS:       
REGION:     Left lower extremity. 
THROMBI:     Negative for DVT. 
     Varithena induced thrombus visualized at dist/med calf.  
COMPRESSIBILITY:     Non-compressible segments corresponding to thrombus 
FLOW:     Areas of no flow corresponding to thrombus 
OTHER:     No patent varicose veins remain.  
  
  
CONCLUSION:      
1. Successful post ablation occlusion of left leg treated branch saphenous  
varicosities. 
  
  
Dictated by: Kayy Junior M.D. on 2025 at 15:16      
Approved by: Kayy Junior M.D. on 2025 at 15:16

## 2025-01-14 NOTE — V.VEINS.HP
Varicose Veins
Patient in this day for follow up ultrasound post Varithena/microfoam chemical ablation left leg. 

Reinaldo TOSCANO MD personally performed the services described in this documentation, as scribed by Gail Jimenez RVT, RDMS in my presence and it is both accurate and complete.



Gail TOSCANO RVT, RDMS, am scribing for, and in the presence of, Dr. Reinaldo Anders and in the presence of the patient.
thigh: bilateral (right leg > left leg), knee: bilateral, calf: bilateral, ankle: bilateral and shin: bilateral
aching, burning, cramping, dull and tender
10
3 years
Worsened in recent months: Yes (last 5 months)
standing, sitting and walking
analgesics (Tylenol), elevating extremities, compression stockings and wraps
Reports muscle spasms of leg, fatigue, heaviness, limb pain, edema and leg edema
History of lower extremity trauma: Yes (a piece of cpap machine fell and hit right lower leg resulting in hemtoma)
Superficial thrombophlebitis: Yes (DVT right leg)
Family history of varicose veins: no
Has patient had previous lower extremity venous surgery: Yes
Patient has previously received the following treatment(s) for lower extremity varicose veins: Reports foam therapy and laser therapy
Does patient have a history of pregnancy: no
Has patient had lower extremity venous scan with relux testing: Yes
Support hose used: Yes
Problems walking or doing physical activity: Yes
How does it affect you: pain decreases ability to walk
Do you walk much: No
Do you stand much: Yes

Review of Systems
ROS  
 Narrative 

Reinaldo TOSCANO MD personally performed the services described in this documentation, as scribed by Gail Jimenez RVT, RDMS in my presence and it is both accurate and complete.



Gail TOSCANO RVT, RDMS, am scribing for, and in the presence of, Dr. Reinaldo Anders and in the presence of the patient.   
 Status of ROS 10 or more systems reviewed and unremarkable except as noted in history and below   
 Cardiovascular Reports: edema   
 Integumentary/Breast Reports: itching, redness, skin pain, skin tenderness, skin swelling, non-healing lesion and changes in skin color   
 Neurological Reports: numbness in extremities and weakness in extremities   
 Hematologic/Lymphatic Reports: easy bruising and easy bleeding   

PFSH
PFS
Medical History (Updated 01/07/25 @ 12:46 by Leelee Arellano RN)

Phlebitis and thrombophlebitis of superficial vessels of left lower extremity
 ?I80.02 - Phlebitis and thrombophlebitis of superficial vessels of left lower extremity (ICD-10)
Phlebitis and thrombophlebitis of superficial vessels of right lower extremity
 ?I80.01 - Phlebitis and thrombophlebitis of superficial vessels of right lower extremity (ICD-10)
Phlebitis and thrombophlebitis of superficial vessels of right lower extremity
 ?I80.01 - Phlebitis and thrombophlebitis of superficial vessels of right lower extremity (ICD-10)
Varicose veins of bilateral lower extremities with pain
 ?I83.813 - Varicose veins of bilateral lower extremities with pain (ICD-10)
Lymphedema of both lower extremities
 ?I89.0 - Lymphedema, not elsewhere classified (ICD-10)
Arthritis
 ?M19.90 - Unspecified osteoarthritis, unspecified site (ICD-10)
Hypertension
 ?I10 - Essential (primary) hypertension (ICD-10)
Deep vein thrombosis
 ?I82.409 - Acute embolism and thrombosis of unspecified deep veins of unspecified lower extremity (ICD-10)
Cardiomyopathy
 ?I42.9 - Cardiomyopathy, unspecified (ICD-10)
Carpal tunnel syndrome
 ?G56.00 - Carpal tunnel syndrome, unspecified upper limb (ICD-10)
Peripheral vascular disease
 ?I73.9 - Peripheral vascular disease, unspecified (ICD-10)
Ulcer of right leg
 ?L97.919 - Non-pressure chronic ulcer of unspecified part of right lower leg with unspecified severity (ICD-10)
Coronary artery disease
 ?I25.10 - Atherosclerotic heart disease of native coronary artery without angina pectoris (ICD-10)
CHF (congestive heart failure)
 ?I50.9 - Heart failure, unspecified (ICD-10)
Obstructive sleep apnea
 ?G47.33 - Obstructive sleep apnea (adult) (pediatric) (ICD-10)
Chronic back pain
 ?M54.9 - Dorsalgia, unspecified (ICD-10)
 ?G89.29 - Other chronic pain (ICD-10)
Postphlebetic syndrome with inflammation
 ?I87.029 - Postthrombotic syndrome with inflammation of unspecified lower extremity (ICD-10)
Type 2 diabetes mellitus
 ?E11.9 - Type 2 diabetes mellitus without complications (ICD-10)


Surgical History (Updated 12/18/24 @ 15:56 by Khang Bahena)

S/P sclerotherapy of varicose veins
 ?Z98.890 - Other specified postprocedural states (ICD-10)
 ?Z86.79 - Personal history of other diseases of the circulatory system (ICD-10)
Status post laser ablation of incompetent vein
 ?Z98.890 - Other specified postprocedural states (ICD-10)
S/P sclerotherapy of varicose veins
 ?Z98.890 - Other specified postprocedural states (ICD-10)
 ?Z86.79 - Personal history of other diseases of the circulatory system (ICD-10)
Status post laser ablation of incompetent vein
 ?Z98.890 - Other specified postprocedural states (ICD-10)
Status post laser ablation of incompetent vein
 ?Z98.890 - Other specified postprocedural states (ICD-10)
History of carpal tunnel surgery of right wrist
 ?Z98.890 - Other specified postprocedural states (ICD-10)
History of cholecystectomy
 ?Z90.49 - Acquired absence of other specified parts of digestive tract (ICD-10)


Family History (Updated 08/19/24 @ 11:42 by Khang Bahena)
Sister
 Family history of diabetes mellitus
Father
 Family history of diabetes mellitus
Other
 Family history of CHF (congestive heart failure)
 Family history of hypertension
 Family history of myocardial infarction



Social History (Updated 08/20/24 @ 10:55 by Khang Bahena)
Within the past year, how often did you have a drink containing alcohol:  never 
Score interpretation:  A score less than 4 is consistent with normal alcohol consumption. 
Smoking status:  Never smoker 
Non-prescribed substance use:  denies use 



Meds
Home Medications and Allergies
Home Medications

?Medication ?Instructions ?Recorded ?Confirmed ?Type
Lactobacillus acidophilus 10 100 mmu cells PO DAILY 08/19/24 08/19/24 History
billion cell capsule    
acetaminophen 500 mg capsule 500 mg PO Q6H PRN pain 08/19/24 08/19/24 History
carvedilol 6.25 mg tablet 6.25 mg PO BID 08/19/24 08/19/24 History
empagliflozin 10 mg-linagliptin 5 1 tab PO DAILY 08/19/24 08/19/24 History
mg tablet    
furosemide 40 mg tablet 40 mg PO BID 08/19/24 08/19/24 History
rivaroxaban 10 mg tablet 10 mg PO DAILY 08/19/24 08/19/24 History
sacubitril 24 mg-valsartan 26 mg 1 tab PO BID 08/19/24 08/19/24 History
tablet    
semaglutide 0.25 mg or 0.5 mg (2 0.25 mg subcut QWEEK 08/19/24 08/19/24 History
mg/3 mL) subcutaneous pen injector    
(Ozempic)    
spironolactone 25 mg tablet 25 mg PO DAILY 08/19/24 08/19/24 History
(Aldactone)    


Allergies

Allergy/AdvReac Type Severity Reaction Status Date / Time
cefixime Allergy  Unknown Verified 08/19/24 11:44
diclofenac Allergy  Unknown Verified 08/19/24 11:44



Exam
Narrative
Exam Narrative: 

Reinaldo TOSCANO MD personally performed the services described in this documentation, as scribed by Gail Jimenez RVT, RDMS in my presence and it is both accurate and complete.



Gail TOSCANO RVT, RDMS, am scribing for, and in the presence of, Dr. Reinaldo Anders and in the presence of the patient.
Constitutional
Documenting provider has reviewed patient's vital signs: yes
Common normals: oriented x3
Nutritional appearance: overweight
Cardio
Peripheral pulses: posterior tibial pulses present and dorsalis pedis pulses present
Extremity
Common normals: normal capillary refill
General: calf tenderness and edema
Right lower extremity: lower leg Right lower leg: inspection and palpation
Left lower extremity: lower leg Left lower leg: inspection and palpation
Neuro
Common normals: oriented x3

Results
Imaging
Venous US: 
      Radiologist's impression: 
The ultrasound demonstrates Varithena induced thrombus visualized at dist/med calf. 

Assessment and Plan
Assessment and Plan
(1) Phlebitis and thrombophlebitis of superficial vessels of left lower extremity: 

Plan

Reinaldo TOSCANO MD personally performed the services described in this documentation, as scribed by Gail Jimenez RVT, RDMS in my presence and it is both accurate and complete.



Gail TOSCANO RVT, RDMS am scribing for, and in the presence of, Dr. Reinaldo Anders and in the presence of the patient.

## 2025-01-14 NOTE — VEINCLINIC_ITS
Varicose Veins    
Patient in this day for follow up ultrasound post Varithena/microfoam chemical   
ablation left leg.     
    
Reinaldo TOSCANO MD personally performed the services described in this   
documentation, as scribed by Gail Jimenez RVT, RDMS in my presence and it   
is both accurate and complete.    
    
    
    
Gail TOSCANO RVT, RDMS, am scribing for, and in the presence of, Dr. Reinaldo Anders and in the presence of the patient.    
thigh: bilateral (right leg > left leg), knee: bilateral, calf: bilateral,   
ankle: bilateral and shin: bilateral    
aching, burning, cramping, dull and tender    
10    
3 years    
Worsened in recent months: Yes (last 5 months)    
standing, sitting and walking    
analgesics (Tylenol), elevating extremities, compression stockings and wraps    
Reports muscle spasms of leg, fatigue, heaviness, limb pain, edema and leg edema    
History of lower extremity trauma: Yes (a piece of cpap machine fell and hit   
right lower leg resulting in hemtoma)    
Superficial thrombophlebitis: Yes (DVT right leg)    
Family history of varicose veins: no    
Has patient had previous lower extremity venous surgery: Yes    
Patient has previously received the following treatment(s) for lower extremity   
varicose veins: Reports foam therapy and laser therapy    
Does patient have a history of pregnancy: no    
Has patient had lower extremity venous scan with relux testing: Yes    
Support hose used: Yes    
Problems walking or doing physical activity: Yes    
How does it affect you: pain decreases ability to walk    
Do you walk much: No    
Do you stand much: Yes    
    
Review of Systems    
    
    
ROS      
    
 Narrative     
    
Reinaldo TOSCANO MD personally performed the services described in this   
documentation, as scribed by Gail Jimenez RVT, RDMS in my presence and it   
is both accurate and complete.    
    
    
    
Gail TOSCANO RVT, RDMS, am scribing for, and in the presence of, Dr. Reinaldo Anders and in the presence of the patient.       
    
 Status of ROS                          10 or more systems reviewed and unremark    
able except as noted in     
history and below       
    
 Cardiovascular Reports: edema       
    
 Integumentary/Breast Reports: itching, redness, skin pain, skin tenderness,   
skin swelling, non-healing lesion and changes in skin color       
    
 Neurological Reports: numbness in extremities and weakness in extremities       
    
 Hematologic/Lymphatic Reports: easy bruising and easy bleeding       
    
    
PFSH    
PFSH    
Medical History (Updated 01/07/25 @ 12:46 by Leelee Arellano RN)    
    
Phlebitis and thrombophlebitis of superficial vessels of left lower extremity    
   ?I80.02 - Phlebitis and thrombophlebitis of superficial vessels of left lower  
   extremity (ICD-10)    
Phlebitis and thrombophlebitis of superficial vessels of right lower extremity    
   ?I80.01 - Phlebitis and thrombophlebitis of superficial vessels of right   
   lower extremity (ICD-10)    
Phlebitis and thrombophlebitis of superficial vessels of right lower extremity    
   ?I80.01 - Phlebitis and thrombophlebitis of superficial vessels of right   
   lower extremity (ICD-10)    
Varicose veins of bilateral lower extremities with pain    
   ?I83.813 - Varicose veins of bilateral lower extremities with pain (ICD-10)    
Lymphedema of both lower extremities    
   ?I89.0 - Lymphedema, not elsewhere classified (ICD-10)    
Arthritis    
   ?M19.90 - Unspecified osteoarthritis, unspecified site (ICD-10)    
Hypertension    
   ?I10 - Essential (primary) hypertension (ICD-10)    
Deep vein thrombosis    
   ?I82.409 - Acute embolism and thrombosis of unspecified deep veins of   
   unspecified lower extremity (ICD-10)    
Cardiomyopathy    
   ?I42.9 - Cardiomyopathy, unspecified (ICD-10)    
Carpal tunnel syndrome    
   ?G56.00 - Carpal tunnel syndrome, unspecified upper limb (ICD-10)    
Peripheral vascular disease    
   ?I73.9 - Peripheral vascular disease, unspecified (ICD-10)    
Ulcer of right leg    
   ?L97.919 - Non-pressure chronic ulcer of unspecified part of right lower leg   
   with unspecified severity (ICD-10)    
Coronary artery disease    
   ?I25.10 - Atherosclerotic heart disease of native coronary artery without   
   angina pectoris (ICD-10)    
CHF (congestive heart failure)    
   ?I50.9 - Heart failure, unspecified (ICD-10)    
Obstructive sleep apnea    
   ?G47.33 - Obstructive sleep apnea (adult) (pediatric) (ICD-10)    
Chronic back pain    
   ?M54.9 - Dorsalgia, unspecified (ICD-10)    
   ?G89.29 - Other chronic pain (ICD-10)    
Postphlebetic syndrome with inflammation    
   ?I87.029 - Postthrombotic syndrome with inflammation of unspecified lower   
   extremity (ICD-10)    
Type 2 diabetes mellitus    
   ?E11.9 - Type 2 diabetes mellitus without complications (ICD-10)    
    
    
Surgical History (Updated 12/18/24 @ 15:56 by Khang Bahena)    
    
S/P sclerotherapy of varicose veins    
   ?Z98.890 - Other specified postprocedural states (ICD-10)    
   ?Z86.79 - Personal history of other diseases of the circulatory system (ICD-  
   10)    
Status post laser ablation of incompetent vein    
   ?Z98.890 - Other specified postprocedural states (ICD-10)    
S/P sclerotherapy of varicose veins    
   ?Z98.890 - Other specified postprocedural states (ICD-10)    
   ?Z86.79 - Personal history of other diseases of the circulatory system (ICD-  
   10)    
Status post laser ablation of incompetent vein    
   ?Z98.890 - Other specified postprocedural states (ICD-10)    
Status post laser ablation of incompetent vein    
   ?Z98.890 - Other specified postprocedural states (ICD-10)    
History of carpal tunnel surgery of right wrist    
   ?Z98.890 - Other specified postprocedural states (ICD-10)    
History of cholecystectomy    
   ?Z90.49 - Acquired absence of other specified parts of digestive tract (ICD-  
   10)    
    
    
Family History (Updated 08/19/24 @ 11:42 by Khang Bahena)    
Sister   Family history of diabetes mellitus    
Father   Family history of diabetes mellitus    
Other   Family history of CHF (congestive heart failure)    
   Family history of hypertension    
   Family history of myocardial infarction    
    
    
    
Social History (Updated 08/20/24 @ 10:55 by Khang Bahena)    
Within the past year, how often did you have a drink containing alcohol:  never     
Score interpretation:  A score less than 4 is consistent with normal alcohol   
consumption.     
Smoking status:  Never smoker     
Non-prescribed substance use:  denies use     
    
    
    
Meds    
Home Medications and Allergies    
                                Home Medications    
    
    
    
?Medication ?Instructions ?Recorded ?Confirmed ?Type    
     
Lactobacillus acidophilus 10 100 mmu cells PO DAILY 08/19/24 08/19/24 History    
    
billion cell capsule        
     
acetaminophen 500 mg capsule 500 mg PO Q6H PRN pain 08/19/24 08/19/24 History    
     
carvedilol 6.25 mg tablet 6.25 mg PO BID 08/19/24 08/19/24 History    
     
empagliflozin 10 mg-linagliptin 5 1 tab PO DAILY 08/19/24 08/19/24 History    
    
mg tablet        
     
furosemide 40 mg tablet 40 mg PO BID 08/19/24 08/19/24 History    
     
rivaroxaban 10 mg tablet 10 mg PO DAILY 08/19/24 08/19/24 History    
     
sacubitril 24 mg-valsartan 26 mg 1 tab PO BID 08/19/24 08/19/24 History    
    
tablet        
     
semaglutide 0.25 mg or 0.5 mg (2 0.25 mg subcut QWEEK 08/19/24 08/19/24 History    
    
mg/3 mL) subcutaneous pen injector        
    
(Ozempic)        
     
spironolactone 25 mg tablet 25 mg PO DAILY 08/19/24 08/19/24 History    
    
(Aldactone)        
    
    
    
                                    Allergies    
    
    
    
Allergy/AdvReac Type Severity Reaction Status Date / Time    
     
cefixime Allergy  Unknown Verified 08/19/24 11:44    
     
diclofenac Allergy  Unknown Verified 08/19/24 11:44    
    
    
    
    
Exam    
Narrative    
Exam Narrative:     
    
Reinaldo TOSCANO MD personally performed the services described in this   
documentation, as scribed by Gail Jimenez RVT, RDMS in my presence and it   
is both accurate and complete.    
    
    
    
Gail TOSCANO RVT, RDMS, am scribing for, and in the presence of, Dr. Reinaldo Anders and in the presence of the patient.    
Constitutional    
Documenting provider has reviewed patient's vital signs: yes    
Common normals: oriented x3    
Nutritional appearance: overweight    
Cardio    
Peripheral pulses: posterior tibial pulses present and dorsalis pedis pulses   
present    
Extremity    
Common normals: normal capillary refill    
General: calf tenderness and edema    
Right lower extremity: lower leg Right lower leg: inspection and palpation    
Left lower extremity: lower leg Left lower leg: inspection and palpation    
Neuro    
Common normals: oriented x3    
    
Results    
Imaging    
Venous US:     
      Radiologist's impression:     
The ultrasound demonstrates Varithena induced thrombus visualized at dist/med   
calf.     
    
Assessment and Plan    
Assessment and Plan    
(1) Phlebitis and thrombophlebitis of superficial vessels of left lower   
extremity:     
    
Plan    
    
Reinaldo TOSCANO MD personally performed the services described in this   
documentation, as scribed by Gail Jimenez RVT, RDMS in my presence and it   
is both accurate and complete.    
    
    
    
Gail TOSCANO RVT, RDMS, am scribing for, and in the presence of, Dr. Reinaldo Anders and in the presence of the patient.

## 2025-01-14 NOTE — W.VEIN
Discharge Plan
Discharge
Disposition: Home, Self-Care

Discharge Medications:
No Action
  carvedilol 6.25 mg tablet 
   6.25 mg PO BID 
   Rx Instructions:
   must administer with a meal/food
  furosemide 40 mg tablet 
   40 mg PO BID 
  acetaminophen 500 mg capsule 
   500 mg PO Q6H PRN (Reason: pain) 
  empagliflozin-linagliptin 10-5 mg tablet 
   1 tab PO DAILY 
  rivaroxaban 10 mg tablet 
   10 mg PO DAILY 
  spironolactone [Aldactone] 25 mg tablet 
   25 mg PO DAILY 
  sacubitril-valsartan 24-26 mg tablet 
   1 tab PO BID 
  Lactobacillus acidophilus 10 billion cell capsule 
   100 mmu cells PO DAILY 
  Ozempic 0.25 mg or 0.5 mg (2 mg/3 mL) pen injector 
   0.25 mg subcut QWEEK 
   Rx Instructions:
   for 4 weeks

Plan of Treatment:
The patient is currently done with treatment and will call and return if any new problems or symptoms occur.

Print Language: English

Discharge Date/Time: 01/14/25 14:49

## 2025-01-14 NOTE — VEIN_ITS
Patient Name:      
JOCELIN ARORA 
      
MR#: [id number]      
: 1955 
Accession #: [id number] 
Exam Date: 2025  
Ordering Doctor: DR KAYY JUNIOR M.D. 
  
RADIOLOGY REPORT 
  
PROCEDURE: [facility] EST LMTD 
  
VEIN CENTER - OFFICE VISIT FOLLOW UP 
  
COMPARISON:  [facility] EST TD, 2024. 
  
PROGRESS NOTES: 
  
The patient reports improvement in leg symptoms.  There has been interval  
reduction in varicosities.  The patient has followed our recommendations to  
walk 20-30 minutes once or twice per day since the procedure.  
  
Physical exam demonstrates decrease in varicosities of the leg.  
  
Review of the ultrasound performed the same day demonstrates occlusive  
thrombus extending throughout the treated vein(s), see separate report,  
consistent with a successful ablation. No thrombus extending into or beyond  
the saphenofemoral junction. 
  
The patient notes significant improvement in both legs. 
  
ORDER #: [id number] VEIN/[facility] EST LMTD  
IMPRESSION:  
1. Successful ablation of the left leg treated branch saphenous vein(s).  
2. No remaining varicosities within right or left leg in need of treatment.  
   
PLAN:  
   
No additional treatment needed at this time.  Patient is doing quite well and   
pleased with improvement of his legs.  Patient will follow-up in future as   
needed.  
   
   
Nurse notes, history and physical were reviewed and confirmed, see attached   
forms.  
The nurse was present throughout the physical exam and consultation  
   
   
   
   
   
Dictated by: Kayy Junior M.D. on 2025 at 15:16       
Approved by: Kayy Junior M.D. on 2025 at 15:17

## 2025-02-25 ENCOUNTER — APPOINTMENT (OUTPATIENT)
Dept: CARDIOLOGY | Facility: CLINIC | Age: 70
End: 2025-02-25
Payer: MEDICARE

## 2025-02-25 VITALS
BODY MASS INDEX: 44.1 KG/M2 | WEIGHT: 315 LBS | DIASTOLIC BLOOD PRESSURE: 80 MMHG | HEIGHT: 71 IN | SYSTOLIC BLOOD PRESSURE: 126 MMHG | HEART RATE: 72 BPM

## 2025-02-25 DIAGNOSIS — I25.10 ASHD (ARTERIOSCLEROTIC HEART DISEASE): ICD-10-CM

## 2025-02-25 DIAGNOSIS — Z78.9 NEVER SMOKED TOBACCO: ICD-10-CM

## 2025-02-25 DIAGNOSIS — I42.8 NON-ISCHEMIC CARDIOMYOPATHY (MULTI): ICD-10-CM

## 2025-02-25 PROCEDURE — 3008F BODY MASS INDEX DOCD: CPT | Performed by: INTERNAL MEDICINE

## 2025-02-25 PROCEDURE — 99214 OFFICE O/P EST MOD 30 MIN: CPT | Performed by: INTERNAL MEDICINE

## 2025-02-25 PROCEDURE — 1159F MED LIST DOCD IN RCRD: CPT | Performed by: INTERNAL MEDICINE

## 2025-02-25 PROCEDURE — 1036F TOBACCO NON-USER: CPT | Performed by: INTERNAL MEDICINE

## 2025-02-25 PROCEDURE — 1160F RVW MEDS BY RX/DR IN RCRD: CPT | Performed by: INTERNAL MEDICINE

## 2025-02-25 NOTE — PATIENT INSTRUCTIONS
Please bring all medicines, vitamins, and herbal supplements with you when you come to the office.    Prescriptions will not be filled unless you are compliant with your follow up appointments or have a follow up appointment scheduled as per instruction of your physician. Refills should be requested at the time of your visit.   BMI was above normal measurement. Current weight: (!) 168 kg (369 lb 6.4 oz)  Weight change since last visit (-) denotes wt loss -8.6 lbs   Weight loss needed to achieve BMI 25: 190.5 Lbs  Weight loss needed to achieve BMI 30: 154.8 Lbs  Provided instructions on dietary changes  Provided instructions on exercise.

## 2025-02-25 NOTE — PROGRESS NOTES
"Subjective   Reuben Black is a 70 y.o. male       Chief Complaint    Follow-up          70-year-old gentleman here for follow-up at 8 months, he has a history of moderate nonischemic cardiomyopathy with mild coronary artery disease; remains on appropriate GDMT.  He has had no hospitalizations or symptomatic pulmonary edema.  He has persistent lower extremity venous edema with recent venous ablation with Dr. Resendez at [facility].    He remains obese, current weight is 369 pounds and remains on Ozempic injections.  The first year he was on Ozempic worked very well with 60 pound weight loss but he has gained this back.    He has had no recent labs to assess.  Current NYHA functional class II-III/stage C.    Recommendations: Continue current therapies, obtain appropriate labs and BNP and follow-up within a year                 Review of Systems   All other systems reviewed and are negative.           Vitals:    02/25/25 1545   BP: 126/80   BP Location: Left arm   Patient Position: Sitting   Pulse: 72   Weight: (!) 168 kg (369 lb 6.4 oz)   Height: 1.803 m (5' 11\")        Objective   Physical Exam  Constitutional:       Appearance: Normal appearance.   HENT:      Nose: Nose normal.   Neck:      Vascular: No carotid bruit.   Cardiovascular:      Rate and Rhythm: Normal rate.      Pulses: Normal pulses.      Heart sounds: Normal heart sounds.   Pulmonary:      Effort: Pulmonary effort is normal.   Abdominal:      General: Bowel sounds are normal.      Palpations: Abdomen is soft.   Musculoskeletal:         General: Normal range of motion.      Cervical back: Normal range of motion.      Right lower leg: No edema.      Left lower leg: No edema.   Skin:     General: Skin is warm and dry.   Neurological:      General: No focal deficit present.      Mental Status: He is alert.   Psychiatric:         Mood and Affect: Mood normal.         Behavior: Behavior normal.         Thought Content: Thought content normal.         " Judgment: Judgment normal.         Allergies  Cephalosporins, Diclofenac sodium, and Statins-hmg-coa reductase inhibitors     Current Medications    Current Outpatient Medications:     carvedilol (Coreg) 6.25 mg tablet, Take 1 tablet (6.25 mg) by mouth 2 times daily (morning and late afternoon)., Disp: , Rfl:     empagliflozin (Jardiance) 10 mg, Take 1 tablet (10 mg) by mouth once daily., Disp: 90 tablet, Rfl: 3    furosemide (Lasix) 40 mg tablet, Take 1 tablet (40 mg) by mouth 2 times a day., Disp: , Rfl:     rivaroxaban (Xarelto) 10 mg tablet, Take 1 tablet (10 mg) by mouth once daily., Disp: , Rfl:     sacubitriL-valsartan (Entresto) 24-26 mg tablet, Take 1 tablet by mouth 2 times a day., Disp: 180 tablet, Rfl: 3    semaglutide (Ozempic) 1 mg/dose (4 mg/3 mL) pen injector, Inject 1 mg under the skin per week., Disp: , Rfl:     spironolactone (Aldactone) 25 mg tablet, Take 1 tablet (25 mg) by mouth once daily., Disp: 90 tablet, Rfl: 3                     Assessment/Plan   1. Non-ischemic cardiomyopathy (Multi)  Follow Up In Cardiology      2. ASHD (arteriosclerotic heart disease)        3. BMI 50.0-59.9, adult (Multi)        4. Never smoked tobacco                 Scribe Attestation  By signing my name below, INy LPN Scribe   attest that this documentation has been prepared under the direction and in the presence of Logan Caraballo DO.     Provider Attestation - Scribe documentation    All medical record entries made by the Scribe were at my direction and personally dictated by me. I have reviewed the chart and agree that the record accurately reflects my personal performance of the history, physical exam, discussion and plan.

## 2025-02-25 NOTE — LETTER
"February 25, 2025     Danay Harkins,   [address]  [facility] [address]    Patient: Reuben Black   YOB: 1955   Date of Visit: 2/25/2025       Dear Dr. Danay Harkins, DO:    Thank you for referring Reuben Black to me for evaluation. Below are my notes for this consultation.  If you have questions, please do not hesitate to call me. I look forward to following your patient along with you.       Sincerely,     Logan Caraballo DO      CC: No Recipients  ______________________________________________________________________________________    Subjective   Reuben Black is a 70 y.o. male       Chief Complaint    Follow-up          70-year-old gentleman here for follow-up at 8 months, he has a history of moderate nonischemic cardiomyopathy with mild coronary artery disease; remains on appropriate GDMT.  He has had no hospitalizations or symptomatic pulmonary edema.  He has persistent lower extremity venous edema with recent venous ablation with Dr. Resendez at [facility].    He remains obese, current weight is 369 pounds and remains on Ozempic injections.  The first year he was on Ozempic worked very well with 60 pound weight loss but he has gained this back.    He has had no recent labs to assess.  Current NYHA functional class II-III/stage C.    Recommendations: Continue current therapies, obtain appropriate labs and BNP and follow-up within a year                 Review of Systems   All other systems reviewed and are negative.           Vitals:    02/25/25 1545   BP: 126/80   BP Location: Left arm   Patient Position: Sitting   Pulse: 72   Weight: (!) 168 kg (369 lb 6.4 oz)   Height: 1.803 m (5' 11\")        Objective   Physical Exam  Constitutional:       Appearance: Normal appearance.   HENT:      Nose: Nose normal.   Neck:      Vascular: No carotid bruit.   Cardiovascular:      Rate and Rhythm: Normal rate.      Pulses: Normal pulses.      Heart sounds: Normal " heart sounds.   Pulmonary:      Effort: Pulmonary effort is normal.   Abdominal:      General: Bowel sounds are normal.      Palpations: Abdomen is soft.   Musculoskeletal:         General: Normal range of motion.      Cervical back: Normal range of motion.      Right lower leg: No edema.      Left lower leg: No edema.   Skin:     General: Skin is warm and dry.   Neurological:      General: No focal deficit present.      Mental Status: He is alert.   Psychiatric:         Mood and Affect: Mood normal.         Behavior: Behavior normal.         Thought Content: Thought content normal.         Judgment: Judgment normal.         Allergies  Cephalosporins, Diclofenac sodium, and Statins-hmg-coa reductase inhibitors     Current Medications    Current Outpatient Medications:   •  carvedilol (Coreg) 6.25 mg tablet, Take 1 tablet (6.25 mg) by mouth 2 times daily (morning and late afternoon)., Disp: , Rfl:   •  empagliflozin (Jardiance) 10 mg, Take 1 tablet (10 mg) by mouth once daily., Disp: 90 tablet, Rfl: 3  •  furosemide (Lasix) 40 mg tablet, Take 1 tablet (40 mg) by mouth 2 times a day., Disp: , Rfl:   •  rivaroxaban (Xarelto) 10 mg tablet, Take 1 tablet (10 mg) by mouth once daily., Disp: , Rfl:   •  sacubitriL-valsartan (Entresto) 24-26 mg tablet, Take 1 tablet by mouth 2 times a day., Disp: 180 tablet, Rfl: 3  •  semaglutide (Ozempic) 1 mg/dose (4 mg/3 mL) pen injector, Inject 1 mg under the skin per week., Disp: , Rfl:   •  spironolactone (Aldactone) 25 mg tablet, Take 1 tablet (25 mg) by mouth once daily., Disp: 90 tablet, Rfl: 3                     Assessment/Plan   1. Non-ischemic cardiomyopathy (Multi)  Follow Up In Cardiology      2. ASHD (arteriosclerotic heart disease)        3. BMI 50.0-59.9, adult (Multi)        4. Never smoked tobacco                 Scribe Attestation  By signing my name below, Ny KAUFMAN LPN  , Scribe   attest that this documentation has been prepared under the direction and in the  presence of Logan Caraballo DO.     Provider Attestation - Scribe documentation    All medical record entries made by the Scribe were at my direction and personally dictated by me. I have reviewed the chart and agree that the record accurately reflects my personal performance of the history, physical exam, discussion and plan.

## 2025-02-26 LAB
NON-UH HIE ANION GAP: 10.3 MEQ/L (ref 6–15)
NON-UH HIE B-TYPE NATRIURETIC PEPTIDE: 19 PG/ML (ref 5–100)
NON-UH HIE BLOOD UREA NITROGEN: 21 MG/DL (ref 7–25)
NON-UH HIE CALCIUM: 9.5 MG/DL (ref 8.6–10.3)
NON-UH HIE CARBON DIOXIDE: 28 MMOL/L (ref 21–31)
NON-UH HIE CHLORIDE: 102 MMOL/L (ref 98–107)
NON-UH HIE CREATININE: 1.17 MG/DL (ref 0.7–1.3)
NON-UH HIE ESTIMATED GFR: >60 ML/MIN
NON-UH HIE GLUCOSE: 88 MG/DL (ref 70–100)
NON-UH HIE POTASSIUM: 4.3 MMOL/L (ref 3.5–5.1)
NON-UH HIE SODIUM: 136 MMOL/L (ref 136–145)

## 2025-03-03 ENCOUNTER — TELEPHONE (OUTPATIENT)
Dept: CARDIOLOGY | Facility: CLINIC | Age: 70
End: 2025-03-03
Payer: MEDICARE

## 2025-03-03 NOTE — TELEPHONE ENCOUNTER
Result Communication    Resulted Orders   [facility] Basic Metabolic Panel   Result Value Ref Range    [facility] Glucose 88 70 - 100 mg/dL      Comment:         Random Glucose Reference Range is dependent on time and   content of last meal. Glucose of more than 200 mg/dL in a   nonstressed, ambulatory subject supports the diagnosis of   Diabetes Mellitus.   ADA recommended reference range    [facility] Blood Urea Nitrogen 21 7 - 25 mg/dL    [facility] Creatinine 1.17 0.70 - 1.30 mg/dL    [facility] ESTIMATED GFR >60.0 mL/Min    [facility] Sodium 136 136 - 145 mmol/L    [facility] Potassium 4.3 3.5 - 5.1 mmol/L    [facility] Chloride 102 98 - 107 mmol/L    [facility] Carbon Dioxide 28.0 21.0 - 31.0 mmol/L    [facility] Anion Gap 10.3 6.0 - 15.0 meq/L    [facility] Calcium 9.5 8.6 - 10.3 mg/dL      Comment:      PERFORMED BY:[facility] [place] [address] [place] [id number] MEDICAL DIRECTORALONSO MCCRARY M.D.   [facility] B-Type Natriuretic Peptide   Result Value Ref Range    [facility] B-Type Natriuretic Peptide 19.0 5 - 100 pg/mL      Comment:      PERFORMED BY:[facility] [place] [address] [place] [id number] MEDICAL DIRECTORALONSO MCCRARY M.D.       3:50 PM      Results were successfully communicated with the patient and they acknowledged their understanding.

## 2025-03-03 NOTE — TELEPHONE ENCOUNTER
----- Message from Logan Caraballo sent at 3/3/2025  3:36 PM EST -----  No new orders. Please call patient with normal result.

## 2025-05-14 DIAGNOSIS — R73.03 PREDIABETES: ICD-10-CM

## 2025-05-14 NOTE — TELEPHONE ENCOUNTER
Patient called for refill on Jardiance. Feb 2026 visit pending.      Left message on his a/m to inquire about what pharm he would like rx sent to

## 2025-05-15 NOTE — TELEPHONE ENCOUNTER
Pt phones back would like sent to [facility]. Order prepped and sent to Liliana Gracia NP for signature

## 2025-08-01 DIAGNOSIS — I50.9 HEART FAILURE, NYHA CLASS 2: ICD-10-CM

## 2025-08-05 RX ORDER — SPIRONOLACTONE 25 MG/1
25 TABLET ORAL DAILY
Qty: 90 TABLET | Refills: 3 | Status: SHIPPED | OUTPATIENT
Start: 2025-08-05 | End: 2026-08-05

## 2026-02-25 ENCOUNTER — APPOINTMENT (OUTPATIENT)
Dept: CARDIOLOGY | Facility: CLINIC | Age: 71
End: 2026-02-25
Payer: MEDICARE